# Patient Record
Sex: FEMALE | Race: BLACK OR AFRICAN AMERICAN | NOT HISPANIC OR LATINO | Employment: OTHER | URBAN - METROPOLITAN AREA
[De-identification: names, ages, dates, MRNs, and addresses within clinical notes are randomized per-mention and may not be internally consistent; named-entity substitution may affect disease eponyms.]

---

## 2018-11-17 DIAGNOSIS — J84.9 ILD (INTERSTITIAL LUNG DISEASE): ICD-10-CM

## 2018-11-17 DIAGNOSIS — R06.09 DOE (DYSPNEA ON EXERTION): Primary | ICD-10-CM

## 2018-11-20 ENCOUNTER — HOSPITAL ENCOUNTER (OUTPATIENT)
Dept: PULMONOLOGY | Facility: CLINIC | Age: 63
Discharge: HOME | End: 2018-11-20
Payer: MEDICARE

## 2018-11-20 ENCOUNTER — OFFICE VISIT (OUTPATIENT)
Dept: PULMONOLOGY | Facility: CLINIC | Age: 63
End: 2018-11-20
Payer: MEDICARE

## 2018-11-20 ENCOUNTER — TELEPHONE (OUTPATIENT)
Dept: TRANSPLANT | Facility: CLINIC | Age: 63
End: 2018-11-20

## 2018-11-20 ENCOUNTER — HOSPITAL ENCOUNTER (OUTPATIENT)
Dept: RADIOLOGY | Facility: HOSPITAL | Age: 63
Discharge: HOME | End: 2018-11-20
Attending: INTERNAL MEDICINE
Payer: MEDICARE

## 2018-11-20 VITALS
WEIGHT: 200 LBS | HEIGHT: 67 IN | HEART RATE: 94 BPM | SYSTOLIC BLOOD PRESSURE: 112 MMHG | BODY MASS INDEX: 31.39 KG/M2 | OXYGEN SATURATION: 97 % | DIASTOLIC BLOOD PRESSURE: 76 MMHG

## 2018-11-20 DIAGNOSIS — R06.09 DOE (DYSPNEA ON EXERTION): ICD-10-CM

## 2018-11-20 DIAGNOSIS — J84.9 ILD (INTERSTITIAL LUNG DISEASE): ICD-10-CM

## 2018-11-20 DIAGNOSIS — Z01.818 ENCOUNTER FOR PRE-TRANSPLANT EVALUATION FOR LUNG TRANSPLANT: ICD-10-CM

## 2018-11-20 DIAGNOSIS — Z76.82 LUNG TRANSPLANT CANDIDATE: ICD-10-CM

## 2018-11-20 DIAGNOSIS — J84.9 ILD (INTERSTITIAL LUNG DISEASE): Primary | ICD-10-CM

## 2018-11-20 LAB
PRE FEV1 FVC: 88
PRE FEV1: 1.2
PRE FVC: 1.36
PREDICTED FEV1 FVC: 78
PREDICTED FEV1: 2.63
PREDICTED FVC: 3.33

## 2018-11-20 PROCEDURE — 71046 X-RAY EXAM CHEST 2 VIEWS: CPT | Mod: TC,FY

## 2018-11-20 PROCEDURE — 94727 GAS DIL/WSHOT DETER LNG VOL: CPT | Mod: 26,S$PBB,, | Performed by: INTERNAL MEDICINE

## 2018-11-20 PROCEDURE — 94729 DIFFUSING CAPACITY: CPT | Mod: PBBFAC | Performed by: INTERNAL MEDICINE

## 2018-11-20 PROCEDURE — 99204 OFFICE O/P NEW MOD 45 MIN: CPT | Mod: 25,S$PBB,, | Performed by: INTERNAL MEDICINE

## 2018-11-20 PROCEDURE — 99213 OFFICE O/P EST LOW 20 MIN: CPT | Mod: PBBFAC,25 | Performed by: INTERNAL MEDICINE

## 2018-11-20 PROCEDURE — 71046 X-RAY EXAM CHEST 2 VIEWS: CPT | Mod: 26,,, | Performed by: RADIOLOGY

## 2018-11-20 PROCEDURE — 94010 BREATHING CAPACITY TEST: CPT | Mod: PBBFAC | Performed by: INTERNAL MEDICINE

## 2018-11-20 PROCEDURE — 94010 BREATHING CAPACITY TEST: CPT | Mod: 26,S$PBB,, | Performed by: INTERNAL MEDICINE

## 2018-11-20 PROCEDURE — 94729 DIFFUSING CAPACITY: CPT | Mod: 26,S$PBB,, | Performed by: INTERNAL MEDICINE

## 2018-11-20 PROCEDURE — 99999 PR PBB SHADOW E&M-EST. PATIENT-LVL III: CPT | Mod: PBBFAC,,, | Performed by: INTERNAL MEDICINE

## 2018-11-20 PROCEDURE — 94727 GAS DIL/WSHOT DETER LNG VOL: CPT | Mod: PBBFAC | Performed by: INTERNAL MEDICINE

## 2018-11-20 RX ORDER — DILTIAZEM HYDROCHLORIDE 120 MG/1
120 CAPSULE, EXTENDED RELEASE ORAL DAILY
COMMUNITY
Start: 2018-11-07 | End: 2020-01-01

## 2018-11-20 RX ORDER — ALBUTEROL SULFATE 90 UG/1
1 AEROSOL, METERED RESPIRATORY (INHALATION) EVERY 6 HOURS PRN
Status: ON HOLD | COMMUNITY
Start: 2018-10-31 | End: 2020-01-01 | Stop reason: HOSPADM

## 2018-11-20 RX ORDER — NINTEDANIB 150 MG/1
CAPSULE ORAL
Refills: 6 | Status: ON HOLD | COMMUNITY
Start: 2018-10-22 | End: 2020-01-01 | Stop reason: HOSPADM

## 2018-11-20 RX ORDER — GABAPENTIN 100 MG/1
100 CAPSULE ORAL 3 TIMES DAILY
Status: ON HOLD | COMMUNITY
Start: 2018-11-12 | End: 2020-01-01 | Stop reason: SDUPTHER

## 2018-11-20 RX ORDER — LOSARTAN POTASSIUM AND HYDROCHLOROTHIAZIDE 25; 100 MG/1; MG/1
1 TABLET ORAL DAILY
COMMUNITY
Start: 2018-11-12 | End: 2020-01-01

## 2018-11-20 RX ORDER — FLUTICASONE PROPIONATE AND SALMETEROL 50; 250 UG/1; UG/1
1 POWDER RESPIRATORY (INHALATION) 2 TIMES DAILY
Status: ON HOLD | COMMUNITY
Start: 2018-11-12 | End: 2020-01-01 | Stop reason: HOSPADM

## 2018-11-20 NOTE — TELEPHONE ENCOUNTER
Received call from Dr. Costa stating that patient was inadvertently referred to him instead of the transplant clinic.  He asked if one of our providers was available to see the patient today.  Explained that we do not have any one available today, but that we also need to get financial clearance to schedule testing and consult.  Explained that I would be in contact with the patient to schedule.  He will drop records off in the transplant clinic.      Attempted to contact patient, phone went straight to message stating that the subscriber is not available.  Contacted patient's daughter who states she is with her mom.  Offered to place them at the [facility] overnight and schedule her to be seen tomorrow afternoon.  She states that they have several people with them and would not be able to stay the night.  Explained that in that case, we could schedule her next week.  She states that would be better.

## 2018-11-20 NOTE — LETTER
11/23/2018    Nitin Ariza  [address]  [place] [address]  Phone: [phone]  Fax: [phone]         Dear Dr. Nitin Ariza    Patient: Chely Becerra     MR Number: [id number]     YOB: 1955       Thank you for the referral of Chely Becerra to our lung transplant program. An initial appointment with the transplant team has been scheduled for November 29, 2018. You will receive an after-visit summary following the completion of your patients appointment in our clinic.    Thank you again for your trust in our program. If there is anything we can do for you or your staff, please feel free to contact us at [phone].    Sincerely,         Niraj Garza MD   Director, Lung Transplantation   Pulmonary & Critical Care Medicine    [facility]  [address] [place] [address]  [phone]

## 2018-11-21 NOTE — PROGRESS NOTES
Subjective:      Patient ID: Chely Becerra is a 63 y.o. female.    Chief Complaint: No chief complaint on file.    HPI  Pleasant 62 yo AA female from [place], [place] is referred by Dr. Isidro for evaluation of possible lung transplant for management of interstitial lung disease She was diagnosed in 2013 with fibrotic lung disease and has been on OFEV for three years and de saturated with walking. In my office she dropped for 95% to 84% walking the length of the branch and back. She brings complete medical records with her.   Her PFT's today show severe Restriction  FVC: 47% and DLCO 43%  Her chest x- ray has bilateral volume loss and fibrotic changes.   She has had a cholecystectomy  and resection of a benign colon mass.No other surgeries and otherwise enjoys good  Health. No hx of cardiac issues or diabetes.  She wound up in my pulmonary clinic,her appointment should have been initially set up in the transplant clinic. They will review my note and her outside records and then contact the patient.      No flowsheet data found.  Review of Systems   Constitutional: Negative.    HENT: Negative.    Eyes: Negative.    Respiratory: Negative.  Positive for dyspnea on extertion. Negative for somnolence.         ILD  [id number]     Currently on OFEV and supplemental oxygen.   Cardiovascular: Negative.    Genitourinary: Negative.    Musculoskeletal: Negative.    Skin: Negative.    Gastrointestinal: Negative.         S.P Cholecystectomy    SP Resection of a benign colon mass   Neurological: Negative.    Psychiatric/Behavioral: Negative.      Objective:     Physical Exam   Constitutional: She is oriented to person, place, and time. She appears well-developed and well-nourished. No distress.   HENT:   Head: Normocephalic and atraumatic.   Right Ear: External ear normal.   Left Ear: External ear normal.   Nose: Nose normal.   Mouth/Throat: Oropharynx is clear and moist.   Eyes: Conjunctivae and EOM are normal. Pupils are equal,  round, and reactive to light.   Neck: Normal range of motion. Neck supple. No JVD present. No thyromegaly present.   Cardiovascular: Normal rate, regular rhythm, normal heart sounds and intact distal pulses. Exam reveals no gallop.   No murmur heard.  Pulmonary/Chest: Breath sounds normal. No stridor. No respiratory distress. She has no wheezes. She has no rales. She exhibits no tenderness.   Faint bi basilar crackles.    No digital clubbing   Abdominal: Soft. Bowel sounds are normal. She exhibits no distension and no mass. There is no tenderness. There is no rebound and no guarding.   Musculoskeletal: Normal range of motion. She exhibits no edema.   Lymphadenopathy:     She has no cervical adenopathy.   Neurological: She is alert and oriented to person, place, and time. She has normal reflexes. She displays normal reflexes. No cranial nerve deficit.   Skin: Skin is warm and dry. No rash noted.   Psychiatric: She has a normal mood and affect. Her behavior is normal. Judgment and thought content normal.   Nursing note and vitals reviewed.      Assessment:     1. ILD (interstitial lung disease)    2. Encounter for pre-transplant evaluation for lung transplant      Outpatient Encounter Medications as of 11/20/2018   Medication Sig Dispense Refill    ADVAIR DISKUS 250-50 mcg/dose diskus inhaler       diltiaZEM HCl (TIAZAC) 120 mg 24 hr capsule       gabapentin (NEURONTIN) 100 MG capsule       losartan-hydrochlorothiazide 100-25 mg (HYZAAR) 100-25 mg per tablet       OFEV 150 mg Cap TAKE 1 CAPSULE BY MOUTH TWICE A DAY  EVERY 12 HOURS  AS DIRECTED WITH FOOD  6    VENTOLIN HFA 90 mcg/actuation inhaler        No facility-administered encounter medications on file as of 11/20/2018.        Plan:   To refer to the lung transplant clinic for review and hopefully they will contact her and ask her to return.  Problem List Items Addressed This Visit     None      Visit Diagnoses     ILD (interstitial lung disease)    -   Primary    Relevant Orders    X-Ray Chest PA And Lateral (Completed)    Encounter for pre-transplant evaluation for lung transplant

## 2018-11-23 NOTE — TELEPHONE ENCOUNTER
Notified patient that she is scheduled for consultation on 11/29 starting at 9:15.  Explained that the consult appointment is where a patient's candidacy to move forward in the evaluation process is determined.  Explained that at this time, she is not a candidate to move forward regardless of what her test results show since she does not have adequate insurance coverage.  Explained that she needs medication coverage (Part D), and a supplement (Part F) in order to move forward in the process should she be a candidate to do so.  Advised that she call Medicare to determine options.  Pt verbalized understanding of all points discussed.

## 2018-11-26 ENCOUNTER — TELEPHONE (OUTPATIENT)
Dept: TRANSPLANT | Facility: CLINIC | Age: 63
End: 2018-11-26

## 2018-11-26 NOTE — TELEPHONE ENCOUNTER
Pt would like to come on 12/13 so her daughter can accompany her.  Scheduling card placed on 's desk.    ----- Message from Azucena Blair sent at 11/26/2018  3:20 PM CST -----  Contact: [phone]  /[phone]  Pt is calling to speak with the nurse to request if possible to reschedule her appt.    Please contact pt      Thank you!

## 2018-12-03 ENCOUNTER — TELEPHONE (OUTPATIENT)
Dept: TRANSPLANT | Facility: CLINIC | Age: 63
End: 2018-12-03

## 2018-12-13 ENCOUNTER — HOSPITAL ENCOUNTER (OUTPATIENT)
Dept: PULMONOLOGY | Facility: CLINIC | Age: 63
Discharge: HOME | End: 2018-12-13
Payer: MEDICARE

## 2018-12-13 ENCOUNTER — HOSPITAL ENCOUNTER (OUTPATIENT)
Dept: CARDIOLOGY | Facility: CLINIC | Age: 63
Discharge: HOME | End: 2018-12-13
Attending: INTERNAL MEDICINE
Payer: MEDICARE

## 2018-12-13 ENCOUNTER — INITIAL CONSULT (OUTPATIENT)
Dept: TRANSPLANT | Facility: CLINIC | Age: 63
End: 2018-12-13
Payer: MEDICARE

## 2018-12-13 ENCOUNTER — HOSPITAL ENCOUNTER (OUTPATIENT)
Dept: RADIOLOGY | Facility: HOSPITAL | Age: 63
Discharge: HOME | End: 2018-12-13
Attending: INTERNAL MEDICINE
Payer: MEDICARE

## 2018-12-13 VITALS
RESPIRATION RATE: 20 BRPM | WEIGHT: 201.06 LBS | OXYGEN SATURATION: 96 % | HEIGHT: 66 IN | BODY MASS INDEX: 32.31 KG/M2 | SYSTOLIC BLOOD PRESSURE: 123 MMHG | DIASTOLIC BLOOD PRESSURE: 62 MMHG | HEART RATE: 75 BPM | WEIGHT: 201.06 LBS | TEMPERATURE: 98 F | HEIGHT: 66 IN | BODY MASS INDEX: 32.31 KG/M2

## 2018-12-13 VITALS
DIASTOLIC BLOOD PRESSURE: 74 MMHG | HEIGHT: 67 IN | WEIGHT: 200 LBS | HEART RATE: 73 BPM | SYSTOLIC BLOOD PRESSURE: 110 MMHG | BODY MASS INDEX: 31.39 KG/M2

## 2018-12-13 DIAGNOSIS — Z76.82 LUNG TRANSPLANT CANDIDATE: ICD-10-CM

## 2018-12-13 DIAGNOSIS — J84.9 ILD (INTERSTITIAL LUNG DISEASE): ICD-10-CM

## 2018-12-13 DIAGNOSIS — J84.112 IPF (IDIOPATHIC PULMONARY FIBROSIS): Primary | ICD-10-CM

## 2018-12-13 DIAGNOSIS — E66.9 OBESITY (BMI 30.0-34.9): ICD-10-CM

## 2018-12-13 DIAGNOSIS — J96.11 CHRONIC RESPIRATORY FAILURE WITH HYPOXIA: ICD-10-CM

## 2018-12-13 LAB
ASCENDING AORTA: 3.26 CM
AV INDEX (PROSTH): 0.84
AV MEAN GRADIENT: 3.88 MMHG
AV PEAK GRADIENT: 7.84 MMHG
AV VALVE AREA: 2.96 CM2
BSA FOR ECHO PROCEDURE: 2.07 M2
CV ECHO LV RWT: 0.24 CM
DOP CALC AO PEAK VEL: 1.4 M/S
DOP CALC AO VTI: 26.56 CM
DOP CALC LVOT AREA: 3.53 CM2
DOP CALC LVOT DIAMETER: 2.12 CM
DOP CALC LVOT STROKE VOLUME: 78.71 CM3
DOP CALCLVOT PEAK VEL VTI: 22.31 CM
E WAVE DECELERATION TIME: 189.84 MSEC
E/A RATIO: 1.18
E/E' RATIO: 8.11
ECHO LV POSTERIOR WALL: 0.57 CM (ref 0.6–1.1)
FRACTIONAL SHORTENING: 34 % (ref 28–44)
INTERVENTRICULAR SEPTUM: 0.93 CM (ref 0.6–1.1)
IVRT: 0.13 MSEC
LA MAJOR: 5.32 CM
LA MINOR: 5.29 CM
LA WIDTH: 4.08 CM
LEFT ATRIUM SIZE: 3.97 CM
LEFT ATRIUM VOLUME INDEX: 36.1 ML/M2
LEFT ATRIUM VOLUME: 73.04 CM3
LEFT INTERNAL DIMENSION IN SYSTOLE: 3.11 CM (ref 2.1–4)
LEFT VENTRICLE DIASTOLIC VOLUME INDEX: 50.21 ML/M2
LEFT VENTRICLE DIASTOLIC VOLUME: 101.52 ML
LEFT VENTRICLE MASS INDEX: 55.2 G/M2
LEFT VENTRICLE SYSTOLIC VOLUME INDEX: 18.8 ML/M2
LEFT VENTRICLE SYSTOLIC VOLUME: 38.07 ML
LEFT VENTRICULAR INTERNAL DIMENSION IN DIASTOLE: 4.68 CM (ref 3.5–6)
LEFT VENTRICULAR MASS: 111.69 G
LV LATERAL E/E' RATIO: 5.92
LV SEPTAL E/E' RATIO: 12.83
MV PEAK A VEL: 0.65 M/S
MV PEAK E VEL: 0.77 M/S
PISA TR MAX VEL: 2.74 M/S
PULM VEIN S/D RATIO: 1.2
PV PEAK D VEL: 0.44 M/S
PV PEAK S VEL: 0.53 M/S
PV PEAK VELOCITY: 0.8 CM/S
RA MAJOR: 5.37 CM
RA WIDTH: 3.81 CM
RIGHT VENTRICULAR END-DIASTOLIC DIMENSION: 3 CM
RV TISSUE DOPPLER FREE WALL SYSTOLIC VELOCITY 1 (APICAL 4 CHAMBER VIEW): 13.34 M/S
SINUS: 3.29 CM
STJ: 2.87 CM
TDI LATERAL: 0.13
TDI SEPTAL: 0.06
TDI: 0.1
TR MAX PG: 30.03 MMHG
TRICUSPID ANNULAR PLANE SYSTOLIC EXCURSION: 1.01 CM

## 2018-12-13 PROCEDURE — 71250 CT THORAX DX C-: CPT | Mod: 26,TXP,, | Performed by: RADIOLOGY

## 2018-12-13 PROCEDURE — 94618 PULMONARY STRESS TESTING: CPT | Mod: 26,S$PBB,TXP, | Performed by: INTERNAL MEDICINE

## 2018-12-13 PROCEDURE — 93306 TTE W/DOPPLER COMPLETE: CPT | Mod: PBBFAC,TXP | Performed by: INTERNAL MEDICINE

## 2018-12-13 PROCEDURE — 94618 PULMONARY STRESS TESTING: CPT | Mod: PBBFAC,TXP | Performed by: INTERNAL MEDICINE

## 2018-12-13 PROCEDURE — 99213 OFFICE O/P EST LOW 20 MIN: CPT | Mod: PBBFAC,25,TXP | Performed by: INTERNAL MEDICINE

## 2018-12-13 PROCEDURE — 71250 CT THORAX DX C-: CPT | Mod: TC,TXP

## 2018-12-13 PROCEDURE — 99999 PR PBB SHADOW E&M-EST. PATIENT-LVL III: CPT | Mod: PBBFAC,TXP,, | Performed by: INTERNAL MEDICINE

## 2018-12-13 PROCEDURE — 99204 OFFICE O/P NEW MOD 45 MIN: CPT | Mod: 25,S$PBB,TXP, | Performed by: INTERNAL MEDICINE

## 2018-12-13 RX ORDER — LOPERAMIDE HCL 2 MG
2 TABLET ORAL 4 TIMES DAILY PRN
Status: ON HOLD | COMMUNITY
End: 2020-01-01 | Stop reason: HOSPADM

## 2018-12-13 RX ORDER — CALCIUM CARBONATE 200(500)MG
1 TABLET,CHEWABLE ORAL DAILY
COMMUNITY
End: 2020-01-01

## 2018-12-13 RX ORDER — CALC/MAG/B COMPLEX/D3/HERB 61
15 TABLET ORAL DAILY
Status: ON HOLD | COMMUNITY
End: 2020-01-01 | Stop reason: SDUPTHER

## 2018-12-13 NOTE — LETTER
December 14, 2018        Nitin Ariza  [address] [facility] [address]  [address]  [place] [address]  Phone: [phone]  Fax: [phone]             [facility]  [address]  [place] [address]  Phone: [phone]   Patient: Chely Becerra   MR Number: [id number]   YOB: 1955   Date of Visit: 12/13/2018       Dear Dr. Nitin Ariza    Thank you for referring Chely Becerra to me for evaluation. Attached you will find relevant portions of my assessment and plan of care.    If you have questions, please do not hesitate to call me. I look forward to following Chely Becerra along with you.    Sincerely,    Niraj Garza MD    Enclosure    If you would like to receive this communication electronically, please contact [email or web address] or [phone] to request [employer] access.    [employer] is a tool which provides read-only access to select patient information with whom you have a relationship. Its easy to use and provides real time access to review your patients record including encounter summaries, notes, results, and demographic information.    If you feel you have received this communication in error or would no longer like to receive these types of communications, please e-mail [email or web address]

## 2018-12-14 ENCOUNTER — TELEPHONE (OUTPATIENT)
Dept: TRANSPLANT | Facility: CLINIC | Age: 63
End: 2018-12-14

## 2018-12-14 DIAGNOSIS — J84.9 ILD (INTERSTITIAL LUNG DISEASE): Primary | ICD-10-CM

## 2018-12-14 DIAGNOSIS — Z76.82 LUNG TRANSPLANT CANDIDATE: ICD-10-CM

## 2018-12-14 NOTE — TELEPHONE ENCOUNTER
Notified patient and her daughter that clearance for eval has been obtained.  They requested to come the week of January 14th.  Explained that appt info would be mailed to the pt.  Mailed list of hotels to patient's daughter.

## 2018-12-14 NOTE — PROCEDURES
Chely Becerra is a 63 y.o.  female patient, who presents for a 6 minute walk test ordered by Prudencio Garza MD.  The diagnosis is Pre Lung Transplant Evaluation; Pulmonary Fibrosis.  The patient's BMI is 32.5 kg/m2.  Predicted distance (lower limit of normal) is 319.57 meters.      Test Results:    The test was completed without stopping.  The total time walked was 360 seconds.  During walking, the patient reported:  Dyspnea.  The patient used supplemental oxygen during testing.     12/13/2018---------Distance: 365.76 meters (1200 feet)     O2 Sat % Supplemental Oxygen Heart Rate Blood Pressure Jaclyn Scale   Pre-exercise  (Resting) 99 % 2 L/M 81 bpm 114/65 mmHg 0   During Exercise 88 % 2 L/M 101 bpm 133/69 mmHg 1   Post-exercise  (Recovery) 96 % 2 L/M  93 bpm       Recovery Time:  87 seconds    Performing nurse/tech:  Lola LOCO      PREVIOUS STUDY:   The patient has not had a previous study.      CLINICAL INTERPRETATION:  Six minute walk distance is 365.76 meters (1200 feet) with very light dyspnea.  During exercise, there was significant desaturation while breathing supplemental oxygen.  Blood pressure remained stable and Heart rate increased significantly with walking.  The patient did not report non-pulmonary symptoms during exercise.  No previous study performed.  Based upon age and body mass index, exercise capacity is normal.

## 2018-12-18 DIAGNOSIS — Z01.818 PRE-TRANSPLANT EVALUATION FOR LUNG TRANSPLANT: Primary | ICD-10-CM

## 2018-12-18 RX ORDER — DEXTROSE MONOHYDRATE AND SODIUM CHLORIDE 5; .45 G/100ML; G/100ML
INJECTION, SOLUTION INTRAVENOUS CONTINUOUS
Status: CANCELLED | OUTPATIENT
Start: 2018-12-18

## 2018-12-18 RX ORDER — DIPHENHYDRAMINE HCL 25 MG
50 CAPSULE ORAL ONCE
Status: CANCELLED | OUTPATIENT
Start: 2018-12-18 | End: 2018-12-18

## 2019-01-01 ENCOUNTER — HOSPITAL ENCOUNTER (OUTPATIENT)
Dept: RADIOLOGY | Facility: HOSPITAL | Age: 64
Discharge: HOME | End: 2019-12-19
Attending: INTERNAL MEDICINE
Payer: MEDICARE

## 2019-01-01 ENCOUNTER — TELEPHONE (OUTPATIENT)
Dept: TRANSPLANT | Facility: CLINIC | Age: 64
End: 2019-01-01

## 2019-01-01 ENCOUNTER — HOSPITAL ENCOUNTER (OUTPATIENT)
Dept: PULMONOLOGY | Facility: CLINIC | Age: 64
Discharge: HOME | End: 2019-12-19
Payer: MEDICARE

## 2019-01-01 ENCOUNTER — HOSPITAL ENCOUNTER (OUTPATIENT)
Dept: CARDIOLOGY | Facility: CLINIC | Age: 64
Discharge: HOME | End: 2019-12-19
Attending: INTERNAL MEDICINE
Payer: MEDICARE

## 2019-01-01 ENCOUNTER — OFFICE VISIT (OUTPATIENT)
Dept: TRANSPLANT | Facility: CLINIC | Age: 64
End: 2019-01-01
Payer: MEDICARE

## 2019-01-01 VITALS
BODY MASS INDEX: 30.45 KG/M2 | SYSTOLIC BLOOD PRESSURE: 132 MMHG | BODY MASS INDEX: 30.61 KG/M2 | TEMPERATURE: 98 F | WEIGHT: 195 LBS | WEIGHT: 194 LBS | DIASTOLIC BLOOD PRESSURE: 76 MMHG | OXYGEN SATURATION: 94 % | HEIGHT: 67 IN | HEART RATE: 69 BPM | HEIGHT: 67 IN | RESPIRATION RATE: 20 BRPM

## 2019-01-01 VITALS
HEIGHT: 67 IN | BODY MASS INDEX: 30.13 KG/M2 | DIASTOLIC BLOOD PRESSURE: 73 MMHG | WEIGHT: 192 LBS | SYSTOLIC BLOOD PRESSURE: 151 MMHG | HEART RATE: 68 BPM

## 2019-01-01 DIAGNOSIS — Z76.82 AWAITING TRANSPLANTATION OF LUNG: ICD-10-CM

## 2019-01-01 DIAGNOSIS — J84.112 IPF (IDIOPATHIC PULMONARY FIBROSIS): ICD-10-CM

## 2019-01-01 DIAGNOSIS — E04.1 THYROID CYST: ICD-10-CM

## 2019-01-01 DIAGNOSIS — J84.112 IPF (IDIOPATHIC PULMONARY FIBROSIS): Primary | ICD-10-CM

## 2019-01-01 DIAGNOSIS — J96.11 CHRONIC RESPIRATORY FAILURE WITH HYPOXIA: ICD-10-CM

## 2019-01-01 DIAGNOSIS — E66.9 OBESITY (BMI 30.0-34.9): ICD-10-CM

## 2019-01-01 LAB
ALLENS TEST: ABNORMAL
ASCENDING AORTA: 3.17 CM
AV INDEX (PROSTH): 0.82
AV MEAN GRADIENT: 5 MMHG
AV PEAK GRADIENT: 9 MMHG
AV VALVE AREA: 2.72 CM2
AV VELOCITY RATIO: 0.87
BSA FOR ECHO PROCEDURE: 2.03 M2
CV ECHO LV RWT: 0.39 CM
DELSYS: ABNORMAL
DLCO ADJ PRE: 8.63 ML/(MIN*MMHG) (ref 18.54–30)
DLCO SINGLE BREATH LLN: 18.54
DLCO SINGLE BREATH PRE REF: 33.6 %
DLCO SINGLE BREATH REF: 24.27
DLCOC SBVA LLN: 3.14
DLCOC SBVA PRE REF: 85.9 %
DLCOC SBVA REF: 4.47
DLCOC SINGLE BREATH LLN: 18.54
DLCOC SINGLE BREATH PRE REF: 35.6 %
DLCOC SINGLE BREATH REF: 24.27
DLCOCSBVAULN: 5.8
DLCOCSINGLEBREATHULN: 30
DLCOSINGLEBREATHULN: 30
DLCOVA LLN: 3.14
DLCOVA PRE REF: 81 %
DLCOVA PRE: 3.62 ML/(MIN*MMHG*L) (ref 3.14–5.8)
DLCOVA REF: 4.47
DLCOVAULN: 5.8
DLVAADJ PRE: 3.84 ML/(MIN*MMHG*L) (ref 3.14–5.8)
DOP CALC AO PEAK VEL: 1.47 M/S
DOP CALC AO VTI: 29.6 CM
DOP CALC LVOT AREA: 3.3 CM2
DOP CALC LVOT DIAMETER: 2.06 CM
DOP CALC LVOT PEAK VEL: 1.28 M/S
DOP CALC LVOT STROKE VOLUME: 80.62 CM3
DOP CALCLVOT PEAK VEL VTI: 24.2 CM
E WAVE DECELERATION TIME: 238.17 MSEC
E/A RATIO: 0.94
E/E' RATIO: 6.53 M/S
ECHO LV POSTERIOR WALL: 0.84 CM (ref 0.6–1.1)
FEF 25 75 LLN: 0.86
FEF 25 75 PRE REF: 128 %
FEF 25 75 REF: 2.04
FEV05 LLN: 1.1
FEV05 REF: 1.96
FEV1 FVC LLN: 67
FEV1 FVC PRE REF: 111.1 %
FEV1 FVC REF: 79
FEV1 LLN: 1.66
FEV1 PRE REF: 51.5 %
FEV1 REF: 2.3
FIO2: 0.27
FLOW: 2
FRACTIONAL SHORTENING: 37 % (ref 28–44)
FVC LLN: 2.14
FVC PRE REF: 46.1 %
FVC REF: 2.93
HCO3 UR-SCNC: 29.7 MMOL/L (ref 24–28)
INTERVENTRICULAR SEPTUM: 1.04 CM (ref 0.6–1.1)
IVC PRE: 1.44 L (ref 2.14–3.72)
IVC SINGLE BREATH LLN: 2.14
IVC SINGLE BREATH PRE REF: 49.1 %
IVC SINGLE BREATH REF: 2.93
IVCSINGLEBREATHULN: 3.72
IVRT: 0.08 MSEC
LA MAJOR: 5.08 CM
LA MINOR: 5.11 CM
LA WIDTH: 3.38 CM
LEFT ATRIUM SIZE: 3.78 CM
LEFT ATRIUM VOLUME INDEX: 27.8 ML/M2
LEFT ATRIUM VOLUME: 55.33 CM3
LEFT INTERNAL DIMENSION IN SYSTOLE: 2.71 CM (ref 2.1–4)
LEFT VENTRICLE DIASTOLIC VOLUME INDEX: 41.04 ML/M2
LEFT VENTRICLE DIASTOLIC VOLUME: 81.55 ML
LEFT VENTRICLE MASS INDEX: 65 G/M2
LEFT VENTRICLE SYSTOLIC VOLUME INDEX: 13.7 ML/M2
LEFT VENTRICLE SYSTOLIC VOLUME: 27.15 ML
LEFT VENTRICULAR INTERNAL DIMENSION IN DIASTOLE: 4.27 CM (ref 3.5–6)
LEFT VENTRICULAR MASS: 129.36 G
LV LATERAL E/E' RATIO: 5.17 M/S
LV SEPTAL E/E' RATIO: 8.86 M/S
MODE: ABNORMAL
MV PEAK A VEL: 0.66 M/S
MV PEAK E VEL: 0.62 M/S
PCO2 BLDA: 39.4 MMHG (ref 35–45)
PEF LLN: 3.76
PEF PRE REF: 59.5 %
PEF REF: 5.95
PH SMN: 7.48 [PH] (ref 7.35–7.45)
PHYSICIAN COMMENT: ABNORMAL
PISA TR MAX VEL: 2.91 M/S
PO2 BLDA: 116 MMHG (ref 80–100)
POC BE: 6 MMOL/L
POC SATURATED O2: 99 % (ref 95–100)
POC TCO2: 31 MMOL/L (ref 23–27)
PRE DLCO: 8.14 ML/(MIN*MMHG) (ref 18.54–30)
PRE FEF 25 75: 2.61 L/S (ref 0.86–3.22)
PRE FET 100: 5.9 SEC
PRE FEV05 REF: 55.6 %
PRE FEV1 FVC: 87.86 % (ref 67.22–91.01)
PRE FEV1: 1.19 L (ref 1.66–2.94)
PRE FEV5: 1.09 L (ref 1.1–2.81)
PRE FVC: 1.35 L (ref 2.14–3.72)
PRE PEF: 3.54 L/S (ref 3.76–8.15)
PULM VEIN S/D RATIO: 1.51
PV PEAK D VEL: 0.53 M/S
PV PEAK S VEL: 0.8 M/S
RA MAJOR: 3.86 CM
RA PRESSURE: 3 MMHG
RA WIDTH: 3.52 CM
RIGHT VENTRICULAR END-DIASTOLIC DIMENSION: 2.75 CM
RV TISSUE DOPPLER FREE WALL SYSTOLIC VELOCITY 1 (APICAL 4 CHAMBER VIEW): 10.51 CM/S
SAMPLE: ABNORMAL
SINUS: 2.87 CM
SITE: ABNORMAL
STJ: 3.23 CM
TDI LATERAL: 0.12 M/S
TDI SEPTAL: 0.07 M/S
TDI: 0.1 M/S
TR MAX PG: 34 MMHG
TRICUSPID ANNULAR PLANE SYSTOLIC EXCURSION: 1.94 CM
TV REST PULMONARY ARTERY PRESSURE: 37 MMHG
VA PRE: 2.24 L (ref 5.28–5.28)
VA SINGLE BREATH LLN: 5.28
VA SINGLE BREATH PRE REF: 42.4 %
VA SINGLE BREATH REF: 5.28
VASINGLEBREATHULN: 5.28

## 2019-01-01 PROCEDURE — 76536 US EXAM OF HEAD AND NECK: CPT | Mod: TC,TXP

## 2019-01-01 PROCEDURE — 71250 CT THORAX DX C-: CPT | Mod: 26,TXP,, | Performed by: RADIOLOGY

## 2019-01-01 PROCEDURE — 99213 OFFICE O/P EST LOW 20 MIN: CPT | Mod: PBBFAC,25,TXP | Performed by: INTERNAL MEDICINE

## 2019-01-01 PROCEDURE — 36600 PR WITHDRAWAL OF ARTERIAL BLOOD: ICD-10-PCS | Mod: S$PBB,TXP,, | Performed by: INTERNAL MEDICINE

## 2019-01-01 PROCEDURE — 99213 OFFICE O/P EST LOW 20 MIN: CPT | Mod: 25,S$PBB,TXP, | Performed by: INTERNAL MEDICINE

## 2019-01-01 PROCEDURE — 82803 BLOOD GASES ANY COMBINATION: CPT | Mod: PBBFAC,TXP | Performed by: INTERNAL MEDICINE

## 2019-01-01 PROCEDURE — 93306 TTE W/DOPPLER COMPLETE: CPT | Mod: PBBFAC,TXP | Performed by: INTERNAL MEDICINE

## 2019-01-01 PROCEDURE — 99999 PR PBB SHADOW E&M-EST. PATIENT-LVL III: ICD-10-PCS | Mod: PBBFAC,TXP,, | Performed by: INTERNAL MEDICINE

## 2019-01-01 PROCEDURE — 76536 US EXAM OF HEAD AND NECK: CPT | Mod: 26,TXP,, | Performed by: RADIOLOGY

## 2019-01-01 PROCEDURE — 36600 WITHDRAWAL OF ARTERIAL BLOOD: CPT | Mod: PBBFAC,TXP | Performed by: INTERNAL MEDICINE

## 2019-01-01 PROCEDURE — 94010 BREATHING CAPACITY TEST: CPT | Mod: PBBFAC,TXP | Performed by: INTERNAL MEDICINE

## 2019-01-01 PROCEDURE — 71250 CT THORAX DX C-: CPT | Mod: TC,TXP

## 2019-01-01 PROCEDURE — 94729 DIFFUSING CAPACITY: CPT | Mod: 26,S$PBB,TXP, | Performed by: INTERNAL MEDICINE

## 2019-01-01 PROCEDURE — 94010 BREATHING CAPACITY TEST: ICD-10-PCS | Mod: 26,S$PBB,TXP, | Performed by: INTERNAL MEDICINE

## 2019-01-01 PROCEDURE — 94729 DIFFUSING CAPACITY: CPT | Mod: PBBFAC,TXP | Performed by: INTERNAL MEDICINE

## 2019-01-01 PROCEDURE — 99999 PR PBB SHADOW E&M-EST. PATIENT-LVL III: CPT | Mod: PBBFAC,TXP,, | Performed by: INTERNAL MEDICINE

## 2019-01-01 PROCEDURE — 94618 PULMONARY STRESS TESTING: CPT | Mod: 26,S$PBB,TXP, | Performed by: INTERNAL MEDICINE

## 2019-01-01 PROCEDURE — 93306 ECHO (CUPID ONLY): ICD-10-PCS | Mod: 26,S$PBB,TXP, | Performed by: INTERNAL MEDICINE

## 2019-01-01 PROCEDURE — 94618 PULMONARY STRESS TESTING: CPT | Mod: PBBFAC,TXP | Performed by: INTERNAL MEDICINE

## 2019-01-01 PROCEDURE — 99213 PR OFFICE/OUTPT VISIT, EST, LEVL III, 20-29 MIN: ICD-10-PCS | Mod: 25,S$PBB,TXP, | Performed by: INTERNAL MEDICINE

## 2019-01-01 PROCEDURE — 36600 WITHDRAWAL OF ARTERIAL BLOOD: CPT | Mod: S$PBB,TXP,, | Performed by: INTERNAL MEDICINE

## 2019-01-01 PROCEDURE — 94010 BREATHING CAPACITY TEST: CPT | Mod: 26,S$PBB,TXP, | Performed by: INTERNAL MEDICINE

## 2019-01-01 PROCEDURE — 94618 PULMONARY STRESS TESTING: ICD-10-PCS | Mod: 26,S$PBB,TXP, | Performed by: INTERNAL MEDICINE

## 2019-01-01 PROCEDURE — 71250 CT CHEST WITHOUT CONTRAST: ICD-10-PCS | Mod: 26,TXP,, | Performed by: RADIOLOGY

## 2019-01-01 PROCEDURE — 94729 PR C02/MEMBANE DIFFUSE CAPACITY: ICD-10-PCS | Mod: 26,S$PBB,TXP, | Performed by: INTERNAL MEDICINE

## 2019-01-01 PROCEDURE — 76536 US SOFT TISSUE HEAD NECK THYROID: ICD-10-PCS | Mod: 26,TXP,, | Performed by: RADIOLOGY

## 2019-01-02 ENCOUNTER — TELEPHONE (OUTPATIENT)
Dept: TRANSPLANT | Facility: CLINIC | Age: 64
End: 2019-01-02

## 2019-01-02 NOTE — TELEPHONE ENCOUNTER
----- Message from Katty Tnioco sent at 1/2/2019  2:48 PM CST -----  Contact: Patient   Needs Advice    Reason for call: patient would like to speak with coordinator concerning paper work           Communication Preference:[phone]    Additional Information: n/a    Contacted Ms. Becerra.  She inquired if the dental clearance needs to be completed before her evaluation testing on 1/14/19.  Informed Ms. Becerra that she does not have to see the dentist before the evaluation testing.  Also informed her that if she needs dental work completed, then it will need to be done before she is listed for lung transplant provided she is a suitable candidate.  She verbalized her understanding and stated that she will try and schedule an appointment prior to 1/14/19.  She denied having any further questions.

## 2019-01-09 NOTE — PROGRESS NOTES
Dr. Garza would like for patient to undergo evaluation testing. Pre transplant binder given to patient. She was asked to review the information it contains.  Discussed evaluation/selection/listing process. Explained the PCP and Dental forms and that they need to be completed and faxed to us. Pap due 2019, mammogram completed 2018, cscope done 10/2018 by Dr. Champagne. Notified patient and her daughters that because they live > 1 hour away from [facility], they would have to relocate to within 1 hour of [facility] for at least 3 months post discharge following transplant. Notified them that the patient would need a primary caregiver who could commit to being with her 24/7 for at least 3 months post discharge. She would also need two backup caregivers in the event that the primary can not be with them for any reason. Patient was told that the primary caregiver will need to accompany her to evaluation testing appointments.  All present verbalized understanding of all points discussed. Discussed the use of Hep C positive donors and explained that with HepCAb +/JENNY - donors, the chance of transmission is 16% and that with HepCAb+/JENNY + donors the risk of transmission is 100%.  Explained that with treatment, HepC is now 98% curable and that typically in patients who can not be cured, effects are not seen (cirrhosis) for decades. Explained that once financial clearance is obtained, I will contact her to schedule work up.

## 2019-01-14 ENCOUNTER — EDUCATION (OUTPATIENT)
Dept: CARDIOLOGY | Facility: CLINIC | Age: 64
End: 2019-01-14

## 2019-01-14 ENCOUNTER — HOSPITAL ENCOUNTER (OUTPATIENT)
Dept: RADIOLOGY | Facility: HOSPITAL | Age: 64
Discharge: HOME | End: 2019-01-14
Attending: INTERNAL MEDICINE
Payer: MEDICARE

## 2019-01-14 ENCOUNTER — HOSPITAL ENCOUNTER (OUTPATIENT)
Dept: RADIOLOGY | Facility: CLINIC | Age: 64
Discharge: HOME | End: 2019-01-14
Attending: INTERNAL MEDICINE
Payer: MEDICARE

## 2019-01-14 ENCOUNTER — INITIAL CONSULT (OUTPATIENT)
Dept: CARDIOLOGY | Facility: CLINIC | Age: 64
End: 2019-01-14
Payer: MEDICARE

## 2019-01-14 ENCOUNTER — CLINICAL SUPPORT (OUTPATIENT)
Dept: TRANSPLANT | Facility: CLINIC | Age: 64
End: 2019-01-14
Payer: MEDICARE

## 2019-01-14 VITALS
SYSTOLIC BLOOD PRESSURE: 106 MMHG | WEIGHT: 197.56 LBS | HEIGHT: 67 IN | DIASTOLIC BLOOD PRESSURE: 68 MMHG | OXYGEN SATURATION: 95 % | HEART RATE: 82 BPM | BODY MASS INDEX: 31.01 KG/M2

## 2019-01-14 DIAGNOSIS — J84.9 ILD (INTERSTITIAL LUNG DISEASE): ICD-10-CM

## 2019-01-14 DIAGNOSIS — Z76.82 LUNG TRANSPLANT CANDIDATE: Primary | ICD-10-CM

## 2019-01-14 DIAGNOSIS — Z98.890 HISTORY OF CORONARY ANGIOGRAM: ICD-10-CM

## 2019-01-14 DIAGNOSIS — Z76.82 LUNG TRANSPLANT CANDIDATE: ICD-10-CM

## 2019-01-14 PROCEDURE — 78597 LUNG PERFUSION DIFFERENTIAL: CPT | Mod: TC,TXP

## 2019-01-14 PROCEDURE — 78597 NM LUNG QUANT DIFFERENTIAL PERFUSION W IMAGING: ICD-10-PCS | Mod: 26,TXP,, | Performed by: RADIOLOGY

## 2019-01-14 PROCEDURE — 99203 OFFICE O/P NEW LOW 30 MIN: CPT | Mod: S$PBB,TXP,, | Performed by: INTERNAL MEDICINE

## 2019-01-14 PROCEDURE — 77080 DXA BONE DENSITY AXIAL: CPT | Mod: 26,TXP,, | Performed by: INTERNAL MEDICINE

## 2019-01-14 PROCEDURE — 77080 DEXA BONE DENSITY SPINE HIP: ICD-10-PCS | Mod: 26,TXP,, | Performed by: INTERNAL MEDICINE

## 2019-01-14 PROCEDURE — 99999 PR PBB SHADOW E&M-EST. PATIENT-LVL III: CPT | Mod: PBBFAC,TXP,, | Performed by: INTERNAL MEDICINE

## 2019-01-14 PROCEDURE — 99211 OFF/OP EST MAY X REQ PHY/QHP: CPT | Mod: PBBFAC,27,TXP,25

## 2019-01-14 PROCEDURE — 78597 LUNG PERFUSION DIFFERENTIAL: CPT | Mod: 26,TXP,, | Performed by: RADIOLOGY

## 2019-01-14 PROCEDURE — 99213 OFFICE O/P EST LOW 20 MIN: CPT | Mod: PBBFAC,TXP,25 | Performed by: INTERNAL MEDICINE

## 2019-01-14 PROCEDURE — 99999 PR PBB SHADOW E&M-EST. PATIENT-LVL I: CPT | Mod: PBBFAC,TXP,,

## 2019-01-14 PROCEDURE — 99999 PR PBB SHADOW E&M-EST. PATIENT-LVL I: ICD-10-PCS | Mod: PBBFAC,TXP,,

## 2019-01-14 PROCEDURE — 99999 PR PBB SHADOW E&M-EST. PATIENT-LVL III: ICD-10-PCS | Mod: PBBFAC,TXP,, | Performed by: INTERNAL MEDICINE

## 2019-01-14 PROCEDURE — 99203 PR OFFICE/OUTPT VISIT, NEW, LEVL III, 30-44 MIN: ICD-10-PCS | Mod: S$PBB,TXP,, | Performed by: INTERNAL MEDICINE

## 2019-01-14 PROCEDURE — 77080 DXA BONE DENSITY AXIAL: CPT | Mod: TC,TXP

## 2019-01-14 RX ORDER — BENZONATATE 200 MG/1
200 CAPSULE ORAL 3 TIMES DAILY PRN
Status: ON HOLD | COMMUNITY
End: 2020-01-01 | Stop reason: HOSPADM

## 2019-01-14 NOTE — PROGRESS NOTES
PRE EDUCATION NOTE    Met with Chely Becerra and her two daughters for pre-transplant education and to consent for lung transplant evaluation.  Pre-transplant educational binder given to patient.  Instructed patient to read the binder.  Thorough pre-transplant education conducted.    Information presented included:  · Evaluation process and testing  · Members of the transplant team  · Selection committee members and role of the committee  · Contraindications to lung transplantation  · Policy for absolute smoking / nicotine cessation for at least 6 months prior to listing  · Relocation pre- and post-transplant  · Listing process for transplant: explained the listing designations, active versus inactive (status 7), lung allocation score (LAS), and allocation of lungs within 250 nautical miles to the recipient with the highest LAS  · National graft survival statistics, our current survival statistics since reopening of the program to present  · Wait time on the list  · The need to reach patient within 15 minutes with donor offer  ·  [facility] donors with increased risk of disease transmission  · Donor criteria and testing on donor, procurement of donor lungs and ischemic time, blood transfusions, process for matching donor with recipient  · Transplant surgery, single vs. double, estimated length of surgery, surgical incision, chest tubes and purpose, and ventilator  · Post-transplant immunosuppression for life with the need to be able to afford post transplant medications, immediate post transplant ICU stay - extubation, explained the importance of getting out of bed (once extubated) and the importance of coughing and taking deep breaths despite the pain to prevent pneumonia  · Need for a caregiver to be with her at all times beginning with discharge from ICU and transfer to TSU, through at least the first 3 months post-transplant possibly longer  · Post-transplant medications, their side effects, and self  medications  · Discharge, follow-up clinic visits, testing with each visit, and surveillance bronchoscopies  · Rejection and treatment, how to reach team members at any time  · Health maintenance post-transplant, avoiding large crowds and sick contacts, wearing a mask, good handwashing, pet policy, fishing, dermatology, opthamology, and dental visits post-transplant  · Multiple listing information provided    Chely Becerra and her two daughters asked pertinent questions which were answered to their satisfaction.     Informed Consent to Undergo Evaluation for Lung Transplantation reviewed and discussed with patient and her two daughters.  Consent initialed and signed by patient.  Copy of consent given to patient.  Original to be sent to Medical Records for scanning.

## 2019-01-14 NOTE — LETTER
January 14, 2019      Niraj Garza MD  [address]  [facility] [address]           [facility]  [address]  [facility] [address]  Phone: [phone]          Patient: Chely Becerra   MR Number: [id number]   YOB: 1955   Date of Visit: 1/14/2019       Dear Dr. Niraj Garza:    Thank you for referring Chely Becerra to me for evaluation. Attached you will find relevant portions of my assessment and plan of care.    If you have questions, please do not hesitate to call me. I look forward to following Chely Becerra along with you.    Sincerely,    Jose Alegre MD    Enclosure  CC:  No Recipients    If you would like to receive this communication electronically, please contact [email or web address] or [phone] to request more information on [employer] access.    For providers and/or their staff who would like to refer a patient to [facility], please contact us through our one-stop-shop provider referral line, [facility] , at [phone].    If you feel you have received this communication in error or would no longer like to receive these types of communications, please e-mail [email or web address]

## 2019-01-14 NOTE — PROGRESS NOTES
"    Interventional Cardiology Clinic Note  Reason for Visit: Coronary angiogram for lung transplant work up    REF: Niraj Garza    HPI:   62 y/o F, referred to interventional cardiology clinic for coronary angiogram as part of the lung transplant work up. She has interstitial pulmonary fibrosis, diagnosed in 2012, at that time she was having exertional dyspnea especially while walking up a flight of stairs. She was refferred to a cardiologist Dr. Marshall, she was unable to walk much on the treadmill for the stress test and therefore taken for an angiogram, we dont have the report of the films but per patient angiogram was negative, but incidentally found to have fibrosis on the lungs noted on fluoroscopy. Referred to Dr. Ariza and eventually to Dr. Garza for lung transplant evaluation.     She was started on home O2 for exertion and rest starting in 12/2018, able to walk upto 2 blocks without resting with the O2, able to shop in [employer] with her O2. She denies any claudication, chest pain, syncope, PND, orthopnea.    She has no hx of MI/CAD/CVA, she has 20 pack year hx of smoking, quit smoking 2 years ago, not a diabetic, she has HTN. She has a fmhx of CAD, mother with MI at age 65 and brother with CAD.   TTE 12/2018: EF 60%, mild RV failure, mild biatrial enlargement.    ROS:    Review of Systems   Constitution: Negative.   HENT: Negative.    Eyes: Negative.    Cardiovascular: Negative.    Respiratory: Positive for shortness of breath.    Musculoskeletal: Negative.    Gastrointestinal: Negative.    Genitourinary: Negative.    Neurological: Negative.      PMH:     Vitals:    01/14/19 0821 01/14/19 0825   BP: 112/68 106/68   BP Location: Left arm Right arm   Patient Position: Sitting Sitting   BP Method: Large (Automatic) Large (Automatic)   Pulse: 82    SpO2: 95%    Weight: 89.6 kg (197 lb 8.5 oz)    Height: 5' 7" (1.702 m)      Past Medical History:   Diagnosis Date    Hypertension     IPF (idiopathic " "pulmonary fibrosis)      Past Surgical History:   Procedure Laterality Date    CHOLECYSTECTOMY      COLONOSCOPY      ESOPHAGOGASTRODUODENOSCOPY      HERNIA REPAIR      SMALL INTESTINE SURGERY      mass removed (benign)     Allergies:   Review of patient's allergies indicates:  No Known Allergies  Medications:     Current Outpatient Medications on File Prior to Visit   Medication Sig Dispense Refill    ADVAIR DISKUS 250-50 mcg/dose diskus inhaler Inhale 1 puff into the lungs 2 (two) times daily.       benzonatate (TESSALON) 200 MG capsule Take 200 mg by mouth 3 (three) times daily as needed for Cough.      calcium carbonate (TUMS) 200 mg calcium (500 mg) chewable tablet Take 1 tablet by mouth once daily.      diltiaZEM HCl (TIAZAC) 120 mg 24 hr capsule Take 120 mg by mouth once daily.       gabapentin (NEURONTIN) 100 MG capsule Take 100 mg by mouth 3 (three) times daily.       lansoprazole (PREVACID) 15 MG capsule Take 15 mg by mouth once daily.      loperamide (IMODIUM A-D) 2 mg Tab Take 2 mg by mouth 4 (four) times daily as needed.      losartan-hydrochlorothiazide 100-25 mg (HYZAAR) 100-25 mg per tablet Take 1 tablet by mouth once daily.       OFEV 150 mg Cap TAKE 1 CAPSULE BY MOUTH TWICE A DAY  EVERY 12 HOURS  AS DIRECTED WITH FOOD  6    VENTOLIN HFA 90 mcg/actuation inhaler Inhale 1 puff into the lungs every 6 (six) hours as needed.        No current facility-administered medications on file prior to visit.      Social History:     Social History     Tobacco Use    Smoking status: Former Smoker     Types: Cigarettes     Last attempt to quit: 2016     Years since quittin.0    Smokeless tobacco: Never Used   Substance Use Topics    Alcohol use: No     Frequency: Never     Family History:   History reviewed. No pertinent family history.  Physical Exam:   /68 (BP Location: Right arm, Patient Position: Sitting, BP Method: Large (Automatic))   Pulse 82   Ht 5' 7" (1.702 m)   Wt " 89.6 kg (197 lb 8.5 oz)   SpO2 95%   BMI 30.94 kg/m²      Physical Exam   Constitutional: She is oriented to person, place, and time. She appears well-developed and well-nourished.   HENT:   Head: Normocephalic and atraumatic.   Eyes: No scleral icterus.   Cardiovascular: Normal rate, regular rhythm and normal heart sounds.   No murmur heard.  Pulses:       Radial pulses are 2+ on the right side, and 2+ on the left side.        Femoral pulses are 2+ on the right side, and 2+ on the left side.  Allens test normal b/l   Pulmonary/Chest: Effort normal. No respiratory distress. She has no wheezes. She has rales.   Musculoskeletal: She exhibits no edema.   Neurological: She is alert and oriented to person, place, and time.   Skin: Skin is warm.   Psychiatric: She has a normal mood and affect.       Labs:     No results found for: NA, K, CL, CO2, BUN, CREATININE, GLUCOSE, ANIONGAP  Lab Results   Component Value Date    HGBA1C 6.5 (H) 01/14/2019     Lab Results   Component Value Date    BNP <10 01/14/2019    No results found for: WBC, HGB, HCT, PLT, GRAN  No results found for: CHOL, HDL, LDLCALC, TRIG       Imaging:     Echo as above   Assessment:      1. Lung transplant candidate    2. History of coronary angiogram      Comment:  62 y/o F is a lung transplant candidate, has IPF, will proceed with coronary angiogram as part of the lung transplant evaluation. Per patient her angiogram in 2012 was negative for obstructive CAD.    Plan:     Coronary angiogram, diagnostic only because of inability to take ASA with pulmonary HTN meds.  Informed Consent obtained.  No ASA or plavix.    Signed:  Mikhail Navarro DO  Cardiology Fellow    Staff:  I have personally taken the history and examined this patient and agree with the fellow's note as stated above and amended it accordingly :-)

## 2019-01-14 NOTE — PROGRESS NOTES
OUTPATIENT CATHETERIZATION INSTRUCTIONS    You have been scheduled for a procedure in the catheterization lab on Thursday, January 17, 2019.     Please report to the Cardiology Waiting Area on the Third floor of the hospital and check in at 7:30AM.   You will then be taken to the SSCU (Short Stay Cardiac Unit) and prepared for your procedure. Please be aware that this is not the time of your procedure but the time you are to arrive. The procedures are scheduled on an hourly basis; however, emergency cases take precedence over all other cases.       You may not have anything to eat or drink after midnight the night before your test. You may take your regular morning medications with water. If there are any medications that you should not take you will be instructed to hold them that morning. If you are diabetic and on Metformin (Glucophage) do not take it the day before, the day of, and for 2 days after your procedure.      The procedure will take 1-2 hours to perform. After the procedure, you will return to SSCU on the third floor of the hospital. You will need to lie still (or keep your arm still) for the next 4 to 6 hours to minimize bleeding from the puncture site. Your family may remain in the room with you during this time.       You may be able to be discharged home that same afternoon if there is someone to drive you home and there were no complications. If you have one of the balloon, stent, or device procedures you may spend the night in the hospital. Your doctor will determine, based on your progress, the date and time of your discharge. The results of your procedure will be discussed with you before you are discharged. Any further testing or procedures will be scheduled for you either before you leave or you will be called with these appointments.       If you should have any questions, concerns, or need to change the date of your procedure, please call TREVON Culver @ [phone]    Special  Instructions:          THE ABOVE INSTRUCTIONS WERE GIVEN TO THE PATIENT VERBALLY AND THEY VERBALIZED UNDERSTANDING.  THEY DO NOT REQUIRE ANY SPECIAL NEEDS AND DO NOT HAVE ANY LEARNING BARRIERS.          Directions for Reporting to Cardiology Waiting Area in the Hospital  If you park in the Parking Garage:  Take elevators to the1st floor of the parking garage.  Continue past the gift shop, coffee shop, and piano.  Take a right and go to the gold elevators. (Elevator B)  Take the elevator to the 3rd floor.  Follow the arrow on the sign on the wall that says Cath Lab Registration/EP Lab Registration.  Follow the long hallway all the way around until you come to a big open area.  This is the registration area.  Check in at Reception Desk.    OR    If family is dropping you off:  Have them drop you off at the front of the Hospital under the green overhang.  Enter through the doors and take a right.  Take the E elevators to the 3rd floor Cardiology Waiting Area.  Check in at the Reception Desk in the waiting room.

## 2019-01-15 ENCOUNTER — HOSPITAL ENCOUNTER (OUTPATIENT)
Dept: RADIOLOGY | Facility: HOSPITAL | Age: 64
Discharge: HOME | End: 2019-01-15
Attending: INTERNAL MEDICINE
Payer: MEDICARE

## 2019-01-15 ENCOUNTER — TELEPHONE (OUTPATIENT)
Dept: TRANSPLANT | Facility: CLINIC | Age: 64
End: 2019-01-15

## 2019-01-15 ENCOUNTER — SOCIAL WORK (OUTPATIENT)
Dept: TRANSPLANT | Facility: CLINIC | Age: 64
End: 2019-01-15
Payer: MEDICARE

## 2019-01-15 DIAGNOSIS — Z76.82 AWAITING TRANSPLANTATION OF LUNG: Primary | ICD-10-CM

## 2019-01-15 DIAGNOSIS — J84.9 ILD (INTERSTITIAL LUNG DISEASE): ICD-10-CM

## 2019-01-15 DIAGNOSIS — Z76.82 LUNG TRANSPLANT CANDIDATE: ICD-10-CM

## 2019-01-15 DIAGNOSIS — R16.0 HEPATOMEGALY: ICD-10-CM

## 2019-01-15 DIAGNOSIS — R93.5 ABNORMAL ABDOMINAL ULTRASOUND: ICD-10-CM

## 2019-01-15 DIAGNOSIS — K76.0 HEPATIC STEATOSIS: ICD-10-CM

## 2019-01-15 PROBLEM — K83.8 COMMON BILE DUCT DILATATION: Status: ACTIVE | Noted: 2019-01-15

## 2019-01-15 PROBLEM — R76.8 HEPATITIS B CORE ANTIBODY POSITIVE: Status: ACTIVE | Noted: 2019-01-15

## 2019-01-15 PROCEDURE — 76700 US EXAM ABDOM COMPLETE: CPT | Mod: TC,TXP

## 2019-01-15 PROCEDURE — 74220 X-RAY XM ESOPHAGUS 1CNTRST: CPT | Mod: 26,TXP,, | Performed by: RADIOLOGY

## 2019-01-15 PROCEDURE — 76700 US EXAM ABDOM COMPLETE: CPT | Mod: 26,TXP,, | Performed by: RADIOLOGY

## 2019-01-15 PROCEDURE — 74220 FL ESOPHAGRAM COMPLETE: ICD-10-PCS | Mod: 26,TXP,, | Performed by: RADIOLOGY

## 2019-01-15 PROCEDURE — 93880 EXTRACRANIAL BILAT STUDY: CPT | Mod: TC,TXP

## 2019-01-15 PROCEDURE — 93880 EXTRACRANIAL BILAT STUDY: CPT | Mod: 26,TXP,, | Performed by: RADIOLOGY

## 2019-01-15 PROCEDURE — 93880 US CAROTID BILATERAL: ICD-10-PCS | Mod: 26,TXP,, | Performed by: RADIOLOGY

## 2019-01-15 PROCEDURE — 76700 US ABDOMEN COMPLETE: ICD-10-PCS | Mod: 26,TXP,, | Performed by: RADIOLOGY

## 2019-01-15 PROCEDURE — 74220 X-RAY XM ESOPHAGUS 1CNTRST: CPT | Mod: TC,TXP

## 2019-01-15 NOTE — TELEPHONE ENCOUNTER
Received below results of abnormal abdominal ultrasound:   Findings compatible with hepatomegaly and hepatic steatosis.  2.  Left simple renal cyst.  3. Mildly dilated common bile duct, measuring 1.0 cm. Consider further evaluation with ERCP versus MRCP.     Message sent to Dr. Garza.   Hepatology consult ordered.    Notified Ms. Becerra of the results of the abdominal ultrasound and orders for a hepatology consult.  She verbalized her understanding.    Contacted Ms. Becerra and notified her of the hepatology appointment tomorrow.  Instructed her not to eat 4 hours prior to the appointment.  She verbalized her understanding of all discussed.

## 2019-01-15 NOTE — PROGRESS NOTES
[facility] VISIT NEW PT NOTE    REFERRING PROVIDER:  Dr. Niraj Garza    CHIEF COMPLAINT: abnormal abd imaging (CBD dilation, fatty liver on US)    HPI: This is a 63 y.o. Black or  female with PMH noted below, presenting for evaluation of    fatty liver disease without elevated liver enzymes.    History of hepatitis ~1979, Jaundice x 2 months, resolved on its, never needed hospitalization or treatment. Unsure of risk for transmission (as no tattoos, IVDU, intranasal drug use, blood transfusions, sexual partners with hepatitis pt is aware of)    Presents today with daughters x2    Referred by Dr. Garza. Currently in evaluation for lung transplant for idiopathic pulmonary fibrosis.     Labs done 1/14/19 show normal transaminase levels, alk phos. No thrombocytopenia  No previous labs to review  Synthetic liver functioning WNL, except mildly decreased albumin     Lab Results   Component Value Date    ALT 14 01/14/2019    AST 15 01/14/2019    ALKPHOS 77 01/14/2019    BILITOT 0.5 01/14/2019    ALBUMIN 3.4 (L) 01/14/2019    INR 0.9 01/14/2019     (H) 01/14/2019     Limited previous serologic w/u negative for Hep B and C. Normal JUAN, IgG, IgM.   However, labs note previous Hep B exposure with clearance (+ Hep B core total ab, negative surface antigen).     Abd U/S done 1/15/19 showed hepatomegaly (18.7cm), diffuse increased parenchymal echogenicity and an elevated HRI, measuring 1.67, suggestive of hepatic steatosis.  Homogeneous parenchymal echotexture. No focal lesions.    FIbroscan done today showed no fibrosis (5.1 kPA), S2 steatosis    Risk factors for NAFLD include obesity, HTN, newly diagnosed T2DM. Diet : higher in carbs, drinking sugary beverages.  Exercise : limited due to pulmonary fibrosis.     Denies family history of liver disease. Denies significant alcohol consumption now or in the past    Immunity to Hep A and B - received Hep A vaccine with ID today, + Hep B  immunity     Denies jaundice, dark urine, abdominal distention, hematemesis, melena, slowed mentation. No abnormal skin rashes. No generalized joint or muscle pain.      Review of patient's allergies indicates:  No Known Allergies    Current Outpatient Medications on File Prior to Visit   Medication Sig Dispense Refill    ADVAIR DISKUS 250-50 mcg/dose diskus inhaler Inhale 1 puff into the lungs 2 (two) times daily.       benzonatate (TESSALON) 200 MG capsule Take 200 mg by mouth 3 (three) times daily as needed for Cough.      calcium carbonate (TUMS) 200 mg calcium (500 mg) chewable tablet Take 1 tablet by mouth once daily.      diltiaZEM HCl (TIAZAC) 120 mg 24 hr capsule Take 120 mg by mouth once daily.       gabapentin (NEURONTIN) 100 MG capsule Take 100 mg by mouth 3 (three) times daily.       lansoprazole (PREVACID) 15 MG capsule Take 15 mg by mouth once daily.      loperamide (IMODIUM A-D) 2 mg Tab Take 2 mg by mouth 4 (four) times daily as needed.      losartan-hydrochlorothiazide 100-25 mg (HYZAAR) 100-25 mg per tablet Take 1 tablet by mouth once daily.       OFEV 150 mg Cap TAKE 1 CAPSULE BY MOUTH TWICE A DAY  EVERY 12 HOURS  AS DIRECTED WITH FOOD  6    VENTOLIN HFA 90 mcg/actuation inhaler Inhale 1 puff into the lungs every 6 (six) hours as needed.        Current Facility-Administered Medications on File Prior to Visit   Medication Dose Route Frequency Provider Last Rate Last Dose    [COMPLETED] ez tanisha ba sulfate 300 mL  300 mL Oral ONCE PRN Niraj Garza MD   300 mL at 01/15/19 0915       PMHX:  has a past medical history of Common bile duct dilatation (1/15/2019), Controlled type 2 diabetes mellitus without complication, without long-term current use of insulin (1/17/2019), Essential hypertension (1/16/2019), GERD (gastroesophageal reflux disease), Hepatitis B core antibody positive (1/15/2019), IPF (idiopathic pulmonary fibrosis), Obesity (BMI 30-39.9) (1/16/2019), and RLS (restless  "legs syndrome).    PSHX:  has a past surgical history that includes Cholecystectomy; Small intestine surgery; Hernia repair; Esophagogastroduodenoscopy; and Colonoscopy.    FAMILY HISTORY: Negative for liver disease, reviewed in EPIC    SOCIAL HISTORY:   Social History     Tobacco Use   Smoking Status Former Smoker    Types: Cigarettes    Last attempt to quit: 2016    Years since quittin.0   Smokeless Tobacco Never Used       Social History     Substance and Sexual Activity   Alcohol Use No    Frequency: Never       Social History     Substance and Sexual Activity   Drug Use No       ROS:   GENERAL: Denies fever, chills, weight loss/gain, fatigue  HEENT: Denies headaches, dizziness, vision/hearing changes  CARDIOVASCULAR: Denies chest pain, palpitations, or edema  RESPIRATORY: + chronic dyspnea  GI: Denies abdominal pain, rectal bleeding, nausea, vomiting. No change in bowel pattern or color  : Denies dysuria, hematuria   SKIN: Denies rash, itching   NEURO: Denies confusion, memory loss, or mood changes  PSYCH: Denies depression or anxiety  HEME/LYMPH: Denies easy bruising or bleeding    PHYSICAL EXAM:   Friendly Black or  female, in no acute distress; alert and oriented to person, place and time  VITALS: /68 (BP Location: Right arm, Patient Position: Sitting, BP Method: Medium (Automatic))   Pulse 72   Resp 18   Ht 5' 7" (1.702 m)   Wt 88.5 kg (195 lb 1.7 oz)   SpO2 (!) 91% Comment: 2 liter O2  BMI 30.56 kg/m²   HENT: Normocephalic, without obvious abnormality. Oral mucosa pink and moist. Dentition good.  EYES: Sclerae anicteric. No conjunctival pallor.   NECK: Supple. No masses or cervical adenopathy.  CARDIOVASCULAR: Regular rate and rhythm. No murmurs.  RESPIRATORY: . No wheezes or crackles. Home oxygen in place  GI: Soft, non-tender, non-distended. No hepatosplenomegaly. No masses palpable. No ascites.  EXTREMITIES:  No clubbing, cyanosis or edema.  SKIN: Warm and " dry. No jaundice. No rashes noted to exposed skin. No telangectasias noted. No palmar erythema.  NEURO:  Normal gait. No asterixis.  PSYCH:  Memory intact. Thought and speech pattern appropriate. Behavior normal. No depression or anxiety noted.    RECENT LABS:    Hepatitis A and B immunity markers:    Hepatitis A Antibody IgG   Date Value Ref Range Status   01/14/2019 Negative  Final       Hepatitis B Surface Ag   Date Value Ref Range Status   01/14/2019 Negative  Final     Hep B Core Total Ab   Date Value Ref Range Status   01/14/2019 Positive (A)  Final     Hep B S Ab   Date Value Ref Range Status   01/14/2019 Positive (A)  Final       There is no immunization history on file for this patient.    Labs:  Lab Results   Component Value Date    WBC 10.53 01/14/2019    HGB 11.3 (L) 01/14/2019    HCT 36.1 (L) 01/14/2019     (H) 01/14/2019    CHOL 180 01/14/2019    TRIG 102 01/14/2019    HDL 57 01/14/2019     01/14/2019    K 3.4 (L) 01/14/2019    CREATININE 0.7 01/14/2019    ALT 14 01/14/2019    AST 15 01/14/2019    ALKPHOS 77 01/14/2019    BILITOT 0.5 01/14/2019    ALBUMIN 3.4 (L) 01/14/2019    INR 0.9 01/14/2019       DIAGNOSTIC STUDIES:  ABD. U/S-   Done 1/15/19  FINDINGS:  Pancreas: The visualized portions of pancreas appear normal.    Aorta: No aneurysm.    Liver: Enlarged, measuring 18.7 cm with diffuse increased parenchymal echogenicity and an elevated HRI, measuring 1.67, suggestive of hepatic steatosis.  Homogeneous parenchymal echotexture. No focal lesions.    Gallbladder: Status post cholecystectomy.    Biliary system: Common bile duct is prominent, measuring 1.0 cm.  No intrahepatic ductal dilatation.    Inferior vena cava: Normal in appearance.    Right kidney: 10.4 cm. No hydronephrosis.    Left kidney: 13.4 cm. No hydronephrosis.  Anechoic lesion within the midpole, compatible with a simple renal cyst, measuring 3.0 x 2.3 x 2.7 cm.    Spleen: 8.8 cm.  Normal in size with homogeneous  echotexture.    Miscellaneous: No ascites.      Impression       1. Findings are compatible with hepatomegaly and hepatic steatosis.    2.  Left simple renal cyst.    3.  Common bile duct is mildly dilated, measuring 1.0 cm.  Consider further evaluation with ERCP versus MRCP.       FIBROSCAN - done 19  Fibroscan readin.1 KPa  Fibrosis:F 0-1   CAP readin dB/m  Steatosis: :S2     ASSESSMENT:  63 y.o. Black or  female with:  1.  NAFLD  - Abd U/S done 1/15/19 showed hepatomegaly (18.7cm), diffuse increased parenchymal echogenicity and an elevated HRI, measuring 1.67, suggestive of hepatic steatosis.  Homogeneous parenchymal echotexture. No focal lesions.  - Labs done 19 show normal transaminase levels, alk phos. No thrombocytopenia  No previous labs to review  Synthetic liver functioning WNL, except mildly decreased albumin   -- Limited previous serologic w/u negative for Hep B and C. Normal JUAN, IgG, IgM. However, labs note previous Hep B exposure with clearance (+ Hep B core total ab, negative surface antigen).   -- Risk factors for NAFLD include obesity, HTN, newly diagnosed T2DM. Diet : higher in carbs, drinking sugary beverages.  Exercise : limited due to pulmonary fibrosis.   -- Immunity to Hep A and B - received Hep A vaccine with ID today, + Hep B immunity   -- FIbroscan done today showed no fibrosis (5.1 kPA), S2 steatosis    2. Hep B core positive antibody  -- negative Hep B surface antigen   -- indicates previous exposure with clearance, no chronic Hep B  -- will need Hep B prophylaxis s/p transplant due to immunosuppresion     3. CBD dilation on US  -- Common bile duct is prominent, measuring 1.0 cm.  No intrahepatic ductal dilatation.  -- likely due to post cholecystectomy, will r/o other etiologies with MRCP  -- alk phos and Tbili WNL    4. Obesity, HTN, newly diagnosed T2DM  -- Body mass index is 30.56 kg/m².   -- could improve carb intake in diet          EDUCATION:      Discussed with patient presence of Hep B core positive antibody, negative antigen so likely suggests previous exposure, clearance, no active chronic Hep B. Discussed importance of notifying any healthcare provider, especially if any immunosuppressive therapy started in the future, such as post transplant immunosuppression, high dose steroids, immunologic medications for rheumatologic disease, chemotherapy, etc.     We discussed the manifestations of non-alcoholic fatty liver disease. At this time, there are no FDA approved therapy for non-alcoholic fatty liver disease.  The patient and I discussed the importance of diet, exercise, and weight loss for management of non-alcoholic fatty liver disease.    We discussed a low carb/sugar, high fiber and protein diet and a goal of 30 minutes of exercise 5 times per week    Recommend to avoid alcohol consumption. It is know that heavy alcohol use is associated with disease progression among patients with fatty liver disease. Information is unknown at this time of the effects on the liver with alcohol use in moderation.    Patient is aware that fatty liver can progress to steatohepatitis and possibly to cirrhosis, putting one at increased risk for liver cancer, liver failure, and death      PLAN:  1. Fibroscan today  2. Continue Hep A vaccine per ID  3. MRCP to assess bile duct   Orders Placed This Encounter   Procedures    MRI MRCP Without Contrast   4. Counseled on options for decreasing carb intake given newly diagnosed DM per labs. Pt instructed to f/u with PCP for diabetes monitoring/management  5. Low carb/sugar, high fiber and protein diet  6. Recommend good cholesterol, blood pressure, blood sugar levels   7.  No Follow-up on file.  - Given normal lab values, no fibrosis on fibroscan - no need for hepatology f/u. Recommend f/u yearly with PCP with liver labs and to schedule f/u appt if liver enzymes increase above normal in the future    Thank you for allowing me  to participate in the care of Chely Becerra    Danielle Garcia NP-C    ADDENDUM 1/17/19: MRCP today:  The intra pancreatic portion of the bile duct has a caliber of 6 mm and tapers to the level of the papilla.  No evidence peribiliary extrinsic masses of retained stones within the bile ducts.  No intrahepatic bile duct dilation. Pancreatic duct has maximum caliber of 2 mm which is within normal limits. No ductal strictures are identified.  Discussed results with Dr. Mendez. No concerns or need for further w/u. No contraindications from hepatology standpoint to proceed with lung transplantation.     CC'ed note to:   Dr. Garza

## 2019-01-15 NOTE — PROGRESS NOTES
Transplant Recipient Adult Psychosocial Assessment    Chely Becerra   [address] [facility] [place] [address]  Telephone Information:   Mobile [phone]   Home  [phone] (home)  Work  There is no work phone number on file.  E-mail  [email or web address]    Sex: female  YOB: 1955  Age: 63 y.o.    Encounter Date: 1/15/2019  [place] Citizen: yes  Primary Language: English   Needed: no    Emergency Contact:  Name: Sushma Kaplan (39)  Relationship: daughter  Address: Same as pt  Phone Numbers:  [phone] (mobile)    Family/Social Support:   Number of dependents/: Pt resides with her 3 young grandchildren. Pt daughter will be primary caregiver.  Zen Eusebio Murray.,children's father, will provide care for them post transplant.   Marital history:  since   Other family dynamics: Pt presents in clinic for Lung transplant work up with SW. Pt presents with daughters Nabil and Ivy who report will be primary caregivers. Pt reports good support group made up of 3 daughters and 1 son. Pt reports parents are .     Household Composition:  Name: Chely Becerra  Age: 63  Relationship: patient  Does person drive? yes    Name: Sushma Kaplan   Age: 39  Relationship: daughter  Does person drive? yes    Name: Brandy Kaplan  Age: 17 (turns 20 2019)  Relationship: granddaughter  Does person drive? no    Name: Zen Kaplan  Age: 15  Relationship: grandson  Does person drive? no    Name: Laney Eusebio  Age: 11  Relationship: granddaughter    Do you and your caregivers have access to reliable transportation? yes  PRIMARY CAREGIVER: Ivy Gusmanes (43), resides in [place] will be primary caregiver, phone number [phone].      provided in-depth information to patient and caregiver regarding pre- and post-transplant caregiver role.   strongly encourages patient and caregiver to have concrete plan regarding post-transplant care giving, including  "back-up caregiver(s) to ensure care giving needs are met as needed.    Patient and Caregiver states understanding all aspects of caregiver role/commitment and is able/willing/committed to being caregiver to the fullest extent necessary.    Patient and Caregiver verbalizes understanding of the education provided today and caregiver responsibilities.         remains available. Patient and Caregiver agree to contact  in a timely manner if concerns arise.      Able to take time off work without financial concerns: yes. Family to rotate    Additional Significant Others who will Assist with Transplant:    Name: Aneesh Leonard (Charles)  Age: 36  City: [place] State: [place]  Relationship: son  Does person drive? yes    Name: Ny Leonard  Age: 28  City: [place] State: [place]  Relationship: daughter  Does person drive? yes     Living Will: no  Healthcare Power of : no  Advance Directives on file: <<no information> per medical record.  Verbally reviewed LW/HCPA information.   provided patient with copy of LW/HCPA documents and provided education on completion of forms.      Highest Education Level: Attended College/Technical School  Reading Ability: college  Denies difficulty with: reading, writing, seeing, hearing, comprehension, learning and memory  Learns Best By:  Written material/Verbal explanation/Demonstration/ Hands on practice     Status: no  VA Benefits: no     Working for Income: No  If no, reason not working: Disability  Patient is disabled.  Prior to disability, patient  was employed as a  at [employer].    Disabled: yes: date disability began: 2013, due to: IPF.    Monthly Income:   Disability: $803  Food [place]: $115  Able to afford all costs now and if transplanted, including medications: yes  Patient and Caregiver verbalizes understanding of personal responsibilities related to transplant costs and the importance of " having a financial plan to ensure that patients transplant costs are fully covered.       provided fundraising information/education. Patient and Caregiververbalizes understanding.   remains available.    Insurance:   Payor/Plan Subscr  Sex Relation Sub. Ins. ID Effective Group Num   1. [place] - [place]* TORRES ADAMS 1955 Female  [id number] 12/1/15                                    [address]   2. [place] TOR[place] ADAMS 1955 Female  [id number]* 12/1/15                                    [address] [address]     Primary Insurance (for UNOS reporting): Public Insurance - Medicare FFS (Fee For Service)  Secondary Insurance (for UNOS reporting): Public Insurance - Medicaid  Patient and Caregiver verbalizes clear understanding that patient may experience difficulty obtaining and/or be denied insurance coverage post-surgery. This includes and is not limited to disability insurance, life insurance, health insurance, burial insurance, long term care insurance, and other insurances.      Patient and Caregiver also reports understanding that future health concerns related to or unrelated to transplantation may not be covered by patient's insurance.  Resources and information provided and reviewed.     Patient and Caregiver provides verbal permission to release any necessary information to outside resources for patient care and discharge planning.  Resources and information provided are reviewed.      Dialysis: Pt reports no history  Infusion Service: patient utilizing? no  Home Health: patient utilizing? no  DME: yes O2 through [employer]. Pt has a portable and home concentrator  Pulmonary/Cardiac Rehab: Pt reports no history   ADLS:  Pt reports no issues with walking, cooking, eating, bathing, housekeeping, and shopping. Pt reports to not driving.     Adherence:   Patient reports high level of adherence to current health care regimen.  Adherence education and counseling provided.  Pt verbalized understanding of importance of taking medications as instructed, following all team and MD recommendations, and coming to all follow up appointments      Per History Section:  Past Medical History:   Diagnosis Date    Hypertension     IPF (idiopathic pulmonary fibrosis)      Social History     Tobacco Use    Smoking status: Former Smoker     Types: Cigarettes     Last attempt to quit: 2016     Years since quittin.0    Smokeless tobacco: Never Used   Substance Use Topics    Alcohol use: No     Frequency: Never     Social History     Substance and Sexual Activity   Drug Use No     Social History     Substance and Sexual Activity   Sexual Activity Not on file       Per Today's Psychosocial:  Tobacco: none, patient denies any use since quitting in 2016. Pt states smoked for several years beginning at the age of 24 and quitting 2 years ago at 1/2pp. Pt reports is committed to abstain.  Alcohol: Pt states drinks rarely. Last drink was a sip of alcohol during new years  Illicit Drugs/Non-prescribed Medications: none, patient denies any use.    Patient and Caregiver states clear understanding of the potential impact of substance use as it relates to transplant candidacy and is aware of possible random substance screening.  Substance abstinence/cessation counseling, education and resources provided and reviewed.     Arrests/DWI/Treatment/Rehab: patient denies    Psychiatric History:    Mental Health: Pt denies any history of depression, anxiety, or any additional current or past mental health issues. Pt verbalized willingness to communicate to team any mental health concerns that may arise and to access appropriate resources in a timely manner as needed and/or as recommended.  Psychiatrist/Counselor: Pt reports no history  Medications:  Pt reports no history  Suicide/Homicide Issues: Pt denies any recent, past, or current feelings of suicide or homicidal issues.   Safety at home: Pt reports no  issues. no physical/emotional abuse reported.      Knowledge: Patient and Caregiver states having clear understanding and realistic expectations regarding the potential risks and potential benefits of organ transplantation and organ donation and agrees to discuss with health care team members and support system members, as well as to utilize available resources and express questions and/or concerns in order to further facilitate the pt informed decision-making.  Resources and information provided and reviewed.    Patient and Caregiver is aware of [facility]'s affiliation and/or partnership with agencies in home health care, LTAC, SNF, [facility], and other hospitals and clinics.    Understanding: Patient and Caregiver reports having a clear understanding of the many lifetime commitments involved with being a transplant recipient, including costs, compliance, medications, lab work, procedures, appointments, concrete and financial planning, preparedness, timely and appropriate communication of concerns, abstinence (ETOH, tobacco, illicit non-prescribed drugs), adherence to all health care team recommendations, support system and caregiver involvement, appropriate and timely resource utilization and follow-through, mental health counseling as needed/recommended, and patient and caregiver responsibilities.  Social Service Handbook, resources and detailed educational information provided and reviewed.  Educational information provided.    Patient and Caregiver also reports current and expected compliance with health care regime and states having a clear understanding of the importance of compliance.      Patient and Caregiver reports a clear understanding that risks and benefits may be involved with organ transplantation and with organ donation.       Patient and Caregiver also reports clear understanding that psychosocial risk factors may affect patient, and include but are not limited to feelings of depression, generalized  anxiety, anxiety regarding dependence on others, post traumatic stress disorder, feelings of guilt and other emotional and/or mental concerns, and/or exacerbation of existing mental health concerns.  Detailed resources provided and discussed.      Patient and Caregiver agrees to access appropriate resources in a timely manner as needed and/or as recommended, and to communicate concerns appropriately.  Patient and Caregiver also reports a clear understanding of treatment options available.     Patient and Caregiver received education in a group setting.   reviewed education, provided additional information, and answered questions.    Feelings or Concerns:  Pt verbalized no issues or concerns at this time.     Coping: Pt coping adequately with transplant process at this time.     Goals: To improve quality of life by discontinuing dependence on O2 through Lung transplant and to spend more time with her grandchildren.    Interview Behavior: Patient and Caregiver presents as alert and oriented x 4, pleasant, good eye contact, well groomed, recall good, concentration/judgement good and asking and answering questions appropriately. Pt provided permission for family to remain in room during entire interview.          Transplant Social Work - Candidacy  Assessment/Plan:     Psychosocial Suitability: Patient presents as a suitable candidate for lung transplant at this time. Based on psychosocial risk factors, patient presents as low risk, due to pt: reports adequate caregiver/transportation plan, reports financial ability to afford transplant, reports compliance with current medical regimen, reports adequate functional status - currently independent with ADLs, no reported cognitive/developmental/behavioral impairments, no significant mental health concerns reported, no reported substance use/abuse, and reports general understanding and motivation for transplant with adequate coping skills.      Recommendations/Additional Comments: Pt would likely benefit from fundraising and [facility] post transplant: both discussed in detail with pt/caregiver. Pt was provided with a handout outlining housing options post transplant and explaining [facility] amenities.     Pt lives further than [place]-hour away from [facility] and will be asked to reside local (within1-hour of [facility]) for an estimated 3 months post transplant. Pt and caregiver were provided with education regarding local housing and given the options of finding their own housing accommodations (hotels/motels/apartments/etc) or residing at [facility]. [facility] are an option available to [facility] patients on a low-cost/sliding-scale rate. Pt was provided with a handout which includes information about [facility]. The patient can find relevant information regarding [facility] policies, sliding scale fee, apartment furnishings, a list of items the patient is expected to provide, as well as other pertinent instructions on this handout.   Pt and caregiver were informed they are not required to make a decision regarding post-transplant housing until the time of transplant, at which point they will be asked to make a post transplant housing plan and share this information with the Transplant  and Lung Transplant Team. Pt and caregiver understand [facility] are under [facility].         Brigid Verma LMSW

## 2019-01-16 ENCOUNTER — CLINICAL SUPPORT (OUTPATIENT)
Dept: INFECTIOUS DISEASES | Facility: CLINIC | Age: 64
End: 2019-01-16
Payer: MEDICARE

## 2019-01-16 ENCOUNTER — HOSPITAL ENCOUNTER (OUTPATIENT)
Dept: PULMONOLOGY | Facility: CLINIC | Age: 64
Discharge: HOME | End: 2019-01-16
Payer: MEDICARE

## 2019-01-16 ENCOUNTER — OFFICE VISIT (OUTPATIENT)
Dept: HEPATOLOGY | Facility: CLINIC | Age: 64
End: 2019-01-16
Payer: MEDICARE

## 2019-01-16 ENCOUNTER — NUTRITION (OUTPATIENT)
Dept: TRANSPLANT | Facility: CLINIC | Age: 64
End: 2019-01-16
Payer: MEDICARE

## 2019-01-16 ENCOUNTER — OFFICE VISIT (OUTPATIENT)
Dept: CARDIOTHORACIC SURGERY | Facility: CLINIC | Age: 64
End: 2019-01-16
Payer: MEDICARE

## 2019-01-16 ENCOUNTER — HOSPITAL ENCOUNTER (OUTPATIENT)
Dept: CARDIOLOGY | Facility: CLINIC | Age: 64
Discharge: HOME | End: 2019-01-16
Payer: MEDICARE

## 2019-01-16 ENCOUNTER — OFFICE VISIT (OUTPATIENT)
Dept: INFECTIOUS DISEASES | Facility: CLINIC | Age: 64
End: 2019-01-16
Payer: MEDICARE

## 2019-01-16 ENCOUNTER — PROCEDURE VISIT (OUTPATIENT)
Dept: HEPATOLOGY | Facility: CLINIC | Age: 64
End: 2019-01-16
Attending: NURSE PRACTITIONER
Payer: MEDICARE

## 2019-01-16 ENCOUNTER — HOSPITAL ENCOUNTER (OUTPATIENT)
Dept: RADIOLOGY | Facility: HOSPITAL | Age: 64
Discharge: HOME | End: 2019-01-16
Attending: NURSE PRACTITIONER
Payer: MEDICARE

## 2019-01-16 VITALS
SYSTOLIC BLOOD PRESSURE: 122 MMHG | BODY MASS INDEX: 30.64 KG/M2 | OXYGEN SATURATION: 91 % | HEART RATE: 96 BPM | TEMPERATURE: 98 F | WEIGHT: 195.19 LBS | HEIGHT: 67 IN | DIASTOLIC BLOOD PRESSURE: 68 MMHG

## 2019-01-16 VITALS
RESPIRATION RATE: 18 BRPM | OXYGEN SATURATION: 91 % | WEIGHT: 195.13 LBS | BODY MASS INDEX: 30.63 KG/M2 | DIASTOLIC BLOOD PRESSURE: 68 MMHG | HEART RATE: 72 BPM | HEIGHT: 67 IN | SYSTOLIC BLOOD PRESSURE: 124 MMHG

## 2019-01-16 VITALS
TEMPERATURE: 98 F | SYSTOLIC BLOOD PRESSURE: 120 MMHG | DIASTOLIC BLOOD PRESSURE: 77 MMHG | HEIGHT: 67 IN | HEART RATE: 79 BPM | WEIGHT: 196 LBS | BODY MASS INDEX: 30.63 KG/M2 | WEIGHT: 195.13 LBS | BODY MASS INDEX: 30.76 KG/M2 | HEIGHT: 67 IN

## 2019-01-16 VITALS — BODY MASS INDEX: 29.51 KG/M2 | HEIGHT: 68 IN | WEIGHT: 194.69 LBS

## 2019-01-16 DIAGNOSIS — Z76.82 LUNG TRANSPLANT CANDIDATE: ICD-10-CM

## 2019-01-16 DIAGNOSIS — J84.9 ILD (INTERSTITIAL LUNG DISEASE): ICD-10-CM

## 2019-01-16 DIAGNOSIS — R93.2 ABNORMAL FINDINGS ON DIAGNOSTIC IMAGING OF LIVER AND BILIARY TRACT: ICD-10-CM

## 2019-01-16 DIAGNOSIS — R06.09 DOE (DYSPNEA ON EXERTION): ICD-10-CM

## 2019-01-16 DIAGNOSIS — I10 ESSENTIAL HYPERTENSION: ICD-10-CM

## 2019-01-16 DIAGNOSIS — K83.8 COMMON BILE DUCT DILATATION: ICD-10-CM

## 2019-01-16 DIAGNOSIS — K76.0 HEPATIC STEATOSIS: Primary | ICD-10-CM

## 2019-01-16 DIAGNOSIS — E66.9 OBESITY (BMI 30.0-34.9): ICD-10-CM

## 2019-01-16 DIAGNOSIS — Z01.818 PRE-TRANSPLANT EVALUATION FOR LUNG TRANSPLANT: ICD-10-CM

## 2019-01-16 DIAGNOSIS — K76.0 HEPATIC STEATOSIS: ICD-10-CM

## 2019-01-16 DIAGNOSIS — J84.9 ILD (INTERSTITIAL LUNG DISEASE): Primary | ICD-10-CM

## 2019-01-16 DIAGNOSIS — R76.8 HEPATITIS B CORE ANTIBODY POSITIVE: ICD-10-CM

## 2019-01-16 DIAGNOSIS — Z76.82 LUNG TRANSPLANT CANDIDATE: Primary | ICD-10-CM

## 2019-01-16 DIAGNOSIS — E66.9 OBESITY (BMI 30-39.9): ICD-10-CM

## 2019-01-16 DIAGNOSIS — J84.112 IPF (IDIOPATHIC PULMONARY FIBROSIS): ICD-10-CM

## 2019-01-16 DIAGNOSIS — E11.9 CONTROLLED TYPE 2 DIABETES MELLITUS WITHOUT COMPLICATION, WITHOUT LONG-TERM CURRENT USE OF INSULIN: ICD-10-CM

## 2019-01-16 LAB
PRE FEV1 FVC: 83
PRE FEV1: 1.09
PRE FVC: 1.31
PREDICTED FEV1 FVC: 78
PREDICTED FEV1: 2.63
PREDICTED FVC: 3.33

## 2019-01-16 PROCEDURE — 99999 PR PBB SHADOW E&M-EST. PATIENT-LVL III: CPT | Mod: PBBFAC,TXP,, | Performed by: THORACIC SURGERY (CARDIOTHORACIC VASCULAR SURGERY)

## 2019-01-16 PROCEDURE — 74181 MRI ABDOMEN WITHOUT CONTRAST MRCP: ICD-10-PCS | Mod: 26,TXP,, | Performed by: RADIOLOGY

## 2019-01-16 PROCEDURE — 94010 BREATHING CAPACITY TEST: CPT | Mod: 26,S$PBB,TXP, | Performed by: INTERNAL MEDICINE

## 2019-01-16 PROCEDURE — G0009 ADMIN PNEUMOCOCCAL VACCINE: HCPCS | Mod: PBBFAC,TXP

## 2019-01-16 PROCEDURE — 76376 3D RENDER W/INTRP POSTPROCES: CPT | Mod: TC,TXP

## 2019-01-16 PROCEDURE — 91200 LIVER ELASTOGRAPHY: CPT | Mod: 26,S$PBB,TXP, | Performed by: NURSE PRACTITIONER

## 2019-01-16 PROCEDURE — 76376 MRI ABDOMEN WITHOUT CONTRAST MRCP: ICD-10-PCS | Mod: 26,TXP,, | Performed by: RADIOLOGY

## 2019-01-16 PROCEDURE — 99213 OFFICE O/P EST LOW 20 MIN: CPT | Mod: PBBFAC,27,TXP,25 | Performed by: THORACIC SURGERY (CARDIOTHORACIC VASCULAR SURGERY)

## 2019-01-16 PROCEDURE — 99999 PR PBB SHADOW E&M-EST. PATIENT-LVL I: ICD-10-PCS | Mod: PBBFAC,TXP,, | Performed by: DIETITIAN, REGISTERED

## 2019-01-16 PROCEDURE — 99204 PR OFFICE/OUTPT VISIT, NEW, LEVL IV, 45-59 MIN: ICD-10-PCS | Mod: S$PBB,TXP,, | Performed by: NURSE PRACTITIONER

## 2019-01-16 PROCEDURE — 90715 TDAP VACCINE 7 YRS/> IM: CPT | Mod: PBBFAC,TXP

## 2019-01-16 PROCEDURE — 94010 BREATHING CAPACITY TEST: CPT | Mod: PBBFAC,TXP | Performed by: INTERNAL MEDICINE

## 2019-01-16 PROCEDURE — 94010 BREATHING CAPACITY TEST: ICD-10-PCS | Mod: 26,S$PBB,TXP, | Performed by: INTERNAL MEDICINE

## 2019-01-16 PROCEDURE — 94618 PULMONARY STRESS TESTING: CPT | Mod: 26,S$PBB,TXP, | Performed by: INTERNAL MEDICINE

## 2019-01-16 PROCEDURE — 99999 PR PBB SHADOW E&M-EST. PATIENT-LVL V: CPT | Mod: PBBFAC,TXP,, | Performed by: NURSE PRACTITIONER

## 2019-01-16 PROCEDURE — 99204 PR OFFICE/OUTPT VISIT, NEW, LEVL IV, 45-59 MIN: ICD-10-PCS | Mod: S$PBB,TXP,, | Performed by: PHYSICIAN ASSISTANT

## 2019-01-16 PROCEDURE — 99205 PR OFFICE/OUTPT VISIT, NEW, LEVL V, 60-74 MIN: ICD-10-PCS | Mod: S$PBB,TXP,, | Performed by: THORACIC SURGERY (CARDIOTHORACIC VASCULAR SURGERY)

## 2019-01-16 PROCEDURE — 99999 PR PBB SHADOW E&M-EST. PATIENT-LVL IV: ICD-10-PCS | Mod: PBBFAC,TXP,, | Performed by: PHYSICIAN ASSISTANT

## 2019-01-16 PROCEDURE — 90471 IMMUNIZATION ADMIN: CPT | Mod: PBBFAC,TXP

## 2019-01-16 PROCEDURE — 74181 MRI ABDOMEN W/O CONTRAST: CPT | Mod: 26,TXP,, | Performed by: RADIOLOGY

## 2019-01-16 PROCEDURE — 99999 PR PBB SHADOW E&M-EST. PATIENT-LVL V: ICD-10-PCS | Mod: PBBFAC,TXP,, | Performed by: NURSE PRACTITIONER

## 2019-01-16 PROCEDURE — 74181 MRI ABDOMEN W/O CONTRAST: CPT | Mod: TC,TXP

## 2019-01-16 PROCEDURE — 94618 PULMONARY STRESS TESTING: CPT | Mod: PBBFAC,TXP | Performed by: INTERNAL MEDICINE

## 2019-01-16 PROCEDURE — 93010 EKG 12-LEAD: ICD-10-PCS | Mod: S$PBB,TXP,, | Performed by: INTERNAL MEDICINE

## 2019-01-16 PROCEDURE — 99999 PR PBB SHADOW E&M-EST. PATIENT-LVL IV: CPT | Mod: PBBFAC,TXP,, | Performed by: PHYSICIAN ASSISTANT

## 2019-01-16 PROCEDURE — 99215 OFFICE O/P EST HI 40 MIN: CPT | Mod: PBBFAC,25,27,TXP | Performed by: NURSE PRACTITIONER

## 2019-01-16 PROCEDURE — 93005 ELECTROCARDIOGRAM TRACING: CPT | Mod: PBBFAC,TXP | Performed by: INTERNAL MEDICINE

## 2019-01-16 PROCEDURE — 93010 ELECTROCARDIOGRAM REPORT: CPT | Mod: S$PBB,TXP,, | Performed by: INTERNAL MEDICINE

## 2019-01-16 PROCEDURE — 90472 IMMUNIZATION ADMIN EACH ADD: CPT | Mod: PBBFAC,TXP

## 2019-01-16 PROCEDURE — 99999 PR PBB SHADOW E&M-EST. PATIENT-LVL III: ICD-10-PCS | Mod: PBBFAC,TXP,, | Performed by: THORACIC SURGERY (CARDIOTHORACIC VASCULAR SURGERY)

## 2019-01-16 PROCEDURE — 97802 MEDICAL NUTRITION INDIV IN: CPT | Mod: PBBFAC,TXP | Performed by: DIETITIAN, REGISTERED

## 2019-01-16 PROCEDURE — 91200 PR LIVER ELASTOGRAPHY W/OUT IMAG W/INTERP & REPORT: ICD-10-PCS | Mod: 26,S$PBB,TXP, | Performed by: NURSE PRACTITIONER

## 2019-01-16 PROCEDURE — 76376 3D RENDER W/INTRP POSTPROCES: CPT | Mod: 26,TXP,, | Performed by: RADIOLOGY

## 2019-01-16 PROCEDURE — 91200 LIVER ELASTOGRAPHY: CPT | Mod: PBBFAC,TXP | Performed by: NURSE PRACTITIONER

## 2019-01-16 PROCEDURE — 94618 PULMONARY STRESS TESTING: ICD-10-PCS | Mod: 26,S$PBB,TXP, | Performed by: INTERNAL MEDICINE

## 2019-01-16 PROCEDURE — 99214 OFFICE O/P EST MOD 30 MIN: CPT | Mod: PBBFAC,25,27,TXP | Performed by: PHYSICIAN ASSISTANT

## 2019-01-16 PROCEDURE — 99204 OFFICE O/P NEW MOD 45 MIN: CPT | Mod: S$PBB,TXP,, | Performed by: PHYSICIAN ASSISTANT

## 2019-01-16 PROCEDURE — 99211 OFF/OP EST MAY X REQ PHY/QHP: CPT | Mod: PBBFAC,TXP,25 | Performed by: DIETITIAN, REGISTERED

## 2019-01-16 PROCEDURE — 99999 PR PBB SHADOW E&M-EST. PATIENT-LVL I: CPT | Mod: PBBFAC,TXP,, | Performed by: DIETITIAN, REGISTERED

## 2019-01-16 PROCEDURE — 99204 OFFICE O/P NEW MOD 45 MIN: CPT | Mod: S$PBB,TXP,, | Performed by: NURSE PRACTITIONER

## 2019-01-16 PROCEDURE — 99205 OFFICE O/P NEW HI 60 MIN: CPT | Mod: S$PBB,TXP,, | Performed by: THORACIC SURGERY (CARDIOTHORACIC VASCULAR SURGERY)

## 2019-01-16 NOTE — Clinical Note
Please find out when she is coming back so she can have the fungal blood and urine tests I ordered and arrange.  If not anytime soon, let me know and we can have her goto [facility].thx

## 2019-01-16 NOTE — PATIENT INSTRUCTIONS
Discussed with patient presence of Hep B core positive antibody, negative antigen so likely suggests previous exposure, clearance, no active chronic Hep B. Discussed importance of notifying any healthcare provider, especially if any immunosuppressive therapy started in the future, such as high dose steroids, transplant immunosuppression, chemotherapy, etc.     1. Fibroscan today  2. MRI to look at bile ducts  3. Discuss your diabetes marker (A1c 6.5%) if your primary doctor. NO sugary beverages, limit starchy food, carbohydrates, fruits  4. If your liver enzymes are ever elevated with your primary doctor, schedule a follow up with us     There is no FDA approved therapy for non-alcoholic fatty liver disease. Therefore, these things are important:  1. Weight loss goal of 10 lbs  2. Low carb/sugar, high fiber and protein diet  3. Recommend good cholesterol, blood pressure, blood sugar levels     Fatty liver can progress to steatohepatitis (inflamatory fatty liver) and possibly to cirrhosis, putting one at increased risk for liver cancer, liver failure, and death

## 2019-01-16 NOTE — LETTER
January 20, 2019      Niraj Garza MD  [address]  [facility] [address]           [facility]  [address]  [facility] [address]  Phone: [phone]  Fax: [phone]          Patient: Chely Becerra   MR Number: [id number]   YOB: 1955   Date of Visit: 1/16/2019       Dear Dr. Niraj Garza:    Thank you for referring Chely Becerra to me for evaluation. Attached you will find relevant portions of my assessment and plan of care.    If you have questions, please do not hesitate to call me. I look forward to following Chely Becerra along with you.    Sincerely,    Noel Villagomez Jr., PA    Enclosure  CC:  No Recipients    If you would like to receive this communication electronically, please contact [email or web address] or [phone] to request more information on [employer] access.    For providers and/or their staff who would like to refer a patient to [facility], please contact us through our one-stop-shop provider referral line, [facility], at [phone].    If you feel you have received this communication in error or would no longer like to receive these types of communications, please e-mail [email or web address]

## 2019-01-16 NOTE — PROGRESS NOTES
I have personally performed a face to face diagnostic evaluation on this patient. I have reviewed and agree with today's findings and the care plan outlined by Danielle Garcia NP with following comments:    Ms. Becerra is a very pleasant 63-year-old lady with history of idiopathic pulmonary fibrosis, who is undergoing lung transplant evaluation. She was found to have hepatic steatosis on ultrasound and dilated CBD without intrahepatic biliary dilatation.   VCTE: F0-1 and grade 2 steatosis.     The patient's risk factors for NAFLD include:    · Obesity/overweight                     yes Body mass index is 30.56 kg/m².  · Dyslipidemia                                no  · Insulin resistance/Diabetes         Yes (last A1C 6.5)  · Family history of diabetes           n/a    The patient has NAFLD - no advanced liver fibrosis. No contraindications for lung transplant from Hepatology standpoint. CBD dilation often seen in post-cholecystectomy, however will obtain MRCP to rule out biliary obstruction for the sake of thoroughness in work up.    The patient will return to Danielle Garcia NP  for follow-up.     Gasper Mendez MD   Hepatology  [facility]

## 2019-01-16 NOTE — PROGRESS NOTES
Pre Transplant Infectious Diseases Consult  Lung Transplant Recipient Evaluation    Requesting Physician: Dr. Aneesh Garza MD    Reason for Visit:    Chief Complaint   Patient presents with    Lung Transplant     History of Present Illness  Chely Becerra is a 63 y.o. year old Black or  female with advanced Lung disease (IPF) currently being evaluated for Lung transplant.  She was diagnosed with IPF in 2013. She is not currently on any immunosuppression.  Patient denies any recent fever, chills, or infective illnesses.      1) Do you have a history of:   YES NO   Diabetes      [] [x]     WOund/Foot Infection/Bone Infection   []        [x]     Surgical Removal of Spleen   []  [x]                  2) Have you had recurrent infections involving:             YES NO  Sinus infections  []         [x]   Sore Throat   []         [x]                 Prostate Infections  []         []              Bladder Infections  []         [x]                     Kidney Infections  []         [x]                               Intestinal Infections  []         [x]      Skin Infections   []         [x]       Reproductive Infections          []  [x]   Periodontal Disease  []         [x]        3)Have you ever had: YES     NO UNKNOWN      Chicken Pox   [x]         []  []   Shingles   []         [x]  []   Orolabial Herpes             []  [x]  []   Genital Herpes  []         [x]  []   Cytomegalovirus  []         [x]  []   Makenzie-Barr Virus  []         [x]  []   Genital Warts   []         [x]  []   Hepatitis A   []         [x]  []   Hepatitis B   []         [x]  []   Hepatitis C   []         [x]  []   Syphilis   [x]         []  []  1983 was diagnosed and treated  Gonorrhea   []         [x]  []   Pelvic Inflammatory   Disease   []         [x]  []   Chlamydia Infection  []         [x]  []   Intestinal parasites   or worms   []         [x]  []   Fungal Infections  []         [x]  []   Blood Infections  []         [x]  []        Comment:       4) Have you ever been exposed   YES NO  To someone with tuberculosis?  []   [x]   If yes, what treatment did you receive:     5) What states have you lived in? [place], [place]    6) What countries have you visited for more than 2 weeks?   none                      YES NO  7) Did you have any associated infections?  []  [x]       8) Are you planning to travel outside the    []  [x]   [place] after your transplant?    9) Household                   YES NO  Do you have pets living in your house?    []         [x]   If yes, describe:     Do you spend time or live on a farm or     []         [x]   have livestock or other farm animals?  If yes, which ones:    Do you have a fish tank?          []  [x]       Do you have a litter box?      []         [x]     Do you fish or hunt?       []         [x]     Do you clean or skin fish or animals?    []         [x]     Do you consume raw or undercooked    []         [x]   meat, fish, or shellfish?      10) What occupations have you had?  in past    11) Patient reports hobby to be sew, fishing, cook.          12)Do you garden or otherwise  YES NO   work in the soil?    []         [x]   13)Do you hike, camp, or spend     time in wooded areas?   []         [x]        14) The patient's immunization history was reviewed.    Have you ever received:  YES NO UNKNOWN DATES   Routine Childhood vaccines  [x]         []  []      Influenza vaccine   [x]  []  [] 2018   Pneumovax    []  []  []     Tetanus-diptheria   []         [x]  []    Hepatitis A vaccine series       [x]  []  []    Hepatitis B vaccine series         [x]  []  []    Meningitis vaccine   []         []  []    Varicella vaccine   [x]         []  []  2018     Influenza 11/04/2011   01/23/2018   10/10/2018               Pneumo (PCV) 01/23/2018                   Novel Influenza [id number] 12/09/2009                   Based on the patients immunization history and serologies, immunizations were  ordered:         Ordered  Not Ordered  Influenza Vaccine     []    [x]   Hepatitis A series at 0,  6 months   [x]    []   Hepatitis B seriesat 0, 1, and 6 months  []    [x]   Hepatitis B High Dose 0,1, and 6 months  []    [x]   Prevnar      []    [x]   Pneumovax      [x]    []    TDap       [x]    []    Shingrix      [x]    []   Menactra      []    [x]            The patient was encouraged to contact us about any problems that may develop after immunization and possible side effects were reviewed.      Previous Transplant: no    Etiology of Lung Disease: H    Allergies: Patient has no known allergies.    There is no immunization history on file for this patient.  Past Medical History:   Diagnosis Date    Common bile duct dilatation 1/15/2019    Essential hypertension 2019    Hepatitis B core antibody positive 1/15/2019    Hypertension     IPF (idiopathic pulmonary fibrosis)     Obesity (BMI 30-39.9) 2019     Past Surgical History:   Procedure Laterality Date    CHOLECYSTECTOMY      COLONOSCOPY      ESOPHAGOGASTRODUODENOSCOPY      HERNIA REPAIR      SMALL INTESTINE SURGERY      mass removed (benign)      Social History     Socioeconomic History    Marital status: Single     Spouse name: Not on file    Number of children: Not on file    Years of education: Not on file    Highest education level: Not on file   Social Needs    Financial resource strain: Not on file    Food insecurity - worry: Not on file    Food insecurity - inability: Not on file    Transportation needs - medical: Not on file    Transportation needs - non-medical: Not on file   Occupational History    Not on file   Tobacco Use    Smoking status: Former Smoker     Types: Cigarettes     Last attempt to quit: 2016     Years since quittin.0    Smokeless tobacco: Never Used   Substance and Sexual Activity    Alcohol use: No     Frequency: Never    Drug use: No    Sexual activity: Not on file   Other Topics  Concern    Not on file   Social History Narrative    Not on file       Review of Systems   Constitution: Positive for decreased appetite, malaise/fatigue, night sweats and weight loss. Negative for chills, fever, weakness and weight gain.   HENT: Negative for congestion, ear pain, hearing loss, hoarse voice, sore throat and tinnitus.    Eyes: Positive for blurred vision and visual disturbance. Negative for redness.   Cardiovascular: Negative for chest pain, leg swelling and palpitations.   Respiratory: Positive for cough, hemoptysis and shortness of breath. Negative for sputum production and wheezing.    Endocrine: Negative for cold intolerance and heat intolerance.   Hematologic/Lymphatic: Negative for adenopathy. Does not bruise/bleed easily.   Skin: Negative for dry skin, itching, rash and suspicious lesions.   Musculoskeletal: Positive for back pain. Negative for joint pain, myalgias and neck pain.   Gastrointestinal: Positive for diarrhea, heartburn and vomiting. Negative for abdominal pain, constipation and nausea.   Genitourinary: Negative for dysuria, flank pain, frequency, hematuria, hesitancy and urgency.   Neurological: Positive for dizziness and paresthesias. Negative for headaches and numbness.   Psychiatric/Behavioral: Negative for depression and memory loss. The patient does not have insomnia and is not nervous/anxious.    Allergic/Immunologic: Negative for environmental allergies, HIV exposure, hives and persistent infections.     Physical Exam   Constitutional: She is oriented to person, place, and time. She appears well-developed and well-nourished. No distress.   HENT:   Head: Normocephalic and atraumatic.   Mouth/Throat: She does not have dentures. No oral lesions. Abnormal dentition. Dental caries present. No dental abscesses or lacerations.   Eyes: EOM are normal. Pupils are equal, round, and reactive to light.   Neck: Normal range of motion. Neck supple.   Cardiovascular: Normal rate,  regular rhythm and normal heart sounds. Exam reveals no gallop and no friction rub.   No murmur heard.  Pulmonary/Chest: Effort normal and breath sounds normal. No respiratory distress. She has no wheezes. She has no rales.   Abdominal: Soft. Bowel sounds are normal. She exhibits no distension and no mass. There is no tenderness. There is no guarding.   Musculoskeletal: She exhibits no edema.   Lymphadenopathy:        Head (right side): No submental, no submandibular, no tonsillar, no preauricular, no posterior auricular and no occipital adenopathy present.        Head (left side): No submental, no submandibular, no tonsillar, no preauricular, no posterior auricular and no occipital adenopathy present.     She has no cervical adenopathy.        Right cervical: No superficial cervical, no deep cervical and no posterior cervical adenopathy present.       Left cervical: No superficial cervical, no deep cervical and no posterior cervical adenopathy present.     She has no axillary adenopathy.        Right axillary: No pectoral and no lateral adenopathy present.        Left axillary: No pectoral and no lateral adenopathy present.       Right: No inguinal, no supraclavicular and no epitrochlear adenopathy present.        Left: No inguinal, no supraclavicular and no epitrochlear adenopathy present.   Neurological: She is alert and oriented to person, place, and time.   Skin: Skin is warm and dry. She is not diaphoretic.   Psychiatric: She has a normal mood and affect. Her behavior is normal.     Diagnostics:   RPR   Date Value Ref Range Status   01/14/2019 Non-reactive Non-reactive Final     No results found for: CMVANTIBODIE  No results found for: HIV1X2  No results found for: HTLVIIIANTIB  Hepatitis B Surface Ag   Date Value Ref Range Status   01/14/2019 Negative  Final     Hep B Core Total Ab   Date Value Ref Range Status   01/14/2019 Positive (A)  Final     Hepatitis C Ab   Date Value Ref Range Status   01/14/2019  Negative  Final     Toxoplasma gondii IGG   Date Value Ref Range Status   01/14/2019 35.1 (H) 0.0 - 6.4 IU/mL Final     No components found for: TOXOIGGINTER  HSV 1 IgG   Date Value Ref Range Status   01/14/2019 Positive (A) Negative Final     HSV 2 IgG   Date Value Ref Range Status   01/14/2019 Positive (A) Negative Final     No results found for: VARICELLAZOS  No results found for: VARICELLAINT  No results found for: STRONGANTIGG  Makenzie-Barr Virus IgG (VCA)   Date Value Ref Range Status   01/14/2019 Positive (A) Negative Final     Hep B S Ab   Date Value Ref Range Status   01/14/2019 Positive (A)  Final        Ref. Range 1/14/2019 08:11   Hep B Core Total Ab Unknown Positive (A)   Hep B S Ab Unknown Positive (A)   Hepatitis B Surface Ag Unknown Negative   Hepatitis C Ab Unknown Negative   Mitogen - Nil Latest Ref Range: See text IU/mL 4.930   NIL Latest Ref Range: See text IU/mL 0.350   TB Gold Plus Unknown Negative   TB1 - Nil Latest Ref Range: See text IU/mL 0.010   TB2 - Nil Latest Ref Range: See text IU/mL -0.090   HIV 1/2 Ag/Ab Latest Ref Range: Negative  Negative   RPR Latest Ref Range: Non-reactive  Non-reactive   CMV IgG Interpretation Unknown Reactive (A)   Makenzie-Barr Virus IgG (VCA) Latest Ref Range: Negative  Positive (A)   HSV 1 IgG Latest Ref Range: Negative  Positive (A)   HSV 2 IgG Latest Ref Range: Negative  Positive (A)   Strongyloides Ab IgG Latest Ref Range: Negative  Negative   Toxoplasma gondii IGG Latest Ref Range: 0.0 - 6.4 IU/mL 35.1 (H)   Toxoplasma IGG Interpretation Unknown Reactive (A)   Toxoplasma IgM Latest Ref Range: <8.0 AU/mL 4.2   Varicella IgG Latest Ref Range: 0.00 - 0.90 ISR 3.04 (H)   Varicella Interpretation Latest Ref Range: Negative  Positive (A)        Ref. Range 1/14/2019 08:11   Hepatitis A Antibody IgG Unknown Negative     Imaging:  CT CHEST WITHOUT CONTRAST   Status: Final result   MyChart Results Release     MyChart Status: Active  Results Release   PACS Images  for [facility] Viewer      Show images for CT CHEST WITHOUT CONTRAST   PACS Images for [facility] Viewer (Includes Ashraf Images)      Show images for CT CHEST WITHOUT CONTRAST   Reviewed By Alena Garza MD on 12/14/2018 16:08   Jessie Santamaria RN on 12/13/2018 10:24   CT CHEST WITHOUT CONTRAST   Order: [id number]   Status:  Final result   Visible to patient:  Yes (Patient Portal) Next appt:  01/17/2019 at 07:30 AM in [facility] ([facility], [facility]) Dx:  ILD (interstitial lung disease); Lung...   Details     Reading Physician Reading Date Result Priority   Aby Acevedo MD 12/13/2018    ORVILLE Brito 12/13/2018       Narrative     EXAMINATION:  CT CHEST WITHOUT CONTRAST    CLINICAL HISTORY:  lung transplant consult; Interstitial pulmonary disease, unspecified    TECHNIQUE:  Low dose axial images, sagittal and coronal reformations were obtained from the thoracic inlet to the lung bases. Contrast was not administered.    COMPARISON:  Chest radiograph 11/20/2018.    FINDINGS:  Examination of the soft tissue and vascular structures at the base of the neck is normal.    There is a left-sided aortic arch with 3 branch vessels.  The thoracic aorta maintains normal caliber, contour, and course without significant atherosclerotic calcification.  There is no evidence of aneurysmal dilation or dissection.    The pulmonary arteries distribute normally.  There are four pulmonary veins.  Systemic and pulmonary venoatrial connections are concordant.    The heart is not enlarged and there is no evidence of pericardial effusion.  There is mild coronary artery atherosclerosis.    There is no axillary, mediastinal, or hilar lymph node enlargement.  There are multiple calcified mediastinal lymph nodes.    The esophagus maintains a normal course and caliber.    Limited images of the upper abdomen obtained during the course of this dedicated thoracic CT demonstrates multiple calcified granulomas in the  "spleen, fluid attenuating left renal lesions suggestive of renal cyst, and cholecystectomy.  There is a small hiatal hernia.    The osseous structures demonstrate thoracic kyphosis and degenerative changes of the thoracic spine.    The trachea and proximal airways are patent.    The lungs are under expanded.  There is widespread reticulation, ground-glass attenuation, architectural distortion, tubular bronchiectasis, and honeycombing which is most compatible with interstitial lung.  No pleural effusion, pneumothorax, or large focal consolidation.      Impression       Findings suggestive of an underlying interstitial lung disease such as UIP or NSIP.  Other considerations include chronic hypersensitivity pneumonitis and sarcoidosis which are felt less likely.    Small hiatal hernia.    Mild coronary artery atherosclerosis.    Calcified mediastinal lymph nodes and multiple calcified splenic granulomas suggestive of old granulomatous disease.    Fluid attenuating left renal lesions suggestive of renal cyst.  This could further be evaluated with ultrasound as warranted.    Additional findings as detailed above.    Electronically signed by resident: Alyce Diaz  Date: 12/13/2018  Time: 09:50    Electronically signed by: Aby Acevedo MD  Date: 12/13/2018  Time: 10:18           TRANSTHORACIC ECHO (TTE) COMPLETE   Conclusion     · Normal left ventricular systolic function. The estimated ejection fraction is 65%  · Indeterminate left ventricular diastolic function.  · Mild-moderate left atrial enlargement.  · Mild tricuspid regurgitation.  · Mild atrial enlargement.  · Mildly reduced right ventricular systolic function.      Performing Sonographer     Mau Hurtado    Reason For Exam   Priority: Routine   Dx: ILD (interstitial lung disease) [J84.9 (ICD-10-CM)]; Lung transplant candidate [Z76.82 (ICD-10-CM)]         Vitals     Height Weight BMI (Calculated) BSA (Calculated - sq m) BP   5' 7" (1.702 m) 88.5 kg (195 " lb 1.7 oz) 30.6 2.05 sq meters 124/68   PACS Images for [facility] Viewer      Show images for Transthoracic echo (TTE) complete (Cupid Only)   Result Image Hyperlink      Show images for Transthoracic echo (TTE) complete (Cupid Only)    Reviewed By     Niraj Garza MD on 12/14/2018 08:58   Jessie Santamaria RN on 12/13/2018 12:12         Study Details     A complete echo was performed using complete 2D, color flow Doppler and spectral Doppler. During the study the apical, parasternal, subcostal and suprasternal views were captured. Overall the study quality was adequate.   Echocardiography Findings     Left Ventricle Normal ejection fraction at 65%. Cavity is normal. Normal wall thickness observed. Indeterminate left ventricular diastolic function.   Right Ventricle Normal cavity size. The ejection fraction is mildly reduced.   Left Atrium Mild-moderate atrial enlarged.   Right Atrium Mild atrial enlargement.   Aortic Valve The valve is trileaflet. Mobility is normal   Mitral Valve Normal valve structure. Normal leaflet mobility.   Tricuspid Valve The tricuspid valve is normal. Mild regurgitation. Mobility is normal   Pulmonic Valve Normal valve structure. Mobility is normal.   IVC/SVC Inferior vena cava is not well visualized.            Transplant Infectious Diseases - Candidacy    Assessment/Plan:     Transplant Candidacy: Based on available information, there are no identified significant barriers to transplantation from an infectious disease standpoint pending a negative immunodiffusion assay, serum and urine histoplasma antigen and Blastomyces urine antigen given splenic calcified granuloma seen on imaging.  Refer to ID if positive. She has natural immunity to Hep B and positive Toxoplasma IgG which need management per protocol postoperatively. It is recommended the patient have their teeth treated prior to transplant which she planning on in the near future. Patient is HSV-2 positive, though she has  never had an outbreak, and was counseled on risks, transmissability, and that she could have outbreaks post transplant and to notify transplant team if occurs  Final determination of transplant candidacy will be made once evaluation is complete and reviewed by the Transplant Selection Committee.    BREN Denny  Vaccine Needs:  1. Hep A today and 6 months rx given for fu vaccine  2. Pneumovax today  3. Tdap today  4. Shingrix - rx given       Counseling:I discussed with Chely the risk for increased susceptibility to infections following transplantation including increased risk for infection right after transplant and if rejection should occur.  The patients has been counseled on the importance of vaccinations including but not limited to a yearly flu vaccine.  Specific guidance has been provided to the patient regarding the patients occupation, hobbies and activities to avoid future infectious complications including but not limited to avoiding undercooked meats and seafood, proper hygiene, and contact with animals.

## 2019-01-16 NOTE — Clinical Note
Fatty liver but no fibrosis. CBD dilation but MRCP with no concerns. No contraindications from hepatology standpoint to proceed with lung transplantation. Diana

## 2019-01-16 NOTE — PROGRESS NOTES
TRANSPLANT NUTRITIONAL ASSESSMENT    Referring Provider: Niraj Garza MD    Reason for Visit: Pre-lung transplant work-up    Age: 63 y.o.  Sex: female    Patient Active Problem List   Diagnosis    CONDON (dyspnea on exertion)    ILD (interstitial lung disease)    Lung transplant candidate    History of coronary angiogram    Hepatic steatosis    Hepatitis B core antibody positive    Common bile duct dilatation     Past Medical History:   Diagnosis Date    Common bile duct dilatation 1/15/2019    Hepatitis B core antibody positive 1/15/2019    Hypertension     IPF (idiopathic pulmonary fibrosis)      Lab Results   Component Value Date     (H) 01/14/2019    K 3.4 (L) 01/14/2019    MG 1.7 01/14/2019    CHOL 180 01/14/2019    HDL 57 01/14/2019    TRIG 102 01/14/2019    ALBUMIN 3.4 (L) 01/14/2019    HGBA1C 6.5 (H) 01/14/2019    CALCIUM 9.9 01/14/2019     Other Pertinent Labs: none    Current Outpatient Medications   Medication Sig    ADVAIR DISKUS 250-50 mcg/dose diskus inhaler Inhale 1 puff into the lungs 2 (two) times daily.     benzonatate (TESSALON) 200 MG capsule Take 200 mg by mouth 3 (three) times daily as needed for Cough.    calcium carbonate (TUMS) 200 mg calcium (500 mg) chewable tablet Take 1 tablet by mouth once daily.    diltiaZEM HCl (TIAZAC) 120 mg 24 hr capsule Take 120 mg by mouth once daily.     gabapentin (NEURONTIN) 100 MG capsule Take 100 mg by mouth 3 (three) times daily.     lansoprazole (PREVACID) 15 MG capsule Take 15 mg by mouth once daily.    loperamide (IMODIUM A-D) 2 mg Tab Take 2 mg by mouth 4 (four) times daily as needed.    losartan-hydrochlorothiazide 100-25 mg (HYZAAR) 100-25 mg per tablet Take 1 tablet by mouth once daily.     OFEV 150 mg Cap TAKE 1 CAPSULE BY MOUTH TWICE A DAY  EVERY 12 HOURS  AS DIRECTED WITH FOOD    VENTOLIN HFA 90 mcg/actuation inhaler Inhale 1 puff into the lungs every 6 (six) hours as needed.      No current facility-administered  "medications for this visit.      Allergies: Patient has no known allergies.    Ht Readings from Last 1 Encounters:   01/16/19 5' 7" (1.702 m)     Wt Readings from Last 1 Encounters:   01/16/19 88.6 kg (195 lb 3.5 oz)      BMI: Body mass index is 30.04 kg/m².    Usual Weight: 190-204 lb  Weight Change/Time: generally stable  Current Diet: regular  Appetite/Current Intake: good   Exercise/Physical Activity: functional in ADL's, walking up/down stairs several times/day  Nutritional/Herbal Supplements: none  Potential Food/Medication Interactions: none  Chewing/Swallowing Problems: none  Symptoms: none  Assessment of Lab Values: Glu 119, K 3.4, Alb 3.4, Ha1c 6.5  Support System: 2 daughters present with pt today, another daughter also involved in pt support, both voice support in pt's diet/nutrition compliance    Estimated Kcal Need: 2200 kcal (25 kcal/kg)  Estimated Protein Need: 88 gm ( 1 gm/kg)    Nutritional History:   Pt present daughters today. Pt has good appetite, no n/v/d/abd pain. Pt voices interest in loosing some weight, about 10 lbs. Pt has been staying with one daughter in [place], she cooks and pt cooks sometimes. Pt reported the following diet recall:   B: oatmeal w/ sugar, carnation milk or boiled/scrambled eggs (1-2), maybe 1 toast, cereal or sandwich (2 thin slices turkey or ham, cheese, epstein) or honey bun, orange juice or milk, 1c  Coffee w/ sweetened flavored creamer  Snack: fruit or none  L: left overs, canned soup, sandwich, once in a while Omid's or Simona's (<1 x/week), water, sweet tea, occasional strawberry lemonade or soda  D: seasoning with Dorian's or Season-all, Okra/tomatoes/corn/shrimp/sausage, fried chicken & biscuits, potatoes (mashed or salad), pork chop, smothered steak, chicken/shrimp keri pasta, sauteed vegetables w/ sausage or shrimp, Chinese food; using fresh greens and frozen vegetables mainly, salad w/ tomato/chopped ham/cheese/eggs/dressing, Ranch or caesar dressing  Snack: " small bag of chips, 1 bread w/ 1 piece of meat or cheese, fresh fruit & cheese, soup, Ramen noddles, yogurt    Nutritional Diagnoses  Problem: food- and nutrition-related knowledge deficit  Etiology: r/t no prior edu on low sodium/heart healthy diet recommendations  Symptoms: aeb diet recall, questions from pt and family    Educational Need? yes  Barriers: none identified  Discussed with: patient and daughter  Interventions: Patient taught nutrition information regarding Pre-lung transplant work-up.    Reviewed Low Sodium packet (low Na diet, foods recommended/not recommended, food label strategies, flavoring tips, & sample menu).   Weight loss tips, 1700/1800 raudel sample meal plan, weight management cooking tips  Continue to be physical active daily.   Limit sugary beverages/sugar added to beverages  Goals/Recommendations: diet adherence and choose healthy options when dining out  Actions Taken: instruct/provide written information  Strategies Used: problem solving, goal setting, motivational interviewing  Patient and/or family comprehend instructions: yes , adherence expected  Outcome: Verbalizes understanding  Monitoring:     Contact information provided, will f/u in clinic and communicate with the care team as needed.     Counseling Time: 30 minutes

## 2019-01-16 NOTE — PROGRESS NOTES
Subjective:      Patient ID: Chely Becerra is a 63 y.o. female.    Chief Complaint: Consult      HPI:  Chely Becerra is a 63 y.o. female who presents on no oxygen at rest but 2L on exertion.  She is on no assisted ventilation.  Her New York Heart Association Class is III and a Karnofsky score of 60% - Requires occasional assistance but is able to care for needs.  She presents today for her initial surgical evaluation for lung transplant. She carries a diagnosis of IPF.     Ms. Becerra says hat she was diagnosed with her condition in 2012. She used to work as a  at [employer] in [place] and noticed that she was developing shortness of breath. She was smoking at the time and has a smoking history of 18 pack years. She quit smoking in 2016. When she saw her PCP she was still symptomatic so she was referred to Cardiology (Dr. Marshall) who performed a LHC on her. Her LHC was unremarkable for coronary disease but he did notice fibrotic changes on the lungs with the fluoroscope and referred her to pulmonary to see Dr. Ariza.      Dr. Ariza performed several radiographic and serum investigations and confirmed the diagnosis of IPF. He did not perform a biopsy. At the moment she was started on a LABA/ICS and KOJO as well as prednisone and benzonatate. She says that the KOJO and the benzonatate helped with her cough but that felt no benefit from the other medications. In 2014 she was started on nintedanib which she has tolerated. She continues to have shortness of breath on exertion and a dry cough. She was started on oxygen this year which she uses for exertion and sleeping. Denies chest pain or orthopnea. Denies leg swelling. Denies fevers, night sweats, or weight loss. Does have heartburn which is controlled with lansoprazole.      Family and social history reviewed    Review of patient's allergies indicates:  No Known Allergies  Past Medical History:   Diagnosis Date     Common bile duct dilatation 1/15/2019    Hepatitis B core antibody positive 1/15/2019    Hypertension     IPF (idiopathic pulmonary fibrosis)      Past Surgical History:   Procedure Laterality Date    CHOLECYSTECTOMY      COLONOSCOPY      ESOPHAGOGASTRODUODENOSCOPY      HERNIA REPAIR      SMALL INTESTINE SURGERY      mass removed (benign)     Family History     None        Social History     Socioeconomic History    Marital status: Single     Spouse name: Not on file    Number of children: Not on file    Years of education: Not on file    Highest education level: Not on file   Social Needs    Financial resource strain: Not on file    Food insecurity - worry: Not on file    Food insecurity - inability: Not on file    Transportation needs - medical: Not on file    Transportation needs - non-medical: Not on file   Occupational History    Not on file   Tobacco Use    Smoking status: Former Smoker     Types: Cigarettes     Last attempt to quit: 2016     Years since quittin.0    Smokeless tobacco: Never Used   Substance and Sexual Activity    Alcohol use: No     Frequency: Never    Drug use: No    Sexual activity: Not on file   Other Topics Concern    Not on file   Social History Narrative    Not on file       Current medications Reviewed    Review of Systems   Constitutional: Negative for chills, fatigue, fever and unexpected weight change.   HENT: Negative for hearing loss and nosebleeds.    Eyes: Negative for pain, redness and visual disturbance.   Respiratory: Positive for shortness of breath. Negative for cough and chest tightness.    Cardiovascular: Negative for chest pain and leg swelling.   Gastrointestinal: Negative for abdominal distention.   Genitourinary: Negative for difficulty urinating and hematuria.   Musculoskeletal: Negative for back pain, gait problem and neck pain.   Neurological: Negative for dizziness, seizures, syncope and headaches.   Hematological: Negative for  adenopathy. Does not bruise/bleed easily.   Psychiatric/Behavioral: Negative for behavioral problems.     Objective:   Physical Exam   Constitutional: She is oriented to person, place, and time. She appears well-developed and well-nourished.   HENT:   Head: Normocephalic and atraumatic.   Eyes: Conjunctivae and EOM are normal. Pupils are equal, round, and reactive to light.   Neck: Normal range of motion. Neck supple. No JVD present. No tracheal deviation present. No thyromegaly present.   Cardiovascular: Normal rate, regular rhythm, normal heart sounds and intact distal pulses.   Pulmonary/Chest: Effort normal. She has decreased breath sounds.   Abdominal: Soft. Bowel sounds are normal.   Musculoskeletal: Normal range of motion.   Neurological: She is alert and oriented to person, place, and time. She has normal reflexes.   Skin: Skin is warm and dry.   Psychiatric: She has a normal mood and affect. Her behavior is normal. Judgment and thought content normal.   Nursing note and vitals reviewed.    Diagnostic Results:    CT chest reviewed   Assessment:   1. IPF  Plan:   I explained lung transplant in detail and think she is a good candidate for bilateral

## 2019-01-16 NOTE — PROGRESS NOTES
Ms. Becerra has received first dose of Hep A, Tdap and Pneumovax 23  Tolerated well  Left unit in NAD

## 2019-01-16 NOTE — PROCEDURES
Fibroscan Procedure     Name: Chely Becerra  Date of Procedure : 2019   :: Danielle Garcia NP  Diagnosis: NAFLD    Probe: XL    Fibroscan readin.1 KPa    Fibrosis:F 0-1     CAP readin dB/m    Steatosis: :S2

## 2019-01-16 NOTE — LETTER
January 16, 2019      Niraj Garza MD  [address]  [facility] [address]           [facility]  [address]  [facility] [address]  Phone: [phone]          Patient: Chely Becerra   MR Number: [id number]   YOB: 1955   Date of Visit: 1/16/2019       Dear Dr. Niraj Garza:    Thank you for referring Chely Becerra to me for evaluation. Attached you will find relevant portions of my assessment and plan of care.    If you have questions, please do not hesitate to call me. I look forward to following Chely Becerra along with you.    Sincerely,    Benjamín Isidro MD    Enclosure  CC:  No Recipients    If you would like to receive this communication electronically, please contact [email or web address] or [phone] to request more information on [employer] access.    For providers and/or their staff who would like to refer a patient to [facility], please contact us through our one-stop-shop provider referral line, [facility] , at [phone].    If you feel you have received this communication in error or would no longer like to receive these types of communications, please e-mail [email or web address]

## 2019-01-16 NOTE — LETTER
January 17, 2019      Niraj Garza MD  [address]  [place] [address]           [facility]  [address]  [place] [address]  Phone: [phone]  Fax: [phone]          Patient: Chely Becerra   MR Number: [id number]   YOB: 1955   Date of Visit: 1/16/2019       Dear Dr. Niraj Garza:    Thank you for referring Chely Becerra to me for evaluation. Attached you will find relevant portions of my assessment and plan of care.    If you have questions, please do not hesitate to call me. I look forward to following Chely Becerra along with you.    Sincerely,    Danielle Garcia, NP    Enclosure  CC:  No Recipients    If you would like to receive this communication electronically, please contact [email or web address] or [phone] to request more information on [employer] Link access.    For providers and/or their staff who would like to refer a patient to [facility], please contact us through our one-stop-shop provider referral line, [facility], at [phone].    If you feel you have received this communication in error or would no longer like to receive these types of communications, please e-mail [email or web address]

## 2019-01-17 ENCOUNTER — PATIENT MESSAGE (OUTPATIENT)
Dept: HEPATOLOGY | Facility: CLINIC | Age: 64
End: 2019-01-17

## 2019-01-17 PROBLEM — Z01.818 PRE-TRANSPLANT EVALUATION FOR LUNG TRANSPLANT: Status: ACTIVE | Noted: 2019-01-17

## 2019-01-17 PROBLEM — E11.9 CONTROLLED TYPE 2 DIABETES MELLITUS WITHOUT COMPLICATION, WITHOUT LONG-TERM CURRENT USE OF INSULIN: Status: ACTIVE | Noted: 2019-01-17

## 2019-01-18 NOTE — PROCEDURES
Chely Becerra is a 63 y.o.  female patient, who presents for a 6 minute walk test ordered by Prudencio Garza MD.  The diagnosis is Pre Lung Transplant Evaluation; Pulmonary Fibrosis.  The patient's BMI is 30.6 kg/m2.  Predicted distance (lower limit of normal) is 331.43 meters.      Test Results:    The test was completed without stopping.  The total time walked was 360 seconds.  During walking, the patient reported:  Chest pain, Dyspnea.  The patient used supplemental oxygen during testing.     01/16/2019---------Distance: 335.28 meters (1100 feet)     O2 Sat % Supplemental Oxygen Heart Rate Blood Pressure Jaclyn Scale   Pre-exercise  (Resting) 98 % 2 L/M 88 bpm 129/69 mmHg 1   During Exercise 83 % 2 L/M 119 bpm 151/72 mmHg 4   Post-exercise  (Recovery) 98 % 2 L/M  98 bpm       Recovery Time:  161 seconds    Performing nurse/tech:  Ganesh LOCO      PREVIOUS STUDY:   12/13/2018---------Distance: 365.76 meters (1200 feet)       O2 Sat % Supplemental Oxygen Heart Rate Blood Pressure Jaclyn Scale   Pre-exercise  (Resting) 99 % 2 L/M 81 bpm 114/65 mmHg 0   During Exercise 88 % 2 L/M 101 bpm 133/69 mmHg 1   Post-exercise  (Recovery) 96 % 2 L/M  93 bpm             CLINICAL INTERPRETATION:  Six minute walk distance is 335.28 meters (1100 feet) with somewhat heavy dyspnea.  During exercise, there was significant desaturation while breathing supplemental oxygen.  Both blood pressure and heart rate increased significantly with walking.  The patient reported non-pulmonary symptoms during exercise.  Since the previous study in December 2018, exercise capacity is unchanged.  Based upon age and body mass index, exercise capacity is normal.

## 2019-01-22 ENCOUNTER — TELEPHONE (OUTPATIENT)
Dept: TRANSPLANT | Facility: CLINIC | Age: 64
End: 2019-01-22

## 2019-01-22 NOTE — TELEPHONE ENCOUNTER
Contacted Ms. Becerra to inquire about pap smear and mammogram results.  Informed her that Dr. Cho's office faxed results from a mammogram from 11/22/17.  Informed her that we need a mammogram within the last year.  Also informed her that a message was sent that there were no paps or gyn exams on file.  Informed her that we need a recent pap or note from a recent gynecological exam.  She verbalized her understanding and stated that she would call her doctor's office tomorrow.  Ms. Becerra also stated that she received a call after she left about additional labs that were needed.  She inquired if she could do the testing locally.  Informed her that the urine and labs were ordered by ID.  Informed her that I would send a message to BREN Salmon, to see if the testing could be done locally.  She verbalized her understanding and stated that she would call back tomorrow regarding the mammogram and pap smear results.

## 2019-01-28 ENCOUNTER — DOCUMENTATION ONLY (OUTPATIENT)
Dept: TRANSPLANT | Facility: CLINIC | Age: 64
End: 2019-01-28

## 2019-01-28 NOTE — PROGRESS NOTES
Attempted to contact Ms. Becerra to see if pap smear and mammogram were scheduled.  No answer.  Left a message requesting a return call.    Received a return call from Ms. Becerra.  She stated that she has an appointment scheduled on 2/6/19 for the pap smear.  She is hoping to have the mammogram on the same day.  She also stated that she needs to call the dentist's office back to schedule an appointment.  Instructed her to keep me updated on the dates of all the appointments.  She verbalized her understanding.

## 2019-01-30 ENCOUNTER — TELEPHONE (OUTPATIENT)
Dept: TRANSPLANT | Facility: CLINIC | Age: 64
End: 2019-01-30

## 2019-01-30 NOTE — TELEPHONE ENCOUNTER
----- Message from Akosua Tapia sent at 1/30/2019 12:14 PM CST -----  Contact: Self  Needs Advice    Reason for call:updating on procedure         Communication Preference:[phone]    Additional Information:    Contacted Ms. Becerra.  She stated that the mammogram and pap smear have been scheduled.  One is on 2/6/19 and the other is on 2/13/19.  She is still working on scheduling a dental appointment.  Requested that she have the results of the mammogram and pap smear faxed to me, and to let me know when the appointment with the dentist is scheduled.  She verbalized her understanding.

## 2019-02-18 ENCOUNTER — DOCUMENTATION ONLY (OUTPATIENT)
Dept: TRANSPLANT | Facility: CLINIC | Age: 64
End: 2019-02-18

## 2019-03-06 ENCOUNTER — TELEPHONE (OUTPATIENT)
Dept: TRANSPLANT | Facility: CLINIC | Age: 64
End: 2019-03-06

## 2019-03-06 NOTE — TELEPHONE ENCOUNTER
LM for patient.    Pt returned my call and states that she had her dental visit and mammogram on Monday and that we should have received records.  We have not received mammogram report, but do have the dental clearance.      Dental recommendations:  Extractions x 2, routine restoratives x 4, prophylaxis with hygienist. Per Ilene with Dr. Tyson's office, he states extractions are necessary as there is active infection.  Restorations are something that can wait.  Per Ilene, patient requested to schedule extractions at the end of April due to financial reasons.  Lm for patient to return my call so that I can explain that she will have to wait to have her case presented to selection until the extractions are done.  Also wanted to make patient aware that the mammogram report has not been received.    ----- Message from Katty Tinoco sent at 3/6/2019  9:41 AM CST -----  Contact: patient   Needs Advice    Reason for call:  To Discuss appointments         Communication Preference: [phone]    Additional Information: n/a

## 2019-03-18 ENCOUNTER — TELEPHONE (OUTPATIENT)
Dept: TRANSPLANT | Facility: CLINIC | Age: 64
End: 2019-03-18

## 2019-03-18 NOTE — TELEPHONE ENCOUNTER
Contacted Ms. Becerra to inform her that the mammogram still has not been received.  Inquired as to where the procedure was performed.  She stated that she had it at [facility].  Also inquired if she received Jessie's message that all the dental work must be completed before we present her to the selection committee.  She stated that she received Jessie's message.  She stated that the required dental work is expensive and she needs to obtain the funds before she has the work done.  She is scheduled to have the required teeth pulled on 4/1/19.  Instructed her to contact me should the appointment date change.  She verbalized her understanding.    Ms. Becerra inquired if she will need to repeat any of the evaluation testing and if so at what time frame.  Informed Ms. Becerra that our listed patients have labs and ABGs every 6 months (with labs possibly every 2 months depending on HLA).  Echocardiogram every year and a CT of the chest every year with an extensive smoking history.  Informed her that PFTs and 6 minute walk test are performed each visit.  Informed her that we will need to schedule a follow-up visit because she was last seen in December.  Informed her that we can not present her to the selection committee unless she has been seen in clinic within the last 3 months.  Informed her that the last appointment was on 12/13/18.  She verbalized her understanding of all discussed.

## 2019-03-21 DIAGNOSIS — J84.9 ILD (INTERSTITIAL LUNG DISEASE): ICD-10-CM

## 2019-03-21 DIAGNOSIS — Z76.82 AWAITING TRANSPLANTATION OF LUNG: Primary | ICD-10-CM

## 2019-04-11 ENCOUNTER — HOSPITAL ENCOUNTER (OUTPATIENT)
Dept: PULMONOLOGY | Facility: CLINIC | Age: 64
Discharge: HOME | End: 2019-04-11
Payer: MEDICARE

## 2019-04-11 ENCOUNTER — OFFICE VISIT (OUTPATIENT)
Dept: TRANSPLANT | Facility: CLINIC | Age: 64
End: 2019-04-11
Payer: MEDICARE

## 2019-04-11 VITALS
RESPIRATION RATE: 20 BRPM | OXYGEN SATURATION: 97 % | BODY MASS INDEX: 29.51 KG/M2 | BODY MASS INDEX: 29.51 KG/M2 | TEMPERATURE: 98 F | HEART RATE: 67 BPM | HEIGHT: 67 IN | HEIGHT: 67 IN | DIASTOLIC BLOOD PRESSURE: 75 MMHG | WEIGHT: 188 LBS | WEIGHT: 188 LBS | SYSTOLIC BLOOD PRESSURE: 122 MMHG

## 2019-04-11 DIAGNOSIS — J84.9 ILD (INTERSTITIAL LUNG DISEASE): ICD-10-CM

## 2019-04-11 DIAGNOSIS — J96.11 CHRONIC RESPIRATORY FAILURE WITH HYPOXIA: ICD-10-CM

## 2019-04-11 DIAGNOSIS — Z76.82 AWAITING TRANSPLANTATION OF LUNG: ICD-10-CM

## 2019-04-11 DIAGNOSIS — J84.112 IPF (IDIOPATHIC PULMONARY FIBROSIS): ICD-10-CM

## 2019-04-11 DIAGNOSIS — Z76.82 LUNG TRANSPLANT CANDIDATE: Primary | ICD-10-CM

## 2019-04-11 PROBLEM — J96.10 CHRONIC RESPIRATORY FAILURE: Status: ACTIVE | Noted: 2019-04-11

## 2019-04-11 LAB
PRE FEV1 FVC: 89
PRE FEV1: 1.1
PRE FVC: 1.23
PREDICTED FEV1 FVC: 78
PREDICTED FEV1: 2.63
PREDICTED FVC: 3.33

## 2019-04-11 PROCEDURE — 94010 BREATHING CAPACITY TEST: ICD-10-PCS | Mod: 26,S$PBB,TXP, | Performed by: INTERNAL MEDICINE

## 2019-04-11 PROCEDURE — 99213 OFFICE O/P EST LOW 20 MIN: CPT | Mod: 25,S$PBB,TXP, | Performed by: INTERNAL MEDICINE

## 2019-04-11 PROCEDURE — 99999 PR PBB SHADOW E&M-EST. PATIENT-LVL III: CPT | Mod: PBBFAC,TXP,, | Performed by: INTERNAL MEDICINE

## 2019-04-11 PROCEDURE — 99213 PR OFFICE/OUTPT VISIT, EST, LEVL III, 20-29 MIN: ICD-10-PCS | Mod: 25,S$PBB,TXP, | Performed by: INTERNAL MEDICINE

## 2019-04-11 PROCEDURE — 94618 PULMONARY STRESS TESTING: ICD-10-PCS | Mod: 26,S$PBB,TXP, | Performed by: INTERNAL MEDICINE

## 2019-04-11 PROCEDURE — 94010 BREATHING CAPACITY TEST: CPT | Mod: PBBFAC,TXP | Performed by: INTERNAL MEDICINE

## 2019-04-11 PROCEDURE — 94618 PULMONARY STRESS TESTING: CPT | Mod: 26,S$PBB,TXP, | Performed by: INTERNAL MEDICINE

## 2019-04-11 PROCEDURE — 94010 BREATHING CAPACITY TEST: CPT | Mod: 26,S$PBB,TXP, | Performed by: INTERNAL MEDICINE

## 2019-04-11 PROCEDURE — 99213 OFFICE O/P EST LOW 20 MIN: CPT | Mod: PBBFAC,TXP | Performed by: INTERNAL MEDICINE

## 2019-04-11 PROCEDURE — 94618 PULMONARY STRESS TESTING: CPT | Mod: PBBFAC,TXP | Performed by: INTERNAL MEDICINE

## 2019-04-11 PROCEDURE — 99999 PR PBB SHADOW E&M-EST. PATIENT-LVL III: ICD-10-PCS | Mod: PBBFAC,TXP,, | Performed by: INTERNAL MEDICINE

## 2019-04-11 NOTE — PROCEDURES
Chely Becerra is a 63 y.o.  female patient, who presents for a 6 minute walk test ordered by Prudencio Garza MD.  The diagnosis is Pre Lung Transplant Evaluation; Pulmonary Fibrosis.  The patient's BMI is 29.5 kg/m2.  Predicted distance (lower limit of normal) is 338.29 meters.      Test Results:    The test was completed without stopping.  The total time walked was 360 seconds.  During walking, the patient reported:  Dyspnea.  The patient used supplemental oxygen during testing.     04/11/2019---------Distance: 365.76 meters (1200 feet)     O2 Sat % Supplemental Oxygen Heart Rate Blood Pressure Jaclyn Scale   Pre-exercise  (Resting) 98 % 2 L/M 75 bpm 137/73 mmHg 0.5   During Exercise 82 % 2 L/M 113 bpm 144/76 mmHg 3   Post-exercise  (Recovery) 98 % 2 L/M  88 bpm       Recovery Time:  90 seconds    Performing nurse/tech:  Estopinal RRT      PREVIOUS STUDY:   01/16/2019---------Distance: 335.28 meters (1100 feet)       O2 Sat % Supplemental Oxygen Heart Rate Blood Pressure Jaclyn Scale   Pre-exercise  (Resting) 98 % 2 L/M 88 bpm 129/69 mmHg 1   During Exercise 83 % 2 L/M 119 bpm 151/72 mmHg 4   Post-exercise  (Recovery) 98 % 2 L/M  98 bpm             CLINICAL INTERPRETATION:  Six minute walk distance is 365.76 meters (1200 feet) with moderate dyspnea.  During exercise, there was significant desaturation while breathing supplemental oxygen.  Blood pressure remained stable and Heart rate increased significantly with walking.  The patient did not report non-pulmonary symptoms during exercise.  Since the previous study in January 2019, exercise capacity is unchanged.  Based upon age and body mass index, exercise capacity is normal.

## 2019-04-11 NOTE — PROGRESS NOTES
"LUNG TRANSPLANT PRE FOLLOW-UP    Referring Physician: Nitin Ariza    Reason for Visit:  Pre-lung transplant follow-up.         Date of Initial Evaluation: 1/14/2019                                                                                             History of Present Illness: Chely Becerra is a 63 y.o. female who is on 2L of oxygen. She is on no assisted ventilation.  Her New York Heart Association Class is III and a Karnofsky score of 60% - Requires occasional assistance but is able to care for needs. She is not diabetic. She feels well. Sob remains the same, has dyspnea on exertion, she is able to walk as much as a mile but able to do 6 steps. Intermittent cough, dry, no chest pain, fever, night sweats, reports some weight loss. No ER visit  Or hospitalization for difficulty breathing. She is complaint with OFEV and advair. She underwent cardiac cath as a part of her lung transplant workup whch essentially normal. Her other transplant workup has so far been unrevealing. Patient has been actively trying to lose weight. Currently BMI is 29  Review of Systems   Constitutional: Negative.    HENT: Negative.    Eyes: Negative.    Respiratory: Positive for cough and shortness of breath. Negative for hemoptysis, sputum production and wheezing.    Cardiovascular: Negative.    Gastrointestinal: Negative.    Genitourinary: Negative.    Musculoskeletal: Negative.    Skin: Negative.    Neurological: Negative.    Endo/Heme/Allergies: Negative.    Psychiatric/Behavioral: Negative.      Objective:   /75   Pulse 67   Temp 97.7 °F (36.5 °C) (Oral)   Resp 20   Ht 5' 7" (1.702 m)   Wt 85.3 kg (188 lb)   SpO2 97% Comment: 2 liters  BMI 29.44 kg/m²   Physical Exam   Constitutional: She is oriented to person, place, and time. No distress.   HENT:   Head: Normocephalic and atraumatic.   Eyes: Pupils are equal, round, and reactive to light. EOM are normal.   Neck: Normal range of motion. Neck supple. "   Cardiovascular: Normal rate, regular rhythm, normal heart sounds and intact distal pulses.   Pulmonary/Chest: Effort normal. No respiratory distress. She has no wheezes. She has rales in the right middle field, the right lower field, the left middle field and the left lower field. She exhibits no tenderness.   Abdominal: Soft. Bowel sounds are normal.   Musculoskeletal: Normal range of motion. She exhibits no edema.   Neurological: She is alert and oriented to person, place, and time.   Skin: Skin is warm and dry. She is not diaphoretic.   Psychiatric: Affect normal.     Labs:  No visits with results within 7 Day(s) from this visit.   Latest known visit with results is:   Admission on 01/17/2019, Discharged on 01/17/2019   Component Date Value    Sodium 01/17/2019 136     Potassium 01/17/2019 3.2*    Chloride 01/17/2019 97     CO2 01/17/2019 31*    Glucose 01/17/2019 112*    BUN, Bld 01/17/2019 14     Creatinine 01/17/2019 0.8     Calcium 01/17/2019 9.7     Anion Gap 01/17/2019 8     eGFR if African American 01/17/2019 >60.0     eGFR if non  Amer* 01/17/2019 >60.0     WBC 01/17/2019 9.10     RBC 01/17/2019 3.84*    Hemoglobin 01/17/2019 11.1*    Hematocrit 01/17/2019 35.5*    MCV 01/17/2019 92     MCH 01/17/2019 28.9     MCHC 01/17/2019 31.3*    RDW 01/17/2019 13.0     Platelets 01/17/2019 341     MPV 01/17/2019 9.4     Immature Granulocytes 01/17/2019 0.3     Gran # (ANC) 01/17/2019 4.8     Immature Grans (Abs) 01/17/2019 0.03     Lymph # 01/17/2019 3.2     Mono # 01/17/2019 0.8     Eos # 01/17/2019 0.3     Baso # 01/17/2019 0.03     nRBC 01/17/2019 0     Gran% 01/17/2019 53.0     Lymph% 01/17/2019 34.6     Mono% 01/17/2019 8.6     Eosinophil% 01/17/2019 3.2     Basophil% 01/17/2019 0.3     Differential Method 01/17/2019 Automated     Group & Rh 01/17/2019 O POS     Indirect Bert 01/17/2019 NEG        Pulmonary Function Tests 4/11/2019 11/20/2018 2/26/2018   FVC 1.23  1.36 1.31   FEV1 1.1 1.2 0.98   TLC (liters) - 1.89 2.55   DLCO (ml/mmHg sec) - 7.3 -   FVC% 37 41 44   FEV1% 42 46 41   FEF 25-75 1.25 2.33 0.76   FEF 25-75% 48 90 33   TLC% - 35 52   RV - 0.58 1.36   RV% - 28 1.16   DLCO% - 37 -     6MW 4/11/2019 1/16/2019 12/13/2018   6MWT Status completed without stopping completed without stopping completed without stopping   Patient Reported Dyspnea Chest pain;Dyspnea Dyspnea   Was O2 used? Yes Yes Yes   Delivery Method Cannula;Pull Tank;Continuous Flow Cannula;Pull Tank;Continuous Flow Cannula;Pull Tank;Continuous Flow   6MW Distance walked (feet) 1200 1100 1200   Distance walked (meters) 365.76 335.28 365.76   Did patient stop? No No No   Oxygen Saturation 98 98 99   Supplemental Oxygen 2 L/M 2 L/M 2 L/M   Heart Rate 75 88 81   Blood Pressure 137/73 129/69 114/65   Jaclyn Dyspnea Rating  very, very light (just noticeable) very light nothing at all   Oxygen Saturation 82 83 88   Supplemental Oxygen 2 L/M 2 L/M 2 L/M   Heart Rate 113 119 101   Blood Pressure 144/76 151/72 133/69   Jaclyn Dyspnea Rating  moderate somewhat heavy very light   Recovery Time (seconds) 90 161 87   Oxygen Saturation 98 98 96   Supplemental Oxygen 2 L/M 2 L/M 2 L/M   Heart Rate 88 98 93     Cardiac cath   · Non-obstructive CAD.  · Filling pressures normal.  · Normal PA pressures  · Estimated blood loss: <50 mL  Assessment:-  1. Lung transplant candidate    2. Chronic respiratory failure with hypoxia    3. IPF (idiopathic pulmonary fibrosis)      Plan:     1. Undergone workup for lung transplant pending presentation at selection committee     2.  Overall patient is stable clinically with no exacerbations , hospitalizations since last visit.     3. FEV1 and FVC slightly decreased from prior. We will continue to monitor; 6MW improving     4. Continue with Nintedanib  For IPF, She also uses imodium to control GI side effects.     5. Continue with Home 02    6. rtc in 2-3 months    Danitza  Naldo  Pulmonary and Critical Care  [facility]     Attending Note:    I have seen and evaluated the patient with the fellow. Their note reflects the content of our discussion and my plan of care.      Niraj Garza MD  Pulmonary/Critical Care Medicine

## 2019-04-11 NOTE — LETTER
April 14, 2019        Nitin Ariza  [address]  [address]  [place] [address]  Phone: [phone]  Fax: [phone]             [facility]  [address]  [place] [address]  Phone: [phone]   Patient: Chely Becerra   MR Number: [id number]   YOB: 1955   Date of Visit: 4/11/2019       Dear Dr. Nitin Ariza    Thank you for referring Chely Becerra to me for evaluation. Attached you will find relevant portions of my assessment and plan of care.    If you have questions, please do not hesitate to call me. I look forward to following Chely Becerra along with you.    Sincerely,    Niraj Garza MD    Enclosure    If you would like to receive this communication electronically, please contact [email or web address] or [phone] to request [employer] Link access.    [employer] Link is a tool which provides read-only access to select patient information with whom you have a relationship. Its easy to use and provides real time access to review your patients record including encounter summaries, notes, results, and demographic information.    If you feel you have received this communication in error or would no longer like to receive these types of communications, please e-mail [email or web address]

## 2019-04-15 ENCOUNTER — TELEPHONE (OUTPATIENT)
Dept: TRANSPLANT | Facility: CLINIC | Age: 64
End: 2019-04-15

## 2019-04-15 NOTE — TELEPHONE ENCOUNTER
Contacted Dr. Blair's office.  Informed that patient did have the 2 extractions on 4/4/19 and is now cleared for transplant.

## 2019-04-16 ENCOUNTER — DOCUMENTATION ONLY (OUTPATIENT)
Dept: PHARMACY | Facility: HOSPITAL | Age: 64
End: 2019-04-16

## 2019-04-16 ENCOUNTER — DOCUMENTATION ONLY (OUTPATIENT)
Dept: TRANSPLANT | Facility: CLINIC | Age: 64
End: 2019-04-16

## 2019-04-16 ENCOUNTER — COMMITTEE REVIEW (OUTPATIENT)
Dept: TRANSPLANT | Facility: CLINIC | Age: 64
End: 2019-04-16

## 2019-04-16 NOTE — PROGRESS NOTES
PHARM.D. PRE-TRANSPLANT NOTE:    Ms. Becerra is a 63 YOF being evaluated for lung transplant due to IPF.  Her New York Heart Association Class is III and a Karnofsky score of 60% - Requires occasional assistance but is able to care for needs. She is not diabetic.     This patient's medication therapy was evaluated as part of her pre-transplant evaluation.      This patient's medication profile was reviewed for contraindications for DAA Hepatitis C therapy:     No current inducers of CYP 3A4 or PGP   No amiodarone on this patient's EMR profile in the last 24 months   No past or current atrial fibrillation on this patient's EMR profile       Current Outpatient Medications   Medication Sig Dispense Refill    ADVAIR DISKUS 250-50 mcg/dose diskus inhaler Inhale 1 puff into the lungs 2 (two) times daily.       benzonatate (TESSALON) 200 MG capsule Take 200 mg by mouth 3 (three) times daily as needed for Cough.      calcium carbonate (TUMS) 200 mg calcium (500 mg) chewable tablet Take 1 tablet by mouth once daily.      diltiaZEM HCl (TIAZAC) 120 mg 24 hr capsule Take 120 mg by mouth once daily.       gabapentin (NEURONTIN) 100 MG capsule Take 100 mg by mouth 3 (three) times daily.       lansoprazole (PREVACID) 15 MG capsule Take 15 mg by mouth once daily.      loperamide (IMODIUM A-D) 2 mg Tab Take 2 mg by mouth 4 (four) times daily as needed.      losartan-hydrochlorothiazide 100-25 mg (HYZAAR) 100-25 mg per tablet Take 1 tablet by mouth once daily.       OFEV 150 mg Cap TAKE 1 CAPSULE BY MOUTH TWICE A DAY  EVERY 12 HOURS  AS DIRECTED WITH FOOD  6    VENTOLIN HFA 90 mcg/actuation inhaler Inhale 1 puff into the lungs every 6 (six) hours as needed.        No current facility-administered medications for this visit.        Currently, patient is responsible for preparing / administering this patient's medications on a daily basis.  I am available for consultation and can be contacted, as needed by the other members of  the Lung Transplant team.

## 2019-04-16 NOTE — COMMITTEE REVIEW
Native Organ Dx: IPF    Pending:  Ms. Chely Becerra was presented to selection committee for a diagnosis of IPF.  All members present agreed that further discussion with Dr. Isidro needs to occur before a final determination regarding lung transplantation can be made.  Dr. Yipolla to discuss with Dr. Isidro upon his return.        I was present during the entire meeting and agree with the statement above.

## 2019-04-18 ENCOUNTER — TELEPHONE (OUTPATIENT)
Dept: TRANSPLANT | Facility: CLINIC | Age: 64
End: 2019-04-18

## 2019-04-18 NOTE — TELEPHONE ENCOUNTER
Discussed patient with Dr. Garza.  Inquired if he spoke with Dr. Isidro regarding determination regarding lung transplantation.  Plan is to discuss patient in the selection committee meeting on Tuesday, 4/23/19.        Contacted Ms. Becerra and notified her of above.  She verbalized her understanding.

## 2019-04-23 ENCOUNTER — TELEPHONE (OUTPATIENT)
Dept: TRANSPLANT | Facility: CLINIC | Age: 64
End: 2019-04-23

## 2019-04-23 ENCOUNTER — COMMITTEE REVIEW (OUTPATIENT)
Dept: TRANSPLANT | Facility: CLINIC | Age: 64
End: 2019-04-23

## 2019-04-23 NOTE — TELEPHONE ENCOUNTER
Contacted Ms. Becerra and notified her of the outcome of selection committee meeting.  Informed her that she will be listed for a bilateral lung transplant once we receive financial clearance from her insurance company.  Patient verbalized complete understanding and stated that she is ready to be listed.

## 2019-04-23 NOTE — COMMITTEE REVIEW
Native Organ Dx: IIP: Idiopathic Pulmonary Fibrosis (IPF)    Approved:  Chely Becerra was presented to selection committee for diagnosis of IPF.  All members present agreed that patient is a suitable candidate for bilateral sequential lung  transplantation.  Plan is to proceed with listing once financial clearance is received.        I was present during the entire meeting and agree with the statement above.

## 2019-04-25 ENCOUNTER — TELEPHONE (OUTPATIENT)
Dept: TRANSPLANT | Facility: CLINIC | Age: 64
End: 2019-04-25

## 2019-04-25 DIAGNOSIS — Z76.82 AWAITING TRANSPLANTATION OF LUNG: Primary | ICD-10-CM

## 2019-04-25 DIAGNOSIS — J84.112 IPF (IDIOPATHIC PULMONARY FIBROSIS): ICD-10-CM

## 2019-04-25 NOTE — TELEPHONE ENCOUNTER
"LISTING NOTE    Received financial clearance.  Contacted Chely Hernan.  Informed her that we will proceed with listing today.  Patient agreeable to being listed.  Verified and updated name, date of birth, social security number, medications, allergies, and telephone numbers.  Notified her of our protocol for our listed patients to start taking Aspirin 81mg daily.  Instructed her to buy the enteric coated Aspirin.  Reminded patient that she should not travel further away than her home, and if she does, someone must first call to let us (on-call MD / on-call transplant coordinator) know.  Also informed patient that she must notify someone (on-call MD) if she is hospitalized somewhere other than here or if she becomes ill, she must notify us of any medication changes or insurance changes.  Patient also instructed that she must be able to be easily reached by telephone when a donor offer becomes available.  Also informed her that she should leave her cell phone on audible and keep it charged.  Asked patient to tell her back-up caregivers to do the same.  Instructed her to call during regular office hours if she has any non-urgent/non-symptom based questions regarding any part of being listed.  Reminded her that at any point prior to the actual surgery, the transplant could be called off resulting in her return home to continue to wait for another organ offer.  Patient verbalized understanding of all points discussed.       Patient listed today per order of Dr. Garza.  Listing paperwork given to , [place].    Diagnosis: IPF  ABO: O POS  LAS: 35.684  CPRA: 0%   Class I Specificity: Negative  Class II Specificity: Negative    No prospective crossmatch needed.  Retrospective crossmatch only.    Ht Readings from Last 1 Encounters:   04/11/19 5' 7" (1.702 m)     Wt Readings from Last 1 Encounters:   04/11/19 85.3 kg (188 lb)     BMI: Estimated body mass index is 29.44 kg/m² as calculated from " "the following:    Height as of 4/11/19: 5' 7" (1.702 m).    Weight as of 4/11/19: 85.3 kg (188 lb).     Contacted Ms. Becerra.  Informed her that she is officially listed and can be contacted at any time with an organ offer.  She verbalized her understanding and denied having any questions.  "

## 2019-04-26 RX ORDER — ASPIRIN 81 MG/1
81 TABLET ORAL DAILY
Status: ON HOLD | COMMUNITY
End: 2020-01-01 | Stop reason: SDUPTHER

## 2019-04-29 ENCOUNTER — TELEPHONE (OUTPATIENT)
Dept: TRANSPLANT | Facility: HOSPITAL | Age: 64
End: 2019-04-29

## 2019-04-29 ENCOUNTER — TELEPHONE (OUTPATIENT)
Dept: TRANSPLANT | Facility: CLINIC | Age: 64
End: 2019-04-29

## 2019-04-29 DIAGNOSIS — Z76.82 AWAITING TRANSPLANTATION OF LUNG: ICD-10-CM

## 2019-04-29 DIAGNOSIS — R73.09 ELEVATED GLYCOSYLATED HEMOGLOBIN: Primary | ICD-10-CM

## 2019-04-29 NOTE — TELEPHONE ENCOUNTER
HgA1C to be drawn with next lab draw.    ----- Message from Niraj Garza MD sent at 4/29/2019  1:06 PM CDT -----  yes  ----- Message -----  From: Mindi Bills RN  Sent: 4/26/2019   2:29 PM  To: Niraj Garza MD, Mindi Bills, RN    Ms. Becerra is not diabetic and is not on any medications, but her HgA1C in January was 6.5.  Do you want to repeat it?    sl

## 2019-04-29 NOTE — TELEPHONE ENCOUNTER
----- Message from Mindi Bills RN sent at 4/25/2019 12:06 PM CDT -----  Ms. Becerra was listed today.  She is asking for information to [facility].    SW returned pt's call. Pt had general questions re: housing after transplant. Pt [facility] away from [facility] and will be asked to reside locally ([facility] of [facility]) for an estimated 3 months post transplant. Pt was provided with general education regarding local housing and given the options of finding their own housing accommodations (hotels/motels/apartments/etc) or residing at [facility]. SW provided list of items that would be needed for the [facility] apartments as well as provided education on the cost (sliding scale fee). All questions answered to pt's satisfaction. No additional needs voiced at this time. SW remains available.

## 2019-04-30 ENCOUNTER — TELEPHONE (OUTPATIENT)
Dept: TRANSPLANT | Facility: CLINIC | Age: 64
End: 2019-04-30

## 2019-04-30 NOTE — TELEPHONE ENCOUNTER
----- Message from Mónica Jordan sent at 4/30/2019 11:59 AM CDT -----  Contact: patient  Needs Advice    Reason for call: Patient called to speak to coordinator regarding her insurance being renewed.        Communication Preference: [phone]    Additional Information: n/a    Contacted Ms. Becerra. She had questions regarding her insurance.  Informed her that she would need to speak with Aliya regarding any insurance questions.  Informed Ms. Becerra that I would send a message to Aliya requesting that she call her.  Ms. Becerra verbalized her understanding and will await Aliya's call.    Message sent to Aliya regarding contacting patient.

## 2019-05-13 ENCOUNTER — TELEPHONE (OUTPATIENT)
Dept: TRANSPLANT | Facility: CLINIC | Age: 64
End: 2019-05-13

## 2019-05-13 NOTE — TELEPHONE ENCOUNTER
"----- Message from Micki Flor sent at 5/13/2019  3:35 PM CDT -----  Calling to discuss lab work results   Lab work was done by dr. Wallace    Pt contact [phone]    Attempted to contact Ms. Becerra.  No answer.  Left a message requesting a return call.     Received a return call from Ms. Becerra.  She stated that she had labs locally and she was found to be anemic.  Additional lab tests were ordered.  Ms. Becerra stated, "It doesn't look like a vitamin deficiency, so I need to see a blood doctor."  The appointment is not scheduled yet.  Instructed Ms. Becerra to have Dr. Stack's office fax the lab results.  Also instructed her to have the hematologist's office send the office note once the appointment is complete.  She verbalized her understanding of all discussed.  "

## 2019-05-14 ENCOUNTER — CONFERENCE (OUTPATIENT)
Dept: TRANSPLANT | Facility: CLINIC | Age: 64
End: 2019-05-14

## 2019-05-14 NOTE — TELEPHONE ENCOUNTER
LISTED PATIENT REVIEW     Ms. Becerra was discussed in the meeting today after M&M discussion.  Members present were:  Dr. Garza, Dr. Dominguez, Dr. Weill, Dr. Isidro, Dr. Clancy, Hussein Guallpa (NP), Mayuri Nath (PA), Marissa Leonard (RN), Amisha Quiroz (RN), Subha Bruce (RN), Mindi Irving (RN), and Jessie Santamaria (RN).  Medical information, studies, and imaging reviewed.  Notified the members present of my conversation with Ms. Becerra yesterday regarding anemia on recent labs drawn locally without vitamin deficiency and PCP's recommendations for Hematology / Oncology consult.  Informed everyone that I am still waiting on the lab results.  Instructed to change Ms. Becerra's status to inactive on the wait list until lab results and hematology / oncology note received and reviewed.  If Ms. Becerra is reactivated on the wait list, she will be listed for a left single lung transplant only due to size mismatch of lungs.  She will need to lose weight to reach a BMI of < 28 (goal weight: 178#).        Contacted Ms. Becerra and notified her of the discussion today.  Informed her that her status will be changed to inactive on the wait list until the lab results and hematologist's note is received and reviewed.  Informed her that she will still be on the lung transplant wait list, but she will not be contacted with any organ offers while she is inactive.  Informed her that she will receive a letter in the mail regarding this decision.  Informed her that we need to ensure that is is safe to proceed with the lung transplantation.  Informed her that if she is reactivated on the wait list, she will be listed for a left single lung transplant only, not a bilateral lung.  Informed her that the physicians are concerned with the size mismatch of her lungs.  Informed her that it is was determined that a single lung would be the best option for her.  Informed her that a single lung transplant is a shorter surgery.  Informed her that the  survival statistics for single and double lung transplants are the same at one year.  Informed her that she will need to reach a BMI of < 28 or a goal weight of 178# to be listed for a single lung transplant.  Informed her that patients with increased BMIs do not do as well and may have more complications.  She verbalized her understanding of all discussed and denied having any questions.  Instructed her to have her daughters contact me should they have any questions.  She again verbalized her understanding.  Informed her that I have not received the lab results from Dr. Stack's office yet.  She stated that she is going to Dr. Stack's office tomorrow.  She will have someone fax the results.  Inquired if the appointment with the hematologist has been scheduled.  She stated that it hasn't.  She will contact me with the date of the appointment.    Status changed to inactive in [facility] per Dr. Garza's instructions.  Also changed listing to left single lung only.

## 2019-05-20 ENCOUNTER — DOCUMENTATION ONLY (OUTPATIENT)
Dept: TRANSPLANT | Facility: CLINIC | Age: 64
End: 2019-05-20

## 2019-05-20 LAB
EXT BASOPHIL%: 0.7
EXT EOSINOPHIL%: 4.6
EXT FERRITIN: 77
EXT HEMATOCRIT: 33.8
EXT HEMOGLOBIN: 11.1
EXT LYMPH%: 46.4
EXT MCH: 28.8
EXT MCHC: 32.8
EXT MCV: 87.6
EXT MONOCYTES%: 5.9
EXT NEUT%: 42.4
EXT PLATELETS: 338
EXT RBC UA: 3.86
EXT RDW: 12.9
EXT WBC: 8.4
FOLIC ACID LEVEL: 10.7
IRON, SERUM: 59
Lab: 20
Lab: 233
Lab: 292
RETICULOCYTE COUNT: 1.9
VIT B12 SERPL-MCNC: 386 PG/ML

## 2019-05-22 ENCOUNTER — TELEPHONE (OUTPATIENT)
Dept: TRANSPLANT | Facility: CLINIC | Age: 64
End: 2019-05-22

## 2019-05-22 NOTE — TELEPHONE ENCOUNTER
----- Message from Niraj Garza MD sent at 5/20/2019 11:34 AM CDT -----  Have no clue why there is so much concern about her anemia    Discussed outside lab results with Dr. Garza.  Instructed to follow PCP's instructions for a hematology appointment.

## 2019-05-28 ENCOUNTER — TELEPHONE (OUTPATIENT)
Dept: TRANSPLANT | Facility: CLINIC | Age: 64
End: 2019-05-28

## 2019-05-31 NOTE — TELEPHONE ENCOUNTER
5/28/19 - Contacted Ms. Becerra.  Inquired if appointment with local hematologist had been scheduled.  She stated that the appointment has been scheduled for 6/5/19.  Informed her that we received the lab results, but we will need to await the hematologist's recommendations.  Reminded her to have the note sent to us after completion of the visit.  She verbalized her understanding.

## 2019-06-11 ENCOUNTER — TELEPHONE (OUTPATIENT)
Dept: TRANSPLANT | Facility: CLINIC | Age: 64
End: 2019-06-11

## 2019-06-11 NOTE — TELEPHONE ENCOUNTER
----- Message from Rasheedmahaddanay Julian sent at 6/11/2019  2:01 PM CDT -----  Contact: Patient   Needs Advice    Reason for call: Speak with coordinator regarding test results     Pt stated she won't get results until 6/26        Communication Preference: [phone]     Additional Information: N/A    Contacted Ms. Becerra.  She stated that she saw the hematologist / oncologist, but the results of testing will not be back until 6/26/19.  Instructed her to have the hematologist / oncologist send the clinic note and test results once resulted.  She verbalized her understanding and stated that she wanted to keep us updated.

## 2019-06-13 ENCOUNTER — OFFICE VISIT (OUTPATIENT)
Dept: TRANSPLANT | Facility: CLINIC | Age: 64
End: 2019-06-13
Payer: MEDICARE

## 2019-06-13 ENCOUNTER — HOSPITAL ENCOUNTER (OUTPATIENT)
Dept: PULMONOLOGY | Facility: CLINIC | Age: 64
Discharge: HOME | End: 2019-06-13
Payer: MEDICARE

## 2019-06-13 VITALS — WEIGHT: 194.69 LBS | HEIGHT: 67 IN | BODY MASS INDEX: 30.56 KG/M2

## 2019-06-13 VITALS
HEIGHT: 67 IN | SYSTOLIC BLOOD PRESSURE: 139 MMHG | BODY MASS INDEX: 30.45 KG/M2 | HEART RATE: 71 BPM | RESPIRATION RATE: 20 BRPM | TEMPERATURE: 98 F | OXYGEN SATURATION: 98 % | WEIGHT: 194 LBS | DIASTOLIC BLOOD PRESSURE: 67 MMHG

## 2019-06-13 DIAGNOSIS — J84.112 IPF (IDIOPATHIC PULMONARY FIBROSIS): ICD-10-CM

## 2019-06-13 DIAGNOSIS — Z76.82 AWAITING TRANSPLANTATION OF LUNG: ICD-10-CM

## 2019-06-13 DIAGNOSIS — E66.9 OBESITY (BMI 30.0-34.9): ICD-10-CM

## 2019-06-13 DIAGNOSIS — J96.11 CHRONIC RESPIRATORY FAILURE WITH HYPOXIA: ICD-10-CM

## 2019-06-13 DIAGNOSIS — D64.9 ANEMIA, UNSPECIFIED TYPE: ICD-10-CM

## 2019-06-13 DIAGNOSIS — Z76.82 LUNG TRANSPLANT CANDIDATE: Primary | ICD-10-CM

## 2019-06-13 LAB
ALLENS TEST: ABNORMAL
DELSYS: ABNORMAL
HCO3 UR-SCNC: 28.7 MMOL/L (ref 24–28)
PCO2 BLDA: 44.3 MMHG (ref 35–45)
PH SMN: 7.42 [PH] (ref 7.35–7.45)
PO2 BLDA: 71 MMHG (ref 80–100)
POC BE: 4 MMOL/L
POC SATURATED O2: 94 % (ref 95–100)
POC TCO2: 30 MMOL/L (ref 23–27)
PRE FEV1 FVC: 88
PRE FEV1: 1.12
PRE FVC: 1.28
PREDICTED FEV1 FVC: 78
PREDICTED FEV1: 2.63
PREDICTED FVC: 3.33
SAMPLE: ABNORMAL
SITE: ABNORMAL

## 2019-06-13 PROCEDURE — 94729 DIFFUSING CAPACITY: CPT | Mod: 26,S$PBB,TXP, | Performed by: INTERNAL MEDICINE

## 2019-06-13 PROCEDURE — 36600 PR WITHDRAWAL OF ARTERIAL BLOOD: ICD-10-PCS | Mod: S$PBB,TXP,, | Performed by: INTERNAL MEDICINE

## 2019-06-13 PROCEDURE — 94618 PULMONARY STRESS TESTING: ICD-10-PCS | Mod: 26,S$PBB,TXP, | Performed by: INTERNAL MEDICINE

## 2019-06-13 PROCEDURE — 94729 PR C02/MEMBANE DIFFUSE CAPACITY: ICD-10-PCS | Mod: 26,S$PBB,TXP, | Performed by: INTERNAL MEDICINE

## 2019-06-13 PROCEDURE — 99214 PR OFFICE/OUTPT VISIT, EST, LEVL IV, 30-39 MIN: ICD-10-PCS | Mod: 25,S$PBB,TXP, | Performed by: PHYSICIAN ASSISTANT

## 2019-06-13 PROCEDURE — 94010 BREATHING CAPACITY TEST: CPT | Mod: 26,S$PBB,TXP, | Performed by: INTERNAL MEDICINE

## 2019-06-13 PROCEDURE — 94010 BREATHING CAPACITY TEST: CPT | Mod: PBBFAC,TXP | Performed by: INTERNAL MEDICINE

## 2019-06-13 PROCEDURE — 94618 PULMONARY STRESS TESTING: CPT | Mod: 26,S$PBB,TXP, | Performed by: INTERNAL MEDICINE

## 2019-06-13 PROCEDURE — 94010 BREATHING CAPACITY TEST: ICD-10-PCS | Mod: 26,S$PBB,TXP, | Performed by: INTERNAL MEDICINE

## 2019-06-13 PROCEDURE — 99213 OFFICE O/P EST LOW 20 MIN: CPT | Mod: PBBFAC,TXP,25 | Performed by: PHYSICIAN ASSISTANT

## 2019-06-13 PROCEDURE — 94618 PULMONARY STRESS TESTING: CPT | Mod: PBBFAC,TXP | Performed by: INTERNAL MEDICINE

## 2019-06-13 PROCEDURE — 99214 OFFICE O/P EST MOD 30 MIN: CPT | Mod: 25,S$PBB,TXP, | Performed by: PHYSICIAN ASSISTANT

## 2019-06-13 PROCEDURE — 36600 WITHDRAWAL OF ARTERIAL BLOOD: CPT | Mod: S$PBB,TXP,, | Performed by: INTERNAL MEDICINE

## 2019-06-13 PROCEDURE — 94729 DIFFUSING CAPACITY: CPT | Mod: PBBFAC,TXP | Performed by: INTERNAL MEDICINE

## 2019-06-13 PROCEDURE — 99999 PR PBB SHADOW E&M-EST. PATIENT-LVL III: CPT | Mod: PBBFAC,TXP,, | Performed by: PHYSICIAN ASSISTANT

## 2019-06-13 PROCEDURE — 82803 BLOOD GASES ANY COMBINATION: CPT | Mod: PBBFAC,TXP | Performed by: INTERNAL MEDICINE

## 2019-06-13 PROCEDURE — 36600 WITHDRAWAL OF ARTERIAL BLOOD: CPT | Mod: PBBFAC,TXP | Performed by: INTERNAL MEDICINE

## 2019-06-13 PROCEDURE — 99999 PR PBB SHADOW E&M-EST. PATIENT-LVL III: ICD-10-PCS | Mod: PBBFAC,TXP,, | Performed by: PHYSICIAN ASSISTANT

## 2019-06-13 RX ORDER — ERGOCALCIFEROL 1.25 MG/1
50000 CAPSULE ORAL
Status: ON HOLD | COMMUNITY
End: 2020-01-01 | Stop reason: HOSPADM

## 2019-06-13 NOTE — LETTER
June 13, 2019        Nitin Ariza  [address]  [address]  [place] [address]  Phone: [phone]  Fax: [phone]             [facility]  [address]  [place] [address]  Phone: [phone]   Patient: Chely Becerra   MR Number: [id number]   YOB: 1955   Date of Visit: 6/13/2019       Dear Dr. Nitin Ariza    Thank you for referring Chely Becerra to me for evaluation. Attached you will find relevant portions of my assessment and plan of care.    If you have questions, please do not hesitate to call me. I look forward to following Chely Becerra along with you.    Sincerely,    Becki Justin PA-C    Enclosure    If you would like to receive this communication electronically, please contact [email or web address] or [phone] to request [employer] access.    [employer] is a tool which provides read-only access to select patient information with whom you have a relationship. Its easy to use and provides real time access to review your patients record including encounter summaries, notes, results, and demographic information.    If you feel you have received this communication in error or would no longer like to receive these types of communications, please e-mail [email or web address]

## 2019-06-13 NOTE — PROGRESS NOTES
Reminded Ms. Becerra to have the hematologist fax the office note and lab results to us for review.  Instructed Ms. Becerra to monitor her weight and continue to work on weight loss.  Encouraged her to remain active and to contact us with any new or worsening complaints.  Instructed her to contact us for any admissions locally.  Ms. Becerra's daughter asked about the frequency of appointments.  Informed her that the listed patients are followed in clinic every 2 months.  Ms. Becerra asked about her immunization schedule.  Notified her of the immunization schedule based on the Infectious Disease note.  Both Ms. Becerra and her daughter verbalized their understanding of all discussed.    Short Physical Performance Battery performed during clinic visit.  Total score = 10.

## 2019-06-13 NOTE — PROGRESS NOTES
LUNG TRANSPLANT PRE FOLLOW-UP    Referring Physician: Nitin Ariza    Reason for Visit:  Pre-lung transplant follow-up.         Date of Initial Evaluation: 1/14/2019                                                                                             History of Present Illness: Chely Becerra is a 63 y.o. female who is on 2L of oxygen. She is on no assisted ventilation.  Her New York Heart Association Class is III and a Karnofsky score of 60% - Requires occasional assistance but is able to care for needs. She is not diabetic.  She presents today for routine follow up for consideration of lung transplant secondary to diagnosis of IPF. Patient reports doing well over the past few months. She denies any recent exacerbations requiring antibiotic or steroid use, ER visits, or hospitalizations. She reports stable respiratory complaints of dyspnea with exertion. She reports her cough is stable and notes mostly a dry cough at night and a productive cough with clear sputum production in the morning. She reports non-compliance with wearing her oxygen with exertion intermittently and reports that the portable concentrator is too heavy for her during her walks. She reports staying active by doing her house work as well as walking in the afternoons around her neighborhood. She reports having nasal congestion and is currently doing saline rinses and Mucinex for relief. She denies any recent fevers, chills, or myalgias. Reports intermittent episodes of diarrhea due to her Ofev for which she takes Imodium with relief. She does report slight weight gain over the past few months, which she attributes to having more sweets in the house while having her grandchildren at home.       Review of Systems   Constitutional: Negative for chills, diaphoresis, fever, malaise/fatigue and weight loss.   HENT: Positive for congestion. Negative for ear discharge, ear pain, hearing loss, nosebleeds, sinus pain, sore throat and tinnitus.   "  Eyes: Negative for blurred vision, double vision, photophobia, pain, discharge and redness.   Respiratory: Positive for cough, sputum production and shortness of breath. Negative for hemoptysis, wheezing and stridor.    Cardiovascular: Negative for chest pain, palpitations, orthopnea, claudication, leg swelling and PND.   Gastrointestinal: Positive for diarrhea. Negative for abdominal pain, blood in stool, constipation, heartburn, melena, nausea and vomiting.   Genitourinary: Negative for dysuria, flank pain, frequency, hematuria and urgency.   Musculoskeletal: Negative for back pain, falls, joint pain, myalgias and neck pain.   Skin: Negative for itching and rash.   Neurological: Negative for dizziness, tingling, tremors, sensory change, speech change, focal weakness, seizures, loss of consciousness, weakness and headaches.   Endo/Heme/Allergies: Negative for environmental allergies and polydipsia. Does not bruise/bleed easily.   Psychiatric/Behavioral: Negative for depression, hallucinations, memory loss, substance abuse and suicidal ideas. The patient is not nervous/anxious and does not have insomnia.      Objective:   /67   Pulse 71   Temp 98.3 °F (36.8 °C) (Oral)   Resp 20   Ht 5' 7" (1.702 m)   Wt 88 kg (194 lb)   SpO2 98% Comment: 2 liters  BMI 30.38 kg/m²   Physical Exam   Constitutional: She is oriented to person, place, and time and well-developed, well-nourished, and in no distress. No distress.   HENT:   Head: Normocephalic and atraumatic.   Right Ear: External ear normal.   Left Ear: External ear normal.   Nose: Nose normal.   Mouth/Throat: Oropharynx is clear and moist.   Eyes: Pupils are equal, round, and reactive to light. EOM are normal.   Neck: Normal range of motion. Neck supple. No JVD present.   Cardiovascular: Normal rate, regular rhythm, normal heart sounds and intact distal pulses. Exam reveals no gallop and no friction rub.   No murmur heard.  Pulmonary/Chest: Effort normal. " No respiratory distress. She has decreased breath sounds. She has no wheezes. She has no rhonchi. She has rales in the right middle field, the right lower field, the left middle field and the left lower field. She exhibits no tenderness.   Nasal cannula in place   Abdominal: Soft. Bowel sounds are normal. She exhibits no distension. There is no tenderness.   Musculoskeletal: Normal range of motion. She exhibits no edema.   Neurological: She is alert and oriented to person, place, and time. Gait normal. GCS score is 15.   Skin: Skin is warm and dry. No rash noted. She is not diaphoretic.   Psychiatric: Affect normal.   Nursing note and vitals reviewed.    Labs:  Hospital Outpatient Visit on 06/13/2019   Component Date Value    POC PH 06/13/2019 7.419     POC PCO2 06/13/2019 44.3     POC PO2 06/13/2019 71*    POC HCO3 06/13/2019 28.7*    POC BE 06/13/2019 4     POC SATURATED O2 06/13/2019 94*    POC TCO2 06/13/2019 30*    Sample 06/13/2019 ARTERIAL     Site 06/13/2019 RR     Allens Test 06/13/2019 Pass     DelSys 06/13/2019 Room Air    Lab Visit on 06/13/2019   Component Date Value    WBC 06/13/2019 9.18     RBC 06/13/2019 3.63*    Hemoglobin 06/13/2019 10.6*    Hematocrit 06/13/2019 33.8*    Mean Corpuscular Volume 06/13/2019 93     Mean Corpuscular Hemoglo* 06/13/2019 29.2     Mean Corpuscular Hemoglo* 06/13/2019 31.4*    RDW 06/13/2019 13.2     Platelets 06/13/2019 337     MPV 06/13/2019 9.4     Immature Granulocytes 06/13/2019 0.2     Gran # (ANC) 06/13/2019 3.9     Immature Grans (Abs) 06/13/2019 0.02     Lymph # 06/13/2019 4.2     Mono # 06/13/2019 0.7     Eos # 06/13/2019 0.4     Baso # 06/13/2019 0.05     nRBC 06/13/2019 0     Gran% 06/13/2019 42.1     Lymph% 06/13/2019 45.5     Mono% 06/13/2019 7.8     Eosinophil% 06/13/2019 3.9     Basophil% 06/13/2019 0.5     Differential Method 06/13/2019 Automated     Sodium 06/13/2019 138     Potassium 06/13/2019 3.8     Chloride  06/13/2019 99     CO2 06/13/2019 31*    Glucose 06/13/2019 101     BUN, Bld 06/13/2019 10     Creatinine 06/13/2019 0.7     Calcium 06/13/2019 9.8     Total Protein 06/13/2019 7.8     Albumin 06/13/2019 3.5     Total Bilirubin 06/13/2019 0.3     Alkaline Phosphatase 06/13/2019 90     AST 06/13/2019 13     ALT 06/13/2019 11     Anion Gap 06/13/2019 8     eGFR if African American 06/13/2019 >60.0     eGFR if non African Amer* 06/13/2019 >60.0     Hemoglobin A1C 06/13/2019 6.3*    Estimated Avg Glucose 06/13/2019 134*       Pulmonary Function Tests 6/13/2019 4/11/2019 11/20/2018 2/26/2018   FVC 1.28 1.23 1.36 1.31   FEV1 1.12 1.1 1.2 0.98   TLC (liters) - - 1.89 2.55   DLCO (ml/mmHg sec) 6.4 - 7.3 -   FVC% 39 37 41 44   FEV1% 43 42 46 41   FEF 25-75 - 1.25 2.33 0.76   FEF 25-75% - 48 90 33   TLC% - - 35 52   RV - - 0.58 1.36   RV% - - 28 1.16   DLCO% 33 - 37 -     6MW 6/13/2019 4/11/2019 1/16/2019 12/13/2018   6MWT Status completed without stopping completed without stopping completed without stopping completed without stopping   Patient Reported Dyspnea Dyspnea Chest pain;Dyspnea Dyspnea   Was O2 used? Yes Yes Yes Yes   Delivery Method Cannula;Pull Tank Cannula;Pull Tank;Continuous Flow Cannula;Pull Tank;Continuous Flow Cannula;Pull Tank;Continuous Flow   6MW Distance walked (feet) 1200 1200 1100 1200   Distance walked (meters) 365.76 365.76 335.28 365.76   Did patient stop? No No No No   Oxygen Saturation 98 98 98 99   Supplemental Oxygen 2 L/M 2 L/M 2 L/M 2 L/M   Heart Rate 74 75 88 81   Blood Pressure 123/59 137/73 129/69 114/65   Jaclyn Dyspnea Rating  very light very, very light (just noticeable) very light nothing at all   Oxygen Saturation 79 82 83 88   Supplemental Oxygen 2 L/M 2 L/M 2 L/M 2 L/M   Heart Rate 102 113 119 101   Blood Pressure 144/68 144/76 151/72 133/69   Jaclyn Dyspnea Rating  somewhat heavy moderate somewhat heavy very light   Recovery Time (seconds) 251 90 161 87   Oxygen  Saturation 98 98 98 96   Supplemental Oxygen 2 L/M 2 L/M 2 L/M 2 L/M   Heart Rate 72 88 98 93       Assessment:-  1. Lung transplant candidate    2. IPF (idiopathic pulmonary fibrosis)    3. Chronic respiratory failure with hypoxia    4. Obesity (BMI 30.0-34.9)    5. Anemia, unspecified type      Plan:   1. Patient to remain inactive pending further investigation of anemia per hematology at PCP's request. Will follow up on patient following hematology appointment on 6/26 and request records. Discussed with patient that she will need to maintain BMI <31. Labs reviewed. ABG with no evidence of hypercapnia. 6MWT stable with 1200 feet completed. SPPB frailty score calculated at 10 today.     2. Continue OFev and Advair. Continue Ventolin PRN. Spirometry reviewed and FVC stable.     3. Continue with continuous oxygen supplementation. Discussed at length with patient that she is to wear her oxygen at rest and with activity as she was noted to desaturate to 79% on the 6MWT today despite wearing oxygen during the walk. Advised patient to maintain O2 sats >88%. Discussed risks of cardiac strain if she does not wear her oxygen. Patient verbalizes understanding.    4. Encouraged patient to maintain her current weight and strive for weight loss through diet and exercise. Current BMI 30.3. Informed patient that prior to listing for lung transplant her BMI will have to be less than 31. Patient verbalizes understanding.     5. Hematology appointment scheduled for 6/26/2019. Will follow up with patient and request records. CBC reviewed and H/H stable at 10.6/33.     6.  Follow up in 2 months or earlier if needed.    Becki Justin PA-C  Lung Transplant

## 2019-06-15 NOTE — PROCEDURES
Chely Becerra is a 63 y.o.  female patient, who presents for a 6 minute walk test ordered by Purdencio Garza MD.  The diagnosis is Pre Lung Transplant Evaluation; Pulmonary Fibrosis.  The patient's BMI is 30.6 kg/m2.  Predicted distance (lower limit of normal) is 331.43 meters.      Test Results:    The test was completed without stopping.  The total time walked was 360 seconds.  During walking, the patient reported:  Dyspnea.  The patient used supplemental oxygen during testing.     06/13/2019---------Distance: 365.76 meters (1200 feet)     O2 Sat % Supplemental Oxygen Heart Rate Blood Pressure Jaclyn Scale   Pre-exercise  (Resting) 98 % 2 L/M 74 bpm 123/59 mmHg 1   During Exercise 79 % 2 L/M 102 bpm 144/68 mmHg 4   Post-exercise  (Recovery) 98 % 2 L/M  72 bpm 132/66 mmHg      Recovery Time:  251 seconds    Performing nurse/tech:  Ganesh LOCO      PREVIOUS STUDY:   04/11/2019---------Distance: 365.76 meters (1200 feet)       O2 Sat % Supplemental Oxygen Heart Rate Blood Pressure Jaclyn Scale   Pre-exercise  (Resting) 98 % 2 L/M 75 bpm 137/73 mmHg 0.5   During Exercise 82 % 2 L/M 113 bpm 144/76 mmHg 3   Post-exercise  (Recovery) 98 % 2 L/M  88 bpm           CLINICAL INTERPRETATION:  Six minute walk distance is 365.76 meters (1200 feet) with somewhat heavy dyspnea.  During exercise, there was significant desaturation while breathing supplemental oxygen.  Blood pressure remained stable and Heart rate increased significantly with walking.  The patient did not report non-pulmonary symptoms during exercise.  Since the previous study in April 2019, exercise capacity is unchanged.  Based upon age and body mass index, exercise capacity is normal.

## 2019-06-21 DIAGNOSIS — Z76.82 AWAITING TRANSPLANTATION OF LUNG: Primary | ICD-10-CM

## 2019-06-21 DIAGNOSIS — J84.112 IPF (IDIOPATHIC PULMONARY FIBROSIS): ICD-10-CM

## 2019-06-26 ENCOUNTER — TELEPHONE (OUTPATIENT)
Dept: TRANSPLANT | Facility: CLINIC | Age: 64
End: 2019-06-26

## 2019-06-26 NOTE — TELEPHONE ENCOUNTER
----- Message from Subha Lake MA sent at 6/26/2019  2:16 PM CDT -----  Contact: [phone]  Needs Advice    Reason for call: pt was wanting to let Mindi know that she had test results from  [facility] faxed to your office.  please call to discuss        Communication Preference: [phone]     Contacted Ms. Becerra.  She stated that she saw the hematologist oncologist today.  She stated that she was told that it isn't anything to worry about and the doctor's office was faxing the office note.  Informed her that I have not received a fax as of yet.  Informed her that I would fax a release of information.  Phone number obtained [phone].  Informed Ms. Becerra that I would review the note with Dr. Garza once received and we will call back with the plan.  She verbalized her understanding.    Release of information faxed to [facility] for labs and office notes.

## 2019-06-26 NOTE — TELEPHONE ENCOUNTER
----- Message from Milton Julian sent at 6/26/2019  3:25 PM CDT -----  Contact: Patient   Needs Advice    Reason for call: Notify coordinator that Dr. Rodriguez from the [facility]    was the doctor that did blood work on her        Communication Preference: [phone]     Additional Information: N/A    Contacted Ms. Becerra.  Informed her that the release of information for records was sent.  Also reminded her that she does need to lose weight to reach a BMI of <28 or a weight of 178#.  Informed her that her weight was 194#, BMI of 30.38 on 6/14/19.  Reminded her that she will be only listed for a left single lung due to the size mismatch of her lungs as previously discussed.  Informed her that we require our patients listed for a single lung only to have a lower   BMI.  Informed her that we have experienced worse outcomes with single lung recipients with higher BMIs.  She verbalized her understanding and requested that information regarding weight loss be sent to her daughter's fax number.  Informed her that I would have the dietitian send the information.  Informed her that I would review the records from Heme / Once with Dr. Garza once received and I would call her back with the plan.  Informed her that she would more than likely need to meet the recommended goal weight prior to reactivating her on the wait list.  She verbalized her understanding and inquired if she would need to wait until her next appointment in August for an assessment of her weight.  Informed her that if she meets the weight requirement prior to her next visit, then she can schedule a weight check at her PCP's office and the result could be faxed to us.  She verbalized her understanding again.

## 2019-07-01 ENCOUNTER — TELEPHONE (OUTPATIENT)
Dept: TRANSPLANT | Facility: CLINIC | Age: 64
End: 2019-07-01

## 2019-07-01 NOTE — TELEPHONE ENCOUNTER
Office notes and labs from Dr. Alejo Tripp (Heme/Onc) reviewed with Dr. Garza.  Notified him of her BMI of 30.38 during her last office visit.  Instructed to reactivate Ms. Becerra on the wait list.  Ms. Becerra must not gain any weight and should continue trying to lose weight.    Contacted Ms. Becerra and notified her of Dr. Garza's instructions.  Informed her that she will be reactivated on the wait list and can be contacted at any time with an organ offer.  Reminded her that she must be reachable 24 hours a day, 7 days a week.  Also instructed her not to gain any weight and to continue trying to lose weight.  She verbalized her understanding and stated that she has been working on weight loss.  She is monitoring her dietary intake and she is also walking.  Informed her that she will receive a letter in the mail regarding her reactivation on the wait list.  Instructed her to contact me for any questions or concerns.  She again verbalized her understanding.    Status changed to active on the wait list.  Also data due to .  UNET updated from recent clinic visit.  LAS [phone].

## 2019-07-01 NOTE — TELEPHONE ENCOUNTER
----- Message from Tania Sidhu RD sent at 7/1/2019  3:46 PM CDT -----  Sending her info now. Put my phone number on the cover sheet as well.    ----- Message -----  From: Mindi Bills RN  Sent: 6/26/2019   3:58 PM  To: Tania Sidhu RD    Ms. Becerra needs to lose weight and reach a BMI of < 28 (178#).  We saw her in clinic recently and she was 194#, BMI 30.38.  She asked that you fax information regarding weight loss, meal plans, etc to her daughter, Ivy, at [phone].    Thanks,  sl

## 2019-07-11 ENCOUNTER — TELEPHONE (OUTPATIENT)
Dept: TRANSPLANT | Facility: CLINIC | Age: 64
End: 2019-07-11

## 2019-07-11 NOTE — TELEPHONE ENCOUNTER
----- Message from Manuel Nieto sent at 7/11/2019 11:36 AM CDT -----  Contact: patient  Please call pt at [phone]    Patient now has [facility] Medicaid (new medicaid)    Thank you    Contacted Ms. Becerra.  She stated that she has a new Medicaid card and she wanted to ensure that transplant would still be covered.  Member ID# obtained.  Informed Ms. Becerra that I would send a message to the  since she would need to verify coverage.  She verbalized her understanding and denied having any further questions or concerns.  She stated that she is working on weight loss.  She did receive the information that Tania sent.    Message sent to Yuly and Aliya regarding insurance.    Received a voice message from Yuly that the behavioral health portion is only through [facility].  There are no issues with her insurance in regard to the transplant.      Contacted Ms. Becerra.  Informed her that there are no issues with her insurance in regards to transplant.  She verbalized her understanding.  She also stated that her daughter is unable to find the information that Tania sent via email.  She asked that Tania send it directly to her.  Informed Ms. Becerra that I would send a message to Tania.  She again verbalized her understanding.

## 2019-07-11 NOTE — TELEPHONE ENCOUNTER
----- Message from Yuly Rogel sent at 7/11/2019 12:50 PM CDT -----  She has [facility] Medicaid for her behavioral health. Thanks.  ----- Message -----  From: Mindi Bills RN  Sent: 7/11/2019  12:31 PM  To: Aliya Castro    Please call pt at [phone]    Patient now has [facility] Medicaid (new medicaid)      I received the above message from Ms. Becerra today.  I called her and she gave me the member ID: [id number].      Mindi

## 2019-07-12 ENCOUNTER — TELEPHONE (OUTPATIENT)
Dept: TRANSPLANT | Facility: CLINIC | Age: 64
End: 2019-07-12

## 2019-07-12 NOTE — TELEPHONE ENCOUNTER
Sent email to contact address provided by nurse coordinator. Emailed pt/family the following nutrition education:  1700 calorie sample meal plan  Weight loss tips  Healthy eating with My Plate method  Encouraged increase physical activity as tolerated, safely.   Reminded pt/family of weight goal of under 180 lb to decrease BMI.     RD contact info included in email for any follow up questions.

## 2019-08-06 ENCOUNTER — TELEPHONE (OUTPATIENT)
Dept: TRANSPLANT | Facility: CLINIC | Age: 64
End: 2019-08-06

## 2019-08-06 NOTE — TELEPHONE ENCOUNTER
----- Message from Subha Lake MA sent at 8/6/2019  3:44 PM CDT -----  Contact: [phone]  Needs Advice    Reason for call: pt has a series of appointments on Thursday 8/8/19 that she would like to have rescheduled to next week if possible         Communication Preference: [phone]    Additional Information: please call

## 2019-08-15 ENCOUNTER — HOSPITAL ENCOUNTER (OUTPATIENT)
Dept: RADIOLOGY | Facility: HOSPITAL | Age: 64
Discharge: HOME | End: 2019-08-15
Attending: INTERNAL MEDICINE
Payer: MEDICARE

## 2019-08-15 ENCOUNTER — OFFICE VISIT (OUTPATIENT)
Dept: TRANSPLANT | Facility: CLINIC | Age: 64
End: 2019-08-15
Payer: MEDICARE

## 2019-08-15 ENCOUNTER — HOSPITAL ENCOUNTER (OUTPATIENT)
Dept: PULMONOLOGY | Facility: CLINIC | Age: 64
Discharge: HOME | End: 2019-08-15
Payer: MEDICARE

## 2019-08-15 VITALS
OXYGEN SATURATION: 97 % | WEIGHT: 189 LBS | TEMPERATURE: 98 F | WEIGHT: 189 LBS | SYSTOLIC BLOOD PRESSURE: 101 MMHG | BODY MASS INDEX: 29.66 KG/M2 | HEIGHT: 67 IN | HEIGHT: 67 IN | DIASTOLIC BLOOD PRESSURE: 62 MMHG | HEART RATE: 97 BPM | BODY MASS INDEX: 29.66 KG/M2 | RESPIRATION RATE: 20 BRPM

## 2019-08-15 DIAGNOSIS — J84.112 IPF (IDIOPATHIC PULMONARY FIBROSIS): ICD-10-CM

## 2019-08-15 DIAGNOSIS — E66.9 OBESITY (BMI 30.0-34.9): ICD-10-CM

## 2019-08-15 DIAGNOSIS — Z76.82 AWAITING TRANSPLANTATION OF LUNG: ICD-10-CM

## 2019-08-15 DIAGNOSIS — J84.112 IPF (IDIOPATHIC PULMONARY FIBROSIS): Primary | ICD-10-CM

## 2019-08-15 DIAGNOSIS — J96.11 CHRONIC RESPIRATORY FAILURE WITH HYPOXIA: ICD-10-CM

## 2019-08-15 DIAGNOSIS — Z76.82 AWAITING TRANSPLANTATION OF LUNG: Primary | ICD-10-CM

## 2019-08-15 DIAGNOSIS — J84.9 ILD (INTERSTITIAL LUNG DISEASE): ICD-10-CM

## 2019-08-15 LAB
PRE FEV1 FVC: 86
PRE FEV1: 1.15
PRE FVC: 1.33
PREDICTED FEV1 FVC: 78
PREDICTED FEV1: 2.61
PREDICTED FVC: 3.31

## 2019-08-15 PROCEDURE — 94010 BREATHING CAPACITY TEST: CPT | Mod: 26,S$PBB,TXP, | Performed by: INTERNAL MEDICINE

## 2019-08-15 PROCEDURE — 99213 PR OFFICE/OUTPT VISIT, EST, LEVL III, 20-29 MIN: ICD-10-PCS | Mod: 25,S$PBB,TXP, | Performed by: INTERNAL MEDICINE

## 2019-08-15 PROCEDURE — 99999 PR PBB SHADOW E&M-EST. PATIENT-LVL III: CPT | Mod: PBBFAC,TXP,, | Performed by: INTERNAL MEDICINE

## 2019-08-15 PROCEDURE — 94618 PULMONARY STRESS TESTING: ICD-10-PCS | Mod: 26,S$PBB,TXP, | Performed by: INTERNAL MEDICINE

## 2019-08-15 PROCEDURE — 94618 PULMONARY STRESS TESTING: CPT | Mod: PBBFAC,TXP | Performed by: INTERNAL MEDICINE

## 2019-08-15 PROCEDURE — 94010 BREATHING CAPACITY TEST: CPT | Mod: PBBFAC,TXP | Performed by: INTERNAL MEDICINE

## 2019-08-15 PROCEDURE — 99999 PR PBB SHADOW E&M-EST. PATIENT-LVL III: ICD-10-PCS | Mod: PBBFAC,TXP,, | Performed by: INTERNAL MEDICINE

## 2019-08-15 PROCEDURE — 99213 OFFICE O/P EST LOW 20 MIN: CPT | Mod: PBBFAC,25,TXP | Performed by: INTERNAL MEDICINE

## 2019-08-15 PROCEDURE — 71046 X-RAY EXAM CHEST 2 VIEWS: CPT | Mod: 26,TXP,, | Performed by: RADIOLOGY

## 2019-08-15 PROCEDURE — 99213 OFFICE O/P EST LOW 20 MIN: CPT | Mod: 25,S$PBB,TXP, | Performed by: INTERNAL MEDICINE

## 2019-08-15 PROCEDURE — 94618 PULMONARY STRESS TESTING: CPT | Mod: 26,S$PBB,TXP, | Performed by: INTERNAL MEDICINE

## 2019-08-15 PROCEDURE — 71046 XR CHEST PA AND LATERAL: ICD-10-PCS | Mod: 26,TXP,, | Performed by: RADIOLOGY

## 2019-08-15 PROCEDURE — 71046 X-RAY EXAM CHEST 2 VIEWS: CPT | Mod: TC,TXP

## 2019-08-15 PROCEDURE — 94010 BREATHING CAPACITY TEST: ICD-10-PCS | Mod: 26,S$PBB,TXP, | Performed by: INTERNAL MEDICINE

## 2019-08-15 RX ORDER — FERROUS SULFATE 325(65) MG
325 TABLET ORAL
Status: ON HOLD | COMMUNITY
End: 2020-01-01 | Stop reason: HOSPADM

## 2019-08-15 NOTE — PROGRESS NOTES
LUNG TRANSPLANT PRE FOLLOW-UP    Referring Physician: Nitin Ariza    Reason for Visit:  Pre-lung transplant follow-up.         Date of Initial Evaluation: 1/14/2019                                                                                             History of Present Illness: Chely Becerra is a 64 y.o. female who is on 2L of oxygen. She is on no assisted ventilation.  Her New York Heart Association Class is III and a Karnofsky score of 70% - Cares for self: Unable to carry on normal activity or active work. She is not diabetic. She presents today for follow up while on the lung transplant wait list. She is listed for a left single lung transplant for her IPF.     Since her last clinic visit she has been doing well. She had some mild anemia which was evaluated by a local hematologist and was cleared from their perspective. She has not had any ED visits or hospitalizations because of her IPF. She continues to use O2 at rest and exertion. Continues to be compliant with nintedanib.     Review of Systems   Constitutional: Negative for chills, diaphoresis, fever, malaise/fatigue and weight loss.   HENT: Negative for congestion, ear discharge, ear pain, hearing loss, nosebleeds and sore throat.    Eyes: Negative for blurred vision, double vision and photophobia.   Respiratory: Positive for cough and sputum production. Negative for hemoptysis, shortness of breath and wheezing.    Cardiovascular: Negative for chest pain, palpitations, orthopnea, claudication, leg swelling and PND.   Gastrointestinal: Negative for abdominal pain, blood in stool, constipation, diarrhea, heartburn, melena, nausea and vomiting.   Genitourinary: Negative for dysuria, flank pain, frequency, hematuria and urgency.   Musculoskeletal: Negative for back pain, falls, joint pain, myalgias and neck pain.   Skin: Negative for itching and rash.   Neurological: Negative for dizziness, tremors, sensory change, loss of consciousness, weakness and  "headaches.   Endo/Heme/Allergies: Does not bruise/bleed easily.   Psychiatric/Behavioral: Negative for depression, hallucinations and memory loss. The patient is not nervous/anxious and does not have insomnia.      Objective:   /62   Pulse 97   Temp 97.8 °F (36.6 °C) (Oral)   Resp 20   Ht 5' 7" (1.702 m)   Wt 85.7 kg (189 lb)   SpO2 97% Comment: 2 liters  BMI 29.60 kg/m²      Physical Exam   Constitutional: She is oriented to person, place, and time and well-developed, well-nourished, and in no distress. No distress.   HENT:   Head: Normocephalic and atraumatic.   Nose: Nose normal.   Mouth/Throat: Oropharynx is clear and moist. No oropharyngeal exudate.   Eyes: Pupils are equal, round, and reactive to light. Conjunctivae and EOM are normal. No scleral icterus.   Neck: Normal range of motion. Neck supple. No JVD present. No tracheal deviation present. No thyromegaly present.   Cardiovascular: Normal rate, regular rhythm, normal heart sounds and intact distal pulses. Exam reveals no gallop and no friction rub.   No murmur heard.  Pulmonary/Chest: Effort normal. No stridor. No respiratory distress. She has no wheezes. She has rales in the right middle field, the right lower field, the left middle field and the left lower field. She exhibits no tenderness.   Abdominal: Soft. Bowel sounds are normal. She exhibits no distension and no mass. There is no tenderness. There is no rebound and no guarding.   Musculoskeletal: Normal range of motion. She exhibits no edema.   Lymphadenopathy:     She has no cervical adenopathy.   Neurological: She is alert and oriented to person, place, and time. No cranial nerve deficit. Gait normal. Coordination normal.   Skin: Skin is warm and dry. No rash noted. She is not diaphoretic. No erythema. No pallor.   Psychiatric: Mood and affect normal.     SPPB score:     Labs:  No visits with results within 7 Day(s) from this visit.   Latest known visit with results is:   Hospital " Outpatient Visit on 06/13/2019   Component Date Value    POC PH 06/13/2019 7.419     POC PCO2 06/13/2019 44.3     POC PO2 06/13/2019 71*    POC HCO3 06/13/2019 28.7*    POC BE 06/13/2019 4     POC SATURATED O2 06/13/2019 94*    POC TCO2 06/13/2019 30*    Sample 06/13/2019 ARTERIAL     Site 06/13/2019 RR     Allens Test 06/13/2019 Pass     DelSys 06/13/2019 Room Air        Pulmonary Function Tests 8/15/2019 6/13/2019 4/11/2019 11/20/2018 2/26/2018   FVC 1.33 1.28 1.23 1.36 1.31   FEV1 1.15 1.12 1.1 1.2 0.98   TLC (liters) - - - 1.89 2.55   DLCO (ml/mmHg sec) - 6.4 - 7.3 -   FVC% 40 39 37 41 44   FEV1% 44 43 42 46 41   FEF 25-75 - - 1.25 2.33 0.76   FEF 25-75% - - 48 90 33   TLC% - - - 35 52   RV - - - 0.58 1.36   RV% - - - 28 1.16   DLCO% - 33 - 37 -     6MW 8/15/2019 6/13/2019 4/11/2019 1/16/2019 12/13/2018   6MWT Status completed without stopping completed without stopping completed without stopping completed without stopping completed without stopping   Patient Reported Dyspnea Dyspnea Dyspnea Chest pain;Dyspnea Dyspnea   Was O2 used? Yes Yes Yes Yes Yes   Delivery Method Cannula Cannula;Pull Tank Cannula;Pull Tank;Continuous Flow Cannula;Pull Tank;Continuous Flow Cannula;Pull Tank;Continuous Flow   6MW Distance walked (feet) 1162 1200 1200 1100 1200   Distance walked (meters) 354.18 365.76 365.76 335.28 365.76   Did patient stop? No No No No No   Oxygen Saturation 97 98 98 98 99   Supplemental Oxygen 2 L/M 2 L/M 2 L/M 2 L/M 2 L/M   Heart Rate 75 74 75 88 81   Blood Pressure 120/62 123/59 137/73 129/69 114/65   Jaclyn Dyspnea Rating  moderate very light very, very light (just noticeable) very light nothing at all   Oxygen Saturation 86 79 82 83 88   Supplemental Oxygen 2 L/M 2 L/M 2 L/M 2 L/M 2 L/M   Heart Rate 108 102 113 119 101   Blood Pressure 126/73 144/68 144/76 151/72 133/69   Jaclyn Dyspnea Rating  somewhat heavy somewhat heavy moderate somewhat heavy very light   Recovery Time (seconds) 100 251 90  161 87   Oxygen Saturation - 98 98 98 96   Supplemental Oxygen 2 L/M 2 L/M 2 L/M 2 L/M 2 L/M   Heart Rate 88 72 88 98 93       Assessment:-  1. IPF (idiopathic pulmonary fibrosis)    2. Chronic respiratory failure with hypoxia    3. Obesity (BMI 30.0-34.9)    4. Awaiting transplantation of lung      Plan:   1. She will continue nintedanib for her IPF. Her FVC has remained stable.     2. Continue oxygen supplementation.     3. Encouraged to maintain her current weight and continue to lose as much as she can.     4. She will remain active on the lung transplant wait list    5. RTC in 2 months or earlier if necessary.

## 2019-08-15 NOTE — LETTER
August 15, 2019        Nitin Ariza  [address] [facility]  [address]  [place] [address]  Phone: [phone]  Fax: [phone]             [facility] Trans[facility]  [address]  [place] [address]  Phone: [phone]   Patient: Chely Becerra   MR Number: [id number]   YOB: 1955   Date of Visit: 8/15/2019       Dear Dr. Nitin Ariza    Thank you for referring Chely Becerra to me for evaluation. Attached you will find relevant portions of my assessment and plan of care.    If you have questions, please do not hesitate to call me. I look forward to following Chely Becerra along with you.    Sincerely,    Niraj Garza MD    Enclosure    If you would like to receive this communication electronically, please contact [email or web address] or [phone] to request [employer] access.    [employer] is a tool which provides read-only access to select patient information with whom you have a relationship. Its easy to use and provides real time access to review your patients record including encounter summaries, notes, results, and demographic information.    If you feel you have received this communication in error or would no longer like to receive these types of communications, please e-mail [email or web address]

## 2019-08-17 NOTE — PROCEDURES
Chely Becerra is a 64 y.o.  female patient, who presents for a 6 minute walk test ordered by Prudencio Garza MD.  The diagnosis is Pre Lung Transplant Evaluation; Pulmonary Fibrosis.  The patient's BMI is 29.7 kg/m2.  Predicted distance (lower limit of normal) is 331.21 meters.      Test Results:    The test was completed without stopping.  The total time walked was 360 seconds.  During walking, the patient reported:  Dyspnea.  The patient used supplemental oxygen during testing.     08/15/2019---------Distance: 354.18 meters (1162 feet)     O2 Sat % Supplemental Oxygen Heart Rate Blood Pressure Jaclyn Scale   Pre-exercise  (Resting) 97 % 2 L/M 75 bpm 120/62 mmHg 3   During Exercise 86 % 2 L/M 108 bpm 126/73 mmHg 4   Post-exercise  (Recovery) 97 % 2 L/M  88 bpm       Recovery Time:  100 seconds    Performing nurse/tech:  [employer] RRT      PREVIOUS STUDY:   06/13/2019---------Distance: 365.76 meters (1200 feet)       O2 Sat % Supplemental Oxygen Heart Rate Blood Pressure Jaclyn Scale   Pre-exercise  (Resting) 98 % 2 L/M 74 bpm 123/59 mmHg 1   During Exercise 79 % 2 L/M 102 bpm 144/68 mmHg 4   Post-exercise  (Recovery) 98 % 2 L/M  72 bpm 132/66 mmHg         CLINICAL INTERPRETATION:  Six minute walk distance is 354.18 meters (1162 feet) with somewhat heavy dyspnea.  During exercise, there was significant desaturation while breathing supplemental oxygen.  Blood pressure remained stable and Heart rate increased significantly with walking.  The patient did not report non-pulmonary symptoms during exercise.  Since the previous study in June 2019, exercise capacity is unchanged.  Based upon age and body mass index, exercise capacity is normal.

## 2019-08-27 ENCOUNTER — TELEPHONE (OUTPATIENT)
Dept: TRANSPLANT | Facility: CLINIC | Age: 64
End: 2019-08-27

## 2019-08-27 NOTE — TELEPHONE ENCOUNTER
----- Message from Esther Spence sent at 8/27/2019  4:17 PM CDT -----  Contact: Patient  Need Advice:      Reason For Call: Patient would like a call back to discuss lab work and medication       Communication Preference: [phone] or [phone]      Contacted Ms. Becerra.  She stated that she received a call from Dr. Ariza' office today instructing her to hold the OFEV x 1 month.  She stated that she had her monthly labs to monitor the OFEV and the results were abnormal.  She is uncertain on which values were abnormal.  Labs will be repeated in 1 month.  Informed Ms. Becerra that I would contact Dr. Ariza' office for the lab results.  Informed her that once labs are received, the results will be reviewed with Dr. Garza.  She verbalized her understanding.    Attempted to contact Dr. Ariza' office.  No answer and unable to leave a message.  Will attempt to call back tomorrow.    8/28/19 - Contacted Kameron at Dr. Ariza' office regarding labs.  She stated that Ms. Becerra's LFTs were elevated, therefore, OFEV is being held for 1 month.  She will fax the results.

## 2019-08-28 ENCOUNTER — DOCUMENTATION ONLY (OUTPATIENT)
Dept: TRANSPLANT | Facility: CLINIC | Age: 64
End: 2019-08-28

## 2019-08-28 LAB
EXT ALBUMIN: 4.1
EXT ALKALINE PHOSPHATASE: 108
EXT ALT: 65
EXT AST: 22
EXT BILIRUBIN TOTAL: 0.3
TOTAL PROTEIN: 8 G/DL

## 2019-09-04 ENCOUNTER — DOCUMENTATION ONLY (OUTPATIENT)
Dept: TRANSPLANT | Facility: CLINIC | Age: 64
End: 2019-09-04

## 2019-09-04 LAB
EXT ALKALINE PHOSPHATASE: 106
EXT ALT: 65
EXT AST: 22
EXT BASOPHIL%: 0.7
EXT BILIRUBIN TOTAL: 0.3
EXT BUN: 9
EXT CALCIUM: 9.6
EXT CHLORIDE: 96
EXT CO2: 28
EXT CREATININE: 0.63 MG/DL
EXT EOSINOPHIL%: 3.7
EXT GFR MDRD AF AMER: 111
EXT GFR MDRD NON AF AMER: 96
EXT HEMATOCRIT: 34.9
EXT HEMOGLOBIN: 11.5
EXT LDL CHOLESTEROL: 200
EXT LYMPH%: 38.2
EXT MCH: 29
EXT MCHC: 33
EXT MCV: 88.1
EXT MONOCYTES: 7.8
EXT NEUTROPHILS (ABSOLUTE): 49.6
EXT PLATELETS: 326
EXT POTASSIUM: 3.4
EXT PROTEIN TOTAL: 7.3
EXT RBC UA: 3.96
EXT RDW: 13
EXT SODIUM: 140 MMOL/L
EXT WBC: 8.4

## 2019-09-05 ENCOUNTER — DOCUMENTATION ONLY (OUTPATIENT)
Dept: TRANSPLANT | Facility: CLINIC | Age: 64
End: 2019-09-05

## 2019-09-05 NOTE — PROGRESS NOTES
Outside labs from PCP's office received and sent to Dr. Garza for review.  No additional orders necessary at this time.

## 2019-10-10 ENCOUNTER — HOSPITAL ENCOUNTER (OUTPATIENT)
Dept: PULMONOLOGY | Facility: CLINIC | Age: 64
Discharge: HOME | End: 2019-10-10
Payer: MEDICARE

## 2019-10-10 ENCOUNTER — HOSPITAL ENCOUNTER (OUTPATIENT)
Dept: RADIOLOGY | Facility: HOSPITAL | Age: 64
Discharge: HOME | End: 2019-10-10
Attending: INTERNAL MEDICINE
Payer: MEDICARE

## 2019-10-10 ENCOUNTER — OFFICE VISIT (OUTPATIENT)
Dept: TRANSPLANT | Facility: CLINIC | Age: 64
End: 2019-10-10
Payer: MEDICARE

## 2019-10-10 VITALS — BODY MASS INDEX: 30.27 KG/M2 | WEIGHT: 192.88 LBS | HEIGHT: 67 IN

## 2019-10-10 VITALS
TEMPERATURE: 98 F | RESPIRATION RATE: 18 BRPM | HEART RATE: 81 BPM | OXYGEN SATURATION: 96 % | BODY MASS INDEX: 30.29 KG/M2 | WEIGHT: 193 LBS | HEIGHT: 67 IN | DIASTOLIC BLOOD PRESSURE: 73 MMHG | SYSTOLIC BLOOD PRESSURE: 151 MMHG

## 2019-10-10 DIAGNOSIS — J84.9 ILD (INTERSTITIAL LUNG DISEASE): ICD-10-CM

## 2019-10-10 DIAGNOSIS — J96.11 CHRONIC RESPIRATORY FAILURE WITH HYPOXIA: ICD-10-CM

## 2019-10-10 DIAGNOSIS — Z76.82 AWAITING TRANSPLANTATION OF LUNG: ICD-10-CM

## 2019-10-10 DIAGNOSIS — R06.09 DOE (DYSPNEA ON EXERTION): ICD-10-CM

## 2019-10-10 DIAGNOSIS — J84.112 IPF (IDIOPATHIC PULMONARY FIBROSIS): Primary | ICD-10-CM

## 2019-10-10 DIAGNOSIS — E66.9 OBESITY (BMI 30.0-34.9): ICD-10-CM

## 2019-10-10 PROCEDURE — 99213 PR OFFICE/OUTPT VISIT, EST, LEVL III, 20-29 MIN: ICD-10-PCS | Mod: 25,S$PBB,TXP, | Performed by: INTERNAL MEDICINE

## 2019-10-10 PROCEDURE — 94618 PULMONARY STRESS TESTING: CPT | Mod: PBBFAC,TXP | Performed by: INTERNAL MEDICINE

## 2019-10-10 PROCEDURE — 94010 BREATHING CAPACITY TEST: ICD-10-PCS | Mod: 26,S$PBB,TXP, | Performed by: INTERNAL MEDICINE

## 2019-10-10 PROCEDURE — 99999 PR PBB SHADOW E&M-EST. PATIENT-LVL III: CPT | Mod: PBBFAC,TXP,, | Performed by: INTERNAL MEDICINE

## 2019-10-10 PROCEDURE — 94010 BREATHING CAPACITY TEST: CPT | Mod: PBBFAC,TXP | Performed by: INTERNAL MEDICINE

## 2019-10-10 PROCEDURE — 94618 PULMONARY STRESS TESTING: ICD-10-PCS | Mod: 26,S$PBB,TXP, | Performed by: INTERNAL MEDICINE

## 2019-10-10 PROCEDURE — 94618 PULMONARY STRESS TESTING: CPT | Mod: 26,S$PBB,TXP, | Performed by: INTERNAL MEDICINE

## 2019-10-10 PROCEDURE — 99999 PR PBB SHADOW E&M-EST. PATIENT-LVL III: ICD-10-PCS | Mod: PBBFAC,TXP,, | Performed by: INTERNAL MEDICINE

## 2019-10-10 PROCEDURE — 99213 OFFICE O/P EST LOW 20 MIN: CPT | Mod: PBBFAC,TXP,25 | Performed by: INTERNAL MEDICINE

## 2019-10-10 PROCEDURE — 94010 BREATHING CAPACITY TEST: CPT | Mod: 26,S$PBB,TXP, | Performed by: INTERNAL MEDICINE

## 2019-10-10 PROCEDURE — 99213 OFFICE O/P EST LOW 20 MIN: CPT | Mod: 25,S$PBB,TXP, | Performed by: INTERNAL MEDICINE

## 2019-10-10 NOTE — PROGRESS NOTES
LUNG TRANSPLANT PRE FOLLOW-UP    Referring Physician: Nitin Ariza    Reason for Visit:  Pre-lung transplant follow-up.         Date of Initial Evaluation: 1/14/2019                                                                                             History of Present Illness: Chely Becerra is a 64 y.o. female who is on 2L of oxygen. She is on no assisted ventilation.  Her New York Heart Association Class is III and a Karnofsky score of 70% - Cares for self: Unable to carry on normal activity or active work. She is not diabetic. She presents today for follow up while on the lung transplant wait list. She is listed for a left single lung transplant for her IPF.     Since her last clinic visit she has been doing well. She had some mild anemia which was evaluated by a local hematologist and was cleared from their perspective. She has not had any ED visits or hospitalizations because of her IPF. She continues to use O2 at rest and exertion. Continues to be compliant with nintedanib.     Review of Systems   Constitutional: Negative for chills, diaphoresis, fever, malaise/fatigue and weight loss.   HENT: Negative for congestion, ear discharge, ear pain, hearing loss, nosebleeds and sore throat.    Eyes: Negative for blurred vision, double vision and photophobia.   Respiratory: Positive for cough and sputum production. Negative for hemoptysis, shortness of breath and wheezing.    Cardiovascular: Negative for chest pain, palpitations, orthopnea, claudication, leg swelling and PND.   Gastrointestinal: Negative for abdominal pain, blood in stool, constipation, diarrhea, heartburn, melena, nausea and vomiting.   Genitourinary: Negative for dysuria, flank pain, frequency, hematuria and urgency.   Musculoskeletal: Negative for back pain, falls, joint pain, myalgias and neck pain.   Skin: Negative for itching and rash.   Neurological: Negative for dizziness, tremors, sensory change, loss of consciousness, weakness and  "headaches.   Endo/Heme/Allergies: Does not bruise/bleed easily.   Psychiatric/Behavioral: Negative for depression, hallucinations and memory loss. The patient is not nervous/anxious and does not have insomnia.      Objective:   BP (!) 151/73   Pulse 81   Temp 97.9 °F (36.6 °C)   Resp 18   Ht 5' 7" (1.702 m)   Wt 87.5 kg (193 lb)   SpO2 96% Comment: 2 liters  BMI 30.23 kg/m²      Physical Exam   Constitutional: She is oriented to person, place, and time and well-developed, well-nourished, and in no distress. No distress.   HENT:   Head: Normocephalic and atraumatic.   Nose: Nose normal.   Mouth/Throat: Oropharynx is clear and moist. No oropharyngeal exudate.   Eyes: Pupils are equal, round, and reactive to light. Conjunctivae and EOM are normal. No scleral icterus.   Neck: Normal range of motion. Neck supple. No JVD present. No tracheal deviation present. No thyromegaly present.   Cardiovascular: Normal rate, regular rhythm, normal heart sounds and intact distal pulses. Exam reveals no gallop and no friction rub.   No murmur heard.  Pulmonary/Chest: Effort normal. No stridor. No respiratory distress. She has no wheezes. She has rales in the right middle field, the right lower field, the left middle field and the left lower field. She exhibits no tenderness.   Abdominal: Soft. Bowel sounds are normal. She exhibits no distension and no mass. There is no tenderness. There is no rebound and no guarding.   Musculoskeletal: Normal range of motion. She exhibits no edema.   Lymphadenopathy:     She has no cervical adenopathy.   Neurological: She is alert and oriented to person, place, and time. No cranial nerve deficit. Gait normal. Coordination normal.   Skin: Skin is warm and dry. No rash noted. She is not diaphoretic. No erythema. No pallor.   Psychiatric: Mood and affect normal.     SPPB score:     Labs:  No visits with results within 7 Day(s) from this visit.   Latest known visit with results is:   Documentation " Only on 09/04/2019   Component Date Value    EXT WBC 08/28/2019 8.4     EXT RBC UA 08/28/2019 3.96     EXT Hemoglobin 08/28/2019 11.5     EXT Hematocrit 08/28/2019 34.9     EXT Platelets 08/28/2019 326     EXT Monocytes 08/28/2019 7.8     EXT Lymph% 08/28/2019 38.2     EXT Sodium 08/28/2019 140     EXT Potassium 08/28/2019 3.4     EXT Chloride 08/28/2019 96     EXT CO2 08/28/2019 28     EXT BUN 08/28/2019 9     EXT Creatinine 08/28/2019 0.63     EXT GFR MDRD NON AF AMER 08/28/2019 96     EXT GFR MDRD AF AMER 08/28/2019 111     EXT Calcium 08/28/2019 9.6     EXT Alkaline Phosphatase 08/28/2019 106     EXT Protein total 08/28/2019 7.3     EXT BilirubiN Total 08/28/2019 0.3     EXT AST 08/28/2019 22     EXT ALT 08/28/2019 65     EXT LDL Cholesterol 08/28/2019 200     EXT Eosinophil% 08/28/2019 3.7     EXT Basophil % 08/28/2019 0.7     EXT NEUTROPHILS (ABSOLUT* 08/28/2019 49.6     EXT MCV 08/28/2019 88.1     EXT MCH 08/28/2019 29.0     EXT MCHC 08/28/2019 33.0     EXT RDW 08/28/2019 13.0        Pulmonary Function Tests 10/10/2019 8/15/2019 6/13/2019 4/11/2019 11/20/2018 2/26/2018   FVC 1.29 1.33 1.28 1.23 1.36 1.31   FEV1 1.15 1.15 1.12 1.1 1.2 0.98   TLC (liters) - - - - 1.89 2.55   DLCO (ml/mmHg sec) - - 6.4 - 7.3 -   FVC% 44 40 39 37 41 44   FEV1% 50 44 43 42 46 41   FEF 25-75 - - - 1.25 2.33 0.76   FEF 25-75% - - - 48 90 33   TLC% - - - - 35 52   RV - - - - 0.58 1.36   RV% - - - - 28 1.16   DLCO% - - 33 - 37 -     6MW 10/10/2019 8/15/2019 6/13/2019 4/11/2019 1/16/2019 12/13/2018   6MWT Status completed without stopping completed without stopping completed without stopping completed without stopping completed without stopping completed without stopping   Patient Reported Dyspnea;Chest pain Dyspnea Dyspnea Dyspnea Chest pain;Dyspnea Dyspnea   Was O2 used? Yes Yes Yes Yes Yes Yes   Delivery Method Cannula;Pull Tank;Continuous Flow Cannula Cannula;Pull Tank Cannula;Pull Tank;Continuous Flow  Cannula;Pull Tank;Continuous Flow Cannula;Pull Tank;Continuous Flow   6MW Distance walked (feet) 1150 1162 1200 1200 1100 1200   Distance walked (meters) 350.52 354.18 365.76 365.76 335.28 365.76   Did patient stop? No No No No No No   Oxygen Saturation 99 97 98 98 98 99   Supplemental Oxygen 3 L/M 2 L/M 2 L/M 2 L/M 2 L/M 2 L/M   Heart Rate 77 75 74 75 88 81   Blood Pressure 146/73 120/62 123/59 137/73 129/69 114/65   Jaclyn Dyspnea Rating  moderate moderate very light very, very light (just noticeable) very light nothing at all   Oxygen Saturation 89 86 79 82 83 88   Supplemental Oxygen 3 L/M 2 L/M 2 L/M 2 L/M 2 L/M 2 L/M   Heart Rate 108 108 102 113 119 101   Blood Pressure 148/81 126/73 144/68 144/76 151/72 133/69   Jaclyn Dyspnea Rating  somewhat heavy somewhat heavy somewhat heavy moderate somewhat heavy very light   Recovery Time (seconds) 121 100 251 90 161 87   Oxygen Saturation 99 - 98 98 98 96   Supplemental Oxygen 3 L/M 2 L/M 2 L/M 2 L/M 2 L/M 2 L/M   Heart Rate 85 88 72 88 98 93       Assessment:-  1. IPF (idiopathic pulmonary fibrosis)    2. Chronic respiratory failure with hypoxia    3. Obesity (BMI 30.0-34.9)    4. Awaiting transplantation of lung      Plan:   1. She will continue nintedanib for her IPF. Her FVC has remained stable.     2. Continue oxygen supplementation.     3. Encouraged to maintain her current weight and continue to lose as much as she can.     4. She will remain active on the lung transplant wait list    5. RTC in 2 months or earlier if necessary.

## 2019-10-10 NOTE — LETTER
October 10, 2019        Nitin Ariza  [address] [facility]  [address]  [place] [address]  Phone: [phone]  Fax: [phone]             [facility] - [facility] Trans[facility]  [address]  [place] [address]  Phone: [phone]   Patient: Chely Becerra   MR Number: [id number]   YOB: 1955   Date of Visit: 10/10/2019       Dear Dr. Nitin Ariza    Thank you for referring Chely Becerra to me for evaluation. Attached you will find relevant portions of my assessment and plan of care.    If you have questions, please do not hesitate to call me. I look forward to following Chely Becerra along with you.    Sincerely,    Niraj Garza MD    Enclosure    If you would like to receive this communication electronically, please contact [email or web address] or [phone] to request [employer] [facility] access.    [employer] [facility] is a tool which provides read-only access to select patient information with whom you have a relationship. Its easy to use and provides real time access to review your patients record including encounter summaries, notes, results, and demographic information.    If you feel you have received this communication in error or would no longer like to receive these types of communications, please e-mail [email or web address]

## 2019-10-10 NOTE — PROCEDURES
Chely Becerra is a 64 y.o.  female patient, who presents for a 6 minute walk test ordered by Prudencio Garza MD.  The diagnosis is Pre Lung Transplant Evaluation; Pulmonary Fibrosis.  The patient's BMI is 30.3 kg/m2.  Predicted distance (lower limit of normal) is 327.47 meters.      Test Results:    The test was completed without stopping.  The total time walked was 360 seconds.  During walking, the patient reported:  Dyspnea, Chest pain.  The patient used supplemental oxygen during testing.     10/10/2019---------Distance: 350.52 meters (1150 feet)     O2 Sat % Supplemental Oxygen Heart Rate Blood Pressure Jaclyn Scale   Pre-exercise  (Resting) 99 % 3 L/M 77 bpm 146/73 mmHg 3   During Exercise 89 % 3 L/M 108 bpm 148/81 mmHg 4   Post-exercise  (Recovery) 99 % 3 L/M  85 bpm 135/67 mmHg      Recovery Time:  121 seconds    Performing nurse/tech:  Orly LOCO      PREVIOUS STUDY:   08/15/2019---------Distance: 354.18 meters (1162 feet)       O2 Sat % Supplemental Oxygen Heart Rate Blood Pressure Jaclyn Scale   Pre-exercise  (Resting) 97 % 2 L/M 75 bpm 120/62 mmHg 3   During Exercise   86 % 2 L/M 108 bpm 126/73 mmHg 4   Post-exercise  (Recovery) 97 % 2 L/M  88 bpm           CLINICAL INTERPRETATION:  Six minute walk distance is 350.52 meters (1150 feet) with somewhat heavy dyspnea.  During exercise, there was significant desaturation while breathing supplemental oxygen.  Blood pressure remained stable and Heart rate increased significantly with walking.  The patient reported non-pulmonary symptoms during exercise.  Since the previous study in August 2019, exercise capacity is unchanged.  Based upon age and body mass index, exercise capacity is normal.

## 2019-11-07 DIAGNOSIS — J84.112 IPF (IDIOPATHIC PULMONARY FIBROSIS): ICD-10-CM

## 2019-11-07 DIAGNOSIS — Z76.82 AWAITING TRANSPLANTATION OF LUNG: Primary | ICD-10-CM

## 2019-11-07 DIAGNOSIS — R73.09 ELEVATED GLYCOSYLATED HEMOGLOBIN: ICD-10-CM

## 2019-12-19 NOTE — LETTER
December 19, 2019        Nitin Ariza  [address] [facility]  [address]  [place] [address]  Phone: [phone]  Fax: [phone]             [facility] - [facility] Trans[facility]  [address]  [place] [address]  Phone: [phone]   Patient: Chely Becerra   MR Number: [id number]   YOB: 1955   Date of Visit: 12/19/2019       Dear Dr. Nitin Ariza    Thank you for referring Chely Becerra to me for evaluation. Attached you will find relevant portions of my assessment and plan of care.    If you have questions, please do not hesitate to call me. I look forward to following Chely Becerra along with you.    Sincerely,    Niraj Garza MD    Enclosure    If you would like to receive this communication electronically, please contact [email or web address] or [phone] to request [employer] access.    [employer] is a tool which provides read-only access to select patient information with whom you have a relationship. Its easy to use and provides real time access to review your patients record including encounter summaries, notes, results, and demographic information.    If you feel you have received this communication in error or would no longer like to receive these types of communications, please e-mail [email or web address]

## 2019-12-19 NOTE — PROGRESS NOTES
LUNG TRANSPLANT PRE FOLLOW-UP    Referring Physician: Nitin Ariza    Reason for Visit:  Pre-lung transplant follow-up.         Date of Initial Evaluation: 1/14/2019                                                                                             History of Present Illness: Chely Becerra is a 64 y.o. female who is on 2L of oxygen. She is on no assisted ventilation.  Her New York Heart Association Class is III and a Karnofsky score of 70% - Cares for self: Unable to carry on normal activity or active work. She is not diabetic. She presents today for follow up while on the lung transplant wait list. She is listed for a left single lung transplant for her IPF.     Since her last clinic visit she has been doing well. Tires easily. She has not had any ED visits or hospitalizations because of her IPF. She continues to use O2 at rest and exertion. Continues to be compliant with nintedanib.     Review of Systems   Constitutional: Negative for chills, diaphoresis, fever, malaise/fatigue and weight loss.   HENT: Negative for congestion, ear discharge, ear pain, hearing loss, nosebleeds and sore throat.    Eyes: Negative for blurred vision, double vision and photophobia.   Respiratory: Positive for cough and sputum production. Negative for hemoptysis, shortness of breath and wheezing.    Cardiovascular: Negative for chest pain, palpitations, orthopnea, claudication, leg swelling and PND.   Gastrointestinal: Negative for abdominal pain, blood in stool, constipation, diarrhea, heartburn, melena, nausea and vomiting.   Genitourinary: Negative for dysuria, flank pain, frequency, hematuria and urgency.   Musculoskeletal: Negative for back pain, falls, joint pain, myalgias and neck pain.   Skin: Negative for itching and rash.   Neurological: Negative for dizziness, tremors, sensory change, loss of consciousness, weakness and headaches.   Endo/Heme/Allergies: Does not bruise/bleed easily.   Psychiatric/Behavioral:  "Negative for depression, hallucinations and memory loss. The patient is not nervous/anxious and does not have insomnia.      Objective:   /76   Pulse 69   Temp 97.7 °F (36.5 °C) (Oral)   Resp 20   Ht 5' 7" (1.702 m)   Wt 88 kg (194 lb)   SpO2 (!) 94% Comment: 2 liters  BMI 30.38 kg/m²      Physical Exam   Constitutional: She is oriented to person, place, and time and well-developed, well-nourished, and in no distress. No distress.   HENT:   Head: Normocephalic and atraumatic.   Nose: Nose normal.   Mouth/Throat: Oropharynx is clear and moist. No oropharyngeal exudate.   Eyes: Pupils are equal, round, and reactive to light. Conjunctivae and EOM are normal. No scleral icterus.   Neck: Normal range of motion. Neck supple. No JVD present. No tracheal deviation present. No thyromegaly present.   Cardiovascular: Normal rate, regular rhythm, normal heart sounds and intact distal pulses. Exam reveals no gallop and no friction rub.   No murmur heard.  Pulmonary/Chest: Effort normal. No stridor. No respiratory distress. She has no wheezes. She has rales in the right middle field, the right lower field, the left middle field and the left lower field. She exhibits no tenderness.   Abdominal: Soft. Bowel sounds are normal. She exhibits no distension and no mass. There is no tenderness. There is no rebound and no guarding.   Musculoskeletal: Normal range of motion. She exhibits no edema.   Lymphadenopathy:     She has no cervical adenopathy.   Neurological: She is alert and oriented to person, place, and time. No cranial nerve deficit. Gait normal. Coordination normal.   Skin: Skin is warm and dry. No rash noted. She is not diaphoretic. No erythema. No pallor.   Psychiatric: Mood and affect normal.     SPPB score:     Labs:  Hospital Outpatient Visit on 12/19/2019   Component Date Value    POC PH 12/19/2019 7.484*    POC PCO2 12/19/2019 39.4     POC PO2 12/19/2019 116*    POC HCO3 12/19/2019 29.7*    POC BE " 12/19/2019 6     POC SATURATED O2 12/19/2019 99     POC TCO2 12/19/2019 31*    Sample 12/19/2019 ARTERIAL     Site 12/19/2019 RR     Allens Test 12/19/2019 Pass     DelSys 12/19/2019 Nasal Can     Mode 12/19/2019 SPONT     Flow 12/19/2019 2     FiO2 12/19/2019 0.27    Hospital Outpatient Visit on 12/19/2019   Component Date Value    Physician Comment 12/19/2019                      Value:Spirometry shows severe restriction based on the reduction in FVC. DLCO is severely decreased.     Notes:    No recent hemoglobin value available.  DLCO interpretation assumes a normal hemoglobin value. Consider lung volume determination to confirm the degree of restriction.     Arterial blood gas shows mixed metabolic and respiratory alkalosis with normal oxygenation while breathing supplemental oxygen.      Pre FVC 12/19/2019 1.35*    PRE FEV5 12/19/2019 1.09*    Pre FEV1 12/19/2019 1.19*    Pre FEV1 FVC 12/19/2019 87.86     Pre FEF 25 75 12/19/2019 2.61     Pre PEF 12/19/2019 3.54*    Pre  12/19/2019 5.90     Pre DLCO 12/19/2019 8.14*    DLCO ADJ PRE 12/19/2019 8.63*    DLCOVA PRE 12/19/2019 3.62     DLVAAdj PRE 12/19/2019 3.84     VA PRE 12/19/2019 2.24*    IVC PRE 12/19/2019 1.44*    FVC Ref 12/19/2019 2.93     FVC LLN 12/19/2019 2.14     FVC Pre Ref 12/19/2019 46.1     FEV05 REF 12/19/2019 1.96     FEV05 LLN 12/19/2019 1.10     PRE FEV05 REF 12/19/2019 55.6     FEV1 Ref 12/19/2019 2.30     FEV1 LLN 12/19/2019 1.66     FEV1 Pre Ref 12/19/2019 51.5     FEV1 FVC Ref 12/19/2019 79     FEV1 FVC LLN 12/19/2019 67     FEV1 FVC Pre Ref 12/19/2019 111.1     FEF 25 75 Ref 12/19/2019 2.04     FEF 25 75 LLN 12/19/2019 0.86     FEF 25 75 Pre Ref 12/19/2019 128.0     PEF Ref 12/19/2019 5.95     PEF LLN 12/19/2019 3.76     PEF Pre Ref 12/19/2019 59.5     DLCO Single Breath Ref 12/19/2019 24.27     DLCO Single Breath LLN 12/19/2019 18.54     DLCO SINGLEBREATH ULN 12/19/2019 30.00     DLCO  Single Breath Pre R* 12/19/2019 33.6     DLCOc Single Breath Ref 12/19/2019 24.27     DLCOc Single Breath LLN 12/19/2019 18.54     DLCOC SINGLEBREATH ULN 12/19/2019 30.00     DLCOc Single Breath Pre * 12/19/2019 35.6     DLCOVA Ref 12/19/2019 4.47     DLCOVA LLN 12/19/2019 3.14     DLCOVA ULN 12/19/2019 5.80     DLCOVA Pre Ref 12/19/2019 81.0     DLCOc SBVA Ref 12/19/2019 4.47     DLCOc SBVA LLN 12/19/2019 3.14     DLCOCSBVA ULN 12/19/2019 5.80     DLCOc SBVA Pre Ref 12/19/2019 85.9     VA Single Breath Ref 12/19/2019 5.28     VA Single Breath LLN 12/19/2019 5.28     VA SINGLEBREATH ULN 12/19/2019 5.28     VA Single Breath Pre Ref 12/19/2019 42.4     IVC Single Breath Ref 12/19/2019 2.93     IVC Single Breath LLN 12/19/2019 2.14     IVC SINGLEBREATH ULN 12/19/2019 3.72     IVC Single Breath Pre Ref 12/19/2019 49.1    Lab Visit on 12/19/2019   Component Date Value    WBC 12/19/2019 7.60     RBC 12/19/2019 3.96*    Hemoglobin 12/19/2019 11.7*    Hematocrit 12/19/2019 38.9     Mean Corpuscular Volume 12/19/2019 98     Mean Corpuscular Hemoglo* 12/19/2019 29.5     Mean Corpuscular Hemoglo* 12/19/2019 30.1*    RDW 12/19/2019 12.6     Platelets 12/19/2019 318     MPV 12/19/2019 9.1*    Immature Granulocytes 12/19/2019 0.1     Gran # (ANC) 12/19/2019 3.3     Immature Grans (Abs) 12/19/2019 0.01     Lymph # 12/19/2019 3.4     Mono # 12/19/2019 0.6     Eos # 12/19/2019 0.3     Baso # 12/19/2019 0.05     nRBC 12/19/2019 0     Gran% 12/19/2019 43.5     Lymph% 12/19/2019 44.5     Mono% 12/19/2019 7.4     Eosinophil% 12/19/2019 3.8     Basophil% 12/19/2019 0.7     Differential Method 12/19/2019 Automated     Sodium 12/19/2019 139     Potassium 12/19/2019 3.6     Chloride 12/19/2019 100     CO2 12/19/2019 32*    Glucose 12/19/2019 109     BUN, Bld 12/19/2019 12     Creatinine 12/19/2019 0.8     Calcium 12/19/2019 9.7     Total Protein 12/19/2019 8.1     Albumin 12/19/2019 3.8      Total Bilirubin 12/19/2019 0.3     Alkaline Phosphatase 12/19/2019 86     AST 12/19/2019 14     ALT 12/19/2019 13     Anion Gap 12/19/2019 7*    eGFR if African American 12/19/2019 >60.0     eGFR if non  Amer* 12/19/2019 >60.0     Hemoglobin A1C 12/19/2019 6.1*    Estimated Avg Glucose 12/19/2019 128    Hospital Outpatient Visit on 12/19/2019   Component Date Value    Ascending aorta 12/19/2019 3.17     STJ 12/19/2019 3.23     AV mean gradient 12/19/2019 5     Ao peak ganesh 12/19/2019 1.47     Ao VTI 12/19/2019 29.60     IVRT 12/19/2019 0.08     IVS 12/19/2019 1.04     LA size 12/19/2019 3.78     Left Atrium Major Axis 12/19/2019 5.08     Left Atrium Minor Axis 12/19/2019 5.11     LVIDD 12/19/2019 4.27     LVIDS 12/19/2019 2.71     LVOT diameter 12/19/2019 2.06     LVOT peak VTI 12/19/2019 24.20     PW 12/19/2019 0.84     MV Peak A Ganesh 12/19/2019 0.66     E wave decelartion time 12/19/2019 238.17     MV Peak E Ganesh 12/19/2019 0.62     PV Peak D Ganesh 12/19/2019 0.53     PV Peak S Ganesh 12/19/2019 0.80     RA Major Axis 12/19/2019 3.86     RA Width 12/19/2019 3.52     RVDD 12/19/2019 2.75     Sinus 12/19/2019 2.87     TAPSE 12/19/2019 1.94     TR Max Ganesh 12/19/2019 2.91     TDI LATERAL 12/19/2019 0.12     TDI SEPTAL 12/19/2019 0.07     LA WIDTH 12/19/2019 3.38     LV Diastolic Volume 12/19/2019 81.55     LV Systolic Volume 12/19/2019 27.15     RV S' 12/19/2019 10.51     LVOT peak ganesh 12/19/2019 1.28     LV LATERAL E/E' RATIO 12/19/2019 5.17     LV SEPTAL E/E' RATIO 12/19/2019 8.86     FS 12/19/2019 37     LA volume 12/19/2019 55.33     LV mass 12/19/2019 129.36     Left Ventricle Relative * 12/19/2019 0.39     AV valve area 12/19/2019 2.72     AV Velocity Ratio 12/19/2019 0.87     AV index (prosthetic) 12/19/2019 0.82     E/A ratio 12/19/2019 0.94     Mean e' 12/19/2019 0.10     Pulm vein S/D ratio 12/19/2019 1.51     LVOT area 12/19/2019 3.3     LVOT stroke  volume 12/19/2019 80.62     AV peak gradient 12/19/2019 9     E/E' ratio 12/19/2019 6.53     Triscuspid Valve Regurgi* 12/19/2019 34     BSA 12/19/2019 2.03     LV Systolic Volume Index 12/19/2019 13.7     LV Diastolic Volume Index 12/19/2019 41.04     LA Volume Index 12/19/2019 27.8     LV Mass Index 12/19/2019 65     Right Atrial Pressure (f* 12/19/2019 3     TV rest pulmonary artery* 12/19/2019 37        Pulmonary Function Tests 12/19/2019 10/10/2019 8/15/2019 6/13/2019 4/11/2019 11/20/2018 2/26/2018   FVC 1.35 1.29 1.33 1.28 1.23 1.36 1.31   FEV1 1.19 1.15 1.15 1.12 1.1 1.2 0.98   TLC (liters) - - - - - 1.89 2.55   DLCO (ml/mmHg sec) 8.1 - - 6.4 - 7.3 -   FVC% 46 44 40 39 37 41 44   FEV1% 52 50 44 43 42 46 41   FEF 25-75 - - - - 1.25 2.33 0.76   FEF 25-75% - - - - 48 90 33   TLC% - - - - - 35 52   RV - - - - - 0.58 1.36   RV% - - - - - 28 1.16   DLCO% 34 - - 33 - 37 -     6MW 12/19/2019 10/10/2019 8/15/2019 6/13/2019 4/11/2019 1/16/2019 12/13/2018   6MWT Status completed without stopping completed without stopping completed without stopping completed without stopping completed without stopping completed without stopping completed without stopping   Patient Reported Dyspnea Dyspnea;Chest pain Dyspnea Dyspnea Dyspnea Chest pain;Dyspnea Dyspnea   Was O2 used? Yes Yes Yes Yes Yes Yes Yes   Delivery Method Cannula;Pull Tank;Continuous Flow Cannula;Pull Tank;Continuous Flow Cannula Cannula;Pull Tank Cannula;Pull Tank;Continuous Flow Cannula;Pull Tank;Continuous Flow Cannula;Pull Tank;Continuous Flow   6MW Distance walked (feet) 1100 1150 1162 1200 1200 1100 1200   Distance walked (meters) 335.28 350.52 354.18 365.76 365.76 335.28 365.76   Did patient stop? No No No No No No No   Oxygen Saturation 100 99 97 98 98 98 99   Supplemental Oxygen 2 L/M 3 L/M 2 L/M 2 L/M 2 L/M 2 L/M 2 L/M   Heart Rate 68 77 75 74 75 88 81   Blood Pressure 123/68 146/73 120/62 123/59 137/73 129/69 114/65   Jaclyn Dyspnea Rating  very,  very light (just noticeable) moderate moderate very light very, very light (just noticeable) very light nothing at all   Oxygen Saturation 86 89 86 79 82 83 88   Supplemental Oxygen 2 L/M 3 L/M 2 L/M 2 L/M 2 L/M 2 L/M 2 L/M   Heart Rate 103 108 108 102 113 119 101   Blood Pressure 145/70 148/81 126/73 144/68 144/76 151/72 133/69   Jaclyn Dyspnea Rating  somewhat heavy somewhat heavy somewhat heavy somewhat heavy moderate somewhat heavy very light   Recovery Time (seconds) 241 121 100 251 90 161 87   Oxygen Saturation 98 99 - 98 98 98 96   Supplemental Oxygen 2 L/M 3 L/M 2 L/M 2 L/M 2 L/M 2 L/M 2 L/M   Heart Rate 76 85 88 72 88 98 93     Results for orders placed during the hospital encounter of 12/19/19   CT Chest Without Contrast    Narrative EXAMINATION:  CT CHEST WITHOUT CONTRAST    CLINICAL HISTORY:  IPF; listed for lung transplant; with smoking history; Awaiting organ transplant status    TECHNIQUE:  Low dose axial images, sagittal and coronal reformations were obtained from the thoracic inlet to the lung bases. Contrast was not administered.    COMPARISON:  Chest radiograph 08/15/2019.  CT chest 12/13/2018.    FINDINGS:  Base of Neck: Left thyroid lobe hypodense lesion measuring 1.5 cm unchanged.    Thoracic soft tissues: Normal.    Aorta: Left-sided aortic arch.  No aneurysm.    Heart: Normal size.  No effusion.  Mild coronary atherosclerosis.    Pulmonary vasculature: Pulmonary arteries distribute normally.  Upper limits of normal size of the main pulmonary artery.    Aurea/Mediastinum: Several calcified lymph nodes in the mediastinum and right aurea, likely old granulomatous disease, including possible sarcoidosis.    Airways: Trachea and proximal airways are patent.    Lungs: Widespread abnormal pulmonary parenchyma with peripheral predominant reticulation, architectural distortion, traction bronchiectasis, and pleural based honeycombing.  Findings are consistent with patient's history of interstitial lung  disease; imaging pattern is consistent with usual interstitial pneumonia (UIP).  No significant detrimental change since prior exam.  No large abnormal masses or new consolidation.    Elevation right hemidiaphragm, unchanged.    Pleura: No pleural thickening or effusion.    Esophagus: Normal.    Upper Abdomen: Small hiatal hernia.  Stable 2.3 cm left renal cyst.  Accessory splenule.  Status post cholecystectomy.  Several punctate splenic calcifications.  Postoperative changes partially visualized in a loop of bowel in the right upper quadrant.    Bones: No acute fracture. No suspicious lytic or sclerotic lesions.      Impression Widespread abnormal pulmonary parenchyma, consistent with chronic interstitial lung disease such as UIP, stable from last year..  Sarcoidosis or other granulomatous process also considered in the differential, noting hilar/mediastinal calcified lymph nodes.    Small hiatal hernia.    Left thyroid hypodense 1.5 cm lesion, unchanged.  Consideration of thyroid ultrasound is recommended if not already performed.    Left renal cyst, unchanged.    Additional stable incidental findings, as above.    Electronically signed by resident: Scott Ellis  Date:    12/19/2019  Time:    10:49    Electronically signed by: Cruz Bernal MD  Date:    12/19/2019  Time:    11:29         Assessment:-  1. IPF (idiopathic pulmonary fibrosis)    2. Chronic respiratory failure with hypoxia    3. Obesity (BMI 30.0-34.9)    4. Thyroid cyst    5. Awaiting transplantation of lung      Plan:   1. She will continue nintedanib for her IPF. Her FVC has remained stable.     2. Continue oxygen supplementation.     3. Encouraged to maintain her current weight and continue to lose as much as she can.     4. Thyroid US ordered today to evaluate cyst found on chest CT.    5. She will remain active on the lung transplant wait list    6. RTC in 2 months or earlier if necessary.

## 2019-12-19 NOTE — TELEPHONE ENCOUNTER
----- Message from Niraj Garza MD sent at 12/19/2019  4:19 PM CST -----  Benign nodules. Nothing else to do.      Attempted to contact Ms. Becerra regarding the results of the thyroid ultrasound.  No answer.  Left a message informing her that the thyroid nodules are benign, therefore, no additional orders are necessary.  Instructed her to return my call for any questions or to discuss further.

## 2019-12-20 NOTE — PROCEDURES
Chely Becerra is a 64 y.o.  female patient, who presents for a 6 minute walk test ordered by Prudencio Garza MD.  The diagnosis is Pre Lung Transplant Evaluation; Pulmonary Fibrosis.  The patient's BMI is 30.5 kg/m2.  Predicted distance (lower limit of normal) is 326.22 meters.      Test Results:    The test was completed without stopping.  The total time walked was 360 seconds.  During walking, the patient reported:  Dyspnea.  The patient used supplemental oxygen during testing.     12/19/2019---------Distance: 335.28 meters (1100 feet)     O2 Sat % Supplemental Oxygen Heart Rate Blood Pressure Jaclyn Scale   Pre-exercise  (Resting) 100 % 2 L/M 68 bpm 123/68 mmHg 0.5   During Exercise 86 % 2 L/M 103 bpm 145/70 mmHg 4   Post-exercise  (Recovery) 98 % 2 L/M  76 bpm 131/66 mmHg      Recovery Time:  241 seconds    Performing nurse/tech:  Ganesh LOCO      PREVIOUS STUDY:   10/10/2019---------Distance: 350.52 meters (1150 feet)       O2 Sat % Supplemental Oxygen Heart Rate Blood Pressure Jaclyn Scale   Pre-exercise  (Resting) 99 % 3 L/M 77 bpm 146/73 mmHg 3   During Exercise   89 % 3 L/M 108 bpm 148/81 mmHg 4   Post-exercise  (Recovery) 99 % 3 L/M  85 bpm 135/67 mmHg         CLINICAL INTERPRETATION:  Six minute walk distance is 335.28 meters (1100 feet) with somewhat heavy dyspnea.  During exercise, there was significant desaturation while breathing supplemental oxygen.  Blood pressure remained stable and Heart rate increased significantly with walking.  The patient did not report non-pulmonary symptoms during exercise.  Since the previous study in October 2019, exercise capacity is unchanged.  Based upon age and body mass index, exercise capacity is normal.

## 2020-01-01 ENCOUNTER — OFFICE VISIT (OUTPATIENT)
Dept: TRANSPLANT | Facility: CLINIC | Age: 65
End: 2020-01-01
Payer: MEDICARE

## 2020-01-01 ENCOUNTER — PATIENT MESSAGE (OUTPATIENT)
Dept: TRANSPLANT | Facility: CLINIC | Age: 65
End: 2020-01-01

## 2020-01-01 ENCOUNTER — HOSPITAL ENCOUNTER (INPATIENT)
Facility: HOSPITAL | Age: 65
LOS: 33 days | DRG: 207 | End: 2020-12-07
Attending: EMERGENCY MEDICINE | Admitting: INTERNAL MEDICINE
Payer: MEDICARE

## 2020-01-01 ENCOUNTER — DOCUMENTATION ONLY (OUTPATIENT)
Dept: TRANSPLANT | Facility: CLINIC | Age: 65
End: 2020-01-01

## 2020-01-01 ENCOUNTER — OFFICE VISIT (OUTPATIENT)
Dept: CARDIOTHORACIC SURGERY | Facility: CLINIC | Age: 65
End: 2020-01-01
Payer: MEDICARE

## 2020-01-01 ENCOUNTER — TELEPHONE (OUTPATIENT)
Dept: TRANSPLANT | Facility: CLINIC | Age: 65
End: 2020-01-01

## 2020-01-01 ENCOUNTER — ANESTHESIA (OUTPATIENT)
Dept: SURGERY | Facility: HOSPITAL | Age: 65
DRG: 007 | End: 2020-01-01
Payer: MEDICARE

## 2020-01-01 ENCOUNTER — TELEPHONE (OUTPATIENT)
Dept: ENDOCRINOLOGY | Facility: HOSPITAL | Age: 65
End: 2020-01-01

## 2020-01-01 ENCOUNTER — HOSPITAL ENCOUNTER (OUTPATIENT)
Dept: PULMONOLOGY | Facility: HOSPITAL | Age: 65
Discharge: HOME | End: 2020-10-21
Attending: INTERNAL MEDICINE
Payer: MEDICARE

## 2020-01-01 ENCOUNTER — HOSPITAL ENCOUNTER (OUTPATIENT)
Dept: PULMONOLOGY | Facility: OTHER | Age: 65
Discharge: HOME | End: 2020-09-22
Attending: INTERNAL MEDICINE
Payer: MEDICARE

## 2020-01-01 ENCOUNTER — HOSPITAL ENCOUNTER (OUTPATIENT)
Dept: PULMONOLOGY | Facility: CLINIC | Age: 65
Discharge: HOME | End: 2020-02-17
Payer: MEDICARE

## 2020-01-01 ENCOUNTER — HOSPITAL ENCOUNTER (OUTPATIENT)
Dept: PULMONOLOGY | Facility: OTHER | Age: 65
Discharge: HOME | End: 2020-09-17
Attending: INTERNAL MEDICINE
Payer: MEDICARE

## 2020-01-01 ENCOUNTER — SOCIAL WORK (OUTPATIENT)
Dept: TRANSPLANT | Facility: CLINIC | Age: 65
End: 2020-01-01

## 2020-01-01 ENCOUNTER — ANESTHESIA EVENT (OUTPATIENT)
Dept: SURGERY | Facility: HOSPITAL | Age: 65
DRG: 007 | End: 2020-01-01
Payer: MEDICARE

## 2020-01-01 ENCOUNTER — LAB VISIT (OUTPATIENT)
Dept: SURGERY | Facility: CLINIC | Age: 65
End: 2020-01-01
Payer: MEDICARE

## 2020-01-01 ENCOUNTER — HOSPITAL ENCOUNTER (OUTPATIENT)
Dept: PULMONOLOGY | Facility: HOSPITAL | Age: 65
Discharge: HOME | End: 2020-08-24
Attending: INTERNAL MEDICINE
Payer: MEDICARE

## 2020-01-01 ENCOUNTER — CONFERENCE (OUTPATIENT)
Dept: TRANSPLANT | Facility: CLINIC | Age: 65
End: 2020-01-01

## 2020-01-01 ENCOUNTER — TELEPHONE (OUTPATIENT)
Dept: CARDIOTHORACIC SURGERY | Facility: CLINIC | Age: 65
End: 2020-01-01

## 2020-01-01 ENCOUNTER — PATIENT MESSAGE (OUTPATIENT)
Dept: CARDIOLOGY | Facility: CLINIC | Age: 65
End: 2020-01-01

## 2020-01-01 ENCOUNTER — HOSPITAL ENCOUNTER (OUTPATIENT)
Dept: RADIOLOGY | Facility: HOSPITAL | Age: 65
Discharge: HOME | End: 2020-09-28
Attending: INTERNAL MEDICINE
Payer: MEDICARE

## 2020-01-01 ENCOUNTER — HOSPITAL ENCOUNTER (OUTPATIENT)
Dept: RADIOLOGY | Facility: HOSPITAL | Age: 65
Discharge: HOME | End: 2020-10-21
Attending: INTERNAL MEDICINE
Payer: MEDICARE

## 2020-01-01 ENCOUNTER — HOSPITAL ENCOUNTER (OUTPATIENT)
Dept: PULMONOLOGY | Facility: HOSPITAL | Age: 65
Discharge: HOME | End: 2020-09-14
Attending: INTERNAL MEDICINE
Payer: MEDICARE

## 2020-01-01 ENCOUNTER — HOSPITAL ENCOUNTER (OUTPATIENT)
Dept: PULMONOLOGY | Facility: CLINIC | Age: 65
Discharge: HOME | End: 2020-06-18
Payer: MEDICARE

## 2020-01-01 ENCOUNTER — TELEPHONE (OUTPATIENT)
Dept: TRANSPLANT | Facility: HOSPITAL | Age: 65
End: 2020-01-01

## 2020-01-01 ENCOUNTER — HOSPITAL ENCOUNTER (OUTPATIENT)
Dept: PULMONOLOGY | Facility: OTHER | Age: 65
Discharge: HOME | End: 2020-09-24
Attending: INTERNAL MEDICINE
Payer: MEDICARE

## 2020-01-01 ENCOUNTER — EXTERNAL HOME HEALTH (OUTPATIENT)
Dept: HOME HEALTH SERVICES | Facility: HOSPITAL | Age: 65
End: 2020-01-01
Payer: MEDICARE

## 2020-01-01 ENCOUNTER — HOSPITAL ENCOUNTER (OUTPATIENT)
Dept: RADIOLOGY | Facility: HOSPITAL | Age: 65
Discharge: HOME | End: 2020-09-08
Attending: INTERNAL MEDICINE
Payer: MEDICARE

## 2020-01-01 ENCOUNTER — LAB VISIT (OUTPATIENT)
Dept: URGENT CARE | Facility: CLINIC | Age: 65
End: 2020-01-01
Payer: MEDICARE

## 2020-01-01 ENCOUNTER — HOSPITAL ENCOUNTER (OUTPATIENT)
Dept: PULMONOLOGY | Facility: OTHER | Age: 65
Discharge: HOME | End: 2020-10-01
Attending: INTERNAL MEDICINE
Payer: MEDICARE

## 2020-01-01 ENCOUNTER — LAB VISIT (OUTPATIENT)
Dept: LAB | Facility: HOSPITAL | Age: 65
End: 2020-01-01
Payer: MEDICARE

## 2020-01-01 ENCOUNTER — HOSPITAL ENCOUNTER (OUTPATIENT)
Dept: PULMONOLOGY | Facility: OTHER | Age: 65
Discharge: HOME | End: 2020-09-29
Attending: INTERNAL MEDICINE
Payer: MEDICARE

## 2020-01-01 ENCOUNTER — HOSPITAL ENCOUNTER (OUTPATIENT)
Dept: PULMONOLOGY | Facility: CLINIC | Age: 65
Discharge: HOME | End: 2020-09-08
Payer: MEDICARE

## 2020-01-01 ENCOUNTER — HOSPITAL ENCOUNTER (OUTPATIENT)
Dept: PULMONOLOGY | Facility: CLINIC | Age: 65
Discharge: HOME | DRG: 207 | End: 2020-11-04
Payer: MEDICARE

## 2020-01-01 ENCOUNTER — HOSPITAL ENCOUNTER (OUTPATIENT)
Dept: RADIOLOGY | Facility: HOSPITAL | Age: 65
Discharge: HOME | End: 2020-08-31
Attending: INTERNAL MEDICINE
Payer: MEDICARE

## 2020-01-01 ENCOUNTER — EDUCATION (OUTPATIENT)
Dept: CARDIOLOGY | Facility: CLINIC | Age: 65
End: 2020-01-01

## 2020-01-01 ENCOUNTER — HOSPITAL ENCOUNTER (OUTPATIENT)
Dept: RADIOLOGY | Facility: HOSPITAL | Age: 65
Discharge: HOME | DRG: 207 | End: 2020-11-04
Attending: INTERNAL MEDICINE
Payer: MEDICARE

## 2020-01-01 ENCOUNTER — HOSPITAL ENCOUNTER (OUTPATIENT)
Dept: PULMONOLOGY | Facility: OTHER | Age: 65
Discharge: HOME | End: 2020-09-11
Attending: INTERNAL MEDICINE
Payer: MEDICARE

## 2020-01-01 ENCOUNTER — HOSPITAL ENCOUNTER (OUTPATIENT)
Dept: PULMONOLOGY | Facility: CLINIC | Age: 65
Discharge: HOME | End: 2020-10-07
Payer: MEDICARE

## 2020-01-01 ENCOUNTER — HOSPITAL ENCOUNTER (OUTPATIENT)
Dept: PULMONOLOGY | Facility: HOSPITAL | Age: 65
Discharge: HOME | End: 2020-10-07
Attending: INTERNAL MEDICINE
Payer: MEDICARE

## 2020-01-01 ENCOUNTER — TELEPHONE (OUTPATIENT)
Dept: CARDIOLOGY | Facility: CLINIC | Age: 65
End: 2020-01-01

## 2020-01-01 ENCOUNTER — SOCIAL WORK (OUTPATIENT)
Dept: TRANSPLANT | Facility: CLINIC | Age: 65
End: 2020-01-01
Payer: MEDICARE

## 2020-01-01 ENCOUNTER — HOSPITAL ENCOUNTER (OUTPATIENT)
Dept: RADIOLOGY | Facility: HOSPITAL | Age: 65
Discharge: HOME | End: 2020-08-24
Attending: INTERNAL MEDICINE
Payer: MEDICARE

## 2020-01-01 ENCOUNTER — HOSPITAL ENCOUNTER (INPATIENT)
Facility: HOSPITAL | Age: 65
LOS: 11 days | Discharge: HOME HEALTH | DRG: 007 | End: 2020-08-20
Attending: INTERNAL MEDICINE | Admitting: INTERNAL MEDICINE
Payer: MEDICARE

## 2020-01-01 ENCOUNTER — HOSPITAL ENCOUNTER (OUTPATIENT)
Facility: HOSPITAL | Age: 65
Discharge: HOME | End: 2020-02-18
Attending: INTERNAL MEDICINE | Admitting: INTERNAL MEDICINE
Payer: MEDICARE

## 2020-01-01 ENCOUNTER — HOSPITAL ENCOUNTER (OUTPATIENT)
Dept: PULMONOLOGY | Facility: HOSPITAL | Age: 65
Discharge: HOME | End: 2020-08-31
Attending: INTERNAL MEDICINE
Payer: MEDICARE

## 2020-01-01 ENCOUNTER — HOSPITAL ENCOUNTER (OUTPATIENT)
Dept: PULMONOLOGY | Facility: HOSPITAL | Age: 65
Discharge: HOME | End: 2020-09-28
Attending: INTERNAL MEDICINE
Payer: MEDICARE

## 2020-01-01 ENCOUNTER — HOSPITAL ENCOUNTER (OUTPATIENT)
Dept: PULMONOLOGY | Facility: OTHER | Age: 65
Discharge: HOME | End: 2020-10-06
Attending: INTERNAL MEDICINE
Payer: MEDICARE

## 2020-01-01 ENCOUNTER — HOSPITAL ENCOUNTER (OUTPATIENT)
Facility: HOSPITAL | Age: 65
Discharge: HOME | End: 2020-09-23
Attending: INTERNAL MEDICINE | Admitting: INTERNAL MEDICINE
Payer: MEDICARE

## 2020-01-01 ENCOUNTER — HOSPITAL ENCOUNTER (OUTPATIENT)
Dept: PULMONOLOGY | Facility: CLINIC | Age: 65
Discharge: HOME | End: 2020-10-21
Payer: MEDICARE

## 2020-01-01 ENCOUNTER — HOSPITAL ENCOUNTER (OUTPATIENT)
Dept: PULMONOLOGY | Facility: HOSPITAL | Age: 65
Discharge: HOME | DRG: 207 | End: 2020-11-04
Attending: INTERNAL MEDICINE
Payer: MEDICARE

## 2020-01-01 ENCOUNTER — HOSPITAL ENCOUNTER (OUTPATIENT)
Dept: RADIOLOGY | Facility: HOSPITAL | Age: 65
Discharge: HOME | End: 2020-10-07
Attending: INTERNAL MEDICINE
Payer: MEDICARE

## 2020-01-01 ENCOUNTER — HOSPITAL ENCOUNTER (OUTPATIENT)
Dept: PULMONOLOGY | Facility: HOSPITAL | Age: 65
Discharge: HOME | End: 2020-09-08
Attending: INTERNAL MEDICINE
Payer: MEDICARE

## 2020-01-01 ENCOUNTER — HOSPITAL ENCOUNTER (OUTPATIENT)
Dept: RADIOLOGY | Facility: HOSPITAL | Age: 65
Discharge: HOME | End: 2020-09-14
Attending: INTERNAL MEDICINE
Payer: MEDICARE

## 2020-01-01 VITALS
OXYGEN SATURATION: 100 % | SYSTOLIC BLOOD PRESSURE: 122 MMHG | BODY MASS INDEX: 26.64 KG/M2 | RESPIRATION RATE: 20 BRPM | WEIGHT: 169.75 LBS | TEMPERATURE: 98 F | DIASTOLIC BLOOD PRESSURE: 80 MMHG | HEART RATE: 96 BPM | HEIGHT: 67 IN

## 2020-01-01 VITALS
HEIGHT: 67 IN | WEIGHT: 192.25 LBS | RESPIRATION RATE: 20 BRPM | HEART RATE: 68 BPM | OXYGEN SATURATION: 96 % | DIASTOLIC BLOOD PRESSURE: 67 MMHG | WEIGHT: 192 LBS | BODY MASS INDEX: 30.13 KG/M2 | TEMPERATURE: 98 F | BODY MASS INDEX: 30.17 KG/M2 | SYSTOLIC BLOOD PRESSURE: 120 MMHG | HEIGHT: 67 IN

## 2020-01-01 VITALS
HEIGHT: 67 IN | WEIGHT: 169 LBS | HEIGHT: 67 IN | TEMPERATURE: 98 F | OXYGEN SATURATION: 96 % | OXYGEN SATURATION: 97 % | DIASTOLIC BLOOD PRESSURE: 80 MMHG | TEMPERATURE: 98 F | TEMPERATURE: 97 F | TEMPERATURE: 97 F | WEIGHT: 172 LBS | OXYGEN SATURATION: 99 % | BODY MASS INDEX: 27 KG/M2 | HEART RATE: 109 BPM | RESPIRATION RATE: 20 BRPM | SYSTOLIC BLOOD PRESSURE: 120 MMHG | HEART RATE: 89 BPM | DIASTOLIC BLOOD PRESSURE: 83 MMHG | BODY MASS INDEX: 26.53 KG/M2 | RESPIRATION RATE: 20 BRPM | SYSTOLIC BLOOD PRESSURE: 128 MMHG | HEART RATE: 101 BPM

## 2020-01-01 VITALS
HEIGHT: 67 IN | DIASTOLIC BLOOD PRESSURE: 84 MMHG | HEART RATE: 105 BPM | OXYGEN SATURATION: 96 % | BODY MASS INDEX: 27.41 KG/M2 | SYSTOLIC BLOOD PRESSURE: 111 MMHG

## 2020-01-01 VITALS
HEART RATE: 95 BPM | BODY MASS INDEX: 27.47 KG/M2 | OXYGEN SATURATION: 96 % | HEIGHT: 67 IN | OXYGEN SATURATION: 97 % | WEIGHT: 175 LBS | HEIGHT: 67 IN | WEIGHT: 172 LBS | RESPIRATION RATE: 20 BRPM | HEART RATE: 104 BPM | DIASTOLIC BLOOD PRESSURE: 86 MMHG | SYSTOLIC BLOOD PRESSURE: 130 MMHG | TEMPERATURE: 98 F | RESPIRATION RATE: 17 BRPM | DIASTOLIC BLOOD PRESSURE: 86 MMHG | BODY MASS INDEX: 27 KG/M2 | TEMPERATURE: 97 F | SYSTOLIC BLOOD PRESSURE: 122 MMHG

## 2020-01-01 VITALS
SYSTOLIC BLOOD PRESSURE: 146 MMHG | HEART RATE: 85 BPM | RESPIRATION RATE: 17 BRPM | DIASTOLIC BLOOD PRESSURE: 77 MMHG | HEIGHT: 67 IN | BODY MASS INDEX: 31.56 KG/M2 | WEIGHT: 201.06 LBS | TEMPERATURE: 99 F | OXYGEN SATURATION: 96 %

## 2020-01-01 VITALS
DIASTOLIC BLOOD PRESSURE: 66 MMHG | BODY MASS INDEX: 26.53 KG/M2 | RESPIRATION RATE: 5 BRPM | WEIGHT: 169 LBS | OXYGEN SATURATION: 83 % | TEMPERATURE: 98 F | HEIGHT: 67 IN | SYSTOLIC BLOOD PRESSURE: 128 MMHG

## 2020-01-01 VITALS
WEIGHT: 192 LBS | BODY MASS INDEX: 30.13 KG/M2 | TEMPERATURE: 97 F | RESPIRATION RATE: 20 BRPM | OXYGEN SATURATION: 98 % | DIASTOLIC BLOOD PRESSURE: 64 MMHG | HEIGHT: 67 IN | HEART RATE: 63 BPM | SYSTOLIC BLOOD PRESSURE: 112 MMHG

## 2020-01-01 VITALS
TEMPERATURE: 97 F | DIASTOLIC BLOOD PRESSURE: 80 MMHG | BODY MASS INDEX: 29.03 KG/M2 | SYSTOLIC BLOOD PRESSURE: 123 MMHG | BODY MASS INDEX: 30.45 KG/M2 | OXYGEN SATURATION: 95 % | HEART RATE: 101 BPM | HEIGHT: 67 IN | HEART RATE: 100 BPM | SYSTOLIC BLOOD PRESSURE: 136 MMHG | RESPIRATION RATE: 20 BRPM | WEIGHT: 185 LBS | RESPIRATION RATE: 20 BRPM | WEIGHT: 194 LBS | OXYGEN SATURATION: 97 % | HEIGHT: 67 IN | TEMPERATURE: 99 F | DIASTOLIC BLOOD PRESSURE: 76 MMHG

## 2020-01-01 VITALS — BODY MASS INDEX: 30.13 KG/M2 | HEIGHT: 67 IN | WEIGHT: 192 LBS

## 2020-01-01 VITALS
DIASTOLIC BLOOD PRESSURE: 63 MMHG | TEMPERATURE: 97 F | WEIGHT: 191 LBS | OXYGEN SATURATION: 100 % | HEIGHT: 67 IN | SYSTOLIC BLOOD PRESSURE: 131 MMHG | BODY MASS INDEX: 29.98 KG/M2 | RESPIRATION RATE: 18 BRPM | HEART RATE: 65 BPM

## 2020-01-01 VITALS
HEART RATE: 100 BPM | TEMPERATURE: 98 F | OXYGEN SATURATION: 98 % | RESPIRATION RATE: 20 BRPM | HEIGHT: 67 IN | BODY MASS INDEX: 26.64 KG/M2 | DIASTOLIC BLOOD PRESSURE: 88 MMHG | SYSTOLIC BLOOD PRESSURE: 142 MMHG | WEIGHT: 169.75 LBS

## 2020-01-01 VITALS — BODY MASS INDEX: 30.13 KG/M2 | WEIGHT: 192 LBS | OXYGEN SATURATION: 92 % | HEIGHT: 67 IN

## 2020-01-01 DIAGNOSIS — R00.0 TACHYCARDIA: ICD-10-CM

## 2020-01-01 DIAGNOSIS — Z76.82 AWAITING TRANSPLANTATION OF LUNG: ICD-10-CM

## 2020-01-01 DIAGNOSIS — J80 ACUTE RESPIRATORY DISTRESS SYNDROME (ARDS) DUE TO COVID-19 VIRUS: ICD-10-CM

## 2020-01-01 DIAGNOSIS — Z94.2 LUNG REPLACED BY TRANSPLANT: Primary | ICD-10-CM

## 2020-01-01 DIAGNOSIS — J84.112 IPF (IDIOPATHIC PULMONARY FIBROSIS): ICD-10-CM

## 2020-01-01 DIAGNOSIS — U07.1 ACUTE HYPOXEMIC RESPIRATORY FAILURE DUE TO COVID-19: ICD-10-CM

## 2020-01-01 DIAGNOSIS — E83.42 HYPOMAGNESEMIA: ICD-10-CM

## 2020-01-01 DIAGNOSIS — R49.0 HOARSENESS: ICD-10-CM

## 2020-01-01 DIAGNOSIS — R65.21 SEPTIC SHOCK: ICD-10-CM

## 2020-01-01 DIAGNOSIS — Z48.24 ENCOUNTER FOR AFTERCARE FOLLOWING LUNG TRANSPLANT: Primary | ICD-10-CM

## 2020-01-01 DIAGNOSIS — E11.9 TYPE 2 DIABETES MELLITUS WITHOUT COMPLICATION, WITH LONG-TERM CURRENT USE OF INSULIN: ICD-10-CM

## 2020-01-01 DIAGNOSIS — D84.9 IMMUNOSUPPRESSION: ICD-10-CM

## 2020-01-01 DIAGNOSIS — D62 ACUTE BLOOD LOSS ANEMIA: ICD-10-CM

## 2020-01-01 DIAGNOSIS — Z79.2 PROPHYLACTIC ANTIBIOTIC: ICD-10-CM

## 2020-01-01 DIAGNOSIS — T38.0X5A ADRENAL CORTICAL STEROIDS CAUSING ADVERSE EFFECT IN THERAPEUTIC USE: ICD-10-CM

## 2020-01-01 DIAGNOSIS — Z94.2 S/P LUNG TRANSPLANT: ICD-10-CM

## 2020-01-01 DIAGNOSIS — R73.03 PREDIABETES: ICD-10-CM

## 2020-01-01 DIAGNOSIS — J84.112 IPF (IDIOPATHIC PULMONARY FIBROSIS): Primary | ICD-10-CM

## 2020-01-01 DIAGNOSIS — Z94.2 LUNG REPLACED BY TRANSPLANT: ICD-10-CM

## 2020-01-01 DIAGNOSIS — Z94.2 S/P LUNG TRANSPLANT: Primary | ICD-10-CM

## 2020-01-01 DIAGNOSIS — J96.12 CHRONIC RESPIRATORY FAILURE WITH HYPOXIA AND HYPERCAPNIA: ICD-10-CM

## 2020-01-01 DIAGNOSIS — Z79.4 TYPE 2 DIABETES MELLITUS WITHOUT COMPLICATION, WITH LONG-TERM CURRENT USE OF INSULIN: ICD-10-CM

## 2020-01-01 DIAGNOSIS — Z94.2 H/O LUNG TRANSPLANT: ICD-10-CM

## 2020-01-01 DIAGNOSIS — U07.1 ACUTE RESPIRATORY DISTRESS SYNDROME (ARDS) DUE TO COVID-19 VIRUS: ICD-10-CM

## 2020-01-01 DIAGNOSIS — Z76.82 LUNG TRANSPLANT CANDIDATE: ICD-10-CM

## 2020-01-01 DIAGNOSIS — I25.10 CORONARY ARTERY DISEASE, ANGINA PRESENCE UNSPECIFIED, UNSPECIFIED VESSEL OR LESION TYPE, UNSPECIFIED WHETHER NATIVE OR TRANSPLANTED HEART: Primary | ICD-10-CM

## 2020-01-01 DIAGNOSIS — R53.81 PHYSICAL DECONDITIONING: ICD-10-CM

## 2020-01-01 DIAGNOSIS — Z01.812 PRE-PROCEDURE LAB EXAM: ICD-10-CM

## 2020-01-01 DIAGNOSIS — U07.1 COVID-19 VIRUS INFECTION: ICD-10-CM

## 2020-01-01 DIAGNOSIS — I25.10 CORONARY ARTERY DISEASE INVOLVING NATIVE CORONARY ARTERY OF NATIVE HEART WITHOUT ANGINA PECTORIS: Primary | ICD-10-CM

## 2020-01-01 DIAGNOSIS — R74.8 ELEVATED ALKALINE PHOSPHATASE LEVEL: ICD-10-CM

## 2020-01-01 DIAGNOSIS — Z98.890 HISTORY OF CORONARY ANGIOGRAM: ICD-10-CM

## 2020-01-01 DIAGNOSIS — J84.9 ILD (INTERSTITIAL LUNG DISEASE): ICD-10-CM

## 2020-01-01 DIAGNOSIS — Z76.82 AWAITING TRANSPLANTATION OF LUNG: Primary | ICD-10-CM

## 2020-01-01 DIAGNOSIS — J96.11 CHRONIC RESPIRATORY FAILURE WITH HYPOXIA: ICD-10-CM

## 2020-01-01 DIAGNOSIS — E66.9 OBESITY (BMI 30.0-34.9): ICD-10-CM

## 2020-01-01 DIAGNOSIS — Z76.82 LUNG TRANSPLANT CANDIDATE: Primary | ICD-10-CM

## 2020-01-01 DIAGNOSIS — J84.9 ILD (INTERSTITIAL LUNG DISEASE): Primary | ICD-10-CM

## 2020-01-01 DIAGNOSIS — Z94.2 STATUS POST LUNG TRANSPLANTATION: ICD-10-CM

## 2020-01-01 DIAGNOSIS — J96.01 ACUTE HYPOXEMIC RESPIRATORY FAILURE DUE TO COVID-19: ICD-10-CM

## 2020-01-01 DIAGNOSIS — E83.42 HYPOMAGNESEMIA: Primary | ICD-10-CM

## 2020-01-01 DIAGNOSIS — Z13.9 SCREENING PROCEDURE: Primary | ICD-10-CM

## 2020-01-01 DIAGNOSIS — R94.31 QT PROLONGATION: ICD-10-CM

## 2020-01-01 DIAGNOSIS — Z94.2 HISTORY OF LUNG TRANSPLANT: ICD-10-CM

## 2020-01-01 DIAGNOSIS — N39.0 URINARY TRACT INFECTION WITHOUT HEMATURIA, SITE UNSPECIFIED: Primary | ICD-10-CM

## 2020-01-01 DIAGNOSIS — T81.89XD NON-HEALING SURGICAL WOUND, SUBSEQUENT ENCOUNTER: ICD-10-CM

## 2020-01-01 DIAGNOSIS — R25.2 MUSCLE CRAMPING: ICD-10-CM

## 2020-01-01 DIAGNOSIS — I49.9 ARRHYTHMIA: ICD-10-CM

## 2020-01-01 DIAGNOSIS — A41.9 SEPTIC SHOCK: ICD-10-CM

## 2020-01-01 DIAGNOSIS — Z79.2 NEED FOR PROPHYLACTIC ANTIBIOTIC: Primary | ICD-10-CM

## 2020-01-01 DIAGNOSIS — J12.82 PNEUMONIA DUE TO COVID-19 VIRUS: ICD-10-CM

## 2020-01-01 DIAGNOSIS — R29.898 MUSCULAR DECONDITIONING: ICD-10-CM

## 2020-01-01 DIAGNOSIS — J96.11 CHRONIC HYPOXEMIC RESPIRATORY FAILURE: ICD-10-CM

## 2020-01-01 DIAGNOSIS — J96.11 CHRONIC RESPIRATORY FAILURE WITH HYPOXIA AND HYPERCAPNIA: ICD-10-CM

## 2020-01-01 DIAGNOSIS — Z94.2 H/O LUNG TRANSPLANT: Primary | ICD-10-CM

## 2020-01-01 DIAGNOSIS — D72.829 LEUKOCYTOSIS, UNSPECIFIED TYPE: ICD-10-CM

## 2020-01-01 DIAGNOSIS — R74.8 ELEVATED ALKALINE PHOSPHATASE LEVEL: Primary | ICD-10-CM

## 2020-01-01 DIAGNOSIS — N39.0 URINARY TRACT INFECTION WITHOUT HEMATURIA, SITE UNSPECIFIED: ICD-10-CM

## 2020-01-01 DIAGNOSIS — L25.9 CONTACT DERMATITIS, UNSPECIFIED CONTACT DERMATITIS TYPE, UNSPECIFIED TRIGGER: ICD-10-CM

## 2020-01-01 DIAGNOSIS — U07.1 PNEUMONIA DUE TO COVID-19 VIRUS: ICD-10-CM

## 2020-01-01 DIAGNOSIS — R73.09 ELEVATED GLYCOSYLATED HEMOGLOBIN: ICD-10-CM

## 2020-01-01 DIAGNOSIS — R52 ACUTE PAIN: ICD-10-CM

## 2020-01-01 DIAGNOSIS — E11.9 CONTROLLED TYPE 2 DIABETES MELLITUS WITHOUT COMPLICATION, WITHOUT LONG-TERM CURRENT USE OF INSULIN: ICD-10-CM

## 2020-01-01 DIAGNOSIS — I25.10 CORONARY ARTERY DISEASE, ANGINA PRESENCE UNSPECIFIED, UNSPECIFIED VESSEL OR LESION TYPE, UNSPECIFIED WHETHER NATIVE OR TRANSPLANTED HEART: ICD-10-CM

## 2020-01-01 DIAGNOSIS — R00.1 BRADYCARDIA: ICD-10-CM

## 2020-01-01 DIAGNOSIS — E87.5 HYPERKALEMIA: ICD-10-CM

## 2020-01-01 LAB
ABO + RH BLD: NORMAL
ACID FAST MOD KINY STN SPEC: NORMAL
ALBUMIN SERPL BCP-MCNC: 1.1 G/DL (ref 3.5–5.2)
ALBUMIN SERPL BCP-MCNC: 1.3 G/DL (ref 3.5–5.2)
ALBUMIN SERPL BCP-MCNC: 1.4 G/DL (ref 3.5–5.2)
ALBUMIN SERPL BCP-MCNC: 1.4 G/DL (ref 3.5–5.2)
ALBUMIN SERPL BCP-MCNC: 1.5 G/DL (ref 3.5–5.2)
ALBUMIN SERPL BCP-MCNC: 1.6 G/DL (ref 3.5–5.2)
ALBUMIN SERPL BCP-MCNC: 1.7 G/DL (ref 3.5–5.2)
ALBUMIN SERPL BCP-MCNC: 1.8 G/DL (ref 3.5–5.2)
ALBUMIN SERPL BCP-MCNC: 2.1 G/DL (ref 3.5–5.2)
ALBUMIN SERPL BCP-MCNC: 2.4 G/DL (ref 3.5–5.2)
ALBUMIN SERPL BCP-MCNC: 2.6 G/DL (ref 3.5–5.2)
ALBUMIN SERPL BCP-MCNC: 2.7 G/DL (ref 3.5–5.2)
ALBUMIN SERPL BCP-MCNC: 2.8 G/DL (ref 3.5–5.2)
ALBUMIN SERPL BCP-MCNC: 2.9 G/DL (ref 3.5–5.2)
ALBUMIN SERPL BCP-MCNC: 3 G/DL (ref 3.5–5.2)
ALBUMIN SERPL BCP-MCNC: 3 G/DL (ref 3.5–5.2)
ALBUMIN SERPL BCP-MCNC: 3.1 G/DL (ref 3.5–5.2)
ALBUMIN SERPL BCP-MCNC: 3.2 G/DL (ref 3.5–5.2)
ALBUMIN SERPL BCP-MCNC: 3.2 G/DL (ref 3.5–5.2)
ALBUMIN SERPL BCP-MCNC: 3.3 G/DL (ref 3.5–5.2)
ALBUMIN SERPL BCP-MCNC: 3.4 G/DL (ref 3.5–5.2)
ALBUMIN SERPL BCP-MCNC: 3.5 G/DL (ref 3.5–5.2)
ALBUMIN SERPL BCP-MCNC: 3.8 G/DL (ref 3.5–5.2)
ALLENS TEST: ABNORMAL
ALLENS TEST: NORMAL
ALP SERPL-CCNC: 105 U/L (ref 55–135)
ALP SERPL-CCNC: 105 U/L (ref 55–135)
ALP SERPL-CCNC: 106 U/L (ref 55–135)
ALP SERPL-CCNC: 107 U/L (ref 55–135)
ALP SERPL-CCNC: 108 U/L (ref 55–135)
ALP SERPL-CCNC: 113 U/L (ref 55–135)
ALP SERPL-CCNC: 114 U/L (ref 55–135)
ALP SERPL-CCNC: 117 U/L (ref 55–135)
ALP SERPL-CCNC: 122 U/L (ref 55–135)
ALP SERPL-CCNC: 136 U/L (ref 55–135)
ALP SERPL-CCNC: 138 U/L (ref 55–135)
ALP SERPL-CCNC: 141 U/L (ref 55–135)
ALP SERPL-CCNC: 142 U/L (ref 55–135)
ALP SERPL-CCNC: 148 U/L (ref 55–135)
ALP SERPL-CCNC: 148 U/L (ref 55–135)
ALP SERPL-CCNC: 150 U/L (ref 55–135)
ALP SERPL-CCNC: 153 U/L (ref 55–135)
ALP SERPL-CCNC: 155 U/L (ref 55–135)
ALP SERPL-CCNC: 157 U/L (ref 55–135)
ALP SERPL-CCNC: 162 U/L (ref 55–135)
ALP SERPL-CCNC: 164 U/L (ref 55–135)
ALP SERPL-CCNC: 172 U/L (ref 55–135)
ALP SERPL-CCNC: 173 U/L (ref 55–135)
ALP SERPL-CCNC: 184 U/L (ref 55–135)
ALP SERPL-CCNC: 197 U/L (ref 55–135)
ALP SERPL-CCNC: 199 U/L (ref 55–135)
ALP SERPL-CCNC: 199 U/L (ref 55–135)
ALP SERPL-CCNC: 200 U/L (ref 55–135)
ALP SERPL-CCNC: 205 U/L (ref 55–135)
ALP SERPL-CCNC: 227 U/L (ref 55–135)
ALP SERPL-CCNC: 239 U/L (ref 55–135)
ALP SERPL-CCNC: 276 U/L (ref 55–135)
ALP SERPL-CCNC: 287 U/L (ref 55–135)
ALP SERPL-CCNC: 317 U/L (ref 55–135)
ALP SERPL-CCNC: 335 U/L (ref 55–135)
ALP SERPL-CCNC: 336 U/L (ref 55–135)
ALP SERPL-CCNC: 353 U/L (ref 55–135)
ALP SERPL-CCNC: 54 U/L (ref 55–135)
ALP SERPL-CCNC: 54 U/L (ref 55–135)
ALP SERPL-CCNC: 68 U/L (ref 55–135)
ALP SERPL-CCNC: 69 U/L (ref 55–135)
ALP SERPL-CCNC: 70 U/L (ref 55–135)
ALP SERPL-CCNC: 78 U/L (ref 55–135)
ALP SERPL-CCNC: 80 U/L (ref 55–135)
ALP SERPL-CCNC: 81 U/L (ref 55–135)
ALT SERPL W/O P-5'-P-CCNC: 12 U/L (ref 10–44)
ALT SERPL W/O P-5'-P-CCNC: 13 U/L (ref 10–44)
ALT SERPL W/O P-5'-P-CCNC: 13 U/L (ref 10–44)
ALT SERPL W/O P-5'-P-CCNC: 139 U/L (ref 10–44)
ALT SERPL W/O P-5'-P-CCNC: 14 U/L (ref 10–44)
ALT SERPL W/O P-5'-P-CCNC: 16 U/L (ref 10–44)
ALT SERPL W/O P-5'-P-CCNC: 17 U/L (ref 10–44)
ALT SERPL W/O P-5'-P-CCNC: 17 U/L (ref 10–44)
ALT SERPL W/O P-5'-P-CCNC: 18 U/L (ref 10–44)
ALT SERPL W/O P-5'-P-CCNC: 20 U/L (ref 10–44)
ALT SERPL W/O P-5'-P-CCNC: 20 U/L (ref 10–44)
ALT SERPL W/O P-5'-P-CCNC: 21 U/L (ref 10–44)
ALT SERPL W/O P-5'-P-CCNC: 21 U/L (ref 10–44)
ALT SERPL W/O P-5'-P-CCNC: 22 U/L (ref 10–44)
ALT SERPL W/O P-5'-P-CCNC: 23 U/L (ref 10–44)
ALT SERPL W/O P-5'-P-CCNC: 24 U/L (ref 10–44)
ALT SERPL W/O P-5'-P-CCNC: 26 U/L (ref 10–44)
ALT SERPL W/O P-5'-P-CCNC: 28 U/L (ref 10–44)
ALT SERPL W/O P-5'-P-CCNC: 28 U/L (ref 10–44)
ALT SERPL W/O P-5'-P-CCNC: 29 U/L (ref 10–44)
ALT SERPL W/O P-5'-P-CCNC: 30 U/L (ref 10–44)
ALT SERPL W/O P-5'-P-CCNC: 31 U/L (ref 10–44)
ALT SERPL W/O P-5'-P-CCNC: 31 U/L (ref 10–44)
ALT SERPL W/O P-5'-P-CCNC: 32 U/L (ref 10–44)
ALT SERPL W/O P-5'-P-CCNC: 32 U/L (ref 10–44)
ALT SERPL W/O P-5'-P-CCNC: 33 U/L (ref 10–44)
ALT SERPL W/O P-5'-P-CCNC: 35 U/L (ref 10–44)
ALT SERPL W/O P-5'-P-CCNC: 38 U/L (ref 10–44)
ALT SERPL W/O P-5'-P-CCNC: 39 U/L (ref 10–44)
ALT SERPL W/O P-5'-P-CCNC: 43 U/L (ref 10–44)
ALT SERPL W/O P-5'-P-CCNC: 46 U/L (ref 10–44)
ALT SERPL W/O P-5'-P-CCNC: 48 U/L (ref 10–44)
ALT SERPL W/O P-5'-P-CCNC: 48 U/L (ref 10–44)
ALT SERPL W/O P-5'-P-CCNC: 49 U/L (ref 10–44)
ALT SERPL W/O P-5'-P-CCNC: 49 U/L (ref 10–44)
ALT SERPL W/O P-5'-P-CCNC: 53 U/L (ref 10–44)
ALT SERPL W/O P-5'-P-CCNC: 59 U/L (ref 10–44)
ALT SERPL W/O P-5'-P-CCNC: 80 U/L (ref 10–44)
ALT SERPL W/O P-5'-P-CCNC: 83 U/L (ref 10–44)
ALT SERPL W/O P-5'-P-CCNC: 86 U/L (ref 10–44)
AMORPH CRY UR QL COMP ASSIST: ABNORMAL
ANION GAP SERPL CALC-SCNC: 10 MMOL/L (ref 8–16)
ANION GAP SERPL CALC-SCNC: 11 MMOL/L (ref 8–16)
ANION GAP SERPL CALC-SCNC: 12 MMOL/L (ref 8–16)
ANION GAP SERPL CALC-SCNC: 13 MMOL/L (ref 8–16)
ANION GAP SERPL CALC-SCNC: 14 MMOL/L (ref 8–16)
ANION GAP SERPL CALC-SCNC: 14 MMOL/L (ref 8–16)
ANION GAP SERPL CALC-SCNC: 15 MMOL/L (ref 8–16)
ANION GAP SERPL CALC-SCNC: 16 MMOL/L (ref 8–16)
ANION GAP SERPL CALC-SCNC: 16 MMOL/L (ref 8–16)
ANION GAP SERPL CALC-SCNC: 18 MMOL/L (ref 8–16)
ANION GAP SERPL CALC-SCNC: 20 MMOL/L (ref 8–16)
ANION GAP SERPL CALC-SCNC: 4 MMOL/L (ref 8–16)
ANION GAP SERPL CALC-SCNC: 5 MMOL/L (ref 8–16)
ANION GAP SERPL CALC-SCNC: 6 MMOL/L (ref 8–16)
ANION GAP SERPL CALC-SCNC: 7 MMOL/L (ref 8–16)
ANION GAP SERPL CALC-SCNC: 8 MMOL/L (ref 8–16)
ANION GAP SERPL CALC-SCNC: 9 MMOL/L (ref 8–16)
ANISOCYTOSIS BLD QL SMEAR: SLIGHT
APPEARANCE FLD: NORMAL
APTT BLDCRRT: 25.5 SEC (ref 21–32)
APTT BLDCRRT: 26.1 SEC (ref 21–32)
APTT BLDCRRT: 26.5 SEC (ref 21–32)
APTT BLDCRRT: 27 SEC (ref 21–32)
APTT BLDCRRT: 27.7 SEC (ref 21–32)
APTT BLDCRRT: 31.6 SEC (ref 21–32)
APTT BLDCRRT: 36.2 SEC (ref 21–32)
APTT BLDCRRT: 53.3 SEC (ref 21–32)
ASCENDING AORTA: 3.02 CM
ASCENDING AORTA: 3.36 CM
AST SERPL-CCNC: 11 U/L (ref 10–40)
AST SERPL-CCNC: 111 U/L (ref 10–40)
AST SERPL-CCNC: 12 U/L (ref 10–40)
AST SERPL-CCNC: 13 U/L (ref 10–40)
AST SERPL-CCNC: 14 U/L (ref 10–40)
AST SERPL-CCNC: 14 U/L (ref 10–40)
AST SERPL-CCNC: 17 U/L (ref 10–40)
AST SERPL-CCNC: 18 U/L (ref 10–40)
AST SERPL-CCNC: 19 U/L (ref 10–40)
AST SERPL-CCNC: 19 U/L (ref 10–40)
AST SERPL-CCNC: 21 U/L (ref 10–40)
AST SERPL-CCNC: 22 U/L (ref 10–40)
AST SERPL-CCNC: 23 U/L (ref 10–40)
AST SERPL-CCNC: 24 U/L (ref 10–40)
AST SERPL-CCNC: 24 U/L (ref 10–40)
AST SERPL-CCNC: 25 U/L (ref 10–40)
AST SERPL-CCNC: 25 U/L (ref 10–40)
AST SERPL-CCNC: 26 U/L (ref 10–40)
AST SERPL-CCNC: 27 U/L (ref 10–40)
AST SERPL-CCNC: 27 U/L (ref 10–40)
AST SERPL-CCNC: 28 U/L (ref 10–40)
AST SERPL-CCNC: 31 U/L (ref 10–40)
AST SERPL-CCNC: 31 U/L (ref 10–40)
AST SERPL-CCNC: 34 U/L (ref 10–40)
AST SERPL-CCNC: 35 U/L (ref 10–40)
AST SERPL-CCNC: 36 U/L (ref 10–40)
AST SERPL-CCNC: 38 U/L (ref 10–40)
AST SERPL-CCNC: 40 U/L (ref 10–40)
AST SERPL-CCNC: 40 U/L (ref 10–40)
AST SERPL-CCNC: 45 U/L (ref 10–40)
AST SERPL-CCNC: 47 U/L (ref 10–40)
AST SERPL-CCNC: 53 U/L (ref 10–40)
AST SERPL-CCNC: 54 U/L (ref 10–40)
AST SERPL-CCNC: 59 U/L (ref 10–40)
AST SERPL-CCNC: 62 U/L (ref 10–40)
AST SERPL-CCNC: 66 U/L (ref 10–40)
AST SERPL-CCNC: 73 U/L (ref 10–40)
AST SERPL-CCNC: 76 U/L (ref 10–40)
AST SERPL-CCNC: 88 U/L (ref 10–40)
AST SERPL-CCNC: 98 U/L (ref 10–40)
AV INDEX (PROSTH): 0.74
AV INDEX (PROSTH): 1
AV MEAN GRADIENT: 4 MMHG
AV MEAN GRADIENT: 5 MMHG
AV PEAK GRADIENT: 6 MMHG
AV PEAK GRADIENT: 8 MMHG
AV VALVE AREA: 2.29 CM2
AV VALVE AREA: 3.75 CM2
AV VELOCITY RATIO: 0.78
AV VELOCITY RATIO: 0.96
B CELL RESULTS - XM ALLO: NEGATIVE
B CELL RESULTS - XM ALLO: NEGATIVE
B CELL RESULTS - XM AUTO: NEGATIVE
B MCS AVERAGE - XM ALLO: 22
B MCS AVERAGE - XM ALLO: 3.5
B MCS AVERAGE - XM AUTO: -14.6
B-OH-BUTYR BLD STRIP-SCNC: 0.1 MMOL/L (ref 0–0.5)
BACTERIA #/AREA URNS AUTO: ABNORMAL /HPF
BACTERIA BLD CULT: ABNORMAL
BACTERIA BLD CULT: NORMAL
BACTERIA SPEC AEROBE CULT: ABNORMAL
BACTERIA SPEC AEROBE CULT: ABNORMAL
BACTERIA SPEC AEROBE CULT: NO GROWTH
BACTERIA SPEC AEROBE CULT: NORMAL
BACTERIA SPEC ANAEROBE CULT: NORMAL
BASO STIPL BLD QL SMEAR: ABNORMAL
BASOPHILS # BLD AUTO: 0.01 K/UL (ref 0–0.2)
BASOPHILS # BLD AUTO: 0.02 K/UL (ref 0–0.2)
BASOPHILS # BLD AUTO: 0.03 K/UL (ref 0–0.2)
BASOPHILS # BLD AUTO: 0.04 K/UL (ref 0–0.2)
BASOPHILS # BLD AUTO: 0.05 K/UL (ref 0–0.2)
BASOPHILS # BLD AUTO: 0.07 K/UL (ref 0–0.2)
BASOPHILS # BLD AUTO: 0.13 K/UL (ref 0–0.2)
BASOPHILS # BLD AUTO: ABNORMAL K/UL (ref 0–0.2)
BASOPHILS NFR BLD: 0 % (ref 0–1.9)
BASOPHILS NFR BLD: 0.1 % (ref 0–1.9)
BASOPHILS NFR BLD: 0.2 % (ref 0–1.9)
BASOPHILS NFR BLD: 0.3 % (ref 0–1.9)
BASOPHILS NFR BLD: 0.5 % (ref 0–1.9)
BASOPHILS NFR BLD: 0.7 % (ref 0–1.9)
BASOPHILS NFR BLD: 0.7 % (ref 0–1.9)
BASOPHILS NFR BLD: 0.8 % (ref 0–1.9)
BILIRUB SERPL-MCNC: 0.3 MG/DL (ref 0.1–1)
BILIRUB SERPL-MCNC: 0.4 MG/DL (ref 0.1–1)
BILIRUB SERPL-MCNC: 0.5 MG/DL (ref 0.1–1)
BILIRUB SERPL-MCNC: 0.6 MG/DL (ref 0.1–1)
BILIRUB SERPL-MCNC: 0.6 MG/DL (ref 0.1–1)
BILIRUB SERPL-MCNC: 0.7 MG/DL (ref 0.1–1)
BILIRUB SERPL-MCNC: 0.7 MG/DL (ref 0.1–1)
BILIRUB SERPL-MCNC: 0.9 MG/DL (ref 0.1–1)
BILIRUB SERPL-MCNC: 0.9 MG/DL (ref 0.1–1)
BILIRUB SERPL-MCNC: 1 MG/DL (ref 0.1–1)
BILIRUB SERPL-MCNC: 1 MG/DL (ref 0.1–1)
BILIRUB SERPL-MCNC: 1.1 MG/DL (ref 0.1–1)
BILIRUB SERPL-MCNC: 1.2 MG/DL (ref 0.1–1)
BILIRUB SERPL-MCNC: 1.4 MG/DL (ref 0.1–1)
BILIRUB SERPL-MCNC: 1.7 MG/DL (ref 0.1–1)
BILIRUB SERPL-MCNC: 1.9 MG/DL (ref 0.1–1)
BILIRUB SERPL-MCNC: 2.1 MG/DL (ref 0.1–1)
BILIRUB UR QL STRIP: NEGATIVE
BLD GP AB SCN CELLS X3 SERPL QL: NORMAL
BLD PROD TYP BPU: NORMAL
BLOOD UNIT EXPIRATION DATE: NORMAL
BLOOD UNIT TYPE CODE: 5100
BLOOD UNIT TYPE CODE: 9500
BLOOD UNIT TYPE CODE: 9500
BLOOD UNIT TYPE: NORMAL
BNP SERPL-MCNC: 122 PG/ML (ref 0–99)
BNP SERPL-MCNC: 356 PG/ML (ref 0–99)
BODY FLD TYPE: NORMAL
BSA FOR ECHO PROCEDURE: 1.9 M2
BSA FOR ECHO PROCEDURE: 1.9 M2
BUN SERPL-MCNC: 10 MG/DL (ref 8–23)
BUN SERPL-MCNC: 10 MG/DL (ref 8–23)
BUN SERPL-MCNC: 11 MG/DL (ref 8–23)
BUN SERPL-MCNC: 12 MG/DL (ref 8–23)
BUN SERPL-MCNC: 12 MG/DL (ref 8–23)
BUN SERPL-MCNC: 20 MG/DL (ref 8–23)
BUN SERPL-MCNC: 21 MG/DL (ref 8–23)
BUN SERPL-MCNC: 22 MG/DL (ref 8–23)
BUN SERPL-MCNC: 23 MG/DL (ref 8–23)
BUN SERPL-MCNC: 24 MG/DL (ref 8–23)
BUN SERPL-MCNC: 25 MG/DL (ref 8–23)
BUN SERPL-MCNC: 26 MG/DL (ref 8–23)
BUN SERPL-MCNC: 27 MG/DL (ref 6–30)
BUN SERPL-MCNC: 27 MG/DL (ref 8–23)
BUN SERPL-MCNC: 28 MG/DL (ref 8–23)
BUN SERPL-MCNC: 29 MG/DL (ref 8–23)
BUN SERPL-MCNC: 30 MG/DL (ref 8–23)
BUN SERPL-MCNC: 33 MG/DL (ref 8–23)
BUN SERPL-MCNC: 35 MG/DL (ref 8–23)
BUN SERPL-MCNC: 37 MG/DL (ref 8–23)
BUN SERPL-MCNC: 41 MG/DL (ref 8–23)
BUN SERPL-MCNC: 43 MG/DL (ref 8–23)
BUN SERPL-MCNC: 44 MG/DL (ref 8–23)
BUN SERPL-MCNC: 44 MG/DL (ref 8–23)
BUN SERPL-MCNC: 45 MG/DL (ref 8–23)
BUN SERPL-MCNC: 45 MG/DL (ref 8–23)
BUN SERPL-MCNC: 47 MG/DL (ref 8–23)
BUN SERPL-MCNC: 48 MG/DL (ref 8–23)
BUN SERPL-MCNC: 48 MG/DL (ref 8–23)
BUN SERPL-MCNC: 7 MG/DL (ref 8–23)
BUN SERPL-MCNC: 8 MG/DL (ref 8–23)
BUN SERPL-MCNC: 9 MG/DL (ref 8–23)
BUN SERPL-MCNC: 9 MG/DL (ref 8–23)
BURR CELLS BLD QL SMEAR: ABNORMAL
CA-I BLDV-SCNC: 1.03 MMOL/L (ref 1.06–1.42)
CALCIUM SERPL-MCNC: 6.9 MG/DL (ref 8.7–10.5)
CALCIUM SERPL-MCNC: 7 MG/DL (ref 8.7–10.5)
CALCIUM SERPL-MCNC: 7.1 MG/DL (ref 8.7–10.5)
CALCIUM SERPL-MCNC: 7.2 MG/DL (ref 8.7–10.5)
CALCIUM SERPL-MCNC: 7.2 MG/DL (ref 8.7–10.5)
CALCIUM SERPL-MCNC: 7.4 MG/DL (ref 8.7–10.5)
CALCIUM SERPL-MCNC: 7.5 MG/DL (ref 8.7–10.5)
CALCIUM SERPL-MCNC: 7.5 MG/DL (ref 8.7–10.5)
CALCIUM SERPL-MCNC: 7.6 MG/DL (ref 8.7–10.5)
CALCIUM SERPL-MCNC: 7.9 MG/DL (ref 8.7–10.5)
CALCIUM SERPL-MCNC: 7.9 MG/DL (ref 8.7–10.5)
CALCIUM SERPL-MCNC: 8 MG/DL (ref 8.7–10.5)
CALCIUM SERPL-MCNC: 8 MG/DL (ref 8.7–10.5)
CALCIUM SERPL-MCNC: 8.1 MG/DL (ref 8.7–10.5)
CALCIUM SERPL-MCNC: 8.2 MG/DL (ref 8.7–10.5)
CALCIUM SERPL-MCNC: 8.3 MG/DL (ref 8.7–10.5)
CALCIUM SERPL-MCNC: 8.4 MG/DL (ref 8.7–10.5)
CALCIUM SERPL-MCNC: 8.5 MG/DL (ref 8.7–10.5)
CALCIUM SERPL-MCNC: 8.6 MG/DL (ref 8.7–10.5)
CALCIUM SERPL-MCNC: 8.7 MG/DL (ref 8.7–10.5)
CALCIUM SERPL-MCNC: 8.8 MG/DL (ref 8.7–10.5)
CALCIUM SERPL-MCNC: 8.9 MG/DL (ref 8.7–10.5)
CALCIUM SERPL-MCNC: 9 MG/DL (ref 8.7–10.5)
CALCIUM SERPL-MCNC: 9.1 MG/DL (ref 8.7–10.5)
CALCIUM SERPL-MCNC: 9.2 MG/DL (ref 8.7–10.5)
CALCIUM SERPL-MCNC: 9.3 MG/DL (ref 8.7–10.5)
CALCIUM SERPL-MCNC: 9.5 MG/DL (ref 8.7–10.5)
CHLORIDE SERPL-SCNC: 100 MMOL/L (ref 95–110)
CHLORIDE SERPL-SCNC: 101 MMOL/L (ref 95–110)
CHLORIDE SERPL-SCNC: 102 MMOL/L (ref 95–110)
CHLORIDE SERPL-SCNC: 102 MMOL/L (ref 95–110)
CHLORIDE SERPL-SCNC: 103 MMOL/L (ref 95–110)
CHLORIDE SERPL-SCNC: 104 MMOL/L (ref 95–110)
CHLORIDE SERPL-SCNC: 105 MMOL/L (ref 95–110)
CHLORIDE SERPL-SCNC: 106 MMOL/L (ref 95–110)
CHLORIDE SERPL-SCNC: 107 MMOL/L (ref 95–110)
CHLORIDE SERPL-SCNC: 108 MMOL/L (ref 95–110)
CHLORIDE SERPL-SCNC: 109 MMOL/L (ref 95–110)
CHLORIDE SERPL-SCNC: 89 MMOL/L (ref 95–110)
CHLORIDE SERPL-SCNC: 93 MMOL/L (ref 95–110)
CHLORIDE SERPL-SCNC: 94 MMOL/L (ref 95–110)
CHLORIDE SERPL-SCNC: 95 MMOL/L (ref 95–110)
CHLORIDE SERPL-SCNC: 95 MMOL/L (ref 95–110)
CHLORIDE SERPL-SCNC: 97 MMOL/L (ref 95–110)
CHLORIDE SERPL-SCNC: 98 MMOL/L (ref 95–110)
CHLORIDE SERPL-SCNC: 99 MMOL/L (ref 95–110)
CLARITY UR REFRACT.AUTO: ABNORMAL
CLARITY UR REFRACT.AUTO: ABNORMAL
CLARITY UR REFRACT.AUTO: CLEAR
CLARITY UR REFRACT.AUTO: CLEAR
CLASS I ANTIBODIES - LUMINEX: NEGATIVE
CLASS II ANTIBODIES - LUMINEX: NEGATIVE
CO2 SERPL-SCNC: 12 MMOL/L (ref 23–29)
CO2 SERPL-SCNC: 16 MMOL/L (ref 23–29)
CO2 SERPL-SCNC: 17 MMOL/L (ref 23–29)
CO2 SERPL-SCNC: 18 MMOL/L (ref 23–29)
CO2 SERPL-SCNC: 20 MMOL/L (ref 23–29)
CO2 SERPL-SCNC: 21 MMOL/L (ref 23–29)
CO2 SERPL-SCNC: 21 MMOL/L (ref 23–29)
CO2 SERPL-SCNC: 22 MMOL/L (ref 23–29)
CO2 SERPL-SCNC: 23 MMOL/L (ref 23–29)
CO2 SERPL-SCNC: 24 MMOL/L (ref 23–29)
CO2 SERPL-SCNC: 25 MMOL/L (ref 23–29)
CO2 SERPL-SCNC: 26 MMOL/L (ref 23–29)
CO2 SERPL-SCNC: 27 MMOL/L (ref 23–29)
CO2 SERPL-SCNC: 28 MMOL/L (ref 23–29)
CO2 SERPL-SCNC: 29 MMOL/L (ref 23–29)
CO2 SERPL-SCNC: 30 MMOL/L (ref 23–29)
CO2 SERPL-SCNC: 31 MMOL/L (ref 23–29)
CO2 SERPL-SCNC: 32 MMOL/L (ref 23–29)
CO2 SERPL-SCNC: 33 MMOL/L (ref 23–29)
CO2 SERPL-SCNC: 34 MMOL/L (ref 23–29)
CODING SYSTEM: NORMAL
COLOR FLD: COLORLESS
COLOR UR AUTO: NORMAL
COLOR UR AUTO: YELLOW
COMMENT: NORMAL
CPRA %: 0
CREAT SERPL-MCNC: 0.6 MG/DL (ref 0.5–1.4)
CREAT SERPL-MCNC: 0.7 MG/DL (ref 0.5–1.4)
CREAT SERPL-MCNC: 0.8 MG/DL (ref 0.5–1.4)
CREAT SERPL-MCNC: 0.9 MG/DL (ref 0.5–1.4)
CREAT SERPL-MCNC: 1 MG/DL (ref 0.5–1.4)
CREAT SERPL-MCNC: 1 MG/DL (ref 0.5–1.4)
CREAT SERPL-MCNC: 1.1 MG/DL (ref 0.5–1.4)
CREAT SERPL-MCNC: 1.2 MG/DL (ref 0.5–1.4)
CTP QC/QA: YES
CV ECHO LV RWT: 0.44 CM
CV ECHO LV RWT: 0.55 CM
D DIMER PPP IA.FEU-MCNC: 0.94 MG/L FEU
D DIMER PPP IA.FEU-MCNC: 1.42 MG/L FEU
DACRYOCYTES BLD QL SMEAR: ABNORMAL
DELSYS: ABNORMAL
DIFFERENTIAL METHOD: ABNORMAL
DISPENSE STATUS: NORMAL
DOHLE BOD BLD QL SMEAR: PRESENT
DONOR MRN: NORMAL
DONOR MRN: NORMAL
DOP CALC AO PEAK VEL: 1.18 M/S
DOP CALC AO PEAK VEL: 1.42 M/S
DOP CALC AO VTI: 20.67 CM
DOP CALC AO VTI: 23.25 CM
DOP CALC LVOT AREA: 3.1 CM2
DOP CALC LVOT AREA: 3.7 CM2
DOP CALC LVOT DIAMETER: 1.98 CM
DOP CALC LVOT DIAMETER: 2.18 CM
DOP CALC LVOT PEAK VEL: 0.92 M/S
DOP CALC LVOT PEAK VEL: 1.37 M/S
DOP CALC LVOT STROKE VOLUME: 47.27 CM3
DOP CALC LVOT STROKE VOLUME: 87.15 CM3
DOP CALCLVOT PEAK VEL VTI: 15.36 CM
DOP CALCLVOT PEAK VEL VTI: 23.36 CM
E WAVE DECELERATION TIME: 221.72 MSEC
E/A RATIO: 0.96
E/E' RATIO: 6.1 M/S
ECHO LV POSTERIOR WALL: 0.9 CM (ref 0.6–1.1)
ECHO LV POSTERIOR WALL: 0.97 CM (ref 0.6–1.1)
ENTEROVIRUS/RHINOVIRUS: NOT DETECTED
ENTEROVIRUS/RHINOVIRUS: NOT DETECTED
EOSINOPHIL # BLD AUTO: 0 K/UL (ref 0–0.5)
EOSINOPHIL # BLD AUTO: 0.1 K/UL (ref 0–0.5)
EOSINOPHIL # BLD AUTO: 0.2 K/UL (ref 0–0.5)
EOSINOPHIL # BLD AUTO: 0.3 K/UL (ref 0–0.5)
EOSINOPHIL # BLD AUTO: 0.4 K/UL (ref 0–0.5)
EOSINOPHIL # BLD AUTO: 0.5 K/UL (ref 0–0.5)
EOSINOPHIL # BLD AUTO: 0.6 K/UL (ref 0–0.5)
EOSINOPHIL # BLD AUTO: 0.6 K/UL (ref 0–0.5)
EOSINOPHIL # BLD AUTO: ABNORMAL K/UL (ref 0–0.5)
EOSINOPHIL NFR BLD: 0 % (ref 0–8)
EOSINOPHIL NFR BLD: 0.1 % (ref 0–8)
EOSINOPHIL NFR BLD: 0.2 % (ref 0–8)
EOSINOPHIL NFR BLD: 0.3 % (ref 0–8)
EOSINOPHIL NFR BLD: 0.7 % (ref 0–8)
EOSINOPHIL NFR BLD: 0.7 % (ref 0–8)
EOSINOPHIL NFR BLD: 1 % (ref 0–8)
EOSINOPHIL NFR BLD: 1.1 % (ref 0–8)
EOSINOPHIL NFR BLD: 1.5 % (ref 0–8)
EOSINOPHIL NFR BLD: 1.9 % (ref 0–8)
EOSINOPHIL NFR BLD: 2.5 % (ref 0–8)
EOSINOPHIL NFR BLD: 2.6 % (ref 0–8)
EOSINOPHIL NFR BLD: 2.7 % (ref 0–8)
EOSINOPHIL NFR BLD: 2.7 % (ref 0–8)
EOSINOPHIL NFR BLD: 2.8 % (ref 0–8)
EOSINOPHIL NFR BLD: 2.8 % (ref 0–8)
EOSINOPHIL NFR BLD: 3 % (ref 0–8)
EOSINOPHIL NFR BLD: 3.3 % (ref 0–8)
EOSINOPHIL NFR BLD: 3.8 % (ref 0–8)
EOSINOPHIL NFR BLD: 3.8 % (ref 0–8)
EOSINOPHIL NFR BLD: 4.1 % (ref 0–8)
EOSINOPHIL NFR BLD: 4.7 % (ref 0–8)
EOSINOPHIL NFR BLD: 7 % (ref 0–8)
ERYTHROCYTE [DISTWIDTH] IN BLOOD BY AUTOMATED COUNT: 12.4 % (ref 11.5–14.5)
ERYTHROCYTE [DISTWIDTH] IN BLOOD BY AUTOMATED COUNT: 12.6 % (ref 11.5–14.5)
ERYTHROCYTE [DISTWIDTH] IN BLOOD BY AUTOMATED COUNT: 12.9 % (ref 11.5–14.5)
ERYTHROCYTE [DISTWIDTH] IN BLOOD BY AUTOMATED COUNT: 13.4 % (ref 11.5–14.5)
ERYTHROCYTE [DISTWIDTH] IN BLOOD BY AUTOMATED COUNT: 13.4 % (ref 11.5–14.5)
ERYTHROCYTE [DISTWIDTH] IN BLOOD BY AUTOMATED COUNT: 13.5 % (ref 11.5–14.5)
ERYTHROCYTE [DISTWIDTH] IN BLOOD BY AUTOMATED COUNT: 13.7 % (ref 11.5–14.5)
ERYTHROCYTE [DISTWIDTH] IN BLOOD BY AUTOMATED COUNT: 13.7 % (ref 11.5–14.5)
ERYTHROCYTE [DISTWIDTH] IN BLOOD BY AUTOMATED COUNT: 13.8 % (ref 11.5–14.5)
ERYTHROCYTE [DISTWIDTH] IN BLOOD BY AUTOMATED COUNT: 13.9 % (ref 11.5–14.5)
ERYTHROCYTE [DISTWIDTH] IN BLOOD BY AUTOMATED COUNT: 13.9 % (ref 11.5–14.5)
ERYTHROCYTE [DISTWIDTH] IN BLOOD BY AUTOMATED COUNT: 14 % (ref 11.5–14.5)
ERYTHROCYTE [DISTWIDTH] IN BLOOD BY AUTOMATED COUNT: 14.1 % (ref 11.5–14.5)
ERYTHROCYTE [DISTWIDTH] IN BLOOD BY AUTOMATED COUNT: 14.1 % (ref 11.5–14.5)
ERYTHROCYTE [DISTWIDTH] IN BLOOD BY AUTOMATED COUNT: 14.3 % (ref 11.5–14.5)
ERYTHROCYTE [DISTWIDTH] IN BLOOD BY AUTOMATED COUNT: 14.5 % (ref 11.5–14.5)
ERYTHROCYTE [DISTWIDTH] IN BLOOD BY AUTOMATED COUNT: 14.6 % (ref 11.5–14.5)
ERYTHROCYTE [DISTWIDTH] IN BLOOD BY AUTOMATED COUNT: 15.2 % (ref 11.5–14.5)
ERYTHROCYTE [DISTWIDTH] IN BLOOD BY AUTOMATED COUNT: 15.3 % (ref 11.5–14.5)
ERYTHROCYTE [DISTWIDTH] IN BLOOD BY AUTOMATED COUNT: 15.4 % (ref 11.5–14.5)
ERYTHROCYTE [DISTWIDTH] IN BLOOD BY AUTOMATED COUNT: 15.4 % (ref 11.5–14.5)
ERYTHROCYTE [DISTWIDTH] IN BLOOD BY AUTOMATED COUNT: 15.5 % (ref 11.5–14.5)
ERYTHROCYTE [DISTWIDTH] IN BLOOD BY AUTOMATED COUNT: 15.5 % (ref 11.5–14.5)
ERYTHROCYTE [DISTWIDTH] IN BLOOD BY AUTOMATED COUNT: 15.6 % (ref 11.5–14.5)
ERYTHROCYTE [DISTWIDTH] IN BLOOD BY AUTOMATED COUNT: 15.6 % (ref 11.5–14.5)
ERYTHROCYTE [DISTWIDTH] IN BLOOD BY AUTOMATED COUNT: 15.7 % (ref 11.5–14.5)
ERYTHROCYTE [DISTWIDTH] IN BLOOD BY AUTOMATED COUNT: 15.9 % (ref 11.5–14.5)
ERYTHROCYTE [DISTWIDTH] IN BLOOD BY AUTOMATED COUNT: 16 % (ref 11.5–14.5)
ERYTHROCYTE [DISTWIDTH] IN BLOOD BY AUTOMATED COUNT: 16.2 % (ref 11.5–14.5)
ERYTHROCYTE [DISTWIDTH] IN BLOOD BY AUTOMATED COUNT: 16.4 % (ref 11.5–14.5)
ERYTHROCYTE [DISTWIDTH] IN BLOOD BY AUTOMATED COUNT: 16.4 % (ref 11.5–14.5)
ERYTHROCYTE [DISTWIDTH] IN BLOOD BY AUTOMATED COUNT: 16.9 % (ref 11.5–14.5)
ERYTHROCYTE [DISTWIDTH] IN BLOOD BY AUTOMATED COUNT: 17.5 % (ref 11.5–14.5)
ERYTHROCYTE [DISTWIDTH] IN BLOOD BY AUTOMATED COUNT: 17.8 % (ref 11.5–14.5)
ERYTHROCYTE [DISTWIDTH] IN BLOOD BY AUTOMATED COUNT: 18.2 % (ref 11.5–14.5)
ERYTHROCYTE [DISTWIDTH] IN BLOOD BY AUTOMATED COUNT: 18.5 % (ref 11.5–14.5)
ERYTHROCYTE [DISTWIDTH] IN BLOOD BY AUTOMATED COUNT: 18.6 % (ref 11.5–14.5)
ERYTHROCYTE [DISTWIDTH] IN BLOOD BY AUTOMATED COUNT: 18.6 % (ref 11.5–14.5)
ERYTHROCYTE [DISTWIDTH] IN BLOOD BY AUTOMATED COUNT: 18.7 % (ref 11.5–14.5)
ERYTHROCYTE [DISTWIDTH] IN BLOOD BY AUTOMATED COUNT: 18.7 % (ref 11.5–14.5)
ERYTHROCYTE [DISTWIDTH] IN BLOOD BY AUTOMATED COUNT: 18.8 % (ref 11.5–14.5)
ERYTHROCYTE [DISTWIDTH] IN BLOOD BY AUTOMATED COUNT: 19 % (ref 11.5–14.5)
ERYTHROCYTE [DISTWIDTH] IN BLOOD BY AUTOMATED COUNT: 19.1 % (ref 11.5–14.5)
ERYTHROCYTE [DISTWIDTH] IN BLOOD BY AUTOMATED COUNT: 19.2 % (ref 11.5–14.5)
ERYTHROCYTE [DISTWIDTH] IN BLOOD BY AUTOMATED COUNT: 19.4 % (ref 11.5–14.5)
ERYTHROCYTE [DISTWIDTH] IN BLOOD BY AUTOMATED COUNT: 19.8 % (ref 11.5–14.5)
ERYTHROCYTE [DISTWIDTH] IN BLOOD BY AUTOMATED COUNT: 19.9 % (ref 11.5–14.5)
ERYTHROCYTE [DISTWIDTH] IN BLOOD BY AUTOMATED COUNT: 20 % (ref 11.5–14.5)
ERYTHROCYTE [DISTWIDTH] IN BLOOD BY AUTOMATED COUNT: 20.3 % (ref 11.5–14.5)
ERYTHROCYTE [SEDIMENTATION RATE] IN BLOOD BY WESTERGREN METHOD: 12 MM/H
ERYTHROCYTE [SEDIMENTATION RATE] IN BLOOD BY WESTERGREN METHOD: 16 MM/H
ERYTHROCYTE [SEDIMENTATION RATE] IN BLOOD BY WESTERGREN METHOD: 18 MM/H
ERYTHROCYTE [SEDIMENTATION RATE] IN BLOOD BY WESTERGREN METHOD: 22 MM/H
ERYTHROCYTE [SEDIMENTATION RATE] IN BLOOD BY WESTERGREN METHOD: 25 MM/H
ERYTHROCYTE [SEDIMENTATION RATE] IN BLOOD BY WESTERGREN METHOD: 26 MM/H
ERYTHROCYTE [SEDIMENTATION RATE] IN BLOOD BY WESTERGREN METHOD: 30 MM/H
ERYTHROCYTE [SEDIMENTATION RATE] IN BLOOD BY WESTERGREN METHOD: 32 MM/H
ERYTHROCYTE [SEDIMENTATION RATE] IN BLOOD BY WESTERGREN METHOD: 35 MM/H
ERYTHROCYTE [SEDIMENTATION RATE] IN BLOOD BY WESTERGREN METHOD: >120 MM/HR (ref 0–36)
EST. GFR  (AFRICAN AMERICAN): 55.6 ML/MIN/1.73 M^2
EST. GFR  (AFRICAN AMERICAN): >60 ML/MIN/1.73 M^2
EST. GFR  (NON AFRICAN AMERICAN): 48.2 ML/MIN/1.73 M^2
EST. GFR  (NON AFRICAN AMERICAN): 53.6 ML/MIN/1.73 M^2
EST. GFR  (NON AFRICAN AMERICAN): >60 ML/MIN/1.73 M^2
ESTIMATED AVG GLUCOSE: 183 MG/DL (ref 68–131)
FEF 25 75 LLN: 0.83
FEF 25 75 LLN: 0.84
FEF 25 75 LLN: 0.85
FEF 25 75 PRE REF: 113.2 %
FEF 25 75 PRE REF: 127.3 %
FEF 25 75 PRE REF: 129.3 %
FEF 25 75 PRE REF: 150.9 %
FEF 25 75 PRE REF: 79.8 %
FEF 25 75 PRE REF: 87.7 %
FEF 25 75 PRE REF: 92.3 %
FEF 25 75 PRE REF: 97.9 %
FEF 25 75 REF: 2.01
FEF 25 75 REF: 2.02
FEF 25 75 REF: 2.02
FEF 25 75 REF: 2.03
FERRITIN SERPL-MCNC: 4375 NG/ML (ref 20–300)
FEV05 LLN: 1.09
FEV05 LLN: 1.1
FEV05 REF: 1.94
FEV05 REF: 1.95
FEV05 REF: 1.96
FEV1 FVC LLN: 67
FEV1 FVC PRE REF: 106.6 %
FEV1 FVC PRE REF: 109.8 %
FEV1 FVC PRE REF: 109.8 %
FEV1 FVC PRE REF: 112.2 %
FEV1 FVC PRE REF: 112.6 %
FEV1 FVC PRE REF: 112.6 %
FEV1 FVC PRE REF: 112.9 %
FEV1 FVC PRE REF: 115.3 %
FEV1 FVC REF: 79
FEV1 LLN: 1.64
FEV1 LLN: 1.64
FEV1 LLN: 1.65
FEV1 LLN: 1.66
FEV1 PRE REF: 50.7 %
FEV1 PRE REF: 57.9 %
FEV1 PRE REF: 59 %
FEV1 PRE REF: 63 %
FEV1 PRE REF: 63.6 %
FEV1 PRE REF: 64.6 %
FEV1 PRE REF: 65.3 %
FEV1 PRE REF: 65.5 %
FEV1 REF: 2.28
FEV1 REF: 2.29
FEV1 REF: 2.3
FIBRINOGEN PPP-MCNC: 286 MG/DL (ref 182–366)
FIBRINOGEN PPP-MCNC: 401 MG/DL (ref 182–366)
FIBRINOGEN PPP-MCNC: 421 MG/DL (ref 182–366)
FIBRINOGEN PPP-MCNC: 422 MG/DL (ref 182–366)
FIBRINOGEN PPP-MCNC: 442 MG/DL (ref 182–366)
FIBRINOGEN PPP-MCNC: 456 MG/DL (ref 182–366)
FIBRINOGEN PPP-MCNC: 469 MG/DL (ref 182–366)
FINAL PATHOLOGIC DIAGNOSIS: NORMAL
FINAL PATHOLOGIC DIAGNOSIS: NORMAL
FIO2: 100
FIO2: 100
FIO2: 30
FIO2: 40
FIO2: 50
FIO2: 55
FIO2: 60
FIO2: 70
FIO2: 75
FIO2: 90
FIO2: 90
FRACTIONAL SHORTENING: 27 % (ref 28–44)
FRACTIONAL SHORTENING: 30 % (ref 28–44)
FUNGUS SPEC CULT: NORMAL
FVC LLN: 2.12
FVC LLN: 2.13
FVC LLN: 2.13
FVC LLN: 2.14
FVC PRE REF: 44.7 %
FVC PRE REF: 51.3 %
FVC PRE REF: 52.1 %
FVC PRE REF: 54.3 %
FVC PRE REF: 56.1 %
FVC PRE REF: 58.4 %
FVC PRE REF: 59.1 %
FVC PRE REF: 61 %
FVC REF: 2.91
FVC REF: 2.92
FXMAL TESTING DATE: NORMAL
FXMAL TESTING DATE: NORMAL
FXMAU TESTING DATE: NORMAL
GALACTOMANNAN AG SERPL IA-ACNC: <0.5 INDEX
GALACTOMANNAN AG SPEC-ACNC: <0.5 INDEX
GALACTOMANNAN AG SPEC-ACNC: <0.5 INDEX
GIANT PLATELETS BLD QL SMEAR: PRESENT
GLUCOSE SERPL-MCNC: 101 MG/DL (ref 70–110)
GLUCOSE SERPL-MCNC: 105 MG/DL (ref 70–110)
GLUCOSE SERPL-MCNC: 112 MG/DL (ref 70–110)
GLUCOSE SERPL-MCNC: 118 MG/DL (ref 70–110)
GLUCOSE SERPL-MCNC: 119 MG/DL (ref 70–110)
GLUCOSE SERPL-MCNC: 123 MG/DL (ref 70–110)
GLUCOSE SERPL-MCNC: 123 MG/DL (ref 70–110)
GLUCOSE SERPL-MCNC: 124 MG/DL (ref 70–110)
GLUCOSE SERPL-MCNC: 124 MG/DL (ref 70–110)
GLUCOSE SERPL-MCNC: 134 MG/DL (ref 70–110)
GLUCOSE SERPL-MCNC: 134 MG/DL (ref 70–110)
GLUCOSE SERPL-MCNC: 136 MG/DL (ref 70–110)
GLUCOSE SERPL-MCNC: 136 MG/DL (ref 70–110)
GLUCOSE SERPL-MCNC: 138 MG/DL (ref 70–110)
GLUCOSE SERPL-MCNC: 139 MG/DL (ref 70–110)
GLUCOSE SERPL-MCNC: 140 MG/DL (ref 70–110)
GLUCOSE SERPL-MCNC: 146 MG/DL (ref 70–110)
GLUCOSE SERPL-MCNC: 146 MG/DL (ref 70–110)
GLUCOSE SERPL-MCNC: 149 MG/DL (ref 70–110)
GLUCOSE SERPL-MCNC: 151 MG/DL (ref 70–110)
GLUCOSE SERPL-MCNC: 151 MG/DL (ref 70–110)
GLUCOSE SERPL-MCNC: 152 MG/DL (ref 70–110)
GLUCOSE SERPL-MCNC: 152 MG/DL (ref 70–110)
GLUCOSE SERPL-MCNC: 153 MG/DL (ref 70–110)
GLUCOSE SERPL-MCNC: 159 MG/DL (ref 70–110)
GLUCOSE SERPL-MCNC: 161 MG/DL (ref 70–110)
GLUCOSE SERPL-MCNC: 165 MG/DL (ref 70–110)
GLUCOSE SERPL-MCNC: 165 MG/DL (ref 70–110)
GLUCOSE SERPL-MCNC: 169 MG/DL (ref 70–110)
GLUCOSE SERPL-MCNC: 170 MG/DL (ref 70–110)
GLUCOSE SERPL-MCNC: 170 MG/DL (ref 70–110)
GLUCOSE SERPL-MCNC: 171 MG/DL (ref 70–110)
GLUCOSE SERPL-MCNC: 172 MG/DL (ref 70–110)
GLUCOSE SERPL-MCNC: 178 MG/DL (ref 70–110)
GLUCOSE SERPL-MCNC: 185 MG/DL (ref 70–110)
GLUCOSE SERPL-MCNC: 186 MG/DL (ref 70–110)
GLUCOSE SERPL-MCNC: 187 MG/DL (ref 70–110)
GLUCOSE SERPL-MCNC: 189 MG/DL (ref 70–110)
GLUCOSE SERPL-MCNC: 192 MG/DL (ref 70–110)
GLUCOSE SERPL-MCNC: 192 MG/DL (ref 70–110)
GLUCOSE SERPL-MCNC: 193 MG/DL (ref 70–110)
GLUCOSE SERPL-MCNC: 196 MG/DL (ref 70–110)
GLUCOSE SERPL-MCNC: 203 MG/DL (ref 70–110)
GLUCOSE SERPL-MCNC: 204 MG/DL (ref 70–110)
GLUCOSE SERPL-MCNC: 205 MG/DL (ref 70–110)
GLUCOSE SERPL-MCNC: 206 MG/DL (ref 70–110)
GLUCOSE SERPL-MCNC: 207 MG/DL (ref 70–110)
GLUCOSE SERPL-MCNC: 214 MG/DL (ref 70–110)
GLUCOSE SERPL-MCNC: 219 MG/DL (ref 70–110)
GLUCOSE SERPL-MCNC: 222 MG/DL (ref 70–110)
GLUCOSE SERPL-MCNC: 237 MG/DL (ref 70–110)
GLUCOSE SERPL-MCNC: 242 MG/DL (ref 70–110)
GLUCOSE SERPL-MCNC: 243 MG/DL (ref 70–110)
GLUCOSE SERPL-MCNC: 243 MG/DL (ref 70–110)
GLUCOSE SERPL-MCNC: 247 MG/DL (ref 70–110)
GLUCOSE SERPL-MCNC: 266 MG/DL (ref 70–110)
GLUCOSE SERPL-MCNC: 293 MG/DL (ref 70–110)
GLUCOSE SERPL-MCNC: 332 MG/DL (ref 70–110)
GLUCOSE SERPL-MCNC: 407 MG/DL (ref 70–110)
GLUCOSE SERPL-MCNC: 410 MG/DL (ref 70–110)
GLUCOSE SERPL-MCNC: 72 MG/DL (ref 70–110)
GLUCOSE SERPL-MCNC: 85 MG/DL (ref 70–110)
GLUCOSE SERPL-MCNC: 91 MG/DL (ref 70–110)
GLUCOSE SERPL-MCNC: 92 MG/DL (ref 70–110)
GLUCOSE SERPL-MCNC: 93 MG/DL (ref 70–110)
GLUCOSE SERPL-MCNC: 99 MG/DL (ref 70–110)
GLUCOSE UR QL STRIP: ABNORMAL
GLUCOSE UR QL STRIP: NEGATIVE
GRAM STN SPEC: ABNORMAL
GRAM STN SPEC: NORMAL
GROSS: NORMAL
GROSS: NORMAL
HBA1C MFR BLD HPLC: 8 % (ref 4–5.6)
HBV DNA SERPL NAA+PROBE-ACNC: <10 IU/ML
HBV DNA SERPL NAA+PROBE-LOG IU: <1 LOG (10) IU/ML
HBV DNA SERPL QL NAA+PROBE: NOT DETECTED
HBV SURFACE AG SERPL QL IA: NEGATIVE
HCO3 UR-SCNC: 15.4 MMOL/L (ref 24–28)
HCO3 UR-SCNC: 18 MMOL/L (ref 24–28)
HCO3 UR-SCNC: 18.7 MMOL/L (ref 24–28)
HCO3 UR-SCNC: 20.4 MMOL/L (ref 24–28)
HCO3 UR-SCNC: 20.5 MMOL/L (ref 24–28)
HCO3 UR-SCNC: 22.2 MMOL/L (ref 24–28)
HCO3 UR-SCNC: 23.2 MMOL/L (ref 24–28)
HCO3 UR-SCNC: 23.4 MMOL/L (ref 24–28)
HCO3 UR-SCNC: 23.5 MMOL/L (ref 24–28)
HCO3 UR-SCNC: 23.7 MMOL/L (ref 24–28)
HCO3 UR-SCNC: 23.9 MMOL/L (ref 24–28)
HCO3 UR-SCNC: 24 MMOL/L (ref 24–28)
HCO3 UR-SCNC: 24.1 MMOL/L (ref 24–28)
HCO3 UR-SCNC: 24.1 MMOL/L (ref 24–28)
HCO3 UR-SCNC: 24.8 MMOL/L (ref 24–28)
HCO3 UR-SCNC: 25.1 MMOL/L (ref 24–28)
HCO3 UR-SCNC: 25.5 MMOL/L (ref 24–28)
HCO3 UR-SCNC: 25.6 MMOL/L (ref 24–28)
HCO3 UR-SCNC: 26.7 MMOL/L (ref 24–28)
HCO3 UR-SCNC: 27.3 MMOL/L (ref 24–28)
HCO3 UR-SCNC: 27.6 MMOL/L (ref 24–28)
HCO3 UR-SCNC: 27.6 MMOL/L (ref 24–28)
HCO3 UR-SCNC: 27.9 MMOL/L (ref 24–28)
HCO3 UR-SCNC: 28 MMOL/L (ref 24–28)
HCO3 UR-SCNC: 28.1 MMOL/L (ref 24–28)
HCO3 UR-SCNC: 28.2 MMOL/L (ref 24–28)
HCO3 UR-SCNC: 28.3 MMOL/L (ref 24–28)
HCO3 UR-SCNC: 28.3 MMOL/L (ref 24–28)
HCO3 UR-SCNC: 28.9 MMOL/L (ref 24–28)
HCO3 UR-SCNC: 29 MMOL/L (ref 24–28)
HCO3 UR-SCNC: 29 MMOL/L (ref 24–28)
HCO3 UR-SCNC: 29.2 MMOL/L (ref 24–28)
HCO3 UR-SCNC: 29.5 MMOL/L (ref 24–28)
HCO3 UR-SCNC: 29.6 MMOL/L (ref 24–28)
HCO3 UR-SCNC: 31 MMOL/L (ref 24–28)
HCO3 UR-SCNC: 31.7 MMOL/L (ref 24–28)
HCO3 UR-SCNC: 31.8 MMOL/L (ref 24–28)
HCO3 UR-SCNC: 31.8 MMOL/L (ref 24–28)
HCO3 UR-SCNC: 32.3 MMOL/L (ref 24–28)
HCO3 UR-SCNC: 32.5 MMOL/L (ref 24–28)
HCO3 UR-SCNC: 32.6 MMOL/L (ref 24–28)
HCO3 UR-SCNC: 32.7 MMOL/L (ref 24–28)
HCO3 UR-SCNC: 33 MMOL/L (ref 24–28)
HCO3 UR-SCNC: 33 MMOL/L (ref 24–28)
HCO3 UR-SCNC: 33.1 MMOL/L (ref 24–28)
HCO3 UR-SCNC: 33.3 MMOL/L (ref 24–28)
HCO3 UR-SCNC: 33.4 MMOL/L (ref 24–28)
HCO3 UR-SCNC: 33.5 MMOL/L (ref 24–28)
HCO3 UR-SCNC: 33.9 MMOL/L (ref 24–28)
HCO3 UR-SCNC: 34 MMOL/L (ref 24–28)
HCO3 UR-SCNC: 34.2 MMOL/L (ref 24–28)
HCO3 UR-SCNC: 34.2 MMOL/L (ref 24–28)
HCO3 UR-SCNC: 34.4 MMOL/L (ref 24–28)
HCO3 UR-SCNC: 34.5 MMOL/L (ref 24–28)
HCO3 UR-SCNC: 34.7 MMOL/L (ref 24–28)
HCO3 UR-SCNC: 35.1 MMOL/L (ref 24–28)
HCO3 UR-SCNC: 35.5 MMOL/L (ref 24–28)
HCO3 UR-SCNC: 35.8 MMOL/L (ref 24–28)
HCO3 UR-SCNC: 36.6 MMOL/L (ref 24–28)
HCT VFR BLD AUTO: 23.1 % (ref 37–48.5)
HCT VFR BLD AUTO: 23.6 % (ref 37–48.5)
HCT VFR BLD AUTO: 23.9 % (ref 37–48.5)
HCT VFR BLD AUTO: 24.4 % (ref 37–48.5)
HCT VFR BLD AUTO: 24.8 % (ref 37–48.5)
HCT VFR BLD AUTO: 24.9 % (ref 37–48.5)
HCT VFR BLD AUTO: 25.1 % (ref 37–48.5)
HCT VFR BLD AUTO: 25.3 % (ref 37–48.5)
HCT VFR BLD AUTO: 25.5 % (ref 37–48.5)
HCT VFR BLD AUTO: 25.8 % (ref 37–48.5)
HCT VFR BLD AUTO: 25.9 % (ref 37–48.5)
HCT VFR BLD AUTO: 26.2 % (ref 37–48.5)
HCT VFR BLD AUTO: 26.4 % (ref 37–48.5)
HCT VFR BLD AUTO: 26.4 % (ref 37–48.5)
HCT VFR BLD AUTO: 26.5 % (ref 37–48.5)
HCT VFR BLD AUTO: 26.8 % (ref 37–48.5)
HCT VFR BLD AUTO: 26.9 % (ref 37–48.5)
HCT VFR BLD AUTO: 27 % (ref 37–48.5)
HCT VFR BLD AUTO: 27.2 % (ref 37–48.5)
HCT VFR BLD AUTO: 27.5 % (ref 37–48.5)
HCT VFR BLD AUTO: 27.6 % (ref 37–48.5)
HCT VFR BLD AUTO: 28.1 % (ref 37–48.5)
HCT VFR BLD AUTO: 28.1 % (ref 37–48.5)
HCT VFR BLD AUTO: 28.2 % (ref 37–48.5)
HCT VFR BLD AUTO: 28.2 % (ref 37–48.5)
HCT VFR BLD AUTO: 28.3 % (ref 37–48.5)
HCT VFR BLD AUTO: 28.3 % (ref 37–48.5)
HCT VFR BLD AUTO: 28.5 % (ref 37–48.5)
HCT VFR BLD AUTO: 28.6 % (ref 37–48.5)
HCT VFR BLD AUTO: 28.6 % (ref 37–48.5)
HCT VFR BLD AUTO: 28.9 % (ref 37–48.5)
HCT VFR BLD AUTO: 29.3 % (ref 37–48.5)
HCT VFR BLD AUTO: 30.1 % (ref 37–48.5)
HCT VFR BLD AUTO: 30.1 % (ref 37–48.5)
HCT VFR BLD AUTO: 31 % (ref 37–48.5)
HCT VFR BLD AUTO: 31 % (ref 37–48.5)
HCT VFR BLD AUTO: 31.5 % (ref 37–48.5)
HCT VFR BLD AUTO: 31.9 % (ref 37–48.5)
HCT VFR BLD AUTO: 32.7 % (ref 37–48.5)
HCT VFR BLD AUTO: 33 % (ref 37–48.5)
HCT VFR BLD AUTO: 33.3 % (ref 37–48.5)
HCT VFR BLD AUTO: 33.7 % (ref 37–48.5)
HCT VFR BLD AUTO: 34.9 % (ref 37–48.5)
HCT VFR BLD AUTO: 35.2 % (ref 37–48.5)
HCT VFR BLD AUTO: 35.8 % (ref 37–48.5)
HCT VFR BLD AUTO: 36 % (ref 37–48.5)
HCT VFR BLD AUTO: 37.1 % (ref 37–48.5)
HCT VFR BLD AUTO: 37.1 % (ref 37–48.5)
HCT VFR BLD AUTO: 37.4 % (ref 37–48.5)
HCT VFR BLD AUTO: 37.9 % (ref 37–48.5)
HCT VFR BLD AUTO: 38.7 % (ref 37–48.5)
HCT VFR BLD CALC: 19 %PCV (ref 36–54)
HCT VFR BLD CALC: 21 %PCV (ref 36–54)
HCT VFR BLD CALC: 22 %PCV (ref 36–54)
HCT VFR BLD CALC: 25 %PCV (ref 36–54)
HCT VFR BLD CALC: 26 %PCV (ref 36–54)
HCT VFR BLD CALC: 26 %PCV (ref 36–54)
HCT VFR BLD CALC: 28 %PCV (ref 36–54)
HCT VFR BLD CALC: 38 %PCV (ref 36–54)
HCV RNA SERPL NAA+PROBE-LOG IU: <1.08 LOG (10) IU/ML
HCV RNA SERPL QL NAA+PROBE: NOT DETECTED IU/ML
HCV RNA SPEC NAA+PROBE-ACNC: <12 IU/ML
HGB BLD-MCNC: 10.1 G/DL (ref 12–16)
HGB BLD-MCNC: 10.4 G/DL (ref 12–16)
HGB BLD-MCNC: 10.5 G/DL (ref 12–16)
HGB BLD-MCNC: 10.6 G/DL (ref 12–16)
HGB BLD-MCNC: 10.6 G/DL (ref 12–16)
HGB BLD-MCNC: 10.9 G/DL (ref 12–16)
HGB BLD-MCNC: 10.9 G/DL (ref 12–16)
HGB BLD-MCNC: 11.3 G/DL (ref 12–16)
HGB BLD-MCNC: 11.6 G/DL (ref 12–16)
HGB BLD-MCNC: 11.8 G/DL (ref 12–16)
HGB BLD-MCNC: 11.8 G/DL (ref 12–16)
HGB BLD-MCNC: 12 G/DL (ref 12–16)
HGB BLD-MCNC: 12.3 G/DL (ref 12–16)
HGB BLD-MCNC: 12.4 G/DL (ref 12–16)
HGB BLD-MCNC: 7 G/DL (ref 12–16)
HGB BLD-MCNC: 7.1 G/DL (ref 12–16)
HGB BLD-MCNC: 7.1 G/DL (ref 12–16)
HGB BLD-MCNC: 7.2 G/DL (ref 12–16)
HGB BLD-MCNC: 7.6 G/DL (ref 12–16)
HGB BLD-MCNC: 7.6 G/DL (ref 12–16)
HGB BLD-MCNC: 7.9 G/DL (ref 12–16)
HGB BLD-MCNC: 7.9 G/DL (ref 12–16)
HGB BLD-MCNC: 8 G/DL (ref 12–16)
HGB BLD-MCNC: 8 G/DL (ref 12–16)
HGB BLD-MCNC: 8.1 G/DL (ref 12–16)
HGB BLD-MCNC: 8.2 G/DL (ref 12–16)
HGB BLD-MCNC: 8.3 G/DL (ref 12–16)
HGB BLD-MCNC: 8.3 G/DL (ref 12–16)
HGB BLD-MCNC: 8.4 G/DL (ref 12–16)
HGB BLD-MCNC: 8.4 G/DL (ref 12–16)
HGB BLD-MCNC: 8.5 G/DL (ref 12–16)
HGB BLD-MCNC: 8.5 G/DL (ref 12–16)
HGB BLD-MCNC: 8.6 G/DL (ref 12–16)
HGB BLD-MCNC: 8.7 G/DL (ref 12–16)
HGB BLD-MCNC: 8.8 G/DL (ref 12–16)
HGB BLD-MCNC: 8.9 G/DL (ref 12–16)
HGB BLD-MCNC: 9 G/DL (ref 12–16)
HGB BLD-MCNC: 9.1 G/DL (ref 12–16)
HGB BLD-MCNC: 9.2 G/DL (ref 12–16)
HGB BLD-MCNC: 9.2 G/DL (ref 12–16)
HGB BLD-MCNC: 9.3 G/DL (ref 12–16)
HGB BLD-MCNC: 9.3 G/DL (ref 12–16)
HGB BLD-MCNC: 9.4 G/DL (ref 12–16)
HGB BLD-MCNC: 9.5 G/DL (ref 12–16)
HGB BLD-MCNC: 9.6 G/DL (ref 12–16)
HGB BLD-MCNC: 9.8 G/DL (ref 12–16)
HGB UR QL STRIP: ABNORMAL
HGB UR QL STRIP: NEGATIVE
HIV 1+2 AB+HIV1 P24 AG SERPL QL IA: NEGATIVE
HLA FLOW CROSSMATCH (ALLO) INTERPRETATION: NORMAL
HLA FLOW CROSSMATCH (AUTO) INTERPRETATION: NORMAL
HPRA INTERPRETATION: NORMAL
HUMAN BOCAVIRUS: NOT DETECTED
HUMAN BOCAVIRUS: NOT DETECTED
HUMAN CORONAVIRUS, COMMON COLD VIRUS: NOT DETECTED
HUMAN CORONAVIRUS, COMMON COLD VIRUS: NOT DETECTED
HYALINE CASTS UR QL AUTO: 0 /LPF
HYPOCHROMIA BLD QL SMEAR: ABNORMAL
IMM GRANULOCYTES # BLD AUTO: 0.02 K/UL (ref 0–0.04)
IMM GRANULOCYTES # BLD AUTO: 0.03 K/UL (ref 0–0.04)
IMM GRANULOCYTES # BLD AUTO: 0.05 K/UL (ref 0–0.04)
IMM GRANULOCYTES # BLD AUTO: 0.05 K/UL (ref 0–0.04)
IMM GRANULOCYTES # BLD AUTO: 0.06 K/UL (ref 0–0.04)
IMM GRANULOCYTES # BLD AUTO: 0.07 K/UL (ref 0–0.04)
IMM GRANULOCYTES # BLD AUTO: 0.08 K/UL (ref 0–0.04)
IMM GRANULOCYTES # BLD AUTO: 0.08 K/UL (ref 0–0.04)
IMM GRANULOCYTES # BLD AUTO: 0.09 K/UL (ref 0–0.04)
IMM GRANULOCYTES # BLD AUTO: 0.1 K/UL (ref 0–0.04)
IMM GRANULOCYTES # BLD AUTO: 0.1 K/UL (ref 0–0.04)
IMM GRANULOCYTES # BLD AUTO: 0.11 K/UL (ref 0–0.04)
IMM GRANULOCYTES # BLD AUTO: 0.12 K/UL (ref 0–0.04)
IMM GRANULOCYTES # BLD AUTO: 0.12 K/UL (ref 0–0.04)
IMM GRANULOCYTES # BLD AUTO: 0.16 K/UL (ref 0–0.04)
IMM GRANULOCYTES # BLD AUTO: 0.16 K/UL (ref 0–0.04)
IMM GRANULOCYTES # BLD AUTO: 0.18 K/UL (ref 0–0.04)
IMM GRANULOCYTES # BLD AUTO: 0.19 K/UL (ref 0–0.04)
IMM GRANULOCYTES # BLD AUTO: 0.19 K/UL (ref 0–0.04)
IMM GRANULOCYTES # BLD AUTO: 0.21 K/UL (ref 0–0.04)
IMM GRANULOCYTES # BLD AUTO: 0.3 K/UL (ref 0–0.04)
IMM GRANULOCYTES # BLD AUTO: 0.35 K/UL (ref 0–0.04)
IMM GRANULOCYTES # BLD AUTO: 0.36 K/UL (ref 0–0.04)
IMM GRANULOCYTES # BLD AUTO: 0.36 K/UL (ref 0–0.04)
IMM GRANULOCYTES # BLD AUTO: 0.59 K/UL (ref 0–0.04)
IMM GRANULOCYTES # BLD AUTO: ABNORMAL K/UL (ref 0–0.04)
IMM GRANULOCYTES NFR BLD AUTO: 0.2 % (ref 0–0.5)
IMM GRANULOCYTES NFR BLD AUTO: 0.2 % (ref 0–0.5)
IMM GRANULOCYTES NFR BLD AUTO: 0.5 % (ref 0–0.5)
IMM GRANULOCYTES NFR BLD AUTO: 0.6 % (ref 0–0.5)
IMM GRANULOCYTES NFR BLD AUTO: 0.6 % (ref 0–0.5)
IMM GRANULOCYTES NFR BLD AUTO: 0.8 % (ref 0–0.5)
IMM GRANULOCYTES NFR BLD AUTO: 0.8 % (ref 0–0.5)
IMM GRANULOCYTES NFR BLD AUTO: 0.9 % (ref 0–0.5)
IMM GRANULOCYTES NFR BLD AUTO: 1.3 % (ref 0–0.5)
IMM GRANULOCYTES NFR BLD AUTO: 1.5 % (ref 0–0.5)
IMM GRANULOCYTES NFR BLD AUTO: 1.6 % (ref 0–0.5)
IMM GRANULOCYTES NFR BLD AUTO: 1.6 % (ref 0–0.5)
IMM GRANULOCYTES NFR BLD AUTO: 1.7 % (ref 0–0.5)
IMM GRANULOCYTES NFR BLD AUTO: 1.7 % (ref 0–0.5)
IMM GRANULOCYTES NFR BLD AUTO: 1.8 % (ref 0–0.5)
IMM GRANULOCYTES NFR BLD AUTO: 1.9 % (ref 0–0.5)
IMM GRANULOCYTES NFR BLD AUTO: 2 % (ref 0–0.5)
IMM GRANULOCYTES NFR BLD AUTO: 2 % (ref 0–0.5)
IMM GRANULOCYTES NFR BLD AUTO: 2.1 % (ref 0–0.5)
IMM GRANULOCYTES NFR BLD AUTO: 2.1 % (ref 0–0.5)
IMM GRANULOCYTES NFR BLD AUTO: 2.4 % (ref 0–0.5)
IMM GRANULOCYTES NFR BLD AUTO: 2.6 % (ref 0–0.5)
IMM GRANULOCYTES NFR BLD AUTO: 2.9 % (ref 0–0.5)
IMM GRANULOCYTES NFR BLD AUTO: 3.1 % (ref 0–0.5)
IMM GRANULOCYTES NFR BLD AUTO: 4.4 % (ref 0–0.5)
IMM GRANULOCYTES NFR BLD AUTO: ABNORMAL % (ref 0–0.5)
INFLUENZA A - H1N1-09: NOT DETECTED
INFLUENZA A - H1N1-09: NOT DETECTED
INR PPP: 0.9 (ref 0.8–1.2)
INR PPP: 1 (ref 0.8–1.2)
INR PPP: 1.1 (ref 0.8–1.2)
INTERVENTRICULAR SEPTUM: 1 CM (ref 0.6–1.1)
INTERVENTRICULAR SEPTUM: 1.16 CM (ref 0.6–1.1)
IP: 20
IT: 1
KETONES UR QL STRIP: NEGATIVE
LA MAJOR: 5.22 CM
LA MAJOR: 5.44 CM
LA MINOR: 4.51 CM
LA MINOR: 5.44 CM
LA WIDTH: 3.02 CM
LA WIDTH: 3.41 CM
LACTATE SERPL-SCNC: 2.2 MMOL/L (ref 0.5–2.2)
LACTATE SERPL-SCNC: 2.5 MMOL/L (ref 0.5–2.2)
LACTATE SERPL-SCNC: 3.2 MMOL/L (ref 0.5–2.2)
LACTATE SERPL-SCNC: 3.2 MMOL/L (ref 0.5–2.2)
LACTATE SERPL-SCNC: 3.3 MMOL/L (ref 0.5–2.2)
LACTATE SERPL-SCNC: 5.2 MMOL/L (ref 0.5–2.2)
LACTATE SERPL-SCNC: 5.6 MMOL/L (ref 0.5–2.2)
LACTATE SERPL-SCNC: 7.7 MMOL/L (ref 0.5–2.2)
LACTATE SERPL-SCNC: 8.3 MMOL/L (ref 0.5–2.2)
LDH SERPL L TO P-CCNC: 1.23 MMOL/L (ref 0.36–1.25)
LDH SERPL L TO P-CCNC: 1.94 MMOL/L (ref 0.36–1.25)
LDH SERPL L TO P-CCNC: 3.74 MMOL/L (ref 0.36–1.25)
LDH SERPL L TO P-CCNC: 3.76 MMOL/L (ref 0.36–1.25)
LDH SERPL L TO P-CCNC: 842 U/L (ref 110–260)
LEFT ATRIUM SIZE: 2.06 CM
LEFT ATRIUM SIZE: 2.31 CM
LEFT ATRIUM VOLUME INDEX: 13.6 ML/M2
LEFT ATRIUM VOLUME INDEX: 19.3 ML/M2
LEFT ATRIUM VOLUME: 25.59 CM3
LEFT ATRIUM VOLUME: 36.42 CM3
LEFT INTERNAL DIMENSION IN SYSTOLE: 2.48 CM (ref 2.1–4)
LEFT INTERNAL DIMENSION IN SYSTOLE: 3 CM (ref 2.1–4)
LEFT VENTRICLE DIASTOLIC VOLUME INDEX: 27.58 ML/M2
LEFT VENTRICLE DIASTOLIC VOLUME INDEX: 39.24 ML/M2
LEFT VENTRICLE DIASTOLIC VOLUME: 51.91 ML
LEFT VENTRICLE DIASTOLIC VOLUME: 73.86 ML
LEFT VENTRICLE MASS INDEX: 61 G/M2
LEFT VENTRICLE MASS INDEX: 65 G/M2
LEFT VENTRICLE SYSTOLIC VOLUME INDEX: 10.9 ML/M2
LEFT VENTRICLE SYSTOLIC VOLUME INDEX: 11.7 ML/M2
LEFT VENTRICLE SYSTOLIC VOLUME: 20.51 ML
LEFT VENTRICLE SYSTOLIC VOLUME: 21.97 ML
LEFT VENTRICULAR INTERNAL DIMENSION IN DIASTOLE: 3.53 CM (ref 3.5–6)
LEFT VENTRICULAR INTERNAL DIMENSION IN DIASTOLE: 4.09 CM (ref 3.5–6)
LEFT VENTRICULAR MASS: 114.86 G
LEFT VENTRICULAR MASS: 122.49 G
LEGIONELLA PNEUMOPHILA: NOT DETECTED
LEGIONELLA PNEUMOPHILA: NOT DETECTED
LEUKOCYTE ESTERASE UR QL STRIP: NEGATIVE
LV LATERAL E/E' RATIO: 5.33 M/S
LV SEPTAL E/E' RATIO: 7.11 M/S
LYMPHOCYTES # BLD AUTO: 0.4 K/UL (ref 1–4.8)
LYMPHOCYTES # BLD AUTO: 0.6 K/UL (ref 1–4.8)
LYMPHOCYTES # BLD AUTO: 0.6 K/UL (ref 1–4.8)
LYMPHOCYTES # BLD AUTO: 0.8 K/UL (ref 1–4.8)
LYMPHOCYTES # BLD AUTO: 0.9 K/UL (ref 1–4.8)
LYMPHOCYTES # BLD AUTO: 1 K/UL (ref 1–4.8)
LYMPHOCYTES # BLD AUTO: 1.1 K/UL (ref 1–4.8)
LYMPHOCYTES # BLD AUTO: 1.2 K/UL (ref 1–4.8)
LYMPHOCYTES # BLD AUTO: 1.2 K/UL (ref 1–4.8)
LYMPHOCYTES # BLD AUTO: 1.3 K/UL (ref 1–4.8)
LYMPHOCYTES # BLD AUTO: 1.4 K/UL (ref 1–4.8)
LYMPHOCYTES # BLD AUTO: 1.5 K/UL (ref 1–4.8)
LYMPHOCYTES # BLD AUTO: 1.6 K/UL (ref 1–4.8)
LYMPHOCYTES # BLD AUTO: 1.7 K/UL (ref 1–4.8)
LYMPHOCYTES # BLD AUTO: 2.2 K/UL (ref 1–4.8)
LYMPHOCYTES # BLD AUTO: 2.9 K/UL (ref 1–4.8)
LYMPHOCYTES # BLD AUTO: 3.4 K/UL (ref 1–4.8)
LYMPHOCYTES # BLD AUTO: 3.5 K/UL (ref 1–4.8)
LYMPHOCYTES # BLD AUTO: 3.8 K/UL (ref 1–4.8)
LYMPHOCYTES # BLD AUTO: 4.3 K/UL (ref 1–4.8)
LYMPHOCYTES # BLD AUTO: ABNORMAL K/UL (ref 1–4.8)
LYMPHOCYTES NFR BLD: 1 % (ref 18–48)
LYMPHOCYTES NFR BLD: 10.1 % (ref 18–48)
LYMPHOCYTES NFR BLD: 10.7 % (ref 18–48)
LYMPHOCYTES NFR BLD: 11 % (ref 18–48)
LYMPHOCYTES NFR BLD: 13 % (ref 18–48)
LYMPHOCYTES NFR BLD: 13.1 % (ref 18–48)
LYMPHOCYTES NFR BLD: 14 % (ref 18–48)
LYMPHOCYTES NFR BLD: 17.8 % (ref 18–48)
LYMPHOCYTES NFR BLD: 18.9 % (ref 18–48)
LYMPHOCYTES NFR BLD: 19 % (ref 18–48)
LYMPHOCYTES NFR BLD: 2.5 % (ref 18–48)
LYMPHOCYTES NFR BLD: 20 % (ref 18–48)
LYMPHOCYTES NFR BLD: 20 % (ref 18–48)
LYMPHOCYTES NFR BLD: 20.1 % (ref 18–48)
LYMPHOCYTES NFR BLD: 22 % (ref 18–48)
LYMPHOCYTES NFR BLD: 23 % (ref 18–48)
LYMPHOCYTES NFR BLD: 24 % (ref 18–48)
LYMPHOCYTES NFR BLD: 24.5 % (ref 18–48)
LYMPHOCYTES NFR BLD: 24.7 % (ref 18–48)
LYMPHOCYTES NFR BLD: 24.9 % (ref 18–48)
LYMPHOCYTES NFR BLD: 26 % (ref 18–48)
LYMPHOCYTES NFR BLD: 26 % (ref 18–48)
LYMPHOCYTES NFR BLD: 26.9 % (ref 18–48)
LYMPHOCYTES NFR BLD: 27.1 % (ref 18–48)
LYMPHOCYTES NFR BLD: 27.9 % (ref 18–48)
LYMPHOCYTES NFR BLD: 28.4 % (ref 18–48)
LYMPHOCYTES NFR BLD: 29 % (ref 18–48)
LYMPHOCYTES NFR BLD: 3 % (ref 18–48)
LYMPHOCYTES NFR BLD: 3.2 % (ref 18–48)
LYMPHOCYTES NFR BLD: 30.4 % (ref 18–48)
LYMPHOCYTES NFR BLD: 39.6 % (ref 18–48)
LYMPHOCYTES NFR BLD: 4 % (ref 18–48)
LYMPHOCYTES NFR BLD: 4.5 % (ref 18–48)
LYMPHOCYTES NFR BLD: 4.8 % (ref 18–48)
LYMPHOCYTES NFR BLD: 4.9 % (ref 18–48)
LYMPHOCYTES NFR BLD: 5.5 % (ref 18–48)
LYMPHOCYTES NFR BLD: 5.6 % (ref 18–48)
LYMPHOCYTES NFR BLD: 6 % (ref 18–48)
LYMPHOCYTES NFR BLD: 6 % (ref 18–48)
LYMPHOCYTES NFR BLD: 6.2 % (ref 18–48)
LYMPHOCYTES NFR BLD: 6.8 % (ref 18–48)
LYMPHOCYTES NFR BLD: 6.8 % (ref 18–48)
LYMPHOCYTES NFR BLD: 7 % (ref 18–48)
LYMPHOCYTES NFR BLD: 7.1 % (ref 18–48)
LYMPHOCYTES NFR BLD: 7.5 % (ref 18–48)
LYMPHOCYTES NFR BLD: 7.9 % (ref 18–48)
LYMPHOCYTES NFR BLD: 8 % (ref 18–48)
LYMPHOCYTES NFR BLD: 8 % (ref 18–48)
LYMPHOCYTES NFR BLD: 8.2 % (ref 18–48)
LYMPHOCYTES NFR BLD: 8.8 % (ref 18–48)
LYMPHOCYTES NFR BLD: 9 % (ref 18–48)
LYMPHOCYTES NFR BLD: 9.9 % (ref 18–48)
LYMPHOCYTES NFR FLD MANUAL: 4 %
MAGNESIUM SERPL-MCNC: 1.3 MG/DL (ref 1.6–2.6)
MAGNESIUM SERPL-MCNC: 1.3 MG/DL (ref 1.6–2.6)
MAGNESIUM SERPL-MCNC: 1.4 MG/DL (ref 1.6–2.6)
MAGNESIUM SERPL-MCNC: 1.4 MG/DL (ref 1.6–2.6)
MAGNESIUM SERPL-MCNC: 1.5 MG/DL (ref 1.6–2.6)
MAGNESIUM SERPL-MCNC: 1.6 MG/DL (ref 1.6–2.6)
MAGNESIUM SERPL-MCNC: 1.7 MG/DL (ref 1.6–2.6)
MAGNESIUM SERPL-MCNC: 1.8 MG/DL (ref 1.6–2.6)
MAGNESIUM SERPL-MCNC: 1.9 MG/DL (ref 1.6–2.6)
MAGNESIUM SERPL-MCNC: 2 MG/DL (ref 1.6–2.6)
MAGNESIUM SERPL-MCNC: 2.1 MG/DL (ref 1.6–2.6)
MAGNESIUM SERPL-MCNC: 2.2 MG/DL (ref 1.6–2.6)
MAGNESIUM SERPL-MCNC: 2.2 MG/DL (ref 1.6–2.6)
MAGNESIUM SERPL-MCNC: 2.3 MG/DL (ref 1.6–2.6)
MAGNESIUM SERPL-MCNC: 2.3 MG/DL (ref 1.6–2.6)
MAGNESIUM SERPL-MCNC: 2.4 MG/DL (ref 1.6–2.6)
MAGNESIUM SERPL-MCNC: 2.6 MG/DL (ref 1.6–2.6)
MCH RBC QN AUTO: 29.9 PG (ref 27–31)
MCH RBC QN AUTO: 30 PG (ref 27–31)
MCH RBC QN AUTO: 30.2 PG (ref 27–31)
MCH RBC QN AUTO: 30.4 PG (ref 27–31)
MCH RBC QN AUTO: 30.4 PG (ref 27–31)
MCH RBC QN AUTO: 30.5 PG (ref 27–31)
MCH RBC QN AUTO: 30.6 PG (ref 27–31)
MCH RBC QN AUTO: 30.7 PG (ref 27–31)
MCH RBC QN AUTO: 30.7 PG (ref 27–31)
MCH RBC QN AUTO: 30.8 PG (ref 27–31)
MCH RBC QN AUTO: 30.9 PG (ref 27–31)
MCH RBC QN AUTO: 30.9 PG (ref 27–31)
MCH RBC QN AUTO: 31.9 PG (ref 27–31)
MCH RBC QN AUTO: 32 PG (ref 27–31)
MCH RBC QN AUTO: 32.1 PG (ref 27–31)
MCH RBC QN AUTO: 32.2 PG (ref 27–31)
MCH RBC QN AUTO: 32.3 PG (ref 27–31)
MCH RBC QN AUTO: 32.4 PG (ref 27–31)
MCH RBC QN AUTO: 32.5 PG (ref 27–31)
MCH RBC QN AUTO: 32.6 PG (ref 27–31)
MCH RBC QN AUTO: 32.7 PG (ref 27–31)
MCH RBC QN AUTO: 32.8 PG (ref 27–31)
MCH RBC QN AUTO: 32.9 PG (ref 27–31)
MCH RBC QN AUTO: 32.9 PG (ref 27–31)
MCH RBC QN AUTO: 33 PG (ref 27–31)
MCH RBC QN AUTO: 33.1 PG (ref 27–31)
MCH RBC QN AUTO: 33.1 PG (ref 27–31)
MCH RBC QN AUTO: 33.2 PG (ref 27–31)
MCH RBC QN AUTO: 33.2 PG (ref 27–31)
MCH RBC QN AUTO: 33.4 PG (ref 27–31)
MCH RBC QN AUTO: 34.1 PG (ref 27–31)
MCH RBC QN AUTO: 34.4 PG (ref 27–31)
MCH RBC QN AUTO: 34.5 PG (ref 27–31)
MCHC RBC AUTO-ENTMCNC: 28.9 G/DL (ref 32–36)
MCHC RBC AUTO-ENTMCNC: 29 G/DL (ref 32–36)
MCHC RBC AUTO-ENTMCNC: 29.1 G/DL (ref 32–36)
MCHC RBC AUTO-ENTMCNC: 29.3 G/DL (ref 32–36)
MCHC RBC AUTO-ENTMCNC: 29.4 G/DL (ref 32–36)
MCHC RBC AUTO-ENTMCNC: 29.5 G/DL (ref 32–36)
MCHC RBC AUTO-ENTMCNC: 29.5 G/DL (ref 32–36)
MCHC RBC AUTO-ENTMCNC: 29.7 G/DL (ref 32–36)
MCHC RBC AUTO-ENTMCNC: 29.8 G/DL (ref 32–36)
MCHC RBC AUTO-ENTMCNC: 30 G/DL (ref 32–36)
MCHC RBC AUTO-ENTMCNC: 30 G/DL (ref 32–36)
MCHC RBC AUTO-ENTMCNC: 30.1 G/DL (ref 32–36)
MCHC RBC AUTO-ENTMCNC: 30.2 G/DL (ref 32–36)
MCHC RBC AUTO-ENTMCNC: 30.4 G/DL (ref 32–36)
MCHC RBC AUTO-ENTMCNC: 30.4 G/DL (ref 32–36)
MCHC RBC AUTO-ENTMCNC: 30.6 G/DL (ref 32–36)
MCHC RBC AUTO-ENTMCNC: 30.7 G/DL (ref 32–36)
MCHC RBC AUTO-ENTMCNC: 30.9 G/DL (ref 32–36)
MCHC RBC AUTO-ENTMCNC: 30.9 G/DL (ref 32–36)
MCHC RBC AUTO-ENTMCNC: 31 G/DL (ref 32–36)
MCHC RBC AUTO-ENTMCNC: 31 G/DL (ref 32–36)
MCHC RBC AUTO-ENTMCNC: 31.1 G/DL (ref 32–36)
MCHC RBC AUTO-ENTMCNC: 31.2 G/DL (ref 32–36)
MCHC RBC AUTO-ENTMCNC: 31.5 G/DL (ref 32–36)
MCHC RBC AUTO-ENTMCNC: 31.6 G/DL (ref 32–36)
MCHC RBC AUTO-ENTMCNC: 31.7 G/DL (ref 32–36)
MCHC RBC AUTO-ENTMCNC: 31.7 G/DL (ref 32–36)
MCHC RBC AUTO-ENTMCNC: 31.8 G/DL (ref 32–36)
MCHC RBC AUTO-ENTMCNC: 31.9 G/DL (ref 32–36)
MCHC RBC AUTO-ENTMCNC: 32 G/DL (ref 32–36)
MCHC RBC AUTO-ENTMCNC: 32.1 G/DL (ref 32–36)
MCHC RBC AUTO-ENTMCNC: 32.2 G/DL (ref 32–36)
MCHC RBC AUTO-ENTMCNC: 32.3 G/DL (ref 32–36)
MCHC RBC AUTO-ENTMCNC: 32.4 G/DL (ref 32–36)
MCHC RBC AUTO-ENTMCNC: 32.4 G/DL (ref 32–36)
MCHC RBC AUTO-ENTMCNC: 32.5 G/DL (ref 32–36)
MCHC RBC AUTO-ENTMCNC: 32.6 G/DL (ref 32–36)
MCHC RBC AUTO-ENTMCNC: 32.6 G/DL (ref 32–36)
MCHC RBC AUTO-ENTMCNC: 32.9 G/DL (ref 32–36)
MCHC RBC AUTO-ENTMCNC: 32.9 G/DL (ref 32–36)
MCHC RBC AUTO-ENTMCNC: 33 G/DL (ref 32–36)
MCHC RBC AUTO-ENTMCNC: 33.1 G/DL (ref 32–36)
MCHC RBC AUTO-ENTMCNC: 33.2 G/DL (ref 32–36)
MCHC RBC AUTO-ENTMCNC: 33.3 G/DL (ref 32–36)
MCHC RBC AUTO-ENTMCNC: 33.8 G/DL (ref 32–36)
MCV RBC AUTO: 100 FL (ref 82–98)
MCV RBC AUTO: 101 FL (ref 82–98)
MCV RBC AUTO: 102 FL (ref 82–98)
MCV RBC AUTO: 102 FL (ref 82–98)
MCV RBC AUTO: 103 FL (ref 82–98)
MCV RBC AUTO: 105 FL (ref 82–98)
MCV RBC AUTO: 105 FL (ref 82–98)
MCV RBC AUTO: 106 FL (ref 82–98)
MCV RBC AUTO: 107 FL (ref 82–98)
MCV RBC AUTO: 108 FL (ref 82–98)
MCV RBC AUTO: 108 FL (ref 82–98)
MCV RBC AUTO: 109 FL (ref 82–98)
MCV RBC AUTO: 109 FL (ref 82–98)
MCV RBC AUTO: 110 FL (ref 82–98)
MCV RBC AUTO: 111 FL (ref 82–98)
MCV RBC AUTO: 111 FL (ref 82–98)
MCV RBC AUTO: 112 FL (ref 82–98)
MCV RBC AUTO: 113 FL (ref 82–98)
MCV RBC AUTO: 114 FL (ref 82–98)
MCV RBC AUTO: 114 FL (ref 82–98)
MCV RBC AUTO: 115 FL (ref 82–98)
MCV RBC AUTO: 93 FL (ref 82–98)
MCV RBC AUTO: 94 FL (ref 82–98)
MCV RBC AUTO: 95 FL (ref 82–98)
MCV RBC AUTO: 96 FL (ref 82–98)
MCV RBC AUTO: 97 FL (ref 82–98)
MCV RBC AUTO: 98 FL (ref 82–98)
MCV RBC AUTO: 99 FL (ref 82–98)
METAMYELOCYTES NFR BLD MANUAL: 0.7 %
METAMYELOCYTES NFR BLD MANUAL: 0.7 %
METAMYELOCYTES NFR BLD MANUAL: 1 %
METAMYELOCYTES NFR BLD MANUAL: 1 %
METAMYELOCYTES NFR BLD MANUAL: 14 %
METAMYELOCYTES NFR BLD MANUAL: 2 %
METAMYELOCYTES NFR BLD MANUAL: 2.5 %
METAMYELOCYTES NFR BLD MANUAL: 4 %
MICROSCOPIC COMMENT: ABNORMAL
MICROSCOPIC COMMENT: ABNORMAL
MIN VOL: 6.3
MIN VOL: 6.5
MIN VOL: 6.8
MIN VOL: 6.81
MIN VOL: 7.6
MIN VOL: 7.72
MIN VOL: 7.75
MIN VOL: 7.75
MIN VOL: 7.79
MIN VOL: 7.8
MIN VOL: 7.82
MIN VOL: 8.08
MIN VOL: 8.71
MIN VOL: 9.16
MODE: ABNORMAL
MONOCYTES # BLD AUTO: 0.1 K/UL (ref 0.3–1)
MONOCYTES # BLD AUTO: 0.2 K/UL (ref 0.3–1)
MONOCYTES # BLD AUTO: 0.3 K/UL (ref 0.3–1)
MONOCYTES # BLD AUTO: 0.4 K/UL (ref 0.3–1)
MONOCYTES # BLD AUTO: 0.6 K/UL (ref 0.3–1)
MONOCYTES # BLD AUTO: 0.7 K/UL (ref 0.3–1)
MONOCYTES # BLD AUTO: 0.8 K/UL (ref 0.3–1)
MONOCYTES # BLD AUTO: 0.8 K/UL (ref 0.3–1)
MONOCYTES # BLD AUTO: 0.9 K/UL (ref 0.3–1)
MONOCYTES # BLD AUTO: 1 K/UL (ref 0.3–1)
MONOCYTES # BLD AUTO: 1.2 K/UL (ref 0.3–1)
MONOCYTES # BLD AUTO: 1.3 K/UL (ref 0.3–1)
MONOCYTES # BLD AUTO: ABNORMAL K/UL (ref 0.3–1)
MONOCYTES NFR BLD: 0 % (ref 4–15)
MONOCYTES NFR BLD: 0 % (ref 4–15)
MONOCYTES NFR BLD: 1 % (ref 4–15)
MONOCYTES NFR BLD: 1 % (ref 4–15)
MONOCYTES NFR BLD: 1.5 % (ref 4–15)
MONOCYTES NFR BLD: 1.6 % (ref 4–15)
MONOCYTES NFR BLD: 1.9 % (ref 4–15)
MONOCYTES NFR BLD: 1.9 % (ref 4–15)
MONOCYTES NFR BLD: 11 % (ref 4–15)
MONOCYTES NFR BLD: 2 % (ref 4–15)
MONOCYTES NFR BLD: 2.2 % (ref 4–15)
MONOCYTES NFR BLD: 2.6 % (ref 4–15)
MONOCYTES NFR BLD: 2.7 % (ref 4–15)
MONOCYTES NFR BLD: 3 % (ref 4–15)
MONOCYTES NFR BLD: 3.1 % (ref 4–15)
MONOCYTES NFR BLD: 3.3 % (ref 4–15)
MONOCYTES NFR BLD: 3.3 % (ref 4–15)
MONOCYTES NFR BLD: 3.5 % (ref 4–15)
MONOCYTES NFR BLD: 3.7 % (ref 4–15)
MONOCYTES NFR BLD: 3.8 % (ref 4–15)
MONOCYTES NFR BLD: 4 % (ref 4–15)
MONOCYTES NFR BLD: 4 % (ref 4–15)
MONOCYTES NFR BLD: 4.3 % (ref 4–15)
MONOCYTES NFR BLD: 4.4 % (ref 4–15)
MONOCYTES NFR BLD: 5 % (ref 4–15)
MONOCYTES NFR BLD: 5.2 % (ref 4–15)
MONOCYTES NFR BLD: 5.5 % (ref 4–15)
MONOCYTES NFR BLD: 5.6 % (ref 4–15)
MONOCYTES NFR BLD: 5.6 % (ref 4–15)
MONOCYTES NFR BLD: 5.7 % (ref 4–15)
MONOCYTES NFR BLD: 5.8 % (ref 4–15)
MONOCYTES NFR BLD: 6 % (ref 4–15)
MONOCYTES NFR BLD: 6 % (ref 4–15)
MONOCYTES NFR BLD: 6.2 % (ref 4–15)
MONOCYTES NFR BLD: 6.5 % (ref 4–15)
MONOCYTES NFR BLD: 6.8 % (ref 4–15)
MONOCYTES NFR BLD: 7 % (ref 4–15)
MONOCYTES NFR BLD: 7.1 % (ref 4–15)
MONOCYTES NFR BLD: 7.3 % (ref 4–15)
MONOCYTES NFR BLD: 7.5 % (ref 4–15)
MONOCYTES NFR BLD: 8.1 % (ref 4–15)
MONOCYTES NFR BLD: 9 % (ref 4–15)
MONOS+MACROS NFR FLD MANUAL: 92 %
MORAXELLA CATARRHALIS: NOT DETECTED
MORAXELLA CATARRHALIS: NOT DETECTED
MV PEAK A VEL: 0.67 M/S
MV PEAK E VEL: 0.64 M/S
MV STENOSIS PRESSURE HALF TIME: 64.3 MS
MV VALVE AREA P 1/2 METHOD: 3.42 CM2
MYCOBACTERIUM SPEC QL CULT: NORMAL
MYELOCYTES NFR BLD MANUAL: 0.5 %
MYELOCYTES NFR BLD MANUAL: 1 %
MYELOCYTES NFR BLD MANUAL: 1.3 %
MYELOCYTES NFR BLD MANUAL: 2 %
MYELOCYTES NFR BLD MANUAL: 2 %
MYELOCYTES NFR BLD MANUAL: 2.5 %
MYELOCYTES NFR BLD MANUAL: 3 %
MYELOCYTES NFR BLD MANUAL: 4 %
NEUTROPHILS # BLD AUTO: 10.5 K/UL (ref 1.8–7.7)
NEUTROPHILS # BLD AUTO: 10.7 K/UL (ref 1.8–7.7)
NEUTROPHILS # BLD AUTO: 11 K/UL (ref 1.8–7.7)
NEUTROPHILS # BLD AUTO: 11 K/UL (ref 1.8–7.7)
NEUTROPHILS # BLD AUTO: 11.1 K/UL (ref 1.8–7.7)
NEUTROPHILS # BLD AUTO: 11.4 K/UL (ref 1.8–7.7)
NEUTROPHILS # BLD AUTO: 11.9 K/UL (ref 1.8–7.7)
NEUTROPHILS # BLD AUTO: 14.4 K/UL (ref 1.8–7.7)
NEUTROPHILS # BLD AUTO: 14.6 K/UL (ref 1.8–7.7)
NEUTROPHILS # BLD AUTO: 14.8 K/UL (ref 1.8–7.7)
NEUTROPHILS # BLD AUTO: 17.1 K/UL (ref 1.8–7.7)
NEUTROPHILS # BLD AUTO: 17.1 K/UL (ref 1.8–7.7)
NEUTROPHILS # BLD AUTO: 18 K/UL (ref 1.8–7.7)
NEUTROPHILS # BLD AUTO: 18.2 K/UL (ref 1.8–7.7)
NEUTROPHILS # BLD AUTO: 2.7 K/UL (ref 1.8–7.7)
NEUTROPHILS # BLD AUTO: 2.8 K/UL (ref 1.8–7.7)
NEUTROPHILS # BLD AUTO: 3.2 K/UL (ref 1.8–7.7)
NEUTROPHILS # BLD AUTO: 3.4 K/UL (ref 1.8–7.7)
NEUTROPHILS # BLD AUTO: 3.8 K/UL (ref 1.8–7.7)
NEUTROPHILS # BLD AUTO: 4 K/UL (ref 1.8–7.7)
NEUTROPHILS # BLD AUTO: 4.3 K/UL (ref 1.8–7.7)
NEUTROPHILS # BLD AUTO: 5.1 K/UL (ref 1.8–7.7)
NEUTROPHILS # BLD AUTO: 6.1 K/UL (ref 1.8–7.7)
NEUTROPHILS # BLD AUTO: 6.4 K/UL (ref 1.8–7.7)
NEUTROPHILS # BLD AUTO: 8.3 K/UL (ref 1.8–7.7)
NEUTROPHILS # BLD AUTO: 8.7 K/UL (ref 1.8–7.7)
NEUTROPHILS # BLD AUTO: 8.8 K/UL (ref 1.8–7.7)
NEUTROPHILS # BLD AUTO: 8.9 K/UL (ref 1.8–7.7)
NEUTROPHILS # BLD AUTO: 9.6 K/UL (ref 1.8–7.7)
NEUTROPHILS # BLD AUTO: ABNORMAL K/UL (ref 1.8–7.7)
NEUTROPHILS NFR BLD: 48 % (ref 38–73)
NEUTROPHILS NFR BLD: 48.1 % (ref 38–73)
NEUTROPHILS NFR BLD: 54.8 % (ref 38–73)
NEUTROPHILS NFR BLD: 58 % (ref 38–73)
NEUTROPHILS NFR BLD: 59 % (ref 38–73)
NEUTROPHILS NFR BLD: 59.3 % (ref 38–73)
NEUTROPHILS NFR BLD: 59.6 % (ref 38–73)
NEUTROPHILS NFR BLD: 62 % (ref 38–73)
NEUTROPHILS NFR BLD: 62.7 % (ref 38–73)
NEUTROPHILS NFR BLD: 63.7 % (ref 38–73)
NEUTROPHILS NFR BLD: 64.3 % (ref 38–73)
NEUTROPHILS NFR BLD: 64.3 % (ref 38–73)
NEUTROPHILS NFR BLD: 65.5 % (ref 38–73)
NEUTROPHILS NFR BLD: 66.7 % (ref 38–73)
NEUTROPHILS NFR BLD: 67.4 % (ref 38–73)
NEUTROPHILS NFR BLD: 68 % (ref 38–73)
NEUTROPHILS NFR BLD: 69 % (ref 38–73)
NEUTROPHILS NFR BLD: 69.5 % (ref 38–73)
NEUTROPHILS NFR BLD: 70 % (ref 38–73)
NEUTROPHILS NFR BLD: 71.1 % (ref 38–73)
NEUTROPHILS NFR BLD: 74 % (ref 38–73)
NEUTROPHILS NFR BLD: 74 % (ref 38–73)
NEUTROPHILS NFR BLD: 74.3 % (ref 38–73)
NEUTROPHILS NFR BLD: 76 % (ref 38–73)
NEUTROPHILS NFR BLD: 77 % (ref 38–73)
NEUTROPHILS NFR BLD: 78 % (ref 38–73)
NEUTROPHILS NFR BLD: 78 % (ref 38–73)
NEUTROPHILS NFR BLD: 78.6 % (ref 38–73)
NEUTROPHILS NFR BLD: 80.5 % (ref 38–73)
NEUTROPHILS NFR BLD: 82.1 % (ref 38–73)
NEUTROPHILS NFR BLD: 82.6 % (ref 38–73)
NEUTROPHILS NFR BLD: 84 % (ref 38–73)
NEUTROPHILS NFR BLD: 84.7 % (ref 38–73)
NEUTROPHILS NFR BLD: 85 % (ref 38–73)
NEUTROPHILS NFR BLD: 85 % (ref 38–73)
NEUTROPHILS NFR BLD: 85.5 % (ref 38–73)
NEUTROPHILS NFR BLD: 86 % (ref 38–73)
NEUTROPHILS NFR BLD: 86 % (ref 38–73)
NEUTROPHILS NFR BLD: 86.8 % (ref 38–73)
NEUTROPHILS NFR BLD: 87 % (ref 38–73)
NEUTROPHILS NFR BLD: 87.4 % (ref 38–73)
NEUTROPHILS NFR BLD: 87.5 % (ref 38–73)
NEUTROPHILS NFR BLD: 87.7 % (ref 38–73)
NEUTROPHILS NFR BLD: 87.8 % (ref 38–73)
NEUTROPHILS NFR BLD: 88.3 % (ref 38–73)
NEUTROPHILS NFR BLD: 88.3 % (ref 38–73)
NEUTROPHILS NFR BLD: 88.5 % (ref 38–73)
NEUTROPHILS NFR BLD: 88.5 % (ref 38–73)
NEUTROPHILS NFR BLD: 89.4 % (ref 38–73)
NEUTROPHILS NFR BLD: 89.7 % (ref 38–73)
NEUTROPHILS NFR BLD: 90.1 % (ref 38–73)
NEUTROPHILS NFR BLD: 92 % (ref 38–73)
NEUTROPHILS NFR BLD: 93 % (ref 38–73)
NEUTROPHILS NFR BLD: 93.7 % (ref 38–73)
NEUTROPHILS NFR FLD MANUAL: 4 %
NEUTS BAND NFR BLD MANUAL: 1 %
NEUTS BAND NFR BLD MANUAL: 12.5 %
NEUTS BAND NFR BLD MANUAL: 14 %
NEUTS BAND NFR BLD MANUAL: 2 %
NEUTS BAND NFR BLD MANUAL: 21 %
NEUTS BAND NFR BLD MANUAL: 23 %
NEUTS BAND NFR BLD MANUAL: 3 %
NEUTS BAND NFR BLD MANUAL: 4.6 %
NEUTS BAND NFR BLD MANUAL: 5.5 %
NEUTS BAND NFR BLD MANUAL: 6 %
NEUTS BAND NFR BLD MANUAL: 7 %
NITRITE UR QL STRIP: NEGATIVE
NRBC BLD-RTO: 0 /100 WBC
NRBC BLD-RTO: 1 /100 WBC
NRBC BLD-RTO: 10 /100 WBC
NRBC BLD-RTO: 10 /100 WBC
NRBC BLD-RTO: 11 /100 WBC
NRBC BLD-RTO: 12 /100 WBC
NRBC BLD-RTO: 14 /100 WBC
NRBC BLD-RTO: 17 /100 WBC
NRBC BLD-RTO: 18 /100 WBC
NRBC BLD-RTO: 2 /100 WBC
NRBC BLD-RTO: 22 /100 WBC
NRBC BLD-RTO: 22 /100 WBC
NRBC BLD-RTO: 3 /100 WBC
NRBC BLD-RTO: 4 /100 WBC
NRBC BLD-RTO: 4 /100 WBC
NRBC BLD-RTO: 6 /100 WBC
NRBC BLD-RTO: 9 /100 WBC
NUM UNITS TRANS FFP: NORMAL
NUM UNITS TRANS PACKED RBC: NORMAL
OVALOCYTES BLD QL SMEAR: ABNORMAL
PARAINFLUENZA: NOT DETECTED
PARAINFLUENZA: NOT DETECTED
PATH REV BLD -IMP: NORMAL
PCO2 BLDA: 31.3 MMHG (ref 35–45)
PCO2 BLDA: 33.3 MMHG (ref 35–45)
PCO2 BLDA: 34 MMHG (ref 35–45)
PCO2 BLDA: 34.5 MMHG (ref 35–45)
PCO2 BLDA: 35.5 MMHG (ref 35–45)
PCO2 BLDA: 35.7 MMHG (ref 35–45)
PCO2 BLDA: 35.9 MMHG (ref 35–45)
PCO2 BLDA: 36 MMHG (ref 35–45)
PCO2 BLDA: 36.2 MMHG (ref 35–45)
PCO2 BLDA: 36.8 MMHG (ref 35–45)
PCO2 BLDA: 37.2 MMHG (ref 35–45)
PCO2 BLDA: 37.2 MMHG (ref 35–45)
PCO2 BLDA: 37.5 MMHG (ref 35–45)
PCO2 BLDA: 37.6 MMHG (ref 35–45)
PCO2 BLDA: 37.7 MMHG (ref 35–45)
PCO2 BLDA: 37.8 MMHG (ref 35–45)
PCO2 BLDA: 38.2 MMHG (ref 35–45)
PCO2 BLDA: 40.4 MMHG (ref 35–45)
PCO2 BLDA: 40.9 MMHG (ref 35–45)
PCO2 BLDA: 41.3 MMHG (ref 35–45)
PCO2 BLDA: 41.4 MMHG (ref 35–45)
PCO2 BLDA: 42.2 MMHG (ref 35–45)
PCO2 BLDA: 42.2 MMHG (ref 35–45)
PCO2 BLDA: 42.5 MMHG (ref 35–45)
PCO2 BLDA: 42.8 MMHG (ref 35–45)
PCO2 BLDA: 43 MMHG (ref 35–45)
PCO2 BLDA: 43 MMHG (ref 35–45)
PCO2 BLDA: 43.1 MMHG (ref 35–45)
PCO2 BLDA: 43.3 MMHG (ref 35–45)
PCO2 BLDA: 43.4 MMHG (ref 35–45)
PCO2 BLDA: 43.7 MMHG (ref 35–45)
PCO2 BLDA: 44.2 MMHG (ref 35–45)
PCO2 BLDA: 44.6 MMHG (ref 35–45)
PCO2 BLDA: 45.3 MMHG (ref 35–45)
PCO2 BLDA: 46.8 MMHG (ref 35–45)
PCO2 BLDA: 47.1 MMHG (ref 35–45)
PCO2 BLDA: 47.1 MMHG (ref 35–45)
PCO2 BLDA: 47.2 MMHG (ref 35–45)
PCO2 BLDA: 47.7 MMHG (ref 35–45)
PCO2 BLDA: 48 MMHG (ref 35–45)
PCO2 BLDA: 48.5 MMHG (ref 35–45)
PCO2 BLDA: 48.5 MMHG (ref 35–45)
PCO2 BLDA: 48.9 MMHG (ref 35–45)
PCO2 BLDA: 49.6 MMHG (ref 35–45)
PCO2 BLDA: 50.2 MMHG (ref 35–45)
PCO2 BLDA: 52.7 MMHG (ref 35–45)
PCO2 BLDA: 54.3 MMHG (ref 35–45)
PCO2 BLDA: 55 MMHG (ref 35–45)
PCO2 BLDA: 55.3 MMHG (ref 35–45)
PCO2 BLDA: 55.5 MMHG (ref 35–45)
PCO2 BLDA: 55.5 MMHG (ref 35–45)
PCO2 BLDA: 56.5 MMHG (ref 35–45)
PCO2 BLDA: 58 MMHG (ref 35–45)
PCO2 BLDA: 61.6 MMHG (ref 35–45)
PCO2 BLDA: 61.7 MMHG (ref 35–45)
PCO2 BLDA: 61.7 MMHG (ref 35–45)
PCO2 BLDA: 72.3 MMHG (ref 35–45)
PCO2 BLDA: 74.8 MMHG (ref 35–45)
PCO2 BLDA: 80 MMHG (ref 35–45)
PEEP: 10
PEEP: 5
PEEP: 7
PEEP: 8
PEF LLN: 3.69
PEF LLN: 3.7
PEF LLN: 3.76
PEF PRE REF: 51.2 %
PEF PRE REF: 61.7 %
PEF PRE REF: 67.7 %
PEF PRE REF: 68.2 %
PEF PRE REF: 72.6 %
PEF PRE REF: 76.8 %
PEF PRE REF: 81.8 %
PEF PRE REF: 83.8 %
PEF REF: 5.88
PEF REF: 5.9
PEF REF: 5.95
PH SMN: 7.12 [PH] (ref 7.35–7.45)
PH SMN: 7.13 [PH] (ref 7.35–7.45)
PH SMN: 7.13 [PH] (ref 7.35–7.45)
PH SMN: 7.14 [PH] (ref 7.35–7.45)
PH SMN: 7.23 [PH] (ref 7.35–7.45)
PH SMN: 7.26 [PH] (ref 7.35–7.45)
PH SMN: 7.26 [PH] (ref 7.35–7.45)
PH SMN: 7.31 [PH] (ref 7.35–7.45)
PH SMN: 7.32 [PH] (ref 7.35–7.45)
PH SMN: 7.33 [PH] (ref 7.35–7.45)
PH SMN: 7.34 [PH] (ref 7.35–7.45)
PH SMN: 7.35 [PH] (ref 7.35–7.45)
PH SMN: 7.37 [PH] (ref 7.35–7.45)
PH SMN: 7.37 [PH] (ref 7.35–7.45)
PH SMN: 7.38 [PH] (ref 7.35–7.45)
PH SMN: 7.38 [PH] (ref 7.35–7.45)
PH SMN: 7.39 [PH] (ref 7.35–7.45)
PH SMN: 7.4 [PH] (ref 7.35–7.45)
PH SMN: 7.41 [PH] (ref 7.35–7.45)
PH SMN: 7.42 [PH] (ref 7.35–7.45)
PH SMN: 7.43 [PH] (ref 7.35–7.45)
PH SMN: 7.43 [PH] (ref 7.35–7.45)
PH SMN: 7.44 [PH] (ref 7.35–7.45)
PH SMN: 7.44 [PH] (ref 7.35–7.45)
PH SMN: 7.45 [PH] (ref 7.35–7.45)
PH SMN: 7.46 [PH] (ref 7.35–7.45)
PH SMN: 7.47 [PH] (ref 7.35–7.45)
PH SMN: 7.48 [PH] (ref 7.35–7.45)
PH SMN: 7.49 [PH] (ref 7.35–7.45)
PH SMN: 7.5 [PH] (ref 7.35–7.45)
PH SMN: 7.52 [PH] (ref 7.35–7.45)
PH SMN: 7.53 [PH] (ref 7.35–7.45)
PH SMN: 7.55 [PH] (ref 7.35–7.45)
PH SMN: 7.55 [PH] (ref 7.35–7.45)
PH UR STRIP: 5 [PH] (ref 5–8)
PHOSPHATE SERPL-MCNC: 2.1 MG/DL (ref 2.7–4.5)
PHOSPHATE SERPL-MCNC: 2.2 MG/DL (ref 2.7–4.5)
PHOSPHATE SERPL-MCNC: 2.3 MG/DL (ref 2.7–4.5)
PHOSPHATE SERPL-MCNC: 2.4 MG/DL (ref 2.7–4.5)
PHOSPHATE SERPL-MCNC: 2.5 MG/DL (ref 2.7–4.5)
PHOSPHATE SERPL-MCNC: 2.6 MG/DL (ref 2.7–4.5)
PHOSPHATE SERPL-MCNC: 2.7 MG/DL (ref 2.7–4.5)
PHOSPHATE SERPL-MCNC: 2.8 MG/DL (ref 2.7–4.5)
PHOSPHATE SERPL-MCNC: 2.8 MG/DL (ref 2.7–4.5)
PHOSPHATE SERPL-MCNC: 2.9 MG/DL (ref 2.7–4.5)
PHOSPHATE SERPL-MCNC: 3 MG/DL (ref 2.7–4.5)
PHOSPHATE SERPL-MCNC: 3 MG/DL (ref 2.7–4.5)
PHOSPHATE SERPL-MCNC: 3.1 MG/DL (ref 2.7–4.5)
PHOSPHATE SERPL-MCNC: 3.2 MG/DL (ref 2.7–4.5)
PHOSPHATE SERPL-MCNC: 3.2 MG/DL (ref 2.7–4.5)
PHOSPHATE SERPL-MCNC: 3.3 MG/DL (ref 2.7–4.5)
PHOSPHATE SERPL-MCNC: 3.4 MG/DL (ref 2.7–4.5)
PHOSPHATE SERPL-MCNC: 3.5 MG/DL (ref 2.7–4.5)
PHOSPHATE SERPL-MCNC: 3.6 MG/DL (ref 2.7–4.5)
PHOSPHATE SERPL-MCNC: 3.7 MG/DL (ref 2.7–4.5)
PHOSPHATE SERPL-MCNC: 3.7 MG/DL (ref 2.7–4.5)
PHOSPHATE SERPL-MCNC: 3.8 MG/DL (ref 2.7–4.5)
PHOSPHATE SERPL-MCNC: 3.9 MG/DL (ref 2.7–4.5)
PHOSPHATE SERPL-MCNC: 3.9 MG/DL (ref 2.7–4.5)
PHOSPHATE SERPL-MCNC: 4 MG/DL (ref 2.7–4.5)
PHOSPHATE SERPL-MCNC: 4.1 MG/DL (ref 2.7–4.5)
PHOSPHATE SERPL-MCNC: 4.3 MG/DL (ref 2.7–4.5)
PHOSPHATE SERPL-MCNC: 4.5 MG/DL (ref 2.7–4.5)
PHYSICIAN COMMENT: ABNORMAL
PHYSICIAN COMMENT: ABNORMAL
PIP: 27
PIP: 32
PIP: 33
PIP: 34
PIP: 36
PIP: 37
PIP: 39
PIP: 40
PIP: 42
PIP: 43
PIP: 48
PISA TR MAX VEL: 3.3 M/S
PLATELET # BLD AUTO: 108 K/UL (ref 150–350)
PLATELET # BLD AUTO: 112 K/UL (ref 150–350)
PLATELET # BLD AUTO: 122 K/UL (ref 150–350)
PLATELET # BLD AUTO: 133 K/UL (ref 150–350)
PLATELET # BLD AUTO: 144 K/UL (ref 150–350)
PLATELET # BLD AUTO: 145 K/UL (ref 150–350)
PLATELET # BLD AUTO: 145 K/UL (ref 150–350)
PLATELET # BLD AUTO: 146 K/UL (ref 150–350)
PLATELET # BLD AUTO: 148 K/UL (ref 150–350)
PLATELET # BLD AUTO: 150 K/UL (ref 150–350)
PLATELET # BLD AUTO: 151 K/UL (ref 150–350)
PLATELET # BLD AUTO: 151 K/UL (ref 150–350)
PLATELET # BLD AUTO: 152 K/UL (ref 150–350)
PLATELET # BLD AUTO: 153 K/UL (ref 150–350)
PLATELET # BLD AUTO: 153 K/UL (ref 150–350)
PLATELET # BLD AUTO: 154 K/UL (ref 150–350)
PLATELET # BLD AUTO: 155 K/UL (ref 150–350)
PLATELET # BLD AUTO: 157 K/UL (ref 150–350)
PLATELET # BLD AUTO: 157 K/UL (ref 150–350)
PLATELET # BLD AUTO: 160 K/UL (ref 150–350)
PLATELET # BLD AUTO: 160 K/UL (ref 150–350)
PLATELET # BLD AUTO: 163 K/UL (ref 150–350)
PLATELET # BLD AUTO: 164 K/UL (ref 150–350)
PLATELET # BLD AUTO: 166 K/UL (ref 150–350)
PLATELET # BLD AUTO: 166 K/UL (ref 150–350)
PLATELET # BLD AUTO: 168 K/UL (ref 150–350)
PLATELET # BLD AUTO: 177 K/UL (ref 150–350)
PLATELET # BLD AUTO: 177 K/UL (ref 150–350)
PLATELET # BLD AUTO: 178 K/UL (ref 150–350)
PLATELET # BLD AUTO: 179 K/UL (ref 150–350)
PLATELET # BLD AUTO: 181 K/UL (ref 150–350)
PLATELET # BLD AUTO: 190 K/UL (ref 150–350)
PLATELET # BLD AUTO: 191 K/UL (ref 150–350)
PLATELET # BLD AUTO: 194 K/UL (ref 150–350)
PLATELET # BLD AUTO: 200 K/UL (ref 150–350)
PLATELET # BLD AUTO: 208 K/UL (ref 150–350)
PLATELET # BLD AUTO: 214 K/UL (ref 150–350)
PLATELET # BLD AUTO: 219 K/UL (ref 150–350)
PLATELET # BLD AUTO: 230 K/UL (ref 150–350)
PLATELET # BLD AUTO: 231 K/UL (ref 150–350)
PLATELET # BLD AUTO: 247 K/UL (ref 150–350)
PLATELET # BLD AUTO: 252 K/UL (ref 150–350)
PLATELET # BLD AUTO: 253 K/UL (ref 150–350)
PLATELET # BLD AUTO: 270 K/UL (ref 150–350)
PLATELET # BLD AUTO: 278 K/UL (ref 150–350)
PLATELET # BLD AUTO: 284 K/UL (ref 150–350)
PLATELET # BLD AUTO: 285 K/UL (ref 150–350)
PLATELET # BLD AUTO: 290 K/UL (ref 150–350)
PLATELET # BLD AUTO: 296 K/UL (ref 150–350)
PLATELET # BLD AUTO: 309 K/UL (ref 150–350)
PLATELET # BLD AUTO: 342 K/UL (ref 150–350)
PLATELET # BLD AUTO: 453 K/UL (ref 150–350)
PLATELET # BLD AUTO: 87 K/UL (ref 150–350)
PLATELET # BLD AUTO: 94 K/UL (ref 150–350)
PLATELET # BLD AUTO: ABNORMAL K/UL (ref 150–350)
PLATELET BLD QL SMEAR: ABNORMAL
PMV BLD AUTO: 10 FL (ref 9.2–12.9)
PMV BLD AUTO: 10 FL (ref 9.2–12.9)
PMV BLD AUTO: 10.2 FL (ref 9.2–12.9)
PMV BLD AUTO: 10.3 FL (ref 9.2–12.9)
PMV BLD AUTO: 10.4 FL (ref 9.2–12.9)
PMV BLD AUTO: 10.6 FL (ref 9.2–12.9)
PMV BLD AUTO: 10.7 FL (ref 9.2–12.9)
PMV BLD AUTO: 10.8 FL (ref 9.2–12.9)
PMV BLD AUTO: 10.9 FL (ref 9.2–12.9)
PMV BLD AUTO: 11 FL (ref 9.2–12.9)
PMV BLD AUTO: 11.3 FL (ref 9.2–12.9)
PMV BLD AUTO: 11.4 FL (ref 9.2–12.9)
PMV BLD AUTO: 11.4 FL (ref 9.2–12.9)
PMV BLD AUTO: 11.5 FL (ref 9.2–12.9)
PMV BLD AUTO: 11.6 FL (ref 9.2–12.9)
PMV BLD AUTO: 11.6 FL (ref 9.2–12.9)
PMV BLD AUTO: 11.7 FL (ref 9.2–12.9)
PMV BLD AUTO: 11.8 FL (ref 9.2–12.9)
PMV BLD AUTO: 11.9 FL (ref 9.2–12.9)
PMV BLD AUTO: 12 FL (ref 9.2–12.9)
PMV BLD AUTO: 12 FL (ref 9.2–12.9)
PMV BLD AUTO: 12.3 FL (ref 9.2–12.9)
PMV BLD AUTO: 12.4 FL (ref 9.2–12.9)
PMV BLD AUTO: 12.5 FL (ref 9.2–12.9)
PMV BLD AUTO: 12.8 FL (ref 9.2–12.9)
PMV BLD AUTO: 8.4 FL (ref 9.2–12.9)
PMV BLD AUTO: 9.3 FL (ref 9.2–12.9)
PMV BLD AUTO: 9.5 FL (ref 9.2–12.9)
PMV BLD AUTO: 9.6 FL (ref 9.2–12.9)
PMV BLD AUTO: 9.6 FL (ref 9.2–12.9)
PMV BLD AUTO: 9.7 FL (ref 9.2–12.9)
PMV BLD AUTO: 9.8 FL (ref 9.2–12.9)
PMV BLD AUTO: 9.9 FL (ref 9.2–12.9)
PMV BLD AUTO: ABNORMAL FL (ref 9.2–12.9)
PO2 BLDA: 122 MMHG (ref 80–100)
PO2 BLDA: 127 MMHG (ref 80–100)
PO2 BLDA: 176 MMHG (ref 80–100)
PO2 BLDA: 187 MMHG (ref 80–100)
PO2 BLDA: 20 MMHG (ref 40–60)
PO2 BLDA: 203 MMHG (ref 80–100)
PO2 BLDA: 210 MMHG (ref 80–100)
PO2 BLDA: 256 MMHG (ref 80–100)
PO2 BLDA: 285 MMHG (ref 80–100)
PO2 BLDA: 35 MMHG (ref 40–60)
PO2 BLDA: 37 MMHG (ref 40–60)
PO2 BLDA: 41 MMHG (ref 40–60)
PO2 BLDA: 49 MMHG (ref 80–100)
PO2 BLDA: 53 MMHG (ref 80–100)
PO2 BLDA: 54 MMHG (ref 80–100)
PO2 BLDA: 54 MMHG (ref 80–100)
PO2 BLDA: 56 MMHG (ref 40–60)
PO2 BLDA: 56 MMHG (ref 80–100)
PO2 BLDA: 57 MMHG (ref 80–100)
PO2 BLDA: 58 MMHG (ref 80–100)
PO2 BLDA: 58 MMHG (ref 80–100)
PO2 BLDA: 59 MMHG (ref 80–100)
PO2 BLDA: 60 MMHG (ref 80–100)
PO2 BLDA: 61 MMHG (ref 80–100)
PO2 BLDA: 62 MMHG (ref 80–100)
PO2 BLDA: 64 MMHG (ref 80–100)
PO2 BLDA: 66 MMHG (ref 80–100)
PO2 BLDA: 67 MMHG (ref 80–100)
PO2 BLDA: 67 MMHG (ref 80–100)
PO2 BLDA: 69 MMHG (ref 80–100)
PO2 BLDA: 70 MMHG (ref 80–100)
PO2 BLDA: 70 MMHG (ref 80–100)
PO2 BLDA: 71 MMHG (ref 80–100)
PO2 BLDA: 72 MMHG (ref 80–100)
PO2 BLDA: 75 MMHG (ref 80–100)
PO2 BLDA: 76 MMHG (ref 80–100)
PO2 BLDA: 76 MMHG (ref 80–100)
PO2 BLDA: 77 MMHG (ref 80–100)
PO2 BLDA: 77 MMHG (ref 80–100)
PO2 BLDA: 79 MMHG (ref 80–100)
PO2 BLDA: 80 MMHG (ref 80–100)
PO2 BLDA: 80 MMHG (ref 80–100)
PO2 BLDA: 82 MMHG (ref 80–100)
PO2 BLDA: 83 MMHG (ref 80–100)
PO2 BLDA: 86 MMHG (ref 80–100)
PO2 BLDA: 90 MMHG (ref 80–100)
PO2 BLDA: 94 MMHG (ref 80–100)
PO2 BLDA: 95 MMHG (ref 80–100)
POC BE: -1 MMOL/L
POC BE: -10 MMOL/L
POC BE: -10 MMOL/L
POC BE: -14 MMOL/L
POC BE: -2 MMOL/L
POC BE: -2 MMOL/L
POC BE: -4 MMOL/L
POC BE: -5 MMOL/L
POC BE: -9 MMOL/L
POC BE: 0 MMOL/L
POC BE: 0 MMOL/L
POC BE: 1 MMOL/L
POC BE: 1 MMOL/L
POC BE: 10 MMOL/L
POC BE: 11 MMOL/L
POC BE: 12 MMOL/L
POC BE: 12 MMOL/L
POC BE: 2 MMOL/L
POC BE: 3 MMOL/L
POC BE: 3 MMOL/L
POC BE: 4 MMOL/L
POC BE: 5 MMOL/L
POC BE: 6 MMOL/L
POC BE: 7 MMOL/L
POC BE: 8 MMOL/L
POC BE: 9 MMOL/L
POC IONIZED CALCIUM: 1.02 MMOL/L (ref 1.06–1.42)
POC IONIZED CALCIUM: 1.04 MMOL/L (ref 1.06–1.42)
POC IONIZED CALCIUM: 1.06 MMOL/L (ref 1.06–1.42)
POC IONIZED CALCIUM: 1.09 MMOL/L (ref 1.06–1.42)
POC IONIZED CALCIUM: 1.1 MMOL/L (ref 1.06–1.42)
POC IONIZED CALCIUM: 1.1 MMOL/L (ref 1.06–1.42)
POC IONIZED CALCIUM: 1.16 MMOL/L (ref 1.06–1.42)
POC IONIZED CALCIUM: 1.17 MMOL/L (ref 1.06–1.42)
POC IONIZED CALCIUM: 1.17 MMOL/L (ref 1.06–1.42)
POC IONIZED CALCIUM: 1.2 MMOL/L (ref 1.06–1.42)
POC MOLECULAR INFLUENZA A AGN: NEGATIVE
POC MOLECULAR INFLUENZA B AGN: NEGATIVE
POC SATURATED O2: 100 % (ref 95–100)
POC SATURATED O2: 32 % (ref 95–100)
POC SATURATED O2: 68 % (ref 95–100)
POC SATURATED O2: 72 % (ref 95–100)
POC SATURATED O2: 74 % (ref 95–100)
POC SATURATED O2: 74 % (ref 95–100)
POC SATURATED O2: 77 % (ref 95–100)
POC SATURATED O2: 78 % (ref 95–100)
POC SATURATED O2: 87 % (ref 95–100)
POC SATURATED O2: 88 % (ref 95–100)
POC SATURATED O2: 89 % (ref 95–100)
POC SATURATED O2: 90 % (ref 95–100)
POC SATURATED O2: 91 % (ref 95–100)
POC SATURATED O2: 92 % (ref 95–100)
POC SATURATED O2: 93 % (ref 95–100)
POC SATURATED O2: 94 % (ref 95–100)
POC SATURATED O2: 95 % (ref 95–100)
POC SATURATED O2: 96 % (ref 95–100)
POC SATURATED O2: 97 % (ref 95–100)
POC SATURATED O2: 98 % (ref 95–100)
POC SATURATED O2: 99 % (ref 95–100)
POC SATURATED O2: 99 % (ref 95–100)
POC TCO2 (MEASURED): 21 MMOL/L (ref 23–29)
POC TCO2: 17 MMOL/L (ref 23–27)
POC TCO2: 19 MMOL/L (ref 23–27)
POC TCO2: 20 MMOL/L (ref 23–27)
POC TCO2: 21 MMOL/L (ref 24–29)
POC TCO2: 22 MMOL/L (ref 24–29)
POC TCO2: 23 MMOL/L (ref 23–27)
POC TCO2: 24 MMOL/L (ref 23–27)
POC TCO2: 24 MMOL/L (ref 24–29)
POC TCO2: 25 MMOL/L (ref 23–27)
POC TCO2: 25 MMOL/L (ref 24–29)
POC TCO2: 26 MMOL/L (ref 23–27)
POC TCO2: 27 MMOL/L (ref 23–27)
POC TCO2: 28 MMOL/L (ref 23–27)
POC TCO2: 28 MMOL/L (ref 23–27)
POC TCO2: 29 MMOL/L (ref 23–27)
POC TCO2: 29 MMOL/L (ref 24–29)
POC TCO2: 30 MMOL/L (ref 23–27)
POC TCO2: 31 MMOL/L (ref 23–27)
POC TCO2: 32 MMOL/L (ref 23–27)
POC TCO2: 33 MMOL/L (ref 23–27)
POC TCO2: 34 MMOL/L (ref 23–27)
POC TCO2: 35 MMOL/L (ref 23–27)
POC TCO2: 36 MMOL/L (ref 23–27)
POC TCO2: 38 MMOL/L (ref 23–27)
POC TCO2: 38 MMOL/L (ref 23–27)
POC TCO2: 39 MMOL/L (ref 23–27)
POCT GLUCOSE: 103 MG/DL (ref 70–110)
POCT GLUCOSE: 105 MG/DL (ref 70–110)
POCT GLUCOSE: 106 MG/DL (ref 70–110)
POCT GLUCOSE: 107 MG/DL (ref 70–110)
POCT GLUCOSE: 107 MG/DL (ref 70–110)
POCT GLUCOSE: 108 MG/DL (ref 70–110)
POCT GLUCOSE: 109 MG/DL (ref 70–110)
POCT GLUCOSE: 109 MG/DL (ref 70–110)
POCT GLUCOSE: 111 MG/DL (ref 70–110)
POCT GLUCOSE: 112 MG/DL (ref 70–110)
POCT GLUCOSE: 112 MG/DL (ref 70–110)
POCT GLUCOSE: 114 MG/DL (ref 70–110)
POCT GLUCOSE: 114 MG/DL (ref 70–110)
POCT GLUCOSE: 115 MG/DL (ref 70–110)
POCT GLUCOSE: 115 MG/DL (ref 70–110)
POCT GLUCOSE: 116 MG/DL (ref 70–110)
POCT GLUCOSE: 121 MG/DL (ref 70–110)
POCT GLUCOSE: 122 MG/DL (ref 70–110)
POCT GLUCOSE: 122 MG/DL (ref 70–110)
POCT GLUCOSE: 123 MG/DL (ref 70–110)
POCT GLUCOSE: 123 MG/DL (ref 70–110)
POCT GLUCOSE: 124 MG/DL (ref 70–110)
POCT GLUCOSE: 125 MG/DL (ref 70–110)
POCT GLUCOSE: 125 MG/DL (ref 70–110)
POCT GLUCOSE: 126 MG/DL (ref 70–110)
POCT GLUCOSE: 128 MG/DL (ref 70–110)
POCT GLUCOSE: 128 MG/DL (ref 70–110)
POCT GLUCOSE: 129 MG/DL (ref 70–110)
POCT GLUCOSE: 130 MG/DL (ref 70–110)
POCT GLUCOSE: 131 MG/DL (ref 70–110)
POCT GLUCOSE: 133 MG/DL (ref 70–110)
POCT GLUCOSE: 134 MG/DL (ref 70–110)
POCT GLUCOSE: 135 MG/DL (ref 70–110)
POCT GLUCOSE: 136 MG/DL (ref 70–110)
POCT GLUCOSE: 138 MG/DL (ref 70–110)
POCT GLUCOSE: 140 MG/DL (ref 70–110)
POCT GLUCOSE: 141 MG/DL (ref 70–110)
POCT GLUCOSE: 142 MG/DL (ref 70–110)
POCT GLUCOSE: 143 MG/DL (ref 70–110)
POCT GLUCOSE: 145 MG/DL (ref 70–110)
POCT GLUCOSE: 146 MG/DL (ref 70–110)
POCT GLUCOSE: 147 MG/DL (ref 70–110)
POCT GLUCOSE: 149 MG/DL (ref 70–110)
POCT GLUCOSE: 149 MG/DL (ref 70–110)
POCT GLUCOSE: 150 MG/DL (ref 70–110)
POCT GLUCOSE: 151 MG/DL (ref 70–110)
POCT GLUCOSE: 152 MG/DL (ref 70–110)
POCT GLUCOSE: 152 MG/DL (ref 70–110)
POCT GLUCOSE: 153 MG/DL (ref 70–110)
POCT GLUCOSE: 153 MG/DL (ref 70–110)
POCT GLUCOSE: 154 MG/DL (ref 70–110)
POCT GLUCOSE: 155 MG/DL (ref 70–110)
POCT GLUCOSE: 155 MG/DL (ref 70–110)
POCT GLUCOSE: 156 MG/DL (ref 70–110)
POCT GLUCOSE: 156 MG/DL (ref 70–110)
POCT GLUCOSE: 157 MG/DL (ref 70–110)
POCT GLUCOSE: 158 MG/DL (ref 70–110)
POCT GLUCOSE: 158 MG/DL (ref 70–110)
POCT GLUCOSE: 159 MG/DL (ref 70–110)
POCT GLUCOSE: 160 MG/DL (ref 70–110)
POCT GLUCOSE: 161 MG/DL (ref 70–110)
POCT GLUCOSE: 162 MG/DL (ref 70–110)
POCT GLUCOSE: 162 MG/DL (ref 70–110)
POCT GLUCOSE: 163 MG/DL (ref 70–110)
POCT GLUCOSE: 164 MG/DL (ref 70–110)
POCT GLUCOSE: 164 MG/DL (ref 70–110)
POCT GLUCOSE: 165 MG/DL (ref 70–110)
POCT GLUCOSE: 165 MG/DL (ref 70–110)
POCT GLUCOSE: 166 MG/DL (ref 70–110)
POCT GLUCOSE: 167 MG/DL (ref 70–110)
POCT GLUCOSE: 167 MG/DL (ref 70–110)
POCT GLUCOSE: 168 MG/DL (ref 70–110)
POCT GLUCOSE: 168 MG/DL (ref 70–110)
POCT GLUCOSE: 169 MG/DL (ref 70–110)
POCT GLUCOSE: 169 MG/DL (ref 70–110)
POCT GLUCOSE: 171 MG/DL (ref 70–110)
POCT GLUCOSE: 171 MG/DL (ref 70–110)
POCT GLUCOSE: 172 MG/DL (ref 70–110)
POCT GLUCOSE: 173 MG/DL (ref 70–110)
POCT GLUCOSE: 173 MG/DL (ref 70–110)
POCT GLUCOSE: 175 MG/DL (ref 70–110)
POCT GLUCOSE: 175 MG/DL (ref 70–110)
POCT GLUCOSE: 176 MG/DL (ref 70–110)
POCT GLUCOSE: 176 MG/DL (ref 70–110)
POCT GLUCOSE: 177 MG/DL (ref 70–110)
POCT GLUCOSE: 178 MG/DL (ref 70–110)
POCT GLUCOSE: 179 MG/DL (ref 70–110)
POCT GLUCOSE: 180 MG/DL (ref 70–110)
POCT GLUCOSE: 182 MG/DL (ref 70–110)
POCT GLUCOSE: 183 MG/DL (ref 70–110)
POCT GLUCOSE: 184 MG/DL (ref 70–110)
POCT GLUCOSE: 185 MG/DL (ref 70–110)
POCT GLUCOSE: 186 MG/DL (ref 70–110)
POCT GLUCOSE: 186 MG/DL (ref 70–110)
POCT GLUCOSE: 187 MG/DL (ref 70–110)
POCT GLUCOSE: 187 MG/DL (ref 70–110)
POCT GLUCOSE: 188 MG/DL (ref 70–110)
POCT GLUCOSE: 190 MG/DL (ref 70–110)
POCT GLUCOSE: 191 MG/DL (ref 70–110)
POCT GLUCOSE: 192 MG/DL (ref 70–110)
POCT GLUCOSE: 193 MG/DL (ref 70–110)
POCT GLUCOSE: 194 MG/DL (ref 70–110)
POCT GLUCOSE: 195 MG/DL (ref 70–110)
POCT GLUCOSE: 196 MG/DL (ref 70–110)
POCT GLUCOSE: 197 MG/DL (ref 70–110)
POCT GLUCOSE: 198 MG/DL (ref 70–110)
POCT GLUCOSE: 198 MG/DL (ref 70–110)
POCT GLUCOSE: 199 MG/DL (ref 70–110)
POCT GLUCOSE: 199 MG/DL (ref 70–110)
POCT GLUCOSE: 200 MG/DL (ref 70–110)
POCT GLUCOSE: 201 MG/DL (ref 70–110)
POCT GLUCOSE: 204 MG/DL (ref 70–110)
POCT GLUCOSE: 205 MG/DL (ref 70–110)
POCT GLUCOSE: 206 MG/DL (ref 70–110)
POCT GLUCOSE: 206 MG/DL (ref 70–110)
POCT GLUCOSE: 207 MG/DL (ref 70–110)
POCT GLUCOSE: 207 MG/DL (ref 70–110)
POCT GLUCOSE: 208 MG/DL (ref 70–110)
POCT GLUCOSE: 209 MG/DL (ref 70–110)
POCT GLUCOSE: 211 MG/DL (ref 70–110)
POCT GLUCOSE: 212 MG/DL (ref 70–110)
POCT GLUCOSE: 212 MG/DL (ref 70–110)
POCT GLUCOSE: 214 MG/DL (ref 70–110)
POCT GLUCOSE: 214 MG/DL (ref 70–110)
POCT GLUCOSE: 215 MG/DL (ref 70–110)
POCT GLUCOSE: 215 MG/DL (ref 70–110)
POCT GLUCOSE: 216 MG/DL (ref 70–110)
POCT GLUCOSE: 216 MG/DL (ref 70–110)
POCT GLUCOSE: 217 MG/DL (ref 70–110)
POCT GLUCOSE: 218 MG/DL (ref 70–110)
POCT GLUCOSE: 218 MG/DL (ref 70–110)
POCT GLUCOSE: 220 MG/DL (ref 70–110)
POCT GLUCOSE: 221 MG/DL (ref 70–110)
POCT GLUCOSE: 221 MG/DL (ref 70–110)
POCT GLUCOSE: 222 MG/DL (ref 70–110)
POCT GLUCOSE: 223 MG/DL (ref 70–110)
POCT GLUCOSE: 224 MG/DL (ref 70–110)
POCT GLUCOSE: 225 MG/DL (ref 70–110)
POCT GLUCOSE: 227 MG/DL (ref 70–110)
POCT GLUCOSE: 228 MG/DL (ref 70–110)
POCT GLUCOSE: 228 MG/DL (ref 70–110)
POCT GLUCOSE: 229 MG/DL (ref 70–110)
POCT GLUCOSE: 231 MG/DL (ref 70–110)
POCT GLUCOSE: 233 MG/DL (ref 70–110)
POCT GLUCOSE: 235 MG/DL (ref 70–110)
POCT GLUCOSE: 238 MG/DL (ref 70–110)
POCT GLUCOSE: 239 MG/DL (ref 70–110)
POCT GLUCOSE: 241 MG/DL (ref 70–110)
POCT GLUCOSE: 241 MG/DL (ref 70–110)
POCT GLUCOSE: 242 MG/DL (ref 70–110)
POCT GLUCOSE: 243 MG/DL (ref 70–110)
POCT GLUCOSE: 244 MG/DL (ref 70–110)
POCT GLUCOSE: 244 MG/DL (ref 70–110)
POCT GLUCOSE: 247 MG/DL (ref 70–110)
POCT GLUCOSE: 247 MG/DL (ref 70–110)
POCT GLUCOSE: 248 MG/DL (ref 70–110)
POCT GLUCOSE: 248 MG/DL (ref 70–110)
POCT GLUCOSE: 250 MG/DL (ref 70–110)
POCT GLUCOSE: 253 MG/DL (ref 70–110)
POCT GLUCOSE: 255 MG/DL (ref 70–110)
POCT GLUCOSE: 259 MG/DL (ref 70–110)
POCT GLUCOSE: 262 MG/DL (ref 70–110)
POCT GLUCOSE: 263 MG/DL (ref 70–110)
POCT GLUCOSE: 264 MG/DL (ref 70–110)
POCT GLUCOSE: 266 MG/DL (ref 70–110)
POCT GLUCOSE: 269 MG/DL (ref 70–110)
POCT GLUCOSE: 280 MG/DL (ref 70–110)
POCT GLUCOSE: 281 MG/DL (ref 70–110)
POCT GLUCOSE: 284 MG/DL (ref 70–110)
POCT GLUCOSE: 288 MG/DL (ref 70–110)
POCT GLUCOSE: 303 MG/DL (ref 70–110)
POCT GLUCOSE: 304 MG/DL (ref 70–110)
POCT GLUCOSE: 306 MG/DL (ref 70–110)
POCT GLUCOSE: 309 MG/DL (ref 70–110)
POCT GLUCOSE: 317 MG/DL (ref 70–110)
POCT GLUCOSE: 333 MG/DL (ref 70–110)
POCT GLUCOSE: 358 MG/DL (ref 70–110)
POCT GLUCOSE: 359 MG/DL (ref 70–110)
POCT GLUCOSE: 371 MG/DL (ref 70–110)
POCT GLUCOSE: 377 MG/DL (ref 70–110)
POCT GLUCOSE: 408 MG/DL (ref 70–110)
POCT GLUCOSE: 421 MG/DL (ref 70–110)
POCT GLUCOSE: 442 MG/DL (ref 70–110)
POCT GLUCOSE: 447 MG/DL (ref 70–110)
POCT GLUCOSE: 85 MG/DL (ref 70–110)
POCT GLUCOSE: 90 MG/DL (ref 70–110)
POCT GLUCOSE: 90 MG/DL (ref 70–110)
POCT GLUCOSE: 93 MG/DL (ref 70–110)
POCT GLUCOSE: 95 MG/DL (ref 70–110)
POCT GLUCOSE: 95 MG/DL (ref 70–110)
POCT GLUCOSE: 99 MG/DL (ref 70–110)
POCT GLUCOSE: 99 MG/DL (ref 70–110)
POIKILOCYTOSIS BLD QL SMEAR: SLIGHT
POLYCHROMASIA BLD QL SMEAR: ABNORMAL
POTASSIUM BLD-SCNC: 2.7 MMOL/L (ref 3.5–5.1)
POTASSIUM BLD-SCNC: 3.1 MMOL/L (ref 3.5–5.1)
POTASSIUM BLD-SCNC: 3.1 MMOL/L (ref 3.5–5.1)
POTASSIUM BLD-SCNC: 3.3 MMOL/L (ref 3.5–5.1)
POTASSIUM BLD-SCNC: 3.3 MMOL/L (ref 3.5–5.1)
POTASSIUM BLD-SCNC: 3.4 MMOL/L (ref 3.5–5.1)
POTASSIUM BLD-SCNC: 3.9 MMOL/L (ref 3.5–5.1)
POTASSIUM BLD-SCNC: 3.9 MMOL/L (ref 3.5–5.1)
POTASSIUM BLD-SCNC: 4 MMOL/L (ref 3.5–5.1)
POTASSIUM BLD-SCNC: 5.6 MMOL/L (ref 3.5–5.1)
POTASSIUM SERPL-SCNC: 3 MMOL/L (ref 3.5–5.1)
POTASSIUM SERPL-SCNC: 3.1 MMOL/L (ref 3.5–5.1)
POTASSIUM SERPL-SCNC: 3.2 MMOL/L (ref 3.5–5.1)
POTASSIUM SERPL-SCNC: 3.2 MMOL/L (ref 3.5–5.1)
POTASSIUM SERPL-SCNC: 3.3 MMOL/L (ref 3.5–5.1)
POTASSIUM SERPL-SCNC: 3.3 MMOL/L (ref 3.5–5.1)
POTASSIUM SERPL-SCNC: 3.4 MMOL/L (ref 3.5–5.1)
POTASSIUM SERPL-SCNC: 3.4 MMOL/L (ref 3.5–5.1)
POTASSIUM SERPL-SCNC: 3.5 MMOL/L (ref 3.5–5.1)
POTASSIUM SERPL-SCNC: 3.6 MMOL/L (ref 3.5–5.1)
POTASSIUM SERPL-SCNC: 3.7 MMOL/L (ref 3.5–5.1)
POTASSIUM SERPL-SCNC: 3.8 MMOL/L (ref 3.5–5.1)
POTASSIUM SERPL-SCNC: 3.9 MMOL/L (ref 3.5–5.1)
POTASSIUM SERPL-SCNC: 4 MMOL/L (ref 3.5–5.1)
POTASSIUM SERPL-SCNC: 4.1 MMOL/L (ref 3.5–5.1)
POTASSIUM SERPL-SCNC: 4.2 MMOL/L (ref 3.5–5.1)
POTASSIUM SERPL-SCNC: 4.3 MMOL/L (ref 3.5–5.1)
POTASSIUM SERPL-SCNC: 4.4 MMOL/L (ref 3.5–5.1)
POTASSIUM SERPL-SCNC: 4.4 MMOL/L (ref 3.5–5.1)
POTASSIUM SERPL-SCNC: 4.5 MMOL/L (ref 3.5–5.1)
POTASSIUM SERPL-SCNC: 4.5 MMOL/L (ref 3.5–5.1)
POTASSIUM SERPL-SCNC: 4.6 MMOL/L (ref 3.5–5.1)
POTASSIUM SERPL-SCNC: 4.7 MMOL/L (ref 3.5–5.1)
POTASSIUM SERPL-SCNC: 4.8 MMOL/L (ref 3.5–5.1)
POTASSIUM SERPL-SCNC: 4.9 MMOL/L (ref 3.5–5.1)
POTASSIUM SERPL-SCNC: 5.1 MMOL/L (ref 3.5–5.1)
POTASSIUM SERPL-SCNC: 5.2 MMOL/L (ref 3.5–5.1)
POTASSIUM SERPL-SCNC: 5.7 MMOL/L (ref 3.5–5.1)
POTASSIUM SERPL-SCNC: 5.7 MMOL/L (ref 3.5–5.1)
PRE FEF 25 75: 1.61 L/S (ref 0.84–3.73)
PRE FEF 25 75: 1.76 L/S (ref 0.84–3.19)
PRE FEF 25 75: 1.86 L/S (ref 0.84–3.73)
PRE FEF 25 75: 1.97 L/S (ref 0.84–3.73)
PRE FEF 25 75: 2.28 L/S (ref 0.84–3.73)
PRE FEF 25 75: 2.56 L/S (ref 0.83–3.73)
PRE FEF 25 75: 2.6 L/S (ref 0.84–3.73)
PRE FEF 25 75: 3.07 L/S (ref 0.85–3.22)
PRE FET 100: 4.68 SEC
PRE FET 100: 5.29 SEC
PRE FET 100: 5.53 SEC
PRE FET 100: 5.53 SEC
PRE FET 100: 5.73 SEC
PRE FET 100: 5.89 SEC
PRE FET 100: 6.02 SEC
PRE FET 100: 6.32 SEC
PRE FEV05 REF: 48.2 %
PRE FEV05 REF: 54.8 %
PRE FEV05 REF: 56.7 %
PRE FEV05 REF: 58.6 %
PRE FEV05 REF: 59.3 %
PRE FEV05 REF: 60.1 %
PRE FEV05 REF: 65 %
PRE FEV05 REF: 66.4 %
PRE FEV1 FVC: 84.28 % (ref 67.05–91.01)
PRE FEV1 FVC: 86.75 % (ref 67.03–89.31)
PRE FEV1 FVC: 86.75 % (ref 67.04–89.31)
PRE FEV1 FVC: 88.63 % (ref 67.05–89.31)
PRE FEV1 FVC: 88.98 % (ref 67.05–89.31)
PRE FEV1 FVC: 89 % (ref 67.01–89.31)
PRE FEV1 FVC: 89.28 % (ref 67.18–91.01)
PRE FEV1 FVC: 91.07 % (ref 67.02–89.31)
PRE FEV1: 1.17 L (ref 1.66–2.94)
PRE FEV1: 1.33 L (ref 1.65–2.9)
PRE FEV1: 1.35 L (ref 1.65–2.9)
PRE FEV1: 1.44 L (ref 1.65–2.9)
PRE FEV1: 1.45 L (ref 1.64–2.9)
PRE FEV1: 1.48 L (ref 1.65–2.9)
PRE FEV1: 1.49 L (ref 1.65–2.9)
PRE FEV1: 1.5 L (ref 1.64–2.92)
PRE FEV5: 0.94 L (ref 1.09–2.8)
PRE FEV5: 1.07 L (ref 1.1–2.81)
PRE FEV5: 1.1 L (ref 1.09–2.8)
PRE FEV5: 1.14 L (ref 1.09–2.8)
PRE FEV5: 1.15 L (ref 1.09–2.8)
PRE FEV5: 1.17 L (ref 1.09–2.8)
PRE FEV5: 1.26 L (ref 1.09–2.8)
PRE FEV5: 1.29 L (ref 1.09–2.8)
PRE FVC: 1.31 L (ref 2.14–3.71)
PRE FVC: 1.5 L (ref 2.13–3.75)
PRE FVC: 1.52 L (ref 2.13–3.75)
PRE FVC: 1.58 L (ref 2.12–3.74)
PRE FVC: 1.63 L (ref 2.12–3.74)
PRE FVC: 1.7 L (ref 2.12–3.74)
PRE FVC: 1.72 L (ref 2.12–3.74)
PRE FVC: 1.77 L (ref 2.12–3.7)
PRE PEF: 3.01 L/S (ref 3.69–8.08)
PRE PEF: 3.64 L/S (ref 3.7–8.09)
PRE PEF: 3.99 L/S (ref 3.7–8.09)
PRE PEF: 4.02 L/S (ref 3.7–8.09)
PRE PEF: 4.28 L/S (ref 3.7–8.09)
PRE PEF: 4.58 L/S (ref 3.76–8.15)
PRE PEF: 4.82 L/S (ref 3.7–8.09)
PRE PEF: 4.94 L/S (ref 3.7–8.09)
PROCALCITONIN SERPL IA-MCNC: 2.11 NG/ML
PROCALCITONIN SERPL IA-MCNC: 3.17 NG/ML
PROMYELOCYTES NFR BLD MANUAL: 0.5 %
PROMYELOCYTES NFR BLD MANUAL: 2 %
PROT SERPL-MCNC: 4.5 G/DL (ref 6–8.4)
PROT SERPL-MCNC: 4.7 G/DL (ref 6–8.4)
PROT SERPL-MCNC: 4.9 G/DL (ref 6–8.4)
PROT SERPL-MCNC: 5 G/DL (ref 6–8.4)
PROT SERPL-MCNC: 5.1 G/DL (ref 6–8.4)
PROT SERPL-MCNC: 5.2 G/DL (ref 6–8.4)
PROT SERPL-MCNC: 5.3 G/DL (ref 6–8.4)
PROT SERPL-MCNC: 5.4 G/DL (ref 6–8.4)
PROT SERPL-MCNC: 5.6 G/DL (ref 6–8.4)
PROT SERPL-MCNC: 5.7 G/DL (ref 6–8.4)
PROT SERPL-MCNC: 5.8 G/DL (ref 6–8.4)
PROT SERPL-MCNC: 5.9 G/DL (ref 6–8.4)
PROT SERPL-MCNC: 6 G/DL (ref 6–8.4)
PROT SERPL-MCNC: 6.1 G/DL (ref 6–8.4)
PROT SERPL-MCNC: 6.2 G/DL (ref 6–8.4)
PROT SERPL-MCNC: 6.3 G/DL (ref 6–8.4)
PROT SERPL-MCNC: 6.4 G/DL (ref 6–8.4)
PROT SERPL-MCNC: 6.6 G/DL (ref 6–8.4)
PROT SERPL-MCNC: 6.7 G/DL (ref 6–8.4)
PROT SERPL-MCNC: 6.8 G/DL (ref 6–8.4)
PROT SERPL-MCNC: 7.4 G/DL (ref 6–8.4)
PROT SERPL-MCNC: 7.6 G/DL (ref 6–8.4)
PROT UR QL STRIP: ABNORMAL
PROT UR QL STRIP: NEGATIVE
PROTHROMBIN TIME: 10.3 SEC (ref 9–12.5)
PROTHROMBIN TIME: 10.4 SEC (ref 9–12.5)
PROTHROMBIN TIME: 10.9 SEC (ref 9–12.5)
PROTHROMBIN TIME: 12 SEC (ref 9–12.5)
PROTHROMBIN TIME: 9.7 SEC (ref 9–12.5)
RA MAJOR: 4.14 CM
RA MAJOR: 4.44 CM
RA WIDTH: 2.65 CM
RA WIDTH: 2.84 CM
RBC # BLD AUTO: 2.06 M/UL (ref 4–5.4)
RBC # BLD AUTO: 2.09 M/UL (ref 4–5.4)
RBC # BLD AUTO: 2.13 M/UL (ref 4–5.4)
RBC # BLD AUTO: 2.14 M/UL (ref 4–5.4)
RBC # BLD AUTO: 2.29 M/UL (ref 4–5.4)
RBC # BLD AUTO: 2.33 M/UL (ref 4–5.4)
RBC # BLD AUTO: 2.42 M/UL (ref 4–5.4)
RBC # BLD AUTO: 2.43 M/UL (ref 4–5.4)
RBC # BLD AUTO: 2.44 M/UL (ref 4–5.4)
RBC # BLD AUTO: 2.49 M/UL (ref 4–5.4)
RBC # BLD AUTO: 2.5 M/UL (ref 4–5.4)
RBC # BLD AUTO: 2.51 M/UL (ref 4–5.4)
RBC # BLD AUTO: 2.51 M/UL (ref 4–5.4)
RBC # BLD AUTO: 2.57 M/UL (ref 4–5.4)
RBC # BLD AUTO: 2.58 M/UL (ref 4–5.4)
RBC # BLD AUTO: 2.6 M/UL (ref 4–5.4)
RBC # BLD AUTO: 2.63 M/UL (ref 4–5.4)
RBC # BLD AUTO: 2.63 M/UL (ref 4–5.4)
RBC # BLD AUTO: 2.64 M/UL (ref 4–5.4)
RBC # BLD AUTO: 2.64 M/UL (ref 4–5.4)
RBC # BLD AUTO: 2.67 M/UL (ref 4–5.4)
RBC # BLD AUTO: 2.7 M/UL (ref 4–5.4)
RBC # BLD AUTO: 2.78 M/UL (ref 4–5.4)
RBC # BLD AUTO: 2.78 M/UL (ref 4–5.4)
RBC # BLD AUTO: 2.8 M/UL (ref 4–5.4)
RBC # BLD AUTO: 2.83 M/UL (ref 4–5.4)
RBC # BLD AUTO: 2.87 M/UL (ref 4–5.4)
RBC # BLD AUTO: 2.92 M/UL (ref 4–5.4)
RBC # BLD AUTO: 2.93 M/UL (ref 4–5.4)
RBC # BLD AUTO: 2.99 M/UL (ref 4–5.4)
RBC # BLD AUTO: 3.01 M/UL (ref 4–5.4)
RBC # BLD AUTO: 3.01 M/UL (ref 4–5.4)
RBC # BLD AUTO: 3.02 M/UL (ref 4–5.4)
RBC # BLD AUTO: 3.03 M/UL (ref 4–5.4)
RBC # BLD AUTO: 3.04 M/UL (ref 4–5.4)
RBC # BLD AUTO: 3.07 M/UL (ref 4–5.4)
RBC # BLD AUTO: 3.08 M/UL (ref 4–5.4)
RBC # BLD AUTO: 3.1 M/UL (ref 4–5.4)
RBC # BLD AUTO: 3.15 M/UL (ref 4–5.4)
RBC # BLD AUTO: 3.17 M/UL (ref 4–5.4)
RBC # BLD AUTO: 3.2 M/UL (ref 4–5.4)
RBC # BLD AUTO: 3.24 M/UL (ref 4–5.4)
RBC # BLD AUTO: 3.29 M/UL (ref 4–5.4)
RBC # BLD AUTO: 3.31 M/UL (ref 4–5.4)
RBC # BLD AUTO: 3.35 M/UL (ref 4–5.4)
RBC # BLD AUTO: 3.48 M/UL (ref 4–5.4)
RBC # BLD AUTO: 3.57 M/UL (ref 4–5.4)
RBC # BLD AUTO: 3.62 M/UL (ref 4–5.4)
RBC # BLD AUTO: 3.65 M/UL (ref 4–5.4)
RBC # BLD AUTO: 3.67 M/UL (ref 4–5.4)
RBC # BLD AUTO: 3.72 M/UL (ref 4–5.4)
RBC # BLD AUTO: 3.87 M/UL (ref 4–5.4)
RBC # BLD AUTO: 3.89 M/UL (ref 4–5.4)
RBC #/AREA URNS AUTO: 2 /HPF (ref 0–4)
RBC #/AREA URNS AUTO: 9 /HPF (ref 0–4)
RIGHT VENTRICULAR END-DIASTOLIC DIMENSION: 3.87 CM
RVP - ADENOVIRUS: NOT DETECTED
RVP - ADENOVIRUS: NOT DETECTED
RVP - HUMAN METAPNEUMOVIRUS (HMPV): NOT DETECTED
RVP - HUMAN METAPNEUMOVIRUS (HMPV): NOT DETECTED
RVP - INFLUENZA A: NOT DETECTED
RVP - INFLUENZA A: NOT DETECTED
RVP - INFLUENZA B: NOT DETECTED
RVP - INFLUENZA B: NOT DETECTED
RVP - RESPIRATORY SYNCTIAL VIRUS (RSV) A: NOT DETECTED
RVP - RESPIRATORY SYNCTIAL VIRUS (RSV) A: NOT DETECTED
RVP - RESPIRATORY VIRAL PANEL, SOURCE: NORMAL
RVP - RESPIRATORY VIRAL PANEL, SOURCE: NORMAL
RVP - RHINOVIRUS: NOT DETECTED
RVP - RHINOVIRUS: NOT DETECTED
SAMPLE: ABNORMAL
SAMPLE: NORMAL
SARS-COV-2 RDRP RESP QL NAA+PROBE: NEGATIVE
SARS-COV-2 RDRP RESP QL NAA+PROBE: POSITIVE
SARS-COV-2 RNA RESP QL NAA+PROBE: NOT DETECTED
SERUM COLLECTION DT - LUMINEX CLASS I: NORMAL
SERUM COLLECTION DT - LUMINEX CLASS II: NORMAL
SERUM COLLECTION DT - XM ALLO: NORMAL
SERUM COLLECTION DT - XM ALLO: NORMAL
SERUM COLLECTION DT - XM AUTO: NORMAL
SINUS: 3.21 CM
SINUS: 3.66 CM
SITE: ABNORMAL
SITE: NORMAL
SMUDGE CELLS BLD QL SMEAR: ABNORMAL
SMUDGE CELLS BLD QL SMEAR: PRESENT
SODIUM BLD-SCNC: 131 MMOL/L (ref 136–145)
SODIUM BLD-SCNC: 137 MMOL/L (ref 136–145)
SODIUM BLD-SCNC: 138 MMOL/L (ref 136–145)
SODIUM BLD-SCNC: 138 MMOL/L (ref 136–145)
SODIUM BLD-SCNC: 139 MMOL/L (ref 136–145)
SODIUM BLD-SCNC: 139 MMOL/L (ref 136–145)
SODIUM BLD-SCNC: 140 MMOL/L (ref 136–145)
SODIUM SERPL-SCNC: 125 MMOL/L (ref 136–145)
SODIUM SERPL-SCNC: 125 MMOL/L (ref 136–145)
SODIUM SERPL-SCNC: 126 MMOL/L (ref 136–145)
SODIUM SERPL-SCNC: 127 MMOL/L (ref 136–145)
SODIUM SERPL-SCNC: 128 MMOL/L (ref 136–145)
SODIUM SERPL-SCNC: 129 MMOL/L (ref 136–145)
SODIUM SERPL-SCNC: 130 MMOL/L (ref 136–145)
SODIUM SERPL-SCNC: 133 MMOL/L (ref 136–145)
SODIUM SERPL-SCNC: 134 MMOL/L (ref 136–145)
SODIUM SERPL-SCNC: 135 MMOL/L (ref 136–145)
SODIUM SERPL-SCNC: 136 MMOL/L (ref 136–145)
SODIUM SERPL-SCNC: 137 MMOL/L (ref 136–145)
SODIUM SERPL-SCNC: 138 MMOL/L (ref 136–145)
SODIUM SERPL-SCNC: 139 MMOL/L (ref 136–145)
SODIUM SERPL-SCNC: 140 MMOL/L (ref 136–145)
SODIUM SERPL-SCNC: 140 MMOL/L (ref 136–145)
SODIUM SERPL-SCNC: 141 MMOL/L (ref 136–145)
SODIUM SERPL-SCNC: 142 MMOL/L (ref 136–145)
SODIUM SERPL-SCNC: 143 MMOL/L (ref 136–145)
SODIUM SERPL-SCNC: 144 MMOL/L (ref 136–145)
SODIUM SERPL-SCNC: 145 MMOL/L (ref 136–145)
SODIUM SERPL-SCNC: 146 MMOL/L (ref 136–145)
SODIUM SERPL-SCNC: 147 MMOL/L (ref 136–145)
SODIUM SERPL-SCNC: 147 MMOL/L (ref 136–145)
SODIUM SERPL-SCNC: 148 MMOL/L (ref 136–145)
SP GR UR STRIP: 1.01 (ref 1–1.03)
SP GR UR STRIP: 1.03 (ref 1–1.03)
SP02: 100
SP02: 90
SP02: 91
SP02: 92
SP02: 92
SP02: 93
SP02: 94
SP02: 94
SP02: 96
SP02: 98
SP02: 99
SP02: 99
SPCL1 TESTING DATE: NORMAL
SPCL2 TESTING DATE: NORMAL
SPLUA TESTING DATE: NORMAL
STJ: 3 CM
STJ: 3.15 CM
T CELL RESULTS - XM ALLO: NEGATIVE
T CELL RESULTS - XM ALLO: NEGATIVE
T CELL RESULTS - XM AUTO: NEGATIVE
T MCS AVERAGE - XM ALLO: 24.6
T MCS AVERAGE - XM ALLO: 8
T MCS AVERAGE - XM AUTO: -5.9
TACROLIMUS BLD-MCNC: 1.6 NG/ML (ref 5–15)
TACROLIMUS BLD-MCNC: 10.2 NG/ML (ref 5–15)
TACROLIMUS BLD-MCNC: 10.4 NG/ML (ref 5–15)
TACROLIMUS BLD-MCNC: 10.7 NG/ML (ref 5–15)
TACROLIMUS BLD-MCNC: 11.1 NG/ML (ref 5–15)
TACROLIMUS BLD-MCNC: 11.5 NG/ML (ref 5–15)
TACROLIMUS BLD-MCNC: 14.3 NG/ML (ref 5–15)
TACROLIMUS BLD-MCNC: 15.6 NG/ML (ref 5–15)
TACROLIMUS BLD-MCNC: 16.7 NG/ML (ref 5–15)
TACROLIMUS BLD-MCNC: 2.3 NG/ML (ref 5–15)
TACROLIMUS BLD-MCNC: 2.4 NG/ML (ref 5–15)
TACROLIMUS BLD-MCNC: 2.9 NG/ML (ref 5–15)
TACROLIMUS BLD-MCNC: 3.3 NG/ML (ref 5–15)
TACROLIMUS BLD-MCNC: 3.9 NG/ML (ref 5–15)
TACROLIMUS BLD-MCNC: 4.8 NG/ML (ref 5–15)
TACROLIMUS BLD-MCNC: 5.3 NG/ML (ref 5–15)
TACROLIMUS BLD-MCNC: 5.7 NG/ML (ref 5–15)
TACROLIMUS BLD-MCNC: 6.1 NG/ML (ref 5–15)
TACROLIMUS BLD-MCNC: 6.2 NG/ML (ref 5–15)
TACROLIMUS BLD-MCNC: 6.3 NG/ML (ref 5–15)
TACROLIMUS BLD-MCNC: 6.7 NG/ML (ref 5–15)
TACROLIMUS BLD-MCNC: 6.8 NG/ML (ref 5–15)
TACROLIMUS BLD-MCNC: 7.2 NG/ML (ref 5–15)
TACROLIMUS BLD-MCNC: 7.2 NG/ML (ref 5–15)
TACROLIMUS BLD-MCNC: 7.3 NG/ML (ref 5–15)
TACROLIMUS BLD-MCNC: 7.5 NG/ML (ref 5–15)
TACROLIMUS BLD-MCNC: 7.8 NG/ML (ref 5–15)
TACROLIMUS BLD-MCNC: 7.8 NG/ML (ref 5–15)
TACROLIMUS BLD-MCNC: 8 NG/ML (ref 5–15)
TACROLIMUS BLD-MCNC: 8.1 NG/ML (ref 5–15)
TACROLIMUS BLD-MCNC: 8.1 NG/ML (ref 5–15)
TACROLIMUS BLD-MCNC: 8.2 NG/ML (ref 5–15)
TACROLIMUS BLD-MCNC: 8.2 NG/ML (ref 5–15)
TACROLIMUS BLD-MCNC: 8.6 NG/ML (ref 5–15)
TACROLIMUS BLD-MCNC: 8.7 NG/ML (ref 5–15)
TACROLIMUS BLD-MCNC: 8.9 NG/ML (ref 5–15)
TACROLIMUS BLD-MCNC: 9 NG/ML (ref 5–15)
TACROLIMUS BLD-MCNC: 9.9 NG/ML (ref 5–15)
TACROLIMUS BLD-MCNC: <1.5 NG/ML (ref 5–15)
TARGETS BLD QL SMEAR: ABNORMAL
TARGETS BLD QL SMEAR: ABNORMAL
TDI LATERAL: 0.12 M/S
TDI SEPTAL: 0.09 M/S
TDI: 0.11 M/S
TEM - ACINETOBACTER BAUMANNII: NOT DETECTED
TEM - ACINETOBACTER BAUMANNII: NOT DETECTED
TEM - BORDETELLA PERTUSSIS: NOT DETECTED
TEM - BORDETELLA PERTUSSIS: NOT DETECTED
TEM - CHLAMYDOPHILA PNEUMONIAE: NOT DETECTED
TEM - CHLAMYDOPHILA PNEUMONIAE: NOT DETECTED
TEM - KLEBSIELLA PNEUMONIAE: NOT DETECTED
TEM - KLEBSIELLA PNEUMONIAE: NOT DETECTED
TEM - MRSA: NOT DETECTED
TEM - MRSA: NOT DETECTED
TEM - MYCOPLASMA PNEUMONIAE: NOT DETECTED
TEM - MYCOPLASMA PNEUMONIAE: NOT DETECTED
TEM - NEISSERIA MENINGITIDIS: NOT DETECTED
TEM - NEISSERIA MENINGITIDIS: NOT DETECTED
TEM - PANTON-VALENTINE: NOT DETECTED
TEM - PANTON-VALENTINE: NOT DETECTED
TEM - PSEUDOMONAS AERUGINOSA: NOT DETECTED
TEM - PSEUDOMONAS AERUGINOSA: NOT DETECTED
TEM - STAPHYLOCOCCUS AUREUS: NOT DETECTED
TEM - STAPHYLOCOCCUS AUREUS: NOT DETECTED
TEM - STREPTOCOCCUS PNEUMONIAE: NOT DETECTED
TEM - STREPTOCOCCUS PNEUMONIAE: NOT DETECTED
TEM - STREPTOCOCCUS PYOGENES A: NOT DETECTED
TEM - STREPTOCOCCUS PYOGENES A: NOT DETECTED
TEM- HAEMOPHILUS INFLUENZAE B: NOT DETECTED
TEM- HAEMOPHILUS INFLUENZAE B: NOT DETECTED
TEM- HAEMOPHILUS INFLUENZAE: NOT DETECTED
TEM- HAEMOPHILUS INFLUENZAE: NOT DETECTED
TOXIC GRANULES BLD QL SMEAR: PRESENT
TR MAX PG: 44 MMHG
TRIGL SERPL-MCNC: 220 MG/DL (ref 30–150)
TROPONIN I SERPL DL<=0.01 NG/ML-MCNC: 0.03 NG/ML (ref 0–0.03)
TROPONIN I SERPL DL<=0.01 NG/ML-MCNC: 0.06 NG/ML (ref 0–0.03)
UNIT NUMBER: NORMAL
URN SPEC COLLECT METH UR: ABNORMAL
URN SPEC COLLECT METH UR: NORMAL
VANCOMYCIN TROUGH SERPL-MCNC: 14.2 UG/ML (ref 10–22)
VANCOMYCIN TROUGH SERPL-MCNC: 15.7 UG/ML (ref 10–22)
VANCOMYCIN TROUGH SERPL-MCNC: 18.3 UG/ML (ref 10–22)
VANCOMYCIN TROUGH SERPL-MCNC: 27.9 UG/ML (ref 10–22)
VANCOMYCIN TROUGH SERPL-MCNC: 9.6 UG/ML (ref 10–22)
VANCOMYCIN TROUGH SERPL-MCNC: <1.1 UG/ML (ref 10–22)
VORICONAZOLE SERPL-MCNC: 6.7 MCG/ML (ref 1–5.5)
VT: 280
VT: 340
VT: 340
VT: 350
VT: 360
VT: 370
VT: 391
VT: 400
VT: 400
VT: 450
WBC # BLD AUTO: 10.29 K/UL (ref 3.9–12.7)
WBC # BLD AUTO: 12.06 K/UL (ref 3.9–12.7)
WBC # BLD AUTO: 12.38 K/UL (ref 3.9–12.7)
WBC # BLD AUTO: 12.78 K/UL (ref 3.9–12.7)
WBC # BLD AUTO: 12.84 K/UL (ref 3.9–12.7)
WBC # BLD AUTO: 12.99 K/UL (ref 3.9–12.7)
WBC # BLD AUTO: 13.61 K/UL (ref 3.9–12.7)
WBC # BLD AUTO: 13.79 K/UL (ref 3.9–12.7)
WBC # BLD AUTO: 13.9 K/UL (ref 3.9–12.7)
WBC # BLD AUTO: 14.3 K/UL (ref 3.9–12.7)
WBC # BLD AUTO: 15.9 K/UL (ref 3.9–12.7)
WBC # BLD AUTO: 15.98 K/UL (ref 3.9–12.7)
WBC # BLD AUTO: 16.07 K/UL (ref 3.9–12.7)
WBC # BLD AUTO: 16.4 K/UL (ref 3.9–12.7)
WBC # BLD AUTO: 16.58 K/UL (ref 3.9–12.7)
WBC # BLD AUTO: 17.32 K/UL (ref 3.9–12.7)
WBC # BLD AUTO: 17.46 K/UL (ref 3.9–12.7)
WBC # BLD AUTO: 17.47 K/UL (ref 3.9–12.7)
WBC # BLD AUTO: 18.11 K/UL (ref 3.9–12.7)
WBC # BLD AUTO: 18.26 K/UL (ref 3.9–12.7)
WBC # BLD AUTO: 19.64 K/UL (ref 3.9–12.7)
WBC # BLD AUTO: 19.68 K/UL (ref 3.9–12.7)
WBC # BLD AUTO: 19.82 K/UL (ref 3.9–12.7)
WBC # BLD AUTO: 19.85 K/UL (ref 3.9–12.7)
WBC # BLD AUTO: 2.65 K/UL (ref 3.9–12.7)
WBC # BLD AUTO: 20.33 K/UL (ref 3.9–12.7)
WBC # BLD AUTO: 20.68 K/UL (ref 3.9–12.7)
WBC # BLD AUTO: 21.81 K/UL (ref 3.9–12.7)
WBC # BLD AUTO: 22.72 K/UL (ref 3.9–12.7)
WBC # BLD AUTO: 26.25 K/UL (ref 3.9–12.7)
WBC # BLD AUTO: 27.54 K/UL (ref 3.9–12.7)
WBC # BLD AUTO: 3.74 K/UL (ref 3.9–12.7)
WBC # BLD AUTO: 3.82 K/UL (ref 3.9–12.7)
WBC # BLD AUTO: 4.07 K/UL (ref 3.9–12.7)
WBC # BLD AUTO: 4.21 K/UL (ref 3.9–12.7)
WBC # BLD AUTO: 4.29 K/UL (ref 3.9–12.7)
WBC # BLD AUTO: 4.55 K/UL (ref 3.9–12.7)
WBC # BLD AUTO: 4.57 K/UL (ref 3.9–12.7)
WBC # BLD AUTO: 4.76 K/UL (ref 3.9–12.7)
WBC # BLD AUTO: 4.83 K/UL (ref 3.9–12.7)
WBC # BLD AUTO: 4.93 K/UL (ref 3.9–12.7)
WBC # BLD AUTO: 4.98 K/UL (ref 3.9–12.7)
WBC # BLD AUTO: 5.1 K/UL (ref 3.9–12.7)
WBC # BLD AUTO: 5.12 K/UL (ref 3.9–12.7)
WBC # BLD AUTO: 5.13 K/UL (ref 3.9–12.7)
WBC # BLD AUTO: 5.65 K/UL (ref 3.9–12.7)
WBC # BLD AUTO: 5.95 K/UL (ref 3.9–12.7)
WBC # BLD AUTO: 5.98 K/UL (ref 3.9–12.7)
WBC # BLD AUTO: 6.69 K/UL (ref 3.9–12.7)
WBC # BLD AUTO: 6.84 K/UL (ref 3.9–12.7)
WBC # BLD AUTO: 6.99 K/UL (ref 3.9–12.7)
WBC # BLD AUTO: 7.26 K/UL (ref 3.9–12.7)
WBC # BLD AUTO: 8.1 K/UL (ref 3.9–12.7)
WBC # BLD AUTO: 8.91 K/UL (ref 3.9–12.7)
WBC # BLD AUTO: 9.79 K/UL (ref 3.9–12.7)
WBC # FLD: 389 /CU MM
WBC #/AREA URNS AUTO: 1 /HPF (ref 0–5)
WBC #/AREA URNS AUTO: 11 /HPF (ref 0–5)
WBC TOXIC VACUOLES BLD QL SMEAR: PRESENT
YEAST UR QL AUTO: ABNORMAL

## 2020-01-01 PROCEDURE — 94003 VENT MGMT INPAT SUBQ DAY: CPT

## 2020-01-01 PROCEDURE — 99214 OFFICE O/P EST MOD 30 MIN: CPT | Mod: 25,S$PBB,, | Performed by: INTERNAL MEDICINE

## 2020-01-01 PROCEDURE — 94660 CPAP INITIATION&MGMT: CPT

## 2020-01-01 PROCEDURE — 87305 ASPERGILLUS AG IA: CPT

## 2020-01-01 PROCEDURE — 63600175 PHARM REV CODE 636 W HCPCS: Performed by: NURSE PRACTITIONER

## 2020-01-01 PROCEDURE — 80197 ASSAY OF TACROLIMUS: CPT

## 2020-01-01 PROCEDURE — 92610 EVALUATE SWALLOWING FUNCTION: CPT

## 2020-01-01 PROCEDURE — P9016 RBC LEUKOCYTES REDUCED: HCPCS

## 2020-01-01 PROCEDURE — 20000000 HC ICU ROOM

## 2020-01-01 PROCEDURE — 71046 X-RAY EXAM CHEST 2 VIEWS: CPT | Mod: 26,,, | Performed by: RADIOLOGY

## 2020-01-01 PROCEDURE — 94640 AIRWAY INHALATION TREATMENT: CPT

## 2020-01-01 PROCEDURE — 99292 PR CRITICAL CARE, ADDL 30 MIN: ICD-10-PCS | Mod: ,,, | Performed by: INTERNAL MEDICINE

## 2020-01-01 PROCEDURE — 63700000 PHARM REV CODE 250 ALT 637 W/O HCPCS: Performed by: NURSE PRACTITIONER

## 2020-01-01 PROCEDURE — 63600175 PHARM REV CODE 636 W HCPCS: Performed by: STUDENT IN AN ORGANIZED HEALTH CARE EDUCATION/TRAINING PROGRAM

## 2020-01-01 PROCEDURE — 25000003 PHARM REV CODE 250: Performed by: INTERNAL MEDICINE

## 2020-01-01 PROCEDURE — 25000003 PHARM REV CODE 250: Performed by: STUDENT IN AN ORGANIZED HEALTH CARE EDUCATION/TRAINING PROGRAM

## 2020-01-01 PROCEDURE — 99291 CRITICAL CARE FIRST HOUR: CPT | Mod: GC,,, | Performed by: INTERNAL MEDICINE

## 2020-01-01 PROCEDURE — 94761 N-INVAS EAR/PLS OXIMETRY MLT: CPT

## 2020-01-01 PROCEDURE — 85025 COMPLETE CBC W/AUTO DIFF WBC: CPT

## 2020-01-01 PROCEDURE — 63600175 PHARM REV CODE 636 W HCPCS: Performed by: INTERNAL MEDICINE

## 2020-01-01 PROCEDURE — 99232 PR SUBSEQUENT HOSPITAL CARE,LEVL II: ICD-10-PCS | Mod: ,,, | Performed by: NURSE PRACTITIONER

## 2020-01-01 PROCEDURE — 94668 MNPJ CHEST WALL SBSQ: CPT

## 2020-01-01 PROCEDURE — 80048 BASIC METABOLIC PNL TOTAL CA: CPT

## 2020-01-01 PROCEDURE — 25000003 PHARM REV CODE 250: Performed by: NURSE PRACTITIONER

## 2020-01-01 PROCEDURE — 25000003 PHARM REV CODE 250: Performed by: THORACIC SURGERY (CARDIOTHORACIC VASCULAR SURGERY)

## 2020-01-01 PROCEDURE — 82800 BLOOD PH: CPT

## 2020-01-01 PROCEDURE — 97110 THERAPEUTIC EXERCISES: CPT | Mod: CQ

## 2020-01-01 PROCEDURE — 84100 ASSAY OF PHOSPHORUS: CPT

## 2020-01-01 PROCEDURE — 36000931 HC OR TIME LEV VII EA ADD 15 MIN: Performed by: THORACIC SURGERY (CARDIOTHORACIC VASCULAR SURGERY)

## 2020-01-01 PROCEDURE — 80053 COMPREHEN METABOLIC PANEL: CPT

## 2020-01-01 PROCEDURE — 97803 MED NUTRITION INDIV SUBSEQ: CPT

## 2020-01-01 PROCEDURE — 82803 BLOOD GASES ANY COMBINATION: CPT

## 2020-01-01 PROCEDURE — 82330 ASSAY OF CALCIUM: CPT

## 2020-01-01 PROCEDURE — C9113 INJ PANTOPRAZOLE SODIUM, VIA: HCPCS | Performed by: INTERNAL MEDICINE

## 2020-01-01 PROCEDURE — 99214 PR OFFICE/OUTPT VISIT, EST, LEVL IV, 30-39 MIN: ICD-10-PCS | Mod: 25,S$PBB,, | Performed by: INTERNAL MEDICINE

## 2020-01-01 PROCEDURE — 99233 SBSQ HOSP IP/OBS HIGH 50: CPT | Mod: ,,, | Performed by: INTERNAL MEDICINE

## 2020-01-01 PROCEDURE — 99233 PR SUBSEQUENT HOSPITAL CARE,LEVL III: ICD-10-PCS | Mod: ,,, | Performed by: INTERNAL MEDICINE

## 2020-01-01 PROCEDURE — 99232 SBSQ HOSP IP/OBS MODERATE 35: CPT | Mod: ,,, | Performed by: NURSE PRACTITIONER

## 2020-01-01 PROCEDURE — 93010 EKG 12-LEAD: ICD-10-PCS | Mod: TXP,,, | Performed by: INTERNAL MEDICINE

## 2020-01-01 PROCEDURE — 27200966 HC CLOSED SUCTION SYSTEM

## 2020-01-01 PROCEDURE — 71046 XR CHEST PA AND LATERAL: ICD-10-PCS | Mod: 26,,, | Performed by: RADIOLOGY

## 2020-01-01 PROCEDURE — G0237 THERAPEUTIC PROCD STRG ENDUR: HCPCS

## 2020-01-01 PROCEDURE — 99900026 HC AIRWAY MAINTENANCE (STAT)

## 2020-01-01 PROCEDURE — 85027 COMPLETE CBC AUTOMATED: CPT

## 2020-01-01 PROCEDURE — 99900035 HC TECH TIME PER 15 MIN (STAT)

## 2020-01-01 PROCEDURE — 71046 X-RAY EXAM CHEST 2 VIEWS: CPT | Mod: TC

## 2020-01-01 PROCEDURE — 99233 PR SUBSEQUENT HOSPITAL CARE,LEVL III: ICD-10-PCS | Mod: GC,,, | Performed by: INTERNAL MEDICINE

## 2020-01-01 PROCEDURE — C1751 CATH, INF, PER/CENT/MIDLINE: HCPCS

## 2020-01-01 PROCEDURE — 27000221 HC OXYGEN, UP TO 24 HOURS

## 2020-01-01 PROCEDURE — 99999 PR PBB SHADOW E&M-EST. PATIENT-LVL III: CPT | Mod: PBBFAC,,, | Performed by: INTERNAL MEDICINE

## 2020-01-01 PROCEDURE — 83735 ASSAY OF MAGNESIUM: CPT

## 2020-01-01 PROCEDURE — 37799 UNLISTED PX VASCULAR SURGERY: CPT

## 2020-01-01 PROCEDURE — 96374 THER/PROPH/DIAG INJ IV PUSH: CPT

## 2020-01-01 PROCEDURE — 27000221 HC OXYGEN, UP TO 24 HOURS: Mod: NTX

## 2020-01-01 PROCEDURE — 85007 BL SMEAR W/DIFF WBC COUNT: CPT

## 2020-01-01 PROCEDURE — 99999 PR PBB SHADOW E&M-EST. PATIENT-LVL V: ICD-10-PCS | Mod: PBBFAC,,, | Performed by: INTERNAL MEDICINE

## 2020-01-01 PROCEDURE — 80202 ASSAY OF VANCOMYCIN: CPT

## 2020-01-01 PROCEDURE — 63600175 PHARM REV CODE 636 W HCPCS: Performed by: PHYSICIAN ASSISTANT

## 2020-01-01 PROCEDURE — 25000242 PHARM REV CODE 250 ALT 637 W/ HCPCS: Performed by: PHYSICIAN ASSISTANT

## 2020-01-01 PROCEDURE — 36415 COLL VENOUS BLD VENIPUNCTURE: CPT

## 2020-01-01 PROCEDURE — 88313 PR  SPECIAL STAINS,GROUP II: ICD-10-PCS | Mod: 26,,, | Performed by: PATHOLOGY

## 2020-01-01 PROCEDURE — G0180 MD CERTIFICATION HHA PATIENT: HCPCS | Mod: ,,, | Performed by: INTERNAL MEDICINE

## 2020-01-01 PROCEDURE — 86832 HLA CLASS I HIGH DEFIN QUAL: CPT

## 2020-01-01 PROCEDURE — 36620 INSERTION CATHETER ARTERY: CPT

## 2020-01-01 PROCEDURE — 99291 PR CRITICAL CARE, E/M 30-74 MINUTES: ICD-10-PCS | Mod: ,,, | Performed by: INTERNAL MEDICINE

## 2020-01-01 PROCEDURE — 83735 ASSAY OF MAGNESIUM: CPT | Mod: 91

## 2020-01-01 PROCEDURE — C1729 CATH, DRAINAGE: HCPCS | Performed by: THORACIC SURGERY (CARDIOTHORACIC VASCULAR SURGERY)

## 2020-01-01 PROCEDURE — 63600175 PHARM REV CODE 636 W HCPCS: Performed by: ANESTHESIOLOGY

## 2020-01-01 PROCEDURE — 94010 BREATHING CAPACITY TEST: ICD-10-PCS | Mod: 26,,, | Performed by: INTERNAL MEDICINE

## 2020-01-01 PROCEDURE — 94761 N-INVAS EAR/PLS OXIMETRY MLT: CPT | Mod: NTX

## 2020-01-01 PROCEDURE — 86920 COMPATIBILITY TEST SPIN: CPT

## 2020-01-01 PROCEDURE — 97161 PT EVAL LOW COMPLEX 20 MIN: CPT

## 2020-01-01 PROCEDURE — 99291 PR CRITICAL CARE, E/M 30-74 MINUTES: ICD-10-PCS | Mod: ,,, | Performed by: NURSE PRACTITIONER

## 2020-01-01 PROCEDURE — 99999 PR PBB SHADOW E&M-EST. PATIENT-LVL III: ICD-10-PCS | Mod: PBBFAC,,, | Performed by: NURSE PRACTITIONER

## 2020-01-01 PROCEDURE — 25000242 PHARM REV CODE 250 ALT 637 W/ HCPCS: Performed by: EMERGENCY MEDICINE

## 2020-01-01 PROCEDURE — 82803 BLOOD GASES ANY COMBINATION: CPT | Mod: NTX

## 2020-01-01 PROCEDURE — 99291 CRITICAL CARE FIRST HOUR: CPT | Mod: ,,, | Performed by: INTERNAL MEDICINE

## 2020-01-01 PROCEDURE — 99232 SBSQ HOSP IP/OBS MODERATE 35: CPT | Mod: ,,, | Performed by: INTERNAL MEDICINE

## 2020-01-01 PROCEDURE — 84132 ASSAY OF SERUM POTASSIUM: CPT

## 2020-01-01 PROCEDURE — 25000003 PHARM REV CODE 250: Performed by: PHYSICIAN ASSISTANT

## 2020-01-01 PROCEDURE — 85652 RBC SED RATE AUTOMATED: CPT

## 2020-01-01 PROCEDURE — 99999 PR PBB SHADOW E&M-EST. PATIENT-LVL IV: CPT | Mod: PBBFAC,TXP,, | Performed by: INTERNAL MEDICINE

## 2020-01-01 PROCEDURE — 84478 ASSAY OF TRIGLYCERIDES: CPT

## 2020-01-01 PROCEDURE — 36620 ARTERIAL: ICD-10-PCS | Mod: 59,GC,, | Performed by: ANESTHESIOLOGY

## 2020-01-01 PROCEDURE — 20600001 HC STEP DOWN PRIVATE ROOM

## 2020-01-01 PROCEDURE — 99291 CRITICAL CARE FIRST HOUR: CPT | Mod: GC,,, | Performed by: ANESTHESIOLOGY

## 2020-01-01 PROCEDURE — 93454 CORONARY ARTERY ANGIO S&I: CPT | Mod: TXP | Performed by: INTERNAL MEDICINE

## 2020-01-01 PROCEDURE — 25500020 PHARM REV CODE 255: Mod: TXP | Performed by: INTERNAL MEDICINE

## 2020-01-01 PROCEDURE — 87486 CHLMYD PNEUM DNA AMP PROBE: CPT

## 2020-01-01 PROCEDURE — 97116 GAIT TRAINING THERAPY: CPT | Mod: CQ

## 2020-01-01 PROCEDURE — 99223 1ST HOSP IP/OBS HIGH 75: CPT | Mod: ,,, | Performed by: INTERNAL MEDICINE

## 2020-01-01 PROCEDURE — 97802 MEDICAL NUTRITION INDIV IN: CPT

## 2020-01-01 PROCEDURE — 97165 OT EVAL LOW COMPLEX 30 MIN: CPT

## 2020-01-01 PROCEDURE — 63600175 PHARM REV CODE 636 W HCPCS: Performed by: THORACIC SURGERY (CARDIOTHORACIC VASCULAR SURGERY)

## 2020-01-01 PROCEDURE — 83605 ASSAY OF LACTIC ACID: CPT

## 2020-01-01 PROCEDURE — G0238 OTH RESP PROC, INDIV: HCPCS

## 2020-01-01 PROCEDURE — 27201037 HC PRESSURE MONITORING SET UP: Mod: NTX

## 2020-01-01 PROCEDURE — 99215 OFFICE O/P EST HI 40 MIN: CPT | Mod: PBBFAC,25 | Performed by: INTERNAL MEDICINE

## 2020-01-01 PROCEDURE — 25000242 PHARM REV CODE 250 ALT 637 W/ HCPCS: Performed by: NURSE PRACTITIONER

## 2020-01-01 PROCEDURE — 93010 EKG 12-LEAD: ICD-10-PCS | Mod: ,,, | Performed by: INTERNAL MEDICINE

## 2020-01-01 PROCEDURE — 85007 BL SMEAR W/DIFF WBC COUNT: CPT | Mod: 91

## 2020-01-01 PROCEDURE — U0002 COVID-19 LAB TEST NON-CDC: HCPCS

## 2020-01-01 PROCEDURE — 93503 SWAN GANZ LINE: ICD-10-PCS | Mod: 59,GC,, | Performed by: ANESTHESIOLOGY

## 2020-01-01 PROCEDURE — 99223 1ST HOSP IP/OBS HIGH 75: CPT | Mod: ,,, | Performed by: EMERGENCY MEDICINE

## 2020-01-01 PROCEDURE — 86901 BLOOD TYPING SEROLOGIC RH(D): CPT

## 2020-01-01 PROCEDURE — 27000190 HC CPAP FULL FACE MASK W/VALVE

## 2020-01-01 PROCEDURE — 99999 PR PBB SHADOW E&M-EST. PATIENT-LVL III: ICD-10-PCS | Mod: PBBFAC,,, | Performed by: INTERNAL MEDICINE

## 2020-01-01 PROCEDURE — 99215 OFFICE O/P EST HI 40 MIN: CPT | Mod: S$PBB,,, | Performed by: INTERNAL MEDICINE

## 2020-01-01 PROCEDURE — 87102 FUNGUS ISOLATION CULTURE: CPT | Mod: 59

## 2020-01-01 PROCEDURE — 86833 HLA CLASS II HIGH DEFIN QUAL: CPT

## 2020-01-01 PROCEDURE — 99232 PR SUBSEQUENT HOSPITAL CARE,LEVL II: ICD-10-PCS | Mod: ,,, | Performed by: INTERNAL MEDICINE

## 2020-01-01 PROCEDURE — 82962 GLUCOSE BLOOD TEST: CPT

## 2020-01-01 PROCEDURE — 87070 CULTURE OTHR SPECIMN AEROBIC: CPT

## 2020-01-01 PROCEDURE — 63600175 PHARM REV CODE 636 W HCPCS: Performed by: SURGERY

## 2020-01-01 PROCEDURE — 99291 PR CRITICAL CARE, E/M 30-74 MINUTES: ICD-10-PCS | Mod: GC,,, | Performed by: INTERNAL MEDICINE

## 2020-01-01 PROCEDURE — 94010 BREATHING CAPACITY TEST: CPT | Mod: 26,,, | Performed by: INTERNAL MEDICINE

## 2020-01-01 PROCEDURE — 85060 PATHOLOGIST REVIEW: ICD-10-PCS | Mod: ,,, | Performed by: PATHOLOGY

## 2020-01-01 PROCEDURE — 85610 PROTHROMBIN TIME: CPT | Mod: TXP

## 2020-01-01 PROCEDURE — 31500 INSERT EMERGENCY AIRWAY: CPT

## 2020-01-01 PROCEDURE — 84100 ASSAY OF PHOSPHORUS: CPT | Mod: 91

## 2020-01-01 PROCEDURE — 87075 CULTR BACTERIA EXCEPT BLOOD: CPT

## 2020-01-01 PROCEDURE — 76937 US GUIDE VASCULAR ACCESS: CPT

## 2020-01-01 PROCEDURE — 99233 SBSQ HOSP IP/OBS HIGH 50: CPT | Mod: GC,,, | Performed by: INTERNAL MEDICINE

## 2020-01-01 PROCEDURE — 37000008 HC ANESTHESIA 1ST 15 MINUTES: Performed by: THORACIC SURGERY (CARDIOTHORACIC VASCULAR SURGERY)

## 2020-01-01 PROCEDURE — 99999 PR PBB SHADOW E&M-EST. PATIENT-LVL I: CPT | Mod: PBBFAC,TXP,,

## 2020-01-01 PROCEDURE — 63700000 PHARM REV CODE 250 ALT 637 W/O HCPCS: Performed by: INTERNAL MEDICINE

## 2020-01-01 PROCEDURE — 99152 PR MOD CONSCIOUS SEDATION, SAME PHYS, 5+ YRS, FIRST 15 MIN: ICD-10-PCS | Mod: TXP,,, | Performed by: INTERNAL MEDICINE

## 2020-01-01 PROCEDURE — 25000003 PHARM REV CODE 250: Mod: TXP | Performed by: INTERNAL MEDICINE

## 2020-01-01 PROCEDURE — 99223 PR INITIAL HOSPITAL CARE,LEVL III: ICD-10-PCS | Mod: ,,, | Performed by: NURSE PRACTITIONER

## 2020-01-01 PROCEDURE — 97116 GAIT TRAINING THERAPY: CPT

## 2020-01-01 PROCEDURE — 36620 INSERTION CATHETER ARTERY: CPT | Mod: 59,GC,, | Performed by: ANESTHESIOLOGY

## 2020-01-01 PROCEDURE — 83880 ASSAY OF NATRIURETIC PEPTIDE: CPT

## 2020-01-01 PROCEDURE — 99238 PR HOSPITAL DISCHARGE DAY,<30 MIN: ICD-10-PCS | Mod: ,,, | Performed by: INTERNAL MEDICINE

## 2020-01-01 PROCEDURE — 88309 PR  SURG PATH,LEVEL VI: ICD-10-PCS | Mod: 26,,, | Performed by: PATHOLOGY

## 2020-01-01 PROCEDURE — 99291 CRITICAL CARE FIRST HOUR: CPT | Mod: ,,, | Performed by: NURSE PRACTITIONER

## 2020-01-01 PROCEDURE — 97530 THERAPEUTIC ACTIVITIES: CPT | Mod: CQ

## 2020-01-01 PROCEDURE — 80048 BASIC METABOLIC PNL TOTAL CA: CPT | Mod: 91

## 2020-01-01 PROCEDURE — D9220A PRA ANESTHESIA: Mod: ,,, | Performed by: ANESTHESIOLOGY

## 2020-01-01 PROCEDURE — 99900035 HC TECH TIME PER 15 MIN (STAT): Mod: NTX

## 2020-01-01 PROCEDURE — 94010 BREATHING CAPACITY TEST: ICD-10-PCS | Mod: 26,S$PBB,TXP, | Performed by: INTERNAL MEDICINE

## 2020-01-01 PROCEDURE — 27100171 HC OXYGEN HIGH FLOW UP TO 24 HOURS

## 2020-01-01 PROCEDURE — 86826 HLA X-MATCH NONCYTOTOXC ADDL: CPT

## 2020-01-01 PROCEDURE — 99214 OFFICE O/P EST MOD 30 MIN: CPT | Mod: 95,TXP,, | Performed by: INTERNAL MEDICINE

## 2020-01-01 PROCEDURE — 36000930 HC OR TIME LEV VII 1ST 15 MIN: Performed by: THORACIC SURGERY (CARDIOTHORACIC VASCULAR SURGERY)

## 2020-01-01 PROCEDURE — 94010 BREATHING CAPACITY TEST: CPT | Mod: PBBFAC,TXP | Performed by: INTERNAL MEDICINE

## 2020-01-01 PROCEDURE — 99292 CRITICAL CARE ADDL 30 MIN: CPT | Mod: 25,,, | Performed by: INTERNAL MEDICINE

## 2020-01-01 PROCEDURE — 87502 INFLUENZA DNA AMP PROBE: CPT

## 2020-01-01 PROCEDURE — 93312 TEE: ICD-10-PCS | Mod: 26,59,, | Performed by: ANESTHESIOLOGY

## 2020-01-01 PROCEDURE — 82010 KETONE BODYS QUAN: CPT

## 2020-01-01 PROCEDURE — 80285 DRUG ASSAY VORICONAZOLE: CPT

## 2020-01-01 PROCEDURE — 87205 SMEAR GRAM STAIN: CPT

## 2020-01-01 PROCEDURE — 63600175 PHARM REV CODE 636 W HCPCS: Mod: NTX | Performed by: STUDENT IN AN ORGANIZED HEALTH CARE EDUCATION/TRAINING PROGRAM

## 2020-01-01 PROCEDURE — 86850 RBC ANTIBODY SCREEN: CPT

## 2020-01-01 PROCEDURE — 93010 ELECTROCARDIOGRAM REPORT: CPT | Mod: 76,,, | Performed by: INTERNAL MEDICINE

## 2020-01-01 PROCEDURE — 94667 MNPJ CHEST WALL 1ST: CPT

## 2020-01-01 PROCEDURE — D9220A PRA ANESTHESIA: ICD-10-PCS | Mod: ,,, | Performed by: ANESTHESIOLOGY

## 2020-01-01 PROCEDURE — P9012 CRYOPRECIPITATE EACH UNIT: HCPCS

## 2020-01-01 PROCEDURE — 87116 MYCOBACTERIA CULTURE: CPT

## 2020-01-01 PROCEDURE — 99292 CRITICAL CARE ADDL 30 MIN: CPT | Mod: ,,, | Performed by: INTERNAL MEDICINE

## 2020-01-01 PROCEDURE — 99999 PR PBB SHADOW E&M-EST. PATIENT-LVL III: CPT | Mod: PBBFAC,,, | Performed by: NURSE PRACTITIONER

## 2020-01-01 PROCEDURE — 88313 SPECIAL STAINS GROUP 2: CPT | Performed by: PATHOLOGY

## 2020-01-01 PROCEDURE — 31623 DX BRONCHOSCOPE/BRUSH: CPT | Mod: 59,LT,, | Performed by: INTERNAL MEDICINE

## 2020-01-01 PROCEDURE — 93005 ELECTROCARDIOGRAM TRACING: CPT

## 2020-01-01 PROCEDURE — 27000191 HC C-V MONITORING: Mod: NTX

## 2020-01-01 PROCEDURE — 97530 THERAPEUTIC ACTIVITIES: CPT

## 2020-01-01 PROCEDURE — 99292 PR CRITICAL CARE, ADDL 30 MIN: ICD-10-PCS | Mod: 25,,, | Performed by: INTERNAL MEDICINE

## 2020-01-01 PROCEDURE — 85610 PROTHROMBIN TIME: CPT

## 2020-01-01 PROCEDURE — 99291 CRITICAL CARE FIRST HOUR: CPT | Mod: 25,,, | Performed by: INTERNAL MEDICINE

## 2020-01-01 PROCEDURE — 84295 ASSAY OF SERUM SODIUM: CPT | Mod: NTX

## 2020-01-01 PROCEDURE — 88309 TISSUE EXAM BY PATHOLOGIST: CPT | Performed by: PATHOLOGY

## 2020-01-01 PROCEDURE — 20600001 HC STEP DOWN PRIVATE ROOM: Mod: NTX

## 2020-01-01 PROCEDURE — 27100088 HC CELL SAVER: Mod: NTX

## 2020-01-01 PROCEDURE — 36410 VNPNXR 3YR/> PHY/QHP DX/THER: CPT

## 2020-01-01 PROCEDURE — 94618 PULMONARY STRESS TESTING: CPT | Mod: PBBFAC,TXP | Performed by: INTERNAL MEDICINE

## 2020-01-01 PROCEDURE — 99214 OFFICE O/P EST MOD 30 MIN: CPT | Mod: 25,S$PBB,TXP, | Performed by: INTERNAL MEDICINE

## 2020-01-01 PROCEDURE — C1769 GUIDE WIRE: HCPCS | Mod: TXP | Performed by: INTERNAL MEDICINE

## 2020-01-01 PROCEDURE — 99213 OFFICE O/P EST LOW 20 MIN: CPT | Mod: PBBFAC,25 | Performed by: INTERNAL MEDICINE

## 2020-01-01 PROCEDURE — 88312 SPECIAL STAINS GROUP 1: CPT | Mod: 26,,, | Performed by: PATHOLOGY

## 2020-01-01 PROCEDURE — 93010 ELECTROCARDIOGRAM REPORT: CPT | Mod: ,,, | Performed by: INTERNAL MEDICINE

## 2020-01-01 PROCEDURE — 99223 1ST HOSP IP/OBS HIGH 75: CPT | Mod: ,,, | Performed by: NURSE PRACTITIONER

## 2020-01-01 PROCEDURE — 99291 CRITICAL CARE FIRST HOUR: CPT | Mod: ,,, | Performed by: EMERGENCY MEDICINE

## 2020-01-01 PROCEDURE — 25000003 PHARM REV CODE 250

## 2020-01-01 PROCEDURE — 31500 INSERT EMERGENCY AIRWAY: CPT | Mod: ,,, | Performed by: INTERNAL MEDICINE

## 2020-01-01 PROCEDURE — 63600175 PHARM REV CODE 636 W HCPCS: Mod: JG | Performed by: STUDENT IN AN ORGANIZED HEALTH CARE EDUCATION/TRAINING PROGRAM

## 2020-01-01 PROCEDURE — 99223 PR INITIAL HOSPITAL CARE,LEVL III: ICD-10-PCS | Mod: ,,, | Performed by: INTERNAL MEDICINE

## 2020-01-01 PROCEDURE — 32852 PR LUNG TRANSPLANT,SINGL W CP BYPASS: ICD-10-PCS | Mod: 22,LT,GC, | Performed by: THORACIC SURGERY (CARDIOTHORACIC VASCULAR SURGERY)

## 2020-01-01 PROCEDURE — 99153 MOD SED SAME PHYS/QHP EA: CPT | Mod: TXP | Performed by: INTERNAL MEDICINE

## 2020-01-01 PROCEDURE — G0180 PR HOME HEALTH MD CERTIFICATION: ICD-10-PCS | Mod: ,,, | Performed by: INTERNAL MEDICINE

## 2020-01-01 PROCEDURE — 99024 PR POST-OP FOLLOW-UP VISIT: ICD-10-PCS | Mod: POP,,, | Performed by: NURSE PRACTITIONER

## 2020-01-01 PROCEDURE — 85730 THROMBOPLASTIN TIME PARTIAL: CPT | Mod: 91

## 2020-01-01 PROCEDURE — 89051 BODY FLUID CELL COUNT: CPT

## 2020-01-01 PROCEDURE — 86825 HLA X-MATH NON-CYTOTOXIC: CPT | Mod: 91

## 2020-01-01 PROCEDURE — 84145 PROCALCITONIN (PCT): CPT

## 2020-01-01 PROCEDURE — 93454 PR CATH PLACE/CORONARY ANGIO, IMG SUPER/INTERP: ICD-10-PCS | Mod: 26,TXP,, | Performed by: INTERNAL MEDICINE

## 2020-01-01 PROCEDURE — 63600175 PHARM REV CODE 636 W HCPCS: Mod: TXP | Performed by: INTERNAL MEDICINE

## 2020-01-01 PROCEDURE — 87541 LEGION PNEUMO DNA AMP PROB: CPT

## 2020-01-01 PROCEDURE — 99999 PR PBB SHADOW E&M-EST. PATIENT-LVL I: ICD-10-PCS | Mod: PBBFAC,TXP,,

## 2020-01-01 PROCEDURE — 94618 PULMONARY STRESS TESTING: ICD-10-PCS | Mod: 26,S$PBB,TXP, | Performed by: INTERNAL MEDICINE

## 2020-01-01 PROCEDURE — 87517 HEPATITIS B DNA QUANT: CPT

## 2020-01-01 PROCEDURE — 87077 CULTURE AEROBIC IDENTIFY: CPT

## 2020-01-01 PROCEDURE — S5010 5% DEXTROSE AND 0.45% SALINE: HCPCS | Mod: TXP | Performed by: INTERNAL MEDICINE

## 2020-01-01 PROCEDURE — 99223 PR INITIAL HOSPITAL CARE,LEVL III: ICD-10-PCS | Mod: AI,NTX,, | Performed by: INTERNAL MEDICINE

## 2020-01-01 PROCEDURE — 87340 HEPATITIS B SURFACE AG IA: CPT

## 2020-01-01 PROCEDURE — 99213 OFFICE O/P EST LOW 20 MIN: CPT | Mod: PBBFAC,25,27 | Performed by: NURSE PRACTITIONER

## 2020-01-01 PROCEDURE — 99223 1ST HOSP IP/OBS HIGH 75: CPT | Mod: AI,NTX,, | Performed by: INTERNAL MEDICINE

## 2020-01-01 PROCEDURE — 85027 COMPLETE CBC AUTOMATED: CPT | Mod: TXP

## 2020-01-01 PROCEDURE — 99231 PR SUBSEQUENT HOSPITAL CARE,LEVL I: ICD-10-PCS | Mod: ,,, | Performed by: NURSE PRACTITIONER

## 2020-01-01 PROCEDURE — U0003 INFECTIOUS AGENT DETECTION BY NUCLEIC ACID (DNA OR RNA); SEVERE ACUTE RESPIRATORY SYNDROME CORONAVIRUS 2 (SARS-COV-2) (CORONAVIRUS DISEASE [COVID-19]), AMPLIFIED PROBE TECHNIQUE, MAKING USE OF HIGH THROUGHPUT TECHNOLOGIES AS DESCRIBED BY CMS-2020-01-R: HCPCS

## 2020-01-01 PROCEDURE — 87040 BLOOD CULTURE FOR BACTERIA: CPT

## 2020-01-01 PROCEDURE — S0028 INJECTION, FAMOTIDINE, 20 MG: HCPCS | Performed by: STUDENT IN AN ORGANIZED HEALTH CARE EDUCATION/TRAINING PROGRAM

## 2020-01-01 PROCEDURE — 93005 ELECTROCARDIOGRAM TRACING: CPT | Mod: TXP

## 2020-01-01 PROCEDURE — 99211 OFF/OP EST MAY X REQ PHY/QHP: CPT | Mod: PBBFAC,25,27,TXP

## 2020-01-01 PROCEDURE — 85610 PROTHROMBIN TIME: CPT | Mod: 91

## 2020-01-01 PROCEDURE — 99223 PR INITIAL HOSPITAL CARE,LEVL III: ICD-10-PCS | Mod: ,,, | Performed by: EMERGENCY MEDICINE

## 2020-01-01 PROCEDURE — 63600175 PHARM REV CODE 636 W HCPCS: Mod: JG

## 2020-01-01 PROCEDURE — 81300001 HC LUNG ACQUISITION CHARGE

## 2020-01-01 PROCEDURE — 80053 COMPREHEN METABOLIC PANEL: CPT | Mod: 91

## 2020-01-01 PROCEDURE — 85730 THROMBOPLASTIN TIME PARTIAL: CPT

## 2020-01-01 PROCEDURE — 32852 LUNG TRANSPLANT WITH BYPASS: CPT | Mod: 22,LT,GC, | Performed by: THORACIC SURGERY (CARDIOTHORACIC VASCULAR SURGERY)

## 2020-01-01 PROCEDURE — 84484 ASSAY OF TROPONIN QUANT: CPT

## 2020-01-01 PROCEDURE — P9045 ALBUMIN (HUMAN), 5%, 250 ML: HCPCS | Mod: JG

## 2020-01-01 PROCEDURE — 87102 FUNGUS ISOLATION CULTURE: CPT

## 2020-01-01 PROCEDURE — 84132 ASSAY OF SERUM POTASSIUM: CPT | Mod: 91

## 2020-01-01 PROCEDURE — 27201423 OPTIME MED/SURG SUP & DEVICES STERILE SUPPLY: Performed by: THORACIC SURGERY (CARDIOTHORACIC VASCULAR SURGERY)

## 2020-01-01 PROCEDURE — 99214 PR OFFICE/OUTPT VISIT, EST, LEVL IV, 30-39 MIN: ICD-10-PCS | Mod: 95,TXP,, | Performed by: INTERNAL MEDICINE

## 2020-01-01 PROCEDURE — 83615 LACTATE (LD) (LDH) ENZYME: CPT

## 2020-01-01 PROCEDURE — 37000009 HC ANESTHESIA EA ADD 15 MINS: Performed by: THORACIC SURGERY (CARDIOTHORACIC VASCULAR SURGERY)

## 2020-01-01 PROCEDURE — 37799 UNLISTED PX VASCULAR SURGERY: CPT | Mod: NTX

## 2020-01-01 PROCEDURE — 94618 PULMONARY STRESS TESTING: CPT

## 2020-01-01 PROCEDURE — 97110 THERAPEUTIC EXERCISES: CPT

## 2020-01-01 PROCEDURE — 31624 DX BRONCHOSCOPE/LAVAGE: CPT | Performed by: INTERNAL MEDICINE

## 2020-01-01 PROCEDURE — 99900025 HC BRONCHOSCOPY-ASST (STAT)

## 2020-01-01 PROCEDURE — 88305 TISSUE EXAM BY PATHOLOGIST: CPT | Performed by: PATHOLOGY

## 2020-01-01 PROCEDURE — 84295 ASSAY OF SERUM SODIUM: CPT

## 2020-01-01 PROCEDURE — 85014 HEMATOCRIT: CPT

## 2020-01-01 PROCEDURE — 85379 FIBRIN DEGRADATION QUANT: CPT

## 2020-01-01 PROCEDURE — 87186 SC STD MICRODIL/AGAR DIL: CPT | Mod: 59

## 2020-01-01 PROCEDURE — 27200188 HC TRANSDUCER, ART ADULT/PEDS

## 2020-01-01 PROCEDURE — 99999 PR PBB SHADOW E&M-EST. PATIENT-LVL IV: ICD-10-PCS | Mod: PBBFAC,TXP,, | Performed by: INTERNAL MEDICINE

## 2020-01-01 PROCEDURE — 80047 BASIC METABLC PNL IONIZED CA: CPT

## 2020-01-01 PROCEDURE — 87206 SMEAR FLUORESCENT/ACID STAI: CPT

## 2020-01-01 PROCEDURE — 84484 ASSAY OF TROPONIN QUANT: CPT | Mod: 91

## 2020-01-01 PROCEDURE — 85025 COMPLETE CBC W/AUTO DIFF WBC: CPT | Mod: 91

## 2020-01-01 PROCEDURE — 87040 BLOOD CULTURE FOR BACTERIA: CPT | Mod: 59

## 2020-01-01 PROCEDURE — 99153 MOD SED SAME PHYS/QHP EA: CPT | Performed by: INTERNAL MEDICINE

## 2020-01-01 PROCEDURE — 99214 PR OFFICE/OUTPT VISIT, EST, LEVL IV, 30-39 MIN: ICD-10-PCS | Mod: 25,S$PBB,TXP, | Performed by: INTERNAL MEDICINE

## 2020-01-01 PROCEDURE — 88313 SPECIAL STAINS GROUP 2: CPT | Mod: 26,,, | Performed by: PATHOLOGY

## 2020-01-01 PROCEDURE — 99214 OFFICE O/P EST MOD 30 MIN: CPT | Mod: PBBFAC,TXP | Performed by: INTERNAL MEDICINE

## 2020-01-01 PROCEDURE — 94010 BREATHING CAPACITY TEST: CPT | Mod: 26,S$PBB,, | Performed by: INTERNAL MEDICINE

## 2020-01-01 PROCEDURE — 86825 HLA X-MATH NON-CYTOTOXIC: CPT

## 2020-01-01 PROCEDURE — 99215 PR OFFICE/OUTPT VISIT, EST, LEVL V, 40-54 MIN: ICD-10-PCS | Mod: S$PBB,,, | Performed by: INTERNAL MEDICINE

## 2020-01-01 PROCEDURE — 94799 UNLISTED PULMONARY SVC/PX: CPT

## 2020-01-01 PROCEDURE — 88312 PR  SPECIAL STAINS,GROUP I: ICD-10-PCS | Mod: 26,,, | Performed by: PATHOLOGY

## 2020-01-01 PROCEDURE — 99291 PR CRITICAL CARE, E/M 30-74 MINUTES: ICD-10-PCS | Mod: GC,,, | Performed by: ANESTHESIOLOGY

## 2020-01-01 PROCEDURE — 87015 SPECIMEN INFECT AGNT CONCNTJ: CPT

## 2020-01-01 PROCEDURE — 99152 MOD SED SAME PHYS/QHP 5/>YRS: CPT | Mod: TXP,,, | Performed by: INTERNAL MEDICINE

## 2020-01-01 PROCEDURE — 94618 PULMONARY STRESS TESTING: CPT | Mod: 26,S$PBB,TXP, | Performed by: INTERNAL MEDICINE

## 2020-01-01 PROCEDURE — 99999 PR PBB SHADOW E&M-EST. PATIENT-LVL V: CPT | Mod: PBBFAC,,, | Performed by: INTERNAL MEDICINE

## 2020-01-01 PROCEDURE — 94010 BREATHING CAPACITY TEST: CPT | Mod: PBBFAC | Performed by: INTERNAL MEDICINE

## 2020-01-01 PROCEDURE — 83036 HEMOGLOBIN GLYCOSYLATED A1C: CPT

## 2020-01-01 PROCEDURE — 84132 ASSAY OF SERUM POTASSIUM: CPT | Mod: NTX

## 2020-01-01 PROCEDURE — 88305 TISSUE EXAM BY PATHOLOGIST: ICD-10-PCS | Mod: 26,,, | Performed by: PATHOLOGY

## 2020-01-01 PROCEDURE — 99211 PR OFFICE/OUTPT VISIT, EST, LEVL I: ICD-10-PCS | Mod: 25,S$GLB,, | Performed by: INTERNAL MEDICINE

## 2020-01-01 PROCEDURE — 31624 DX BRONCHOSCOPE/LAVAGE: CPT | Mod: LT,,, | Performed by: INTERNAL MEDICINE

## 2020-01-01 PROCEDURE — C9399 UNCLASSIFIED DRUGS OR BIOLOG: HCPCS | Performed by: STUDENT IN AN ORGANIZED HEALTH CARE EDUCATION/TRAINING PROGRAM

## 2020-01-01 PROCEDURE — 85520 HEPARIN ASSAY: CPT | Mod: NTX

## 2020-01-01 PROCEDURE — 85384 FIBRINOGEN ACTIVITY: CPT | Mod: 91

## 2020-01-01 PROCEDURE — 76937 SWAN GANZ LINE: ICD-10-PCS | Mod: 26,GC,, | Performed by: ANESTHESIOLOGY

## 2020-01-01 PROCEDURE — 87086 URINE CULTURE/COLONY COUNT: CPT

## 2020-01-01 PROCEDURE — 99291 PR CRITICAL CARE, E/M 30-74 MINUTES: ICD-10-PCS | Mod: ,,, | Performed by: EMERGENCY MEDICINE

## 2020-01-01 PROCEDURE — 63600175 PHARM REV CODE 636 W HCPCS: Mod: NTX | Performed by: INTERNAL MEDICINE

## 2020-01-01 PROCEDURE — 63600367 HC NITRIC OXIDE PER HOUR

## 2020-01-01 PROCEDURE — 31624 PR BRONCHOSCOPY,DIAG2STIC W LAVAGE: ICD-10-PCS | Mod: LT,,, | Performed by: INTERNAL MEDICINE

## 2020-01-01 PROCEDURE — 88309 TISSUE EXAM BY PATHOLOGIST: CPT | Mod: 26,,, | Performed by: PATHOLOGY

## 2020-01-01 PROCEDURE — 88305 TISSUE EXAM BY PATHOLOGIST: CPT | Mod: 26,,, | Performed by: PATHOLOGY

## 2020-01-01 PROCEDURE — 31623 PR BRONCHOSCOPY,DIAGNOSTIC W BRUSH: ICD-10-PCS | Mod: 59,LT,, | Performed by: INTERNAL MEDICINE

## 2020-01-01 PROCEDURE — 36620 INSERTION CATHETER ARTERY: CPT | Mod: 59,,, | Performed by: INTERNAL MEDICINE

## 2020-01-01 PROCEDURE — 76937 US GUIDE VASCULAR ACCESS: CPT | Mod: 26,GC,, | Performed by: ANESTHESIOLOGY

## 2020-01-01 PROCEDURE — 92526 ORAL FUNCTION THERAPY: CPT

## 2020-01-01 PROCEDURE — 83605 ASSAY OF LACTIC ACID: CPT | Mod: 91

## 2020-01-01 PROCEDURE — 94010 BREATHING CAPACITY TEST: ICD-10-PCS | Mod: 26,S$PBB,, | Performed by: INTERNAL MEDICINE

## 2020-01-01 PROCEDURE — 99152 MOD SED SAME PHYS/QHP 5/>YRS: CPT | Mod: TXP | Performed by: INTERNAL MEDICINE

## 2020-01-01 PROCEDURE — 85060 BLOOD SMEAR INTERPRETATION: CPT | Mod: ,,, | Performed by: PATHOLOGY

## 2020-01-01 PROCEDURE — 94002 VENT MGMT INPAT INIT DAY: CPT | Mod: NTX

## 2020-01-01 PROCEDURE — 63600175 PHARM REV CODE 636 W HCPCS: Performed by: EMERGENCY MEDICINE

## 2020-01-01 PROCEDURE — C1894 INTRO/SHEATH, NON-LASER: HCPCS | Mod: TXP | Performed by: INTERNAL MEDICINE

## 2020-01-01 PROCEDURE — 99900026 HC AIRWAY MAINTENANCE (STAT): Mod: NTX

## 2020-01-01 PROCEDURE — 99238 HOSP IP/OBS DSCHRG MGMT 30/<: CPT | Mod: ,,, | Performed by: INTERNAL MEDICINE

## 2020-01-01 PROCEDURE — 81001 URINALYSIS AUTO W/SCOPE: CPT

## 2020-01-01 PROCEDURE — 93312 ECHO TRANSESOPHAGEAL: CPT | Mod: 26,59,, | Performed by: ANESTHESIOLOGY

## 2020-01-01 PROCEDURE — 25000003 PHARM REV CODE 250: Performed by: SURGERY

## 2020-01-01 PROCEDURE — 32855 PR TRANSPLANT,PREP DONOR LUNG, SINGLE: ICD-10-PCS | Mod: 51,LT,GC, | Performed by: THORACIC SURGERY (CARDIOTHORACIC VASCULAR SURGERY)

## 2020-01-01 PROCEDURE — 36600 WITHDRAWAL OF ARTERIAL BLOOD: CPT | Mod: NTX

## 2020-01-01 PROCEDURE — P9017 PLASMA 1 DONOR FRZ W/IN 8 HR: HCPCS

## 2020-01-01 PROCEDURE — 99214 OFFICE O/P EST MOD 30 MIN: CPT | Mod: PBBFAC,TXP,25 | Performed by: INTERNAL MEDICINE

## 2020-01-01 PROCEDURE — 93010 RHYTHM STRIP: ICD-10-PCS | Mod: ,,, | Performed by: INTERNAL MEDICINE

## 2020-01-01 PROCEDURE — 93010 ELECTROCARDIOGRAM REPORT: CPT | Mod: TXP,,, | Performed by: INTERNAL MEDICINE

## 2020-01-01 PROCEDURE — 87206 SMEAR FLUORESCENT/ACID STAI: CPT | Mod: 91

## 2020-01-01 PROCEDURE — 25000003 PHARM REV CODE 250: Performed by: ANESTHESIOLOGY

## 2020-01-01 PROCEDURE — 93571 PR HEART FLOW RESERV MEASURE,INIT VESSL: ICD-10-PCS | Mod: 26,52,TXP, | Performed by: INTERNAL MEDICINE

## 2020-01-01 PROCEDURE — 36430 TRANSFUSION BLD/BLD COMPNT: CPT

## 2020-01-01 PROCEDURE — 80048 BASIC METABOLIC PNL TOTAL CA: CPT | Mod: TXP

## 2020-01-01 PROCEDURE — 81003 URINALYSIS AUTO W/O SCOPE: CPT

## 2020-01-01 PROCEDURE — 31623 DX BRONCHOSCOPE/BRUSH: CPT | Performed by: INTERNAL MEDICINE

## 2020-01-01 PROCEDURE — 36592 COLLECT BLOOD FROM PICC: CPT | Mod: NTX

## 2020-01-01 PROCEDURE — 87070 CULTURE OTHR SPECIMN AEROBIC: CPT | Mod: 59

## 2020-01-01 PROCEDURE — 99152 MOD SED SAME PHYS/QHP 5/>YRS: CPT | Performed by: INTERNAL MEDICINE

## 2020-01-01 PROCEDURE — 31622 DX BRONCHOSCOPE/WASH: CPT

## 2020-01-01 PROCEDURE — 94002 VENT MGMT INPAT INIT DAY: CPT

## 2020-01-01 PROCEDURE — 88312 SPECIAL STAINS GROUP 1: CPT | Performed by: PATHOLOGY

## 2020-01-01 PROCEDURE — 27000175 HC ADULT BYPASS PUMP: Mod: NTX

## 2020-01-01 PROCEDURE — S0030 INJECTION, METRONIDAZOLE: HCPCS | Performed by: INTERNAL MEDICINE

## 2020-01-01 PROCEDURE — 99291 PR CRITICAL CARE, E/M 30-74 MINUTES: ICD-10-PCS | Mod: 25,,, | Performed by: INTERNAL MEDICINE

## 2020-01-01 PROCEDURE — 99231 SBSQ HOSP IP/OBS SF/LOW 25: CPT | Mod: ,,, | Performed by: NURSE PRACTITIONER

## 2020-01-01 PROCEDURE — 36600 WITHDRAWAL OF ARTERIAL BLOOD: CPT

## 2020-01-01 PROCEDURE — 93571 IV DOP VEL&/PRESS C FLO 1ST: CPT | Mod: 26,52,TXP, | Performed by: INTERNAL MEDICINE

## 2020-01-01 PROCEDURE — 86965 POOLING BLOOD PLATELETS: CPT

## 2020-01-01 PROCEDURE — 99291 CRITICAL CARE FIRST HOUR: CPT | Mod: 25

## 2020-01-01 PROCEDURE — 94010 BREATHING CAPACITY TEST: CPT | Mod: 26,S$PBB,TXP, | Performed by: INTERNAL MEDICINE

## 2020-01-01 PROCEDURE — 99024 POSTOP FOLLOW-UP VISIT: CPT | Mod: POP,,, | Performed by: NURSE PRACTITIONER

## 2020-01-01 PROCEDURE — 93454 CORONARY ARTERY ANGIO S&I: CPT | Mod: 26,TXP,, | Performed by: INTERNAL MEDICINE

## 2020-01-01 PROCEDURE — 86850 RBC ANTIBODY SCREEN: CPT | Mod: TXP

## 2020-01-01 PROCEDURE — C1887 CATHETER, GUIDING: HCPCS | Mod: TXP | Performed by: INTERNAL MEDICINE

## 2020-01-01 PROCEDURE — 93571 IV DOP VEL&/PRESS C FLO 1ST: CPT | Mod: 74,TXP | Performed by: INTERNAL MEDICINE

## 2020-01-01 PROCEDURE — 87205 SMEAR GRAM STAIN: CPT | Mod: 59

## 2020-01-01 PROCEDURE — 93503 INSERT/PLACE HEART CATHETER: CPT | Mod: 59,GC,, | Performed by: ANESTHESIOLOGY

## 2020-01-01 PROCEDURE — 31628 BRONCHOSCOPY/LUNG BX EACH: CPT | Performed by: INTERNAL MEDICINE

## 2020-01-01 PROCEDURE — 31500 INTUBATION: ICD-10-PCS | Mod: ,,, | Performed by: INTERNAL MEDICINE

## 2020-01-01 PROCEDURE — 86826 HLA X-MATCH NONCYTOTOXC ADDL: CPT | Mod: 91

## 2020-01-01 PROCEDURE — 87522 HEPATITIS C REVRS TRNSCRPJ: CPT

## 2020-01-01 PROCEDURE — 82728 ASSAY OF FERRITIN: CPT

## 2020-01-01 PROCEDURE — 86703 HIV-1/HIV-2 1 RESULT ANTBDY: CPT

## 2020-01-01 PROCEDURE — 94003 VENT MGMT INPAT SUBQ DAY: CPT | Mod: NTX

## 2020-01-01 PROCEDURE — 81003 URINALYSIS AUTO W/O SCOPE: CPT | Mod: TXP

## 2020-01-01 PROCEDURE — 27202608 HC CANNULA, MISC: Mod: NTX

## 2020-01-01 PROCEDURE — 86977 RBC SERUM PRETX INCUBJ/INHIB: CPT | Mod: 59

## 2020-01-01 PROCEDURE — 99211 OFF/OP EST MAY X REQ PHY/QHP: CPT | Mod: 25,S$GLB,, | Performed by: INTERNAL MEDICINE

## 2020-01-01 PROCEDURE — 36620 ARTERIAL LINE: ICD-10-PCS | Mod: 59,,, | Performed by: INTERNAL MEDICINE

## 2020-01-01 RX ORDER — FUROSEMIDE 10 MG/ML
60 INJECTION INTRAMUSCULAR; INTRAVENOUS ONCE
Status: DISCONTINUED | OUTPATIENT
Start: 2020-01-01 | End: 2020-01-01

## 2020-01-01 RX ORDER — SULFAMETHOXAZOLE AND TRIMETHOPRIM 800; 160 MG/1; MG/1
1 TABLET ORAL
Status: DISCONTINUED | OUTPATIENT
Start: 2020-01-01 | End: 2020-01-01 | Stop reason: HOSPADM

## 2020-01-01 RX ORDER — QUETIAPINE FUMARATE 25 MG/1
25 TABLET, FILM COATED ORAL NIGHTLY
Status: DISCONTINUED | OUTPATIENT
Start: 2020-01-01 | End: 2020-01-01

## 2020-01-01 RX ORDER — INSULIN ASPART 100 [IU]/ML
3 INJECTION, SOLUTION INTRAVENOUS; SUBCUTANEOUS
Status: DISCONTINUED | OUTPATIENT
Start: 2020-01-01 | End: 2020-01-01

## 2020-01-01 RX ORDER — NIFEDIPINE 30 MG/1
30 TABLET, EXTENDED RELEASE ORAL DAILY
Status: DISCONTINUED | OUTPATIENT
Start: 2020-01-01 | End: 2020-01-01

## 2020-01-01 RX ORDER — HEPARIN SODIUM 200 [USP'U]/100ML
INJECTION, SOLUTION INTRAVENOUS
Status: DISCONTINUED | OUTPATIENT
Start: 2020-01-01 | End: 2020-01-01 | Stop reason: HOSPADM

## 2020-01-01 RX ORDER — CIPROFLOXACIN 500 MG/1
500 TABLET ORAL EVERY 12 HOURS
Qty: 14 TABLET | Refills: 0 | Status: ON HOLD | OUTPATIENT
Start: 2020-01-01 | End: 2020-01-01 | Stop reason: HOSPADM

## 2020-01-01 RX ORDER — MAGNESIUM SULFATE HEPTAHYDRATE 40 MG/ML
2 INJECTION, SOLUTION INTRAVENOUS
Status: DISCONTINUED | OUTPATIENT
Start: 2020-01-01 | End: 2020-12-07 | Stop reason: HOSPADM

## 2020-01-01 RX ORDER — VANCOMYCIN HCL IN 5 % DEXTROSE 1G/250ML
1000 PLASTIC BAG, INJECTION (ML) INTRAVENOUS
Status: DISCONTINUED | OUTPATIENT
Start: 2020-01-01 | End: 2020-12-07 | Stop reason: HOSPADM

## 2020-01-01 RX ORDER — IBUPROFEN 200 MG
24 TABLET ORAL
Status: DISCONTINUED | OUTPATIENT
Start: 2020-01-01 | End: 2020-01-01

## 2020-01-01 RX ORDER — TACROLIMUS 0.5 MG/1
0.5 CAPSULE ORAL NIGHTLY
Qty: 30 CAPSULE | Refills: 11 | Status: SHIPPED | OUTPATIENT
Start: 2020-01-01 | End: 2020-01-01

## 2020-01-01 RX ORDER — FUROSEMIDE 10 MG/ML
80 INJECTION INTRAMUSCULAR; INTRAVENOUS ONCE
Status: COMPLETED | OUTPATIENT
Start: 2020-01-01 | End: 2020-01-01

## 2020-01-01 RX ORDER — PEN NEEDLE, DIABETIC 30 GX3/16"
NEEDLE, DISPOSABLE MISCELLANEOUS
Qty: 100 EACH | Refills: 11 | Status: SHIPPED | OUTPATIENT
Start: 2020-01-01 | End: 2020-01-01 | Stop reason: SDUPTHER

## 2020-01-01 RX ORDER — MEROPENEM 1 G/1
1 INJECTION, POWDER, FOR SOLUTION INTRAVENOUS
Status: DISCONTINUED | OUTPATIENT
Start: 2020-01-01 | End: 2020-01-01

## 2020-01-01 RX ORDER — FENTANYL CITRATE-0.9 % NACL/PF 10 MCG/ML
10 PLASTIC BAG, INJECTION (ML) INTRAVENOUS CONTINUOUS
Status: DISCONTINUED | OUTPATIENT
Start: 2020-01-01 | End: 2020-01-01

## 2020-01-01 RX ORDER — INSULIN PUMP SYRINGE, 3 ML
EACH MISCELLANEOUS
Qty: 1 EACH | Refills: 0 | Status: ON HOLD | OUTPATIENT
Start: 2020-01-01 | End: 2020-01-01 | Stop reason: HOSPADM

## 2020-01-01 RX ORDER — LAMIVUDINE 150 MG/1
150 TABLET, FILM COATED ORAL DAILY
Status: DISCONTINUED | OUTPATIENT
Start: 2020-01-01 | End: 2020-01-01

## 2020-01-01 RX ORDER — CALC/MAG/B COMPLEX/D3/HERB 61
15 TABLET ORAL DAILY
Qty: 30 CAPSULE | Refills: 11 | Status: ON HOLD | OUTPATIENT
Start: 2020-01-01 | End: 2020-01-01 | Stop reason: HOSPADM

## 2020-01-01 RX ORDER — HEPARIN SODIUM 1000 [USP'U]/ML
INJECTION, SOLUTION INTRAVENOUS; SUBCUTANEOUS
Status: DISCONTINUED | OUTPATIENT
Start: 2020-01-01 | End: 2020-01-01 | Stop reason: HOSPADM

## 2020-01-01 RX ORDER — FUROSEMIDE 10 MG/ML
10 INJECTION INTRAMUSCULAR; INTRAVENOUS ONCE
Status: COMPLETED | OUTPATIENT
Start: 2020-01-01 | End: 2020-01-01

## 2020-01-01 RX ORDER — MAGNESIUM SULFATE HEPTAHYDRATE 40 MG/ML
2 INJECTION, SOLUTION INTRAVENOUS ONCE
Status: COMPLETED | OUTPATIENT
Start: 2020-01-01 | End: 2020-01-01

## 2020-01-01 RX ORDER — TACROLIMUS 0.5 MG/1
0.5 CAPSULE ORAL ONCE
Status: DISCONTINUED | OUTPATIENT
Start: 2020-01-01 | End: 2020-01-01 | Stop reason: HOSPADM

## 2020-01-01 RX ORDER — PROMETHAZINE HYDROCHLORIDE AND CODEINE PHOSPHATE 6.25; 1 MG/5ML; MG/5ML
5 SOLUTION ORAL EVERY 4 HOURS PRN
Status: DISCONTINUED | OUTPATIENT
Start: 2020-01-01 | End: 2020-12-07 | Stop reason: HOSPADM

## 2020-01-01 RX ORDER — FAMOTIDINE 20 MG/1
20 TABLET, FILM COATED ORAL 2 TIMES DAILY
Status: DISCONTINUED | OUTPATIENT
Start: 2020-01-01 | End: 2020-01-01

## 2020-01-01 RX ORDER — POTASSIUM CHLORIDE 14.9 MG/ML
20 INJECTION INTRAVENOUS
Status: DISCONTINUED | OUTPATIENT
Start: 2020-01-01 | End: 2020-01-01

## 2020-01-01 RX ORDER — PANTOPRAZOLE SODIUM 40 MG/1
40 TABLET, DELAYED RELEASE ORAL DAILY
Status: DISCONTINUED | OUTPATIENT
Start: 2020-01-01 | End: 2020-01-01

## 2020-01-01 RX ORDER — PREDNISONE 5 MG/1
5 TABLET ORAL DAILY
Qty: 30 TABLET | Refills: 1 | Status: SHIPPED | OUTPATIENT
Start: 2020-12-09 | End: 2020-01-01

## 2020-01-01 RX ORDER — INSULIN ASPART 100 [IU]/ML
3 INJECTION, SOLUTION INTRAVENOUS; SUBCUTANEOUS
Status: DISCONTINUED | OUTPATIENT
Start: 2020-01-01 | End: 2020-12-07 | Stop reason: HOSPADM

## 2020-01-01 RX ORDER — TACROLIMUS 1 MG/1
1 CAPSULE ORAL EVERY MORNING
Status: DISCONTINUED | OUTPATIENT
Start: 2020-01-01 | End: 2020-01-01 | Stop reason: HOSPADM

## 2020-01-01 RX ORDER — IBUPROFEN 200 MG
16 TABLET ORAL
Status: DISCONTINUED | OUTPATIENT
Start: 2020-01-01 | End: 2020-01-01 | Stop reason: HOSPADM

## 2020-01-01 RX ORDER — LAMIVUDINE 10 MG/ML
150 SOLUTION ORAL DAILY
Status: DISCONTINUED | OUTPATIENT
Start: 2020-01-01 | End: 2020-12-07 | Stop reason: HOSPADM

## 2020-01-01 RX ORDER — PROPOFOL 10 MG/ML
INJECTION, EMULSION INTRAVENOUS
Status: COMPLETED
Start: 2020-01-01 | End: 2020-01-01

## 2020-01-01 RX ORDER — TRANEXAMIC ACID 100 MG/ML
INJECTION, SOLUTION INTRAVENOUS
Status: DISCONTINUED | OUTPATIENT
Start: 2020-01-01 | End: 2020-01-01

## 2020-01-01 RX ORDER — MUPIROCIN 20 MG/G
OINTMENT TOPICAL 2 TIMES DAILY
Status: COMPLETED | OUTPATIENT
Start: 2020-01-01 | End: 2020-01-01

## 2020-01-01 RX ORDER — DEXMEDETOMIDINE HYDROCHLORIDE 4 UG/ML
0.2 INJECTION, SOLUTION INTRAVENOUS CONTINUOUS
Status: DISCONTINUED | OUTPATIENT
Start: 2020-01-01 | End: 2020-01-01

## 2020-01-01 RX ORDER — INSULIN ASPART 100 [IU]/ML
0-5 INJECTION, SOLUTION INTRAVENOUS; SUBCUTANEOUS EVERY 6 HOURS PRN
Status: DISCONTINUED | OUTPATIENT
Start: 2020-01-01 | End: 2020-01-01

## 2020-01-01 RX ORDER — ONDANSETRON 8 MG/1
8 TABLET, ORALLY DISINTEGRATING ORAL EVERY 8 HOURS PRN
Qty: 15 TABLET | Refills: 11 | Status: ON HOLD | OUTPATIENT
Start: 2020-01-01 | End: 2020-01-01 | Stop reason: HOSPADM

## 2020-01-01 RX ORDER — FENTANYL CITRATE 50 UG/ML
INJECTION, SOLUTION INTRAMUSCULAR; INTRAVENOUS CODE/TRAUMA/SEDATION MEDICATION
Status: COMPLETED | OUTPATIENT
Start: 2020-01-01 | End: 2020-01-01

## 2020-01-01 RX ORDER — SODIUM CHLORIDE 0.9 % (FLUSH) 0.9 %
10 SYRINGE (ML) INJECTION
Status: DISCONTINUED | OUTPATIENT
Start: 2020-01-01 | End: 2020-12-07 | Stop reason: HOSPADM

## 2020-01-01 RX ORDER — VANCOMYCIN HCL IN 5 % DEXTROSE 1G/250ML
1000 PLASTIC BAG, INJECTION (ML) INTRAVENOUS
Status: DISCONTINUED | OUTPATIENT
Start: 2020-01-01 | End: 2020-01-01

## 2020-01-01 RX ORDER — DIPHENHYDRAMINE HYDROCHLORIDE 50 MG/ML
INJECTION INTRAMUSCULAR; INTRAVENOUS
Status: DISCONTINUED | OUTPATIENT
Start: 2020-01-01 | End: 2020-01-01

## 2020-01-01 RX ORDER — OLMESARTAN MEDOXOMIL AND HYDROCHLOROTHIAZIDE 40/12.5 40; 12.5 MG/1; MG/1
1 TABLET ORAL DAILY
Status: ON HOLD | COMMUNITY
Start: 2020-01-01 | End: 2020-01-01 | Stop reason: HOSPADM

## 2020-01-01 RX ORDER — NOREPINEPHRINE BITARTRATE 1 MG/ML
INJECTION, SOLUTION INTRAVENOUS
Status: DISPENSED
Start: 2020-01-01 | End: 2020-01-01

## 2020-01-01 RX ORDER — ALBUMIN HUMAN 50 G/1000ML
SOLUTION INTRAVENOUS
Status: COMPLETED
Start: 2020-01-01 | End: 2020-01-01

## 2020-01-01 RX ORDER — LANCETS
EACH MISCELLANEOUS
Qty: 100 EACH | Refills: 11 | Status: SHIPPED | OUTPATIENT
Start: 2020-01-01 | End: 2020-01-01 | Stop reason: SDUPTHER

## 2020-01-01 RX ORDER — NOREPINEPHRINE BITARTRATE/D5W 8 MG/250ML
0.02 PLASTIC BAG, INJECTION (ML) INTRAVENOUS CONTINUOUS
Status: DISCONTINUED | OUTPATIENT
Start: 2020-01-01 | End: 2020-01-01

## 2020-01-01 RX ORDER — VASOPRESSIN 20 [USP'U]/ML
INJECTION, SOLUTION INTRAMUSCULAR; SUBCUTANEOUS
Status: DISCONTINUED | OUTPATIENT
Start: 2020-01-01 | End: 2020-01-01

## 2020-01-01 RX ORDER — VASOPRESSIN 20 [USP'U]/ML
INJECTION, SOLUTION INTRAMUSCULAR; SUBCUTANEOUS
Status: DISPENSED
Start: 2020-01-01 | End: 2020-01-01

## 2020-01-01 RX ORDER — INSULIN ASPART 100 [IU]/ML
1-10 INJECTION, SOLUTION INTRAVENOUS; SUBCUTANEOUS EVERY 4 HOURS PRN
Status: DISCONTINUED | OUTPATIENT
Start: 2020-01-01 | End: 2020-12-07 | Stop reason: HOSPADM

## 2020-01-01 RX ORDER — METOPROLOL TARTRATE 100 MG/1
100 TABLET ORAL 2 TIMES DAILY
Qty: 60 TABLET | Refills: 11 | Status: SHIPPED | OUTPATIENT
Start: 2020-01-01 | End: 2020-01-01 | Stop reason: SDUPTHER

## 2020-01-01 RX ORDER — INSULIN ASPART 100 [IU]/ML
1-10 INJECTION, SOLUTION INTRAVENOUS; SUBCUTANEOUS
Status: DISCONTINUED | OUTPATIENT
Start: 2020-01-01 | End: 2020-01-01

## 2020-01-01 RX ORDER — ATORVASTATIN CALCIUM 20 MG/1
20 TABLET, FILM COATED ORAL DAILY
Qty: 30 TABLET | Refills: 11 | Status: ON HOLD | OUTPATIENT
Start: 2020-01-01 | End: 2020-01-01 | Stop reason: HOSPADM

## 2020-01-01 RX ORDER — FLUDROCORTISONE ACETATE 0.1 MG/1
100 TABLET ORAL DAILY
Status: DISCONTINUED | OUTPATIENT
Start: 2020-01-01 | End: 2020-01-01

## 2020-01-01 RX ORDER — VANCOMYCIN HCL IN 5 % DEXTROSE 1.25 G/25
1250 PLASTIC BAG, INJECTION (ML) INTRAVENOUS
Status: DISCONTINUED | OUTPATIENT
Start: 2020-01-01 | End: 2020-01-01

## 2020-01-01 RX ORDER — FENTANYL CITRATE 50 UG/ML
50 INJECTION, SOLUTION INTRAMUSCULAR; INTRAVENOUS
Status: DISCONTINUED | OUTPATIENT
Start: 2020-01-01 | End: 2020-01-01

## 2020-01-01 RX ORDER — LIDOCAINE HCL/PF 100 MG/5ML
SYRINGE (ML) INTRAVENOUS
Status: DISCONTINUED | OUTPATIENT
Start: 2020-01-01 | End: 2020-01-01

## 2020-01-01 RX ORDER — POTASSIUM CHLORIDE 1.5 G/1.58G
60 POWDER, FOR SOLUTION ORAL
Status: DISCONTINUED | OUTPATIENT
Start: 2020-01-01 | End: 2020-01-01 | Stop reason: HOSPADM

## 2020-01-01 RX ORDER — NICARDIPINE HYDROCHLORIDE 0.2 MG/ML
1 INJECTION INTRAVENOUS CONTINUOUS
Status: DISCONTINUED | OUTPATIENT
Start: 2020-01-01 | End: 2020-01-01

## 2020-01-01 RX ORDER — NOREPINEPHRINE BITARTRATE/D5W 4MG/250ML
0-3 PLASTIC BAG, INJECTION (ML) INTRAVENOUS CONTINUOUS
Status: DISCONTINUED | OUTPATIENT
Start: 2020-01-01 | End: 2020-01-01

## 2020-01-01 RX ORDER — IPRATROPIUM BROMIDE AND ALBUTEROL SULFATE 2.5; .5 MG/3ML; MG/3ML
3 SOLUTION RESPIRATORY (INHALATION) EVERY 6 HOURS
Status: DISCONTINUED | OUTPATIENT
Start: 2020-01-01 | End: 2020-01-01 | Stop reason: SINTOL

## 2020-01-01 RX ORDER — GLYCOPYRROLATE 0.2 MG/ML
INJECTION INTRAMUSCULAR; INTRAVENOUS
Status: DISCONTINUED | OUTPATIENT
Start: 2020-01-01 | End: 2020-01-01

## 2020-01-01 RX ORDER — ENOXAPARIN SODIUM 100 MG/ML
40 INJECTION SUBCUTANEOUS EVERY 24 HOURS
Status: DISCONTINUED | OUTPATIENT
Start: 2020-01-01 | End: 2020-01-01 | Stop reason: HOSPADM

## 2020-01-01 RX ORDER — CYCLOBENZAPRINE HCL 10 MG
10 TABLET ORAL 3 TIMES DAILY PRN
Qty: 30 TABLET | Refills: 0 | Status: SHIPPED | OUTPATIENT
Start: 2020-01-01 | End: 2020-01-01

## 2020-01-01 RX ORDER — PEN NEEDLE, DIABETIC 30 GX3/16"
NEEDLE, DISPOSABLE MISCELLANEOUS
Qty: 100 EACH | Refills: 11 | Status: ON HOLD | OUTPATIENT
Start: 2020-01-01 | End: 2020-01-01 | Stop reason: HOSPADM

## 2020-01-01 RX ORDER — INSULIN PUMP SYRINGE, 3 ML
EACH MISCELLANEOUS
Qty: 1 EACH | Refills: 0 | Status: SHIPPED | OUTPATIENT
Start: 2020-01-01 | End: 2020-01-01 | Stop reason: SDUPTHER

## 2020-01-01 RX ORDER — LIDOCAINE HYDROCHLORIDE 10 MG/ML
INJECTION INFILTRATION; PERINEURAL CODE/TRAUMA/SEDATION MEDICATION
Status: COMPLETED | OUTPATIENT
Start: 2020-01-01 | End: 2020-01-01

## 2020-01-01 RX ORDER — TACROLIMUS 0.5 MG/1
0.5 CAPSULE ORAL DAILY
Qty: 30 CAPSULE | Refills: 11 | Status: ON HOLD | OUTPATIENT
Start: 2020-01-01 | End: 2020-01-01 | Stop reason: HOSPADM

## 2020-01-01 RX ORDER — POTASSIUM CHLORIDE 29.8 MG/ML
40 INJECTION INTRAVENOUS ONCE
Status: DISCONTINUED | OUTPATIENT
Start: 2020-01-01 | End: 2020-01-01

## 2020-01-01 RX ORDER — CHLORHEXIDINE GLUCONATE ORAL RINSE 1.2 MG/ML
10 SOLUTION DENTAL 2 TIMES DAILY
Status: DISPENSED | OUTPATIENT
Start: 2020-01-01 | End: 2020-01-01

## 2020-01-01 RX ORDER — INSULIN ASPART 100 [IU]/ML
0-10 INJECTION, SOLUTION INTRAVENOUS; SUBCUTANEOUS
Status: DISCONTINUED | OUTPATIENT
Start: 2020-01-01 | End: 2020-01-01

## 2020-01-01 RX ORDER — POTASSIUM CHLORIDE 29.8 MG/ML
40 INJECTION INTRAVENOUS
Status: DISCONTINUED | OUTPATIENT
Start: 2020-01-01 | End: 2020-01-01

## 2020-01-01 RX ORDER — SENNOSIDES 8.6 MG/1
17.2 TABLET ORAL DAILY
Status: DISCONTINUED | OUTPATIENT
Start: 2020-01-01 | End: 2020-01-01 | Stop reason: HOSPADM

## 2020-01-01 RX ORDER — LIDOCAINE 50 MG/G
1 PATCH TOPICAL DAILY
Qty: 15 PATCH | Refills: 1 | Status: SHIPPED | OUTPATIENT
Start: 2020-01-01 | End: 2020-01-01

## 2020-01-01 RX ORDER — SULFAMETHOXAZOLE AND TRIMETHOPRIM 800; 160 MG/1; MG/1
1 TABLET ORAL
Qty: 15 TABLET | Refills: 11 | Status: ON HOLD | OUTPATIENT
Start: 2020-01-01 | End: 2020-01-01 | Stop reason: HOSPADM

## 2020-01-01 RX ORDER — DOCUSATE SODIUM 100 MG/1
100 CAPSULE, LIQUID FILLED ORAL 2 TIMES DAILY
Status: DISCONTINUED | OUTPATIENT
Start: 2020-01-01 | End: 2020-01-01 | Stop reason: HOSPADM

## 2020-01-01 RX ORDER — IBUPROFEN 200 MG
16 TABLET ORAL
Status: DISCONTINUED | OUTPATIENT
Start: 2020-01-01 | End: 2020-01-01

## 2020-01-01 RX ORDER — FUROSEMIDE 10 MG/ML
60 INJECTION INTRAMUSCULAR; INTRAVENOUS EVERY 6 HOURS
Status: DISCONTINUED | OUTPATIENT
Start: 2020-01-01 | End: 2020-01-01

## 2020-01-01 RX ORDER — FAMOTIDINE 10 MG/ML
20 INJECTION INTRAVENOUS 2 TIMES DAILY
Status: DISCONTINUED | OUTPATIENT
Start: 2020-01-01 | End: 2020-01-01

## 2020-01-01 RX ORDER — TACROLIMUS 0.5 MG/1
0.5 CAPSULE ORAL EVERY MORNING
Status: DISCONTINUED | OUTPATIENT
Start: 2020-01-01 | End: 2020-01-01

## 2020-01-01 RX ORDER — POTASSIUM CHLORIDE 1.5 G/1.58G
40 POWDER, FOR SOLUTION ORAL
Status: DISCONTINUED | OUTPATIENT
Start: 2020-01-01 | End: 2020-01-01 | Stop reason: HOSPADM

## 2020-01-01 RX ORDER — IBUPROFEN 200 MG
24 TABLET ORAL
Status: DISCONTINUED | OUTPATIENT
Start: 2020-01-01 | End: 2020-01-01 | Stop reason: HOSPADM

## 2020-01-01 RX ORDER — PREDNISONE 5 MG/1
5 TABLET ORAL DAILY
Qty: 30 TABLET | Refills: 1 | Status: SHIPPED | OUTPATIENT
Start: 2020-01-01 | End: 2020-01-01

## 2020-01-01 RX ORDER — NIFEDIPINE 30 MG/1
30 TABLET, EXTENDED RELEASE ORAL DAILY
Qty: 30 TABLET | Refills: 11 | Status: ON HOLD | OUTPATIENT
Start: 2020-01-01 | End: 2020-01-01 | Stop reason: HOSPADM

## 2020-01-01 RX ORDER — AMOXICILLIN AND CLAVULANATE POTASSIUM 875; 125 MG/1; MG/1
1 TABLET, FILM COATED ORAL EVERY 12 HOURS
Qty: 60 TABLET | Refills: 2 | Status: ON HOLD | OUTPATIENT
Start: 2020-01-01 | End: 2020-01-01 | Stop reason: HOSPADM

## 2020-01-01 RX ORDER — VANCOMYCIN HCL IN 5 % DEXTROSE 1G/250ML
1000 PLASTIC BAG, INJECTION (ML) INTRAVENOUS
Status: COMPLETED | OUTPATIENT
Start: 2020-01-01 | End: 2020-01-01

## 2020-01-01 RX ORDER — FUROSEMIDE 10 MG/ML
40 INJECTION INTRAMUSCULAR; INTRAVENOUS ONCE
Status: COMPLETED | OUTPATIENT
Start: 2020-01-01 | End: 2020-01-01

## 2020-01-01 RX ORDER — TACROLIMUS 0.5 MG/1
0.5 CAPSULE ORAL 2 TIMES DAILY
Status: DISCONTINUED | OUTPATIENT
Start: 2020-01-01 | End: 2020-01-01

## 2020-01-01 RX ORDER — GLUCAGON 1 MG
1 KIT INJECTION
Status: DISCONTINUED | OUTPATIENT
Start: 2020-01-01 | End: 2020-01-01

## 2020-01-01 RX ORDER — INSULIN ASPART 100 [IU]/ML
0-5 INJECTION, SOLUTION INTRAVENOUS; SUBCUTANEOUS
Status: DISCONTINUED | OUTPATIENT
Start: 2020-01-01 | End: 2020-01-01

## 2020-01-01 RX ORDER — NOREPINEPHRINE BITARTRATE/D5W 4MG/250ML
PLASTIC BAG, INJECTION (ML) INTRAVENOUS
Status: COMPLETED
Start: 2020-01-01 | End: 2020-01-01

## 2020-01-01 RX ORDER — PROPOFOL 10 MG/ML
5 INJECTION, EMULSION INTRAVENOUS CONTINUOUS
Status: DISCONTINUED | OUTPATIENT
Start: 2020-01-01 | End: 2020-01-01

## 2020-01-01 RX ORDER — GLUCAGON 1 MG
1 KIT INJECTION
Status: DISCONTINUED | OUTPATIENT
Start: 2020-01-01 | End: 2020-01-01 | Stop reason: HOSPADM

## 2020-01-01 RX ORDER — PROPOFOL 10 MG/ML
0-50 INJECTION, EMULSION INTRAVENOUS CONTINUOUS
Status: DISCONTINUED | OUTPATIENT
Start: 2020-01-01 | End: 2020-01-01

## 2020-01-01 RX ORDER — TALC
9 POWDER (GRAM) TOPICAL NIGHTLY PRN
Status: DISCONTINUED | OUTPATIENT
Start: 2020-01-01 | End: 2020-01-01 | Stop reason: HOSPADM

## 2020-01-01 RX ORDER — PROPOFOL 10 MG/ML
VIAL (ML) INTRAVENOUS CONTINUOUS PRN
Status: DISCONTINUED | OUTPATIENT
Start: 2020-01-01 | End: 2020-01-01

## 2020-01-01 RX ORDER — VALGANCICLOVIR 450 MG/1
450 TABLET, FILM COATED ORAL 2 TIMES DAILY
Qty: 60 TABLET | Refills: 11 | Status: ON HOLD | OUTPATIENT
Start: 2020-01-01 | End: 2020-01-01 | Stop reason: HOSPADM

## 2020-01-01 RX ORDER — LABETALOL HCL 20 MG/4 ML
10 SYRINGE (ML) INTRAVENOUS EVERY 4 HOURS PRN
Status: DISCONTINUED | OUTPATIENT
Start: 2020-01-01 | End: 2020-01-01

## 2020-01-01 RX ORDER — DEXAMETHASONE SODIUM PHOSPHATE 4 MG/ML
6 INJECTION, SOLUTION INTRA-ARTICULAR; INTRALESIONAL; INTRAMUSCULAR; INTRAVENOUS; SOFT TISSUE
Status: COMPLETED | OUTPATIENT
Start: 2020-01-01 | End: 2020-01-01

## 2020-01-01 RX ORDER — AMOXICILLIN AND CLAVULANATE POTASSIUM 500; 125 MG/1; MG/1
1 TABLET, FILM COATED ORAL 2 TIMES DAILY
Status: DISCONTINUED | OUTPATIENT
Start: 2020-01-01 | End: 2020-01-01 | Stop reason: HOSPADM

## 2020-01-01 RX ORDER — OXYCODONE HYDROCHLORIDE 10 MG/1
10 TABLET ORAL EVERY 6 HOURS PRN
Qty: 20 TABLET | Refills: 0 | Status: CANCELLED | OUTPATIENT
Start: 2020-01-01

## 2020-01-01 RX ORDER — MYCOPHENOLATE MOFETIL 200 MG/ML
500 POWDER, FOR SUSPENSION ORAL 2 TIMES DAILY
Status: DISCONTINUED | OUTPATIENT
Start: 2020-01-01 | End: 2020-01-01

## 2020-01-01 RX ORDER — AMOXICILLIN AND CLAVULANATE POTASSIUM 875; 125 MG/1; MG/1
1 TABLET, FILM COATED ORAL EVERY 12 HOURS
Status: DISCONTINUED | OUTPATIENT
Start: 2020-01-01 | End: 2020-01-01

## 2020-01-01 RX ORDER — INSULIN ASPART 100 [IU]/ML
2 INJECTION, SOLUTION INTRAVENOUS; SUBCUTANEOUS
Status: DISCONTINUED | OUTPATIENT
Start: 2020-01-01 | End: 2020-01-01 | Stop reason: HOSPADM

## 2020-01-01 RX ORDER — DEXMEDETOMIDINE HYDROCHLORIDE 4 UG/ML
INJECTION, SOLUTION INTRAVENOUS
Status: COMPLETED
Start: 2020-01-01 | End: 2020-01-01

## 2020-01-01 RX ORDER — CLOPIDOGREL 300 MG/1
600 TABLET, FILM COATED ORAL ONCE
Status: COMPLETED | OUTPATIENT
Start: 2020-01-01 | End: 2020-01-01

## 2020-01-01 RX ORDER — OXYCODONE HYDROCHLORIDE 10 MG/1
10 TABLET ORAL EVERY 4 HOURS PRN
Status: DISCONTINUED | OUTPATIENT
Start: 2020-01-01 | End: 2020-01-01 | Stop reason: HOSPADM

## 2020-01-01 RX ORDER — SULFAMETHOXAZOLE AND TRIMETHOPRIM 400; 80 MG/1; MG/1
1 TABLET ORAL
Status: DISCONTINUED | OUTPATIENT
Start: 2020-01-01 | End: 2020-01-01

## 2020-01-01 RX ORDER — PHENYLEPHRINE HCL IN 0.9% NACL 1 MG/10 ML
100 SYRINGE (ML) INTRAVENOUS ONCE
Status: COMPLETED | OUTPATIENT
Start: 2020-01-01 | End: 2020-01-01

## 2020-01-01 RX ORDER — TACROLIMUS 0.5 MG/1
0.5 CAPSULE ORAL EVERY EVENING
Status: DISCONTINUED | OUTPATIENT
Start: 2020-01-01 | End: 2020-01-01 | Stop reason: HOSPADM

## 2020-01-01 RX ORDER — LIDOCAINE HYDROCHLORIDE 20 MG/ML
SOLUTION OROPHARYNGEAL CODE/TRAUMA/SEDATION MEDICATION
Status: COMPLETED | OUTPATIENT
Start: 2020-01-01 | End: 2020-01-01

## 2020-01-01 RX ORDER — SULFAMETHOXAZOLE AND TRIMETHOPRIM 200; 40 MG/5ML; MG/5ML
20 SUSPENSION ORAL
Status: DISCONTINUED | OUTPATIENT
Start: 2020-01-01 | End: 2020-01-01

## 2020-01-01 RX ORDER — POLYETHYLENE GLYCOL 3350 17 G/17G
17 POWDER, FOR SOLUTION ORAL DAILY
Status: DISCONTINUED | OUTPATIENT
Start: 2020-01-01 | End: 2020-12-07 | Stop reason: HOSPADM

## 2020-01-01 RX ORDER — POTASSIUM CHLORIDE 14.9 MG/ML
60 INJECTION INTRAVENOUS
Status: DISCONTINUED | OUTPATIENT
Start: 2020-01-01 | End: 2020-12-07 | Stop reason: HOSPADM

## 2020-01-01 RX ORDER — NOREPINEPHRINE BITARTRATE/D5W 4MG/250ML
0.02 PLASTIC BAG, INJECTION (ML) INTRAVENOUS CONTINUOUS
Status: DISCONTINUED | OUTPATIENT
Start: 2020-01-01 | End: 2020-01-01

## 2020-01-01 RX ORDER — PREDNISONE 5 MG/1
5 TABLET ORAL DAILY
Qty: 29 TABLET | Refills: 0 | Status: SHIPPED | OUTPATIENT
Start: 2020-01-01 | End: 2020-01-01

## 2020-01-01 RX ORDER — POTASSIUM CHLORIDE 29.8 MG/ML
40 INJECTION INTRAVENOUS ONCE
Status: COMPLETED | OUTPATIENT
Start: 2020-01-01 | End: 2020-01-01

## 2020-01-01 RX ORDER — PHENYLEPHRINE HCL IN 0.9% NACL 1 MG/10 ML
SYRINGE (ML) INTRAVENOUS
Status: COMPLETED
Start: 2020-01-01 | End: 2020-01-01

## 2020-01-01 RX ORDER — POLYETHYLENE GLYCOL 3350 17 G/17G
17 POWDER, FOR SOLUTION ORAL DAILY
Status: DISCONTINUED | OUTPATIENT
Start: 2020-01-01 | End: 2020-01-01 | Stop reason: HOSPADM

## 2020-01-01 RX ORDER — NEOSTIGMINE METHYLSULFATE 0.5 MG/ML
INJECTION, SOLUTION INTRAVENOUS
Status: DISCONTINUED | OUTPATIENT
Start: 2020-01-01 | End: 2020-01-01

## 2020-01-01 RX ORDER — MIDAZOLAM HYDROCHLORIDE 5 MG/ML
INJECTION INTRAMUSCULAR; INTRAVENOUS CODE/TRAUMA/SEDATION MEDICATION
Status: COMPLETED | OUTPATIENT
Start: 2020-01-01 | End: 2020-01-01

## 2020-01-01 RX ORDER — ENOXAPARIN SODIUM 100 MG/ML
1 INJECTION SUBCUTANEOUS EVERY 12 HOURS
Status: DISCONTINUED | OUTPATIENT
Start: 2020-01-01 | End: 2020-12-07 | Stop reason: HOSPADM

## 2020-01-01 RX ORDER — PREDNISONE 5 MG/1
TABLET ORAL
Qty: 140 TABLET | Refills: 11 | Status: CANCELLED | OUTPATIENT
Start: 2020-01-01

## 2020-01-01 RX ORDER — SULFAMETHOXAZOLE AND TRIMETHOPRIM 800; 160 MG/1; MG/1
1 TABLET ORAL
Status: DISCONTINUED | OUTPATIENT
Start: 2020-01-01 | End: 2020-01-01

## 2020-01-01 RX ORDER — DIPHENHYDRAMINE HCL 25 MG
50 CAPSULE ORAL ONCE
Status: CANCELLED | OUTPATIENT
Start: 2020-01-01 | End: 2020-01-01

## 2020-01-01 RX ORDER — POTASSIUM CHLORIDE 14.9 MG/ML
20 INJECTION INTRAVENOUS ONCE
Status: COMPLETED | OUTPATIENT
Start: 2020-01-01 | End: 2020-01-01

## 2020-01-01 RX ORDER — ALBUMIN HUMAN 50 G/1000ML
25 SOLUTION INTRAVENOUS ONCE
Status: COMPLETED | OUTPATIENT
Start: 2020-01-01 | End: 2020-01-01

## 2020-01-01 RX ORDER — DEXAMETHASONE SODIUM PHOSPHATE 4 MG/ML
6 INJECTION, SOLUTION INTRA-ARTICULAR; INTRALESIONAL; INTRAMUSCULAR; INTRAVENOUS; SOFT TISSUE DAILY
Status: COMPLETED | OUTPATIENT
Start: 2020-01-01 | End: 2020-01-01

## 2020-01-01 RX ORDER — GABAPENTIN 100 MG/1
100 CAPSULE ORAL 3 TIMES DAILY
Status: DISCONTINUED | OUTPATIENT
Start: 2020-01-01 | End: 2020-01-01 | Stop reason: HOSPADM

## 2020-01-01 RX ORDER — LIDOCAINE 50 MG/G
1 PATCH TOPICAL
Status: DISCONTINUED | OUTPATIENT
Start: 2020-01-01 | End: 2020-01-01 | Stop reason: HOSPADM

## 2020-01-01 RX ORDER — CLOTRIMAZOLE AND BETAMETHASONE DIPROPIONATE 10; .64 MG/G; MG/G
CREAM TOPICAL 2 TIMES DAILY
Qty: 1 TUBE | Refills: 2 | Status: SHIPPED | OUTPATIENT
Start: 2020-01-01 | End: 2020-01-01

## 2020-01-01 RX ORDER — LACTULOSE 10 G/15ML
20 SOLUTION ORAL EVERY 6 HOURS PRN
Status: DISCONTINUED | OUTPATIENT
Start: 2020-01-01 | End: 2020-01-01 | Stop reason: HOSPADM

## 2020-01-01 RX ORDER — MUPIROCIN 20 MG/G
OINTMENT TOPICAL
Status: DISCONTINUED | OUTPATIENT
Start: 2020-01-01 | End: 2020-01-01

## 2020-01-01 RX ORDER — POTASSIUM CHLORIDE 14.9 MG/ML
40 INJECTION INTRAVENOUS
Status: DISCONTINUED | OUTPATIENT
Start: 2020-01-01 | End: 2020-01-01

## 2020-01-01 RX ORDER — PREDNISONE 5 MG/1
5 TABLET ORAL DAILY
Qty: 30 TABLET | Refills: 11 | Status: SHIPPED | OUTPATIENT
Start: 2021-02-08 | End: 2020-01-01

## 2020-01-01 RX ORDER — CEFEPIME HYDROCHLORIDE 2 G/1
2 INJECTION, POWDER, FOR SOLUTION INTRAVENOUS
Status: DISCONTINUED | OUTPATIENT
Start: 2020-01-01 | End: 2020-01-01

## 2020-01-01 RX ORDER — PROTAMINE SULFATE 10 MG/ML
INJECTION, SOLUTION INTRAVENOUS
Status: DISCONTINUED | OUTPATIENT
Start: 2020-01-01 | End: 2020-01-01

## 2020-01-01 RX ORDER — TACROLIMUS 1 MG/1
1 CAPSULE ORAL DAILY
Qty: 30 CAPSULE | Refills: 11 | Status: SHIPPED | OUTPATIENT
Start: 2020-01-01 | End: 2020-01-01 | Stop reason: DRUGHIGH

## 2020-01-01 RX ORDER — MYCOPHENOLATE MOFETIL 250 MG/1
1000 CAPSULE ORAL 2 TIMES DAILY
Qty: 240 CAPSULE | Refills: 11 | Status: ON HOLD | OUTPATIENT
Start: 2020-01-01 | End: 2020-01-01 | Stop reason: HOSPADM

## 2020-01-01 RX ORDER — GLUCAGON 1 MG
1 KIT INJECTION
Status: DISCONTINUED | OUTPATIENT
Start: 2020-01-01 | End: 2020-12-07 | Stop reason: HOSPADM

## 2020-01-01 RX ORDER — AZITHROMYCIN 250 MG/1
250 TABLET, FILM COATED ORAL
Status: DISCONTINUED | OUTPATIENT
Start: 2020-01-01 | End: 2020-01-01

## 2020-01-01 RX ORDER — INSULIN ASPART 100 [IU]/ML
4 INJECTION, SOLUTION INTRAVENOUS; SUBCUTANEOUS
Qty: 1 BOX | Refills: 3 | Status: ON HOLD | OUTPATIENT
Start: 2020-01-01 | End: 2020-01-01 | Stop reason: HOSPADM

## 2020-01-01 RX ORDER — ACETAZOLAMIDE 500 MG/5ML
500 INJECTION, POWDER, LYOPHILIZED, FOR SOLUTION INTRAVENOUS ONCE
Status: COMPLETED | OUTPATIENT
Start: 2020-01-01 | End: 2020-01-01

## 2020-01-01 RX ORDER — INSULIN ASPART 100 [IU]/ML
0-5 INJECTION, SOLUTION INTRAVENOUS; SUBCUTANEOUS EVERY 4 HOURS PRN
Status: DISCONTINUED | OUTPATIENT
Start: 2020-01-01 | End: 2020-01-01

## 2020-01-01 RX ORDER — METOPROLOL SUCCINATE 25 MG/1
25 TABLET, EXTENDED RELEASE ORAL DAILY
Status: ON HOLD | COMMUNITY
Start: 2020-01-01 | End: 2020-01-01 | Stop reason: HOSPADM

## 2020-01-01 RX ORDER — FENTANYL CITRATE 50 UG/ML
INJECTION, SOLUTION INTRAMUSCULAR; INTRAVENOUS
Status: DISCONTINUED | OUTPATIENT
Start: 2020-01-01 | End: 2020-01-01

## 2020-01-01 RX ORDER — MAGNESIUM SULFATE HEPTAHYDRATE 40 MG/ML
2 INJECTION, SOLUTION INTRAVENOUS
Status: DISCONTINUED | OUTPATIENT
Start: 2020-01-01 | End: 2020-01-01 | Stop reason: HOSPADM

## 2020-01-01 RX ORDER — METOCLOPRAMIDE HYDROCHLORIDE 5 MG/ML
5 INJECTION INTRAMUSCULAR; INTRAVENOUS EVERY 6 HOURS PRN
Status: DISCONTINUED | OUTPATIENT
Start: 2020-01-01 | End: 2020-01-01 | Stop reason: HOSPADM

## 2020-01-01 RX ORDER — LANCETS
EACH MISCELLANEOUS
Qty: 100 EACH | Refills: 11 | Status: ON HOLD | OUTPATIENT
Start: 2020-01-01 | End: 2020-01-01 | Stop reason: HOSPADM

## 2020-01-01 RX ORDER — SODIUM CHLORIDE 9 MG/ML
INJECTION, SOLUTION INTRAVENOUS
Status: DISCONTINUED | OUTPATIENT
Start: 2020-01-01 | End: 2020-01-01 | Stop reason: HOSPADM

## 2020-01-01 RX ORDER — GABAPENTIN 100 MG/1
100 CAPSULE ORAL 3 TIMES DAILY
Qty: 90 CAPSULE | Refills: 11 | Status: ON HOLD | OUTPATIENT
Start: 2020-01-01 | End: 2020-01-01 | Stop reason: HOSPADM

## 2020-01-01 RX ORDER — POTASSIUM CHLORIDE 29.8 MG/ML
80 INJECTION INTRAVENOUS
Status: DISCONTINUED | OUTPATIENT
Start: 2020-01-01 | End: 2020-12-07 | Stop reason: HOSPADM

## 2020-01-01 RX ORDER — INSULIN ASPART 100 [IU]/ML
0-5 INJECTION, SOLUTION INTRAVENOUS; SUBCUTANEOUS
Status: DISCONTINUED | OUTPATIENT
Start: 2020-01-01 | End: 2020-01-01 | Stop reason: HOSPADM

## 2020-01-01 RX ORDER — METHYLPREDNISOLONE SODIUM SUCCINATE 1 G/16ML
INJECTION INTRAMUSCULAR; INTRAVENOUS
Status: DISCONTINUED | OUTPATIENT
Start: 2020-01-01 | End: 2020-01-01

## 2020-01-01 RX ORDER — LABETALOL HCL 20 MG/4 ML
10 SYRINGE (ML) INTRAVENOUS ONCE
Status: COMPLETED | OUTPATIENT
Start: 2020-01-01 | End: 2020-01-01

## 2020-01-01 RX ORDER — AZITHROMYCIN 250 MG/1
250 TABLET, FILM COATED ORAL DAILY
COMMUNITY
Start: 2020-01-01 | End: 2020-01-01

## 2020-01-01 RX ORDER — FUROSEMIDE 10 MG/ML
80 INJECTION INTRAMUSCULAR; INTRAVENOUS 2 TIMES DAILY
Status: DISCONTINUED | OUTPATIENT
Start: 2020-01-01 | End: 2020-01-01

## 2020-01-01 RX ORDER — FUROSEMIDE 10 MG/ML
60 INJECTION INTRAMUSCULAR; INTRAVENOUS EVERY 12 HOURS
Status: DISCONTINUED | OUTPATIENT
Start: 2020-01-01 | End: 2020-01-01

## 2020-01-01 RX ORDER — LEVALBUTEROL 1.25 MG/.5ML
1.25 SOLUTION, CONCENTRATE RESPIRATORY (INHALATION) EVERY 6 HOURS PRN
Status: DISCONTINUED | OUTPATIENT
Start: 2020-01-01 | End: 2020-01-01 | Stop reason: HOSPADM

## 2020-01-01 RX ORDER — ASPIRIN 81 MG/1
81 TABLET ORAL DAILY
Status: DISCONTINUED | OUTPATIENT
Start: 2020-01-01 | End: 2020-01-01 | Stop reason: HOSPADM

## 2020-01-01 RX ORDER — PROPOFOL 10 MG/ML
5 INJECTION, EMULSION INTRAVENOUS CONTINUOUS
Status: DISCONTINUED | OUTPATIENT
Start: 2020-01-01 | End: 2020-12-07 | Stop reason: HOSPADM

## 2020-01-01 RX ORDER — BETAMETHASONE DIPROPIONATE 0.5 MG/G
CREAM TOPICAL 2 TIMES DAILY
Qty: 30 G | Refills: 1 | Status: SHIPPED | OUTPATIENT
Start: 2020-01-01 | End: 2020-01-01 | Stop reason: SDUPTHER

## 2020-01-01 RX ORDER — HYDROCODONE BITARTRATE AND ACETAMINOPHEN 500; 5 MG/1; MG/1
TABLET ORAL
Status: DISCONTINUED | OUTPATIENT
Start: 2020-01-01 | End: 2020-01-01

## 2020-01-01 RX ORDER — POLYETHYLENE GLYCOL 3350 17 G/17G
17 POWDER, FOR SOLUTION ORAL 2 TIMES DAILY PRN
Qty: 510 G | Refills: 5 | Status: ON HOLD | OUTPATIENT
Start: 2020-01-01 | End: 2020-01-01 | Stop reason: HOSPADM

## 2020-01-01 RX ORDER — POTASSIUM CHLORIDE 29.8 MG/ML
40 INJECTION INTRAVENOUS
Status: DISCONTINUED | OUTPATIENT
Start: 2020-01-01 | End: 2020-12-07 | Stop reason: HOSPADM

## 2020-01-01 RX ORDER — ASPIRIN 81 MG/1
81 TABLET ORAL DAILY
Qty: 30 TABLET | Refills: 11 | Status: ON HOLD | OUTPATIENT
Start: 2020-01-01 | End: 2020-01-01 | Stop reason: HOSPADM

## 2020-01-01 RX ORDER — CISATRACURIUM BESYLATE 2 MG/ML
0.15 INJECTION, SOLUTION INTRAVENOUS ONCE
Status: COMPLETED | OUTPATIENT
Start: 2020-01-01 | End: 2020-01-01

## 2020-01-01 RX ORDER — FENTANYL CITRATE-0.9 % NACL/PF 10 MCG/ML
0-250 PLASTIC BAG, INJECTION (ML) INTRAVENOUS CONTINUOUS
Status: DISCONTINUED | OUTPATIENT
Start: 2020-01-01 | End: 2020-12-07 | Stop reason: HOSPADM

## 2020-01-01 RX ORDER — DIPHENHYDRAMINE HCL 50 MG
50 CAPSULE ORAL ONCE
Status: COMPLETED | OUTPATIENT
Start: 2020-01-01 | End: 2020-01-01

## 2020-01-01 RX ORDER — LIDOCAINE HYDROCHLORIDE 20 MG/ML
INJECTION, SOLUTION INFILTRATION; PERINEURAL CODE/TRAUMA/SEDATION MEDICATION
Status: COMPLETED | OUTPATIENT
Start: 2020-01-01 | End: 2020-01-01

## 2020-01-01 RX ORDER — NIFEDIPINE 30 MG/1
30 TABLET, EXTENDED RELEASE ORAL DAILY
Status: DISCONTINUED | OUTPATIENT
Start: 2020-01-01 | End: 2020-01-01 | Stop reason: HOSPADM

## 2020-01-01 RX ORDER — PANTOPRAZOLE SODIUM 40 MG/1
40 TABLET, DELAYED RELEASE ORAL DAILY
Status: DISCONTINUED | OUTPATIENT
Start: 2020-01-01 | End: 2020-01-01 | Stop reason: HOSPADM

## 2020-01-01 RX ORDER — ATENOLOL 25 MG/1
25 TABLET ORAL DAILY
COMMUNITY
Start: 2020-01-01 | End: 2020-01-01

## 2020-01-01 RX ORDER — NOREPINEPHRINE BITARTRATE/D5W 4MG/250ML
0-.2 PLASTIC BAG, INJECTION (ML) INTRAVENOUS CONTINUOUS
Status: DISCONTINUED | OUTPATIENT
Start: 2020-01-01 | End: 2020-01-01

## 2020-01-01 RX ORDER — ONDANSETRON 2 MG/ML
INJECTION INTRAMUSCULAR; INTRAVENOUS
Status: DISCONTINUED | OUTPATIENT
Start: 2020-01-01 | End: 2020-01-01

## 2020-01-01 RX ORDER — FUROSEMIDE 10 MG/ML
20 INJECTION INTRAMUSCULAR; INTRAVENOUS DAILY
Status: COMPLETED | OUTPATIENT
Start: 2020-01-01 | End: 2020-01-01

## 2020-01-01 RX ORDER — PHENYLEPHRINE HCL IN 0.9% NACL 20MG/250ML
0.5 PLASTIC BAG, INJECTION (ML) INTRAVENOUS CONTINUOUS
Status: DISCONTINUED | OUTPATIENT
Start: 2020-01-01 | End: 2020-01-01

## 2020-01-01 RX ORDER — PREDNISONE 5 MG/1
20 TABLET ORAL DAILY
Status: DISCONTINUED | OUTPATIENT
Start: 2020-01-01 | End: 2020-01-01

## 2020-01-01 RX ORDER — LANOLIN ALCOHOL/MO/W.PET/CERES
400 CREAM (GRAM) TOPICAL 3 TIMES DAILY
Qty: 90 TABLET | Refills: 11 | Status: ON HOLD | OUTPATIENT
Start: 2020-01-01 | End: 2020-01-01 | Stop reason: HOSPADM

## 2020-01-01 RX ORDER — ASPIRIN 325 MG
325 TABLET ORAL DAILY
Status: DISCONTINUED | OUTPATIENT
Start: 2020-01-01 | End: 2020-01-01

## 2020-01-01 RX ORDER — FENTANYL CITRATE 50 UG/ML
INJECTION, SOLUTION INTRAMUSCULAR; INTRAVENOUS
Status: DISCONTINUED | OUTPATIENT
Start: 2020-01-01 | End: 2020-01-01 | Stop reason: HOSPADM

## 2020-01-01 RX ORDER — PREDNISONE 5 MG/1
TABLET ORAL
Qty: 140 TABLET | Refills: 11 | Status: ON HOLD | OUTPATIENT
Start: 2020-01-01 | End: 2020-01-01 | Stop reason: HOSPADM

## 2020-01-01 RX ORDER — MUPIROCIN 20 MG/G
1 OINTMENT TOPICAL 2 TIMES DAILY
Status: COMPLETED | OUTPATIENT
Start: 2020-01-01 | End: 2020-01-01

## 2020-01-01 RX ORDER — INSULIN ASPART 100 [IU]/ML
1-10 INJECTION, SOLUTION INTRAVENOUS; SUBCUTANEOUS EVERY 6 HOURS PRN
Status: DISCONTINUED | OUTPATIENT
Start: 2020-01-01 | End: 2020-01-01

## 2020-01-01 RX ORDER — MORPHINE SULFATE 2 MG/ML
1 INJECTION, SOLUTION INTRAMUSCULAR; INTRAVENOUS ONCE
Status: COMPLETED | OUTPATIENT
Start: 2020-01-01 | End: 2020-01-01

## 2020-01-01 RX ORDER — ACETAMINOPHEN 10 MG/ML
INJECTION, SOLUTION INTRAVENOUS
Status: DISCONTINUED | OUTPATIENT
Start: 2020-01-01 | End: 2020-01-01

## 2020-01-01 RX ORDER — HEPARIN SODIUM 1000 [USP'U]/ML
INJECTION, SOLUTION INTRAVENOUS; SUBCUTANEOUS
Status: DISCONTINUED | OUTPATIENT
Start: 2020-01-01 | End: 2020-01-01

## 2020-01-01 RX ORDER — INSULIN ASPART 100 [IU]/ML
4 INJECTION, SOLUTION INTRAVENOUS; SUBCUTANEOUS
Status: DISCONTINUED | OUTPATIENT
Start: 2020-01-01 | End: 2020-01-01

## 2020-01-01 RX ORDER — CEFAZOLIN SODIUM 1 G/3ML
INJECTION, POWDER, FOR SOLUTION INTRAMUSCULAR; INTRAVENOUS
Status: DISCONTINUED | OUTPATIENT
Start: 2020-01-01 | End: 2020-01-01

## 2020-01-01 RX ORDER — FLUDROCORTISONE ACETATE 0.1 MG/1
100 TABLET ORAL DAILY
Status: DISCONTINUED | OUTPATIENT
Start: 2020-01-01 | End: 2020-12-07 | Stop reason: HOSPADM

## 2020-01-01 RX ORDER — FENTANYL CITRATE-0.9 % NACL/PF 10 MCG/ML
PLASTIC BAG, INJECTION (ML) INTRAVENOUS CONTINUOUS
Status: DISCONTINUED | OUTPATIENT
Start: 2020-01-01 | End: 2020-01-01

## 2020-01-01 RX ORDER — SENNOSIDES 8.6 MG/1
2 TABLET ORAL DAILY
Qty: 60 TABLET | Refills: 11 | Status: ON HOLD | OUTPATIENT
Start: 2020-01-01 | End: 2020-01-01 | Stop reason: HOSPADM

## 2020-01-01 RX ORDER — MAGNESIUM SULFATE HEPTAHYDRATE 40 MG/ML
2 INJECTION, SOLUTION INTRAVENOUS ONCE
Status: DISCONTINUED | OUTPATIENT
Start: 2020-01-01 | End: 2020-01-01

## 2020-01-01 RX ORDER — DEXTROSE MONOHYDRATE AND SODIUM CHLORIDE 5; .45 G/100ML; G/100ML
INJECTION, SOLUTION INTRAVENOUS CONTINUOUS
Status: DISCONTINUED | OUTPATIENT
Start: 2020-01-01 | End: 2020-01-01 | Stop reason: HOSPADM

## 2020-01-01 RX ORDER — MYCOPHENOLATE MOFETIL 250 MG/1
1000 CAPSULE ORAL 2 TIMES DAILY
Status: DISCONTINUED | OUTPATIENT
Start: 2020-01-01 | End: 2020-01-01 | Stop reason: HOSPADM

## 2020-01-01 RX ORDER — ETOMIDATE 2 MG/ML
INJECTION INTRAVENOUS
Status: COMPLETED
Start: 2020-01-01 | End: 2020-01-01

## 2020-01-01 RX ORDER — AZITHROMYCIN 250 MG/1
250 TABLET, FILM COATED ORAL
Qty: 13 TABLET | Refills: 11 | Status: CANCELLED | OUTPATIENT
Start: 2020-01-01

## 2020-01-01 RX ORDER — METRONIDAZOLE 500 MG/100ML
500 INJECTION, SOLUTION INTRAVENOUS
Status: DISCONTINUED | OUTPATIENT
Start: 2020-01-01 | End: 2020-01-01

## 2020-01-01 RX ORDER — AZITHROMYCIN 250 MG/1
250 TABLET, FILM COATED ORAL
Qty: 13 TABLET | Refills: 11 | Status: ON HOLD | OUTPATIENT
Start: 2020-01-01 | End: 2020-01-01 | Stop reason: HOSPADM

## 2020-01-01 RX ORDER — PROPOFOL 10 MG/ML
VIAL (ML) INTRAVENOUS
Status: DISCONTINUED | OUTPATIENT
Start: 2020-01-01 | End: 2020-01-01

## 2020-01-01 RX ORDER — CYCLOBENZAPRINE HCL 5 MG
10 TABLET ORAL 3 TIMES DAILY PRN
Status: DISCONTINUED | OUTPATIENT
Start: 2020-01-01 | End: 2020-01-01 | Stop reason: HOSPADM

## 2020-01-01 RX ORDER — MIDAZOLAM HYDROCHLORIDE 1 MG/ML
INJECTION, SOLUTION INTRAMUSCULAR; INTRAVENOUS
Status: DISCONTINUED | OUTPATIENT
Start: 2020-01-01 | End: 2020-01-01 | Stop reason: HOSPADM

## 2020-01-01 RX ORDER — LAMIVUDINE 150 MG/1
150 TABLET, FILM COATED ORAL DAILY
Qty: 30 TABLET | Refills: 11 | Status: ON HOLD | OUTPATIENT
Start: 2020-01-01 | End: 2020-01-01 | Stop reason: HOSPADM

## 2020-01-01 RX ORDER — ATORVASTATIN CALCIUM 20 MG/1
20 TABLET, FILM COATED ORAL DAILY
Status: ON HOLD | COMMUNITY
Start: 2020-01-01 | End: 2020-01-01 | Stop reason: SDUPTHER

## 2020-01-01 RX ORDER — POTASSIUM CHLORIDE 1.5 G/1.58G
20 POWDER, FOR SOLUTION ORAL ONCE
Status: COMPLETED | OUTPATIENT
Start: 2020-01-01 | End: 2020-01-01

## 2020-01-01 RX ORDER — METOPROLOL TARTRATE 25 MG/1
25 TABLET, FILM COATED ORAL 2 TIMES DAILY
Status: DISCONTINUED | OUTPATIENT
Start: 2020-01-01 | End: 2020-01-01 | Stop reason: HOSPADM

## 2020-01-01 RX ORDER — OXYCODONE HYDROCHLORIDE 5 MG/1
5 TABLET ORAL EVERY 6 HOURS PRN
Qty: 28 TABLET | Refills: 0 | Status: SHIPPED | OUTPATIENT
Start: 2020-01-01 | End: 2020-01-01 | Stop reason: ALTCHOICE

## 2020-01-01 RX ORDER — IPRATROPIUM BROMIDE AND ALBUTEROL SULFATE 2.5; .5 MG/3ML; MG/3ML
3 SOLUTION RESPIRATORY (INHALATION)
Status: COMPLETED | OUTPATIENT
Start: 2020-01-01 | End: 2020-01-01

## 2020-01-01 RX ORDER — LEVALBUTEROL 1.25 MG/.5ML
1.25 SOLUTION, CONCENTRATE RESPIRATORY (INHALATION)
Status: DISCONTINUED | OUTPATIENT
Start: 2020-01-01 | End: 2020-01-01 | Stop reason: HOSPADM

## 2020-01-01 RX ORDER — DOCUSATE SODIUM 100 MG/1
100 CAPSULE, LIQUID FILLED ORAL 2 TIMES DAILY PRN
Qty: 60 CAPSULE | Refills: 11 | Status: ON HOLD | OUTPATIENT
Start: 2020-01-01 | End: 2020-01-01 | Stop reason: HOSPADM

## 2020-01-01 RX ORDER — DEXTROSE MONOHYDRATE 100 MG/ML
INJECTION, SOLUTION INTRAVENOUS CONTINUOUS PRN
Status: DISCONTINUED | OUTPATIENT
Start: 2020-01-01 | End: 2020-12-07 | Stop reason: HOSPADM

## 2020-01-01 RX ORDER — MYCOPHENOLATE MOFETIL 250 MG/1
1000 CAPSULE ORAL 2 TIMES DAILY
Qty: 16 CAPSULE | Refills: 0 | Status: SHIPPED | OUTPATIENT
Start: 2020-01-01 | End: 2020-01-01

## 2020-01-01 RX ORDER — ACETAMINOPHEN 325 MG/1
650 TABLET ORAL EVERY 6 HOURS PRN
Status: DISCONTINUED | OUTPATIENT
Start: 2020-01-01 | End: 2020-12-07 | Stop reason: HOSPADM

## 2020-01-01 RX ORDER — VORICONAZOLE 200 MG/1
TABLET, FILM COATED ORAL
Qty: 90 TABLET | Refills: 5 | Status: ON HOLD | OUTPATIENT
Start: 2020-01-01 | End: 2020-01-01 | Stop reason: HOSPADM

## 2020-01-01 RX ORDER — TRANEXAMIC ACID 100 MG/ML
INJECTION, SOLUTION INTRAVENOUS CONTINUOUS PRN
Status: DISCONTINUED | OUTPATIENT
Start: 2020-01-01 | End: 2020-01-01

## 2020-01-01 RX ORDER — MAGNESIUM SULFATE HEPTAHYDRATE 40 MG/ML
4 INJECTION, SOLUTION INTRAVENOUS
Status: DISCONTINUED | OUTPATIENT
Start: 2020-01-01 | End: 2020-12-07 | Stop reason: HOSPADM

## 2020-01-01 RX ORDER — LABETALOL HCL 20 MG/4 ML
5 SYRINGE (ML) INTRAVENOUS ONCE
Status: COMPLETED | OUTPATIENT
Start: 2020-01-01 | End: 2020-01-01

## 2020-01-01 RX ORDER — CARVEDILOL 6.25 MG/1
6.25 TABLET ORAL 2 TIMES DAILY
Status: DISCONTINUED | OUTPATIENT
Start: 2020-01-01 | End: 2020-01-01

## 2020-01-01 RX ORDER — FUROSEMIDE 10 MG/ML
40 INJECTION INTRAMUSCULAR; INTRAVENOUS EVERY 12 HOURS
Status: DISCONTINUED | OUTPATIENT
Start: 2020-01-01 | End: 2020-01-01

## 2020-01-01 RX ORDER — NITROGLYCERIN 5 MG/ML
INJECTION, SOLUTION INTRAVENOUS
Status: DISCONTINUED | OUTPATIENT
Start: 2020-01-01 | End: 2020-01-01 | Stop reason: HOSPADM

## 2020-01-01 RX ORDER — MIDAZOLAM HYDROCHLORIDE 1 MG/ML
INJECTION, SOLUTION INTRAMUSCULAR; INTRAVENOUS
Status: DISCONTINUED | OUTPATIENT
Start: 2020-01-01 | End: 2020-01-01

## 2020-01-01 RX ORDER — POTASSIUM CHLORIDE 14.9 MG/ML
INJECTION INTRAVENOUS CONTINUOUS PRN
Status: DISCONTINUED | OUTPATIENT
Start: 2020-01-01 | End: 2020-01-01

## 2020-01-01 RX ORDER — METOPROLOL TARTRATE 25 MG/1
25 TABLET, FILM COATED ORAL 2 TIMES DAILY
Qty: 60 TABLET | Refills: 11 | Status: ON HOLD | OUTPATIENT
Start: 2020-01-01 | End: 2020-01-01 | Stop reason: HOSPADM

## 2020-01-01 RX ORDER — OXYCODONE HYDROCHLORIDE 5 MG/1
5 TABLET ORAL EVERY 4 HOURS PRN
Status: DISCONTINUED | OUTPATIENT
Start: 2020-01-01 | End: 2020-01-01 | Stop reason: HOSPADM

## 2020-01-01 RX ORDER — ONDANSETRON 8 MG/1
8 TABLET, ORALLY DISINTEGRATING ORAL EVERY 8 HOURS PRN
Status: DISCONTINUED | OUTPATIENT
Start: 2020-01-01 | End: 2020-01-01 | Stop reason: HOSPADM

## 2020-01-01 RX ORDER — FLUCONAZOLE 10 MG/ML
400 POWDER, FOR SUSPENSION ORAL DAILY
Status: DISCONTINUED | OUTPATIENT
Start: 2020-01-01 | End: 2020-01-01

## 2020-01-01 RX ORDER — ROCURONIUM BROMIDE 10 MG/ML
0.6 INJECTION, SOLUTION INTRAVENOUS ONCE
Status: COMPLETED | OUTPATIENT
Start: 2020-01-01 | End: 2020-01-01

## 2020-01-01 RX ORDER — CYCLOBENZAPRINE HCL 10 MG
10 TABLET ORAL 3 TIMES DAILY PRN
Qty: 30 TABLET | Refills: 1 | Status: SHIPPED | OUTPATIENT
Start: 2020-01-01 | End: 2020-01-01

## 2020-01-01 RX ORDER — PREDNISONE 5 MG/1
20 TABLET ORAL DAILY
Qty: 92 TABLET | Refills: 0 | Status: SHIPPED | OUTPATIENT
Start: 2020-01-01 | End: 2020-01-01

## 2020-01-01 RX ORDER — SULFAMETHOXAZOLE AND TRIMETHOPRIM 400; 80 MG/1; MG/1
1 TABLET ORAL
Status: DISCONTINUED | OUTPATIENT
Start: 2020-01-01 | End: 2020-12-07 | Stop reason: HOSPADM

## 2020-01-01 RX ORDER — TACROLIMUS 1 MG/1
1 CAPSULE ORAL EVERY 12 HOURS
Qty: 60 CAPSULE | Refills: 11 | Status: SHIPPED | OUTPATIENT
Start: 2020-01-01 | End: 2020-01-01 | Stop reason: SDUPTHER

## 2020-01-01 RX ORDER — POLYETHYLENE GLYCOL 3350 17 G/17G
17 POWDER, FOR SOLUTION ORAL DAILY
Status: DISCONTINUED | OUTPATIENT
Start: 2020-01-01 | End: 2020-01-01

## 2020-01-01 RX ORDER — CEFTRIAXONE 2 G/50ML
2 INJECTION, SOLUTION INTRAVENOUS
Status: DISCONTINUED | OUTPATIENT
Start: 2020-01-01 | End: 2020-01-01

## 2020-01-01 RX ORDER — MYCOPHENOLATE MOFETIL 250 MG/1
500 CAPSULE ORAL 2 TIMES DAILY
Status: DISCONTINUED | OUTPATIENT
Start: 2020-01-01 | End: 2020-01-01

## 2020-01-01 RX ORDER — LIDOCAINE AND PRILOCAINE 25; 25 MG/G; MG/G
CREAM TOPICAL ONCE
Status: COMPLETED | OUTPATIENT
Start: 2020-01-01 | End: 2020-01-01

## 2020-01-01 RX ORDER — ACETAMINOPHEN 325 MG/1
650 TABLET ORAL EVERY 6 HOURS PRN
Status: DISCONTINUED | OUTPATIENT
Start: 2020-01-01 | End: 2020-01-01 | Stop reason: HOSPADM

## 2020-01-01 RX ORDER — HYDROXYZINE HYDROCHLORIDE 25 MG/1
50 TABLET, FILM COATED ORAL 3 TIMES DAILY PRN
Status: DISCONTINUED | OUTPATIENT
Start: 2020-01-01 | End: 2020-12-07 | Stop reason: HOSPADM

## 2020-01-01 RX ORDER — POTASSIUM CHLORIDE 1.5 G/1.58G
40 POWDER, FOR SOLUTION ORAL ONCE
Status: COMPLETED | OUTPATIENT
Start: 2020-01-01 | End: 2020-01-01

## 2020-01-01 RX ORDER — MEROPENEM 1 G/1
2 INJECTION, POWDER, FOR SOLUTION INTRAVENOUS
Status: DISCONTINUED | OUTPATIENT
Start: 2020-01-01 | End: 2020-01-01

## 2020-01-01 RX ORDER — INSULIN ASPART 100 [IU]/ML
2 INJECTION, SOLUTION INTRAVENOUS; SUBCUTANEOUS
Status: DISCONTINUED | OUTPATIENT
Start: 2020-01-01 | End: 2020-01-01

## 2020-01-01 RX ORDER — LANOLIN ALCOHOL/MO/W.PET/CERES
400 CREAM (GRAM) TOPICAL 2 TIMES DAILY
Qty: 60 TABLET | Refills: 11 | Status: SHIPPED | OUTPATIENT
Start: 2020-01-01 | End: 2020-01-01 | Stop reason: SDUPTHER

## 2020-01-01 RX ORDER — TACROLIMUS 0.5 MG/1
0.5 CAPSULE ORAL EVERY 12 HOURS
Qty: 60 CAPSULE | Refills: 11 | Status: SHIPPED | OUTPATIENT
Start: 2020-01-01 | End: 2020-01-01

## 2020-01-01 RX ORDER — ETOMIDATE 2 MG/ML
20 INJECTION INTRAVENOUS ONCE
Status: COMPLETED | OUTPATIENT
Start: 2020-01-01 | End: 2020-01-01

## 2020-01-01 RX ORDER — ASPIRIN 325 MG
325 TABLET ORAL ONCE
Status: COMPLETED | OUTPATIENT
Start: 2020-01-01 | End: 2020-01-01

## 2020-01-01 RX ORDER — FENTANYL CITRATE 50 UG/ML
INJECTION, SOLUTION INTRAMUSCULAR; INTRAVENOUS
Status: COMPLETED
Start: 2020-01-01 | End: 2020-01-01

## 2020-01-01 RX ORDER — ROCURONIUM BROMIDE 10 MG/ML
INJECTION, SOLUTION INTRAVENOUS
Status: COMPLETED
Start: 2020-01-01 | End: 2020-01-01

## 2020-01-01 RX ORDER — DEXTROSE MONOHYDRATE AND SODIUM CHLORIDE 5; .45 G/100ML; G/100ML
INJECTION, SOLUTION INTRAVENOUS CONTINUOUS
Status: CANCELLED | OUTPATIENT
Start: 2020-01-01

## 2020-01-01 RX ORDER — NITROGLYCERIN 5 MG/ML
INJECTION, SOLUTION INTRAVENOUS
Status: DISCONTINUED | OUTPATIENT
Start: 2020-01-01 | End: 2020-01-01

## 2020-01-01 RX ORDER — KETAMINE HCL IN 0.9 % NACL 50 MG/5 ML
SYRINGE (ML) INTRAVENOUS
Status: DISCONTINUED | OUTPATIENT
Start: 2020-01-01 | End: 2020-01-01

## 2020-01-01 RX ORDER — VALGANCICLOVIR 450 MG/1
450 TABLET, FILM COATED ORAL 2 TIMES DAILY
Status: DISCONTINUED | OUTPATIENT
Start: 2020-01-01 | End: 2020-01-01 | Stop reason: HOSPADM

## 2020-01-01 RX ORDER — CALCIUM CARBONATE 200(500)MG
1000 TABLET,CHEWABLE ORAL EVERY 6 HOURS PRN
Status: DISCONTINUED | OUTPATIENT
Start: 2020-01-01 | End: 2020-01-01 | Stop reason: HOSPADM

## 2020-01-01 RX ORDER — ADENOSINE 3 MG/ML
INJECTION, SOLUTION INTRAVENOUS
Status: DISCONTINUED | OUTPATIENT
Start: 2020-01-01 | End: 2020-01-01 | Stop reason: HOSPADM

## 2020-01-01 RX ORDER — ROSUVASTATIN CALCIUM 10 MG/1
10 TABLET, COATED ORAL DAILY
Status: ON HOLD | COMMUNITY
End: 2020-01-01 | Stop reason: HOSPADM

## 2020-01-01 RX ORDER — PANTOPRAZOLE SODIUM 40 MG/10ML
40 INJECTION, POWDER, LYOPHILIZED, FOR SOLUTION INTRAVENOUS DAILY
Status: DISCONTINUED | OUTPATIENT
Start: 2020-01-01 | End: 2020-01-01

## 2020-01-01 RX ORDER — METHYLPREDNISOLONE SOD SUCC 125 MG
2 VIAL (EA) INJECTION DAILY
Status: COMPLETED | OUTPATIENT
Start: 2020-01-01 | End: 2020-01-01

## 2020-01-01 RX ORDER — ROCURONIUM BROMIDE 10 MG/ML
75 INJECTION, SOLUTION INTRAVENOUS ONCE
Status: COMPLETED | OUTPATIENT
Start: 2020-01-01 | End: 2020-01-01

## 2020-01-01 RX ORDER — LIDOCAINE HYDROCHLORIDE 20 MG/ML
INJECTION, SOLUTION EPIDURAL; INFILTRATION; INTRACAUDAL; PERINEURAL
Status: DISCONTINUED | OUTPATIENT
Start: 2020-01-01 | End: 2020-01-01 | Stop reason: HOSPADM

## 2020-01-01 RX ORDER — PANTOPRAZOLE SODIUM 40 MG/1
40 FOR SUSPENSION ORAL DAILY
Status: DISCONTINUED | OUTPATIENT
Start: 2020-01-01 | End: 2020-12-07 | Stop reason: HOSPADM

## 2020-01-01 RX ORDER — NAPROXEN SODIUM 220 MG/1
81 TABLET, FILM COATED ORAL ONCE
Status: COMPLETED | OUTPATIENT
Start: 2020-01-01 | End: 2020-01-01

## 2020-01-01 RX ORDER — LIDOCAINE 50 MG/G
1 PATCH TOPICAL
Status: DISCONTINUED | OUTPATIENT
Start: 2020-01-01 | End: 2020-01-01

## 2020-01-01 RX ORDER — LANOLIN ALCOHOL/MO/W.PET/CERES
400 CREAM (GRAM) TOPICAL 2 TIMES DAILY
Status: DISCONTINUED | OUTPATIENT
Start: 2020-01-01 | End: 2020-01-01 | Stop reason: HOSPADM

## 2020-01-01 RX ORDER — TACROLIMUS 0.5 MG/1
0.5 CAPSULE ORAL EVERY 12 HOURS
Qty: 60 CAPSULE | Refills: 11 | Status: SHIPPED | OUTPATIENT
Start: 2020-01-01 | End: 2020-01-01 | Stop reason: SDUPTHER

## 2020-01-01 RX ORDER — ROCURONIUM BROMIDE 10 MG/ML
INJECTION, SOLUTION INTRAVENOUS
Status: DISCONTINUED | OUTPATIENT
Start: 2020-01-01 | End: 2020-01-01

## 2020-01-01 RX ADMIN — LAMIVUDINE 150 MG: 10 SOLUTION ORAL at 09:12

## 2020-01-01 RX ADMIN — METHYLPREDNISOLONE SODIUM SUCCINATE 500 MG: 1 INJECTION, POWDER, LYOPHILIZED, FOR SOLUTION INTRAMUSCULAR; INTRAVENOUS at 05:08

## 2020-01-01 RX ADMIN — DEXTROSE 2 MCG/KG/MIN: 50 INJECTION, SOLUTION INTRAVENOUS at 06:11

## 2020-01-01 RX ADMIN — POTASSIUM CHLORIDE 40 MEQ: 29.8 INJECTION, SOLUTION INTRAVENOUS at 01:12

## 2020-01-01 RX ADMIN — SODIUM CHLORIDE 0.5 UNITS/HR: 9 INJECTION, SOLUTION INTRAVENOUS at 08:08

## 2020-01-01 RX ADMIN — LEVALBUTEROL 1.25 MG: 1.25 SOLUTION, CONCENTRATE RESPIRATORY (INHALATION) at 08:08

## 2020-01-01 RX ADMIN — HYDROCORTISONE SODIUM SUCCINATE 100 MG: 100 INJECTION, POWDER, FOR SOLUTION INTRAMUSCULAR; INTRAVENOUS at 01:11

## 2020-01-01 RX ADMIN — GANCICLOVIR SODIUM 385 MG: 500 INJECTION, POWDER, LYOPHILIZED, FOR SOLUTION INTRAVENOUS at 08:11

## 2020-01-01 RX ADMIN — PANTOPRAZOLE SODIUM 40 MG: 40 GRANULE, DELAYED RELEASE ORAL at 09:12

## 2020-01-01 RX ADMIN — TACROLIMUS 0.5 MG: 0.5 CAPSULE ORAL at 05:08

## 2020-01-01 RX ADMIN — DEXMEDETOMIDINE HYDROCHLORIDE 1.4 MCG/KG/HR: 4 INJECTION, SOLUTION INTRAVENOUS at 07:11

## 2020-01-01 RX ADMIN — FAMOTIDINE 20 MG: 20 TABLET, FILM COATED ORAL at 08:11

## 2020-01-01 RX ADMIN — HYDROXYZINE HYDROCHLORIDE 50 MG: 25 TABLET, FILM COATED ORAL at 06:11

## 2020-01-01 RX ADMIN — FUROSEMIDE 40 MG: 10 INJECTION, SOLUTION INTRAMUSCULAR; INTRAVENOUS at 11:11

## 2020-01-01 RX ADMIN — NICARDIPINE HYDROCHLORIDE 5 MG/HR: 0.2 INJECTION, SOLUTION INTRAVENOUS at 07:11

## 2020-01-01 RX ADMIN — Medication 200 MCG: at 12:11

## 2020-01-01 RX ADMIN — IPRATROPIUM BROMIDE AND ALBUTEROL SULFATE 3 ML: .5; 2.5 SOLUTION RESPIRATORY (INHALATION) at 08:11

## 2020-01-01 RX ADMIN — SODIUM CHLORIDE 2 UNITS/HR: 9 INJECTION, SOLUTION INTRAVENOUS at 08:11

## 2020-01-01 RX ADMIN — DEXMEDETOMIDINE HYDROCHLORIDE 1.4 MCG/KG/HR: 4 INJECTION, SOLUTION INTRAVENOUS at 09:11

## 2020-01-01 RX ADMIN — ENOXAPARIN SODIUM 80 MG: 40 INJECTION SUBCUTANEOUS at 08:11

## 2020-01-01 RX ADMIN — DEXTROSE 20 MG: 5 SOLUTION INTRAVENOUS at 01:08

## 2020-01-01 RX ADMIN — GABAPENTIN 100 MG: 100 CAPSULE ORAL at 08:08

## 2020-01-01 RX ADMIN — SODIUM CHLORIDE 2.7 UNITS/HR: 9 INJECTION, SOLUTION INTRAVENOUS at 06:08

## 2020-01-01 RX ADMIN — LEVALBUTEROL 1.25 MG: 1.25 SOLUTION, CONCENTRATE RESPIRATORY (INHALATION) at 02:08

## 2020-01-01 RX ADMIN — ASPIRIN 81 MG: 81 TABLET, COATED ORAL at 08:08

## 2020-01-01 RX ADMIN — INSULIN ASPART 1 UNITS: 100 INJECTION, SOLUTION INTRAVENOUS; SUBCUTANEOUS at 11:08

## 2020-01-01 RX ADMIN — CALCIUM CARBONATE (ANTACID) CHEW TAB 500 MG 1000 MG: 500 CHEW TAB at 05:08

## 2020-01-01 RX ADMIN — SODIUM CHLORIDE 2.4 UNITS/HR: 9 INJECTION, SOLUTION INTRAVENOUS at 12:11

## 2020-01-01 RX ADMIN — TACROLIMUS 0.5 MG: 0.5 CAPSULE ORAL at 10:08

## 2020-01-01 RX ADMIN — MINERAL OIL AND WHITE PETROLATUM: 150; 830 OINTMENT OPHTHALMIC at 10:11

## 2020-01-01 RX ADMIN — DEXTROSE 50 ML/HR: 10 SOLUTION INTRAVENOUS at 08:12

## 2020-01-01 RX ADMIN — FENTANYL CITRATE 50 MCG: 50 INJECTION INTRAMUSCULAR; INTRAVENOUS at 12:08

## 2020-01-01 RX ADMIN — SODIUM CHLORIDE, SODIUM LACTATE, POTASSIUM CHLORIDE, AND CALCIUM CHLORIDE 500 ML: .6; .31; .03; .02 INJECTION, SOLUTION INTRAVENOUS at 05:12

## 2020-01-01 RX ADMIN — SODIUM CHLORIDE 2 UNITS/HR: 9 INJECTION, SOLUTION INTRAVENOUS at 11:11

## 2020-01-01 RX ADMIN — INSULIN ASPART 4 UNITS: 100 INJECTION, SOLUTION INTRAVENOUS; SUBCUTANEOUS at 11:11

## 2020-01-01 RX ADMIN — PROPOFOL 50 MCG/KG/MIN: 10 INJECTION, EMULSION INTRAVENOUS at 07:12

## 2020-01-01 RX ADMIN — PROPOFOL 40 MCG/KG/MIN: 10 INJECTION, EMULSION INTRAVENOUS at 06:12

## 2020-01-01 RX ADMIN — MINERAL OIL AND WHITE PETROLATUM: 150; 830 OINTMENT OPHTHALMIC at 01:11

## 2020-01-01 RX ADMIN — INSULIN ASPART 6 UNITS: 100 INJECTION, SOLUTION INTRAVENOUS; SUBCUTANEOUS at 05:08

## 2020-01-01 RX ADMIN — SULFAMETHOXAZOLE AND TRIMETHOPRIM 1 TABLET: 800; 160 TABLET ORAL at 08:11

## 2020-01-01 RX ADMIN — PROPOFOL 15 MCG/KG/MIN: 10 INJECTION, EMULSION INTRAVENOUS at 05:11

## 2020-01-01 RX ADMIN — PANTOPRAZOLE SODIUM 40 MG: 40 INJECTION, POWDER, LYOPHILIZED, FOR SOLUTION INTRAVENOUS at 09:11

## 2020-01-01 RX ADMIN — PROPOFOL 20 MCG/KG/MIN: 10 INJECTION, EMULSION INTRAVENOUS at 11:11

## 2020-01-01 RX ADMIN — POTASSIUM CHLORIDE 60 MEQ: 14.9 INJECTION, SOLUTION INTRAVENOUS at 10:12

## 2020-01-01 RX ADMIN — NIFEDIPINE 30 MG: 30 TABLET, FILM COATED, EXTENDED RELEASE ORAL at 08:11

## 2020-01-01 RX ADMIN — Medication 250 MCG: at 01:12

## 2020-01-01 RX ADMIN — MEROPENEM 2 G: 1 INJECTION, POWDER, FOR SOLUTION INTRAVENOUS at 08:11

## 2020-01-01 RX ADMIN — DEXTROSE MONOHYDRATE 350 MG: 50 INJECTION, SOLUTION INTRAVENOUS at 12:08

## 2020-01-01 RX ADMIN — INSULIN ASPART 2 UNITS: 100 INJECTION, SOLUTION INTRAVENOUS; SUBCUTANEOUS at 07:11

## 2020-01-01 RX ADMIN — GANCICLOVIR SODIUM 433 MG: 500 INJECTION, POWDER, LYOPHILIZED, FOR SOLUTION INTRAVENOUS at 11:08

## 2020-01-01 RX ADMIN — SODIUM CHLORIDE, SODIUM GLUCONATE, SODIUM ACETATE, POTASSIUM CHLORIDE, MAGNESIUM CHLORIDE, SODIUM PHOSPHATE, DIBASIC, AND POTASSIUM PHOSPHATE: .53; .5; .37; .037; .03; .012; .00082 INJECTION, SOLUTION INTRAVENOUS at 05:08

## 2020-01-01 RX ADMIN — INSULIN ASPART 8 UNITS: 100 INJECTION, SOLUTION INTRAVENOUS; SUBCUTANEOUS at 12:11

## 2020-01-01 RX ADMIN — MORPHINE SULFATE 1 MG: 2 INJECTION, SOLUTION INTRAMUSCULAR; INTRAVENOUS at 05:11

## 2020-01-01 RX ADMIN — Medication 0.08 MCG/KG/MIN: at 01:11

## 2020-01-01 RX ADMIN — INSULIN ASPART 4 UNITS: 100 INJECTION, SOLUTION INTRAVENOUS; SUBCUTANEOUS at 08:11

## 2020-01-01 RX ADMIN — CHLORHEXIDINE GLUCONATE 0.12% ORAL RINSE 10 ML: 1.2 LIQUID ORAL at 08:08

## 2020-01-01 RX ADMIN — LAMIVUDINE 150 MG: 10 SOLUTION ORAL at 09:11

## 2020-01-01 RX ADMIN — POLYETHYLENE GLYCOL 3350 17 G: 17 POWDER, FOR SOLUTION ORAL at 08:12

## 2020-01-01 RX ADMIN — CEFEPIME 2 G: 2 INJECTION, POWDER, FOR SOLUTION INTRAVENOUS at 02:12

## 2020-01-01 RX ADMIN — CALCIUM CHLORIDE 500 MG: 100 INJECTION, SOLUTION INTRAVENOUS at 06:08

## 2020-01-01 RX ADMIN — MAGNESIUM SULFATE IN WATER 2 G: 40 INJECTION, SOLUTION INTRAVENOUS at 06:12

## 2020-01-01 RX ADMIN — DEXTROSE 300 MG: 5 SOLUTION INTRAVENOUS at 08:11

## 2020-01-01 RX ADMIN — TACROLIMUS 0.5 MG: 1 CAPSULE, GELATIN COATED ORAL at 06:12

## 2020-01-01 RX ADMIN — PROPOFOL 30 MCG/KG/MIN: 10 INJECTION, EMULSION INTRAVENOUS at 11:11

## 2020-01-01 RX ADMIN — TACROLIMUS 0.5 MG: 0.5 CAPSULE ORAL at 11:11

## 2020-01-01 RX ADMIN — AZITHROMYCIN MONOHYDRATE 500 MG: 500 INJECTION, POWDER, LYOPHILIZED, FOR SOLUTION INTRAVENOUS at 05:11

## 2020-01-01 RX ADMIN — PROPOFOL 29.99 MCG/KG/MIN: 10 INJECTION, EMULSION INTRAVENOUS at 11:11

## 2020-01-01 RX ADMIN — MEROPENEM 2 G: 1 INJECTION, POWDER, FOR SOLUTION INTRAVENOUS at 12:11

## 2020-01-01 RX ADMIN — PROPOFOL 45 MCG/KG/MIN: 10 INJECTION, EMULSION INTRAVENOUS at 05:11

## 2020-01-01 RX ADMIN — Medication 225 MCG/HR: at 01:12

## 2020-01-01 RX ADMIN — PROPOFOL 10 MCG/KG/MIN: 10 INJECTION, EMULSION INTRAVENOUS at 06:11

## 2020-01-01 RX ADMIN — PROPOFOL 45 MCG/KG/MIN: 10 INJECTION, EMULSION INTRAVENOUS at 09:11

## 2020-01-01 RX ADMIN — MAGNESIUM SULFATE IN WATER 2 G: 40 INJECTION, SOLUTION INTRAVENOUS at 03:08

## 2020-01-01 RX ADMIN — INSULIN ASPART 2 UNITS: 100 INJECTION, SOLUTION INTRAVENOUS; SUBCUTANEOUS at 12:11

## 2020-01-01 RX ADMIN — MINERAL OIL AND WHITE PETROLATUM: 150; 830 OINTMENT OPHTHALMIC at 08:11

## 2020-01-01 RX ADMIN — VORICONAZOLE 350 MG: 50 TABLET ORAL at 09:08

## 2020-01-01 RX ADMIN — Medication 0.8 MCG/KG/MIN: at 04:12

## 2020-01-01 RX ADMIN — PROPOFOL 45 MCG/KG/MIN: 10 INJECTION, EMULSION INTRAVENOUS at 07:11

## 2020-01-01 RX ADMIN — TACROLIMUS 0.5 MG: 1 CAPSULE, GELATIN COATED ORAL at 05:11

## 2020-01-01 RX ADMIN — MINERAL OIL AND WHITE PETROLATUM: 150; 830 OINTMENT OPHTHALMIC at 05:11

## 2020-01-01 RX ADMIN — GABAPENTIN 100 MG: 100 CAPSULE ORAL at 09:08

## 2020-01-01 RX ADMIN — GANCICLOVIR SODIUM 385 MG: 500 INJECTION, POWDER, LYOPHILIZED, FOR SOLUTION INTRAVENOUS at 11:11

## 2020-01-01 RX ADMIN — MUPIROCIN 1 G: 20 OINTMENT TOPICAL at 09:08

## 2020-01-01 RX ADMIN — MEROPENEM 2 G: 1 INJECTION, POWDER, FOR SOLUTION INTRAVENOUS at 10:11

## 2020-01-01 RX ADMIN — VALGANCICLOVIR 450 MG: 450 TABLET, FILM COATED ORAL at 09:08

## 2020-01-01 RX ADMIN — Medication 1250 MG: at 06:08

## 2020-01-01 RX ADMIN — PROPOFOL 30 MCG/KG/MIN: 10 INJECTION, EMULSION INTRAVENOUS at 07:11

## 2020-01-01 RX ADMIN — DEXMEDETOMIDINE HYDROCHLORIDE 1.4 MCG/KG/HR: 4 INJECTION, SOLUTION INTRAVENOUS at 05:11

## 2020-01-01 RX ADMIN — CYCLOBENZAPRINE HYDROCHLORIDE 10 MG: 5 TABLET, FILM COATED ORAL at 11:08

## 2020-01-01 RX ADMIN — POTASSIUM CHLORIDE 40 MEQ: 1.5 POWDER, FOR SOLUTION ORAL at 09:11

## 2020-01-01 RX ADMIN — FUROSEMIDE 10 MG: 10 INJECTION, SOLUTION INTRAMUSCULAR; INTRAVENOUS at 02:08

## 2020-01-01 RX ADMIN — PROPOFOL 30 MCG/KG/MIN: 10 INJECTION, EMULSION INTRAVENOUS at 04:11

## 2020-01-01 RX ADMIN — NIFEDIPINE 30 MG: 30 TABLET, FILM COATED, EXTENDED RELEASE ORAL at 08:08

## 2020-01-01 RX ADMIN — MINERAL OIL AND WHITE PETROLATUM: 150; 830 OINTMENT OPHTHALMIC at 10:12

## 2020-01-01 RX ADMIN — PROPOFOL 40 MCG/KG/MIN: 10 INJECTION, EMULSION INTRAVENOUS at 01:12

## 2020-01-01 RX ADMIN — INSULIN ASPART 2 UNITS: 100 INJECTION, SOLUTION INTRAVENOUS; SUBCUTANEOUS at 08:11

## 2020-01-01 RX ADMIN — AMOXICILLIN AND CLAVULANATE POTASSIUM 500 MG: 500; 125 TABLET, FILM COATED ORAL at 09:08

## 2020-01-01 RX ADMIN — MEROPENEM 2 G: 1 INJECTION, POWDER, FOR SOLUTION INTRAVENOUS at 07:11

## 2020-01-01 RX ADMIN — FUROSEMIDE 60 MG: 10 INJECTION, SOLUTION INTRAMUSCULAR; INTRAVENOUS at 01:12

## 2020-01-01 RX ADMIN — REMDESIVIR 100 MG: 100 INJECTION, POWDER, LYOPHILIZED, FOR SOLUTION INTRAVENOUS at 03:11

## 2020-01-01 RX ADMIN — ENOXAPARIN SODIUM 80 MG: 40 INJECTION SUBCUTANEOUS at 08:12

## 2020-01-01 RX ADMIN — TACROLIMUS 0.5 MG: 1 CAPSULE, GELATIN COATED ORAL at 05:12

## 2020-01-01 RX ADMIN — PROPOFOL 45 MCG/KG/MIN: 10 INJECTION, EMULSION INTRAVENOUS at 02:11

## 2020-01-01 RX ADMIN — Medication 2500 MCG: at 12:11

## 2020-01-01 RX ADMIN — LEVALBUTEROL 1.25 MG: 1.25 SOLUTION, CONCENTRATE RESPIRATORY (INHALATION) at 07:08

## 2020-01-01 RX ADMIN — ENOXAPARIN SODIUM 80 MG: 40 INJECTION SUBCUTANEOUS at 10:11

## 2020-01-01 RX ADMIN — INSULIN ASPART 2 UNITS: 100 INJECTION, SOLUTION INTRAVENOUS; SUBCUTANEOUS at 06:08

## 2020-01-01 RX ADMIN — CEFTRIAXONE 2 G: 2 INJECTION, SOLUTION INTRAVENOUS at 03:11

## 2020-01-01 RX ADMIN — FENTANYL CITRATE 50 MCG: 50 INJECTION INTRAMUSCULAR; INTRAVENOUS at 07:08

## 2020-01-01 RX ADMIN — MYCOPHENOLATE MOFETIL 500 MG: 250 CAPSULE ORAL at 09:08

## 2020-01-01 RX ADMIN — LIDOCAINE 1 PATCH: 50 PATCH TOPICAL at 09:08

## 2020-01-01 RX ADMIN — ENOXAPARIN SODIUM 80 MG: 40 INJECTION SUBCUTANEOUS at 09:11

## 2020-01-01 RX ADMIN — Medication 1250 MG: at 05:08

## 2020-01-01 RX ADMIN — FUROSEMIDE 40 MG: 10 INJECTION, SOLUTION INTRAMUSCULAR; INTRAVENOUS at 02:11

## 2020-01-01 RX ADMIN — ALBUMIN (HUMAN) 25 G: 12.5 SOLUTION INTRAVENOUS at 12:08

## 2020-01-01 RX ADMIN — DEXAMETHASONE SODIUM PHOSPHATE 6 MG: 4 INJECTION, SOLUTION INTRAMUSCULAR; INTRAVENOUS at 08:11

## 2020-01-01 RX ADMIN — MINERAL OIL AND WHITE PETROLATUM: 150; 830 OINTMENT OPHTHALMIC at 09:12

## 2020-01-01 RX ADMIN — ENOXAPARIN SODIUM 40 MG: 40 INJECTION SUBCUTANEOUS at 05:08

## 2020-01-01 RX ADMIN — Medication 0.2 MCG/KG/MIN: at 09:11

## 2020-01-01 RX ADMIN — CISATRACURIUM BESYLATE 2 MCG/KG/MIN: 10 INJECTION INTRAVENOUS at 03:12

## 2020-01-01 RX ADMIN — GANCICLOVIR SODIUM 385 MG: 500 INJECTION, POWDER, LYOPHILIZED, FOR SOLUTION INTRAVENOUS at 09:11

## 2020-01-01 RX ADMIN — NIFEDIPINE 30 MG: 30 TABLET, FILM COATED, EXTENDED RELEASE ORAL at 04:11

## 2020-01-01 RX ADMIN — PROPOFOL 45 MCG/KG/MIN: 10 INJECTION, EMULSION INTRAVENOUS at 10:11

## 2020-01-01 RX ADMIN — OXYCODONE HYDROCHLORIDE 10 MG: 10 TABLET ORAL at 11:08

## 2020-01-01 RX ADMIN — FLUDROCORTISONE ACETATE 100 MCG: 0.1 TABLET ORAL at 08:11

## 2020-01-01 RX ADMIN — MAGNESIUM SULFATE IN WATER 4 G: 40 INJECTION, SOLUTION INTRAVENOUS at 03:12

## 2020-01-01 RX ADMIN — FUROSEMIDE 80 MG: 10 INJECTION, SOLUTION INTRAMUSCULAR; INTRAVENOUS at 08:11

## 2020-01-01 RX ADMIN — OXYCODONE HYDROCHLORIDE 5 MG: 5 TABLET ORAL at 06:08

## 2020-01-01 RX ADMIN — TOPICAL ANESTHETIC 0.5 ML: 200 SPRAY DENTAL; PERIODONTAL at 08:09

## 2020-01-01 RX ADMIN — OXYCODONE HYDROCHLORIDE 5 MG: 5 TABLET ORAL at 01:08

## 2020-01-01 RX ADMIN — VALGANCICLOVIR 450 MG: 450 TABLET, FILM COATED ORAL at 08:08

## 2020-01-01 RX ADMIN — DOCUSATE SODIUM 100 MG: 100 CAPSULE, LIQUID FILLED ORAL at 09:08

## 2020-01-01 RX ADMIN — Medication 9 MG: at 08:08

## 2020-01-01 RX ADMIN — LAMIVUDINE 150 MG: 10 SOLUTION ORAL at 01:11

## 2020-01-01 RX ADMIN — SODIUM PHOSPHATE, MONOBASIC, MONOHYDRATE 15 MMOL: 276; 142 INJECTION, SOLUTION INTRAVENOUS at 01:11

## 2020-01-01 RX ADMIN — SULFAMETHOXAZOLE AND TRIMETHOPRIM 20 ML: 200; 40 SUSPENSION ORAL at 12:08

## 2020-01-01 RX ADMIN — MEROPENEM 2 G: 1 INJECTION, POWDER, FOR SOLUTION INTRAVENOUS at 11:11

## 2020-01-01 RX ADMIN — LEVALBUTEROL 1.25 MG: 1.25 SOLUTION, CONCENTRATE RESPIRATORY (INHALATION) at 03:08

## 2020-01-01 RX ADMIN — PREDNISONE 20 MG: 5 TABLET ORAL at 08:12

## 2020-01-01 RX ADMIN — LIDOCAINE 1 PATCH: 50 PATCH TOPICAL at 08:08

## 2020-01-01 RX ADMIN — PROPOFOL 30 MCG/KG/MIN: 10 INJECTION, EMULSION INTRAVENOUS at 09:11

## 2020-01-01 RX ADMIN — LAMIVUDINE 150 MG: 150 TABLET, FILM COATED ORAL at 08:11

## 2020-01-01 RX ADMIN — PROPOFOL 40 MCG/KG/MIN: 10 INJECTION, EMULSION INTRAVENOUS at 12:11

## 2020-01-01 RX ADMIN — CYCLOBENZAPRINE HYDROCHLORIDE 10 MG: 5 TABLET, FILM COATED ORAL at 02:08

## 2020-01-01 RX ADMIN — SODIUM BICARBONATE: 84 INJECTION, SOLUTION INTRAVENOUS at 07:11

## 2020-01-01 RX ADMIN — PROPOFOL 45 MCG/KG/MIN: 10 INJECTION, EMULSION INTRAVENOUS at 10:12

## 2020-01-01 RX ADMIN — SULFAMETHOXAZOLE AND TRIMETHOPRIM 1 TABLET: 400; 80 TABLET ORAL at 09:11

## 2020-01-01 RX ADMIN — METOPROLOL TARTRATE 25 MG: 25 TABLET, FILM COATED ORAL at 09:08

## 2020-01-01 RX ADMIN — TACROLIMUS 0.5 MG: 1 CAPSULE, GELATIN COATED ORAL at 07:11

## 2020-01-01 RX ADMIN — FUROSEMIDE 60 MG: 10 INJECTION, SOLUTION INTRAMUSCULAR; INTRAVENOUS at 05:12

## 2020-01-01 RX ADMIN — PROPOFOL 40 MCG/KG/MIN: 10 INJECTION, EMULSION INTRAVENOUS at 11:11

## 2020-01-01 RX ADMIN — LAMIVUDINE 150 MG: 10 SOLUTION ORAL at 08:12

## 2020-01-01 RX ADMIN — HYDROCORTISONE SODIUM SUCCINATE 50 MG: 100 INJECTION, POWDER, FOR SOLUTION INTRAMUSCULAR; INTRAVENOUS at 08:11

## 2020-01-01 RX ADMIN — INSULIN ASPART 2 UNITS: 100 INJECTION, SOLUTION INTRAVENOUS; SUBCUTANEOUS at 04:11

## 2020-01-01 RX ADMIN — PREDNISONE 35 MG: 20 TABLET ORAL at 09:08

## 2020-01-01 RX ADMIN — Medication 2500 MCG: at 02:11

## 2020-01-01 RX ADMIN — HYDROCORTISONE SODIUM SUCCINATE 100 MG: 100 INJECTION, POWDER, FOR SOLUTION INTRAMUSCULAR; INTRAVENOUS at 05:11

## 2020-01-01 RX ADMIN — DOCUSATE SODIUM 100 MG: 100 CAPSULE, LIQUID FILLED ORAL at 08:08

## 2020-01-01 RX ADMIN — INSULIN ASPART 4 UNITS: 100 INJECTION, SOLUTION INTRAVENOUS; SUBCUTANEOUS at 05:11

## 2020-01-01 RX ADMIN — MAGNESIUM SULFATE IN WATER 2 G: 40 INJECTION, SOLUTION INTRAVENOUS at 04:11

## 2020-01-01 RX ADMIN — Medication 30 MG: at 05:08

## 2020-01-01 RX ADMIN — MAGNESIUM OXIDE TAB 400 MG (241.3 MG ELEMENTAL MG) 400 MG: 400 (241.3 MG) TAB at 09:08

## 2020-01-01 RX ADMIN — NITROGLYCERIN 25 MCG: 5 INJECTION, SOLUTION INTRAVENOUS at 06:08

## 2020-01-01 RX ADMIN — CEFEPIME 2 G: 2 INJECTION, POWDER, FOR SOLUTION INTRAVENOUS at 11:08

## 2020-01-01 RX ADMIN — MUPIROCIN 1 G: 20 OINTMENT TOPICAL at 12:08

## 2020-01-01 RX ADMIN — HYDROCORTISONE SODIUM SUCCINATE 100 MG: 100 INJECTION, POWDER, FOR SOLUTION INTRAMUSCULAR; INTRAVENOUS at 10:11

## 2020-01-01 RX ADMIN — POTASSIUM CHLORIDE 20 MEQ: 14.9 INJECTION, SOLUTION INTRAVENOUS at 12:12

## 2020-01-01 RX ADMIN — FUROSEMIDE 80 MG: 10 INJECTION, SOLUTION INTRAMUSCULAR; INTRAVENOUS at 06:11

## 2020-01-01 RX ADMIN — PROPOFOL 50 MCG/KG/MIN: 10 INJECTION, EMULSION INTRAVENOUS at 10:08

## 2020-01-01 RX ADMIN — MAGNESIUM SULFATE IN WATER 4 G: 40 INJECTION, SOLUTION INTRAVENOUS at 10:12

## 2020-01-01 RX ADMIN — POTASSIUM CHLORIDE 40 MEQ: 1.5 FOR SOLUTION ORAL at 01:11

## 2020-01-01 RX ADMIN — IPRATROPIUM BROMIDE AND ALBUTEROL SULFATE 3 ML: .5; 2.5 SOLUTION RESPIRATORY (INHALATION) at 12:11

## 2020-01-01 RX ADMIN — CEFTRIAXONE 2 G: 2 INJECTION, SOLUTION INTRAVENOUS at 12:11

## 2020-01-01 RX ADMIN — FUROSEMIDE 60 MG: 10 INJECTION, SOLUTION INTRAMUSCULAR; INTRAVENOUS at 12:12

## 2020-01-01 RX ADMIN — CALCIUM CARBONATE (ANTACID) CHEW TAB 500 MG 1000 MG: 500 CHEW TAB at 08:08

## 2020-01-01 RX ADMIN — INSULIN ASPART 3 UNITS: 100 INJECTION, SOLUTION INTRAVENOUS; SUBCUTANEOUS at 11:11

## 2020-01-01 RX ADMIN — INSULIN ASPART 1 UNITS: 100 INJECTION, SOLUTION INTRAVENOUS; SUBCUTANEOUS at 04:08

## 2020-01-01 RX ADMIN — PANTOPRAZOLE SODIUM 40 MG: 40 TABLET, DELAYED RELEASE ORAL at 09:08

## 2020-01-01 RX ADMIN — FUROSEMIDE 20 MG: 10 INJECTION, SOLUTION INTRAMUSCULAR; INTRAVENOUS at 09:08

## 2020-01-01 RX ADMIN — VORICONAZOLE 350 MG: 50 TABLET ORAL at 08:08

## 2020-01-01 RX ADMIN — MYCOPHENOLATE MOFETIL 500 MG: 250 CAPSULE ORAL at 08:08

## 2020-01-01 RX ADMIN — INSULIN ASPART 3 UNITS: 100 INJECTION, SOLUTION INTRAVENOUS; SUBCUTANEOUS at 11:12

## 2020-01-01 RX ADMIN — IPRATROPIUM BROMIDE AND ALBUTEROL SULFATE 3 ML: .5; 2.5 SOLUTION RESPIRATORY (INHALATION) at 01:11

## 2020-01-01 RX ADMIN — PROPOFOL 30 MCG/KG/MIN: 10 INJECTION, EMULSION INTRAVENOUS at 01:11

## 2020-01-01 RX ADMIN — NIFEDIPINE 30 MG: 30 TABLET, FILM COATED, EXTENDED RELEASE ORAL at 09:08

## 2020-01-01 RX ADMIN — GANCICLOVIR SODIUM 385 MG: 500 INJECTION, POWDER, LYOPHILIZED, FOR SOLUTION INTRAVENOUS at 09:12

## 2020-01-01 RX ADMIN — INSULIN ASPART 3 UNITS: 100 INJECTION, SOLUTION INTRAVENOUS; SUBCUTANEOUS at 05:08

## 2020-01-01 RX ADMIN — INSULIN ASPART 6 UNITS: 100 INJECTION, SOLUTION INTRAVENOUS; SUBCUTANEOUS at 11:11

## 2020-01-01 RX ADMIN — VANCOMYCIN HYDROCHLORIDE 1000 MG: 1 INJECTION, POWDER, LYOPHILIZED, FOR SOLUTION INTRAVENOUS at 02:11

## 2020-01-01 RX ADMIN — ACETAMINOPHEN 650 MG: 325 TABLET ORAL at 06:11

## 2020-01-01 RX ADMIN — Medication 0.02 MCG/KG/MIN: at 10:11

## 2020-01-01 RX ADMIN — VORICONAZOLE 300 MG: 50 TABLET ORAL at 08:11

## 2020-01-01 RX ADMIN — CISATRACURIUM BESYLATE 6.9 MCG/KG/MIN: 10 INJECTION INTRAVENOUS at 01:12

## 2020-01-01 RX ADMIN — IPRATROPIUM BROMIDE AND ALBUTEROL SULFATE 3 ML: .5; 2.5 SOLUTION RESPIRATORY (INHALATION) at 11:11

## 2020-01-01 RX ADMIN — NOREPINEPHRINE BITARTRATE 0.02 MCG/KG/MIN: 1 INJECTION, SOLUTION, CONCENTRATE INTRAVENOUS at 05:08

## 2020-01-01 RX ADMIN — CEFEPIME 2 G: 2 INJECTION, POWDER, FOR SOLUTION INTRAVENOUS at 10:08

## 2020-01-01 RX ADMIN — INSULIN ASPART 2 UNITS: 100 INJECTION, SOLUTION INTRAVENOUS; SUBCUTANEOUS at 05:08

## 2020-01-01 RX ADMIN — INSULIN ASPART 1 UNITS: 100 INJECTION, SOLUTION INTRAVENOUS; SUBCUTANEOUS at 12:12

## 2020-01-01 RX ADMIN — Medication 2500 MCG: at 06:11

## 2020-01-01 RX ADMIN — OXYCODONE HYDROCHLORIDE 10 MG: 10 TABLET ORAL at 08:08

## 2020-01-01 RX ADMIN — HYDROCORTISONE SODIUM SUCCINATE: 100 INJECTION, POWDER, FOR SOLUTION INTRAMUSCULAR; INTRAVENOUS at 08:11

## 2020-01-01 RX ADMIN — PANTOPRAZOLE SODIUM 40 MG: 40 GRANULE, DELAYED RELEASE ORAL at 08:11

## 2020-01-01 RX ADMIN — HYDROCORTISONE SODIUM SUCCINATE 50 MG: 100 INJECTION, POWDER, FOR SOLUTION INTRAMUSCULAR; INTRAVENOUS at 09:11

## 2020-01-01 RX ADMIN — TACROLIMUS 0.5 MG: 1 CAPSULE, GELATIN COATED ORAL at 06:11

## 2020-01-01 RX ADMIN — LEVALBUTEROL 1.25 MG: 1.25 SOLUTION, CONCENTRATE RESPIRATORY (INHALATION) at 09:08

## 2020-01-01 RX ADMIN — VANCOMYCIN HYDROCHLORIDE 1000 MG: 1 INJECTION, POWDER, LYOPHILIZED, FOR SOLUTION INTRAVENOUS at 03:11

## 2020-01-01 RX ADMIN — METOPROLOL TARTRATE 25 MG: 25 TABLET, FILM COATED ORAL at 08:08

## 2020-01-01 RX ADMIN — INSULIN ASPART 3 UNITS: 100 INJECTION, SOLUTION INTRAVENOUS; SUBCUTANEOUS at 03:11

## 2020-01-01 RX ADMIN — DEXTROSE 300 MG: 5 SOLUTION INTRAVENOUS at 07:12

## 2020-01-01 RX ADMIN — INSULIN ASPART 3 UNITS: 100 INJECTION, SOLUTION INTRAVENOUS; SUBCUTANEOUS at 08:12

## 2020-01-01 RX ADMIN — Medication 250 MCG/HR: at 04:12

## 2020-01-01 RX ADMIN — FUROSEMIDE 60 MG: 10 INJECTION, SOLUTION INTRAMUSCULAR; INTRAVENOUS at 11:12

## 2020-01-01 RX ADMIN — FUROSEMIDE 80 MG: 10 INJECTION, SOLUTION INTRAMUSCULAR; INTRAVENOUS at 03:11

## 2020-01-01 RX ADMIN — TACROLIMUS 0.5 MG: 0.5 CAPSULE ORAL at 09:11

## 2020-01-01 RX ADMIN — TACROLIMUS 0.5 MG: 1 CAPSULE, GELATIN COATED ORAL at 10:11

## 2020-01-01 RX ADMIN — TOBRAMYCIN SULFATE 430 MG: 40 INJECTION, SOLUTION INTRAMUSCULAR; INTRAVENOUS at 05:11

## 2020-01-01 RX ADMIN — FUROSEMIDE 20 MG: 10 INJECTION, SOLUTION INTRAMUSCULAR; INTRAVENOUS at 08:08

## 2020-01-01 RX ADMIN — POLYETHYLENE GLYCOL 3350 17 G: 17 POWDER, FOR SOLUTION ORAL at 01:11

## 2020-01-01 RX ADMIN — Medication 0.02 MCG/KG/MIN: at 05:11

## 2020-01-01 RX ADMIN — INSULIN ASPART 2 UNITS: 100 INJECTION, SOLUTION INTRAVENOUS; SUBCUTANEOUS at 05:11

## 2020-01-01 RX ADMIN — PREDNISONE 60 MG: 10 TABLET ORAL at 08:08

## 2020-01-01 RX ADMIN — HYDROXYZINE HYDROCHLORIDE 50 MG: 25 TABLET, FILM COATED ORAL at 01:11

## 2020-01-01 RX ADMIN — INSULIN ASPART 3 UNITS: 100 INJECTION, SOLUTION INTRAVENOUS; SUBCUTANEOUS at 04:12

## 2020-01-01 RX ADMIN — INSULIN ASPART 3 UNITS: 100 INJECTION, SOLUTION INTRAVENOUS; SUBCUTANEOUS at 08:11

## 2020-01-01 RX ADMIN — VASOPRESSIN 0.04 UNITS/MIN: 20 INJECTION INTRAVENOUS at 09:12

## 2020-01-01 RX ADMIN — OXYCODONE HYDROCHLORIDE 5 MG: 5 TABLET ORAL at 04:08

## 2020-01-01 RX ADMIN — MINERAL OIL AND WHITE PETROLATUM: 150; 830 OINTMENT OPHTHALMIC at 05:12

## 2020-01-01 RX ADMIN — CEFEPIME 2 G: 2 INJECTION, POWDER, FOR SOLUTION INTRAVENOUS at 09:11

## 2020-01-01 RX ADMIN — LAMIVUDINE 150 MG: 10 SOLUTION ORAL at 10:11

## 2020-01-01 RX ADMIN — PROPOFOL 45 MCG/KG/MIN: 10 INJECTION, EMULSION INTRAVENOUS at 06:12

## 2020-01-01 RX ADMIN — INSULIN ASPART 3 UNITS: 100 INJECTION, SOLUTION INTRAVENOUS; SUBCUTANEOUS at 05:11

## 2020-01-01 RX ADMIN — CISATRACURIUM BESYLATE 3 MCG/KG/MIN: 10 INJECTION INTRAVENOUS at 08:11

## 2020-01-01 RX ADMIN — MUPIROCIN 1 G: 20 OINTMENT TOPICAL at 08:08

## 2020-01-01 RX ADMIN — CLOPIDOGREL BISULFATE 600 MG: 300 TABLET, FILM COATED ORAL at 09:02

## 2020-01-01 RX ADMIN — INSULIN ASPART 3 UNITS: 100 INJECTION, SOLUTION INTRAVENOUS; SUBCUTANEOUS at 12:11

## 2020-01-01 RX ADMIN — INSULIN ASPART 3 UNITS: 100 INJECTION, SOLUTION INTRAVENOUS; SUBCUTANEOUS at 01:12

## 2020-01-01 RX ADMIN — MEROPENEM 2 G: 1 INJECTION, POWDER, FOR SOLUTION INTRAVENOUS at 03:11

## 2020-01-01 RX ADMIN — CISATRACURIUM BESYLATE 3 MCG/KG/MIN: 10 INJECTION INTRAVENOUS at 04:11

## 2020-01-01 RX ADMIN — INSULIN ASPART 3 UNITS: 100 INJECTION, SOLUTION INTRAVENOUS; SUBCUTANEOUS at 04:11

## 2020-01-01 RX ADMIN — FUROSEMIDE 60 MG: 10 INJECTION, SOLUTION INTRAMUSCULAR; INTRAVENOUS at 06:12

## 2020-01-01 RX ADMIN — Medication 250 MCG: at 11:11

## 2020-01-01 RX ADMIN — PREDNISONE 20 MG: 5 TABLET ORAL at 09:12

## 2020-01-01 RX ADMIN — Medication 0.2 MCG/KG/MIN: at 03:12

## 2020-01-01 RX ADMIN — INSULIN ASPART 1 UNITS: 100 INJECTION, SOLUTION INTRAVENOUS; SUBCUTANEOUS at 09:08

## 2020-01-01 RX ADMIN — DEXTROSE MONOHYDRATE 520 MG: 50 INJECTION, SOLUTION INTRAVENOUS at 12:08

## 2020-01-01 RX ADMIN — NICARDIPINE HYDROCHLORIDE 12.5 MG/HR: 0.2 INJECTION, SOLUTION INTRAVENOUS at 06:08

## 2020-01-01 RX ADMIN — GANCICLOVIR SODIUM 433 MG: 500 INJECTION, POWDER, LYOPHILIZED, FOR SOLUTION INTRAVENOUS at 10:08

## 2020-01-01 RX ADMIN — TACROLIMUS 0.5 MG: 0.5 CAPSULE ORAL at 09:08

## 2020-01-01 RX ADMIN — SODIUM CHLORIDE 1000 ML: 0.9 INJECTION, SOLUTION INTRAVENOUS at 01:12

## 2020-01-01 RX ADMIN — INSULIN ASPART 3 UNITS: 100 INJECTION, SOLUTION INTRAVENOUS; SUBCUTANEOUS at 06:12

## 2020-01-01 RX ADMIN — MINERAL OIL AND WHITE PETROLATUM: 150; 830 OINTMENT OPHTHALMIC at 02:11

## 2020-01-01 RX ADMIN — MEROPENEM 2 G: 1 INJECTION, POWDER, FOR SOLUTION INTRAVENOUS at 02:11

## 2020-01-01 RX ADMIN — CEFTRIAXONE 2 G: 2 INJECTION, SOLUTION INTRAVENOUS at 01:11

## 2020-01-01 RX ADMIN — PROPOFOL 40 MCG/KG/MIN: 10 INJECTION, EMULSION INTRAVENOUS at 01:11

## 2020-01-01 RX ADMIN — PROPOFOL 15 MCG/KG/MIN: 10 INJECTION, EMULSION INTRAVENOUS at 07:11

## 2020-01-01 RX ADMIN — Medication 150 MCG/HR: at 12:11

## 2020-01-01 RX ADMIN — SODIUM CHLORIDE 0.5 UNITS/HR: 9 INJECTION, SOLUTION INTRAVENOUS at 09:08

## 2020-01-01 RX ADMIN — HYDROCORTISONE SODIUM SUCCINATE 100 MG: 100 INJECTION, POWDER, FOR SOLUTION INTRAMUSCULAR; INTRAVENOUS at 09:11

## 2020-01-01 RX ADMIN — FAMOTIDINE 20 MG: 20 TABLET, FILM COATED ORAL at 09:11

## 2020-01-01 RX ADMIN — DEXTROSE: 5 SOLUTION INTRAVENOUS at 08:08

## 2020-01-01 RX ADMIN — GABAPENTIN 100 MG: 100 CAPSULE ORAL at 02:08

## 2020-01-01 RX ADMIN — MUPIROCIN: 20 OINTMENT TOPICAL at 09:11

## 2020-01-01 RX ADMIN — MYCOPHENOLATE MOFETIL 1000 MG: 250 CAPSULE ORAL at 09:08

## 2020-01-01 RX ADMIN — PROPOFOL 45 MCG/KG/MIN: 10 INJECTION, EMULSION INTRAVENOUS at 12:11

## 2020-01-01 RX ADMIN — ENOXAPARIN SODIUM 40 MG: 40 INJECTION SUBCUTANEOUS at 08:11

## 2020-01-01 RX ADMIN — DEXMEDETOMIDINE HYDROCHLORIDE 1.4 MCG/KG/HR: 4 INJECTION, SOLUTION INTRAVENOUS at 01:11

## 2020-01-01 RX ADMIN — FUROSEMIDE 10 MG: 10 INJECTION, SOLUTION INTRAMUSCULAR; INTRAVENOUS at 09:08

## 2020-01-01 RX ADMIN — INSULIN ASPART 2 UNITS: 100 INJECTION, SOLUTION INTRAVENOUS; SUBCUTANEOUS at 01:11

## 2020-01-01 RX ADMIN — MEROPENEM 2 G: 1 INJECTION, POWDER, FOR SOLUTION INTRAVENOUS at 06:11

## 2020-01-01 RX ADMIN — PANTOPRAZOLE SODIUM 40 MG: 40 INJECTION, POWDER, LYOPHILIZED, FOR SOLUTION INTRAVENOUS at 08:11

## 2020-01-01 RX ADMIN — TACROLIMUS 0.5 MG: 0.5 CAPSULE ORAL at 07:08

## 2020-01-01 RX ADMIN — Medication 250 MCG/HR: at 11:12

## 2020-01-01 RX ADMIN — INSULIN ASPART 6 UNITS: 100 INJECTION, SOLUTION INTRAVENOUS; SUBCUTANEOUS at 06:11

## 2020-01-01 RX ADMIN — SODIUM BICARBONATE: 84 INJECTION, SOLUTION INTRAVENOUS at 02:11

## 2020-01-01 RX ADMIN — Medication 250 MCG: at 03:11

## 2020-01-01 RX ADMIN — SULFAMETHOXAZOLE AND TRIMETHOPRIM 1 TABLET: 400; 80 TABLET ORAL at 08:11

## 2020-01-01 RX ADMIN — INSULIN ASPART 4 UNITS: 100 INJECTION, SOLUTION INTRAVENOUS; SUBCUTANEOUS at 06:11

## 2020-01-01 RX ADMIN — PROPOFOL 30 MCG/KG/MIN: 10 INJECTION, EMULSION INTRAVENOUS at 03:11

## 2020-01-01 RX ADMIN — Medication 100 MCG: at 02:11

## 2020-01-01 RX ADMIN — Medication 250 MCG: at 12:11

## 2020-01-01 RX ADMIN — INSULIN ASPART 2 UNITS: 100 INJECTION, SOLUTION INTRAVENOUS; SUBCUTANEOUS at 05:12

## 2020-01-01 RX ADMIN — INSULIN ASPART 2 UNITS: 100 INJECTION, SOLUTION INTRAVENOUS; SUBCUTANEOUS at 09:08

## 2020-01-01 RX ADMIN — CYCLOBENZAPRINE HYDROCHLORIDE 10 MG: 5 TABLET, FILM COATED ORAL at 10:08

## 2020-01-01 RX ADMIN — VORICONAZOLE 300 MG: 50 TABLET ORAL at 09:11

## 2020-01-01 RX ADMIN — FLUDROCORTISONE ACETATE 100 MCG: 0.1 TABLET ORAL at 10:11

## 2020-01-01 RX ADMIN — DEXTROSE 300 MG: 5 SOLUTION INTRAVENOUS at 09:11

## 2020-01-01 RX ADMIN — VANCOMYCIN HYDROCHLORIDE 1000 MG: 1 INJECTION, POWDER, LYOPHILIZED, FOR SOLUTION INTRAVENOUS at 04:11

## 2020-01-01 RX ADMIN — PROPOFOL 45 MCG/KG/MIN: 10 INJECTION, EMULSION INTRAVENOUS at 04:11

## 2020-01-01 RX ADMIN — PANTOPRAZOLE SODIUM 40 MG: 40 TABLET, DELAYED RELEASE ORAL at 08:08

## 2020-01-01 RX ADMIN — MINERAL OIL AND WHITE PETROLATUM: 150; 830 OINTMENT OPHTHALMIC at 01:12

## 2020-01-01 RX ADMIN — GANCICLOVIR SODIUM 433 MG: 500 INJECTION, POWDER, LYOPHILIZED, FOR SOLUTION INTRAVENOUS at 12:08

## 2020-01-01 RX ADMIN — POTASSIUM CHLORIDE 40 MEQ: 29.8 INJECTION, SOLUTION INTRAVENOUS at 05:12

## 2020-01-01 RX ADMIN — DIPHENHYDRAMINE HYDROCHLORIDE 50 MG: 50 CAPSULE ORAL at 08:02

## 2020-01-01 RX ADMIN — PROPOFOL 45 MCG/KG/MIN: 10 INJECTION, EMULSION INTRAVENOUS at 05:12

## 2020-01-01 RX ADMIN — PROPOFOL 45 MCG/KG/MIN: 10 INJECTION, EMULSION INTRAVENOUS at 08:12

## 2020-01-01 RX ADMIN — INSULIN ASPART 3 UNITS: 100 INJECTION, SOLUTION INTRAVENOUS; SUBCUTANEOUS at 09:12

## 2020-01-01 RX ADMIN — ASPIRIN 325 MG ORAL TABLET 325 MG: 325 PILL ORAL at 09:08

## 2020-01-01 RX ADMIN — CYCLOBENZAPRINE HYDROCHLORIDE 10 MG: 5 TABLET, FILM COATED ORAL at 08:08

## 2020-01-01 RX ADMIN — AMOXICILLIN AND CLAVULANATE POTASSIUM 1 TABLET: 875; 125 TABLET, FILM COATED ORAL at 08:11

## 2020-01-01 RX ADMIN — VASOPRESSIN 2 UNITS: 20 INJECTION INTRAVENOUS at 09:08

## 2020-01-01 RX ADMIN — TACROLIMUS 0.5 MG: 0.5 CAPSULE ORAL at 10:11

## 2020-01-01 RX ADMIN — CHLORHEXIDINE GLUCONATE 0.12% ORAL RINSE 10 ML: 1.2 LIQUID ORAL at 09:08

## 2020-01-01 RX ADMIN — MINERAL OIL AND WHITE PETROLATUM: 150; 830 OINTMENT OPHTHALMIC at 03:11

## 2020-01-01 RX ADMIN — INSULIN ASPART 4 UNITS: 100 INJECTION, SOLUTION INTRAVENOUS; SUBCUTANEOUS at 12:11

## 2020-01-01 RX ADMIN — ASPIRIN 81 MG: 81 TABLET, COATED ORAL at 09:08

## 2020-01-01 RX ADMIN — MUPIROCIN: 20 OINTMENT TOPICAL at 08:11

## 2020-01-01 RX ADMIN — MAGNESIUM SULFATE IN WATER 2 G: 40 INJECTION, SOLUTION INTRAVENOUS at 10:08

## 2020-01-01 RX ADMIN — CARVEDILOL 6.25 MG: 6.25 TABLET, FILM COATED ORAL at 08:11

## 2020-01-01 RX ADMIN — OXYCODONE HYDROCHLORIDE 10 MG: 10 TABLET ORAL at 09:08

## 2020-01-01 RX ADMIN — INSULIN ASPART 1 UNITS: 100 INJECTION, SOLUTION INTRAVENOUS; SUBCUTANEOUS at 08:12

## 2020-01-01 RX ADMIN — Medication 250 MCG: at 06:11

## 2020-01-01 RX ADMIN — AZITHROMYCIN MONOHYDRATE 250 MG: 250 TABLET ORAL at 08:11

## 2020-01-01 RX ADMIN — QUETIAPINE FUMARATE 25 MG: 25 TABLET ORAL at 08:11

## 2020-01-01 RX ADMIN — Medication 5 MG: at 04:11

## 2020-01-01 RX ADMIN — TRANEXAMIC ACID 800 MG: 100 INJECTION, SOLUTION INTRAVENOUS at 06:08

## 2020-01-01 RX ADMIN — Medication 5 MG: at 06:11

## 2020-01-01 RX ADMIN — VORICONAZOLE 350 MG: 50 TABLET ORAL at 11:08

## 2020-01-01 RX ADMIN — DEXTROSE 300 MG: 5 SOLUTION INTRAVENOUS at 08:12

## 2020-01-01 RX ADMIN — FAMOTIDINE 20 MG: 10 INJECTION INTRAVENOUS at 08:08

## 2020-01-01 RX ADMIN — PROPOFOL 45 MCG/KG/MIN: 10 INJECTION, EMULSION INTRAVENOUS at 12:12

## 2020-01-01 RX ADMIN — MAGNESIUM SULFATE IN WATER 2 G: 40 INJECTION, SOLUTION INTRAVENOUS at 05:11

## 2020-01-01 RX ADMIN — METRONIDAZOLE 500 MG: 500 SOLUTION INTRAVENOUS at 02:11

## 2020-01-01 RX ADMIN — SODIUM BICARBONATE: 84 INJECTION, SOLUTION INTRAVENOUS at 01:11

## 2020-01-01 RX ADMIN — Medication 20 MG: at 08:08

## 2020-01-01 RX ADMIN — FUROSEMIDE 60 MG: 10 INJECTION, SOLUTION INTRAMUSCULAR; INTRAVENOUS at 02:11

## 2020-01-01 RX ADMIN — INSULIN ASPART 2 UNITS: 100 INJECTION, SOLUTION INTRAVENOUS; SUBCUTANEOUS at 11:11

## 2020-01-01 RX ADMIN — PREDNISONE 90 MG: 20 TABLET ORAL at 09:08

## 2020-01-01 RX ADMIN — POTASSIUM CHLORIDE 40 MEQ: 200 INJECTION, SOLUTION INTRAVENOUS at 12:08

## 2020-01-01 RX ADMIN — MEROPENEM 2 G: 1 INJECTION, POWDER, FOR SOLUTION INTRAVENOUS at 08:12

## 2020-01-01 RX ADMIN — LIDOCAINE HYDROCHLORIDE 4 ML: 20 INJECTION, SOLUTION INFILTRATION; PERINEURAL at 08:09

## 2020-01-01 RX ADMIN — AMOXICILLIN AND CLAVULANATE POTASSIUM 500 MG: 500; 125 TABLET, FILM COATED ORAL at 08:08

## 2020-01-01 RX ADMIN — POTASSIUM CHLORIDE 40 MEQ: 29.8 INJECTION, SOLUTION INTRAVENOUS at 07:12

## 2020-01-01 RX ADMIN — CISATRACURIUM BESYLATE 3 MCG/KG/MIN: 10 INJECTION INTRAVENOUS at 06:11

## 2020-01-01 RX ADMIN — IPRATROPIUM BROMIDE AND ALBUTEROL SULFATE 3 ML: .5; 2.5 SOLUTION RESPIRATORY (INHALATION) at 07:11

## 2020-01-01 RX ADMIN — MAGNESIUM SULFATE IN WATER 2 G: 40 INJECTION, SOLUTION INTRAVENOUS at 12:08

## 2020-01-01 RX ADMIN — PROPOFOL 40 MCG/KG/MIN: 10 INJECTION, EMULSION INTRAVENOUS at 08:12

## 2020-01-01 RX ADMIN — INSULIN ASPART 2 UNITS: 100 INJECTION, SOLUTION INTRAVENOUS; SUBCUTANEOUS at 02:08

## 2020-01-01 RX ADMIN — INSULIN ASPART 3 UNITS: 100 INJECTION, SOLUTION INTRAVENOUS; SUBCUTANEOUS at 12:08

## 2020-01-01 RX ADMIN — NOREPINEPHRINE BITARTRATE 1.4 MCG/KG/MIN: 1 INJECTION, SOLUTION, CONCENTRATE INTRAVENOUS at 05:12

## 2020-01-01 RX ADMIN — CEFTRIAXONE 2 G: 2 INJECTION, SOLUTION INTRAVENOUS at 02:11

## 2020-01-01 RX ADMIN — CISATRACURIUM BESYLATE 3 MCG/KG/MIN: 10 INJECTION INTRAVENOUS at 11:12

## 2020-01-01 RX ADMIN — VASOPRESSIN 0.04 UNITS/MIN: 20 INJECTION INTRAVENOUS at 07:11

## 2020-01-01 RX ADMIN — PANTOPRAZOLE SODIUM 40 MG: 40 TABLET, DELAYED RELEASE ORAL at 10:08

## 2020-01-01 RX ADMIN — IPRATROPIUM BROMIDE AND ALBUTEROL SULFATE 3 ML: .5; 2.5 SOLUTION RESPIRATORY (INHALATION) at 02:11

## 2020-01-01 RX ADMIN — ACETAMINOPHEN 650 MG: 325 TABLET ORAL at 01:11

## 2020-01-01 RX ADMIN — MEROPENEM 1 G: 1 INJECTION, POWDER, FOR SOLUTION INTRAVENOUS at 04:11

## 2020-01-01 RX ADMIN — CEFEPIME 2 G: 2 INJECTION, POWDER, FOR SOLUTION INTRAVENOUS at 08:12

## 2020-01-01 RX ADMIN — MEROPENEM 2 G: 1 INJECTION, POWDER, FOR SOLUTION INTRAVENOUS at 01:11

## 2020-01-01 RX ADMIN — PROPOFOL 45 MCG/KG/MIN: 10 INJECTION, EMULSION INTRAVENOUS at 03:11

## 2020-01-01 RX ADMIN — ACETAMINOPHEN 650 MG: 325 TABLET ORAL at 04:11

## 2020-01-01 RX ADMIN — PREDNISONE 25 MG: 20 TABLET ORAL at 09:08

## 2020-01-01 RX ADMIN — PROPOFOL 40 MCG/KG/MIN: 10 INJECTION, EMULSION INTRAVENOUS at 05:11

## 2020-01-01 RX ADMIN — AZITHROMYCIN 250 MG: 250 TABLET, FILM COATED ORAL at 09:11

## 2020-01-01 RX ADMIN — POLYETHYLENE GLYCOL 3350 17 G: 17 POWDER, FOR SOLUTION ORAL at 08:11

## 2020-01-01 RX ADMIN — MAGNESIUM SULFATE IN WATER 2 G: 40 INJECTION, SOLUTION INTRAVENOUS at 09:11

## 2020-01-01 RX ADMIN — INSULIN ASPART 4 UNITS: 100 INJECTION, SOLUTION INTRAVENOUS; SUBCUTANEOUS at 04:11

## 2020-01-01 RX ADMIN — TACROLIMUS 0.5 MG: 1 CAPSULE, GELATIN COATED ORAL at 08:11

## 2020-01-01 RX ADMIN — SENNOSIDES 17.2 MG: 8.6 TABLET, FILM COATED ORAL at 09:08

## 2020-01-01 RX ADMIN — POTASSIUM CHLORIDE 40 MEQ: 29.8 INJECTION, SOLUTION INTRAVENOUS at 07:11

## 2020-01-01 RX ADMIN — MAGNESIUM SULFATE IN WATER 2 G: 40 INJECTION, SOLUTION INTRAVENOUS at 08:11

## 2020-01-01 RX ADMIN — INSULIN ASPART 3 UNITS: 100 INJECTION, SOLUTION INTRAVENOUS; SUBCUTANEOUS at 01:11

## 2020-01-01 RX ADMIN — Medication 1250 MG: at 08:08

## 2020-01-01 RX ADMIN — POLYETHYLENE GLYCOL 3350 17 G: 17 POWDER, FOR SOLUTION ORAL at 09:11

## 2020-01-01 RX ADMIN — SULFAMETHOXAZOLE AND TRIMETHOPRIM 1 TABLET: 800; 160 TABLET ORAL at 09:08

## 2020-01-01 RX ADMIN — REMDESIVIR 100 MG: 100 INJECTION, POWDER, LYOPHILIZED, FOR SOLUTION INTRAVENOUS at 04:11

## 2020-01-01 RX ADMIN — POTASSIUM CHLORIDE 20 MEQ: 14.9 INJECTION, SOLUTION INTRAVENOUS at 02:12

## 2020-01-01 RX ADMIN — ASPIRIN 325 MG ORAL TABLET 325 MG: 325 PILL ORAL at 10:08

## 2020-01-01 RX ADMIN — INSULIN ASPART 3 UNITS: 100 INJECTION, SOLUTION INTRAVENOUS; SUBCUTANEOUS at 03:12

## 2020-01-01 RX ADMIN — HYDROCORTISONE SODIUM SUCCINATE: 100 INJECTION, POWDER, FOR SOLUTION INTRAMUSCULAR; INTRAVENOUS at 10:11

## 2020-01-01 RX ADMIN — MEROPENEM 2 G: 1 INJECTION, POWDER, FOR SOLUTION INTRAVENOUS at 05:11

## 2020-01-01 RX ADMIN — DEXMEDETOMIDINE HYDROCHLORIDE 1.4 MCG/KG/HR: 4 INJECTION, SOLUTION INTRAVENOUS at 11:11

## 2020-01-01 RX ADMIN — MINERAL OIL AND WHITE PETROLATUM: 150; 830 OINTMENT OPHTHALMIC at 06:12

## 2020-01-01 RX ADMIN — MINERAL OIL AND WHITE PETROLATUM: 150; 830 OINTMENT OPHTHALMIC at 04:11

## 2020-01-01 RX ADMIN — MAGNESIUM SULFATE IN WATER 2 G: 40 INJECTION, SOLUTION INTRAVENOUS at 09:08

## 2020-01-01 RX ADMIN — Medication 0.56 MCG/KG/MIN: at 09:11

## 2020-01-01 RX ADMIN — OXYCODONE HYDROCHLORIDE 5 MG: 5 TABLET ORAL at 11:08

## 2020-01-01 RX ADMIN — FLUDROCORTISONE ACETATE 100 MCG: 0.1 TABLET ORAL at 09:11

## 2020-01-01 RX ADMIN — AZITHROMYCIN 250 MG: 250 TABLET, FILM COATED ORAL at 08:11

## 2020-01-01 RX ADMIN — SODIUM CHLORIDE 2.4 UNITS/HR: 9 INJECTION, SOLUTION INTRAVENOUS at 08:11

## 2020-01-01 RX ADMIN — FAMOTIDINE 20 MG: 20 TABLET ORAL at 10:08

## 2020-01-01 RX ADMIN — CISATRACURIUM BESYLATE 1 MCG/KG/MIN: 10 INJECTION INTRAVENOUS at 10:11

## 2020-01-01 RX ADMIN — CISATRACURIUM BESYLATE 3 MCG/KG/MIN: 10 INJECTION INTRAVENOUS at 08:12

## 2020-01-01 RX ADMIN — INSULIN ASPART 4 UNITS: 100 INJECTION, SOLUTION INTRAVENOUS; SUBCUTANEOUS at 09:11

## 2020-01-01 RX ADMIN — INSULIN DETEMIR 7 UNITS: 100 INJECTION, SOLUTION SUBCUTANEOUS at 09:11

## 2020-01-01 RX ADMIN — Medication 250 MCG/HR: at 07:11

## 2020-01-01 RX ADMIN — DEXTROSE 300 MG: 5 SOLUTION INTRAVENOUS at 11:11

## 2020-01-01 RX ADMIN — LIDOCAINE 1 PATCH: 50 PATCH TOPICAL at 03:08

## 2020-01-01 RX ADMIN — HYDROCORTISONE SODIUM SUCCINATE 100 MG: 100 INJECTION, POWDER, FOR SOLUTION INTRAMUSCULAR; INTRAVENOUS at 09:12

## 2020-01-01 RX ADMIN — CYCLOBENZAPRINE HYDROCHLORIDE 10 MG: 5 TABLET, FILM COATED ORAL at 09:08

## 2020-01-01 RX ADMIN — Medication 0.84 MCG/KG/MIN: at 03:11

## 2020-01-01 RX ADMIN — Medication 225 MCG/HR: at 02:12

## 2020-01-01 RX ADMIN — Medication 2500 MCG: at 04:11

## 2020-01-01 RX ADMIN — ROCURONIUM BROMIDE 46 MG: 10 INJECTION, SOLUTION INTRAVENOUS at 07:11

## 2020-01-01 RX ADMIN — SENNOSIDES 17.2 MG: 8.6 TABLET, FILM COATED ORAL at 08:08

## 2020-01-01 RX ADMIN — DEXTROSE: 5 SOLUTION INTRAVENOUS at 09:08

## 2020-01-01 RX ADMIN — INSULIN ASPART 3 UNITS: 100 INJECTION, SOLUTION INTRAVENOUS; SUBCUTANEOUS at 11:08

## 2020-01-01 RX ADMIN — DEXTROSE MONOHYDRATE 520 MG: 50 INJECTION, SOLUTION INTRAVENOUS at 01:08

## 2020-01-01 RX ADMIN — METHYLPREDNISOLONE SODIUM SUCCINATE 80 MG: 40 INJECTION, POWDER, FOR SOLUTION INTRAMUSCULAR; INTRAVENOUS at 04:12

## 2020-01-01 RX ADMIN — ACETAZOLAMIDE 500 MG: 500 INJECTION, POWDER, LYOPHILIZED, FOR SOLUTION INTRAVENOUS at 05:11

## 2020-01-01 RX ADMIN — Medication 250 MCG/HR: at 09:11

## 2020-01-01 RX ADMIN — PROPOFOL 50 MCG/KG/MIN: 10 INJECTION, EMULSION INTRAVENOUS at 11:12

## 2020-01-01 RX ADMIN — Medication 0.35 MCG/KG/MIN: at 08:12

## 2020-01-01 RX ADMIN — FENTANYL CITRATE 100 MCG: 50 INJECTION, SOLUTION INTRAMUSCULAR; INTRAVENOUS at 05:08

## 2020-01-01 RX ADMIN — Medication 200 MCG/HR: at 05:11

## 2020-01-01 RX ADMIN — INSULIN ASPART 3 UNITS: 100 INJECTION, SOLUTION INTRAVENOUS; SUBCUTANEOUS at 12:12

## 2020-01-01 RX ADMIN — INSULIN ASPART 2 UNITS: 100 INJECTION, SOLUTION INTRAVENOUS; SUBCUTANEOUS at 11:08

## 2020-01-01 RX ADMIN — FAMOTIDINE 20 MG: 20 TABLET ORAL at 09:08

## 2020-01-01 RX ADMIN — CYCLOBENZAPRINE HYDROCHLORIDE 10 MG: 5 TABLET, FILM COATED ORAL at 05:08

## 2020-01-01 RX ADMIN — ENOXAPARIN SODIUM 80 MG: 40 INJECTION SUBCUTANEOUS at 09:12

## 2020-01-01 RX ADMIN — POLYETHYLENE GLYCOL 3350 17 G: 17 POWDER, FOR SOLUTION ORAL at 09:12

## 2020-01-01 RX ADMIN — FUROSEMIDE 40 MG: 10 INJECTION, SOLUTION INTRAMUSCULAR; INTRAVENOUS at 04:11

## 2020-01-01 RX ADMIN — PANTOPRAZOLE SODIUM 40 MG: 40 TABLET, DELAYED RELEASE ORAL at 02:11

## 2020-01-01 RX ADMIN — ASPIRIN 325 MG ORAL TABLET 325 MG: 325 PILL ORAL at 08:08

## 2020-01-01 RX ADMIN — PROPOFOL 30 MCG/KG/MIN: 10 INJECTION, EMULSION INTRAVENOUS at 06:11

## 2020-01-01 RX ADMIN — POTASSIUM CHLORIDE 40 MEQ: 29.8 INJECTION, SOLUTION INTRAVENOUS at 10:11

## 2020-01-01 RX ADMIN — VANCOMYCIN HYDROCHLORIDE 1500 MG: 1.5 INJECTION, POWDER, LYOPHILIZED, FOR SOLUTION INTRAVENOUS at 06:11

## 2020-01-01 RX ADMIN — CISATRACURIUM BESYLATE 11.6 MG: 2 INJECTION INTRAVENOUS at 03:12

## 2020-01-01 RX ADMIN — ASPIRIN 325 MG ORAL TABLET 325 MG: 325 PILL ORAL at 12:08

## 2020-01-01 RX ADMIN — Medication 0.4 MCG/KG/MIN: at 04:12

## 2020-01-01 RX ADMIN — MIDAZOLAM 1 MG: 5 INJECTION INTRAMUSCULAR; INTRAVENOUS at 08:09

## 2020-01-01 RX ADMIN — PREDNISONE 20 MG: 5 TABLET ORAL at 08:11

## 2020-01-01 RX ADMIN — DEXTROSE 300 MG: 5 SOLUTION INTRAVENOUS at 07:11

## 2020-01-01 RX ADMIN — CEFEPIME 2 G: 2 INJECTION, POWDER, FOR SOLUTION INTRAVENOUS at 02:11

## 2020-01-01 RX ADMIN — TACROLIMUS 0.5 MG: 0.5 CAPSULE ORAL at 08:08

## 2020-01-01 RX ADMIN — TACROLIMUS 0.5 MG: 1 CAPSULE, GELATIN COATED ORAL at 08:12

## 2020-01-01 RX ADMIN — Medication 250 MCG/HR: at 02:11

## 2020-01-01 RX ADMIN — ACETAMINOPHEN 650 MG: 325 TABLET ORAL at 10:11

## 2020-01-01 RX ADMIN — LAMIVUDINE 150 MG: 150 TABLET, FILM COATED ORAL at 11:11

## 2020-01-01 RX ADMIN — PROPOFOL 45 MCG/KG/MIN: 10 INJECTION, EMULSION INTRAVENOUS at 02:12

## 2020-01-01 RX ADMIN — MAGNESIUM SULFATE IN WATER 2 G: 40 INJECTION, SOLUTION INTRAVENOUS at 11:08

## 2020-01-01 RX ADMIN — SODIUM CHLORIDE, SODIUM LACTATE, POTASSIUM CHLORIDE, AND CALCIUM CHLORIDE 500 ML: .6; .31; .03; .02 INJECTION, SOLUTION INTRAVENOUS at 07:11

## 2020-01-01 RX ADMIN — Medication 200 MCG/HR: at 01:12

## 2020-01-01 RX ADMIN — INSULIN ASPART 1 UNITS: 100 INJECTION, SOLUTION INTRAVENOUS; SUBCUTANEOUS at 10:08

## 2020-01-01 RX ADMIN — Medication 0.34 MCG/KG/MIN: at 02:11

## 2020-01-01 RX ADMIN — PROPOFOL 5 MCG/KG/MIN: 10 INJECTION, EMULSION INTRAVENOUS at 09:12

## 2020-01-01 RX ADMIN — LAMIVUDINE 150 MG: 150 TABLET, FILM COATED ORAL at 09:11

## 2020-01-01 RX ADMIN — ENOXAPARIN SODIUM 100 MG: 40 INJECTION SUBCUTANEOUS at 09:12

## 2020-01-01 RX ADMIN — MINERAL OIL AND WHITE PETROLATUM: 150; 830 OINTMENT OPHTHALMIC at 02:12

## 2020-01-01 RX ADMIN — LIDOCAINE HYDROCHLORIDE 75 MG: 20 INJECTION, SOLUTION INTRAVENOUS at 05:08

## 2020-01-01 RX ADMIN — PROPOFOL 40 MCG/KG/MIN: 10 INJECTION, EMULSION INTRAVENOUS at 03:12

## 2020-01-01 RX ADMIN — PANTOPRAZOLE SODIUM 40 MG: 40 GRANULE, DELAYED RELEASE ORAL at 08:12

## 2020-01-01 RX ADMIN — Medication: at 11:11

## 2020-01-01 RX ADMIN — POTASSIUM CHLORIDE 20 MEQ: 14.9 INJECTION, SOLUTION INTRAVENOUS at 01:11

## 2020-01-01 RX ADMIN — NOREPINEPHRINE BITARTRATE 0.4 MCG/KG/MIN: 1 INJECTION, SOLUTION, CONCENTRATE INTRAVENOUS at 11:11

## 2020-01-01 RX ADMIN — SULFAMETHOXAZOLE AND TRIMETHOPRIM 1 TABLET: 800; 160 TABLET ORAL at 08:08

## 2020-01-01 RX ADMIN — TACROLIMUS 0.5 MG: 0.5 CAPSULE ORAL at 08:11

## 2020-01-01 RX ADMIN — CALCIUM GLUCONATE 1000 MG: 98 INJECTION, SOLUTION INTRAVENOUS at 03:08

## 2020-01-01 RX ADMIN — ENOXAPARIN SODIUM 40 MG: 40 INJECTION SUBCUTANEOUS at 03:08

## 2020-01-01 RX ADMIN — Medication 250 MCG: at 05:12

## 2020-01-01 RX ADMIN — DEXTROSE 2 MCG/KG/MIN: 50 INJECTION, SOLUTION INTRAVENOUS at 09:11

## 2020-01-01 RX ADMIN — DEXMEDETOMIDINE HYDROCHLORIDE 1.4 MCG/KG/HR: 4 INJECTION, SOLUTION INTRAVENOUS at 03:11

## 2020-01-01 RX ADMIN — OXYCODONE HYDROCHLORIDE 10 MG: 10 TABLET ORAL at 02:08

## 2020-01-01 RX ADMIN — FUROSEMIDE 40 MG: 10 INJECTION, SOLUTION INTRAMUSCULAR; INTRAVENOUS at 08:11

## 2020-01-01 RX ADMIN — CALCIUM CARBONATE (ANTACID) CHEW TAB 500 MG 1000 MG: 500 CHEW TAB at 11:08

## 2020-01-01 RX ADMIN — AMOXICILLIN AND CLAVULANATE POTASSIUM 1 TABLET: 875; 125 TABLET, FILM COATED ORAL at 09:11

## 2020-01-01 RX ADMIN — TACROLIMUS 0.5 MG: 0.5 CAPSULE ORAL at 06:08

## 2020-01-01 RX ADMIN — VASOPRESSIN 0.04 UNITS/MIN: 20 INJECTION INTRAVENOUS at 12:11

## 2020-01-01 RX ADMIN — INSULIN ASPART 2 UNITS: 100 INJECTION, SOLUTION INTRAVENOUS; SUBCUTANEOUS at 08:08

## 2020-01-01 RX ADMIN — PROPOFOL 40 MCG/KG/MIN: 10 INJECTION, EMULSION INTRAVENOUS at 05:12

## 2020-01-01 RX ADMIN — INSULIN ASPART 3 UNITS: 100 INJECTION, SOLUTION INTRAVENOUS; SUBCUTANEOUS at 05:12

## 2020-01-01 RX ADMIN — LAMIVUDINE 150 MG: 150 TABLET, FILM COATED ORAL at 10:11

## 2020-01-01 RX ADMIN — HYDROCORTISONE SODIUM SUCCINATE 50 MG: 100 INJECTION, POWDER, FOR SOLUTION INTRAMUSCULAR; INTRAVENOUS at 05:11

## 2020-01-01 RX ADMIN — ACETAMINOPHEN 650 MG: 325 TABLET ORAL at 02:11

## 2020-01-01 RX ADMIN — MAGNESIUM SULFATE IN WATER 2 G: 40 INJECTION, SOLUTION INTRAVENOUS at 10:11

## 2020-01-01 RX ADMIN — PROPOFOL 45 MCG/KG/MIN: 10 INJECTION, EMULSION INTRAVENOUS at 08:11

## 2020-01-01 RX ADMIN — INSULIN ASPART 2 UNITS: 100 INJECTION, SOLUTION INTRAVENOUS; SUBCUTANEOUS at 07:08

## 2020-01-01 RX ADMIN — PROMETHAZINE HYDROCHLORIDE AND CODEINE PHOSPHATE 5 ML: 10; 6.25 SOLUTION ORAL at 08:11

## 2020-01-01 RX ADMIN — VANCOMYCIN HYDROCHLORIDE 1000 MG: 1 INJECTION, POWDER, LYOPHILIZED, FOR SOLUTION INTRAVENOUS at 06:08

## 2020-01-01 RX ADMIN — VASOPRESSIN 1 UNITS: 20 INJECTION INTRAVENOUS at 08:08

## 2020-01-01 RX ADMIN — INSULIN ASPART 2 UNITS: 100 INJECTION, SOLUTION INTRAVENOUS; SUBCUTANEOUS at 09:11

## 2020-01-01 RX ADMIN — FENTANYL CITRATE 100 MCG: 50 INJECTION, SOLUTION INTRAMUSCULAR; INTRAVENOUS at 06:08

## 2020-01-01 RX ADMIN — PROPOFOL 45 MCG/KG/MIN: 10 INJECTION, EMULSION INTRAVENOUS at 03:12

## 2020-01-01 RX ADMIN — PROPOFOL 25 MCG/KG/MIN: 10 INJECTION, EMULSION INTRAVENOUS at 06:11

## 2020-01-01 RX ADMIN — CISATRACURIUM BESYLATE 2 MCG/KG/MIN: 10 INJECTION INTRAVENOUS at 01:12

## 2020-01-01 RX ADMIN — INSULIN ASPART 1 UNITS: 100 INJECTION, SOLUTION INTRAVENOUS; SUBCUTANEOUS at 03:08

## 2020-01-01 RX ADMIN — ASPIRIN 81 MG CHEWABLE TABLET 81 MG: 81 TABLET CHEWABLE at 09:02

## 2020-01-01 RX ADMIN — Medication 0.01 MCG/KG/MIN: at 07:11

## 2020-01-01 RX ADMIN — CISATRACURIUM BESYLATE 3 MCG/KG/MIN: 10 INJECTION INTRAVENOUS at 12:11

## 2020-01-01 RX ADMIN — MINERAL OIL AND WHITE PETROLATUM: 150; 830 OINTMENT OPHTHALMIC at 09:11

## 2020-01-01 RX ADMIN — INSULIN ASPART 3 UNITS: 100 INJECTION, SOLUTION INTRAVENOUS; SUBCUTANEOUS at 08:08

## 2020-01-01 RX ADMIN — REMDESIVIR 200 MG: 100 INJECTION, POWDER, LYOPHILIZED, FOR SOLUTION INTRAVENOUS at 06:11

## 2020-01-01 RX ADMIN — MAGNESIUM OXIDE TAB 400 MG (241.3 MG ELEMENTAL MG) 400 MG: 400 (241.3 MG) TAB at 08:08

## 2020-01-01 RX ADMIN — Medication 250 MCG: at 09:11

## 2020-01-01 RX ADMIN — Medication 175 MCG: at 01:11

## 2020-01-01 RX ADMIN — POLYETHYLENE GLYCOL 3350 17 G: 17 POWDER, FOR SOLUTION ORAL at 02:08

## 2020-01-01 RX ADMIN — CALCIUM CHLORIDE 500 MG: 100 INJECTION, SOLUTION INTRAVENOUS at 08:08

## 2020-01-01 RX ADMIN — VASOPRESSIN 0.04 UNITS/MIN: 20 INJECTION INTRAVENOUS at 05:12

## 2020-01-01 RX ADMIN — INSULIN ASPART 8 UNITS: 100 INJECTION, SOLUTION INTRAVENOUS; SUBCUTANEOUS at 05:11

## 2020-01-01 RX ADMIN — POTASSIUM CHLORIDE: 14.9 INJECTION, SOLUTION INTRAVENOUS at 09:08

## 2020-01-01 RX ADMIN — CHLORHEXIDINE GLUCONATE 0.12% ORAL RINSE 10 ML: 1.2 LIQUID ORAL at 12:08

## 2020-01-01 RX ADMIN — CEFEPIME 2 G: 2 INJECTION, POWDER, FOR SOLUTION INTRAVENOUS at 12:08

## 2020-01-01 RX ADMIN — Medication 250 MCG/HR: at 08:11

## 2020-01-01 RX ADMIN — GANCICLOVIR SODIUM 385 MG: 500 INJECTION, POWDER, LYOPHILIZED, FOR SOLUTION INTRAVENOUS at 08:12

## 2020-01-01 RX ADMIN — Medication 250 MCG: at 09:12

## 2020-01-01 RX ADMIN — MEROPENEM 2 G: 1 INJECTION, POWDER, FOR SOLUTION INTRAVENOUS at 04:12

## 2020-01-01 RX ADMIN — NITROGLYCERIN 50 MCG: 5 INJECTION, SOLUTION INTRAVENOUS at 06:08

## 2020-01-01 RX ADMIN — MAGNESIUM SULFATE IN WATER 2 G: 40 INJECTION, SOLUTION INTRAVENOUS at 06:11

## 2020-01-01 RX ADMIN — LAMIVUDINE 150 MG: 10 SOLUTION ORAL at 11:11

## 2020-01-01 RX ADMIN — HYDROXYZINE HYDROCHLORIDE 50 MG: 25 TABLET, FILM COATED ORAL at 08:11

## 2020-01-01 RX ADMIN — DEXMEDETOMIDINE HYDROCHLORIDE 0.2 MCG/KG/HR: 4 INJECTION, SOLUTION INTRAVENOUS at 03:11

## 2020-01-01 RX ADMIN — POTASSIUM CHLORIDE 40 MEQ: 1.5 FOR SOLUTION ORAL at 08:11

## 2020-01-01 RX ADMIN — HYDROCORTISONE SODIUM SUCCINATE 100 MG: 100 INJECTION, POWDER, FOR SOLUTION INTRAMUSCULAR; INTRAVENOUS at 06:11

## 2020-01-01 RX ADMIN — VASOPRESSIN 0.04 UNITS/MIN: 20 INJECTION INTRAVENOUS at 11:11

## 2020-01-01 RX ADMIN — MEROPENEM 2 G: 1 INJECTION, POWDER, FOR SOLUTION INTRAVENOUS at 04:11

## 2020-01-01 RX ADMIN — SODIUM CHLORIDE, SODIUM LACTATE, POTASSIUM CHLORIDE, AND CALCIUM CHLORIDE 500 ML: .6; .31; .03; .02 INJECTION, SOLUTION INTRAVENOUS at 05:11

## 2020-01-01 RX ADMIN — TACROLIMUS 0.5 MG: 1 CAPSULE, GELATIN COATED ORAL at 09:11

## 2020-01-01 RX ADMIN — OXYCODONE HYDROCHLORIDE 10 MG: 10 TABLET ORAL at 06:08

## 2020-01-01 RX ADMIN — PANTOPRAZOLE SODIUM 40 MG: 40 GRANULE, DELAYED RELEASE ORAL at 09:11

## 2020-01-01 RX ADMIN — INSULIN ASPART 1 UNITS: 100 INJECTION, SOLUTION INTRAVENOUS; SUBCUTANEOUS at 08:11

## 2020-01-01 RX ADMIN — MEROPENEM 1 G: 1 INJECTION, POWDER, FOR SOLUTION INTRAVENOUS at 06:11

## 2020-01-01 RX ADMIN — DEXTROSE 300 MG: 5 SOLUTION INTRAVENOUS at 09:12

## 2020-01-01 RX ADMIN — NICARDIPINE HYDROCHLORIDE 15 MG/HR: 0.2 INJECTION, SOLUTION INTRAVENOUS at 11:11

## 2020-01-01 RX ADMIN — PREDNISONE 35 MG: 20 TABLET ORAL at 08:08

## 2020-01-01 RX ADMIN — POTASSIUM CHLORIDE 40 MEQ: 29.8 INJECTION, SOLUTION INTRAVENOUS at 12:11

## 2020-01-01 RX ADMIN — INSULIN ASPART 6 UNITS: 100 INJECTION, SOLUTION INTRAVENOUS; SUBCUTANEOUS at 04:11

## 2020-01-01 RX ADMIN — TACROLIMUS 1 MG: 1 CAPSULE, GELATIN COATED ORAL at 09:12

## 2020-01-01 RX ADMIN — SODIUM CHLORIDE: 0.9 INJECTION, SOLUTION INTRAVENOUS at 02:11

## 2020-01-01 RX ADMIN — ACETAMINOPHEN 650 MG: 325 TABLET ORAL at 05:11

## 2020-01-01 RX ADMIN — INSULIN ASPART 2 UNITS: 100 INJECTION, SOLUTION INTRAVENOUS; SUBCUTANEOUS at 03:11

## 2020-01-01 RX ADMIN — Medication 225 MCG: at 03:12

## 2020-01-01 RX ADMIN — NOREPINEPHRINE BITARTRATE 1 MCG/KG/MIN: 1 INJECTION, SOLUTION, CONCENTRATE INTRAVENOUS at 05:12

## 2020-01-01 RX ADMIN — PROTAMINE SULFATE 235 MG: 10 INJECTION, SOLUTION INTRAVENOUS at 08:08

## 2020-01-01 RX ADMIN — PROPOFOL 20 MCG/KG/MIN: 10 INJECTION, EMULSION INTRAVENOUS at 05:11

## 2020-01-01 RX ADMIN — FAMOTIDINE 20 MG: 10 INJECTION INTRAVENOUS at 12:08

## 2020-01-01 RX ADMIN — NOREPINEPHRINE BITARTRATE 1 MCG/KG/MIN: 1 INJECTION, SOLUTION, CONCENTRATE INTRAVENOUS at 10:12

## 2020-01-01 RX ADMIN — PROPOFOL 10 MCG/KG/MIN: 10 INJECTION, EMULSION INTRAVENOUS at 09:11

## 2020-01-01 RX ADMIN — CISATRACURIUM BESYLATE 3 MCG/KG/MIN: 10 INJECTION INTRAVENOUS at 02:12

## 2020-01-01 RX ADMIN — Medication 0.28 MCG/KG/MIN: at 11:12

## 2020-01-01 RX ADMIN — MAGNESIUM SULFATE IN WATER 2 G: 40 INJECTION, SOLUTION INTRAVENOUS at 02:12

## 2020-01-01 RX ADMIN — HYDROCORTISONE SODIUM SUCCINATE 100 MG: 100 INJECTION, POWDER, FOR SOLUTION INTRAMUSCULAR; INTRAVENOUS at 01:12

## 2020-01-01 RX ADMIN — INSULIN HUMAN 5 UNITS: 100 INJECTION, SOLUTION PARENTERAL at 07:08

## 2020-01-01 RX ADMIN — TACROLIMUS 0.5 MG: 1 CAPSULE, GELATIN COATED ORAL at 09:12

## 2020-01-01 RX ADMIN — PROPOFOL 25 MCG/KG/MIN: 10 INJECTION, EMULSION INTRAVENOUS at 02:11

## 2020-01-01 RX ADMIN — PROPOFOL 50 MG: 10 INJECTION, EMULSION INTRAVENOUS at 05:08

## 2020-01-01 RX ADMIN — FUROSEMIDE 20 MG: 10 INJECTION, SOLUTION INTRAMUSCULAR; INTRAVENOUS at 03:08

## 2020-01-01 RX ADMIN — PROPOFOL 50 MCG/KG/MIN: 10 INJECTION, EMULSION INTRAVENOUS at 11:11

## 2020-01-01 RX ADMIN — HYDROCORTISONE SODIUM SUCCINATE 100 MG: 100 INJECTION, POWDER, FOR SOLUTION INTRAMUSCULAR; INTRAVENOUS at 03:11

## 2020-01-01 RX ADMIN — GANCICLOVIR SODIUM 385 MG: 500 INJECTION, POWDER, LYOPHILIZED, FOR SOLUTION INTRAVENOUS at 10:11

## 2020-01-01 RX ADMIN — Medication 250 MCG/HR: at 10:11

## 2020-01-01 RX ADMIN — PROPOFOL 40 MCG/KG/MIN: 10 INJECTION, EMULSION INTRAVENOUS at 08:11

## 2020-01-01 RX ADMIN — SULFAMETHOXAZOLE AND TRIMETHOPRIM 1 TABLET: 400; 80 TABLET ORAL at 09:12

## 2020-01-01 RX ADMIN — PROPOFOL 40 MCG/KG/MIN: 10 INJECTION, EMULSION INTRAVENOUS at 09:12

## 2020-01-01 RX ADMIN — Medication 2500 MCG: at 10:11

## 2020-01-01 RX ADMIN — MEROPENEM 2 G: 1 INJECTION, POWDER, FOR SOLUTION INTRAVENOUS at 11:12

## 2020-01-01 RX ADMIN — SODIUM CHLORIDE 0.5 UNITS/HR: 9 INJECTION, SOLUTION INTRAVENOUS at 01:08

## 2020-01-01 RX ADMIN — Medication 150 MCG: at 08:11

## 2020-01-01 RX ADMIN — INSULIN ASPART 6 UNITS: 100 INJECTION, SOLUTION INTRAVENOUS; SUBCUTANEOUS at 05:11

## 2020-01-01 RX ADMIN — PREDNISONE 20 MG: 5 TABLET ORAL at 09:11

## 2020-01-01 RX ADMIN — PROPOFOL 35 MCG/KG/MIN: 10 INJECTION, EMULSION INTRAVENOUS at 04:11

## 2020-01-01 RX ADMIN — MAGNESIUM SULFATE IN WATER 2 G: 40 INJECTION, SOLUTION INTRAVENOUS at 11:11

## 2020-01-01 RX ADMIN — POTASSIUM CHLORIDE 40 MEQ: 29.8 INJECTION, SOLUTION INTRAVENOUS at 04:11

## 2020-01-01 RX ADMIN — LEVALBUTEROL 1.25 MG: 1.25 SOLUTION, CONCENTRATE RESPIRATORY (INHALATION) at 01:08

## 2020-01-01 RX ADMIN — PROPOFOL 20 MCG/KG/MIN: 10 INJECTION, EMULSION INTRAVENOUS at 09:11

## 2020-01-01 RX ADMIN — PROPOFOL 30 MCG/KG/MIN: 10 INJECTION, EMULSION INTRAVENOUS at 04:08

## 2020-01-01 RX ADMIN — SODIUM CHLORIDE 1.2 UNITS/HR: 9 INJECTION, SOLUTION INTRAVENOUS at 04:11

## 2020-01-01 RX ADMIN — GABAPENTIN 100 MG: 100 CAPSULE ORAL at 03:08

## 2020-01-01 RX ADMIN — MIDAZOLAM HYDROCHLORIDE 2 MG: 1 INJECTION, SOLUTION INTRAMUSCULAR; INTRAVENOUS at 05:08

## 2020-01-01 RX ADMIN — FENTANYL CITRATE 25 MCG: 50 INJECTION, SOLUTION INTRAMUSCULAR; INTRAVENOUS at 08:09

## 2020-01-01 RX ADMIN — SENNOSIDES 17.2 MG: 8.6 TABLET, FILM COATED ORAL at 03:08

## 2020-01-01 RX ADMIN — ACETAMINOPHEN 1000 MG: 10 INJECTION, SOLUTION INTRAVENOUS at 06:08

## 2020-01-01 RX ADMIN — DEXMEDETOMIDINE HYDROCHLORIDE 0.8 MCG/KG/HR: 4 INJECTION, SOLUTION INTRAVENOUS at 08:11

## 2020-01-01 RX ADMIN — ALBUMIN HUMAN 25 G: 50 SOLUTION INTRAVENOUS at 12:08

## 2020-01-01 RX ADMIN — Medication 250 MCG/HR: at 04:11

## 2020-01-01 RX ADMIN — PROPOFOL 50 MCG/KG/MIN: 10 INJECTION, EMULSION INTRAVENOUS at 09:08

## 2020-01-01 RX ADMIN — NICARDIPINE HYDROCHLORIDE 7.5 MG/HR: 0.2 INJECTION, SOLUTION INTRAVENOUS at 08:08

## 2020-01-01 RX ADMIN — Medication 100 MCG: at 06:11

## 2020-01-01 RX ADMIN — INSULIN ASPART 3 UNITS: 100 INJECTION, SOLUTION INTRAVENOUS; SUBCUTANEOUS at 09:08

## 2020-01-01 RX ADMIN — OXYCODONE HYDROCHLORIDE 10 MG: 10 TABLET ORAL at 12:08

## 2020-01-01 RX ADMIN — PROPOFOL 20 MCG/KG/MIN: 10 INJECTION, EMULSION INTRAVENOUS at 06:11

## 2020-01-01 RX ADMIN — Medication: at 12:11

## 2020-01-01 RX ADMIN — Medication 0.2 MCG/KG/MIN: at 02:12

## 2020-01-01 RX ADMIN — CISATRACURIUM BESYLATE 3 MCG/KG/MIN: 10 INJECTION INTRAVENOUS at 03:11

## 2020-01-01 RX ADMIN — FENTANYL CITRATE 50 MCG: 50 INJECTION INTRAMUSCULAR; INTRAVENOUS at 08:08

## 2020-01-01 RX ADMIN — POLYETHYLENE GLYCOL 3350 17 G: 17 POWDER, FOR SOLUTION ORAL at 09:08

## 2020-01-01 RX ADMIN — Medication 2500 MCG: at 09:11

## 2020-01-01 RX ADMIN — LIDOCAINE AND PRILOCAINE: 25; 25 CREAM TOPICAL at 04:11

## 2020-01-01 RX ADMIN — PROPOFOL 45 MCG/KG/MIN: 10 INJECTION, EMULSION INTRAVENOUS at 07:12

## 2020-01-01 RX ADMIN — DEXAMETHASONE SODIUM PHOSPHATE 6 MG: 4 INJECTION INTRA-ARTICULAR; INTRALESIONAL; INTRAMUSCULAR; INTRAVENOUS; SOFT TISSUE at 11:11

## 2020-01-01 RX ADMIN — CYCLOBENZAPRINE HYDROCHLORIDE 10 MG: 5 TABLET, FILM COATED ORAL at 06:08

## 2020-01-01 RX ADMIN — FUROSEMIDE 20 MG: 10 INJECTION, SOLUTION INTRAMUSCULAR; INTRAVENOUS at 10:08

## 2020-01-01 RX ADMIN — POTASSIUM CHLORIDE 60 MEQ: 14.9 INJECTION, SOLUTION INTRAVENOUS at 07:11

## 2020-01-01 RX ADMIN — DEXMEDETOMIDINE HYDROCHLORIDE 1.4 MCG/KG/HR: 4 INJECTION, SOLUTION INTRAVENOUS at 04:11

## 2020-01-01 RX ADMIN — ACETAZOLAMIDE SODIUM 500 MG: 500 INJECTION, POWDER, LYOPHILIZED, FOR SOLUTION INTRAVENOUS at 01:11

## 2020-01-01 RX ADMIN — Medication 0.02 MCG/KG/MIN: at 02:11

## 2020-01-01 RX ADMIN — Medication 100 MCG/HR: at 10:11

## 2020-01-01 RX ADMIN — LIDOCAINE HYDROCHLORIDE 5 ML: 20 SOLUTION OROPHARYNGEAL at 08:09

## 2020-01-01 RX ADMIN — POTASSIUM PHOSPHATE, MONOBASIC AND POTASSIUM PHOSPHATE, DIBASIC 20 MMOL: 224; 236 INJECTION, SOLUTION, CONCENTRATE INTRAVENOUS at 09:11

## 2020-01-01 RX ADMIN — CYCLOBENZAPRINE HYDROCHLORIDE 10 MG: 5 TABLET, FILM COATED ORAL at 03:08

## 2020-01-01 RX ADMIN — LACTULOSE 20 G: 20 SOLUTION ORAL at 05:08

## 2020-01-01 RX ADMIN — VASOPRESSIN 0.04 UNITS/MIN: 20 INJECTION INTRAVENOUS at 06:11

## 2020-01-01 RX ADMIN — PREDNISONE 45 MG: 20 TABLET ORAL at 08:08

## 2020-01-01 RX ADMIN — CISATRACURIUM BESYLATE 3 MCG/KG/MIN: 10 INJECTION INTRAVENOUS at 05:11

## 2020-01-01 RX ADMIN — OXYCODONE HYDROCHLORIDE 5 MG: 5 TABLET ORAL at 05:08

## 2020-01-01 RX ADMIN — PROPOFOL 5 MCG/KG/MIN: 10 INJECTION, EMULSION INTRAVENOUS at 04:11

## 2020-01-01 RX ADMIN — DEXMEDETOMIDINE HYDROCHLORIDE 1.3 MCG/KG/HR: 4 INJECTION, SOLUTION INTRAVENOUS at 07:11

## 2020-01-01 RX ADMIN — VASOPRESSIN 0.04 UNITS/MIN: 20 INJECTION INTRAVENOUS at 03:12

## 2020-01-01 RX ADMIN — NICARDIPINE HYDROCHLORIDE 5 MG/HR: 0.2 INJECTION, SOLUTION INTRAVENOUS at 09:11

## 2020-01-01 RX ADMIN — ALTEPLASE 2 MG: 2.2 INJECTION, POWDER, LYOPHILIZED, FOR SOLUTION INTRAVENOUS at 06:11

## 2020-01-01 RX ADMIN — Medication 0.5 MCG/KG/MIN: at 03:11

## 2020-01-01 RX ADMIN — PROPOFOL 30 MCG/KG/MIN: 10 INJECTION, EMULSION INTRAVENOUS at 01:08

## 2020-01-01 RX ADMIN — FENTANYL CITRATE 100 MCG: 50 INJECTION, SOLUTION INTRAMUSCULAR; INTRAVENOUS at 08:08

## 2020-01-01 RX ADMIN — DEXTROSE MONOHYDRATE AND SODIUM CHLORIDE: 5; .45 INJECTION, SOLUTION INTRAVENOUS at 08:02

## 2020-01-01 RX ADMIN — Medication 200 MCG/HR: at 11:12

## 2020-01-01 RX ADMIN — PROPOFOL 30 MCG/KG/MIN: 10 INJECTION, EMULSION INTRAVENOUS at 05:11

## 2020-01-01 RX ADMIN — SODIUM CHLORIDE 2.4 UNITS/HR: 9 INJECTION, SOLUTION INTRAVENOUS at 07:11

## 2020-01-01 RX ADMIN — PANTOPRAZOLE SODIUM 40 MG: 40 TABLET, DELAYED RELEASE ORAL at 08:11

## 2020-01-01 RX ADMIN — HYDROXYZINE HYDROCHLORIDE 50 MG: 25 TABLET, FILM COATED ORAL at 03:11

## 2020-01-01 RX ADMIN — INSULIN DETEMIR 7 UNITS: 100 INJECTION, SOLUTION SUBCUTANEOUS at 08:11

## 2020-01-01 RX ADMIN — VASOPRESSIN 0.04 UNITS/MIN: 20 INJECTION INTRAVENOUS at 06:08

## 2020-01-01 RX ADMIN — PROPOFOL 35 MCG/KG/MIN: 10 INJECTION, EMULSION INTRAVENOUS at 12:11

## 2020-01-01 RX ADMIN — INSULIN ASPART 10 UNITS: 100 INJECTION, SOLUTION INTRAVENOUS; SUBCUTANEOUS at 06:11

## 2020-01-01 RX ADMIN — DEXMEDETOMIDINE HYDROCHLORIDE 0.2 MCG/KG/HR: 4 INJECTION, SOLUTION INTRAVENOUS at 06:11

## 2020-01-01 RX ADMIN — POTASSIUM CHLORIDE 40 MEQ: 29.8 INJECTION, SOLUTION INTRAVENOUS at 02:12

## 2020-01-01 RX ADMIN — HYDROCORTISONE SODIUM SUCCINATE 100 MG: 100 INJECTION, POWDER, FOR SOLUTION INTRAMUSCULAR; INTRAVENOUS at 02:11

## 2020-01-01 RX ADMIN — MINERAL OIL AND WHITE PETROLATUM: 150; 830 OINTMENT OPHTHALMIC at 11:11

## 2020-01-01 RX ADMIN — SODIUM CHLORIDE 3 UNITS/HR: 9 INJECTION, SOLUTION INTRAVENOUS at 11:11

## 2020-01-01 RX ADMIN — TRANEXAMIC ACID 1 MG/KG/HR: 100 INJECTION, SOLUTION INTRAVENOUS at 05:08

## 2020-01-01 RX ADMIN — PROPOFOL 50 MCG/KG/MIN: 10 INJECTION, EMULSION INTRAVENOUS at 12:08

## 2020-01-01 RX ADMIN — NICARDIPINE HYDROCHLORIDE 2.5 MG/HR: 0.2 INJECTION, SOLUTION INTRAVENOUS at 02:08

## 2020-01-01 RX ADMIN — NEOSTIGMINE METHYLSULFATE 3 MG: 0.5 INJECTION INTRAVENOUS at 10:08

## 2020-01-01 RX ADMIN — LAMIVUDINE 150 MG: 10 SOLUTION ORAL at 11:12

## 2020-01-01 RX ADMIN — LIDOCAINE HYDROCHLORIDE 8 ML: 10 INJECTION, SOLUTION INFILTRATION; PERINEURAL at 08:09

## 2020-01-01 RX ADMIN — Medication 250 MCG/HR: at 02:12

## 2020-01-01 RX ADMIN — SODIUM CHLORIDE 3 UNITS/HR: 9 INJECTION, SOLUTION INTRAVENOUS at 12:08

## 2020-01-01 RX ADMIN — Medication 0.25 MCG/KG/MIN: at 11:11

## 2020-01-01 RX ADMIN — POTASSIUM CHLORIDE 20 MEQ: 14.9 INJECTION, SOLUTION INTRAVENOUS at 10:08

## 2020-01-01 RX ADMIN — FAMOTIDINE 20 MG: 20 TABLET ORAL at 08:08

## 2020-01-01 RX ADMIN — FUROSEMIDE 80 MG: 10 INJECTION, SOLUTION INTRAMUSCULAR; INTRAVENOUS at 12:11

## 2020-01-01 RX ADMIN — PROPOFOL 40 MCG/KG/MIN: 10 INJECTION, EMULSION INTRAVENOUS at 03:11

## 2020-01-01 RX ADMIN — SODIUM CHLORIDE 0.6 UNITS/HR: 9 INJECTION, SOLUTION INTRAVENOUS at 02:08

## 2020-01-01 RX ADMIN — PROPOFOL 30 MCG/KG/MIN: 10 INJECTION, EMULSION INTRAVENOUS at 07:08

## 2020-01-01 RX ADMIN — METHYLPREDNISOLONE SODIUM SUCCINATE 173 MG: 125 INJECTION, POWDER, FOR SOLUTION INTRAMUSCULAR; INTRAVENOUS at 10:08

## 2020-01-01 RX ADMIN — DEXMEDETOMIDINE HYDROCHLORIDE 1.4 MCG/KG/HR: 4 INJECTION, SOLUTION INTRAVENOUS at 08:11

## 2020-01-01 RX ADMIN — Medication 150 MCG: at 04:11

## 2020-01-01 RX ADMIN — ACETAMINOPHEN 650 MG: 325 TABLET ORAL at 09:11

## 2020-01-01 RX ADMIN — HEPARIN SODIUM 26000 UNITS: 1000 INJECTION, SOLUTION INTRAVENOUS; SUBCUTANEOUS at 06:08

## 2020-01-01 RX ADMIN — Medication 0.02 MCG/KG/MIN: at 04:11

## 2020-01-01 RX ADMIN — POLYETHYLENE GLYCOL 3350 17 G: 17 POWDER, FOR SOLUTION ORAL at 08:08

## 2020-01-01 RX ADMIN — SODIUM CHLORIDE 5 UNITS/HR: 9 INJECTION, SOLUTION INTRAVENOUS at 08:08

## 2020-01-01 RX ADMIN — POLYETHYLENE GLYCOL 3350 17 G: 17 POWDER, FOR SOLUTION ORAL at 01:08

## 2020-01-01 RX ADMIN — POTASSIUM CHLORIDE 20 MEQ: 1.5 POWDER, FOR SOLUTION ORAL at 05:11

## 2020-01-01 RX ADMIN — NIFEDIPINE 30 MG: 30 TABLET, FILM COATED, EXTENDED RELEASE ORAL at 02:08

## 2020-01-01 RX ADMIN — FLUDROCORTISONE ACETATE 100 MCG: 0.1 TABLET ORAL at 09:12

## 2020-01-01 RX ADMIN — NICARDIPINE HYDROCHLORIDE 10 MG/HR: 0.2 INJECTION, SOLUTION INTRAVENOUS at 01:11

## 2020-01-01 RX ADMIN — HYDROCORTISONE SODIUM SUCCINATE 50 MG: 100 INJECTION, POWDER, FOR SOLUTION INTRAMUSCULAR; INTRAVENOUS at 01:11

## 2020-01-01 RX ADMIN — AZITHROMYCIN MONOHYDRATE 500 MG: 500 INJECTION, POWDER, LYOPHILIZED, FOR SOLUTION INTRAVENOUS at 06:11

## 2020-01-01 RX ADMIN — POTASSIUM CHLORIDE 40 MEQ: 400 INJECTION, SOLUTION INTRAVENOUS at 10:08

## 2020-01-01 RX ADMIN — PROPOFOL 15 MCG/KG/MIN: 10 INJECTION, EMULSION INTRAVENOUS at 10:11

## 2020-01-01 RX ADMIN — INSULIN HUMAN 5 UNITS: 100 INJECTION, SOLUTION PARENTERAL at 08:08

## 2020-01-01 RX ADMIN — HEPARIN SODIUM 2500 UNITS: 1000 INJECTION, SOLUTION INTRAVENOUS; SUBCUTANEOUS at 06:08

## 2020-01-01 RX ADMIN — METOPROLOL TARTRATE 25 MG: 25 TABLET, FILM COATED ORAL at 10:08

## 2020-01-01 RX ADMIN — SODIUM CHLORIDE 5 UNITS: 9 INJECTION, SOLUTION INTRAVENOUS at 08:08

## 2020-01-01 RX ADMIN — INSULIN ASPART 10 UNITS: 100 INJECTION, SOLUTION INTRAVENOUS; SUBCUTANEOUS at 05:11

## 2020-01-01 RX ADMIN — DIPHENHYDRAMINE HYDROCHLORIDE 50 MG: 50 INJECTION, SOLUTION INTRAMUSCULAR; INTRAVENOUS at 06:08

## 2020-01-01 RX ADMIN — SODIUM CHLORIDE 500 ML: 0.9 INJECTION, SOLUTION INTRAVENOUS at 08:11

## 2020-01-01 RX ADMIN — Medication 250 MCG/HR: at 06:12

## 2020-01-01 RX ADMIN — ROCURONIUM BROMIDE 100 MG: 10 INJECTION, SOLUTION INTRAVENOUS at 05:08

## 2020-01-01 RX ADMIN — GLYCOPYRROLATE 0.4 MG: 0.2 INJECTION, SOLUTION INTRAMUSCULAR; INTRAVENOUS at 10:08

## 2020-01-01 RX ADMIN — SODIUM CHLORIDE 0.5 UNITS/HR: 9 INJECTION, SOLUTION INTRAVENOUS at 02:08

## 2020-01-01 RX ADMIN — Medication 10 MG: at 09:11

## 2020-01-01 RX ADMIN — CHLORHEXIDINE GLUCONATE 0.12% ORAL RINSE 10 ML: 1.2 LIQUID ORAL at 10:08

## 2020-01-01 RX ADMIN — SODIUM CHLORIDE 1.6 UNITS/HR: 9 INJECTION, SOLUTION INTRAVENOUS at 04:11

## 2020-01-01 RX ADMIN — CALCIUM GLUCONATE 1000 MG: 98 INJECTION, SOLUTION INTRAVENOUS at 05:11

## 2020-01-01 RX ADMIN — CISATRACURIUM BESYLATE 3 MCG/KG/MIN: 10 INJECTION INTRAVENOUS at 07:12

## 2020-01-01 RX ADMIN — CISATRACURIUM BESYLATE 3 MCG/KG/MIN: 10 INJECTION INTRAVENOUS at 03:12

## 2020-01-01 RX ADMIN — VANCOMYCIN HYDROCHLORIDE 1000 MG: 1 INJECTION, POWDER, LYOPHILIZED, FOR SOLUTION INTRAVENOUS at 01:12

## 2020-01-01 RX ADMIN — SULFAMETHOXAZOLE AND TRIMETHOPRIM 1 TABLET: 800; 160 TABLET ORAL at 04:11

## 2020-01-01 RX ADMIN — VANCOMYCIN HYDROCHLORIDE 1750 MG: 750 INJECTION, POWDER, LYOPHILIZED, FOR SOLUTION INTRAVENOUS at 08:12

## 2020-01-01 RX ADMIN — SULFAMETHOXAZOLE AND TRIMETHOPRIM 1 TABLET: 400; 80 TABLET ORAL at 08:12

## 2020-01-01 RX ADMIN — VALGANCICLOVIR 450 MG: 450 TABLET, FILM COATED ORAL at 10:08

## 2020-01-01 RX ADMIN — INSULIN ASPART 4 UNITS: 100 INJECTION, SOLUTION INTRAVENOUS; SUBCUTANEOUS at 09:08

## 2020-01-14 NOTE — TELEPHONE ENCOUNTER
Discussed Ms. Becerra in the Lung Transplant Selection Committee Meeting today.  The right and left heart catheterization from 1/17/19 and echocardiogram from 12/19/19 was reviewed.  Imaging was reviewed.  Plan is to repeat the left heart catheterization and to change patient's status to inactive until after the procedure is completed and reviewed per cardiothoracic surgery.  If Ms. Becerra is reactivated on the wait list, she will be listed for a bilateral lung vs. single right lung vs. single left lung transplant.         Attempted to contact Ms. Becerra.  No answer.  Left a message requesting a return call.    Contacted Ms. Becerra.  Notified her of the discussion in the selection committee meeting today.  Informed her that a repeat left heart catheterization is needed to check for worsening coronary artery disease.  Informed her that her status on the wait list will be changed to inactive until the heart catheterization is repeated and reviewed per cardiothoracic surgery.  Informed her that if she is reactivated on the wait list, she will be listed for a bilateral, left single, or right single lung transplant which means she can receive a bilateral lung transplant or a right or left single lung transplant.   Informed her that she will receive a letter in the mail regarding her status change, and she will not be contacted with any organ offers while she is inactive on the wait list.  Informed her that an appointment with interventional cardiology will be scheduled as soon as possible.  She verbalized her understanding of all discussed.  She inquired if she could potentially be removed from the list.  Informed her that it is a possibility if her coronary artery disease has worsened and is not amenable to any intervention.  She again verbalized her understanding.    Status changed to inactive in [facility].  Also lung preference changed to bilateral, left single, and right single.

## 2020-01-27 PROBLEM — I25.10 CORONARY ARTERY DISEASE: Status: ACTIVE | Noted: 2020-01-01

## 2020-01-27 NOTE — TELEPHONE ENCOUNTER
Jaden Garza and Deonte have requested a repeat LHC on patient before listing for Lung Tx.  OK per Dr. Ellington to admit to SSCU.  Needs labs and consents.  Reviewed all instructions and mailed.  Patient to have LHC, LM FFR.

## 2020-01-27 NOTE — PROGRESS NOTES
OUTPATIENT CATHETERIZATION INSTRUCTIONS    You have been scheduled for a procedure in the catheterization lab on Tuessday, February 18, 2020.     Please report to the Cardiology Waiting Area on the Third floor of the hospital and check in at 8 AM.   You will then be taken to the SSCU (Short Stay Cardiac Unit) and prepared for your procedure. Please be aware that this is not the time of your procedure but the time you are to arrive. The procedures are scheduled on an hourly basis; however, emergency cases take precedence over all other cases.       You may not have anything to eat or drink after midnight the night before your test. You may take your regular morning medications with water. If there are any medications that you should not take you will be instructed to hold them that morning. If you are diabetic and on Metformin (Glucophage) do not take it the day before, the day of, and for 2 days after your procedure.      The procedure will take 1-2 hours to perform. After the procedure, you will return to SSCU on the third floor of the hospital. You will need to lie still (or keep your arm still) for the next 4 to 6 hours to minimize bleeding from the puncture site. Your family may remain in the room with you during this time.       You may be able to be discharged home that same afternoon if there is someone to drive you home and there were no complications. If you have one of the balloon, stent, or device procedures you may spend the night in the hospital. Your doctor will determine, based on your progress, the date and time of your discharge. The results of your procedure will be discussed with you before you are discharged. Any further testing or procedures will be scheduled for you either before you leave or you will be called with these appointments.       If you should have any questions, concerns, or need to change the date of your procedure, please call TREVON Culver @ [phone]    Special  Instructions:  none        THE ABOVE INSTRUCTIONS WERE GIVEN TO THE PATIENT VERBALLY AND THEY VERBALIZED UNDERSTANDING.  THEY DO NOT REQUIRE ANY SPECIAL NEEDS AND DO NOT HAVE ANY LEARNING BARRIERS.          Directions for Reporting to Cardiology Waiting Area in the Hospital  If you park in the Parking Garage:  Take elevators to the1st floor of the parking garage.  Continue past the gift shop, coffee shop, and piano.  Take a right and go to the gold elevators. (Elevator B)  Take the elevator to the 3rd floor.  Follow the arrow on the sign on the wall that says Cath Lab Registration/EP Lab Registration.  Follow the long hallway all the way around until you come to a big open area.  This is the registration area.  Check in at Reception Desk.    OR    If family is dropping you off:  Have them drop you off at the front of the Hospital under the green overhang.  Enter through the doors and take a right.  Take the E elevators to the 3rd floor Cardiology Waiting Area.  Check in at the Reception Desk in the waiting room.

## 2020-01-28 NOTE — TELEPHONE ENCOUNTER
----- Message from Sandrine Romo sent at 1/28/2020  2:44 PM CST -----  Contact: pt  Patient calling to speak with coordinator about 02/13/2020 appt       Call back : [phone]      Contacted Ms. Becerra.  She stated that the heart catheterization is scheduled for 2/12/20.  She also inquired if she needs to keep the appointments on 2/13/20.  Informed her that we want to see her in clinic on 2/13/20 for her 2 month follow-up.  She verbalized her understanding.

## 2020-01-29 NOTE — TELEPHONE ENCOUNTER
Patient's daughter requesting reschedule due to work constraints. She is aware we will attempt to reschedule procedure, contact that department. She is requesting Feb. 17,18 if possible, or Feb 13,14. Daughter is aware we will follow up. Tel [phone].

## 2020-01-29 NOTE — TELEPHONE ENCOUNTER
----- Message from Cecy Guaman sent at 1/29/2020 12:29 PM CST -----  Contact: PTs Daughter Ms Cortez  Daughter called to reschedule appointments that are currently scheduled on 02/12, 02/13 and move them to 2/19, 2/20 please    Callback: [phone]

## 2020-02-17 NOTE — PROGRESS NOTES
Transplant Recipient Adult Psychosocial Assessment- Annual Update    Chely Becerra   [address] [facility] [facility] [address]  Telephone Information:   Mobile [phone]   Home  [phone] (home)  Work  There is no work phone number on file.  E-mail  [email or web address]    Sex: female  YOB: 1955  Age: 64 y.o.    Encounter Date: 2020  [place] Citizen: yes  Primary Language: English   Needed: no    Emergency Contact:  Name: Sushma Kaplan (40)  Relationship: daughter  Address: Same as pt  Phone Numbers:  [phone] (mobile)    Family/Social Support:   Number of dependents/: Pt resides with her 3 young grandchildren.  Zen Kaplan .,children's father, will provide care for them post transplant.   Marital history:  since   Other family dynamics: Pt presents in clinic for annual update with Lung txp . Pt presents with daughters Nabil and Ivy who report will continue to be primary caregivers. Pt reports good support group made up of 3 daughters and 1 son. Pt reports parents are .     Household Composition:  Name: Chely Becerra  Age: 64  Relationship: patient  Does person drive? yes    Name: Sushma Kaplan   Age: 40  Relationship: daughter  Does person drive? yes    Name: Brandy Kaplan  Age: 18  Relationship: granddaughter  Does person drive? no    Name: Zen Kaplan  Age: 16  Relationship: grandson  Does person drive? no    Name: Laney Kaplan  Age: 12  Relationship: granddaughter    Do you and your caregivers have access to reliable transportation? yes  PRIMARY CAREGIVER: Ivy Cam (44), resides in [facility], reports will be primary caregiver, phone number [phone].      provided in-depth information to patient and caregiver regarding pre- and post-transplant caregiver role.   strongly encourages patient and caregiver to have concrete plan regarding post-transplant care giving, including back-up  "caregiver(s) to ensure care giving needs are met as needed.    Patient and Caregiver states understanding all aspects of caregiver role/commitment and is able/willing/committed to being caregiver to the fullest extent necessary.    Patient and Caregiver verbalizes understanding of the education provided today and caregiver responsibilities.         remains available. Patient and Caregiver agree to contact  in a timely manner if concerns arise.      Able to take time off work without financial concerns: yes. Family to rotate    Additional Significant Others who will Assist with Transplant:    Name: Aneesh Leonard (Charles)  Age: 37  City: [place] State: [place]  Relationship: son  Does person drive? yes    Name: Ny Leonard  Age: 29  City: [place] State: [place]  Relationship: daughter  Does person drive? yes     Living Will: yes  Healthcare Power of : yes Ivy Levy (44) is listed as HCPA phone number [phone]  Advance Directives on file: yes in per medical record.  Verbally reviewed LW/HCPA information.   provided patient with copy of LW/HCPA documents and provided education on completion of forms.      Highest Education Level: Attended College/Technical School  Reading Ability: college  Denies difficulty with:  Pt states has felt that she has had some memory issues as she has gotten older. Pt reports sometimes she will forget where she places items. Daughters state help pt with medications and reminders as needed.  Learns Best By:  Written material/Verbal explanation/Demonstration/ Hands on practice     Status: no  VA Benefits: no      Working for Income: No  If no, reason not working: Disability  Patient is disabled.  Prior to disability, patient  was employed as a  at [employer].    Disabled: yes: date disability began: 2013, due to: IPF.    Monthly Income:   Disability: $803  Food [place]: $115  Able to afford all " costs now and if transplanted, including medications: yes  Patient and Caregiver verbalizes understanding of personal responsibilities related to transplant costs and the importance of having a financial plan to ensure that patients transplant costs are fully covered.       provided fundraising information/education. Patient and Caregiververbalizes understanding.   remains available.    Insurance:   Payor/Plan Subscr  Sex Relation Sub. Ins. ID Effective Group Num   1. [place] - [place]* TORRES ADAMS 1955 Female  [id number] 12/1/15                                    [address]   2. [place] TOR[place] ADAMS ZACK 1955 Female  [id number]* 12/1/15                                    [address] [address]     Primary Insurance (for UNOS reporting): Public Insurance - Medicare FFS (Fee For Service)  Secondary Insurance (for UNOS reporting): Public Insurance - Medicaid  Patient and Caregiver verbalizes clear understanding that patient may experience difficulty obtaining and/or be denied insurance coverage post-surgery. This includes and is not limited to disability insurance, life insurance, health insurance, burial insurance, long term care insurance, and other insurances.      Patient and Caregiver also reports understanding that future health concerns related to or unrelated to transplantation may not be covered by patient's insurance.  Resources and information provided and reviewed.     Patient and Caregiver provides verbal permission to release any necessary information to outside resources for patient care and discharge planning.  Resources and information provided are reviewed.      Dialysis: Pt reports no history  Infusion Service: patient utilizing? no  Home Health: patient utilizing? no  DME: yes O2 through [employer]. Pt has a portable and home concentrator  Pulmonary/Cardiac Rehab: Pt reports no history   ADLS:  Pt reports no issues with walking, cooking, eating, bathing,  housekeeping, and shopping. Pt reports to not driving.     Adherence:   Patient reports high level of adherence to current health care regimen.  Adherence education and counseling provided. Pt verbalized understanding of importance of taking medications as instructed, following all team and MD recommendations, and coming to all follow up appointments      Per History Section:  Past Medical History:   Diagnosis Date    Common bile duct dilatation 1/15/2019    Controlled type 2 diabetes mellitus without complication, without long-term current use of insulin 1/17/2019    Essential hypertension 1/16/2019    GERD (gastroesophageal reflux disease)     Hepatitis B core antibody positive 1/15/2019    IPF (idiopathic pulmonary fibrosis)     Obesity (BMI 30-39.9) 1/16/2019    RLS (restless legs syndrome)      Social History     Tobacco Use    Smoking status: Former Smoker     Types: Cigarettes     Last attempt to quit: 12/13/2016     Years since quitting: 3.1    Smokeless tobacco: Never Used   Substance Use Topics    Alcohol use: No     Frequency: Never     Social History     Substance and Sexual Activity   Drug Use No     Social History     Substance and Sexual Activity   Sexual Activity Not on file       Per Today's Psychosocial:  Tobacco: none, patient denies any use since quitting in 2016. Pt states smoked for several years beginning at the age of 24 and quitting 2 years ago at 1/2pp. Pt reports is committed to abstain.  Alcohol: Pt states drinks rarely. Last drink was a sip of alcohol during new years  Illicit Drugs/Non-prescribed Medications: none, patient denies any use.    Patient and Caregiver states clear understanding of the potential impact of substance use as it relates to transplant candidacy and is aware of possible random substance screening.  Substance abstinence/cessation counseling, education and resources provided and reviewed.     Arrests/DWI/Treatment/Rehab: patient denies    Psychiatric  History:    Mental Health: Pt denies any history of depression, anxiety, or any additional current or past mental health issues. Pt verbalized willingness to communicate to team any mental health concerns that may arise and to access appropriate resources in a timely manner as needed and/or as recommended.  Psychiatrist/Counselor: Pt reports no history  Medications:  Pt reports no history  Suicide/Homicide Issues: Pt denies any recent, past, or current feelings of suicide or homicidal issues.   Safety at home: Pt reports no issues. no physical/emotional abuse reported.      Knowledge: Patient and Caregiver states having clear understanding and realistic expectations regarding the potential risks and potential benefits of organ transplantation and organ donation and agrees to discuss with health care team members and support system members, as well as to utilize available resources and express questions and/or concerns in order to further facilitate the pt informed decision-making.  Resources and information provided and reviewed.    Patient and Caregiver is aware of [facility]'s affiliation and/or partnership with agencies in home health care, LTAC, SNF, [facility], and other hospitals and clinics.    Understanding: Patient and Caregiver reports having a clear understanding of the many lifetime commitments involved with being a transplant recipient, including costs, compliance, medications, lab work, procedures, appointments, concrete and financial planning, preparedness, timely and appropriate communication of concerns, abstinence (ETOH, tobacco, illicit non-prescribed drugs), adherence to all health care team recommendations, support system and caregiver involvement, appropriate and timely resource utilization and follow-through, mental health counseling as needed/recommended, and patient and caregiver responsibilities.  Social Service Handbook, resources and detailed educational information provided and reviewed.   Educational information provided.    Patient and Caregiver also reports current and expected compliance with health care regime and states having a clear understanding of the importance of compliance.      Patient and Caregiver reports a clear understanding that risks and benefits may be involved with organ transplantation and with organ donation.       Patient and Caregiver also reports clear understanding that psychosocial risk factors may affect patient, and include but are not limited to feelings of depression, generalized anxiety, anxiety regarding dependence on others, post traumatic stress disorder, feelings of guilt and other emotional and/or mental concerns, and/or exacerbation of existing mental health concerns.  Detailed resources provided and discussed.      Patient and Caregiver agrees to access appropriate resources in a timely manner as needed and/or as recommended, and to communicate concerns appropriately.  Patient and Caregiver also reports a clear understanding of treatment options available.     Patient and Caregiver received education in a group setting.   reviewed education, provided additional information, and answered questions.    Feelings or Concerns:  Pt verbalized no issues or concerns at this time.     Coping: Pt coping adequately with transplant process at this time.     Goals: To improve quality of life by discontinuing dependence on O2 through Lung transplant and to spend more time with her grandchildren.    Interview Behavior: Patient and Caregiver presents as alert and oriented x 4, pleasant, good eye contact, well groomed, recall good, concentration/judgement good and asking and answering questions appropriately. Pt provided permission for family to remain in room during entire interview.          Transplant Social Work - Candidacy  Assessment/Plan:     Psychosocial Suitability: Patient continues to present as a suitable candidate for lung transplant at this time.  Based on psychosocial risk factors, patient presents as low risk, due to pt: reports adequate caregiver/transportation plan, reports financial ability to afford transplant, reports compliance with current medical regimen, reports adequate functional status - currently independent with ADLs, no reported cognitive/developmental/behavioral impairments, no significant mental health concerns reported, no reported substance use/abuse, and reports general understanding and motivation for transplant with adequate coping skills.     Recommendations/Additional Comments: Pt would likely benefit from fundraising and [facility] post transplant: both discussed in detail with pt/caregiver. Pt was provided with a handout outlining housing options post transplant and explaining [facility] amenities.     Pt lives further than 1-hour away from [facility] and will be asked to reside local (within1-hour of [facility]) for an estimated 3 months post transplant. Pt and caregiver were provided with education regarding local housing and given the options of finding their own housing accommodations (hotels/motels/apartments/etc) or residing at [facility]. [facility] are an option available to [facility] patients on a low-cost/sliding-scale rate. Pt was provided with a handout which includes information about [facility]. The patient can find relevant information regarding [facility] policies, sliding scale fee, apartment furnishings, a list of items the patient is expected to provide, as well as other pertinent instructions on this handout.   Pt and caregiver were informed they are not required to make a decision regarding post-transplant housing until the time of transplant, at which point they will be asked to make a post transplant housing plan and share this information with the Transplant  and Lung Transplant Team. Pt and caregiver understand they are under no  obligation to reside at [facility].         Brigid Verma, MOEW

## 2020-02-17 NOTE — LETTER
February 18, 2020        Nitin Ariza  [address]  [address]  [place] [address]  Phone: [phone]  Fax: [phone]             [facility] Trans[facility]  [address]  [place] [address]  Phone: [phone]   Patient: Chely Becerra   MR Number: [id number]   YOB: 1955   Date of Visit: 2/17/2020       Dear Dr. Nitin Ariza    Thank you for referring Chely Becerra to me for evaluation. Attached you will find relevant portions of my assessment and plan of care.    If you have questions, please do not hesitate to call me. I look forward to following Chely Becerra along with you.    Sincerely,    Niraj Garza MD    Enclosure    If you would like to receive this communication electronically, please contact [email or web address] or [phone] to request [employer] access.    [employer] is a tool which provides read-only access to select patient information with whom you have a relationship. Its easy to use and provides real time access to review your patients record including encounter summaries, notes, results, and demographic information.    If you feel you have received this communication in error or would no longer like to receive these types of communications, please e-mail [email or web address]

## 2020-02-17 NOTE — PROCEDURES
Chely Becerra is a 64 y.o.  female patient, who presents for a 6 minute walk test ordered by MD Kayla.  The diagnosis is Pre Lung Transplant Evaluation.  The patient's BMI is 30.1 kg/m2.  Predicted distance (lower limit of normal) is 328.72 meters.      Test Results:    The test was completed without stopping.    The total time walked was 360 seconds.  During walking, the patient reported:  Dyspnea. The patient used no assistive devices and supplemental oxygen during testing.     02/17/2020---------Distance: 396.24 meters (1300 feet)     O2 Sat % Supplemental Oxygen Heart Rate Blood Pressure Jaclyn Scale   Pre-exercise  (Resting) 100 % 2 L/M 75 bpm (!) 124/95 mmHg 1   During Exercise 81 % 2 L/M 97 bpm 137/65 mmHg 4   Post-exercise  (Recovery) 98 %    82 bpm   mmHg       Recovery Time: 77 seconds    Performing nurse/tech: Ganesh LOCO      PREVIOUS STUDY:   The patient had a previous study.  12/19/2019---------Distance: 335.28 meters (1100 feet)       O2 Sat % Supplemental Oxygen Heart Rate Blood Pressure Jaclyn Scale   Pre-exercise  (Resting) 100 % 2 L/M 68 bpm 123/68 mmHg 0.5   During Exercise 86 % 2 L/M 103 bpm 145/70 mmHg 4   Post-exercise  (Recovery) 98 % 2 L/M  76 bpm 131/66 mmHg            CLINICAL INTERPRETATION:  Six minute walk distance is 396.24 meters (1300 feet) with somewhat heavy dyspnea.  During exercise, there was significant desaturation while breathing supplemental oxygen.  Blood pressure remained stable and Heart rate increased significantly with walking.  This may represent a tachycardic response to exercise.  The patient reported non-pulmonary symptoms during exercise.  Since the previous study in December 2019, exercise capacity is unchanged.  Based upon age and body mass index, exercise capacity is normal.

## 2020-02-18 NOTE — BRIEF OP NOTE
"Post Cath Note  Referring Physician: Trey Evans MD  Procedure: Left heart cath (Left), Fractional Flow [place] (FFR), Coronary       Access: Right radial    Coronary angiography revealed non-obstructive coronary disease in the left main coronary artery (30%), unchanged from prior, confirmed by iFR 0.97. The rest of the coronary arteries were normal.    See full report for further details    Intervention:     Closure device: Radial band    Post Cath Exam:   /68 (BP Location: Left arm, Patient Position: Lying)   Pulse 69   Temp 97.1 °F (36.2 °C) (Oral)   Resp 18   Ht 5' 7" (1.702 m)   Wt 86.6 kg (191 lb)   SpO2 100%   Breastfeeding? No   BMI 29.91 kg/m²   No unusual pain, hematoma, thrill or bruit at vascular access site.  Distal pulse present without signs of ischemia.    Recommendations:   - Routine post-cath care  - IVF at 3 cc/kg/hr for 4 hrs  - Continue medical management, Risk factor reduction, Follow-up with outpatient cardiologist    Venkat Otto MD  PGY-V      "

## 2020-02-18 NOTE — Clinical Note
Catheter is removed from the ostium   right coronary artery. Angiography performed  in multiple views.

## 2020-02-18 NOTE — HPI
63 y/o F, with PMH ILD on home oxygen being evaluated for lung transplant, also has non-obstructive CAD, WHO III pHTN here for C with FFR of left main coronary artery. She continues to have dyspnea on exertion but carries out all ADLs (cleaning, cooking, laundry) with some difficulty. When she becomes short of breath with exertion she also does feel mild chest discomfort. She denies orthopnea, PND or peripheral edema. She denies symptoms of claudication or syncope.      She has no hx of MI/CAD/CVA, she has 20 pack year hx of smoking, quit smoking 3 years ago, not a diabetic, she has HTN. She has a fmhx of CAD, mother with MI at age 65 and brother with CAD.

## 2020-02-18 NOTE — HOSPITAL COURSE
Coronary angiography revealed non-obstructive left main disease confirmed by iFR 0.97. She tolerated the procedure well and was discharged home in stable condition.

## 2020-02-18 NOTE — PROGRESS NOTES
LUNG TRANSPLANT PRE FOLLOW-UP    Referring Physician: Nitin Ariza    Reason for Visit:  Pre-lung transplant follow-up.         Date of Initial Evaluation: 1/14/2019                                                                                             History of Present Illness: Chely Becerra is a 64 y.o. female who is on 2L of oxygen. She is on no assisted ventilation.  Her [place] Heart Association Class is III and a Karnofsky score of 70% - Cares for self: Unable to carry on normal activity or active work. She is not diabetic. She presents today for follow up while on the lung transplant wait list. She is listed for a left single lung transplant for her IPF.     Since her last clinic visit she has been doing well. Tires easily. She has been inactive because her LHC needs to be repeated as per protocol. She has not had any ED visits or hospitalizations because of her IPF. She continues to use O2 at rest and exertion. Continues to be compliant with nintedanib.     Review of Systems   Constitutional: Negative for chills, diaphoresis, fever, malaise/fatigue and weight loss.   HENT: Negative for congestion, ear discharge, ear pain, hearing loss, nosebleeds and sore throat.    Eyes: Negative for blurred vision, double vision and photophobia.   Respiratory: Positive for cough and sputum production. Negative for hemoptysis, shortness of breath and wheezing.    Cardiovascular: Negative for chest pain, palpitations, orthopnea, claudication, leg swelling and PND.   Gastrointestinal: Negative for abdominal pain, blood in stool, constipation, diarrhea, heartburn, melena, nausea and vomiting.   Genitourinary: Negative for dysuria, flank pain, frequency, hematuria and urgency.   Musculoskeletal: Negative for back pain, falls, joint pain, myalgias and neck pain.   Skin: Negative for itching and rash.   Neurological: Negative for dizziness, tremors, sensory change, loss of consciousness, weakness and headaches.  "  Endo/Heme/Allergies: Does not bruise/bleed easily.   Psychiatric/Behavioral: Negative for depression, hallucinations and memory loss. The patient is not nervous/anxious and does not have insomnia.      Objective:   /67   Pulse 68   Temp 97.7 °F (36.5 °C) (Oral)   Resp 20   Ht 5' 7" (1.702 m)   Wt 87.1 kg (192 lb)   SpO2 96% Comment: 3 liters  BMI 30.07 kg/m²      Physical Exam   Constitutional: She is oriented to person, place, and time and well-developed, well-nourished, and in no distress. No distress.   HENT:   Head: Normocephalic and atraumatic.   Nose: Nose normal.   Mouth/Throat: Oropharynx is clear and moist. No oropharyngeal exudate.   Eyes: Pupils are equal, round, and reactive to light. Conjunctivae and EOM are normal. No scleral icterus.   Neck: Normal range of motion. Neck supple. No JVD present. No tracheal deviation present. No thyromegaly present.   Cardiovascular: Normal rate, regular rhythm, normal heart sounds and intact distal pulses. Exam reveals no gallop and no friction rub.   No murmur heard.  Pulmonary/Chest: Effort normal. No stridor. No respiratory distress. She has no wheezes. She has rales in the right middle field, the right lower field, the left middle field and the left lower field. She exhibits no tenderness.   Abdominal: Soft. Bowel sounds are normal. She exhibits no distension and no mass. There is no tenderness. There is no rebound and no guarding.   Musculoskeletal: Normal range of motion. She exhibits no edema.   Lymphadenopathy:     She has no cervical adenopathy.   Neurological: She is alert and oriented to person, place, and time. No cranial nerve deficit. Gait normal. Coordination normal.   Skin: Skin is warm and dry. No rash noted. She is not diaphoretic. No erythema. No pallor.   Psychiatric: Mood and affect normal.     SPPB score:     Labs:  Hospital Outpatient Visit on 02/17/2020   Component Date Value    Physician Comment 02/17/2020                      " Value:Spirometry shows severe restriction based on the reduction in FVC.     Notes:  Consider lung volume determination to confirm the degree of restriction. Also consider DLCO determination if clinically indicated.       Pre FVC 02/17/2020 1.31*    PRE FEV5 02/17/2020 1.07*    Pre FEV1 02/17/2020 1.17*    Pre FEV1 FVC 02/17/2020 89.28     Pre FEF 25 75 02/17/2020 3.07     Pre PEF 02/17/2020 4.58     Pre  02/17/2020 6.32     FVC Ref 02/17/2020 2.92     FVC LLN 02/17/2020 2.14     FVC Pre Ref 02/17/2020 44.7     FEV05 REF 02/17/2020 1.96     FEV05 LLN 02/17/2020 1.10     PRE FEV05 REF 02/17/2020 54.8     FEV1 Ref 02/17/2020 2.30     FEV1 LLN 02/17/2020 1.66     FEV1 Pre Ref 02/17/2020 50.7     FEV1 FVC Ref 02/17/2020 79     FEV1 FVC LLN 02/17/2020 67     FEV1 FVC Pre Ref 02/17/2020 112.9     FEF 25 75 Ref 02/17/2020 2.03     FEF 25 75 LLN 02/17/2020 0.85     FEF 25 75 Pre Ref 02/17/2020 150.9     PEF Ref 02/17/2020 5.95     PEF LLN 02/17/2020 3.76     PEF Pre Ref 02/17/2020 76.8        Pulmonary Function Tests 2/17/2020 12/19/2019 10/10/2019 8/15/2019 6/13/2019 4/11/2019 11/20/2018   FVC 1.31 1.35 1.29 1.33 1.28 1.23 1.36   FEV1 1.17 1.19 1.15 1.15 1.12 1.1 1.2   TLC (liters) - - - - - - 1.89   DLCO (ml/mmHg sec) - 8.1 - - 6.4 - 7.3   FVC% 45 46 44 40 39 37 41   FEV1% 51 52 50 44 43 42 46   FEF 25-75 - - - - - 1.25 2.33   FEF 25-75% - - - - - 48 90   TLC% - - - - - - 35   RV - - - - - - 0.58   RV% - - - - - - 28   DLCO% - 34 - - 33 - 37     6MW 2/17/2020 12/19/2019 10/10/2019 8/15/2019 6/13/2019 4/11/2019 1/16/2019   6MWT Status completed without stopping completed without stopping completed without stopping completed without stopping completed without stopping completed without stopping completed without stopping   Patient Reported Dyspnea Dyspnea Dyspnea;Chest pain Dyspnea Dyspnea Dyspnea Chest pain;Dyspnea   Was O2 used? Yes Yes Yes Yes Yes Yes Yes   Delivery Method Cannula;Pull  Tank;Continuous Flow Cannula;Pull Tank;Continuous Flow Cannula;Pull Tank;Continuous Flow Cannula Cannula;Pull Tank Cannula;Pull Tank;Continuous Flow Cannula;Pull Tank;Continuous Flow   6MW Distance walked (feet) 1300 1100 1150 1162 1200 1200 1100   Distance walked (meters) 396.24 335.28 350.52 354.18 365.76 365.76 335.28   Did patient stop? No No No No No No No   Oxygen Saturation 100 100 99 97 98 98 98   Supplemental Oxygen 2 L/M 2 L/M 3 L/M 2 L/M 2 L/M 2 L/M 2 L/M   Heart Rate 75 68 77 75 74 75 88   Blood Pressure 124/95 123/68 146/73 120/62 123/59 137/73 129/69   Jaclyn Dyspnea Rating  very light very, very light (just noticeable) moderate moderate very light very, very light (just noticeable) very light   Oxygen Saturation 81 86 89 86 79 82 83   Supplemental Oxygen 2 L/M 2 L/M 3 L/M 2 L/M 2 L/M 2 L/M 2 L/M   Heart Rate 97 103 108 108 102 113 119   Blood Pressure 137/65 145/70 148/81 126/73 144/68 144/76 151/72   Jaclyn Dyspnea Rating  somewhat heavy somewhat heavy somewhat heavy somewhat heavy somewhat heavy moderate somewhat heavy   Recovery Time (seconds) 77 241 121 100 251 90 161   Oxygen Saturation 98 98 99 - 98 98 98   Supplemental Oxygen - 2 L/M 3 L/M 2 L/M 2 L/M 2 L/M 2 L/M   Heart Rate 82 76 85 88 72 88 98     Results for orders placed during the hospital encounter of 12/19/19   CT Chest Without Contrast    Narrative EXAMINATION:  CT CHEST WITHOUT CONTRAST    CLINICAL HISTORY:  IPF; listed for lung transplant; with smoking history; Awaiting organ transplant status    TECHNIQUE:  Low dose axial images, sagittal and coronal reformations were obtained from the thoracic inlet to the lung bases. Contrast was not administered.    COMPARISON:  Chest radiograph 08/15/2019.  CT chest 12/13/2018.    FINDINGS:  Base of Neck: Left thyroid lobe hypodense lesion measuring 1.5 cm unchanged.    Thoracic soft tissues: Normal.    Aorta: Left-sided aortic arch.  No aneurysm.    Heart: Normal size.  No effusion.  Mild coronary  atherosclerosis.    Pulmonary vasculature: Pulmonary arteries distribute normally.  Upper limits of normal size of the main pulmonary artery.    Aurea/Mediastinum: Several calcified lymph nodes in the mediastinum and right aurea, likely old granulomatous disease, including possible sarcoidosis.    Airways: Trachea and proximal airways are patent.    Lungs: Widespread abnormal pulmonary parenchyma with peripheral predominant reticulation, architectural distortion, traction bronchiectasis, and pleural based honeycombing.  Findings are consistent with patient's history of interstitial lung disease; imaging pattern is consistent with usual interstitial pneumonia (UIP).  No significant detrimental change since prior exam.  No large abnormal masses or new consolidation.    Elevation right hemidiaphragm, unchanged.    Pleura: No pleural thickening or effusion.    Esophagus: Normal.    Upper Abdomen: Small hiatal hernia.  Stable 2.3 cm left renal cyst.  Accessory splenule.  Status post cholecystectomy.  Several punctate splenic calcifications.  Postoperative changes partially visualized in a loop of bowel in the right upper quadrant.    Bones: No acute fracture. No suspicious lytic or sclerotic lesions.      Impression Widespread abnormal pulmonary parenchyma, consistent with chronic interstitial lung disease such as UIP, stable from last year..  Sarcoidosis or other granulomatous process also considered in the differential, noting hilar/mediastinal calcified lymph nodes.    Small hiatal hernia.    Left thyroid hypodense 1.5 cm lesion, unchanged.  Consideration of thyroid ultrasound is recommended if not already performed.    Left renal cyst, unchanged.    Additional stable incidental findings, as above.    Electronically signed by resident: Scott Ellis  Date:    12/19/2019  Time:    10:49    Electronically signed by: Cruz Bernal MD  Date:    12/19/2019  Time:    11:29         Assessment:-  1. IPF (idiopathic pulmonary  fibrosis)    2. Chronic respiratory failure with hypoxia    3. Obesity (BMI 30.0-34.9)    4. Awaiting transplantation of lung      Plan:   1. She will continue nintedanib for her IPF. Her FVC has remained stable.     2. Continue oxygen supplementation.     3. Encouraged to maintain her current weight and continue to lose as much as she can.     4. She will remain inactive on the lung transplant wait list until LHC is repeated.    5. RTC in 2 months or earlier if necessary.

## 2020-02-18 NOTE — PLAN OF CARE
Report received from TREVON Knapp. Patient s/p [facility]. R radial band intact. No bleeding or hematoma. IVF infusing. Call light in reach. Will monitor.

## 2020-02-18 NOTE — SUBJECTIVE & OBJECTIVE
"Past Medical History:   Diagnosis Date    Common bile duct dilatation 1/15/2019    Controlled type 2 diabetes mellitus without complication, without long-term current use of insulin 1/17/2019    Essential hypertension 1/16/2019    GERD (gastroesophageal reflux disease)     Hepatitis B core antibody positive 1/15/2019    IPF (idiopathic pulmonary fibrosis)     Obesity (BMI 30-39.9) 1/16/2019    RLS (restless legs syndrome)        Past Surgical History:   Procedure Laterality Date    CATHETERIZATION OF BOTH LEFT AND RIGHT HEART N/A 1/17/2019    Procedure: CATHETERIZATION, HEART, BOTH LEFT AND RIGHT;  Surgeon: Trey Evans MD;  Location: [facility] CATH LAB;  Service: Cardiology;  Laterality: N/A;    CHOLECYSTECTOMY      COLONOSCOPY      ESOPHAGOGASTRODUODENOSCOPY      HERNIA REPAIR      SMALL INTESTINE SURGERY      mass removed (benign)       Review of patient's allergies indicates:   Allergen Reactions    Boniva [ibandronate] Other (See Comments)     "chest cramps"       PTA Medications   Medication Sig    ADVAIR DISKUS 250-50 mcg/dose diskus inhaler Inhale 1 puff into the lungs 2 (two) times daily.     aspirin (ECOTRIN) 81 MG EC tablet Take 81 mg by mouth once daily.    benzonatate (TESSALON) 200 MG capsule Take 200 mg by mouth 3 (three) times daily as needed for Cough.    calcium carbonate (TUMS) 200 mg calcium (500 mg) chewable tablet Take 1 tablet by mouth once daily.    diltiaZEM HCl (TIAZAC) 120 mg 24 hr capsule Take 120 mg by mouth once daily.     ergocalciferol (ERGOCALCIFEROL) 50,000 unit Cap Take 50,000 Units by mouth every 14 (fourteen) days.    ferrous sulfate (FEOSOL) 325 mg (65 mg iron) Tab tablet Take 325 mg by mouth daily with breakfast.    gabapentin (NEURONTIN) 100 MG capsule Take 100 mg by mouth 3 (three) times daily.     lansoprazole (PREVACID) 15 MG capsule Take 15 mg by mouth once daily.    loperamide (IMODIUM A-D) 2 mg Tab Take 2 mg by mouth 4 (four) times daily as " needed.    losartan-hydrochlorothiazide 100-25 mg (HYZAAR) 100-25 mg per tablet Take 1 tablet by mouth once daily.     OFEV 150 mg Cap TAKE 1 CAPSULE BY MOUTH TWICE A DAY  EVERY 12 HOURS  AS DIRECTED WITH FOOD    VENTOLIN HFA 90 mcg/actuation inhaler Inhale 1 puff into the lungs every 6 (six) hours as needed.      Family History     None        Tobacco Use    Smoking status: Former Smoker     Types: Cigarettes     Last attempt to quit: 12/13/2016     Years since quitting: 3.1    Smokeless tobacco: Never Used   Substance and Sexual Activity    Alcohol use: No     Frequency: Never    Drug use: No    Sexual activity: Not on file     Review of Systems   All other systems reviewed and are negative.    Objective:     Vital Signs (Most Recent):  Temp: 97.1 °F (36.2 °C) (02/18/20 0845)  Pulse: 69 (02/18/20 0845)  Resp: 18 (02/18/20 0845)  BP: 112/68 (02/18/20 0846)  SpO2: 100 % (02/18/20 0845) Vital Signs (24h Range):  Temp:  [97.1 °F (36.2 °C)-97.7 °F (36.5 °C)] 97.1 °F (36.2 °C)  Pulse:  [68-69] 69  Resp:  [18-20] 18  SpO2:  [96 %-100 %] 100 %  BP: (112-120)/(67-76) 112/68     Weight: 86.6 kg (191 lb)  Body mass index is 29.91 kg/m².    SpO2: 100 %  O2 Device (Oxygen Therapy): nasal cannula    No intake or output data in the 24 hours ending 02/18/20 0925    Lines/Drains/Airways     None                 Physical Exam   Constitutional: She is oriented to person, place, and time. She appears well-developed and well-nourished. No distress.   HENT:   Head: Normocephalic and atraumatic.   Nasal cannula   Neck: No JVD present.   Cardiovascular: Normal rate, regular rhythm, normal heart sounds and intact distal pulses. Exam reveals no gallop and no friction rub.   No murmur heard.  Pulses:       Radial pulses are 2+ on the right side, and 2+ on the left side.        Femoral pulses are 2+ on the right side, and 2+ on the left side.       Dorsalis pedis pulses are 2+ on the right side, and 2+ on the left side.         Posterior tibial pulses are 2+ on the right side, and 2+ on the left side.   Normal Johnnie's test   Pulmonary/Chest: Effort normal. No respiratory distress. She has no wheezes. She has no rales.   Bilateral, panlobular crackles   Abdominal: Soft. Bowel sounds are normal. She exhibits no distension. There is no tenderness.   Musculoskeletal: She exhibits no edema.   Neurological: She is alert and oriented to person, place, and time.   Skin: She is not diaphoretic.       Significant Labs:     Recent Results (from the past 24 hour(s))   Spirometry w/tracing    Collection Time: 02/17/20  2:55 PM   Result Value Ref Range    Physician Comment       Spirometry shows severe restriction based on the reduction in FVC.     Notes:  Consider lung volume determination to confirm the degree of restriction. Also consider DLCO determination if clinically indicated.       Pre FVC 1.31 (L) 2.14 - 3.71 L    PRE FEV5 1.07 (L) 1.10 - 2.81 L    Pre FEV1 1.17 (L) 1.66 - 2.94 L    Pre FEV1 FVC 89.28 67.18 - 91.01 %    Pre FEF 25 75 3.07 0.85 - 3.22 L/s    Pre PEF 4.58 3.76 - 8.15 L/s    Pre  6.32 sec    FVC Ref 2.92     FVC LLN 2.14     FVC Pre Ref 44.7 %    FEV05 REF 1.96     FEV05 LLN 1.10     PRE FEV05 REF 54.8 %    FEV1 Ref 2.30     FEV1 LLN 1.66     FEV1 Pre Ref 50.7 %    FEV1 FVC Ref 79     FEV1 FVC LLN 67     FEV1 FVC Pre Ref 112.9 %    FEF 25 75 Ref 2.03     FEF 25 75 LLN 0.85     FEF 25 75 Pre Ref 150.9 %    PEF Ref 5.95     PEF LLN 3.76     PEF Pre Ref 76.8 %         Significant Imaging:     I have reviewed all pertinent imaging studies

## 2020-02-18 NOTE — ASSESSMENT & PLAN NOTE
1. Cardiac catheterization with FFR of left main and possible PCI.   2. Antiplatelets: ASA, plavix  3. Access: right radial  4. Catheters: Jasen  5. Pt is a OMAIRA candidate and understands the importance of taking plavix for at least one year, understands that in case of receiving a drug coated stent the failure to comply with dual anti-platelet therapy is likely to result in stent clothing, heart attack and death.   6. The risks, benefits, and alternatives of coronary vascular angiography and possible intervention were discussed with the patient. All questions were answered and informed consent was obtained. I had a detailed discussion with the patient regarding risk of stroke, MI, bleeding access site complications including limb loss, allergy, kidney failure including dialysis and death.  7. The patient understands the risks and benefits and wishes to go ahead with the procedure.  8. All patient's questions were answered

## 2020-02-18 NOTE — H&P
[facility]  Interventional Cardiology  H&P    Patient Name: Chely Becerra  MRN: [id number]  Admission Date: 2/18/2020  Code Status: No Order   Attending Provider: Trey Evans MD   Primary Care Physician: ZAHIDA Stack MD  Principal Problem:<principal problem not specified>    Patient information was obtained from patient and ER records.     Subjective:     Chief Complaint:  Coronary artery disease     HPI:  63 y/o F, with PMH ILD on home oxygen being evaluated for lung transplant, also has non-obstructive CAD, WHO III pHTN here for [facility] with FFR of left main coronary artery. She continues to have dyspnea on exertion but carries out all ADLs (cleaning, cooking, laundry) with some difficulty. When she becomes short of breath with exertion she also does feel mild chest discomfort. She denies orthopnea, PND or peripheral edema. She denies symptoms of claudication or syncope.      She has no hx of MI/CAD/CVA, she has 20 pack year hx of smoking, quit smoking 3 years ago, not a diabetic, she has HTN. She has a fmhx of CAD, mother with MI at age 65 and brother with CAD.    Past Medical History:   Diagnosis Date    Common bile duct dilatation 1/15/2019    Controlled type 2 diabetes mellitus without complication, without long-term current use of insulin 1/17/2019    Essential hypertension 1/16/2019    GERD (gastroesophageal reflux disease)     Hepatitis B core antibody positive 1/15/2019    IPF (idiopathic pulmonary fibrosis)     Obesity (BMI 30-39.9) 1/16/2019    RLS (restless legs syndrome)        Past Surgical History:   Procedure Laterality Date    CATHETERIZATION OF BOTH LEFT AND RIGHT HEART N/A 1/17/2019    Procedure: CATHETERIZATION, HEART, BOTH LEFT AND RIGHT;  Surgeon: Trey Evans MD;  Location: [facility] CATH LAB;  Service: Cardiology;  Laterality: N/A;    CHOLECYSTECTOMY      COLONOSCOPY      ESOPHAGOGASTRODUODENOSCOPY      HERNIA REPAIR      SMALL  "INTESTINE SURGERY      mass removed (benign)       Review of patient's allergies indicates:   Allergen Reactions    Boniva [ibandronate] Other (See Comments)     "chest cramps"       PTA Medications   Medication Sig    ADVAIR DISKUS 250-50 mcg/dose diskus inhaler Inhale 1 puff into the lungs 2 (two) times daily.     aspirin (ECOTRIN) 81 MG EC tablet Take 81 mg by mouth once daily.    benzonatate (TESSALON) 200 MG capsule Take 200 mg by mouth 3 (three) times daily as needed for Cough.    calcium carbonate (TUMS) 200 mg calcium (500 mg) chewable tablet Take 1 tablet by mouth once daily.    diltiaZEM HCl (TIAZAC) 120 mg 24 hr capsule Take 120 mg by mouth once daily.     ergocalciferol (ERGOCALCIFEROL) 50,000 unit Cap Take 50,000 Units by mouth every 14 (fourteen) days.    ferrous sulfate (FEOSOL) 325 mg (65 mg iron) Tab tablet Take 325 mg by mouth daily with breakfast.    gabapentin (NEURONTIN) 100 MG capsule Take 100 mg by mouth 3 (three) times daily.     lansoprazole (PREVACID) 15 MG capsule Take 15 mg by mouth once daily.    loperamide (IMODIUM A-D) 2 mg Tab Take 2 mg by mouth 4 (four) times daily as needed.    losartan-hydrochlorothiazide 100-25 mg (HYZAAR) 100-25 mg per tablet Take 1 tablet by mouth once daily.     OFEV 150 mg Cap TAKE 1 CAPSULE BY MOUTH TWICE A DAY  EVERY 12 HOURS  AS DIRECTED WITH FOOD    VENTOLIN HFA 90 mcg/actuation inhaler Inhale 1 puff into the lungs every 6 (six) hours as needed.      Family History     None        Tobacco Use    Smoking status: Former Smoker     Types: Cigarettes     Last attempt to quit: 12/13/2016     Years since quitting: 3.1    Smokeless tobacco: Never Used   Substance and Sexual Activity    Alcohol use: No     Frequency: Never    Drug use: No    Sexual activity: Not on file     Review of Systems   All other systems reviewed and are negative.    Objective:     Vital Signs (Most Recent):  Temp: 97.1 °F (36.2 °C) (02/18/20 0845)  Pulse: 69 (02/18/20 " 0845)  Resp: 18 (02/18/20 0845)  BP: 112/68 (02/18/20 0846)  SpO2: 100 % (02/18/20 0845) Vital Signs (24h Range):  Temp:  [97.1 °F (36.2 °C)-97.7 °F (36.5 °C)] 97.1 °F (36.2 °C)  Pulse:  [68-69] 69  Resp:  [18-20] 18  SpO2:  [96 %-100 %] 100 %  BP: (112-120)/(67-76) 112/68     Weight: 86.6 kg (191 lb)  Body mass index is 29.91 kg/m².    SpO2: 100 %  O2 Device (Oxygen Therapy): nasal cannula    No intake or output data in the 24 hours ending 02/18/20 0925    Lines/Drains/Airways     None                 Physical Exam   Constitutional: She is oriented to person, place, and time. She appears well-developed and well-nourished. No distress.   HENT:   Head: Normocephalic and atraumatic.   Nasal cannula   Neck: No JVD present.   Cardiovascular: Normal rate, regular rhythm, normal heart sounds and intact distal pulses. Exam reveals no gallop and no friction rub.   No murmur heard.  Pulses:       Radial pulses are 2+ on the right side, and 2+ on the left side.        Femoral pulses are 2+ on the right side, and 2+ on the left side.       Dorsalis pedis pulses are 2+ on the right side, and 2+ on the left side.        Posterior tibial pulses are 2+ on the right side, and 2+ on the left side.   Normal Johnnie's test   Pulmonary/Chest: Effort normal. No respiratory distress. She has no wheezes. She has no rales.   Bilateral, panlobular crackles   Abdominal: Soft. Bowel sounds are normal. She exhibits no distension. There is no tenderness.   Musculoskeletal: She exhibits no edema.   Neurological: She is alert and oriented to person, place, and time.   Skin: She is not diaphoretic.       Significant Labs:     Recent Results (from the past 24 hour(s))   Spirometry w/tracing    Collection Time: 02/17/20  2:55 PM   Result Value Ref Range    Physician Comment       Spirometry shows severe restriction based on the reduction in FVC.     Notes:  Consider lung volume determination to confirm the degree of restriction. Also consider DLCO  determination if clinically indicated.       Pre FVC 1.31 (L) 2.14 - 3.71 L    PRE FEV5 1.07 (L) 1.10 - 2.81 L    Pre FEV1 1.17 (L) 1.66 - 2.94 L    Pre FEV1 FVC 89.28 67.18 - 91.01 %    Pre FEF 25 75 3.07 0.85 - 3.22 L/s    Pre PEF 4.58 3.76 - 8.15 L/s    Pre  6.32 sec    FVC Ref 2.92     FVC LLN 2.14     FVC Pre Ref 44.7 %    FEV05 REF 1.96     FEV05 LLN 1.10     PRE FEV05 REF 54.8 %    FEV1 Ref 2.30     FEV1 LLN 1.66     FEV1 Pre Ref 50.7 %    FEV1 FVC Ref 79     FEV1 FVC LLN 67     FEV1 FVC Pre Ref 112.9 %    FEF 25 75 Ref 2.03     FEF 25 75 LLN 0.85     FEF 25 75 Pre Ref 150.9 %    PEF Ref 5.95     PEF LLN 3.76     PEF Pre Ref 76.8 %     Labs pending    Significant Imaging:     I have reviewed all pertinent imaging studies      Assessment and Plan:     Coronary artery disease  1. Cardiac catheterization with FFR of left main and possible PCI.   2. Antiplatelets: ASA, plavix  3. Access: right radial  4. Catheters: Jasen  5. Pt is a OMAIRA candidate and understands the importance of taking plavix for at least one year, understands that in case of receiving a drug coated stent the failure to comply with dual anti-platelet therapy is likely to result in stent clothing, heart attack and death.   6. The risks, benefits, and alternatives of coronary vascular angiography and possible intervention were discussed with the patient. All questions were answered and informed consent was obtained. I had a detailed discussion with the patient regarding risk of stroke, MI, bleeding access site complications including limb loss, allergy, kidney failure including dialysis and death.  7. The patient understands the risks and benefits and wishes to go ahead with the procedure.  8. All patient's questions were answered            VTE Risk Mitigation (From admission, onward)    None          Venkat Otto MD  Interventional Cardiology   [facility]

## 2020-02-18 NOTE — DISCHARGE SUMMARY
[facility]  Interventional Cardiology  Discharge Summary      Patient Name: Chely Becerra  MRN: [id number]  Admission Date: 2/18/2020  Hospital Length of Stay: 0 days  Discharge Date and Time:  02/18/2020 1:05 PM  Attending Physician: Trey Evans MD  Discharging Provider: Venkat Otto MD  Primary Care Physician: ZAHIDA Stack MD    HPI:  65 y/o F, with PMH ILD on home oxygen being evaluated for lung transplant, also has non-obstructive CAD, WHO III pHTN here for [facility] with FFR of left main coronary artery. She continues to have dyspnea on exertion but carries out all ADLs (cleaning, cooking, laundry) with some difficulty. When she becomes short of breath with exertion she also does feel mild chest discomfort. She denies orthopnea, PND or peripheral edema. She denies symptoms of claudication or syncope.      She has no hx of MI/CAD/CVA, she has 20 pack year hx of smoking, quit smoking 3 years ago, not a diabetic, she has HTN. She has a fmhx of CAD, mother with MI at age 65 and brother with CAD.    Procedure(s) (LRB):  Left heart cath (Left)  Fractional Flow [place] (FFR), Coronary     Indwelling Lines/Drains at time of discharge:  Lines/Drains/Airways     None                 Hospital Course:  Coronary angiography revealed non-obstructive left main disease confirmed by iFR 0.97. She tolerated the procedure well and was discharged home in stable condition.      Significant Diagnostic Studies:   Recent Results (from the past 24 hour(s))   Spirometry w/tracing    Collection Time: 02/17/20  2:55 PM   Result Value Ref Range    Physician Comment       Spirometry shows severe restriction based on the reduction in FVC.     Notes:  Consider lung volume determination to confirm the degree of restriction. Also consider DLCO determination if clinically indicated.       Pre FVC 1.31 (L) 2.14 - 3.71 L    PRE FEV5 1.07 (L) 1.10 - 2.81 L    Pre FEV1 1.17 (L) 1.66 - 2.94 L    Pre  FEV1 FVC 89.28 67.18 - 91.01 %    Pre FEF 25 75 3.07 0.85 - 3.22 L/s    Pre PEF 4.58 3.76 - 8.15 L/s    Pre  6.32 sec    FVC Ref 2.92     FVC LLN 2.14     FVC Pre Ref 44.7 %    FEV05 REF 1.96     FEV05 LLN 1.10     PRE FEV05 REF 54.8 %    FEV1 Ref 2.30     FEV1 LLN 1.66     FEV1 Pre Ref 50.7 %    FEV1 FVC Ref 79     FEV1 FVC LLN 67     FEV1 FVC Pre Ref 112.9 %    FEF 25 75 Ref 2.03     FEF 25 75 LLN 0.85     FEF 25 75 Pre Ref 150.9 %    PEF Ref 5.95     PEF LLN 3.76     PEF Pre Ref 76.8 %   Basic metabolic panel    Collection Time: 02/18/20  8:18 AM   Result Value Ref Range    Sodium 141 136 - 145 mmol/L    Potassium 3.5 3.5 - 5.1 mmol/L    Chloride 103 95 - 110 mmol/L    CO2 30 (H) 23 - 29 mmol/L    Glucose 105 70 - 110 mg/dL    BUN, Bld 11 8 - 23 mg/dL    Creatinine 0.7 0.5 - 1.4 mg/dL    Calcium 9.1 8.7 - 10.5 mg/dL    Anion Gap 8 8 - 16 mmol/L    eGFR if African American >60.0 >60 mL/min/1.73 m^2    eGFR if non African American >60.0 >60 mL/min/1.73 m^2   CBC Without Differential    Collection Time: 02/18/20  8:18 AM   Result Value Ref Range    WBC 6.69 3.90 - 12.70 K/uL    RBC 3.65 (L) 4.00 - 5.40 M/uL    Hemoglobin 10.9 (L) 12.0 - 16.0 g/dL    Hematocrit 35.8 (L) 37.0 - 48.5 %    Mean Corpuscular Volume 98 82 - 98 fL    Mean Corpuscular Hemoglobin 29.9 27.0 - 31.0 pg    Mean Corpuscular Hemoglobin Conc 30.4 (L) 32.0 - 36.0 g/dL    RDW 12.6 11.5 - 14.5 %    Platelets 270 150 - 350 K/uL    MPV 9.5 9.2 - 12.9 fL   Protime-INR    Collection Time: 02/18/20  8:18 AM   Result Value Ref Range    Prothrombin Time 9.7 9.0 - 12.5 sec    INR 0.9 0.8 - 1.2   Type & Screen    Collection Time: 02/18/20  8:18 AM   Result Value Ref Range    Group & Rh O POS     Indirect Bert NEG          Pending Diagnostic Studies:     None        Discharged Condition: fair    Follow Up: Dr. Garza     Medications:  Reconciled Home Medications:      Medication List      CONTINUE taking these medications    Advair Diskus 250-50  mcg/dose diskus inhaler  Generic drug:  fluticasone-salmeterol 250-50 mcg/dose  Inhale 1 puff into the lungs 2 (two) times daily.     aspirin 81 MG EC tablet  Commonly known as:  ECOTRIN  Take 81 mg by mouth once daily.     benzonatate 200 MG capsule  Commonly known as:  TESSALON  Take 200 mg by mouth 3 (three) times daily as needed for Cough.     calcium carbonate 200 mg calcium (500 mg) chewable tablet  Commonly known as:  TUMS  Take 1 tablet by mouth once daily.     diltiaZEM HCl 120 mg 24 hr capsule  Commonly known as:  TIAZAC  Take 120 mg by mouth once daily.     ergocalciferol 50,000 unit Cap  Commonly known as:  ERGOCALCIFEROL  Take 50,000 Units by mouth every 14 (fourteen) days.     ferrous sulfate 325 mg (65 mg iron) Tab tablet  Commonly known as:  FEOSOL  Take 325 mg by mouth daily with breakfast.     gabapentin 100 MG capsule  Commonly known as:  NEURONTIN  Take 100 mg by mouth 3 (three) times daily.     lansoprazole 15 MG capsule  Commonly known as:  PREVACID  Take 15 mg by mouth once daily.     loperamide 2 mg Tab  Commonly known as:  IMODIUM A-D  Take 2 mg by mouth 4 (four) times daily as needed.     losartan-hydrochlorothiazide 100-25 mg 100-25 mg per tablet  Commonly known as:  HYZAAR  Take 1 tablet by mouth once daily.     Ofev 150 mg Cap  Generic drug:  nintedanib  TAKE 1 CAPSULE BY MOUTH TWICE A DAY  EVERY 12 HOURS  AS DIRECTED WITH FOOD     Ventolin HFA 90 mcg/actuation inhaler  Generic drug:  albuterol  Inhale 1 puff into the lungs every 6 (six) hours as needed.            Time spent on the discharge of patient: 30 minutes    Venkat Otto MD  Interventional Cardiology  [facility]

## 2020-02-18 NOTE — TELEPHONE ENCOUNTER
Listed patient review conducted today during the Lung Transplant QAPI meeting.  Members in attendance were: Jaden Garza, Angelica, & Weill (Pulmonary Disease); Jaden Clemons, Julieth, & Malou (CTS); Rogelio Gregory (pharm D); Mayuri Nath (PA); Kalani Etienne (CTS RN); Marissa Leonard (RN); Amisha Quiroz (RN); Adrianne Brown (RN); Mindi Irving (RN); Jessie Santamaria (RN); and Brigid Verma ([facility]).  Left heart catheterization from today was reviewed.  Instructed to reactivate Ms. Becerra on the wait list.  She will be listed for a bilateral,  single right, and single left lung transplant.      Contacted Ms. Becerra.  Informed her that the heart catheterization was reviewed today with the Lung Transplant Team.  Informed her that she will be reactivated on the lung transplant wait list.  Informed her that once she is reactivated, she can be contacted at any time with an organ offer.  Reminded her that she will receive a letter in the mail with the status update.  She verbalized her understanding of all discussed.    Status changed to active in [facility].

## 2020-02-19 NOTE — TELEPHONE ENCOUNTER
Contacted Maria Alejandra, Dr. Champagne's nurse, to determine when she is due for a repeat colonoscopy.  Informed per Maria Alejandra that she is due for a repeat colonoscopy in 2023 (5 years from her last one).

## 2020-02-19 NOTE — TELEPHONE ENCOUNTER
Contacted Ms. Becerra to see if she has a mammogram and her annual gyn exam scheduled.  She stated that the mammogram is scheduled for either March 10th or 11th.  She will contact her doctor's office to schedule the annual gyn (well woman) exam.

## 2020-02-24 NOTE — TELEPHONE ENCOUNTER
----- Message from Honey Fagan sent at 2/24/2020 12:04 PM CST -----  Pt is calling to speak to coordinator      Pt contact [phone]    Contacted Ms. Becerra.  She stated that she scheduled a well woman visit with her GYN.  She requested our fax number to have the note and the mammogram faxed.  She also inquired as to her placement on the wait list.  Notified Ms. Becerra of her placement on the wait list.  Reminded her that if the donor is within [place] of another transplant center, then the lungs will be offered to the individual with the highest lung allocation score for the donor's blood type.  Reminded her that the list is ever changing due to patients being added, removed, or becoming sicker.  She verbalized her understanding of all discussed.

## 2020-03-17 NOTE — TELEPHONE ENCOUNTER
Contacted Ms. Becerra to inform her that we are currently still transplanting patients and are taking the necessary precautions.  Also informed her that we are still seeing the listed patients every 2 months in clinic.  Informed her of the checkpoints and the visitor policy.  Informed her that she is allowed only one visitor.  Informed her that this is a very fluid situation and is subject to change day to day.  She verbalized her understanding of all discussed.

## 2020-04-07 NOTE — TELEPHONE ENCOUNTER
Received a call from Ivy, patient's daughter, inquiring about patient's appointment next week.  Ivy inquired if patient has to come to the appointment.  She stated that patient is not comfortable with coming to [place] due to the COVID-19 pandemic.  Informed Ivy that we will have a meeting tomorrow to discuss all the patients that are scheduled for next week.  Informed her that we are not making patients come to their appointments if they don't want to.  Informed her that we are not removing patients from the list or making them inactive if they don't come for their appointments.  Informed Ivy that since we are still transplanting patients, we need to ensure that they still meet our criteria for lung transplantation.  Informed her that we may be able to order the required testing locally and then schedule a virtual visit with one of our providers with the Short Physical Performance Battery.  Informed Ivy that the patient has to have access to an iPhone, iPad or Android to download the alessandro.  Informed Ivy that I would contact her mom tomorrow after our meeting with a plan.  Ivy verbalized her understanding.  She stated that the virtual visit would be best.  She stated that her mom has an iPad.  She requested that I call her back tomorrow with the plan as well.  Informed Ivy that I would call her tomorrow.

## 2020-04-08 NOTE — TELEPHONE ENCOUNTER
Informed Dr. Garza of my conversation with patient's daughter yesterday during the team discussion.  Instructed to change patient's appointment next week to a virtual visit and to schedule a chest xray and a 6 minute walk test locally.  Instructed to order a retrospective crossmatch at time of the organ offer instead of a virtual crossmatch.  Also instructed to cancel the HLA PRA screens every 60 days.  HLA PRA screen will be ordered with any desensitizing event.    Contacted Ms. Becerra and notified her of Dr. Garza's instructions to change her appointment to a virtual visit with the Short Physical Performance Battery.  Informed her that we will attempt to schedule a chest xray and a 6 minute walk test locally.  Informed her that she does not need to have HLA testing drawn with each visit any longer since her last 3 PRA screens have been negative.  Inquired as to where she can go locally for testing.  She stated that she usually goes to [facility].  She stated that Dr. Ariza has scheduled PFTs, imaging, and 6 minute walk test there in the past.  Informed her that I will call to see if the testing can be done.  Informed her that I would call her back with the outcome.  She verbalized her understanding of all discussed.  Informed Ms. Becerra that I would contact Ivy regarding the plan per her request.  Ms. Becerra requested that I contact Ivy with the plan.    Contacted [place] with Dr. Ariza' office to see if [facility] was able to perform testing.  She stated that [facility] is only doing imaging.  She stated that [facility] is not doing echos, PFTs, or 6 minute walk tests.  She recommended that I contact [facility] to see if they are able to schedule a 6 minute walk test.  Phone number [phone].    Attempted to contact Ivy, patient's daughter, per her request.  No answer.  Left a message informing her that the appointment next week will be changed to a virtual visit.  Also informed her that we will  attempt to schedule the testing locally if we are able.  Requested that she return the call for any questions.    Contacted [facility].  Transferred to the scheduling department.  Inquired if a chest xray and a 6 minute walk test can be scheduled.  She stated that a chest xray doesn't have to be scheduled.  An order would just need to be sent to admitting.  She is uncertain if a 6 minute walk test can be performed.  Requested a transfer to the respiratory department.  Transferred to Delia in respiratory.  Inquired if they are still performing testing (PFTs & 6 minute walk tests).  She stated that they are only doing PFTs for surgery clearance.  Inquired if they could do a 6 minute walk test.  She stated that she can schedule a 6 minute walk test, but she will need to fax an attestation for the physician to sign.  She stated that she would fax the form tomorrow.  She requested that I send the signed form and orders for the chest xray and 6 minute walk test to her at [phone].  Once the requested information is received, she will contact the patient to schedule.  Patient name, date of birth, and phone numbers given to Delia.  Informed her that patient is scheduled for a virtual visit on 4/16/20.  Inquired if the testing could be scheduled prior to then.  She stated that it would be scheduled prior to 4/16/20.     Contacted Ms. Becerra.  Informed her that [facility] is not performing 6 minute walk tests.  Informed her that [facility] can do the chest xray and the 6 minute walk test.  Informed her that an attestation form will be faxed tomorrow for the physician's signature.  Informed her that once the attestation form and orders are received, then she will be contacted by Delia in the Respiratory Therapy department to schedule the testing.  Instructed her to contact me with the date of the appointments.  Informed her that I will send instructions on the virtual visit.  Requested that she complete  the pre check prior to the appointment.  Also instructed her to log in for the visit 15 minutes prior to her appointment time.  She again verbalized her understanding.

## 2020-04-09 NOTE — PROGRESS NOTES
Attestation for 6 minute walk test and orders for a chest xray and a 6 minute walk test were faxed to Delia at [facility].      Contacted Delia to ensure the information was received.  She stated that she did receive the fax.  She will contact patient today to schedule the 6 minute walk test prior to her virtual visit on Thursday, 4/16/20

## 2020-04-13 NOTE — TELEPHONE ENCOUNTER
Patient called to verify 'how far she is supposed to walk for the 6MWT'? Informed patient that she needs to walk a minimum of 800 ft to qualify for active lung transplant waitlist status. She verbalized understanding. She is also asking for fax number to send records from facility ([facility]) - number provided to patient. All questions answered at this time. She is aware she will be contacted to pre-chart / prepare for visit before Thursday.

## 2020-04-15 NOTE — NURSING
"Contacted Ms. Becerra.  Reviewed her medications and allergies prior to her virtual visit tomorrow.  Inquired if she did her pre-check.  She stated that she did.  Reminded her to log on 15 minutes prior to her appointment time tomorrow.  Also reminded her that the Short Physical Performance Battery will be performed during the visit tomorrow.  Instructed her to gómez off 98" for the walking portion of the test.  She verbalized her understanding of all discussed.  "

## 2020-04-16 NOTE — LETTER
April 16, 2020        Nitin Ariza  [address] [facility]  [address]  [place] [address]  Phone: [phone]  Fax: [phone]             [facility] Trans[facility]  [address]  [place] [address]  Phone: [phone]   Patient: Chely Becerra   MR Number: [id number]   YOB: 1955   Date of Visit: 4/16/2020       Dear Dr. Nitin Ariza    Thank you for referring Chely Becerra to me for evaluation. Attached you will find relevant portions of my assessment and plan of care.    If you have questions, please do not hesitate to call me. I look forward to following Chely Beecrra along with you.    Sincerely,    Patricia Dominguez, DO    Enclosure    If you would like to receive this communication electronically, please contact [email or web address] or [phone] to request [email or web address] Link access.    [email or web address] Link is a tool which provides read-only access to select patient information with whom you have a relationship. Its easy to use and provides real time access to review your patients record including encounter summaries, notes, results, and demographic information.    If you feel you have received this communication in error or would no longer like to receive these types of communications, please e-mail [email or web address]

## 2020-04-16 NOTE — PROGRESS NOTES
The patient location is: home  The chief complaint leading to consultation is: routine follow-up  Visit type: audiovisual  Total time spent with patient: 15  Each patient to whom he or she provides medical services by telemedicine is:  (1) informed of the relationship between the physician and patient and the respective role of any other health care provider with respect to management of the patient; and (2) notified that he or she may decline to receive medical services by telemedicine and may withdraw from such care at any time.    LUNG TRANSPLANT PRE FOLLOW-UP    Referring Physician: Nitin Ariza    Reason for Visit:  Pre-lung transplant follow-up.         Date of Initial Evaluation: 1/14/2019                                                                                             History of Present Illness: Chely Becerra is a 64 y.o. female who is on 3L of oxygen. She is on no assisted ventilation.  Her New York Heart Association Class is III and a Karnofsky score of 60% - Requires occasional assistance but is able to care for needs. She is not diabetic.  She presents today via video visit for routine pre-transplant follow-up.  Since her last visit, she has been doing well.  Denies any changes in her breathing or cough.  Takes OFEV and inhalers as directed.  No recent illnesses/sick contacts.  She has been social distancing/isolating.  Overall she remains unchanged.    Review of Systems   Constitutional: Negative for chills, diaphoresis, fever, malaise/fatigue and weight loss.   HENT: Negative for congestion, ear discharge, ear pain, hearing loss, nosebleeds and sore throat.    Eyes: Negative for blurred vision, double vision and photophobia.   Respiratory: Positive for cough and shortness of breath. Negative for hemoptysis, sputum production and wheezing.    Cardiovascular: Negative for chest pain, palpitations, orthopnea, claudication, leg swelling and PND.   Gastrointestinal: Negative for abdominal pain,  "blood in stool, constipation, diarrhea, heartburn, melena, nausea and vomiting.   Genitourinary: Negative for dysuria, flank pain, frequency, hematuria and urgency.   Musculoskeletal: Negative for back pain, falls, joint pain, myalgias and neck pain.   Skin: Negative for itching and rash.   Neurological: Negative for dizziness, tremors, sensory change, loss of consciousness, weakness and headaches.   Endo/Heme/Allergies: Does not bruise/bleed easily.   Psychiatric/Behavioral: Negative for depression, hallucinations and memory loss. The patient is not nervous/anxious and does not have insomnia.      Objective:   Ht 5' 7" (1.702 m) Comment: reported  Wt 87.1 kg (192 lb) Comment: reported  SpO2 (!) 92% Comment: 2 L NC, patient reported  BMI 30.07 kg/m²   Physical Exam   Constitutional: She is oriented to person, place, and time. She appears well-developed and well-nourished. No distress.   HENT:   Head: Normocephalic.   Neurological: She is alert and oriented to person, place, and time.   Psychiatric: She has a normal mood and affect. Judgment normal.   Limited exam due to video visit.    Labs:  No visits with results within 7 Day(s) from this visit.   Latest known visit with results is:   Admission on 02/18/2020, Discharged on 02/18/2020   Component Date Value    Sodium 02/18/2020 141     Potassium 02/18/2020 3.5     Chloride 02/18/2020 103     CO2 02/18/2020 30*    Glucose 02/18/2020 105     BUN, Bld 02/18/2020 11     Creatinine 02/18/2020 0.7     Calcium 02/18/2020 9.1     Anion Gap 02/18/2020 8     eGFR if African American 02/18/2020 >60.0     eGFR if non African Amer* 02/18/2020 >60.0     WBC 02/18/2020 6.69     RBC 02/18/2020 3.65*    Hemoglobin 02/18/2020 10.9*    Hematocrit 02/18/2020 35.8*    Mean Corpuscular Volume 02/18/2020 98     Mean Corpuscular Hemoglo* 02/18/2020 29.9     Mean Corpuscular Hemoglo* 02/18/2020 30.4*    RDW 02/18/2020 12.6     Platelets 02/18/2020 270     MPV " 02/18/2020 9.5     Prothrombin Time 02/18/2020 9.7     INR 02/18/2020 0.9     Group & Rh 02/18/2020 O POS     Indirect Bert 02/18/2020 NEG        Pulmonary Function Tests 2/17/2020 12/19/2019 10/10/2019 8/15/2019 6/13/2019 4/11/2019 11/20/2018   FVC 1.31 1.35 1.29 1.33 1.28 1.23 1.36   FEV1 1.17 1.19 1.15 1.15 1.12 1.1 1.2   TLC (liters) - - - - - - 1.89   DLCO (ml/mmHg sec) - 8.1 - - 6.4 - 7.3   FVC% 45 46 44 40 39 37 41   FEV1% 51 52 50 44 43 42 46   FEF 25-75 - - - - - 1.25 2.33   FEF 25-75% - - - - - 48 90   TLC% - - - - - - 35   RV - - - - - - 0.58   RV% - - - - - - 28   DLCO% - 34 - - 33 - 37     6MW 2/17/2020 12/19/2019 10/10/2019 8/15/2019 6/13/2019 4/11/2019 1/16/2019   6MWT Status completed without stopping completed without stopping completed without stopping completed without stopping completed without stopping completed without stopping completed without stopping   Patient Reported Dyspnea Dyspnea Dyspnea;Chest pain Dyspnea Dyspnea Dyspnea Chest pain;Dyspnea   Was O2 used? Yes Yes Yes Yes Yes Yes Yes   Delivery Method Cannula;Pull Tank;Continuous Flow Cannula;Pull Tank;Continuous Flow Cannula;Pull Tank;Continuous Flow Cannula Cannula;Pull Tank Cannula;Pull Tank;Continuous Flow Cannula;Pull Tank;Continuous Flow   6MW Distance walked (feet) 1300 1100 1150 1162 1200 1200 1100   Distance walked (meters) 396.24 335.28 350.52 354.18 365.76 365.76 335.28   Did patient stop? No No No No No No No   Oxygen Saturation 100 100 99 97 98 98 98   Supplemental Oxygen 2 L/M 2 L/M 3 L/M 2 L/M 2 L/M 2 L/M 2 L/M   Heart Rate 75 68 77 75 74 75 88   Blood Pressure 124/95 123/68 146/73 120/62 123/59 137/73 129/69   Jaclyn Dyspnea Rating  very light very, very light (just noticeable) moderate moderate very light very, very light (just noticeable) very light   Oxygen Saturation 81 86 89 86 79 82 83   Supplemental Oxygen 2 L/M 2 L/M 3 L/M 2 L/M 2 L/M 2 L/M 2 L/M   Heart Rate 97 103 108 108 102 113 119   Blood Pressure 137/65  145/70 148/81 126/73 144/68 144/76 151/72   Jaclyn Dyspnea Rating  somewhat heavy somewhat heavy somewhat heavy somewhat heavy somewhat heavy moderate somewhat heavy   Recovery Time (seconds) 77 241 121 100 251 90 161   Oxygen Saturation 98 98 99 - 98 98 98   Supplemental Oxygen - 2 L/M 3 L/M 2 L/M 2 L/M 2 L/M 2 L/M   Heart Rate 82 76 85 88 72 88 98       Assessment:-  1. Awaiting transplantation of lung    2. IPF (idiopathic pulmonary fibrosis)    3. Chronic respiratory failure with hypoxia    4. Obesity (BMI 30.0-34.9)      Plan:   1. Followed for IPF.  Continue with OFEV as prescribed.  Symptoms stable.    2. Continue with supplemental oxygen as prescribed.  Currently on 3L but increases her flow with exertion due to desaturations.  Does not require NIPPV.    3. Continue with exercise and diet to limit weight gain as much as possible.  Current BMI 30.      4. She has been active on the lung transplant waitlist since 4/25/2019 with an LAS of 35.8.  Her most recent 6MWT distance was 1300feet.  Most recent albumin is 3.8 and Hgb 11.9.  She is encouraged to remain active and continue with weight loss attempts.  She is otherwise stable and will remain active on the lung transplant wait list.    5. Follow-up in 2 months.      Patricia Dominguez D.O.  Lung Transplant  Pulmonary/Critical Care Medicine

## 2020-05-19 NOTE — TELEPHONE ENCOUNTER
Review of listed patients conducted during the QAPI meeting today.  Recent labs and testing reviewed with the multidisciplinary team.  Patient will remain active on the wait list.  No additional orders necessary at this time.

## 2020-05-26 NOTE — TELEPHONE ENCOUNTER
Attempted to contact Ms. Becerra regarding a follow-up appointment.  No answer.  Left a message requesting a return call.

## 2020-06-01 NOTE — TELEPHONE ENCOUNTER
Contacted patient regarding scheduling a follow-up appointment.  Informed her that we are now seeing patients in clinic and she is due for a follow-up appointment.  Inquired about her availability.  She stated that she would need to speak with her daughter, Sushma.  Informed Ms. Becerra that we have safety measures in place for our patients (screening questions, temperature checks, masks for everyone while in the facility, and restrictions on visitors).  Informed her that if she requires assistance due to needing a wheelchair, then she will be allowed only one adult visitor.  Informed her that if she does not require assistance, then she would not be allowed any visitors.  Also informed her of our policy for a negative COVID-19 test within 48 hours of PFTs.  Informed her that we prefer patients to have the COVID-19 testing at an [facility] facility.  Informed her I can also see about scheduling the PFTs locally.  She verbalized her understanding of all discussed.  She requested to have PFTs scheduled locally if able at either [facility] or [facility].  Informed her that I would contact both facilities to see about scheduling the PFTs and I would call her back with a plan.    Contacted Dr. Ariza' office and spoke with Kameron.  Inquired if [facility] is scheduling PFTs.  She stated that [facility] is scheduling PFTs again, but COVID-19 testing is required within 24 hours of the appointment.  Phone number obtained to schedule PFTs ([phone] option 2).    Contacted [facility].  Transferred to Ifeoma.  She stated that she would need to speak with the pulmonary lab.  Ifeoma stated that she would give my message to Delia Armas in the pulmonary lab and have her call me back.  Phone number given to Ifeoma.  Will await return call.     Received a return call from Delia Armas from the respiratory department.  She stated that they are scheduling PFTs.  She stated that she has openings on 6/11/20 and 6/12/20.   She requested that I contact the scheduling department at [phone] to schedule.  Obtained Delia's number for future reference (cell: [phone] and Respiratory: [phone]).    Contacted Ms. Becerra and notified her of my conversation with Kameron and Delia.  She verbalized her understanding and requested that I schedule the PFTs at [facility] since it is closer to her house.  Inquired if she spoke with Sushma.  She stated that she has.  She stated that Sushma just needs to give her boss a couple of weeks notice.  Informed her that we will plan to schedule the appointment the week of June 15th.  She verbalized her understanding.    Attempted to contact the scheduling department at [facility].  No answer.  Left a message requesting a return call to schedule PFTs.

## 2020-06-02 NOTE — TELEPHONE ENCOUNTER
"Contacted the scheduling department at [facility] regarding scheduling spirometry.  Spoke with Leydi.  She requested that I call back tomorrow to schedule the appointment since their "system is being upgraded".  Fax number obtained [phone].  Will call back tomorrow to schedule the appointment.    6/3/20 - Contacted the scheduling department at [facility].  Spoke with Jeanie.  PFTs scheduled for 6/15/20 at 1:30pm.    Attempted to contact patient regarding appointment date and time.  No answer.  Left a message informing her of the date and time of the appointment for PFTs at [facility].  Also left a message inquiring about her annual mammogram and well woman visit.  Requested a return call or message via the patient portal.      "

## 2020-06-03 NOTE — TELEPHONE ENCOUNTER
----- Message from Benjamín Aranda sent at 6/3/2020 11:38 AM CDT -----  Contact: pt  Calling to speak with coordinator     Call back: [phone]    Contacted patient and notified her of the date and time of the PFT appointment at [facility].  She verbalized her understanding.  Also inquired if she had her mammogram and well woman visit.  She stated that she had both.  Informed her that I would request the results.  She verbalized her understanding.

## 2020-06-04 NOTE — TELEPHONE ENCOUNTER
Lung Transplant Organ Offer    Notified by Dr. Dominguez that an organ offer has been accepted for this patient. Contacted Ridge Barry, , to confirm donor and recipient information, which was read back for verification.  Notified that the transplant will be performed by Dr. Clemons on June 5 at 0630.   Called patient to discuss the organ offer, that she is backup for left single lung transplant. Patient identified recent yeast infection, treated for three days with OTC marilee, but no other medical issues during the telephone assessment.     Note patient did request to speak with Dr. Dominguez prior to accepting organ offer, message sent / patient did accept offer.    Informed patient that the organ comes from a Standard donor: The patient accepted the organ offer.  Provided the following instructions to the patient:  · Be NPO starting time of call (1642) last food 1400  · Take a shower  · Leave valuables at home but bring any belongings needed for a comfortable hospital stay  · Bring insurance card and medication list  · Bring enough Oxygen for travel to [facility] and for travel home should surgery be cancelled  · Bring N/A. Patient states she does not use BiPAP/CPAP machine to use while in the hospital  · Keep cell phone charged and audible so I can contact you with any additional information. Note - did update patient's contact information in Epic.  · Call the hospital  at [phone] and ask for the lung transplant coordinator on call to be contacted should you have any questions or needs  · Report to the ED registration desk  to check in for admission    Explained that that the surgery is scheduled at June 5 at 0630 am and that she is backup.  Reminded the patient that surgery could still be cancelled at any time should the donor lung(s) become unsuitable for transplant.  The patient was given the opportunity to ask questions.  She then verbalized understanding of all information  discussed. She reported that the ETA is 2200 on June 4 .    Contacted the following staff to notify them about the pending admission for this patient at 2200 ETA to have a Left single lung transplant surgery with Dr. Clemons on June 5 at 0630 (backup) :  · Lung transplant provider: Liz   · Admit office for hospital reservation: Chucky (@1720)  · Nursing supervisor: Johnathan (@2001)  · ICU charge nurse: Alfonso (@ 1724)  · TSU charge nurse: Laura @1725  · OR desk: Genie @1723  · CTS resident Nima @ 1733 to request OR entry of OR case request and completion of surgery consent  · HLA  not notified at this time, patient is retrospective crossmatch backup), who read back the donor and recipient information correctly and stated that Dr. Hill and Ridge Barry, , will be notified of the Retrospective crossmatch results.

## 2020-06-05 PROBLEM — J96.11 CHRONIC HYPOXEMIC RESPIRATORY FAILURE: Status: ACTIVE | Noted: 2019-04-11

## 2020-06-08 NOTE — TELEPHONE ENCOUNTER
"----- Message from Benjamín Aranda sent at 6/8/2020  1:48 PM CDT -----  Contact: pt  Calling to speak with coordinator     Call back: [phone]    Contacted Ms. Becerra.  She stated, "I was calling to see if the doctor would also prescribe a cream, but I'm not worried about that anymore."  She stated that her vaginal area is very irritated.  She will start the antibiotic (cipro) and will contact us for any new or worsening complaints.  She will also call her GYN's office for an appointment if her symptoms don't improve.  Ms. Becerra will keep us updated.    "

## 2020-06-08 NOTE — TELEPHONE ENCOUNTER
----- Message from Adam Hill MD sent at 6/8/2020 11:01 AM CDT -----  rx for ciprofloxacin 500 mg q 12 h x 7 days    Contacted Ms. Becerra.  Notified her of the urine culture results and Dr. Hill's orders for cipro 500mg every 12 hours for 7 days.  Inquired if she is having symptoms.  She stated that she is having symptoms.  She left a message for her local physician regarding her symptoms.  Informed her that the prescription will be sent today.  Instructed her to contact us should she develop new or worsening symptoms.  She verbalized her understanding.

## 2020-06-16 NOTE — TELEPHONE ENCOUNTER
Review of listed patients conducted today.  Recent labs and testing reviewed with the multidisciplinary team.  All members present agreed that patient will remain active on the wait list.  No additional orders are necessary at this time.

## 2020-06-18 NOTE — PROGRESS NOTES
LUNG TRANSPLANT PRE FOLLOW-UP    Referring Physician: Nitin Ariza    Reason for Visit:  Pre-lung transplant follow-up.         Date of Initial Evaluation: 1/14/2019                                                                                             History of Present Illness: Chely Becerra is a 64 y.o. female hx/o IPF with resultant chronic hypoxemic respiratory failure on 2L/rest and 3L/exertion of oxygen. She is on no assisted ventilation.  Her New York Heart Association Class is III and a Karnofsky score of 60% - Requires occasional assistance but is able to care for needs. She is not diabetic.      Patient is actively listed for lung transplant on [facility] under our institution since 04/25/19 with latest LAS 35.8 (01/2020).  She was recently treated for a UTI (proteus mirabilis, E. Coli)  and reports completing her course of cipro with some improvement in her symptoms.  She continue to non-bloody, clear vaginal discharge.  She admits to inguinal itching and below the abdominal itching.  She reports a change in washing detergent.  Daughter has similar symptoms.  She denies fever, chills, changes in bowel habits.  She feels otherwise well and denies any new respiratory concerns or limitations.        Review of Systems   Constitutional: Negative for chills, diaphoresis, fever, malaise/fatigue and weight loss.   HENT: Negative for congestion, ear discharge, ear pain, hearing loss, nosebleeds and sore throat.    Eyes: Negative for blurred vision, double vision and photophobia.   Respiratory: Positive for cough and shortness of breath. Negative for hemoptysis, sputum production and wheezing.    Cardiovascular: Negative for chest pain, palpitations, orthopnea, claudication, leg swelling and PND.   Gastrointestinal: Negative for abdominal pain, blood in stool, constipation, diarrhea, heartburn, melena, nausea and vomiting.   Genitourinary: Negative for dysuria, flank pain, frequency, hematuria and urgency.  "  Musculoskeletal: Negative for back pain, falls, joint pain, myalgias and neck pain.   Skin: Negative for itching and rash.   Neurological: Negative for dizziness, tremors, sensory change, loss of consciousness, weakness and headaches.   Endo/Heme/Allergies: Does not bruise/bleed easily.   Psychiatric/Behavioral: Negative for depression, hallucinations and memory loss. The patient is not nervous/anxious and does not have insomnia.      Objective:   /64   Pulse 63   Temp 97.1 °F (36.2 °C) (Oral)   Resp 20   Ht 5' 7" (1.702 m)   Wt 87.1 kg (192 lb)   LMP  (LMP Unknown)   SpO2 98% Comment: 2 liters  BMI 30.07 kg/m²   Physical Exam  Constitutional:       General: She is not in acute distress.     Appearance: She is well-developed.   HENT:      Head: Normocephalic.   Neurological:      Mental Status: She is alert and oriented to person, place, and time.   Psychiatric:         Judgment: Judgment normal.     Limited exam due to video visit.    Labs:  Lab Visit on 06/18/2020   Component Date Value    WBC 06/18/2020 10.63     RBC 06/18/2020 3.74*    Hemoglobin 06/18/2020 11.4*    Hematocrit 06/18/2020 36.9*    Mean Corpuscular Volume 06/18/2020 99*    Mean Corpuscular Hemoglo* 06/18/2020 30.5     Mean Corpuscular Hemoglo* 06/18/2020 30.9*    RDW 06/18/2020 12.5     Platelets 06/18/2020 313     MPV 06/18/2020 9.2     Immature Granulocytes 06/18/2020 0.3     Gran # (ANC) 06/18/2020 4.2     Immature Grans (Abs) 06/18/2020 0.03     Lymph # 06/18/2020 4.6     Mono # 06/18/2020 1.0     Eos # 06/18/2020 0.7*    Baso # 06/18/2020 0.09     nRBC 06/18/2020 0     Gran% 06/18/2020 39.0     Lymph% 06/18/2020 43.4     Mono% 06/18/2020 9.5     Eosinophil% 06/18/2020 7.0     Basophil% 06/18/2020 0.8     Differential Method 06/18/2020 Automated     Sodium 06/18/2020 139     Potassium 06/18/2020 4.0     Chloride 06/18/2020 100     CO2 06/18/2020 33*    Glucose 06/18/2020 88     BUN, Bld 06/18/2020 " 12     Creatinine 06/18/2020 0.8     Calcium 06/18/2020 9.7     Total Protein 06/18/2020 7.5     Albumin 06/18/2020 3.5     Total Bilirubin 06/18/2020 0.4     Alkaline Phosphatase 06/18/2020 79     AST 06/18/2020 14     ALT 06/18/2020 15     Anion Gap 06/18/2020 6*    eGFR if African American 06/18/2020 >60.0     eGFR if non African Amer* 06/18/2020 >60.0     Hemoglobin A1C 06/18/2020 6.0*    Estimated Avg Glucose 06/18/2020 126        Pulmonary Function Tests 6/15/2020 2/17/2020 12/19/2019 10/10/2019 8/15/2019 6/13/2019 4/11/2019   FVC 1.32 1.31 1.35 1.29 1.33 1.28 1.23   FEV1 1.16 1.17 1.19 1.15 1.15 1.12 1.1   TLC (liters) 2.1 - - - - - -   DLCO (ml/mmHg sec) 5.31 - 8.1 - - 6.4 -   FVC% 45 45 46 44 40 39 37   FEV1% 50 51 52 50 44 43 42   FEF 25-75 - - - - - - 1.25   FEF 25-75% - - - - - - 48   TLC% 43 - - - - - -   RV - - - - - - -   RV% - - - - - - -   DLCO% 25 - 34 - - 33 -     6MW 6/18/2020 2/17/2020 12/19/2019 10/10/2019 8/15/2019 6/13/2019 4/11/2019   6MWT Status completed without stopping completed without stopping completed without stopping completed without stopping completed without stopping completed without stopping completed without stopping   Patient Reported Dyspnea Dyspnea Dyspnea Dyspnea;Chest pain Dyspnea Dyspnea Dyspnea   Was O2 used? Yes Yes Yes Yes Yes Yes Yes   Delivery Method Cannula;Pull Tank;Continuous Flow Cannula;Pull Tank;Continuous Flow Cannula;Pull Tank;Continuous Flow Cannula;Pull Tank;Continuous Flow Cannula Cannula;Pull Tank Cannula;Pull Tank;Continuous Flow   6MW Distance walked (feet) 1077 1300 1100 1150 1162 1200 1200   Distance walked (meters) 328.27 396.24 335.28 350.52 354.18 365.76 365.76   Did patient stop? No No No No No No No   Oxygen Saturation 99 100 100 99 97 98 98   Supplemental Oxygen 2 L/M 2 L/M 2 L/M 3 L/M 2 L/M 2 L/M 2 L/M   Heart Rate 71 75 68 77 75 74 75   Blood Pressure 103/55 124/95 123/68 146/73 120/62 123/59 137/73   Jaclyn Dyspnea Rating  nothing  at all very light very, very light (just noticeable) moderate moderate very light very, very light (just noticeable)   Oxygen Saturation 88 81 86 89 86 79 82   Supplemental Oxygen 2 L/M 2 L/M 2 L/M 3 L/M 2 L/M 2 L/M 2 L/M   Heart Rate 92 97 103 108 108 102 113   Blood Pressure 106/61 137/65 145/70 148/81 126/73 144/68 144/76   Jaclyn Dyspnea Rating  somewhat heavy somewhat heavy somewhat heavy somewhat heavy somewhat heavy somewhat heavy moderate   Recovery Time (seconds) 66 77 241 121 100 251 90   Oxygen Saturation 97 98 98 99 - 98 98   Supplemental Oxygen 2 L/M - 2 L/M 3 L/M 2 L/M 2 L/M 2 L/M   Heart Rate 81 82 76 85 88 72 88       Assessment:-  1. Awaiting transplantation of lung    2. IPF (idiopathic pulmonary fibrosis)    3. Chronic respiratory failure with hypoxia and hypercapnia    4. Urinary tract infection without hematuria, site unspecified    5. Contact dermatitis, unspecified contact dermatitis type, unspecified trigger      Plan:   1. Spirometric values appear to grossly stable except for drop in DLCO.  Clinically stable from chronic lung disease POV.  Followed for IPF.  Continue with OFEV as prescribed.      2. Continue with supplemental oxygen as prescribed.      3. She has been active on the lung transplant waitlist since 4/25/2019 with an LAS of 35.8.  Update LAS score per schedule.    4. Completed cipro for proteus and Ecoli associated UTI.  Will get UA today to evaluate for clearance.      5.  Topical corticosteroids for probable contact dermatitis.  Recommended change to hypoallergenic detergent.      Follow-up in 2 months     Adam Hill MD  Pulmonary/Critical Care Medicnie/Transplant Pulmonology  [facility]  6/18/2020

## 2020-06-18 NOTE — PROCEDURES
Chely Becerra is a 64 y.o.  female patient, who presents for a 6 minute walk test ordered by MD Liz.  The diagnosis is Pre Lung Transplant Evaluation; Pulmonary Fibrosis.  The patient's BMI is 30.1 kg/m2.  Predicted distance (lower limit of normal) is 328.72 meters.      Test Results:    The test was completed without stopping.  The total time walked was 360 seconds.  During walking, the patient reported:  Dyspnea.  The patient used supplemental oxygen during testing.     06/18/2020---------Distance: 328.27 meters (1077 feet)     O2 Sat % Supplemental Oxygen Heart Rate Blood Pressure Jaclyn Scale   Pre-exercise  (Resting) 99 % 2 L/M 71 bpm 103/55 mmHg 0   During Exercise 88 % 2 L/M 92 bpm 106/61 mmHg 4   Post-exercise  (Recovery) 97 % 2 L/M  81 bpm       Recovery Time: 66 seconds    Performing nurse/tech: Ganesh Armas      PREVIOUS STUDY:   02/17/2020---------Distance: 396.24 meters (1300 feet)       O2 Sat % Supplemental Oxygen Heart Rate Blood Pressure Jaclyn Scale   Pre-exercise  (Resting) 100 % 2 L/M 75 bpm (!) 124/95 mmHg 1   During Exercise   81 % 2 L/M 97 bpm 137/65 mmHg 4   Post-exercise  (Recovery) 98 %    82 bpm   mmHg         CLINICAL INTERPRETATION:  Six minute walk distance is 328.27 meters (1077 feet) with somewhat heavy dyspnea.  During exercise, there was significant desaturation while breathing supplemental oxygen.  Blood pressure remained stable and Heart rate increased significantly with walking.  The patient did not report non-pulmonary symptoms during exercise.  Since the previous study in February 2020, exercise capacity is significantly worse.  Based upon age and body mass index, exercise capacity is less than predicted.

## 2020-06-18 NOTE — PROGRESS NOTES
Reminded Ms. Becerra to remain active despite COVID-19.  Discussed with patient her follow-up with her local pulmonologist.  She stated that she has an appointment scheduled on Monday with a CXR, which was rescheduled from March.  Informed her that she can obtain a CD from her CXR dated 6/5/20 here, or from her CXR in April that was done at [facility].  She stated that her local pulmonologist prefers to do his own CXR.  She stated that she was concerned with having so many xrays.  Reminded patient that we will order a CXR every 6 months since she is listed for lung transplant.  Encouraged her to discuss with her local pulmonologist the need for CXR every 6 months with him as well.  She stated that she would discuss with him, but he manages all her medications.  Reminded her that we can prescribe her pulmonary medications if necessary.  Again encouraged her to speak with her local pulmonologist.  Informed her that it is recommended that she have a local pulmonologist in case of emergencies since she lives a great distance from [facility].  She verbalized her understanding of all discussed.

## 2020-06-18 NOTE — LETTER
June 18, 2020        Nitin Ariza  [address]  [address]  [place] [address]  Phone: [phone]  Fax: [phone]             [facility] - Lung Transplant  [address]  [place] [address]  Phone: [phone]   Patient: Chely Becerra   MR Number: [id number]   YOB: 1955   Date of Visit: 6/18/2020       Dear Dr. Nitin Ariza    Thank you for referring Chely Becerra to me for evaluation. Attached you will find relevant portions of my assessment and plan of care.    If you have questions, please do not hesitate to call me. I look forward to following Chely Becerra along with you.    Sincerely,    Adam Hill MD    Enclosure    If you would like to receive this communication electronically, please contact [email or web address] or [phone] to request [employer] Link access.    [employer] Link is a tool which provides read-only access to select patient information with whom you have a relationship. Its easy to use and provides real time access to review your patients record including encounter summaries, notes, results, and demographic information.    If you feel you have received this communication in error or would no longer like to receive these types of communications, please e-mail [email or web address]

## 2020-06-24 NOTE — TELEPHONE ENCOUNTER
Contacted patient, she requested to pre-schedule her follow up appointment, she is aware the provider schedules are not available for August at this time. Patient requesting appt. On August 20 with Dr. Hill if she is in clinic. Patient states she is requesting this date since her daughter from [place] would be able to take time off to bring her. Message to coordinator, patient is aware this may not work out. All questions answered at this time.

## 2020-06-24 NOTE — TELEPHONE ENCOUNTER
----- Message from Desirae Ruiz sent at 6/24/2020 11:38 AM CDT -----  Regarding: Speak with nurse  Contact: Chely Monreal is calling to speak with nurse.    Pt# [phone]

## 2020-07-21 NOTE — TELEPHONE ENCOUNTER
----- Message from Desirae Ruiz sent at 7/21/2020 11:02 AM CDT -----  Regarding: Speak with office  Contact: Chely Monreal calling to speak with nurse to see if she is on transplant list.      [phone]    Contacted patient.  She stated that she went out of town to [facility] in [place].  She stated that she called and left a message.  She wanted to inform us that she was back home and to ensure that she was active on the wait list.  Informed her that I was unaware that she went out of town.  Informed her that she is active on the wait list.  She verbalized her understanding.      Inquired if she wanted to have her PFTs (spirometry) at [facility] again.  She stated that she would prefer to have the PFTs locally.  Informed her that I would call this week to schedule the testing before her appointment on 8/20/20.  She verbalized her understanding.  She stated that she is available any day or time.

## 2020-07-22 NOTE — TELEPHONE ENCOUNTER
Listed Patient Review - 7/21/20    Members    [x] Transplant Physician - Pulmonary Disease  [x] Transplant Cardiothoracic Surgery  [] Dietician  [x] Transplant   [x] Pharmacist  [] Transplant   [x] Transplant Nurse Coordinator  [x] Transplant Supervisor    Team Discussion  [x] Recent lab results, physical health including trends in six-minute walk test plus frailty assessments, imaging,       and cardiac studies  [x] Psychosocial status per  and   [x] Medication management  [x] Nutritional status    Follow up care needed  [] Labs: N/A  [] Imaging: N/A  [] Cardiac studies: N/A  [] : N/A  [] : N/A  [] Pharmacist: N/A  [] Dietician: N/A  [x] Other: CTS appointment every other visit (schedule with next visit on 8/20/20)    Status  [x] Remain active  [] Inactive  [] Reactivate

## 2020-07-23 NOTE — TELEPHONE ENCOUNTER
Contacted the scheduling department at [facility] to schedule PFTs prior to patient's appointment on 8/20/20.  Appointment scheduled for 8/17/20 at 12:00pm.      Attempted to contact patient regarding the date and time of the spirometry appointment and to discuss the appointment with CTS on 8/20/20.  No answer.  Left a message requesting a return call.    Received a return call from patient.  Informed her that the breathing test (spirometry) has been scheduled on 8/17/20 at 12:00pm.  Appointment date and time written down and read back per patient.  Also informed her of our meeting this week to discuss the listed patients.  Informed her that the surgeons want to see all listed patients in clinic.  Informed her that she will see a surgeon every other visit, in addition to, the transplant pulmonologist.  Informed her that the surgeons want to ensure that all of our listed patients remain a good transplant candidate from a surgical standpoint.  Informed her that an appointment has been scheduled with Dr. Clancy at 1:45 pm on 8/20/20.  She verbalized her understanding of all discussed.

## 2020-08-09 NOTE — H&P
[facility]  Lung Transplant  H&P    Patient Name: Chely Becerra  MRN: [id number]  Admission Date: 8/9/2020  Attending Physician: Niraj Garza MD  Primary Care Provider: ZAHIDA Stack MD     Subjective:     History of Present Illness:  Ms Becerra is a 64 y/o woman with a history of IPF who  is on 2L of oxygen.  She is on no assisted ventilation.  Her New York Heart Association Class is 60% - Requires occasional assistance but is able to care for needs.  She is not diabetic. She is being admitted tonight for potential left single lung transplant.     Her donor is not a PHS increased risk nor a Hep C+ donor.     Since her last clinic visit she has not required outpatient antibiotics or steroids. Has not visited the ED or been hospitalized. She says that her cough and shortness of breath on exertion are at her baseline.       Past Medical History:   Diagnosis Date    Common bile duct dilatation 1/15/2019    Controlled type 2 diabetes mellitus without complication, without long-term current use of insulin 1/17/2019    Essential hypertension 1/16/2019    GERD (gastroesophageal reflux disease)     Hepatitis B core antibody positive 1/15/2019    IPF (idiopathic pulmonary fibrosis)     Obesity (BMI 30-39.9) 1/16/2019    RLS (restless legs syndrome)        Past Surgical History:   Procedure Laterality Date    CATHETERIZATION OF BOTH LEFT AND RIGHT HEART N/A 1/17/2019    Procedure: CATHETERIZATION, HEART, BOTH LEFT AND RIGHT;  Surgeon: Trey Evans MD;  Location: [facility] CATH LAB;  Service: Cardiology;  Laterality: N/A;    CHOLECYSTECTOMY      COLONOSCOPY      ESOPHAGOGASTRODUODENOSCOPY      HERNIA REPAIR      LEFT HEART CATHETERIZATION Left 2/18/2020    Procedure: Left heart cath;  Surgeon: Trey Evans MD;  Location: [facility] CATH LAB;  Service: Cardiology;  Laterality: Left;    SMALL INTESTINE SURGERY      mass removed (benign)       Review of patient's allergies indicates:  "  Allergen Reactions    Boniva [ibandronate] Other (See Comments)     "chest cramps"       PTA Medications   Medication Sig    ADVAIR DISKUS 250-50 mcg/dose diskus inhaler Inhale 1 puff into the lungs 2 (two) times daily.     aspirin (ECOTRIN) 81 MG EC tablet Take 81 mg by mouth once daily.    atorvastatin (LIPITOR) 20 MG tablet Take 20 mg by mouth once daily.    benzonatate (TESSALON) 200 MG capsule Take 200 mg by mouth 3 (three) times daily as needed for Cough.    betamethasone dipropionate (DIPROLENE) 0.05 % cream APPLY TO AFFECTED AREA(S) TWO TIMES A DAY    ciprofloxacin HCl (CIPRO) 500 MG tablet Take 1 tablet (500 mg total) by mouth every 12 (twelve) hours.    ergocalciferol (ERGOCALCIFEROL) 50,000 unit Cap Take 50,000 Units by mouth every 14 (fourteen) days.    ferrous sulfate (FEOSOL) 325 mg (65 mg iron) Tab tablet Take 325 mg by mouth daily with breakfast.    gabapentin (NEURONTIN) 100 MG capsule Take 100 mg by mouth 3 (three) times daily.     lansoprazole (PREVACID) 15 MG capsule Take 15 mg by mouth once daily.    loperamide (IMODIUM A-D) 2 mg Tab Take 2 mg by mouth 4 (four) times daily as needed.    metoprolol succinate (TOPROL-XL) 25 MG 24 hr tablet Take 25 mg by mouth once daily.    OFEV 150 mg Cap TAKE 1 CAPSULE BY MOUTH TWICE A DAY  EVERY 12 HOURS  AS DIRECTED WITH FOOD    olmesartan-hydrochlorothiazide (BENICAR HCT) 40-12.5 mg Tab Take 1 tablet by mouth once daily.    VENTOLIN HFA 90 mcg/actuation inhaler Inhale 1 puff into the lungs every 6 (six) hours as needed.      Family History     None        Tobacco Use    Smoking status: Former Smoker     Types: Cigarettes     Quit date: 12/13/2016     Years since quitting: 3.6    Smokeless tobacco: Never Used   Substance and Sexual Activity    Alcohol use: No     Frequency: Never    Drug use: No    Sexual activity: Not on file     Review of Systems   Constitutional: Negative for activity change, appetite change, chills, diaphoresis, " fatigue and fever.   HENT: Negative for congestion, ear discharge, ear pain, facial swelling, hearing loss, mouth sores, nosebleeds, postnasal drip, rhinorrhea, sinus pressure, sore throat, trouble swallowing and voice change.    Eyes: Negative for pain, discharge, redness and itching.   Respiratory: Positive for cough and shortness of breath. Negative for apnea, choking, chest tightness, wheezing and stridor.    Cardiovascular: Negative for chest pain, palpitations and leg swelling.   Gastrointestinal: Negative for abdominal distention, abdominal pain, anal bleeding, blood in stool, constipation, diarrhea, nausea, rectal pain and vomiting.   Endocrine: Negative for cold intolerance and heat intolerance.   Genitourinary: Negative for difficulty urinating, dysuria and flank pain.   Musculoskeletal: Negative for arthralgias, back pain, joint swelling, myalgias and neck pain.   Neurological: Negative for dizziness, tremors, light-headedness, numbness and headaches.   Psychiatric/Behavioral: Negative for agitation and hallucinations. The patient is nervous/anxious.      Objective:     Vital Signs (Most Recent):  Temp: 98.2 °F (36.8 °C) (08/09/20 1805)  Pulse: 66 (08/09/20 1805)  Resp: 18 (08/09/20 1805)  BP: 138/68 (08/09/20 1805)  SpO2: 99 % (08/09/20 1828) Vital Signs (24h Range):  Temp:  [98.2 °F (36.8 °C)] 98.2 °F (36.8 °C)  Pulse:  [66] 66  Resp:  [18] 18  SpO2:  [98 %-99 %] 99 %  BP: (138)/(68) 138/68     Weight: 86.4 kg (190 lb 9.4 oz)  Body mass index is 29.85 kg/m².    No intake or output data in the 24 hours ending 08/09/20 1845    Physical Exam  Constitutional:       General: She is not in acute distress.     Appearance: She is well-developed. She is not diaphoretic.   HENT:      Head: Normocephalic and atraumatic.      Nose: Nose normal.      Comments: NC in place     Mouth/Throat:      Pharynx: No oropharyngeal exudate.   Eyes:      General: No scleral icterus.        Right eye: No discharge.         Left  eye: No discharge.      Conjunctiva/sclera: Conjunctivae normal.      Pupils: Pupils are equal, round, and reactive to light.   Neck:      Musculoskeletal: Normal range of motion and neck supple.      Thyroid: No thyromegaly.      Vascular: No JVD.      Trachea: No tracheal deviation.   Cardiovascular:      Rate and Rhythm: Normal rate and regular rhythm.      Heart sounds: Normal heart sounds. No murmur. No friction rub. No gallop.    Pulmonary:      Effort: Pulmonary effort is normal. No respiratory distress.      Breath sounds: No stridor. Examination of the right-middle field reveals rales. Examination of the left-middle field reveals rales. Examination of the right-lower field reveals rales. Examination of the left-lower field reveals rales. Rales present. No wheezing.   Chest:      Chest wall: No tenderness.   Abdominal:      General: Bowel sounds are normal. There is no distension.      Palpations: Abdomen is soft. There is no mass.      Tenderness: There is no abdominal tenderness. There is no guarding or rebound.   Musculoskeletal: Normal range of motion.         General: No tenderness.   Lymphadenopathy:      Cervical: No cervical adenopathy.   Skin:     General: Skin is warm and dry.      Coloration: Skin is not pale.      Findings: No erythema or rash.   Neurological:      Mental Status: She is alert and oriented to person, place, and time.         Significant Labs:  CBC:  No results for input(s): WBC, RBC, HGB, HCT, PLT, MCV, MCH, MCHC in the last 72 hours.  BMP:  No results for input(s): GLUCOSE, NA, K, CL, CO2, BUN, CREATININE, CALCIUM in the last 72 hours.     I have reviewed all pertinent labs within the past 24 hours.    Diagnostic Results:  X-Ray: Reviewed  Chronic fibrotic changes, no consolidation, or nodules.     Assessment/Plan:     * Awaiting transplantation of lung  Will prepare patient for left single lung transplant. Final determination on adequacy of donor lungs will be made by procuring  surgeon.     Chronic hypoxemic respiratory failure  Continue oxygen supplementation.     IPF (idiopathic pulmonary fibrosis)  On nintedanib at home, will resume if transplant does not happen.         Niraj Garza MD  Lung Transplant  [facility]

## 2020-08-09 NOTE — ASSESSMENT & PLAN NOTE
Will prepare patient for left single lung transplant. Final determination on adequacy of donor lungs will be made by procuring surgeon.

## 2020-08-09 NOTE — NURSING
Pt arrived to TSU for L lung txp. Pt AAOx4, VSS, afebrile. Sats stable on 2L NC - home O2 at bedside. VANESA Garza MD aware of pts arrival. Orders placed and released. Pt oriented to room, instructed to use call bell for assistance - pt demonstrated and verbalized understanding. Skin intact. Pt able to ambulate independently. Pt in no apparent distress. Will continue to monitor pt.

## 2020-08-09 NOTE — TELEPHONE ENCOUNTER
Lung Transplant Organ Offer Note    1125: Notified by Dr. Garza that an organ offer for a left single lung transplant had been accepted for this patient, with a retrospective crossmatch needed.  Received a telephone order from Dr. Garza to arrange hospital admission.  1128: Contacted Sea Rodriguez, , to confirm donor and recipient information, which was read back for verification. Surgeon and OR time had yet to be determined.  1131: Called patient to discuss the organ offer and identified no acute medical issues during the telephone assessment.    Informed patient that the organ comes from a Standard donor: The patient accepted the organ offer.  Provided the following instructions to the patient:  · Be NPO starting at the time of our phone conversation.  When asked, the patient reported eating last at 1030 this morning.  · Take a shower  · Leave valuables at home but bring any belongings needed for a comfortable hospital stay  · Bring insurance card and medication list  · Bring enough Oxygen for travel to [facility] and for travel home should surgery be cancelled.  Patient denied the use of CPAP/BiPAP or equipment/supplies for blood sugar monitoring.  · Keep cell phone charged and audible so I can contact you with any additional information  · Call the hospital  at [phone] and ask for the lung transplant coordinator on call to be contacted should you have any questions or needs  · Report to the 1st floor admit office to check in for admission  · Bring one caregiver only.  Caregiver can stay with patient while she awaits surgery and then during the procedure so she, the patient's daughter, can receive updates from the operating room team.  Once surgery is completed, the caregiver can visit the patient in ICU, once she is settled, between the visiting hours of 0800 - 1800.  If surgery is completed during non-visiting hours, the caregiver will be asked to leave the hospital once  she has been updated by the surgeon and to return during visiting hours.  · Explained that that surgery time had yet to be determined but she should report to the hospital for admission.  Reminded the patient that surgery could still be cancelled at any time should the donor lung become unsuitable for transplant.  The patient and her daughter, both of whom were on speaker phone, were given the opportunity to ask questions which were answered to their satisfaction.  Both then verbalized their understanding of all information discussed. The patient reported that her ETA is 1700 .    1145 - 1207: Contacted the following staff to notify them about the pending admission for this patient at 1700 to have a left single lung transplant with the surgeon and OR time yet to be determined:  - Lung transplant provider: Dr. Garza  - Admit office for hospital reservation: Esther  - Nursing supervisor: Johnathan  - ICU charge nurse: Gregoria  - TSU charge nurse: Alhaji  - OR desk: Karishma    1623:  Called patient to ask if her hospital ETA is still 1700.  The patient reported being stuck in traffic in [place] and would arrive later than expected.  Informed the patient and her daughter via speaker phone that a surgery time had still not been determined so they should not rush travel to the hospital.  Explained that I will call them back with instructions re: where they should go upon hospital arrival since the admission office closes at 1700.  They verbalized their understanding.  1628:  Notified nursing supervisor Johnathan that the patient will arrive later than expected, likely closer to 1800, due to traffic.  Johnathan stated that the patient should report to TSU for direct admission upon her arrival.  1629:  Contacted TSU charge nurse Alhaji to inform her that patient will arrive later than expected.  Alhaji verbalized her understanding and verified that the patient should report to TSU upon arrival for admission.  1631:  Notified Dr. Garza that the patient had been delayed by traffic and hopes to arrive by 1800.  1632: Notified the patient and her daughter via speaker phone that they should report directly to \[facility]\"" upon hospital arrival for admission.  They denied having any questions and verbalized their understanding.    1745:  Called patient to determine her location.  She reported that she had arrived at the hospital and was in transit to \[facility]\"".  1747:  Notified Dr. Garza about the patient's hospital arrival.    1948:  Notified by  Sea Rodriguez that the OR time for this patient has been set at 0430 tomorrow morning.  1951:  Notified Dr. Garza that the OR time for this patient has been scheduled for 0430.  Per Dr. Garza, the patient can have oral fluids only between now and 2030 then should resume strict NPO.  1954:  Notified by Dr. Clemons that he will perform the left single lung transplant for this patient.  Home medications were reviewed with Dr. Clemons.  2002:  Notified the patient about the OR time of 0430.  Explained that she can have oral fluids only between now and 2030 then she should resume strict NPO status.  Reminded her that surgery can still be cancelled at any time should the donor lung become unsuitable for transplant.  The patient asked questions, which were answered to her satisfaction, and she verbalized her understanding of all information discussed.   2004 - 2009:  Notified the following staff that Dr. Clemons will be the surgeon for this patient's left lung transplant at 0430 tomorrow morning:  Nursing supervisor Regan ECKERT charge nurse Betty -explained Dr. Garza's order for oral fluids only between now and 2030 then resumption of strict NPO status  [facility] charge [facility]  [facility]  CTS resident Dr. Combs, who stated that she had completed the surgical consent with the patient and will enter the OR case request for Dr. Clemons.

## 2020-08-09 NOTE — SUBJECTIVE & OBJECTIVE
"Past Medical History:   Diagnosis Date    Common bile duct dilatation 1/15/2019    Controlled type 2 diabetes mellitus without complication, without long-term current use of insulin 1/17/2019    Essential hypertension 1/16/2019    GERD (gastroesophageal reflux disease)     Hepatitis B core antibody positive 1/15/2019    IPF (idiopathic pulmonary fibrosis)     Obesity (BMI 30-39.9) 1/16/2019    RLS (restless legs syndrome)        Past Surgical History:   Procedure Laterality Date    CATHETERIZATION OF BOTH LEFT AND RIGHT HEART N/A 1/17/2019    Procedure: CATHETERIZATION, HEART, BOTH LEFT AND RIGHT;  Surgeon: Trey Evans MD;  Location: [facility] CATH [facility];  Service: Cardiology;  Laterality: N/A;    CHOLECYSTECTOMY      COLONOSCOPY      ESOPHAGOGASTRODUODENOSCOPY      HERNIA REPAIR      LEFT HEART CATHETERIZATION Left 2/18/2020    Procedure: Left heart cath;  Surgeon: Trey Evans MD;  Location: [facility] CATH [facility];  Service: Cardiology;  Laterality: Left;    SMALL INTESTINE SURGERY      mass removed (benign)       Review of patient's allergies indicates:   Allergen Reactions    Boniva [ibandronate] Other (See Comments)     "chest cramps"       PTA Medications   Medication Sig    ADVAIR DISKUS 250-50 mcg/dose diskus inhaler Inhale 1 puff into the lungs 2 (two) times daily.     aspirin (ECOTRIN) 81 MG EC tablet Take 81 mg by mouth once daily.    atorvastatin (LIPITOR) 20 MG tablet Take 20 mg by mouth once daily.    benzonatate (TESSALON) 200 MG capsule Take 200 mg by mouth 3 (three) times daily as needed for Cough.    betamethasone dipropionate (DIPROLENE) 0.05 % cream APPLY TO AFFECTED AREA(S) TWO TIMES A DAY    ciprofloxacin HCl (CIPRO) 500 MG tablet Take 1 tablet (500 mg total) by mouth every 12 (twelve) hours.    ergocalciferol (ERGOCALCIFEROL) 50,000 unit Cap Take 50,000 Units by mouth every 14 (fourteen) days.    ferrous sulfate (FEOSOL) 325 mg (65 mg iron) Tab tablet Take 325 mg by " mouth daily with breakfast.    gabapentin (NEURONTIN) 100 MG capsule Take 100 mg by mouth 3 (three) times daily.     lansoprazole (PREVACID) 15 MG capsule Take 15 mg by mouth once daily.    loperamide (IMODIUM A-D) 2 mg Tab Take 2 mg by mouth 4 (four) times daily as needed.    metoprolol succinate (TOPROL-XL) 25 MG 24 hr tablet Take 25 mg by mouth once daily.    OFEV 150 mg Cap TAKE 1 CAPSULE BY MOUTH TWICE A DAY  EVERY 12 HOURS  AS DIRECTED WITH FOOD    olmesartan-hydrochlorothiazide (BENICAR HCT) 40-12.5 mg Tab Take 1 tablet by mouth once daily.    VENTOLIN HFA 90 mcg/actuation inhaler Inhale 1 puff into the lungs every 6 (six) hours as needed.      Family History     None        Tobacco Use    Smoking status: Former Smoker     Types: Cigarettes     Quit date: 12/13/2016     Years since quitting: 3.6    Smokeless tobacco: Never Used   Substance and Sexual Activity    Alcohol use: No     Frequency: Never    Drug use: No    Sexual activity: Not on file     Review of Systems   Constitutional: Negative for activity change, appetite change, chills, diaphoresis, fatigue and fever.   HENT: Negative for congestion, ear discharge, ear pain, facial swelling, hearing loss, mouth sores, nosebleeds, postnasal drip, rhinorrhea, sinus pressure, sore throat, trouble swallowing and voice change.    Eyes: Negative for pain, discharge, redness and itching.   Respiratory: Positive for cough and shortness of breath. Negative for apnea, choking, chest tightness, wheezing and stridor.    Cardiovascular: Negative for chest pain, palpitations and leg swelling.   Gastrointestinal: Negative for abdominal distention, abdominal pain, anal bleeding, blood in stool, constipation, diarrhea, nausea, rectal pain and vomiting.   Endocrine: Negative for cold intolerance and heat intolerance.   Genitourinary: Negative for difficulty urinating, dysuria and flank pain.   Musculoskeletal: Negative for arthralgias, back pain, joint swelling,  myalgias and neck pain.   Neurological: Negative for dizziness, tremors, light-headedness, numbness and headaches.   Psychiatric/Behavioral: Negative for agitation and hallucinations. The patient is nervous/anxious.      Objective:     Vital Signs (Most Recent):  Temp: 98.2 °F (36.8 °C) (08/09/20 1805)  Pulse: 66 (08/09/20 1805)  Resp: 18 (08/09/20 1805)  BP: 138/68 (08/09/20 1805)  SpO2: 99 % (08/09/20 1828) Vital Signs (24h Range):  Temp:  [98.2 °F (36.8 °C)] 98.2 °F (36.8 °C)  Pulse:  [66] 66  Resp:  [18] 18  SpO2:  [98 %-99 %] 99 %  BP: (138)/(68) 138/68     Weight: 86.4 kg (190 lb 9.4 oz)  Body mass index is 29.85 kg/m².    No intake or output data in the 24 hours ending 08/09/20 1845    Physical Exam  Constitutional:       General: She is not in acute distress.     Appearance: She is well-developed. She is not diaphoretic.   HENT:      Head: Normocephalic and atraumatic.      Nose: Nose normal.      Comments: NC in place     Mouth/Throat:      Pharynx: No oropharyngeal exudate.   Eyes:      General: No scleral icterus.        Right eye: No discharge.         Left eye: No discharge.      Conjunctiva/sclera: Conjunctivae normal.      Pupils: Pupils are equal, round, and reactive to light.   Neck:      Musculoskeletal: Normal range of motion and neck supple.      Thyroid: No thyromegaly.      Vascular: No JVD.      Trachea: No tracheal deviation.   Cardiovascular:      Rate and Rhythm: Normal rate and regular rhythm.      Heart sounds: Normal heart sounds. No murmur. No friction rub. No gallop.    Pulmonary:      Effort: Pulmonary effort is normal. No respiratory distress.      Breath sounds: No stridor. Examination of the right-middle field reveals rales. Examination of the left-middle field reveals rales. Examination of the right-lower field reveals rales. Examination of the left-lower field reveals rales. Rales present. No wheezing.   Chest:      Chest wall: No tenderness.   Abdominal:      General: Bowel  sounds are normal. There is no distension.      Palpations: Abdomen is soft. There is no mass.      Tenderness: There is no abdominal tenderness. There is no guarding or rebound.   Musculoskeletal: Normal range of motion.         General: No tenderness.   Lymphadenopathy:      Cervical: No cervical adenopathy.   Skin:     General: Skin is warm and dry.      Coloration: Skin is not pale.      Findings: No erythema or rash.   Neurological:      Mental Status: She is alert and oriented to person, place, and time.         Significant Labs:  CBC:  No results for input(s): WBC, RBC, HGB, HCT, PLT, MCV, MCH, MCHC in the last 72 hours.  BMP:  No results for input(s): GLUCOSE, NA, K, CL, CO2, BUN, CREATININE, CALCIUM in the last 72 hours.     I have reviewed all pertinent labs within the past 24 hours.    Diagnostic Results:  X-Ray: Reviewed  Chronic fibrotic changes, no consolidation, or nodules.

## 2020-08-09 NOTE — HPI
Ms Becerra is a 64 y/o woman with a history of IPF who  is on 2L of oxygen.  She is on no assisted ventilation.  Her New York Heart Association Class is 60% - Requires occasional assistance but is able to care for needs.  She is not diabetic. She is being admitted tonight for potential left single lung transplant.     Her donor is not a PHS increased risk nor a Hep C+ donor.     Since her last clinic visit she has not required outpatient antibiotics or steroids. Has not visited the ED or been hospitalized. She says that her cough and shortness of breath on exertion are at her baseline.

## 2020-08-10 PROBLEM — T38.0X5A ADRENAL CORTICAL STEROIDS CAUSING ADVERSE EFFECT IN THERAPEUTIC USE: Status: ACTIVE | Noted: 2020-01-01

## 2020-08-10 PROBLEM — E66.3 OVERWEIGHT (BMI 25.0-29.9): Status: ACTIVE | Noted: 2020-01-01

## 2020-08-10 PROBLEM — D69.6 THROMBOCYTOPENIA, UNSPECIFIED: Status: ACTIVE | Noted: 2020-01-01

## 2020-08-10 PROBLEM — D72.829 LEUKOCYTOSIS: Status: ACTIVE | Noted: 2020-01-01

## 2020-08-10 PROBLEM — Z79.2 PROPHYLACTIC ANTIBIOTIC: Status: ACTIVE | Noted: 2020-01-01

## 2020-08-10 PROBLEM — E66.3 OVERWEIGHT (BMI 25.0-29.9): Status: RESOLVED | Noted: 2020-01-01 | Resolved: 2020-01-01

## 2020-08-10 PROBLEM — Z94.2 S/P LUNG TRANSPLANT: Status: ACTIVE | Noted: 2020-01-01

## 2020-08-10 PROBLEM — I95.81 POSTPROCEDURAL HYPOTENSION: Status: ACTIVE | Noted: 2020-01-01

## 2020-08-10 PROBLEM — D84.9 IMMUNOSUPPRESSION: Status: ACTIVE | Noted: 2020-01-01

## 2020-08-10 PROBLEM — D62 ACUTE BLOOD LOSS ANEMIA: Status: ACTIVE | Noted: 2020-01-01

## 2020-08-10 NOTE — ASSESSMENT & PLAN NOTE
POD #0 s/p uncomplicated LSLT for IPF. Currently PGD 1. Recommend gentle diuresis as tolerated. CT output stable. CTS primary and LuT following for immunosuppression and OIP.

## 2020-08-10 NOTE — HPI
66 y/o F h/o IPF on home oxygen and nintedanib, DM2, HTN s/p b/l sequential lung transplant (CMV D+/R-, basiliximab and SM induction, mmf/tacro, Donor HbcAb+) OR course notable for Significant pulmonary artery reconstruction bilaterally with pericardium Bilateral reconstruction pulmonary vein cuffs with the donor pericardium resulting in extremely challenging case increasing the operative time more than 2 hours.

## 2020-08-10 NOTE — CARE UPDATE
Pt transferred to SICU Room [id number] per Anesthesia team.   Pt connected to VS monitor, IV pole, ambu bag.     CTS Residents at bedside.   SICU Resident at bedside.   ICU Charge at bedside.     Pt intubated.   Sedation paused during assessment.     Pt arouses to voice.   Following all commands.   Nods/gestures appropriately.   Moving all extremities freely/equally.      Propofol gtt titrated per comfort.     Vent Settings: AC/VC, Rate: 16, FiO2: 50:, PEEP: 8.   Q1H ABGs.     SvO2- 74  CO- 4.2-4.7  CI- 2.3-2.6  [id number]  PVR-120-160    Goal to maintain MAP 70-80.   Vaso @0.04units/min.     CVP 10, HOB Flat.   UOP ~75cc/hr.    Chest tubes placed to wall suction.   Q1H chest tube out noted.   See flowsheet for details.     Q2H Restraints.   Q4H Labs.     Plan of care reviewed with pt.   See flowsheet for details.

## 2020-08-10 NOTE — ANESTHESIA PROCEDURE NOTES
MATT    Diagnosis: Awaiting transplantation of lung (Z76.82)  Patient location during procedure: OR  Procedure start time: 8/10/2020 6:00 AM  Surgery related to: IPF  Exam type: Baseline  Staffing  Anesthesiologist: Dmitriy Zamudio MD  Performed: fellow and anesthesiologist   Preanesthetic Checklist  Completed: patient identified, surgical consent, pre-op evaluation, timeout performed, risks and benefits discussed, monitors and equipment checked, anesthesia consent given, oxygen available, suction available, hand hygiene performed and patient being monitored  Setup & Induction  Patient preparation: bite block inserted  Probe Insertion: easy  Exam: complete      Findings  Impression  Other Findings  Pre:    Normal biventricular systolic function   Trace TR  Trace PI  No effusions  Probe Removal      Exam     Left Heart  Left Atruim: normal        LVAD:no  Estimated Ejection Fraction: > 55% normal            Right Heart  Right Ventricle: normal  Right Ventricle Function: normal  Right Atria: cm and normal    Intra Atrial Septum  PFO: no shunt by color flow doppler          Right Ventricle  Size: normal    Aortic Valve:  Stenosis: none  Morphology: trileaflet  Regurgitation: no aortic valve regurgitation         Tricuspid Valve:  Morphology: normal  Regurgitation: mild    Pulmonic Valve:  Morphology:normal  Regurgitation(color flow): mild    Great Vessels  Ascending Aorta Atherosclerosis: 1=nl-min dz  Aortic Arch Atherosclerosis: 1=nl-min dz  IABP: no  Descending Aorta Atherosclerosis: 1=nl-min dz  Aorta    Descending aorta IABP: no    Effusions  Effusions: none    Summary  Findings discussed with surgeon.    Other Findings   Pre:    Normal biventricular systolic function   Trace TR  Trace PI  No effusions

## 2020-08-10 NOTE — ANESTHESIA PROCEDURE NOTES
[facility] [facility]    Diagnosis: IPF  Patient location during procedure: done in OR  Procedure start time: 8/10/2020 5:33 AM  Timeout: 8/10/2020 5:32 AM  Procedure end time: 8/10/2020 5:45 AM    Staffing  Authorizing Provider: Dmitriy Zamudio MD  Performing Provider: Moriah Mahoney MD    Anesthesiologist was present at the time of the procedure.  Preanesthetic Checklist  Completed: patient identified, site marked, surgical consent, pre-op evaluation, timeout performed, IV checked, risks and benefits discussed, monitors and equipment checked and anesthesia consent given  [place] [facility]  Skin Prep: chlorhexidine gluconate and isopropyl alcohol  Local Infiltration: none  Location: right,  internal jugular vein  Vessel Caliber: medium, patent, compressibility normal  Coaxial introducer size: 9F double. manometry used.  Device: CCO/Oximetric Catheter  Catheter placement by yes. Heme positive aspiration all ports. PAC floated with balloon up until wedgedSterile sheath usedInsertion Attempts: 1  Indication: intravenous therapy, hemodynamic monitoring  Ultrasound Guidance  Needle advanced into vessel with real time Ultrasound guidance.  Image recorded and saved.  Assessment  Central Line Bundle Protocol followed. Hand hygiene before procedure, surgical cap worn, surgical mask worn, sterile surgical gloves worn, large sterile drape used.  Patient: Tolerated Well  Additional Notes  No effusions on echo

## 2020-08-10 NOTE — TRANSFER OF CARE
"Anesthesia Transfer of Care Note    Patient: Chely Becerra    Procedure(s) Performed: Procedure(s) (LRB):  TRANSPLANT, LUNG - 4:30AM start (Left)  APPLICATION, WOUND VAC, 40 x 5cm (Left)  RESECTION, LUNG (Left)    Patient location: ICU    Anesthesia Type: general    Transport from OR: Transported from OR intubated on 100% O2 by AMBU with assisted ventilation. Upon arrival to PACU/ICU, patient attached to ventilator and auscultated to confirm bilateral breath sounds and adequate TV. Continuous ECG monitoring in transport. Continuous SpO2 monitoring in transport. Continuos invasive BP monitoring in transport    Post pain: adequate analgesia    Post assessment: no apparent anesthetic complications    Post vital signs: stable    Level of consciousness: sedated    Nausea/Vomiting: no nausea/vomiting    Complications: none    Transfer of care protocol was followed      Last vitals:   Visit Vitals  BP (!) 95/55 (BP Location: Left arm, Patient Position: Lying)   Pulse 68   Temp 37.1 °C (98.7 °F) (Oral)   Resp 16   Ht 5' 7" (1.702 m)   Wt 86.4 kg (190 lb 9.4 oz)   LMP  (LMP Unknown)   SpO2 97%   Breastfeeding No   BMI 29.85 kg/m²     "

## 2020-08-10 NOTE — ANESTHESIA PROCEDURE NOTES
Intubation  Performed by: Moriah Mahoney MD  Authorized by: Dmitriy Zamudio MD     Intubation:     Induction:  Intravenous    Intubated:  Postinduction    Mask Ventilation:  Easy mask    Attempts:  1    Attempted By:  Staff anesthesiologist    Blade:  Guallpa 2    Laryngeal View Grade: Grade I - full view of chords      Difficult Airway Encountered?: No      Complications:  None    Airway Device:  Oral endotracheal tube    Airway Device Size:  8.0    Style/Cuff Inflation:  Cuffed (inflated to minimal occlusive pressure)    Inflation Amount (mL):  5    Tube secured:  21    Secured at:  The lips    Placement Verified By:  Capnometry    Complicating Factors:  None    Findings Post-Intubation:  BS equal bilateral

## 2020-08-10 NOTE — PROGRESS NOTES
"Pharmacokinetic Initial Assessment: IV Vancomycin    Assessment/Plan:    · Vancomycin 1000 mg given once pre-op s/p of lung transplant today  · Will start prophylaxis Vancomycin 1250 mg q12h  · Desired empiric serum trough concentration is 10 to 20 mcg/mL   · Draw vancomycin trough level 30 min prior to fourth dose on 8/11 at approximately 1800  Pharmacy will continue to follow and monitor vancomycin.      Please contact pharmacy at extension [phone] with any questions regarding this assessment.     Thank you for the consult,   Trino Moore       Patient brief summary:  Chely Becerra is a 65 y.o. female initiated on antimicrobial therapy with IV Vancomycin for Lung transplant prophylaxis    Drug Allergies:   Review of patient's allergies indicates:   Allergen Reactions    Boniva [ibandronate] Other (See Comments)     "chest cramps"       Actual Body Weight:   86.4 kg    Renal Function:   Estimated Creatinine Clearance: 93 mL/min (based on SCr of 0.7 mg/dL).,     Dialysis Method (if applicable):  N/A    CBC (last 72 hours):  Recent Labs   Lab Result Units 08/09/20  1902 08/10/20  1020   WBC K/uL 8.91 12.84*   Hemoglobin g/dL 11.8* 8.1*   Hematocrit % 37.9 25.1*   Platelets K/uL 284 112*   Gran% % 48.1 85.5*   Lymph% % 39.6 7.9*   Mono% % 7.5 5.6   Eosinophil% % 3.8 0.2   Basophil% % 0.8 0.2   Differential Method  Automated Automated       Metabolic Panel (last 72 hours):  Recent Labs   Lab Result Units 08/09/20  1821 08/09/20  1902   Sodium mmol/L  --  139   Potassium mmol/L  --  3.5   Chloride mmol/L  --  100   CO2 mmol/L  --  31*   Glucose mg/dL  --  92   Glucose, UA  Negative  --    BUN, Bld mg/dL  --  12   Creatinine mg/dL  --  0.7   Albumin g/dL  --  3.8   Total Bilirubin mg/dL  --  0.3   Alkaline Phosphatase U/L  --  80   AST U/L  --  18   ALT U/L  --  18       Drug levels (last 3 results):  No results for input(s): VANCOMYCINRA, VANCOMYCINPE, VANCOMYCINTR in the last 72 hours.    Microbiologic " Results:  Microbiology Results (last 7 days)     Procedure Component Value Units Date/Time    Aerobic culture [[id number]] Collected: 08/10/20 0758    Order Status: Sent Specimen: Body Fluid from Lung, Left Updated: 08/10/20 1021    AFB Culture & Smear [[id number]] Collected: 08/10/20 0758    Order Status: Sent Specimen: Body Fluid from Lung, Left Updated: 08/10/20 1021    Fungus culture [[id number]] Collected: 08/10/20 0758    Order Status: Sent Specimen: Body Fluid from Lung, Left Updated: 08/10/20 1021    Culture, Anaerobe [[id number]] Collected: 08/10/20 0758    Order Status: Sent Specimen: Body Fluid from Lung, Left Updated: 08/10/20 1021    Gram stain [[id number]] Collected: 08/10/20 0758    Order Status: Sent Specimen: Body Fluid from Lung, Left Updated: 08/10/20 1020

## 2020-08-10 NOTE — PROGRESS NOTES
Contacted [facility].  Transferred to Respiratory Care Services.  Spoke with Diann.  Informed Diann of patient's current admission.  Requested that she cancel the PFTs that were scheduled on 8/17/20 at 12:00pm.  She verbalized her understanding and stated that the testing would be canceled.

## 2020-08-10 NOTE — TELEPHONE ENCOUNTER
Received a call from Yuri, HLA .  Confirmed for him that a retrospective crossmatch was needed for this patient and donor ID [id number]. Instructed Yuri to notify Dr. Hill of the result.  Yuri correctly repeated all information and denied having any questions.

## 2020-08-10 NOTE — PROGRESS NOTES
SW attempted to meet with pt re: admit note, however pt out of room for procedure. SW to return at a later date/time.

## 2020-08-10 NOTE — ASSESSMENT & PLAN NOTE
S/p lung transplant on 8/10/20  Managed per primary team  Avoid hypoglycemia  Optimize BG control for surgical wound healing

## 2020-08-10 NOTE — NURSING
Pt left unit via bed for OR. 2 L NC with oxygen tank. TEDs/SCDs on. Protective mask in place. Chart with consents sent with pt.

## 2020-08-10 NOTE — ASSESSMENT & PLAN NOTE
Induction with solumedrol and basiliximab. Repeat basiliximab on POD#4. Tacrolimus, MMF, and prednisone taper ordered.

## 2020-08-10 NOTE — ANESTHESIA PROCEDURE NOTES
Final MATT     Diagnosis: respiratory failure   Patient location during procedure: OR  Procedure start time: 8/10/2020 9:11 AM  Procedure end time: 8/10/2020 9:51 AM  Exam type: Final  Staffing  Anesthesiologist: AMBER Araujo MD  Performed: anesthesiologist and fellow   Setup & Induction      Findings  Impression  Other Findings  Normal LV size and function  Mildly depressed RV function   Mild TR / PI.   No other valvular abnormalities.  Normal pulmonary vein velocities   No aortic dissection   Probe Removal      Exam         LVAD:no  Estimated Ejection Fraction: > 55% normal  Regional Wall Abnormalities: no RWMA                Aortic Valve:  Stenosis: none  Morphology: trileaflet  Regurgitation: no aortic valve regurgitation     Mitral Valve:  Morphology:normal  Jet Description: none    Tricuspid Valve:  Morphology: normal  Regurgitation: moderate    Pulmonic Valve:  Morphology:normal  Regurgitation(color flow): mild    Great Vessels  Ascending Aorta Atherosclerosis: 2=mild dz (<3mm)  Aortic Arch Atherosclerosis: 2=mild dz (<3mm)  IABP: no  Descending Aorta Atherosclerosis: 2=mild dz (<3mm)  Aorta    Descending aorta IABP: no    Effusions  Effusions: none    Summary  Findings discussed with surgeon.    Other Findings   Normal LV size and function  Mildly depressed RV function   Mild TR / PI.   No other valvular abnormalities.  Normal pulmonary vein velocities   No aortic dissection

## 2020-08-10 NOTE — CONSULTS
[facility]  Endocrinology  Diabetes Consult Note    Consult Requested by: Niraj Garza MD   Reason for admit: S/P lung transplant    HISTORY OF PRESENT ILLNESS:  Reason for Consult: Management of T2DM, Hyperglycemia     Surgical Procedure and Date: Left lung transplant and wound vac application 8/10/20    Diabetes diagnosis year: NAVEEN    Lab Results   Component Value Date    HGBA1C 6.0 (H) 06/18/2020       Home Diabetes Medications: none (per chart review)    How often checking glucose at home? NAVEEN  BG readings on regimen: NAVEEN  Hypoglycemia on the regimen? NAVEEN  Missed doses on regimen? NAVEEN    Diabetes Complications include:     NAVEEN    Complicating diabetes co morbidities:   Glucocorticoid use  s/p lung transplant      HPI:   Patient is a 65 y.o. female with a diagnosis of DM2, IPF, and chronic respiratory failure, who is s/p left lung transplant on 8/10/20.  Patient with controlled DM2 on no medications per chart review.  Endocrinology consulted for DM/BG management.            Interval HPI:   Overnight events: Received in ICU, intubated.  BG well controlled on intensive insulin protocol, rates ranging from 5-8 u/hr.  Received solu medrol 500 mg IV this morning.  Eating:   NPO  Nausea: No  Hypoglycemia and intervention: No  Fever: No  TPN and/or TF: No    PMH, PSH, FH, SH reviewed     ROS:  Unable to obtain due to: Sedation,Intubation,Altered mental status,Critical illness,Reviewed ROS from note dated 8/9/20 by Niraj Garza MD.      Current Medications and/or Treatments Impacting Glycemic Control  Immunotherapy:    Immunosuppressants         Stop Route Frequency     mycophenolate mofetil 200 mg/mL suspension 500 mg      -- PER NG TUBE 2 times daily     tacrolimus (PROGRAF) 1 mg/mL oral syringe      -- PER NG TUBE 2 times daily     basiliximab (SIMULECT) 20 mg in dextrose 5 % 50 mL infusion      08/18 1114 IV Every 96 hours        Steroids:   Hormones (From admission, onward)    Start      Stop Route Frequency Ordered    08/13/20 0900  methylPREDNISolone sodium succinate injection 173 mg  (methylprednisolone panel)      08/14 0859 IV Daily 08/10/20 1015    08/12/20 0900  methylPREDNISolone sodium succinate (SOLU-MEDROL) 259.5 mg in dextrose 5 % 100 mL IVPB  (methylprednisolone panel)      08/13 0859 IV Daily 08/10/20 1015    08/11/20 0900  methylPREDNISolone sodium succinate (SOLU-MEDROL) 432.5 mg in dextrose 5 % 100 mL IVPB  (methylprednisolone panel)      08/12 0859 IV Daily 08/10/20 1015    08/10/20 1145  vasopressin (PITRESSIN) 0.2 Units/mL in dextrose 5 % 100 mL infusion      -- IV Continuous 08/10/20 1035        Pressors:    Autonomic Drugs (From admission, onward)    Start     Stop Route Frequency Ordered    08/10/20 1145  norepinephrine 4 mg in dextrose 5% 250 mL infusion (premix) (titrating)     Question Answer Comment   Titrate by: (in mcg/kg/min) 0.02    Titrate interval: (in minutes) 5    Titrate to maintain: (MAP or SBP) MAP    Greater than: (in mmHg) 70    Maximum dose: (in mcg/kg/min) 3        -- IV Continuous 08/10/20 1035    08/10/20 1115  EPINEPHrine (ADRENALIN) 5 mg in sodium chloride 0.9% 250 mL infusion     Question Answer Comment   Titrate by: (in mcg/kg/min) 0.05    Titrate interval: (in minutes) 5    Titrate to maintain: (SBP or MAP or Cardiac Index) MAP    Greater than: (in mmHg) 60    Cardiac index greater than: (in L/min) 2.2    Maximum dose: (in mcg/kg/min) 2        -- IV Continuous 08/10/20 1015        Hyperglycemia/Diabetes Medications:   Antihyperglycemics (From admission, onward)    Start     Stop Route Frequency Ordered    08/10/20 1115  insulin regular 100 Units in sodium chloride 0.9% 100 mL infusion     Question:  Insulin rate changes (DO NOT MODIFY ANSWER)  Answer:  [email or web address]    -- IV Continuous 08/10/20 1015             PHYSICAL EXAMINATION:  Vitals:    08/10/20 1110   BP:    Pulse: 69   Resp: 16    Temp:      Body mass index is 29.85 kg/m².    Physical Exam     Constitutional: Well developed, well nourished, NAD.  ENT: External ears no masses with nose patent  Neck: Supple; trachea midline  Cardiovascular: Normal heart sounds, no LE edema. DP +2 bilaterally.  Lungs: Normal effort; lungs anterior bilaterally clear to auscultation.  Intubated on a ventilator.  Abdomen: Soft, no masses, no hernias.  Hypoactive BS noted.  MS: No clubbing or cyanosis of nails noted; unable to assess gait.  Skin: No rashes, lesions, or ulcers; no nodules.  Mid-sternal incision with telfa island dressing, CDI.  CT x 3.  Psychiatric: NAVEEN  Neurological: NAVEEN  Foot: Nails in good condition, no amputations noted        Labs Reviewed and Include   Recent Labs   Lab 08/09/20  1902   GLU 92   CALCIUM 9.5   ALBUMIN 3.8   PROT 7.6      K 3.5   CO2 31*      BUN 12   CREATININE 0.7   ALKPHOS 80   ALT 18   AST 18   BILITOT 0.3     Lab Results   Component Value Date    WBC 12.84 (H) 08/10/2020    HGB 8.1 (L) 08/10/2020    HCT 25.1 (L) 08/10/2020    MCV 95 08/10/2020     (L) 08/10/2020     No results for input(s): TSH, FREET4 in the last 168 hours.  Lab Results   Component Value Date    HGBA1C 6.0 (H) 06/18/2020       Nutritional status:   Body mass index is 29.85 kg/m².  Lab Results   Component Value Date    ALBUMIN 3.8 08/09/2020    ALBUMIN 3.5 06/18/2020    ALBUMIN 3.6 06/05/2020     No results found for: PREALBUMIN    Estimated Creatinine Clearance: 93 mL/min (based on SCr of 0.7 mg/dL).    Accu-Checks  Recent Labs     08/10/20  1013 08/10/20  1119   POCTGLUCOSE 149* 122*        ASSESSMENT and PLAN    * S/P lung transplant  S/p lung transplant on 8/10/20  Managed per primary team  Avoid hypoglycemia  Optimize BG control for surgical wound healing        Controlled type 2 diabetes mellitus without complication, without long-term current use of insulin  BG goal 140 - 180     Continue IV insulin infusion protocol  Requires  intensive BG monitoring while on protocol     Discharge planning: TBD      Adrenal cortical steroids causing adverse effect in therapeutic use  On steroid taper per transplant team; may elevate BG readings            Plan discussed with RN at bedside.     Mendez Dsouza NP  Endocrinology  [facility]

## 2020-08-10 NOTE — OP NOTE
Date of service:  08/10/2020     Preoperative diagnosis:   1.  End-stage lung failure secondary to idiopathic pulmonary fibrosis  2.   chronic hypoxemia respiratory failure  3.  Controlled type 2 diabetes mellitus  4.  Dyspnea on exertion  5.  Hepatic steatosis  6.  Hepatitis-B core antibody positive  7.  Obesity BMI 30-39.9  8.  Essential hypertension     Postop diagnosis:  Same     Operation:  1.   left lung transplantation on cardiopulmonary bypass  2.  Back table preparation of donor lung  3.  Native left lung pneumonectomy  4.  40 x 5 cm wound VAC due to extremely large body habitus increasing the risk of sternal infection and wound dehiscence   5.  Extremely challenging case due to the significant size mismatch of the pulmonary artery, massive amounts of adhesions causing the pulmonary artery to be completely plastered to the bronchus and the whole hilar structures was matted together with all the lymph nodes.  Electrocautery was used to carry out transection of the hilum and after that bleeding structures were individually identified as pulmonary artery and pulmonary vein.  To further at to the challenge was extremely large heart that was shifted towards the left side causing extreme difficulty in visualization requiring use of off pump protractor to enable some form of exposure.       Staff surgeon:  Benedict Clemons  First assistant:   Refugio Johnston  Second assistant: Marcia Ruiz  Anesthesia:  GTA  Estimated blood loss:  150cc     Key findings of the operation:  1.  Significant size mismatch of Donor Bronchus- intusseption of donor bronchus into recipient brinchus  2.  Extremely challenging case due to hilar structures were completely matted together and poor visualization due to the heart completely turning into the left chest     Indication of operation:  This is a    65 year-old  female who presented with end-stage idiopathic pulmonary disease and was worked up for lung transplantation. Patient was  discussed at the multidisciplinary selection meeting and was found to be an acceptable candidate for lung transplantation.  Patient was listed for right/left/bilateral lung transplantation.  Donor organs were made available.  Donor lung was evaluated by Dr. Garza  and found the donor organ to be acceptable for this patient.  On-site evaluation of this organ was done by Dr. Byrne and  the organs to be acceptable for this patient.  Donor specifics are present in the donor net.  Patient understood the risks and benefits and different treatment options.  An informed consent was then obtained.     Operation:  Patient was brought to the operating room and placed in supine position.  After induction of anesthesia area was prepped and draped in the usual sterile fashion.  An upper midline incision was made which was carried all the way down to the sternum.  A median sternotomy was then performed.  Sternal edges were cauterized.  Chest retractor was placed.  Pericardium was then opened up.  Pericardial well was created.  Systemic heparinization was then out.  ACT greater than 450 was obtained.  Cannulation stitches were placed.  Arterial cannula placed in the ascending aorta.  Right atrium was used to place the venous cannula.  Patient was placed on full cardiopulmonary bypass.  Left pleural space was then opened widely.  Visualization of the lungs were carried out.  Left hilar structures were then dissected.  Extensive adhesions were noted on the left side requiring careful dissection.  Left inferior pulmonary ligament was then taken down under direct vision with good hemostasis.  Once that was done the inferior  right pulmonary veins were then dissected circumferentially and umbilical tapes were passed around them.  Meticulous dissection was then carried out around the phrenic nerve to avoid any thermal conduction to the nerve.  At this stage the whole-structure was extremely matted together and structures could not  be identified separately.  Large amounts of lymph nodes for the challenged and obstructed the hilar review.  Visualization was extremely challenging which increased the complexity and duration of the operation by more than 90 min.  Off pump suction cup was brought to the field to retract the heart on 1 side.  After that an Estech retractor was placed to for the help exposure of the hilar structures.  Due to extensive an intense adhesion of all hilar structures into a mass a decision was made to transect the hilum using electrocautery this was essential to prevent bleeding and to restrict the amount of lymphatic leaks from extensive lymph nodes that were present around the hilar structures.  On transection of the hilum the lung was then mobilized and extensive amount of adhesions on the a pickle and lateral aspect was then divided with electrocautery.  Once the lung was passed of the field the hilar structures were noted to bleeding holes were noted the superior 1 was the pulmonary artery which was then dissected and  from the matted structures.  Then attention was directed towards the superior pulmonary vein and bronchus was then dissected for the for anastomosis purposes.  Good hemostasis was ensured.  Back table preparation of the donor lung was then initiated.  The bronchus was prepared after sending microbiology and removing secretions.  The pulmonary artery and pulmonary vein anastomotic cuffs was then prepared.  Lung was then lowered into the left chest cavity on a bed of ice.  Bronchial anastomosis was initiated by using 4-0 PDS suture.  Due to significant amount of donor size mismatch the donor bronchus was intercepted into the recipient bronchus.  Fiberoptic bronchoscopy was then carried out by anesthesia services to check for patency of the bronchial anastomosis.  The bronchial anastomosis was found to be widely patent.  Attention was then directed towards the pulmonary artery anastomosis.  After  trimming the pulmonary arteries to appropriate length 4-0 Prolene stitch was used to perform a continuous running anastomosis.  Once that was done then attention was directed towards the left pulmonary vein anastomosis.  Due to significant poor visualization open anastomosis was carried out the left superior pulmonary vein was transected and carried towards the bifurcation using a 4-0 Prolene stitch in continuous running anastomosis was performed in such a way that there was endothelial to endothelial apposition.  Once that was done the clamp that had been applied on the pulmonary artery was removed and efflux of the pneumo plegia was then released through the pulmonary artery in the pulmonary vein anastomosis.  Both these anastomosis were then tied down.  The left chest cavity was drained and was filled with warm saline.  Valsalva maneuver was carried out to test for air tightness of the left bronchial anastomosis.  Prior to Valsalva a root vent was placed in the ascending aorta to the moved any intra left atrial and ventricular air.  CO2 had been used throughout the case to Flood the operative field to decrease the chances of any air embolism.  Once bronchial anastomosis was tested for air tightness the left pleural space was then drained of the remaining saline.  Two left pleural tubes were placed brought through separate skin incision and a right pleural drain was also placed and brought through separate skin incision all these were connected to separate pleura vacs.  After ensuring adequate electrolytes patient was weaned off from cardiopulmonary bypass after ensuring absence of any intracardiac air on MATT examination.  Test dose of protamine was given followed by full dose of protamine to reverse the effects of systemic heparin.  [place] does all the various catheters and cannulas were then removed.  Pulmonary vein velocity on the left was noted to be 0.8 m/sec.  Patient tolerated procedure well.  After  ensuring good hemostasis sternum was then approximated by # 6 Stainless steel wires.  Skin and subcutaneous tissues were closed in multiple layers.  Due to the extremely large body habitus and morbid obesity and increased risks for sternal dehiscence and wound complication a 40 x 5 cm wound VAC was placed.  Sterile dressing was applied.   The lung was then removed from the chest cavity and passed of the field.      Terminal count of needles, sponges and instruments were found to be correct.     Medicare attestation:  Due to unavailability of any adequate cardiothoracic surgery resident at performed all parts of the operation myself.

## 2020-08-10 NOTE — SUBJECTIVE & OBJECTIVE
Subjective:     Interval History: POD#0 s/p LSLT for IPF. Received 1 unit PRBCs and 2 cryo in OR. Sedated with propofol and fentanyl. Briefly required vaso upon arrival to ICU, but has weaned off. UOP and chest tube output appropriate. On VC ventilation.     Continuous Infusions:   epinephrine Stopped (08/10/20 1100)    fentanyl      insulin (HUMAN R) infusion (adults) 3 Units/hr (08/10/20 1230)    norepinephrine bitartrate-D5W Stopped (08/10/20 1100)    propofoL 30 mcg/kg/min (08/10/20 1300)    vasopressin (PITRESSIN) infusion Stopped (08/10/20 1200)     Scheduled Meds:   aspirin  325 mg Per NG tube Once    [START ON 8/11/2020] aspirin  325 mg Oral Daily    basiliximab (SIMULECT) infusion  20 mg Intravenous Q96H    ceFEPime (MAXIPIME) IVPB  2 g Intravenous Q12H    chlorhexidine  10 mL Mouth/Throat BID    famotidine (PF)  20 mg Intravenous BID    ganciclovir (CYTOVENE) IVPB  5 mg/kg Intravenous Q12H    [START ON 8/11/2020] methylPREDNISolone (SOLU-Medrol) IVPB (doses > 250 mg)   Intravenous Daily    Followed by    [START ON 8/12/2020] methylPREDNISolone (SOLU-Medrol) IVPB (doses > 250 mg)   Intravenous Daily    Followed by    [START ON 8/13/2020] methylPREDNISolone sodium succinate  2 mg/kg Intravenous Daily    mupirocin  1 g Nasal BID    [START ON 8/12/2020] mycophenolate mofetil  500 mg Per NG tube BID    polyethylene glycol  17 g Oral Daily    sulfamethoxazole-trimethoprim 200-40 mg/5 ml  20 mL Per NG tube Every Mon, Wed, Fri    [START ON 8/12/2020] tacrolimus  0.5 mg Per NG tube BID    vancomycin (VANCOCIN) IVPB  1,250 mg Intravenous Q12H    [START ON 8/11/2020] vorconazole (VFEND) IVPB  4 mg/kg Intravenous Q12H    vorconazole (VFEND) IVPB  6 mg/kg Intravenous Q12H     PRN Meds:acetaminophen, dextrose 50%, dextrose 50%, dextrose 50%, dextrose 50%, lactulose, magnesium sulfate IVPB, metoclopramide HCl, ondansetron, oxyCODONE, oxyCODONE, potassium chloride in water **AND** potassium  "chloride in water **AND** potassium chloride in water, sodium phosphate IVPB, sodium phosphate IVPB, sodium phosphate IVPB, Pharmacy to dose Vancomycin consult **AND** vancomycin - pharmacy to dose    Review of patient's allergies indicates:   Allergen Reactions    Boniva [ibandronate] Other (See Comments)     "chest cramps"       Review of Systems   Unable to perform ROS: Intubated     Objective:   Physical Exam  Vitals signs and nursing note reviewed.   Constitutional:       Appearance: Normal appearance. She is overweight.      Interventions: She is intubated.   HENT:      Head: Normocephalic and atraumatic.      Nose: Nose normal.      Mouth/Throat:      Mouth: Mucous membranes are dry.      Comments: ET/OGT in place  Eyes:      General: No scleral icterus.     Conjunctiva/sclera: Conjunctivae normal.   Neck:      Comments: Right IJ catheter and cordis c/d/i  Cardiovascular:      Rate and Rhythm: Normal rate and regular rhythm.      Heart sounds: No murmur. Friction rub present. No gallop.    Pulmonary:      Effort: She is intubated.      Breath sounds: Examination of the right-middle field reveals rales. Examination of the right-lower field reveals decreased breath sounds and rales. Examination of the left-lower field reveals decreased breath sounds. Decreased breath sounds and rales present. No wheezing or rhonchi.      Comments: Mechanical BS bilateral, 2 left pleural and 1 right pleural chest tubes in place with sanguinous output, no airleaks  Abdominal:      General: Bowel sounds are decreased. There is no distension.      Palpations: Abdomen is soft.   Genitourinary:     Comments: pelletier  Musculoskeletal:      Right lower leg: No edema.      Left lower leg: No edema.   Skin:     General: Skin is warm and dry.      Capillary Refill: Capillary refill takes less than 2 seconds.      Comments: Left radial arterial line c/d/i   Neurological:      Comments: sedated   Psychiatric:      Comments: sedated "           Vital Signs (Most Recent):  Temp: 96.7 °F (35.9 °C) (08/10/20 1200)  Pulse: 75 (08/10/20 1200)  Resp: 16 (08/10/20 1200)  BP: (!) 95/55 (08/10/20 0411)  SpO2: 100 % (08/10/20 1200) Vital Signs (24h Range):  Temp:  [96.6 °F (35.9 °C)-98.7 °F (37.1 °C)] 96.7 °F (35.9 °C)  Pulse:  [57-79] 75  Resp:  [16-19] 16  SpO2:  [97 %-100 %] 100 %  BP: ()/(55-84) 95/55  Arterial Line BP: (112-149)/(57-79) 120/64     Weight: 86.4 kg (190 lb 9.4 oz)  Body mass index is 29.85 kg/m².      Intake/Output Summary (Last 24 hours) at 8/10/2020 1239  Last data filed at 8/10/2020 1200  Gross per 24 hour   Intake 2123 ml   Output 960 ml   Net 1163 ml       Ventilator Data:     Vent Mode: A/C  Oxygen Concentration (%):  [28-50] 50  Resp Rate Total:  [16 br/min-18 br/min] 17 br/min  Vt Set:  [370 mL-450 mL] 370 mL  PEEP/CPAP:  [8 cmH20] 8 cmH20  Mean Airway Pressure:  [12 swJ09-70 cmH20] 12 cmH20    Hemodynamic Parameters:  PAP: (32-39)/(14-19) 32/14  PAP (Mean):  [22 mmHg-28 mmHg] 22 mmHg  CO:  [4.8 L/min-5.2 L/min] 5.2 L/min  CI:  [2.4 L/min/m2-2.6 L/min/m2] 2.6 L/min/m2    Lines/Drains:  Pulmonary Artery Catheter Assessment  08/10/20 9783 (Active)   Verification by X-ray Yes 08/10/20 1100   Site Assessment No drainage;No redness;No swelling;No warmth 08/10/20 1100   Line Securement Device Secured with sutures 08/10/20 1100   Dressing Type Biopatch in place;Central line dressing with pants 08/10/20 1100   Dressing Status Clean;Dry;Intact 08/10/20 1100   Dressing Intervention Integrity maintained 08/10/20 1100   Date on Dressing 08/17/20 08/10/20 1100   Dressing Due to be Changed 08/17/20 08/10/20 1100   Balloon Patency/Care flushed w/o difficulty;normal saline locked;infusing 08/10/20 1100   Thermistor Patency/Care flushed w/o difficulty;normal saline locked;infusing 08/10/20 1100   Proximal Patency/Care flushed w/o difficulty;normal saline locked;infusing 08/10/20 1100   Distal Patency/Care flushed w/o difficulty;normal  saline locked;infusing 08/10/20 1100   Extra Patency/Care flushed w/o difficulty;normal saline locked;infusing 08/10/20 1100   Waveform Normal 08/10/20 1100   Line Necessity Review Large/frequent boluses;Longterm central access required;Medication caustic to vasculature;Poor venous access 08/10/20 1100   Number of days: 0        Introducer with Double Lumen 08/10/20 right internal jugular (Active)   Site Assessment No drainage;No redness;No swelling;No warmth 08/10/20 1100   Line Securement Device Secured with sutures 08/10/20 1100   Dressing Type Biopatch in place;Central line dressing with pants 08/10/20 1100   Dressing Status Clean;Dry;Intact 08/10/20 1100   Dressing Intervention Integrity maintained 08/10/20 1100   Date on Dressing 08/17/20 08/10/20 1100   Dressing Due to be Changed 08/17/20 08/10/20 1100   Distal Patency/Care flushed w/o difficulty;blood return present;infusing 08/10/20 1100   Proximal 1 Patency/Care flushed w/o difficulty;blood return present;infusing 08/10/20 1100   Line Necessity Review Large/frequent boluses;Longterm central access required;Medication caustic to vasculature;Poor venous access 08/10/20 1100   Number of days: 0            Peripheral IV - Single Lumen 08/09/20 2000 22 G Right Antecubital (Active)   Site Assessment Clean;Dry;Intact;No redness;No swelling 08/10/20 1100   Line Status Blood return noted;Flushed;Infusing 08/10/20 1100   Dressing Status Clean;Dry;Intact 08/10/20 1100   Dressing Intervention First dressing 08/10/20 1100   Dressing Change Due 08/14/20 08/10/20 1100   Site Change Due 08/14/20 08/10/20 1100   Reason Not Rotated Not due 08/10/20 1100   Number of days: 0            Peripheral IV - Single Lumen 08/10/20 0555 16 G Left Forearm (Active)   Site Assessment Clean;Dry;Intact;No redness;No swelling 08/10/20 1100   Line Status Blood return noted;Flushed;Saline locked 08/10/20 1100   Dressing Status Clean;Dry;Intact 08/10/20 1100   Dressing Intervention First  dressing 08/10/20 1100   Dressing Change Due 08/14/20 08/10/20 1100   Site Change Due 08/14/20 08/10/20 1100   Reason Not Rotated Not due 08/10/20 1100   Number of days: 0            Arterial Line 08/10/20 0531 Left Radial (Active)   Site Assessment Clean;Dry;Intact;No redness;No swelling 08/10/20 1100   Line Status Pulsatile blood flow 08/10/20 1100   Art Line Waveform Appropriate;Square wave test performed 08/10/20 1100   Arterial Line Interventions Zeroed and calibrated;Leveled;Connections checked and tightened;Flushed per protocol;Line pulled back 08/10/20 1100   Color/Movement/Sensation Capillary refill less than 3 sec 08/10/20 1100   Dressing Type Central line dressing with pants 08/10/20 1100   Dressing Status Clean;Dry;Intact 08/10/20 1100   Dressing Intervention Integrity maintained 08/10/20 1100   Dressing Change Due 08/17/20 08/10/20 1100   Number of days: 0            Chest Tube 08/10/20 1100 1 Left Pleural (Active)   Chest Tube WDL ex 08/10/20 1100   Function -20 cm H2O 08/10/20 1100   Air Leak/Fluctuation air leak not present;fluctuation not present;connections tightened;dependent drainage cleared 08/10/20 1100   Safety all tubing connections taped;2 rubber-tipped hemostats w/ patient;all connections secured;suction checked 08/10/20 1100   Securement tubing secured to body distal to insertion site w/ tape 08/10/20 1100   Patency Intervention Tip/tilt 08/10/20 1100   Drainage Description Sanguineous 08/10/20 1100   Dressing Appearance occlusive gauze dressing intact;clean and dry 08/10/20 1100   Dressing Care dressing reinforced 08/10/20 1100   Left Subcutaneous Emphysema none present 08/10/20 1100   Right Subcutaneous Emphysema none present 08/10/20 1100   Site Assessment Not assessed;Other (Comment) 08/10/20 1100   Surrounding Skin Unable to view 08/10/20 1100   Output (mL) 10 mL 08/10/20 1100   Number of days: 0            Chest Tube 08/10/20 1100 1 Right Pleural (Active)   Chest Tube WDL ex 08/10/20  1100   Function -20 cm H2O 08/10/20 1100   Air Leak/Fluctuation air leak not present;fluctuation not present 08/10/20 1100   Safety all tubing connections taped;2 rubber-tipped hemostats w/ patient;all connections secured;suction checked 08/10/20 1100   Securement tubing secured to body distal to insertion site w/ tape 08/10/20 1100   Patency Intervention Tip/tilt 08/10/20 1100   Drainage Description Sanguineous 08/10/20 1100   Dressing Appearance occlusive gauze dressing intact;clean and dry 08/10/20 1100   Dressing Care dressing reinforced 08/10/20 1100   Left Subcutaneous Emphysema none present 08/10/20 1100   Right Subcutaneous Emphysema none present 08/10/20 1100   Site Assessment Not assessed;Other (Comment) 08/10/20 1100   Surrounding Skin Unable to view;Other (Comment) 08/10/20 1100   Output (mL) 15 mL 08/10/20 1100   Number of days: 0            Chest Tube 08/10/20 1100 2 Left Pleural (Active)   Chest Tube WDL ex 08/10/20 1100   Function -20 cm H2O 08/10/20 1100   Air Leak/Fluctuation air leak not present;fluctuation not present 08/10/20 1100   Safety all tubing connections taped;2 rubber-tipped hemostats w/ patient;all connections secured;suction checked 08/10/20 1100   Securement tubing secured to body distal to insertion site w/ tape 08/10/20 1100   Patency Intervention Tip/tilt 08/10/20 1100   Drainage Description Sanguineous 08/10/20 1100   Dressing Appearance occlusive gauze dressing intact;clean and dry 08/10/20 1100   Dressing Care dressing reinforced 08/10/20 1100   Left Subcutaneous Emphysema none present 08/10/20 1100   Right Subcutaneous Emphysema none present 08/10/20 1100   Site Assessment Not assessed;Other (Comment) 08/10/20 1100   Surrounding Skin Unable to view;Other (Comment) 08/10/20 1100   Output (mL) 20 mL 08/10/20 1100   Number of days: 0            NG/OG Tube 08/10/20 1100 (Active)   Placement Check placement verified by aspirate characteristics;placement verified by x-ray 08/10/20  "1100   Advancement advanced manually 08/10/20 1100   Tolerance no signs/symptoms of discomfort 08/10/20 1100   Securement secured to commercial device 08/10/20 1100   Clamp Status/Tolerance clamped;no abdominal discomfort;no abdominal distention;no emesis;no nausea;no residual;no restlessness 08/10/20 1100   Suction Setting/Drainage Method dependent drainage 08/10/20 1100   Insertion Site Appearance no redness, warmth, tenderness, skin breakdown, drainage 08/10/20 1100   Number of days: 0            Urethral Catheter 08/10/20 0535 Non-latex;Straight-tip 16 Fr. (Active)   Site Assessment Clean;Intact 08/10/20 1100   Collection Container Urimeter 08/10/20 1100   Securement Method secured to top of thigh w/ adhesive device 08/10/20 1100   Catheter Care Performed yes 08/10/20 1100   Reason for Continuing Urinary Catheterization Critically ill in ICU requiring intensive monitoring 08/10/20 1100   CAUTI Prevention Bundle StatLock in place w 1" slack;Intact seal between catheter & drainage tubing;Drainage bag/urimeter off the floor;Green sheeting clip in use;No dependent loops or kinks;Drainage bag/urimeter not overfilled (<2/3 full);Drainage bag/urimeter below bladder 08/10/20 1100   Output (mL) 100 mL 08/10/20 1200   Number of days: 0       Significant Labs:  CBC:  Recent Labs   Lab 08/10/20  1020   WBC 12.84*   RBC 2.63*   HGB 8.1*   HCT 25.1*   *   MCV 95   MCH 30.8   MCHC 32.3     BMP:  Recent Labs   Lab 08/10/20  1020      K 3.3*  3.3*      CO2 23   BUN 9   CREATININE 0.6   CALCIUM 6.9*      Tacrolimus Levels:  No results for input(s): TACROLIMUS in the last 72 hours.  Microbiology:  Microbiology Results (last 7 days)     Procedure Component Value Units Date/Time    Gram stain [[id number]] Collected: 08/10/20 5765    Order Status: Completed Specimen: Body Fluid from Lung, Left Updated: 08/10/20 6719     Gram Stain Result No WBC's      No organisms seen    Narrative:      Bronchus Donor Lung Left " (Aerobic, Anaerobic, AFB, Fungus,  Gram)    Aerobic culture [[id number]] Collected: 08/10/20 0758    Order Status: Sent Specimen: Body Fluid from Lung, Left Updated: 08/10/20 1021    AFB Culture & Smear [[id number]] Collected: 08/10/20 0758    Order Status: Sent Specimen: Body Fluid from Lung, Left Updated: 08/10/20 1021    Fungus culture [[id number]] Collected: 08/10/20 0758    Order Status: Sent Specimen: Body Fluid from Lung, Left Updated: 08/10/20 1021    Culture, Anaerobe [[id number]] Collected: 08/10/20 0758    Order Status: Sent Specimen: Body Fluid from Lung, Left Updated: 08/10/20 1021          I have reviewed all pertinent labs within the past 24 hours.    Diagnostic Results:  Chest X-Ray: I personally reviewed the films and findings are:, pulmonary edema, perihilar opacitites bilaterally     No Further

## 2020-08-10 NOTE — ASSESSMENT & PLAN NOTE
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 Bactrim, ganciclovir, and voriconazole for OIP. Will follow up on donor cultures and adjust therapy as needed.

## 2020-08-10 NOTE — ASSESSMENT & PLAN NOTE
BG goal 140 - 180     Continue IV insulin infusion protocol  Requires intensive BG monitoring while on protocol     Discharge planning: [facility]

## 2020-08-10 NOTE — PROGRESS NOTES
Analilia Castellon MD  Christo Clifton  1952 08/31/2017    Patient Active Problem List   Diagnosis   • IDDM (insulin dependent diabetes mellitus)   • ASCVD (arteriosclerotic cardiovascular disease)   • S/P CABG x 3   • Essential hypertension   • Dyslipidemia   • Skin ulcer   • Post-operative state   • Preop cardiovascular exam       Dear Analilia Castellon MD:    Subjective     Christo Clifton is a 65 y.o. male with the above medical problems who is being seen for follow up. According to the patient he has been doing fairly well since his last visit.  Continues to complain of shortness of breath on exertion that appears to be related to COPD due to his previous extensive history of smoking.  This is being managed by the primary team.  Denies any episodes of chest pain, palpitations, orthopnea, PND, lower extremity edema, dizziness or syncope.  He underwent toe amputation without any complications.      Current Outpatient Prescriptions:   •  aspirin 81 MG EC tablet, Take 81 mg by mouth Every Night., Disp: , Rfl:   •  atorvastatin (LIPITOR) 80 MG tablet, Take 1 tablet by mouth daily. (Patient taking differently: Take 80 mg by mouth Every Night.), Disp: 90 tablet, Rfl: 3  •  diltiaZEM CD (CARTIA XT) 180 MG 24 hr capsule, Take 180 mg by mouth Daily., Disp: , Rfl:   •  escitalopram (LEXAPRO) 10 MG tablet, Take 10 mg by mouth daily., Disp: , Rfl:   •  insulin aspart prot-insulin aspart (novoLOG 70/30) (70-30) 100 UNIT/ML injection, Inject 45 Units under the skin Every Night., Disp: , Rfl:   •  insulin aspart protamine-insulin aspart (NovoLOG 70/30) (70-30) 100 UNIT/ML injection, Inject 60 Units under the skin Every Morning., Disp: , Rfl:   •  levothyroxine (SYNTHROID, LEVOTHROID) 50 MCG tablet, Take 50 mcg by mouth daily., Disp: , Rfl:   •  lisinopril (PRINIVIL,ZESTRIL) 40 MG tablet, Take 40 mg by mouth daily., Disp: , Rfl:   •  metoprolol tartrate (LOPRESSOR) 25 MG tablet, Take 25 mg by mouth 2 (Two) Times a Day., Disp: ,  [facility]  Lung Transplant  Progress Note - Critical Care    Patient Name: Chely Becerra  MRN: [id number]  Admission Date: 8/9/2020  Hospital Length of Stay: 1 days  Post-Operative Day: 0  Attending Physician: Niraj Garza MD  Primary Care Provider: ZAHIDA Stack MD     Subjective:     Interval History: POD#0 s/p LSLT for IPF. Received 1 unit PRBCs and 2 cryo in OR. Sedated with propofol and fentanyl. Briefly required vaso upon arrival to ICU, but has weaned off. UOP and chest tube output appropriate. On VC ventilation.     Continuous Infusions:   epinephrine Stopped (08/10/20 1100)    fentanyl      insulin (HUMAN R) infusion (adults) 3 Units/hr (08/10/20 1230)    norepinephrine bitartrate-D5W Stopped (08/10/20 1100)    propofoL 30 mcg/kg/min (08/10/20 1300)    vasopressin (PITRESSIN) infusion Stopped (08/10/20 1200)     Scheduled Meds:   aspirin  325 mg Per NG tube Once    [START ON 8/11/2020] aspirin  325 mg Oral Daily    basiliximab (SIMULECT) infusion  20 mg Intravenous Q96H    ceFEPime (MAXIPIME) IVPB  2 g Intravenous Q12H    chlorhexidine  10 mL Mouth/Throat BID    famotidine (PF)  20 mg Intravenous BID    ganciclovir (CYTOVENE) IVPB  5 mg/kg Intravenous Q12H    [START ON 8/11/2020] methylPREDNISolone (SOLU-Medrol) IVPB (doses > 250 mg)   Intravenous Daily    Followed by    [START ON 8/12/2020] methylPREDNISolone (SOLU-Medrol) IVPB (doses > 250 mg)   Intravenous Daily    Followed by    [START ON 8/13/2020] methylPREDNISolone sodium succinate  2 mg/kg Intravenous Daily    mupirocin  1 g Nasal BID    [START ON 8/12/2020] mycophenolate mofetil  500 mg Per NG tube BID    polyethylene glycol  17 g Oral Daily    sulfamethoxazole-trimethoprim 200-40 mg/5 ml  20 mL Per NG tube Every Mon, Wed, Fri    [START ON 8/12/2020] tacrolimus  0.5 mg Per NG tube BID    vancomycin (VANCOCIN) IVPB  1,250 mg Intravenous Q12H    [START ON 8/11/2020] vorconazole (VFEND) IVPB  4  "mg/kg Intravenous Q12H    vorconazole (VFEND) IVPB  6 mg/kg Intravenous Q12H     PRN Meds:acetaminophen, dextrose 50%, dextrose 50%, dextrose 50%, dextrose 50%, lactulose, magnesium sulfate IVPB, metoclopramide HCl, ondansetron, oxyCODONE, oxyCODONE, potassium chloride in water **AND** potassium chloride in water **AND** potassium chloride in water, sodium phosphate IVPB, sodium phosphate IVPB, sodium phosphate IVPB, Pharmacy to dose Vancomycin consult **AND** vancomycin - pharmacy to dose    Review of patient's allergies indicates:   Allergen Reactions    Boniva [ibandronate] Other (See Comments)     "chest cramps"       Review of Systems   Unable to perform ROS: Intubated     Objective:   Physical Exam  Vitals signs and nursing note reviewed.   Constitutional:       Appearance: Normal appearance. She is overweight.      Interventions: She is intubated.   HENT:      Head: Normocephalic and atraumatic.      Nose: Nose normal.      Mouth/Throat:      Mouth: Mucous membranes are dry.      Comments: ET/OGT in place  Eyes:      General: No scleral icterus.     Conjunctiva/sclera: Conjunctivae normal.   Neck:      Comments: Right IJ catheter and cordis c/d/i  Cardiovascular:      Rate and Rhythm: Normal rate and regular rhythm.      Heart sounds: No murmur. Friction rub present. No gallop.    Pulmonary:      Effort: She is intubated.      Breath sounds: Examination of the right-middle field reveals rales. Examination of the right-lower field reveals decreased breath sounds and rales. Examination of the left-lower field reveals decreased breath sounds. Decreased breath sounds and rales present. No wheezing or rhonchi.      Comments: Mechanical BS bilateral, 2 left pleural and 1 right pleural chest tubes in place with sanguinous output, no airleaks  Abdominal:      General: Bowel sounds are decreased. There is no distension.      Palpations: Abdomen is soft.   Genitourinary:     Comments: pelletier  Musculoskeletal:      " Rfl:   •  OMEGA-3 KRILL OIL PO, Take 350 mg by mouth Daily., Disp: , Rfl:   •  phenytoin (DILANTIN) 100 MG ER capsule, Take 300 mg by mouth 2 (Two) Times a Day., Disp: , Rfl:   •  CARTIA  MG 24 hr capsule, TAKE 1 CAPSULE EVERY DAY, Disp: 30 capsule, Rfl: 0  •  clindamycin (CLEOCIN) 300 MG capsule, Take 1 capsule by mouth 3 (Three) Times a Day., Disp: 30 capsule, Rfl: 2  •  HYDROcodone-acetaminophen (NORCO)  MG per tablet, Take 1 tablet by mouth Every 6 (Six) Hours As Needed for Moderate Pain ., Disp: 45 tablet, Rfl: 0  •  ibuprofen (ADVIL,MOTRIN) 800 MG tablet, Take 1 tablet by mouth Every 6 (Six) Hours As Needed for Mild Pain ., Disp: 60 tablet, Rfl: 0  •  vitamin C (ASCORBIC ACID) 500 MG tablet, Take 1 tablet by mouth Daily., Disp: 60 tablet, Rfl: 1    The following portions of the patient's history were reviewed and updated as appropriate: allergies, current medications, past family history, past medical history, past social history, past surgical history and problem list.    Review of Systems   Constitution: Negative for chills, diaphoresis and fever.   HENT: Negative for congestion, ear pain and sore throat.    Eyes: Negative for blurred vision, pain and redness.   Cardiovascular: Negative for chest pain, dyspnea on exertion, irregular heartbeat, leg swelling, near-syncope, orthopnea, palpitations, paroxysmal nocturnal dyspnea and syncope.   Respiratory: Positive for shortness of breath. Negative for cough, hemoptysis and wheezing.    Endocrine: Negative for cold intolerance and heat intolerance.   Hematologic/Lymphatic: Does not bruise/bleed easily.   Skin: Negative for rash.   Musculoskeletal: Negative for myalgias.   Gastrointestinal: Negative for abdominal pain, constipation, diarrhea, hematemesis, hematochezia, melena, nausea and vomiting.   Genitourinary: Negative for dysuria and hematuria.   Neurological: Negative for dizziness, focal weakness, light-headedness and numbness.  "  Psychiatric/Behavioral: Positive for depression. The patient is not nervous/anxious.        Objective   Blood pressure 119/70, pulse 67, resp. rate 16, height 180.3 cm (71\"), weight 95.7 kg (211 lb), SpO2 98 %.    Physical Exam   Constitutional: He is oriented to person, place, and time. He appears well-developed and well-nourished.   Elderly white male sitting comfortably on a chair.   HENT:   Mouth/Throat: Oropharynx is clear and moist.   Eyes: EOM are normal. Pupils are equal, round, and reactive to light.   Neck: Neck supple. No JVD present. No tracheal deviation present. No thyromegaly present.   Cardiovascular: Normal rate, regular rhythm, S1 normal and S2 normal.  Exam reveals no gallop and no friction rub.    No murmur heard.  Pulmonary/Chest: Effort normal and breath sounds normal. No respiratory distress. He has no wheezes. He has no rales.   Abdominal: Soft. Bowel sounds are normal. He exhibits no mass. There is tenderness (Epigastric tenderness to palpation.).   Musculoskeletal: Normal range of motion. He exhibits no edema.   Lymphadenopathy:     He has no cervical adenopathy.   Neurological: He is alert and oriented to person, place, and time.   Skin: Skin is warm and dry. No rash noted.   Psychiatric: He has a normal mood and affect.         ECG 12 Lead  Date/Time: 4/11/2018 8:15 AM  Performed by: ERIKA ARAIZA  Authorized by: ERIKA ARAIAZ   Comparison: compared with previous ECG from 8/31/2017  Similar to previous ECG  Rhythm: sinus rhythm  Rate: normal  BPM: 65  Conduction: conduction normal  ST Segments: ST segments normal  T flattening: V1 and II  QRS axis: normal  Other: no other findings  Clinical impression: non-specific ECG            Assessment/Plan     1.  ASCVD: Patient with a history of ASCVD currently on aspirin, statin, beta blocker, ACE inhibitor and statin.  At this point he is chest pain-free.  We will continue on current regimen.    2.  " Right lower leg: No edema.      Left lower leg: No edema.   Skin:     General: Skin is warm and dry.      Capillary Refill: Capillary refill takes less than 2 seconds.      Comments: Left radial arterial line c/d/i   Neurological:      Comments: sedated   Psychiatric:      Comments: sedated           Vital Signs (Most Recent):  Temp: 96.7 °F (35.9 °C) (08/10/20 1200)  Pulse: 75 (08/10/20 1200)  Resp: 16 (08/10/20 1200)  BP: (!) 95/55 (08/10/20 0411)  SpO2: 100 % (08/10/20 1200) Vital Signs (24h Range):  Temp:  [96.6 °F (35.9 °C)-98.7 °F (37.1 °C)] 96.7 °F (35.9 °C)  Pulse:  [57-79] 75  Resp:  [16-19] 16  SpO2:  [97 %-100 %] 100 %  BP: ()/(55-84) 95/55  Arterial Line BP: (112-149)/(57-79) 120/64     Weight: 86.4 kg (190 lb 9.4 oz)  Body mass index is 29.85 kg/m².      Intake/Output Summary (Last 24 hours) at 8/10/2020 1239  Last data filed at 8/10/2020 1200  Gross per 24 hour   Intake 2123 ml   Output 960 ml   Net 1163 ml       Ventilator Data:     Vent Mode: A/C  Oxygen Concentration (%):  [28-50] 50  Resp Rate Total:  [16 br/min-18 br/min] 17 br/min  Vt Set:  [370 mL-450 mL] 370 mL  PEEP/CPAP:  [8 cmH20] 8 cmH20  Mean Airway Pressure:  [12 [id number] cmH20] 12 cmH20    Hemodynamic Parameters:  PAP: (32-39)/(14-19) 32/14  PAP (Mean):  [22 mmHg-28 mmHg] 22 mmHg  CO:  [4.8 L/min-5.2 L/min] 5.2 L/min  CI:  [2.4 L/min/m2-2.6 L/min/m2] 2.6 L/min/m2    Lines/Drains:  Pulmonary Artery Catheter Assessment  08/10/20 2945 (Active)   Verification by X-ray Yes 08/10/20 1100   Site Assessment No drainage;No redness;No swelling;No warmth 08/10/20 1100   Line Securement Device Secured with sutures 08/10/20 1100   Dressing Type Biopatch in place;Central line dressing with pants 08/10/20 1100   Dressing Status Clean;Dry;Intact 08/10/20 1100   Dressing Intervention Integrity maintained 08/10/20 1100   Date on Dressing 08/17/20 08/10/20 1100   Dressing Due to be Changed 08/17/20 08/10/20 1100   Balloon Patency/Care flushed w/o  Hypertension: The patient has a history of hypertension which appears to be well-controlled on current regimen.  At this point will continue.  We will check a CMP.    3.  Dyslipidemia: Patient with a history of dyslipidemia with no recent lipid panel on records.  Will check.     Diagnosis Plan   1. ASCVD (arteriosclerotic cardiovascular disease)  ECG 12 Lead   2. Essential hypertension  Comprehensive Metabolic Panel   3. Dyslipidemia  Lipid Panel            Return in about 6 months (around 10/11/2018).    I appreciate the opportunity to participate in this patient's cardiovascular care.    Best Regards    David Dumas       difficulty;normal saline locked;infusing 08/10/20 1100   Thermistor Patency/Care flushed w/o difficulty;normal saline locked;infusing 08/10/20 1100   Proximal Patency/Care flushed w/o difficulty;normal saline locked;infusing 08/10/20 1100   Distal Patency/Care flushed w/o difficulty;normal saline locked;infusing 08/10/20 1100   Extra Patency/Care flushed w/o difficulty;normal saline locked;infusing 08/10/20 1100   Waveform Normal 08/10/20 1100   Line Necessity Review Large/frequent boluses;Longterm central access required;Medication caustic to vasculature;Poor venous access 08/10/20 1100   Number of days: 0        Introducer with Double Lumen 08/10/20 right internal jugular (Active)   Site Assessment No drainage;No redness;No swelling;No warmth 08/10/20 1100   Line Securement Device Secured with sutures 08/10/20 1100   Dressing Type Biopatch in place;Central line dressing with pants 08/10/20 1100   Dressing Status Clean;Dry;Intact 08/10/20 1100   Dressing Intervention Integrity maintained 08/10/20 1100   Date on Dressing 08/17/20 08/10/20 1100   Dressing Due to be Changed 08/17/20 08/10/20 1100   Distal Patency/Care flushed w/o difficulty;blood return present;infusing 08/10/20 1100   Proximal 1 Patency/Care flushed w/o difficulty;blood return present;infusing 08/10/20 1100   Line Necessity Review Large/frequent boluses;Longterm central access required;Medication caustic to vasculature;Poor venous access 08/10/20 1100   Number of days: 0            Peripheral IV - Single Lumen 08/09/20 2000 22 G Right Antecubital (Active)   Site Assessment Clean;Dry;Intact;No redness;No swelling 08/10/20 1100   Line Status Blood return noted;Flushed;Infusing 08/10/20 1100   Dressing Status Clean;Dry;Intact 08/10/20 1100   Dressing Intervention First dressing 08/10/20 1100   Dressing Change Due 08/14/20 08/10/20 1100   Site Change Due 08/14/20 08/10/20 1100   Reason Not Rotated Not due 08/10/20 1100   Number of days: 0             Peripheral IV - Single Lumen 08/10/20 0555 16 G Left Forearm (Active)   Site Assessment Clean;Dry;Intact;No redness;No swelling 08/10/20 1100   Line Status Blood return noted;Flushed;Saline locked 08/10/20 1100   Dressing Status Clean;Dry;Intact 08/10/20 1100   Dressing Intervention First dressing 08/10/20 1100   Dressing Change Due 08/14/20 08/10/20 1100   Site Change Due 08/14/20 08/10/20 1100   Reason Not Rotated Not due 08/10/20 1100   Number of days: 0            Arterial Line 08/10/20 0531 Left Radial (Active)   Site Assessment Clean;Dry;Intact;No redness;No swelling 08/10/20 1100   Line Status Pulsatile blood flow 08/10/20 1100   Art Line Waveform Appropriate;Square wave test performed 08/10/20 1100   Arterial Line Interventions Zeroed and calibrated;Leveled;Connections checked and tightened;Flushed per protocol;Line pulled back 08/10/20 1100   Color/Movement/Sensation Capillary refill less than 3 sec 08/10/20 1100   Dressing Type Central line dressing with pants 08/10/20 1100   Dressing Status Clean;Dry;Intact 08/10/20 1100   Dressing Intervention Integrity maintained 08/10/20 1100   Dressing Change Due 08/17/20 08/10/20 1100   Number of days: 0            Chest Tube 08/10/20 1100 1 Left Pleural (Active)   Chest Tube WDL ex 08/10/20 1100   Function -20 cm H2O 08/10/20 1100   Air Leak/Fluctuation air leak not present;fluctuation not present;connections tightened;dependent drainage cleared 08/10/20 1100   Safety all tubing connections taped;2 rubber-tipped hemostats w/ patient;all connections secured;suction checked 08/10/20 1100   Securement tubing secured to body distal to insertion site w/ tape 08/10/20 1100   Patency Intervention Tip/tilt 08/10/20 1100   Drainage Description Sanguineous 08/10/20 1100   Dressing Appearance occlusive gauze dressing intact;clean and dry 08/10/20 1100   Dressing Care dressing reinforced 08/10/20 1100   Left Subcutaneous Emphysema none present 08/10/20 1100   Right Subcutaneous  Emphysema none present 08/10/20 1100   Site Assessment Not assessed;Other (Comment) 08/10/20 1100   Surrounding Skin Unable to view 08/10/20 1100   Output (mL) 10 mL 08/10/20 1100   Number of days: 0            Chest Tube 08/10/20 1100 1 Right Pleural (Active)   Chest Tube WDL ex 08/10/20 1100   Function -20 cm H2O 08/10/20 1100   Air Leak/Fluctuation air leak not present;fluctuation not present 08/10/20 1100   Safety all tubing connections taped;2 rubber-tipped hemostats w/ patient;all connections secured;suction checked 08/10/20 1100   Securement tubing secured to body distal to insertion site w/ tape 08/10/20 1100   Patency Intervention Tip/tilt 08/10/20 1100   Drainage Description Sanguineous 08/10/20 1100   Dressing Appearance occlusive gauze dressing intact;clean and dry 08/10/20 1100   Dressing Care dressing reinforced 08/10/20 1100   Left Subcutaneous Emphysema none present 08/10/20 1100   Right Subcutaneous Emphysema none present 08/10/20 1100   Site Assessment Not assessed;Other (Comment) 08/10/20 1100   Surrounding Skin Unable to view;Other (Comment) 08/10/20 1100   Output (mL) 15 mL 08/10/20 1100   Number of days: 0            Chest Tube 08/10/20 1100 2 Left Pleural (Active)   Chest Tube WDL ex 08/10/20 1100   Function -20 cm H2O 08/10/20 1100   Air Leak/Fluctuation air leak not present;fluctuation not present 08/10/20 1100   Safety all tubing connections taped;2 rubber-tipped hemostats w/ patient;all connections secured;suction checked 08/10/20 1100   Securement tubing secured to body distal to insertion site w/ tape 08/10/20 1100   Patency Intervention Tip/tilt 08/10/20 1100   Drainage Description Sanguineous 08/10/20 1100   Dressing Appearance occlusive gauze dressing intact;clean and dry 08/10/20 1100   Dressing Care dressing reinforced 08/10/20 1100   Left Subcutaneous Emphysema none present 08/10/20 1100   Right Subcutaneous Emphysema none present 08/10/20 1100   Site Assessment Not assessed;Other  "(Comment) 08/10/20 1100   Surrounding Skin Unable to view;Other (Comment) 08/10/20 1100   Output (mL) 20 mL 08/10/20 1100   Number of days: 0            NG/OG Tube 08/10/20 1100 (Active)   Placement Check placement verified by aspirate characteristics;placement verified by x-ray 08/10/20 1100   Advancement advanced manually 08/10/20 1100   Tolerance no signs/symptoms of discomfort 08/10/20 1100   Securement secured to commercial device 08/10/20 1100   Clamp Status/Tolerance clamped;no abdominal discomfort;no abdominal distention;no emesis;no nausea;no residual;no restlessness 08/10/20 1100   Suction Setting/Drainage Method dependent drainage 08/10/20 1100   Insertion Site Appearance no redness, warmth, tenderness, skin breakdown, drainage 08/10/20 1100   Number of days: 0            Urethral Catheter 08/10/20 0535 Non-latex;Straight-tip 16 Fr. (Active)   Site Assessment Clean;Intact 08/10/20 1100   Collection Container Urimeter 08/10/20 1100   Securement Method secured to top of thigh w/ adhesive device 08/10/20 1100   Catheter Care Performed yes 08/10/20 1100   Reason for Continuing Urinary Catheterization Critically ill in ICU requiring intensive monitoring 08/10/20 1100   CAUTI Prevention Bundle StatLock in place w 1" slack;Intact seal between catheter & drainage tubing;Drainage bag/urimeter off the floor;Green sheeting clip in use;No dependent loops or kinks;Drainage bag/urimeter not overfilled (<2/3 full);Drainage bag/urimeter below bladder 08/10/20 1100   Output (mL) 100 mL 08/10/20 1200   Number of days: 0       Significant Labs:  CBC:  Recent Labs   Lab 08/10/20  1020   WBC 12.84*   RBC 2.63*   HGB 8.1*   HCT 25.1*   *   MCV 95   MCH 30.8   MCHC 32.3     BMP:  Recent Labs   Lab 08/10/20  1020      K 3.3*  3.3*      CO2 23   BUN 9   CREATININE 0.6   CALCIUM 6.9*      Tacrolimus Levels:  No results for input(s): TACROLIMUS in the last 72 hours.  Microbiology:  Microbiology Results (last 7 " days)     Procedure Component Value Units Date/Time    Gram stain [[id number]] Collected: 08/10/20 0758    Order Status: Completed Specimen: Body Fluid from Lung, Left Updated: 08/10/20 1143     Gram Stain Result No WBC's      No organisms seen    Narrative:      Bronchus Donor Lung Left (Aerobic, Anaerobic, AFB, Fungus,  Gram)    Aerobic culture [[id number]] Collected: 08/10/20 0758    Order Status: Sent Specimen: Body Fluid from Lung, Left Updated: 08/10/20 1021    AFB Culture & Smear [[id number]] Collected: 08/10/20 0758    Order Status: Sent Specimen: Body Fluid from Lung, Left Updated: 08/10/20 1021    Fungus culture [[id number]] Collected: 08/10/20 0758    Order Status: Sent Specimen: Body Fluid from Lung, Left Updated: 08/10/20 1021    Culture, Anaerobe [[id number]] Collected: 08/10/20 0758    Order Status: Sent Specimen: Body Fluid from Lung, Left Updated: 08/10/20 1021          I have reviewed all pertinent labs within the past 24 hours.    Diagnostic Results:  Chest X-Ray: I personally reviewed the films and findings are:, pulmonary edema, perihilar opacitites bilaterally     No Further        Assessment/Plan:     * S/P lung transplant  POD #0 s/p uncomplicated LSLT for IPF. Currently PGD 1. Recommend gentle diuresis as tolerated. CT output stable. CTS primary and LuT following for immunosuppression and OIP.     Immunosuppression  Induction with solumedrol and basiliximab. Repeat basiliximab on POD#4. Tacrolimus, MMF, and prednisone taper ordered.     Prophylactic antibiotic  CMV D-/R+ On vancomycin and cefepime for routine surgical prophylaxis. Bactrim, ganciclovir, and voriconazole for OIP. Will follow up on donor cultures and adjust therapy as needed.     Controlled type 2 diabetes mellitus without complication, without long-term current use of insulin  Endocrine following, appreciate recs.     Postprocedural hypotension  Expected post-op. Maintain MAPs per CTS.    Acute blood loss anemia  Expected post-op.  Continue to trend and transfuse per CTS thresholds.     Thrombocytopenia, unspecified  Expected post-op. Transfuse per CTS thresholds.     Leukocytosis  Expected post-op. Continue to trend.         Preventive Measures: per primary    Counseling/Consultation:I discussed the case with Dr. Hill.      Mayuri Nath PA-C  Lung Transplant  [facility]

## 2020-08-10 NOTE — CARE UPDATE
ALAYNA Clemons MD at bedside.   MD notified of pts current gtts, rates, VS, UOP, CVP, CO/CI.   MAP >80, sustaining.   50mcg IVP Fentanyl Q4H  PRN administered per MD order.     CVP 8, HOB Flat.   UOP ~45cc/hr.   500 Albumin/1 hour administered per MD order.   See flowsheet for details.

## 2020-08-10 NOTE — HPI
Reason for Consult: Management of pre-dm, Hyperglycemia     Surgical Procedure and Date: Left lung transplant and wound vac application 8/10/20    Diabetes diagnosis year: pre-dm - diet controlled     Lab Results   Component Value Date    HGBA1C 6.0 (H) 06/18/2020       Home Diabetes Medications: none (per chart review)    How often checking glucose at home? Not checking    Diabetes Complications include:     none    Complicating diabetes co morbidities:   Glucocorticoid use  s/p lung transplant      HPI:   Patient is a 65 y.o. female with a diagnosis of DM2, IPF, and chronic respiratory failure, who is s/p left lung transplant on 8/10/20.  Patient with controlled DM2 on no medications per chart review.  Endocrinology consulted for DM/BG management.

## 2020-08-10 NOTE — ANESTHESIA PROCEDURE NOTES
Arterial    Diagnosis: IPF    Patient location during procedure: done in OR  Procedure start time: 8/10/2020 5:31 AM  Timeout: 8/10/2020 5:30 AM  Procedure end time: 8/10/2020 5:32 AM    Staffing  Authorizing Provider: Dmitriy Zamudio MD  Performing Provider: Moriah Mahoney MD    Anesthesiologist was present at the time of the procedure.    Preanesthetic Checklist  Completed: patient identified, site marked, surgical consent, pre-op evaluation, timeout performed, IV checked, risks and benefits discussed, monitors and equipment checked and anesthesia consent givenArterial  Skin Prep: chlorhexidine gluconate and isopropyl alcohol  Local Infiltration: none  Orientation: left  Location: radial  Catheter Size: 20 G  Catheter placement by Anatomical landmarks. Heme positive aspiration all ports.Insertion Attempts: 1  Assessment  Dressing: secured with tape and tegaderm  Patient: Tolerated well

## 2020-08-10 NOTE — PLAN OF CARE
AA&Ox4, VSS, afebrile. Pt scheduled to go for left lung txp at 0430. R 22 G PIV placed in AC - pt tolerated well. Preoperative orders carried out. Covid screening negative. All consents signed. NPO at 2030. Plans to take shower at 0330. Daughter at bedside. Bed in lowest/locked position, nonskid socks on when out of bed and call light within reach. Pt resting comfortably. WCTM.

## 2020-08-10 NOTE — ANESTHESIA PREPROCEDURE EVALUATION
08/09/2020  [facility]  Anesthesia Pre-Operative Evaluation         Patient Name: Chely Becerra  YOB: 1955  MRN: [id number]    SUBJECTIVE:     08/09/2020    Pre-operative evaluation for * Left single lung transplant *    Chely Becerra is a 65 y.o. female with Type 2 Diabetes Mellitus, HTN, GERD, and idiopathic pulmonary fibrosis who presents for the above procedure.     2D Echo 12/19/2019 :  · Normal left ventricular systolic function. The estimated ejection fraction is 65%  · No wall motion abnormalities.  · Normal LV diastolic function.  · Normal right ventricular systolic function.  · Normal central venous pressure (3 mm Hg).  · The estimated PA systolic pressure is 37 mm Hg    LHC:  1. Coronary dominance: right  2. The left main coronary artery (LMCA) has an ostial 30% stenosis. iFR is 0.97. It gives origin to the left anterior descending (LAD) and left circumflex (LCx) arteries. There is no ramus intermedius. There is ALINA 3 flow.  3. The LAD is normal. It gives origin to two large diagonal branch(es). There is ALINA 3 flow.  4. The LCx is normal. It gives origin to two large obtuse marginal branch(es). There is ALINA 3 flow.  5. The RCA is normal. It gives origin to the posterior descending artery and the posterolateral branch.      LDA:   22g Right AC       Previous airway:   None documented.      Drips:   None documented.      Patient Active Problem List   Diagnosis    CONDON (dyspnea on exertion)    ILD (interstitial lung disease)    Lung transplant candidate    History of coronary angiogram    Hepatic steatosis    Hepatitis B core antibody positive    Common bile duct dilatation    Obesity (BMI 30-39.9)    Essential hypertension    Pre-transplant evaluation for lung transplant    Controlled type 2 diabetes mellitus without complication, without long-term  "current use of insulin    Awaiting transplantation of lung    Chronic hypoxemic respiratory failure    IPF (idiopathic pulmonary fibrosis)    Coronary artery disease       Review of patient's allergies indicates:   Allergen Reactions    Boniva [ibandronate] Other (See Comments)     "chest cramps"       Current Inpatient Medications:      No current facility-administered medications on file prior to encounter.      Current Outpatient Medications on File Prior to Encounter   Medication Sig Dispense Refill    ADVAIR DISKUS 250-50 mcg/dose diskus inhaler Inhale 1 puff into the lungs 2 (two) times daily.       aspirin (ECOTRIN) 81 MG EC tablet Take 81 mg by mouth once daily.      atorvastatin (LIPITOR) 20 MG tablet Take 20 mg by mouth once daily.      benzonatate (TESSALON) 200 MG capsule Take 200 mg by mouth 3 (three) times daily as needed for Cough.      betamethasone dipropionate (DIPROLENE) 0.05 % cream APPLY TO AFFECTED AREA(S) TWO TIMES A DAY 30 g 1    ciprofloxacin HCl (CIPRO) 500 MG tablet Take 1 tablet (500 mg total) by mouth every 12 (twelve) hours. 14 tablet 0    ergocalciferol (ERGOCALCIFEROL) 50,000 unit Cap Take 50,000 Units by mouth every 14 (fourteen) days.      ferrous sulfate (FEOSOL) 325 mg (65 mg iron) Tab tablet Take 325 mg by mouth daily with breakfast.      gabapentin (NEURONTIN) 100 MG capsule Take 100 mg by mouth 3 (three) times daily.       lansoprazole (PREVACID) 15 MG capsule Take 15 mg by mouth once daily.      loperamide (IMODIUM A-D) 2 mg Tab Take 2 mg by mouth 4 (four) times daily as needed.      metoprolol succinate (TOPROL-XL) 25 MG 24 hr tablet Take 25 mg by mouth once daily.      OFEV 150 mg Cap TAKE 1 CAPSULE BY MOUTH TWICE A DAY  EVERY 12 HOURS  AS DIRECTED WITH FOOD  6    olmesartan-hydrochlorothiazide (BENICAR HCT) 40-12.5 mg Tab Take 1 tablet by mouth once daily.      VENTOLIN HFA 90 mcg/actuation inhaler Inhale 1 puff into the lungs every 6 (six) hours as " needed.       [DISCONTINUED] betamethasone dipropionate (DIPROLENE) 0.05 % cream Apply topically 2 (two) times daily. 30 g 1       Past Surgical History:   Procedure Laterality Date    CATHETERIZATION OF BOTH LEFT AND RIGHT HEART N/A 1/17/2019    Procedure: CATHETERIZATION, HEART, BOTH LEFT AND RIGHT;  Surgeon: Trey Evans MD;  Location: [facility] CATH LAB;  Service: Cardiology;  Laterality: N/A;    CHOLECYSTECTOMY      COLONOSCOPY      ESOPHAGOGASTRODUODENOSCOPY      HERNIA REPAIR      LEFT HEART CATHETERIZATION Left 2/18/2020    Procedure: Left heart cath;  Surgeon: Trey Evans MD;  Location: [facility] CATH LAB;  Service: Cardiology;  Laterality: Left;    SMALL INTESTINE SURGERY      mass removed (benign)       Social History     Socioeconomic History    Marital status: Single     Spouse name: Not on file    Number of children: Not on file    Years of education: Not on file    Highest education level: Not on file   Occupational History    Not on file   Social Needs    Financial resource strain: Not on file    Food insecurity     Worry: Not on file     Inability: Not on file    Transportation needs     Medical: Not on file     Non-medical: Not on file   Tobacco Use    Smoking status: Former Smoker     Types: Cigarettes     Quit date: 12/13/2016     Years since quitting: 3.6    Smokeless tobacco: Never Used   Substance and Sexual Activity    Alcohol use: No     Frequency: Never    Drug use: No    Sexual activity: Not on file   Lifestyle    Physical activity     Days per week: Not on file     Minutes per session: Not on file    Stress: Not on file   Relationships    Social connections     Talks on phone: Not on file     Gets together: Not on file     Attends Yazidi service: Not on file     Active member of club or organization: Not on file     Attends meetings of clubs or organizations: Not on file     Relationship status: Not on file   Other Topics Concern    Not on file   Social  History Narrative    Not on file       OBJECTIVE:     Vital Signs Range (Last 24H):  Temp:  [36.8 °C (98.2 °F)-36.9 °C (98.4 °F)]   Pulse:  [66]   Resp:  [18]   BP: (138)/(68)   SpO2:  [98 %-99 %]       Significant Labs:  Lab Results   Component Value Date    WBC 8.91 08/09/2020    HGB 11.8 (L) 08/09/2020    HCT 37.9 08/09/2020     08/09/2020    CHOL 180 01/14/2019    TRIG 102 01/14/2019    HDL 57 01/14/2019    ALT 18 08/09/2020    AST 18 08/09/2020     08/09/2020    K 3.5 08/09/2020     08/09/2020    CREATININE 0.7 08/09/2020    BUN 12 08/09/2020    CO2 31 (H) 08/09/2020    TSH 1.345 01/14/2019    INR 0.9 08/09/2020    HGBA1C 6.0 (H) 06/18/2020         Diagnostic Studies:    CXR 8/9/20:  FINDINGS:  Single frontal view the chest indicates that there is diffuse opacification of the right and left lung parenchyma.  On previous dictations this was attributed to chronic interstitial changes.  There does not appear to be any new appreciable changes.  The cardiac silhouette is mainly obscured.  There is elevation of the right hemidiaphragm.  This calcification of the arch of the aorta.      Cardiac Studies:  EKG:   Normal sinus rhythm  LVH  Abnormal ECG  When compared with ECG of 17-JAN-2019 07:56,  Premature ventricular complexes are no longer Present  Confirmed by Roslyn Calvillo MD ([id number]) on 2/18/2020 7:19:04 PM.    2D Echo 12/19/2019 :  · Normal left ventricular systolic function. The estimated ejection fraction is 65%  · No wall motion abnormalities.  · Normal LV diastolic function.  · Normal right ventricular systolic function.  · Normal central venous pressure (3 mm Hg).  · The estimated PA systolic pressure is 37 mm Hg    LHC:  6. Coronary dominance: right  7. The left main coronary artery (LMCA) has an ostial 30% stenosis. iFR is 0.97. It gives origin to the left anterior descending (LAD) and left circumflex (LCx) arteries. There is no ramus intermedius. There is ALINA 3 flow.  8. The LAD is  normal. It gives origin to two large diagonal branch(es). There is ALINA 3 flow.  9. The LCx is normal. It gives origin to two large obtuse marginal branch(es). There is ALINA 3 flow.  10. The RCA is normal. It gives origin to the posterior descending artery and the posterolateral branch.    ASSESSMENT/PLAN:       Anesthesia Evaluation    I have reviewed the Patient Summary Reports.    I have reviewed the Nursing Notes. I have reviewed the NPO Status.      Review of Systems  Anesthesia Hx:   Denies Personal Hx of Anesthesia complications.   Social:  Former Smoker    EENT/Dental:EENT/Dental Normal   Cardiovascular:   Hypertension CAD asymptomatic  CONDON    Pulmonary:   Shortness of breath IPF   Hepatic/GI:   GERD Liver Disease,    Musculoskeletal:  Musculoskeletal Normal    Neurological:  Neurology Normal    Endocrine:   Diabetes, well controlled, type 2    Dermatological:  Skin Normal    Psych:  Psychiatric Normal           Physical Exam  General:  Well nourished    Airway/Jaw/Neck:  Airway Findings: Mouth Opening: Normal Tongue: Normal  Mallampati: III  Improves to II with phonation.  TM Distance: Normal, at least 6 cm  Jaw/Neck Findings:     Neck ROM: Normal ROM     Eyes/Ears/Nose:  Eyes/Ears/Nose Findings: EOMI, MMM    Dental:  Dental Findings: Upper partial dentures    Chest/Lungs:  Chest/Lungs Findings: Rales, Extensive, Normal Respiratory Rate     Heart/Vascular:  Heart Findings: Rate: Normal  Rhythm: Regular Rhythm        Mental Status:  Mental Status Findings:  Cooperative, Alert and Oriented         Anesthesia Plan  Type of Anesthesia, risks & benefits discussed:  Anesthesia Type:  general  Patient's Preference:   Intra-op Monitoring Plan: arterial line, central line, [place] and standard ASA monitors  Intra-op Monitoring Plan Comments:   Post Op Pain Control Plan: multimodal analgesia, IV/PO Opioids PRN and per primary service following discharge from PACU  Post Op Pain Control Plan Comments:   Induction:    IV  Beta Blocker:  Patient is not currently on a Beta-Blocker (No further documentation required).       Informed Consent: Patient understands risks and agrees with Anesthesia plan.  Questions answered. Anesthesia consent signed with patient.  ASA Score: 4  emergent   Day of Surgery Review of History & Physical:        Anesthesia Plan Notes: Patient reports not taking metoprolol succinate         Ready For Surgery From Anesthesia Perspective.

## 2020-08-10 NOTE — CARE UPDATE
Pt arrived to SICU from OR intubated with 8.0 ETT secured 21 cm at the lips. Pt connected to ventilator on documented settings upon arrival. ABG with lactate and electrolytes done. Results reported to David ANDERSON. Will continue to monitor closely.

## 2020-08-10 NOTE — SUBJECTIVE & OBJECTIVE
Interval HPI:   Overnight events: Received in ICU, intubated.  BG well controlled on intensive insulin protocol, rates ranging from 5-8 u/hr.  Received solu medrol 500 mg IV this morning.  Eating:   NPO  Nausea: No  Hypoglycemia and intervention: No  Fever: No  TPN and/or TF: No    PMH, PSH, FH, SH reviewed     ROS:  Unable to obtain due to: Sedation,Intubation,Altered mental status,Critical illness,Reviewed ROS from note dated 8/9/20 by Niraj Garza MD.      Current Medications and/or Treatments Impacting Glycemic Control  Immunotherapy:    Immunosuppressants         Stop Route Frequency     mycophenolate mofetil 200 mg/mL suspension 500 mg      -- PER NG TUBE 2 times daily     tacrolimus (PROGRAF) 1 mg/mL oral syringe      -- PER NG TUBE 2 times daily     basiliximab (SIMULECT) 20 mg in dextrose 5 % 50 mL infusion      08/18 1114 IV Every 96 hours        Steroids:   Hormones (From admission, onward)    Start     Stop Route Frequency Ordered    08/13/20 0900  methylPREDNISolone sodium succinate injection 173 mg  (methylprednisolone panel)      08/14 0859 IV Daily 08/10/20 1015    08/12/20 0900  methylPREDNISolone sodium succinate (SOLU-MEDROL) 259.5 mg in dextrose 5 % 100 mL IVPB  (methylprednisolone panel)      08/13 0859 IV Daily 08/10/20 1015    08/11/20 0900  methylPREDNISolone sodium succinate (SOLU-MEDROL) 432.5 mg in dextrose 5 % 100 mL IVPB  (methylprednisolone panel)      08/12 0859 IV Daily 08/10/20 1015    08/10/20 1145  vasopressin (PITRESSIN) 0.2 Units/mL in dextrose 5 % 100 mL infusion      -- IV Continuous 08/10/20 1035        Pressors:    Autonomic Drugs (From admission, onward)    Start     Stop Route Frequency Ordered    08/10/20 1145  norepinephrine 4 mg in dextrose 5% 250 mL infusion (premix) (titrating)     Question Answer Comment   Titrate by: (in mcg/kg/min) 0.02    Titrate interval: (in minutes) 5    Titrate to maintain: (MAP or SBP) MAP    Greater than: (in mmHg) 70    Maximum dose:  (in mcg/kg/min) 3        -- IV Continuous 08/10/20 1035    08/10/20 1115  EPINEPHrine (ADRENALIN) 5 mg in sodium chloride 0.9% 250 mL infusion     Question Answer Comment   Titrate by: (in mcg/kg/min) 0.05    Titrate interval: (in minutes) 5    Titrate to maintain: (SBP or MAP or Cardiac Index) MAP    Greater than: (in mmHg) 60    Cardiac index greater than: (in L/min) 2.2    Maximum dose: (in mcg/kg/min) 2        -- IV Continuous 08/10/20 1015        Hyperglycemia/Diabetes Medications:   Antihyperglycemics (From admission, onward)    Start     Stop Route Frequency Ordered    08/10/20 1115  insulin regular 100 Units in sodium chloride 0.9% 100 mL infusion     Question:  Insulin rate changes (DO NOT MODIFY ANSWER)  Answer:  \\[email or web address] [email or web address]    -- IV Continuous 08/10/20 1015             PHYSICAL EXAMINATION:  Vitals:    08/10/20 1110   BP:    Pulse: 69   Resp: 16   Temp:      Body mass index is 29.85 kg/m².    Physical Exam     Constitutional: Well developed, well nourished, NAD.  ENT: External ears no masses with nose patent  Neck: Supple; trachea midline  Cardiovascular: Normal heart sounds, no LE edema. DP +2 bilaterally.  Lungs: Normal effort; lungs anterior bilaterally clear to auscultation.  Intubated on a ventilator.  Abdomen: Soft, no masses, no hernias.  Hypoactive BS noted.  MS: No clubbing or cyanosis of nails noted; unable to assess gait.  Skin: No rashes, lesions, or ulcers; no nodules.  Mid-sternal incision with telfa island dressing, CDI.  CT x 3.  Psychiatric: NAVEEN  Neurological: NAVEEN  Foot: Nails in good condition, no amputations noted

## 2020-08-11 PROBLEM — D69.6 THROMBOCYTOPENIA, UNSPECIFIED: Status: RESOLVED | Noted: 2020-01-01 | Resolved: 2020-01-01

## 2020-08-11 PROBLEM — I95.81 POSTPROCEDURAL HYPOTENSION: Status: RESOLVED | Noted: 2020-01-01 | Resolved: 2020-01-01

## 2020-08-11 NOTE — PLAN OF CARE
Recommendations     1. Once extubated, advance PO diet order to Regular as tolerated (texture per SLP). Add Diabetic diet restrictions if necessary (A1C 6.0).  2. If PO intake poor >48 hrs, add Boost Glucose Control TID to supplement intake.   3. If pt remains intubated >72 hrs, begin enteral feeds with Impact Peptide 1.5. (Goal: 45 mL/hr to meet 100% EEN/EPN).  4. RD to monitor & follow-up w/ post transplant diet education.     Goals: Meet % EEN, EPN by RD f/u date  Nutrition Goal Status: new  Communication of RD Recs: reviewed with RN

## 2020-08-11 NOTE — PROGRESS NOTES
[facility]  Critical Care - Surgery  History & Physical    Patient Name: Chely Becerra  MRN: [id number]  Admission Date: 8/9/2020  Code Status: Full Code  Attending Physician: Benedict Clemons MD   Primary Care Provider: ZAHIDA Stack MD   Principal Problem: S/P lung transplant    Subjective:     HPI:Ms Becerra is a 64 y/o woman with a history of IPF who  is on 2L of oxygen.  She is on no assisted ventilation.  Her New [place] Heart Association Class is 60% - Requires occasional assistance but is able to care for needs.  She is not diabetic. She is being admitted tonight for potential left single lung transplant.      Her donor is not a PHS increased risk nor a Hep C+ donor.     Hospital/ICU Course:   NAEON; VSS.   Not requiring any pressors.    Chest tubes in place with minimal output. (85mL from each L1 and R1; 190mL from L2)  UOP adequate.   Propofol (30) and insulin (0.4) gtts this am.  A/C VC+ 30% FiO2, 5 of peep. Vt 370, Ti.74.     Follow-up For: Procedure(s) (LRB):  TRANSPLANT, LUNG - 4:30AM start (Left)  APPLICATION, WOUND VAC, 40 x 5cm (Left)  RESECTION, LUNG (Left)    Post-Operative Day: Day of Surgery     Past Medical History:   Diagnosis Date    Common bile duct dilatation 1/15/2019    Controlled type 2 diabetes mellitus without complication, without long-term current use of insulin 1/17/2019    Essential hypertension 1/16/2019    GERD (gastroesophageal reflux disease)     Hepatitis B core antibody positive 1/15/2019    IPF (idiopathic pulmonary fibrosis)     Obesity (BMI 30-39.9) 1/16/2019    RLS (restless legs syndrome)        Past Surgical History:   Procedure Laterality Date    CATHETERIZATION OF BOTH LEFT AND RIGHT HEART N/A 1/17/2019    Procedure: CATHETERIZATION, HEART, BOTH LEFT AND RIGHT;  Surgeon: Trey Evans MD;  Location: [facility] CATH LAB;  Service: Cardiology;  Laterality: N/A;    CHOLECYSTECTOMY      COLONOSCOPY      ESOPHAGOGASTRODUODENOSCOPY      HERNIA  "REPAIR      LEFT HEART CATHETERIZATION Left 2/18/2020    Procedure: Left heart cath;  Surgeon: Trey Evans MD;  Location: [facility] CATH LAB;  Service: Cardiology;  Laterality: Left;    SMALL INTESTINE SURGERY      mass removed (benign)       Review of patient's allergies indicates:   Allergen Reactions    Boniva [ibandronate] Other (See Comments)     "chest cramps"       Family History     None        Tobacco Use    Smoking status: Former Smoker     Types: Cigarettes     Quit date: 12/13/2016     Years since quitting: 3.6    Smokeless tobacco: Never Used   Substance and Sexual Activity    Alcohol use: No     Frequency: Never    Drug use: No    Sexual activity: Not on file      Review of Systems  Objective:     Vital Signs (Most Recent):  Temp: 98.6 °F (37 °C) (08/11/20 0600)  Pulse: 76 (08/11/20 0615)  Resp: (!) 25 (08/11/20 0615)  BP: (!) 104/59 (08/11/20 0600)  SpO2: 99 % (08/11/20 0615) Vital Signs (24h Range):  Temp:  [95.9 °F (35.5 °C)-99.7 °F (37.6 °C)] 98.6 °F (37 °C)  Pulse:  [63-81] 76  Resp:  [15-26] 25  SpO2:  [95 %-100 %] 99 %  BP: (103-104)/(56-59) 104/59  Arterial Line BP: ()/(50-84) 120/59     Weight: 96.7 kg (213 lb 3 oz)  Body mass index is 33.39 kg/m².      Intake/Output Summary (Last 24 hours) at 8/11/2020 0657  Last data filed at 8/11/2020 0600  Gross per 24 hour   Intake 4346.21 ml   Output 5175 ml   Net -828.79 ml     Physical Exam    Vents:  Vent Mode: A/C (08/11/20 0412)  Ventilator Initiated: Yes (08/10/20 1015)  Set Rate: 16 BPM (08/11/20 0412)  Vt Set: 370 mL (08/11/20 0412)  PEEP/CPAP: 5 cmH20 (08/11/20 0412)  Oxygen Concentration (%): 30 (08/11/20 0615)  Peak Airway Pressure: 24 cmH2O (08/11/20 0412)  Plateau Pressure: 0 cmH20 (08/11/20 0412)  Total Ve: 7.86 mL (08/11/20 0412)  F/VT Ratio<105 (RSBI): (!) 49.63 (08/11/20 0412)    Lines/Drains/Airways     Central Venous Catheter Line             Introducer with Double Lumen 08/10/20 right internal jugular 1 day    " Pulmonary Artery Catheter Assessment  08/10/20 0533 1 day          Drain                 Urethral Catheter 08/10/20 0535 Non-latex;Straight-tip 16 Fr. 1 day         Chest Tube 08/10/20 1100 1 Left Pleural less than 1 day         Chest Tube 08/10/20 1100 1 Right Pleural less than 1 day         Chest Tube 08/10/20 1100 2 Left Pleural less than 1 day         NG/OG Tube 08/10/20 1100 less than 1 day          Airway                 Airway - Non-Surgical 08/10/20 0623 1 day          Arterial Line                 Arterial Line 08/10/20 0531 Left Radial 1 day          Peripheral Intravenous Line                 Peripheral IV - Single Lumen 08/09/20 2000 22 G Right Antecubital 1 day         Peripheral IV - Single Lumen 08/10/20 0555 16 G Left Forearm 1 day                Significant Labs:    CBC/Anemia Profile:  Recent Labs   Lab 08/10/20  1800 08/10/20  2359 08/11/20  0400   WBC 9.79 12.99* 16.07*   HGB 9.1* 9.3* 9.4*   HCT 28.1* 29.3* 28.2*   * 145* 153   MCV 94 95 93   RDW 13.4 13.5 13.8       Chemistries:  Recent Labs   Lab 08/09/20  1902 08/10/20  1020  08/10/20  1800 08/10/20  2000 08/10/20  2359 08/11/20  0400    138   < > 136  --  137 139  139   K 3.5 3.3*  3.3*   < > 3.8  3.8 3.7 4.3  4.3 4.0  4.0  4.0    107   < > 105  --  107 107  107   CO2 31* 23   < > 25  --  24 23  23   BUN 12 9   < > 10  --  9 8  8   CREATININE 0.7 0.6   < > 0.7  --  0.7 0.7  0.7   CALCIUM 9.5 6.9*   < > 8.0*  --  8.1* 8.4*  8.4*   ALBUMIN 3.8  --   --   --   --   --  3.5   PROT 7.6  --   --   --   --   --  5.9*   BILITOT 0.3  --   --   --   --   --  0.5   ALKPHOS 80  --   --   --   --   --  54*   ALT 18  --   --   --   --   --  20   AST 18  --   --   --   --   --  34   MG  --  1.5*  --   --  2.6  --  2.4   PHOS  --  2.2*   < > 2.8  --  2.9 2.9  2.9    < > = values in this interval not displayed.     ABGs:   Recent Labs   Lab 08/11/20  0526   PH 7.404   PCO2 37.8   HCO3 23.7*   POCSATURATED 68*   BE -1      Bilirubin:   Recent Labs   Lab 08/09/20 1902 08/11/20  0400   BILITOT 0.3 0.5     Blood Culture: No results for input(s): LABBLOO in the last 48 hours.  BMP:   Recent Labs   Lab 08/11/20  0400   *  152*     139   K 4.0  4.0  4.0     107   CO2 23  23   BUN 8  8   CREATININE 0.7  0.7   CALCIUM 8.4*  8.4*   MG 2.4     CMP:   Recent Labs   Lab 08/09/20  1902  08/10/20  1800 08/10/20  2000 08/10/20  2359 08/11/20  0400      < > 136  --  137 139  139   K 3.5   < > 3.8  3.8 3.7 4.3  4.3 4.0  4.0  4.0      < > 105  --  107 107  107   CO2 31*   < > 25  --  24 23  23   GLU 92   < > 185*  --  124* 152*  152*   BUN 12   < > 10  --  9 8  8   CREATININE 0.7   < > 0.7  --  0.7 0.7  0.7   CALCIUM 9.5   < > 8.0*  --  8.1* 8.4*  8.4*   PROT 7.6  --   --   --   --  5.9*   ALBUMIN 3.8  --   --   --   --  3.5   BILITOT 0.3  --   --   --   --  0.5   ALKPHOS 80  --   --   --   --  54*   AST 18  --   --   --   --  34   ALT 18  --   --   --   --  20   ANIONGAP 8   < > 6*  --  6* 9  9   EGFRNONAA >60.0   < > >60.0  --  >60.0 >60.0  >60.0    < > = values in this interval not displayed.     Cardiac Markers: No results for input(s): CKMB, TROPONINT, MYOGLOBIN in the last 48 hours.  Coagulation:   Recent Labs   Lab 08/11/20  0400   INR 0.9   APTT 27.0     Lactic Acid: No results for input(s): LACTATE in the last 48 hours.  Lipid Panel: No results for input(s): CHOL, HDL, LDLCALC, TRIG, CHOLHDL in the last 48 hours.    Significant Imaging: I have reviewed and interpreted all pertinent imaging results/findings within the past 24 hours.    Assessment/Plan:   65 year old female with end stage IPF s/p single left lung transplant 8/10/20.     Neuro:   - Chemically sedated and intubated  - Propofol and precedex PRN  - Fentanyl PRN    Cardiac:  - MAP goal 75-85  - continue bere  - continue CVC  - CT, continue to wall suction, will keep today    Pulmonary:   - intubated A/C VC+ 30% FiO2, 5 of  peep. Vt 370, Ti.74.   - Bronchoscopy in ICU while intubated.   -  SBT with extubation after bronchoscopy today.    - PRN breathing treatments  - daily CXR  - ABG (0530) 7.4/37.8/35/23.7/-1    Renal:   - monitor Is and Os  - trend BMP now and daily  - continue pelletier  - Lasix for as needed diuresis    Infectious Disease:   - WBC trending, trend daily  - Vanc, cefepime, voriconazole  - Methylprednisone, Mycophenolate, and tacrolimus    Lab Results   Component Value Date    WBC 16.07 (H) 08/11/2020     Hematology/Oncology:  - H/H trending, monitor now and daily  - No indication for transfusion at this time  Lab Results   Component Value Date    WBC 16.07 (H) 08/11/2020    HGB 9.4 (L) 08/11/2020    HCT 28.2 (L) 08/11/2020    MCV 93 08/11/2020     08/11/2020       Endocrine:  - insulin gtt vs SSI as needed  - endocrinology following    F:   Fluids/Electrolytes (From admission, onward)    Start     Stop Route Frequency Ordered    08/10/20 1256  dextrose 50% injection 25 g      -- IV As needed (PRN) 08/10/20 1157    08/10/20 1256  dextrose 50% injection 12.5 g      -- IV As needed (PRN) 08/10/20 1157    08/10/20 1111  dextrose 50% injection 12.5 g      -- IV As needed (PRN) 08/10/20 1015    08/10/20 1111  dextrose 50% injection 25 g      -- IV As needed (PRN) 08/10/20 1015    08/10/20 1111  potassium chloride 20 mEq in 100 mL IVPB (FOR CENTRAL LINE ADMINISTRATION ONLY)  (Med - Potassium Chloride IVPB for CENTRAL LINE)      -- IV As needed (PRN) 08/10/20 1015    08/10/20 1111  potassium chloride 40 mEq in 100 mL IVPB (FOR CENTRAL LINE ADMINISTRATION ONLY)  (Med - Potassium Chloride IVPB for CENTRAL LINE)      -- IV As needed (PRN) 08/10/20 1015    08/10/20 1111  potassium chloride 20 mEq in 100 mL IVPB (FOR CENTRAL LINE ADMINISTRATION ONLY)  (Med - Potassium Chloride IVPB for CENTRAL LINE)      -- IV As needed (PRN) 08/10/20 1015    08/10/20 1111  magnesium sulfate 2g in water 50mL IVPB (premix)      -- IV As needed  (PRN) 08/10/20 1015         E:   Recent Labs   Lab 08/11/20  0400     139   K 4.0  4.0  4.0     107   CO2 23  23   BUN 8  8   CREATININE 0.7  0.7   MG 2.4      N: NPO    GI:   - replace electrolytes as needed to keep K>4, Mg>2  - bowel reg - daily miralax, docusate  - famotidine for GI prophylaxis    Dispo:  - continue care in the SICU        Jayden Hernandez MD  Critical Care - Surgery  [facility]

## 2020-08-11 NOTE — PROGRESS NOTES
"[facility]  Endocrinology  Progress Note    Admit Date: 8/9/2020     Reason for Consult: Management of T2DM, Hyperglycemia     Surgical Procedure and Date: Left lung transplant and wound vac application 8/10/20    Diabetes diagnosis year: NAVEEN    Lab Results   Component Value Date    HGBA1C 6.0 (H) 06/18/2020       Home Diabetes Medications: none (per chart review)    How often checking glucose at home? NAVEEN  BG readings on regimen: NAVEEN  Hypoglycemia on the regimen? NAVEEN  Missed doses on regimen? ANVEEN    Diabetes Complications include:     NAVEEN    Complicating diabetes co morbidities:   Glucocorticoid use  s/p lung transplant      HPI:   Patient is a 65 y.o. female with a diagnosis of DM2, IPF, and chronic respiratory failure, who is s/p left lung transplant on 8/10/20.  Patient with controlled DM2 on no medications per chart review.  Endocrinology consulted for DM/BG management.            Interval HPI:   Overnight events: Remains in ICU, intubated and sedated. Family at [facility]. BG reasonably   well controlled on IV insulin infusion 0.2-4.7 u/hr.   Eating:   NPO  Nausea: No  Hypoglycemia and intervention: No  Fever: No  TPN and/or TF: No    /65 (BP Location: Left arm, Patient Position: Lying)   Pulse 63   Temp 98.3 °F (36.8 °C) (Core ([place]))   Resp (!) 21   Ht 5' 7" (1.702 m)   Wt 96.7 kg (213 lb 3 oz)   LMP  (LMP Unknown)   SpO2 100%   Breastfeeding No   BMI 33.39 kg/m²     Labs Reviewed and Include    Recent Labs   Lab 08/11/20  0400  08/11/20  1200   *  152*   < > 165*   CALCIUM 8.4*  8.4*   < > 8.3*   ALBUMIN 3.5  --   --    PROT 5.9*  --   --      139   < > 139   K 4.0  4.0  4.0   < > 4.9  4.9   CO2 23  23   < > 26     107   < > 109   BUN 8  8   < > 8   CREATININE 0.7  0.7   < > 0.6   ALKPHOS 54*  --   --    ALT 20  --   --    AST 34  --   --    BILITOT 0.5  --   --     < > = values in this interval not displayed.     Lab Results   Component " Value Date    WBC 16.40 (H) 08/11/2020    HGB 9.2 (L) 08/11/2020    HCT 28.3 (L) 08/11/2020    MCV 94 08/11/2020     (L) 08/11/2020     No results for input(s): TSH, FREET4 in the last 168 hours.  Lab Results   Component Value Date    HGBA1C 6.0 (H) 06/18/2020       Nutritional status:   Body mass index is 33.39 kg/m².  Lab Results   Component Value Date    ALBUMIN 3.5 08/11/2020    ALBUMIN 3.8 08/09/2020    ALBUMIN 3.5 06/18/2020     No results found for: PREALBUMIN    Estimated Creatinine Clearance: 114.5 mL/min (based on SCr of 0.6 mg/dL).    Accu-Checks  Recent Labs     08/11/20  0502 08/11/20  0625 08/11/20  0706 08/11/20  0807 08/11/20  0841 08/11/20  0904 08/11/20  1020 08/11/20  1112 08/11/20  1217 08/11/20  1314   POCTGLUCOSE 158* 167* 173* 136* 178* 182* 166* 184* 150* 155*       Current Medications and/or Treatments Impacting Glycemic Control  Immunotherapy:    Immunosuppressants         Stop Route Frequency     mycophenolate mofetil 200 mg/mL suspension 500 mg      -- PER NG TUBE 2 times daily     tacrolimus (PROGRAF) 1 mg/mL oral syringe      -- PER NG TUBE 2 times daily     basiliximab (SIMULECT) 20 mg in dextrose 5 % 50 mL infusion      08/18 1114 IV Every 96 hours        Steroids:   Hormones (From admission, onward)    Start     Stop Route Frequency Ordered    08/17/20 0900  predniSONE tablet 35 mg      -- Oral Daily 08/11/20 1153    08/16/20 0900  predniSONE tablet 45 mg      08/17 0859 Oral Daily 08/11/20 1153    08/15/20 0900  predniSONE tablet 60 mg      08/16 0859 Oral Daily 08/11/20 1153    08/14/20 0900  predniSONE tablet 90 mg      08/15 0859 Oral Daily 08/11/20 1153    08/13/20 0900  methylPREDNISolone sodium succinate injection 173 mg  (methylprednisolone panel)      08/14 0859 IV Daily 08/10/20 1015    08/12/20 0900  methylPREDNISolone sodium succinate (SOLU-MEDROL) 259.5 mg in dextrose 5 % 100 mL IVPB  (methylprednisolone panel)      08/13 0859 IV Daily 08/10/20 1015    08/10/20  1145  vasopressin (PITRESSIN) 0.2 Units/mL in dextrose 5 % 100 mL infusion      -- IV Continuous 08/10/20 1035        Pressors:    Autonomic Drugs (From admission, onward)    Start     Stop Route Frequency Ordered    08/10/20 1145  norepinephrine 4 mg in dextrose 5% 250 mL infusion (premix) (titrating)     Question Answer Comment   Titrate by: (in mcg/kg/min) 0.02    Titrate interval: (in minutes) 5    Titrate to maintain: (MAP or SBP) MAP    Greater than: (in mmHg) 70    Maximum dose: (in mcg/kg/min) 3        -- IV Continuous 08/10/20 1035    08/10/20 1115  EPINEPHrine (ADRENALIN) 5 mg in sodium chloride 0.9% 250 mL infusion     Question Answer Comment   Titrate by: (in mcg/kg/min) 0.05    Titrate interval: (in minutes) 5    Titrate to maintain: (SBP or MAP or Cardiac Index) MAP    Greater than: (in mmHg) 60    Cardiac index greater than: (in L/min) 2.2    Maximum dose: (in mcg/kg/min) 2        -- IV Continuous 08/10/20 1015        Hyperglycemia/Diabetes Medications:   Antihyperglycemics (From admission, onward)    Start     Stop Route Frequency Ordered    08/10/20 1300  insulin regular 100 Units in sodium chloride 0.9% 100 mL infusion     Question:  Insulin Rate Adjustment (DO NOT MODIFY ANSWER)  Answer:  [email or web address]    -- IV Continuous 08/10/20 1157          ASSESSMENT and PLAN    * S/P lung transplant  S/p lung transplant on 8/10/20  Managed per primary team  Avoid hypoglycemia  Optimize BG control for surgical wound healing        Controlled type 2 diabetes mellitus without complication, without long-term current use of insulin  BG goal 140 - 180     Change to transition insulin infusion at 0.6 u/hr.  BG monitoring every 4 hours and low dose correction scale.     Discharge planning: TBD      Adrenal cortical steroids causing adverse effect in therapeutic use  On steroid taper per transplant team; may elevate BG readings            Nidia Frazier,  NP  Endocrinology  [facility]

## 2020-08-11 NOTE — ASSESSMENT & PLAN NOTE
BG goal 140 - 180     Change to transition insulin infusion at 0.6 u/hr.  BG monitoring every 4 hours and low dose correction scale.     Discharge planning: [facility]

## 2020-08-11 NOTE — PROGRESS NOTES
Admission Note:    Chely Becerra  is a 65 y.o. female who received a L SLT on 8/10/20 secondary to IPF.  The patient received the standard lung transplant immunosuppression which includes methylprednisolone 1 gram IV in the operating room, followed by steroid taper, per protocol.  Basiliximab 20 mg was administered on arrival to the ICU.  Next dose due on POD #4.    Maintenance immunosuppression includes tacrolimus 0.5 mg BID and mycophenolate mofetil 500 mg BID, each will be titrated to goal level or maximum dose.  Opportunistic infection prophylaxis includes valganciclovir 450 mg twice daily, voriconazole 4 mg/kg twice daily, and Sulfamethoximazole/Trimethoprim DS QMWF.   Pertinent home medications have been restarted.  Self medication administration will begin when the patient is moved to the transplant step down unit.  Medication education will begin as well.  Will follow with team.     CMV D(-) / R(+)    Prednisone Taper:  (8/26-9/9):     25 mg daily  (9/10-11/8):   20 mg daily  (11/9-12/8):   15 mg daily  (12/9-2/7):     10 mg daily  (2/8/20):          5 mg daily

## 2020-08-11 NOTE — PROGRESS NOTES
"Pharmacokinetic Assessment Follow Up: IV Vancomycin    Vancomycin serum concentration assessment/plan:    · Continue vancomycin 1250 mg every 12 hours  · Trough level for 8/12/20 at 0630  · Goal 15-20 mg/dL      Drug levels (last 3 results):  No results for input(s): VANCOMYCINRA, VANCOMYCINPE, VANCOMYCINTR, VANCOTROUGH in the last 72 hours.    Pharmacy will continue to follow and monitor vancomycin.    Please contact pharmacy at extension [phone] for questions regarding this assessment.    Thank you for the consult,   Rogelio Gregory       Patient brief summary:  Chely Becerra is a 65 y.o. female initiated on antimicrobial therapy with IV Vancomycin for treatment of OIP s/p lung transplant        Drug Allergies:   Review of patient's allergies indicates:   Allergen Reactions    Boniva [ibandronate] Other (See Comments)     "chest cramps"       Actual Body Weight:   96 kg    Renal Function:   Estimated Creatinine Clearance: 114.5 mL/min (based on SCr of 0.6 mg/dL).,     Dialysis Method (if applicable):  N/A    CBC (last 72 hours):  Recent Labs   Lab Result Units 08/09/20  1902 08/10/20  1020 08/10/20  1400 08/10/20  1800 08/10/20  2359 08/11/20  0400 08/11/20  0800   WBC K/uL 8.91 12.84* 10.29 9.79 12.99* 16.07* 16.58*   Hemoglobin g/dL 11.8* 8.1* 9.3* 9.1* 9.3* 9.4* 9.2*   Hematocrit % 37.9 25.1* 28.5* 28.1* 29.3* 28.2* 28.6*   Platelets K/uL 284 112* 146* 133* 145* 153 144*   Gran% % 48.1 85.5* 84.7* 85.0* 87.4* 89.4* 88.3*   Lymph% % 39.6 7.9* 8.8* 10.1* 6.8* 5.6* 5.5*   Mono% % 7.5 5.6 5.7 4.3 5.5 4.4 5.6   Eosinophil% % 3.8 0.2 0.1 0.0 0.0 0.0 0.0   Basophil% % 0.8 0.2 0.1 0.1 0.1 0.1 0.1   Differential Method  Automated Automated Automated Automated Automated Automated Automated       Metabolic Panel (last 72 hours):  Recent Labs   Lab Result Units 08/09/20  1821 08/09/20  1902 08/10/20  1020 08/10/20  1400 08/10/20  1800 08/10/20  2000 08/10/20  2359 08/11/20  0400 08/11/20  0800   Sodium mmol/L  --  139 138 " 135* 136  --  137 139  139 137   Potassium mmol/L  --  3.5 3.3*  3.3* 4.8  4.8  4.8 3.8  3.8 3.7 4.3  4.3 4.0  4.0  4.0 3.7  3.7   Chloride mmol/L  --  100 107 105 105  --  107 107  107 106   CO2 mmol/L  --  31* 23 24 25  --  24 23  23 24   Glucose mg/dL  --  92 151* 138* 185*  --  124* 152*  152* 169*   Glucose, UA  Negative  --   --   --   --   --   --   --   --    BUN, Bld mg/dL  --  12 9 10 10  --  9 8  8 7*   Creatinine mg/dL  --  0.7 0.6 0.6 0.7  --  0.7 0.7  0.7 0.6   Albumin g/dL  --  3.8  --   --   --   --   --  3.5  --    Total Bilirubin mg/dL  --  0.3  --   --   --   --   --  0.5  --    Alkaline Phosphatase U/L  --  80  --   --   --   --   --  54*  --    AST U/L  --  18  --   --   --   --   --  34  --    ALT U/L  --  18  --   --   --   --   --  20  --    Magnesium mg/dL  --   --  1.5*  --   --  2.6  --  2.4  --    Phosphorus mg/dL  --   --  2.2* 2.7  2.7 2.8  --  2.9 2.9  2.9 2.7       Vancomycin Administrations:  vancomycin given in the last 96 hours                   vancomycin (VANCOCIN) 1250 mg in 5 % dextrose 250 mL IVPB (mg) 1,250 mg New Bag 08/11/20 0532     1,250 mg New Bag 08/10/20 1730    vancomycin in dextrose 5 % 1 gram/250 mL IVPB 1,000 mg (mg) 1,000 mg Given 08/10/20 0627                Microbiologic Results:  Microbiology Results (last 7 days)     Procedure Component Value Units Date/Time    AFB Culture & Smear [[id number]]     Order Status: No result Specimen: Respiratory from BAL, JOSETTE     Fungus culture [[id number]]     Order Status: No result Specimen: Respiratory from BAL, JOSETTE     Culture, Respiratory with Gram Stain [[id number]]     Order Status: No result Specimen: Respiratory from BAL, JOSETTE     Culture, Anaerobe [[id number]] Collected: 08/10/20 0758    Order Status: Completed Specimen: Body Fluid from Lung, Left Updated: 08/11/20 0719     Anaerobic Culture Culture in progress    Narrative:      Bronchus Donor Lung Left (Aerobic, Anaerobic, AFB, Fungus,  Gram)    Aerobic  culture [[id number]] Collected: 08/10/20 0758    Order Status: Completed Specimen: Body Fluid from Lung, Left Updated: 08/11/20 0715     Aerobic Bacterial Culture No growth    Narrative:      Bronchus Donor Lung Left (Aerobic, Anaerobic, AFB, Fungus,  Gram)    Gram stain [[id number]] Collected: 08/10/20 0758    Order Status: Completed Specimen: Body Fluid from Lung, Left Updated: 08/10/20 1143     Gram Stain Result No WBC's      No organisms seen    Narrative:      Bronchus Donor Lung Left (Aerobic, Anaerobic, AFB, Fungus,  Gram)    AFB Culture & Smear [[id number]] Collected: 08/10/20 0758    Order Status: Sent Specimen: Body Fluid from Lung, Left Updated: 08/10/20 1021    Fungus culture [[id number]] Collected: 08/10/20 0758    Order Status: Sent Specimen: Body Fluid from Lung, Left Updated: 08/10/20 1021

## 2020-08-11 NOTE — PT/OT/SLP PROGRESS
Occupational Therapy      Patient Name:  Chely Becerra   MRN:  [id number]    Patient not seen today secondary to pt intubated at this time. Will follow-up tomorrow.    KHOI Fields  8/11/2020

## 2020-08-11 NOTE — SUBJECTIVE & OBJECTIVE
Subjective:     Interval History: POD#1 s/p LSLT for IPF. No acute events overnight. Diuresed well with lasix IVP. Remains on VC ventilation. On propofol for sedation. No pressors. CT output stable.     Continuous Infusions:   epinephrine Stopped (08/10/20 1100)    insulin (HUMAN R) infusion (adults) 1 Units/hr (08/11/20 1100)    nicardipine Stopped (08/10/20 1700)    norepinephrine bitartrate-D5W Stopped (08/10/20 1100)    propofoL 30 mcg/kg/min (08/11/20 1100)    vasopressin (PITRESSIN) infusion Stopped (08/10/20 1200)     Scheduled Meds:   aspirin  325 mg Oral Daily    basiliximab (SIMULECT) infusion  20 mg Intravenous Q96H    ceFEPime (MAXIPIME) IVPB  2 g Intravenous Q12H    chlorhexidine  10 mL Mouth/Throat BID    famotidine (PF)  20 mg Intravenous BID    ganciclovir (CYTOVENE) IVPB  5 mg/kg Intravenous Q12H    [START ON 8/12/2020] methylPREDNISolone (SOLU-Medrol) IVPB (doses > 250 mg)   Intravenous Daily    Followed by    [START ON 8/13/2020] methylPREDNISolone sodium succinate  2 mg/kg Intravenous Daily    mupirocin  1 g Nasal BID    [START ON 8/12/2020] mycophenolate mofetil  500 mg Per NG tube BID    polyethylene glycol  17 g Oral Daily    [START ON 8/14/2020] predniSONE  90 mg Oral Daily    Followed by    [START ON 8/15/2020] predniSONE  60 mg Oral Daily    Followed by    [START ON 8/16/2020] predniSONE  45 mg Oral Daily    Followed by    [START ON 8/17/2020] predniSONE  35 mg Oral Daily    [START ON 8/14/2020] sulfamethoxazole-trimethoprim 200-40 mg/5 ml  20 mL Per NG tube Every Mon, Wed, Fri    [START ON 8/12/2020] tacrolimus  0.5 mg Per NG tube BID    vancomycin (VANCOCIN) IVPB  1,250 mg Intravenous Q12H    vorconazole (VFEND) IVPB  4 mg/kg Intravenous Q12H     PRN Meds:acetaminophen, dextrose 50%, dextrose 50%, dextrose 50%, dextrose 50%, fentaNYL, lactulose, magnesium sulfate IVPB, metoclopramide HCl, ondansetron, oxyCODONE, oxyCODONE, potassium chloride in water **AND**  "potassium chloride in water **AND** potassium chloride in water, sodium phosphate IVPB, sodium phosphate IVPB, sodium phosphate IVPB, Pharmacy to dose Vancomycin consult **AND** vancomycin - pharmacy to dose    Review of patient's allergies indicates:   Allergen Reactions    Boniva [ibandronate] Other (See Comments)     "chest cramps"       Review of Systems   Unable to perform ROS: Intubated     Objective:   Physical Exam  Vitals signs and nursing note reviewed.   Constitutional:       Appearance: Normal appearance. She is overweight.      Interventions: She is intubated.   HENT:      Head: Normocephalic and atraumatic.      Nose: Nose normal.      Mouth/Throat:      Mouth: Mucous membranes are dry.      Comments: ET/OGT in place  Eyes:      General: No scleral icterus.     Conjunctiva/sclera: Conjunctivae normal.   Neck:      Comments: Right IJ catheter and cordis c/d/i  Cardiovascular:      Rate and Rhythm: Normal rate and regular rhythm.      Heart sounds: No murmur. Friction rub present. No gallop.    Pulmonary:      Effort: She is intubated.      Breath sounds: Examination of the right-middle field reveals rales. Examination of the right-lower field reveals decreased breath sounds and rales. Examination of the left-lower field reveals decreased breath sounds. Decreased breath sounds and rales present. No wheezing or rhonchi.      Comments: Mechanical BS bilateral, 2 left pleural and 1 right pleural chest tubes in place with sanguinous output, no airleaks  Abdominal:      General: Bowel sounds are decreased. There is no distension.      Palpations: Abdomen is soft.   Genitourinary:     Comments: pelletier  Musculoskeletal:      Right lower leg: No edema.      Left lower leg: No edema.   Skin:     General: Skin is warm and dry.      Capillary Refill: Capillary refill takes less than 2 seconds.      Comments: Left radial arterial line c/d/i   Neurological:      Comments: sedated   Psychiatric:      Comments: sedated "           Vital Signs (Most Recent):  Temp: 98.3 °F (36.8 °C) (08/11/20 1100)  Pulse: 63 (08/11/20 1100)  Resp: 16 (08/11/20 1100)  BP: (!) 95/56 (08/11/20 1100)  SpO2: 98 % (08/11/20 1100) Vital Signs (24h Range):  Temp:  [95.9 °F (35.5 °C)-99.7 °F (37.6 °C)] 98.3 °F (36.8 °C)  Pulse:  [62-81] 63  Resp:  [15-26] 16  SpO2:  [95 %-100 %] 98 %  BP: ()/(55-61) 95/56  Arterial Line BP: ()/(50-84) 110/54     Weight: 96.7 kg (213 lb 3 oz)  Body mass index is 33.39 kg/m².      Intake/Output Summary (Last 24 hours) at 8/11/2020 1230  Last data filed at 8/11/2020 1200  Gross per 24 hour   Intake 2442.41 ml   Output 5570 ml   Net -3127.59 ml       Ventilator Data:     Vent Mode: A/C  Oxygen Concentration (%):  [30-50] 30  Resp Rate Total:  [16 br/min-27 br/min] 16 br/min  Vt Set:  [370 mL] 370 mL  PEEP/CPAP:  [5 cmH20-8 cmH20] 5 cmH20  Mean Airway Pressure:  [9.6 kkW79-53 cmH20] 12 cmH20    Hemodynamic Parameters:  PAP: (24-38)/(1-16) 28/5  PAP (Mean):  [14 mmHg-24 mmHg] 17 mmHg  PCWP:  [7 mmHg-15 mmHg] 7 mmHg  CO:  [4 L/min-6.1 L/min] 6.1 L/min  CI:  [2.2 L/min/m2-3.9 L/min/m2] 3.1 L/min/m2    Lines/Drains:  Pulmonary Artery Catheter Assessment  08/10/20 9536 (Active)   Verification by X-ray Yes 08/10/20 1100   Site Assessment No drainage;No redness;No swelling;No warmth 08/10/20 1100   Line Securement Device Secured with sutures 08/10/20 1100   Dressing Type Biopatch in place;Central line dressing with pants 08/10/20 1100   Dressing Status Clean;Dry;Intact 08/10/20 1100   Dressing Intervention Integrity maintained 08/10/20 1100   Date on Dressing 08/17/20 08/10/20 1100   Dressing Due to be Changed 08/17/20 08/10/20 1100   Balloon Patency/Care flushed w/o difficulty;normal saline locked;infusing 08/10/20 1100   Thermistor Patency/Care flushed w/o difficulty;normal saline locked;infusing 08/10/20 1100   Proximal Patency/Care flushed w/o difficulty;normal saline locked;infusing 08/10/20 1100   Distal Patency/Care  flushed w/o difficulty;normal saline locked;infusing 08/10/20 1100   Extra Patency/Care flushed w/o difficulty;normal saline locked;infusing 08/10/20 1100   Waveform Normal 08/10/20 1100   Line Necessity Review Large/frequent boluses;Longterm central access required;Medication caustic to vasculature;Poor venous access 08/10/20 1100   Number of days: 0        Introducer with Double Lumen 08/10/20 right internal jugular (Active)   Site Assessment No drainage;No redness;No swelling;No warmth 08/10/20 1100   Line Securement Device Secured with sutures 08/10/20 1100   Dressing Type Biopatch in place;Central line dressing with pants 08/10/20 1100   Dressing Status Clean;Dry;Intact 08/10/20 1100   Dressing Intervention Integrity maintained 08/10/20 1100   Date on Dressing 08/17/20 08/10/20 1100   Dressing Due to be Changed 08/17/20 08/10/20 1100   Distal Patency/Care flushed w/o difficulty;blood return present;infusing 08/10/20 1100   Proximal 1 Patency/Care flushed w/o difficulty;blood return present;infusing 08/10/20 1100   Line Necessity Review Large/frequent boluses;Longterm central access required;Medication caustic to vasculature;Poor venous access 08/10/20 1100   Number of days: 0            Peripheral IV - Single Lumen 08/09/20 2000 22 G Right Antecubital (Active)   Site Assessment Clean;Dry;Intact;No redness;No swelling 08/10/20 1100   Line Status Blood return noted;Flushed;Infusing 08/10/20 1100   Dressing Status Clean;Dry;Intact 08/10/20 1100   Dressing Intervention First dressing 08/10/20 1100   Dressing Change Due 08/14/20 08/10/20 1100   Site Change Due 08/14/20 08/10/20 1100   Reason Not Rotated Not due 08/10/20 1100   Number of days: 0            Peripheral IV - Single Lumen 08/10/20 0555 16 G Left Forearm (Active)   Site Assessment Clean;Dry;Intact;No redness;No swelling 08/10/20 1100   Line Status Blood return noted;Flushed;Saline locked 08/10/20 1100   Dressing Status Clean;Dry;Intact 08/10/20 1100    Dressing Intervention First dressing 08/10/20 1100   Dressing Change Due 08/14/20 08/10/20 1100   Site Change Due 08/14/20 08/10/20 1100   Reason Not Rotated Not due 08/10/20 1100   Number of days: 0            Arterial Line 08/10/20 0531 Left Radial (Active)   Site Assessment Clean;Dry;Intact;No redness;No swelling 08/10/20 1100   Line Status Pulsatile blood flow 08/10/20 1100   Art Line Waveform Appropriate;Square wave test performed 08/10/20 1100   Arterial Line Interventions Zeroed and calibrated;Leveled;Connections checked and tightened;Flushed per protocol;Line pulled back 08/10/20 1100   Color/Movement/Sensation Capillary refill less than 3 sec 08/10/20 1100   Dressing Type Central line dressing with pants 08/10/20 1100   Dressing Status Clean;Dry;Intact 08/10/20 1100   Dressing Intervention Integrity maintained 08/10/20 1100   Dressing Change Due 08/17/20 08/10/20 1100   Number of days: 0            Chest Tube 08/10/20 1100 1 Left Pleural (Active)   Chest Tube WDL ex 08/10/20 1100   Function -20 cm H2O 08/10/20 1100   Air Leak/Fluctuation air leak not present;fluctuation not present;connections tightened;dependent drainage cleared 08/10/20 1100   Safety all tubing connections taped;2 rubber-tipped hemostats w/ patient;all connections secured;suction checked 08/10/20 1100   Securement tubing secured to body distal to insertion site w/ tape 08/10/20 1100   Patency Intervention Tip/tilt 08/10/20 1100   Drainage Description Sanguineous 08/10/20 1100   Dressing Appearance occlusive gauze dressing intact;clean and dry 08/10/20 1100   Dressing Care dressing reinforced 08/10/20 1100   Left Subcutaneous Emphysema none present 08/10/20 1100   Right Subcutaneous Emphysema none present 08/10/20 1100   Site Assessment Not assessed;Other (Comment) 08/10/20 1100   Surrounding Skin Unable to view 08/10/20 1100   Output (mL) 10 mL 08/10/20 1100   Number of days: 0            Chest Tube 08/10/20 1100 1 Right Pleural  (Active)   Chest Tube WDL ex 08/10/20 1100   Function -20 cm H2O 08/10/20 1100   Air Leak/Fluctuation air leak not present;fluctuation not present 08/10/20 1100   Safety all tubing connections taped;2 rubber-tipped hemostats w/ patient;all connections secured;suction checked 08/10/20 1100   Securement tubing secured to body distal to insertion site w/ tape 08/10/20 1100   Patency Intervention Tip/tilt 08/10/20 1100   Drainage Description Sanguineous 08/10/20 1100   Dressing Appearance occlusive gauze dressing intact;clean and dry 08/10/20 1100   Dressing Care dressing reinforced 08/10/20 1100   Left Subcutaneous Emphysema none present 08/10/20 1100   Right Subcutaneous Emphysema none present 08/10/20 1100   Site Assessment Not assessed;Other (Comment) 08/10/20 1100   Surrounding Skin Unable to view;Other (Comment) 08/10/20 1100   Output (mL) 15 mL 08/10/20 1100   Number of days: 0            Chest Tube 08/10/20 1100 2 Left Pleural (Active)   Chest Tube WDL ex 08/10/20 1100   Function -20 cm H2O 08/10/20 1100   Air Leak/Fluctuation air leak not present;fluctuation not present 08/10/20 1100   Safety all tubing connections taped;2 rubber-tipped hemostats w/ patient;all connections secured;suction checked 08/10/20 1100   Securement tubing secured to body distal to insertion site w/ tape 08/10/20 1100   Patency Intervention Tip/tilt 08/10/20 1100   Drainage Description Sanguineous 08/10/20 1100   Dressing Appearance occlusive gauze dressing intact;clean and dry 08/10/20 1100   Dressing Care dressing reinforced 08/10/20 1100   Left Subcutaneous Emphysema none present 08/10/20 1100   Right Subcutaneous Emphysema none present 08/10/20 1100   Site Assessment Not assessed;Other (Comment) 08/10/20 1100   Surrounding Skin Unable to view;Other (Comment) 08/10/20 1100   Output (mL) 20 mL 08/10/20 1100   Number of days: 0            NG/OG Tube 08/10/20 1100 (Active)   Placement Check placement verified by aspirate  "characteristics;placement verified by x-ray 08/10/20 1100   Advancement advanced manually 08/10/20 1100   Tolerance no signs/symptoms of discomfort 08/10/20 1100   Securement secured to commercial device 08/10/20 1100   Clamp Status/Tolerance clamped;no abdominal discomfort;no abdominal distention;no emesis;no nausea;no residual;no restlessness 08/10/20 1100   Suction Setting/Drainage Method dependent drainage 08/10/20 1100   Insertion Site Appearance no redness, warmth, tenderness, skin breakdown, drainage 08/10/20 1100   Number of days: 0            Urethral Catheter 08/10/20 0535 Non-latex;Straight-tip 16 Fr. (Active)   Site Assessment Clean;Intact 08/10/20 1100   Collection Container Urimeter 08/10/20 1100   Securement Method secured to top of thigh w/ adhesive device 08/10/20 1100   Catheter Care Performed yes 08/10/20 1100   Reason for Continuing Urinary Catheterization Critically ill in ICU requiring intensive monitoring 08/10/20 1100   CAUTI Prevention Bundle StatLock in place w 1" slack;Intact seal between catheter & drainage tubing;Drainage bag/urimeter off the floor;Green sheeting clip in use;No dependent loops or kinks;Drainage bag/urimeter not overfilled (<2/3 full);Drainage bag/urimeter below bladder 08/10/20 1100   Output (mL) 100 mL 08/10/20 1200   Number of days: 0       Significant Labs:  CBC:  Recent Labs   Lab 08/11/20  0800   WBC 16.58*   RBC 3.01*   HGB 9.2*   HCT 28.6*   *   MCV 95   MCH 30.6   MCHC 32.2     BMP:  Recent Labs   Lab 08/11/20  0800      K 3.7  3.7      CO2 24   BUN 7*   CREATININE 0.6   CALCIUM 7.9*      Tacrolimus Levels:  No results for input(s): TACROLIMUS in the last 72 hours.  Microbiology:  Microbiology Results (last 7 days)     Procedure Component Value Units Date/Time    AFB Culture & Smear [[id number]]     Order Status: No result Specimen: Respiratory from BAL, JOSETTE     Fungus culture [[id number]]     Order Status: No result Specimen: Respiratory from " BAL, JOSETTE     Culture, Respiratory with Gram Stain [[id number]]     Order Status: No result Specimen: Respiratory from BAL, JOSETTE     Culture, Anaerobe [[id number]] Collected: 08/10/20 0758    Order Status: Completed Specimen: Body Fluid from Lung, Left Updated: 08/11/20 0719     Anaerobic Culture Culture in progress    Narrative:      Bronchus Donor Lung Left (Aerobic, Anaerobic, AFB, Fungus,  Gram)    Aerobic culture [[id number]] Collected: 08/10/20 0758    Order Status: Completed Specimen: Body Fluid from Lung, Left Updated: 08/11/20 0715     Aerobic Bacterial Culture No growth    Narrative:      Bronchus Donor Lung Left (Aerobic, Anaerobic, AFB, Fungus,  Gram)    Gram stain [[id number]] Collected: 08/10/20 0758    Order Status: Completed Specimen: Body Fluid from Lung, Left Updated: 08/10/20 1143     Gram Stain Result No WBC's      No organisms seen    Narrative:      Bronchus Donor Lung Left (Aerobic, Anaerobic, AFB, Fungus,  Gram)    AFB Culture & Smear [[id number]] Collected: 08/10/20 0758    Order Status: Sent Specimen: Body Fluid from Lung, Left Updated: 08/10/20 1021    Fungus culture [[id number]] Collected: 08/10/20 0758    Order Status: Sent Specimen: Body Fluid from Lung, Left Updated: 08/10/20 1021          I have reviewed all pertinent labs within the past 24 hours.    Diagnostic Results:  Chest X-Ray: I personally reviewed the films and findings are:, pulmonary edema, perihilar opacitites bilaterally     No Further

## 2020-08-11 NOTE — PROGRESS NOTES
Patient received a left lung transplant yesterday.  Today, she was sedated and intubated in the ICU.  Per Dr. Hill, the CTS and SICU teams plan to wean sedation in hopes of extubating the patient later today. Patient's daughter was at bedside and updated about the plan of care.  Transplant coordinator will continue to follow with the multi-disciplinary team, and remains available to assist with patient/family care and education.

## 2020-08-11 NOTE — PROGRESS NOTES
[facility]  Lung Transplant  Progress Note - Critical Care    Patient Name: Chely Becerra  MRN: [id number]  Admission Date: 8/9/2020  Hospital Length of Stay: 2 days  Post-Operative Day: 1  Attending Physician: Benedict Clemons MD  Primary Care Provider: ZAHIDA Stack MD     Subjective:     Interval History: POD#1 s/p LSLT for IPF. No acute events overnight. Diuresed well with lasix IVP. Remains on VC ventilation. On propofol for sedation. No pressors. CT output stable.     Continuous Infusions:   epinephrine Stopped (08/10/20 1100)    insulin (HUMAN R) infusion (adults) 1 Units/hr (08/11/20 1100)    nicardipine Stopped (08/10/20 1700)    norepinephrine bitartrate-D5W Stopped (08/10/20 1100)    propofoL 30 mcg/kg/min (08/11/20 1100)    vasopressin (PITRESSIN) infusion Stopped (08/10/20 1200)     Scheduled Meds:   aspirin  325 mg Oral Daily    basiliximab (SIMULECT) infusion  20 mg Intravenous Q96H    ceFEPime (MAXIPIME) IVPB  2 g Intravenous Q12H    chlorhexidine  10 mL Mouth/Throat BID    famotidine (PF)  20 mg Intravenous BID    ganciclovir (CYTOVENE) IVPB  5 mg/kg Intravenous Q12H    [START ON 8/12/2020] methylPREDNISolone (SOLU-Medrol) IVPB (doses > 250 mg)   Intravenous Daily    Followed by    [START ON 8/13/2020] methylPREDNISolone sodium succinate  2 mg/kg Intravenous Daily    mupirocin  1 g Nasal BID    [START ON 8/12/2020] mycophenolate mofetil  500 mg Per NG tube BID    polyethylene glycol  17 g Oral Daily    [START ON 8/14/2020] predniSONE  90 mg Oral Daily    Followed by    [START ON 8/15/2020] predniSONE  60 mg Oral Daily    Followed by    [START ON 8/16/2020] predniSONE  45 mg Oral Daily    Followed by    [START ON 8/17/2020] predniSONE  35 mg Oral Daily    [START ON 8/14/2020] sulfamethoxazole-trimethoprim 200-40 mg/5 ml  20 mL Per NG tube Every Mon, Wed, Fri    [START ON 8/12/2020] tacrolimus  0.5 mg Per NG tube BID    vancomycin (VANCOCIN) IVPB  1,250  "mg Intravenous Q12H    vorconazole (VFEND) IVPB  4 mg/kg Intravenous Q12H     PRN Meds:acetaminophen, dextrose 50%, dextrose 50%, dextrose 50%, dextrose 50%, fentaNYL, lactulose, magnesium sulfate IVPB, metoclopramide HCl, ondansetron, oxyCODONE, oxyCODONE, potassium chloride in water **AND** potassium chloride in water **AND** potassium chloride in water, sodium phosphate IVPB, sodium phosphate IVPB, sodium phosphate IVPB, Pharmacy to dose Vancomycin consult **AND** vancomycin - pharmacy to dose    Review of patient's allergies indicates:   Allergen Reactions    Boniva [ibandronate] Other (See Comments)     "chest cramps"       Review of Systems   Unable to perform ROS: Intubated     Objective:   Physical Exam  Vitals signs and nursing note reviewed.   Constitutional:       Appearance: Normal appearance. She is overweight.      Interventions: She is intubated.   HENT:      Head: Normocephalic and atraumatic.      Nose: Nose normal.      Mouth/Throat:      Mouth: Mucous membranes are dry.      Comments: ET/OGT in place  Eyes:      General: No scleral icterus.     Conjunctiva/sclera: Conjunctivae normal.   Neck:      Comments: Right IJ catheter and cordis c/d/i  Cardiovascular:      Rate and Rhythm: Normal rate and regular rhythm.      Heart sounds: No murmur. Friction rub present. No gallop.    Pulmonary:      Effort: She is intubated.      Breath sounds: Examination of the right-middle field reveals rales. Examination of the right-lower field reveals decreased breath sounds and rales. Examination of the left-lower field reveals decreased breath sounds. Decreased breath sounds and rales present. No wheezing or rhonchi.      Comments: Mechanical BS bilateral, 2 left pleural and 1 right pleural chest tubes in place with sanguinous output, no airleaks  Abdominal:      General: Bowel sounds are decreased. There is no distension.      Palpations: Abdomen is soft.   Genitourinary:     Comments: pelletier  Musculoskeletal: "      Right lower leg: No edema.      Left lower leg: No edema.   Skin:     General: Skin is warm and dry.      Capillary Refill: Capillary refill takes less than 2 seconds.      Comments: Left radial arterial line c/d/i   Neurological:      Comments: sedated   Psychiatric:      Comments: sedated           Vital Signs (Most Recent):  Temp: 98.3 °F (36.8 °C) (08/11/20 1100)  Pulse: 63 (08/11/20 1100)  Resp: 16 (08/11/20 1100)  BP: (!) 95/56 (08/11/20 1100)  SpO2: 98 % (08/11/20 1100) Vital Signs (24h Range):  Temp:  [95.9 °F (35.5 °C)-99.7 °F (37.6 °C)] 98.3 °F (36.8 °C)  Pulse:  [62-81] 63  Resp:  [15-26] 16  SpO2:  [95 %-100 %] 98 %  BP: ()/(55-61) 95/56  Arterial Line BP: ()/(50-84) 110/54     Weight: 96.7 kg (213 lb 3 oz)  Body mass index is 33.39 kg/m².      Intake/Output Summary (Last 24 hours) at 8/11/2020 1230  Last data filed at 8/11/2020 1200  Gross per 24 hour   Intake 2442.41 ml   Output 5570 ml   Net -3127.59 ml       Ventilator Data:     Vent Mode: A/C  Oxygen Concentration (%):  [30-50] 30  Resp Rate Total:  [16 br/min-27 br/min] 16 br/min  Vt Set:  [370 mL] 370 mL  PEEP/CPAP:  [5 cmH20-8 cmH20] 5 cmH20  Mean Airway Pressure:  [9.6 xmV91-90 cmH20] 12 cmH20    Hemodynamic Parameters:  PAP: (24-38)/(1-16) 28/5  PAP (Mean):  [14 mmHg-24 mmHg] 17 mmHg  PCWP:  [7 mmHg-15 mmHg] 7 mmHg  CO:  [4 L/min-6.1 L/min] 6.1 L/min  CI:  [2.2 L/min/m2-3.9 L/min/m2] 3.1 L/min/m2    Lines/Drains:  Pulmonary Artery Catheter Assessment  08/10/20 0533 (Active)   Verification by X-ray Yes 08/10/20 1100   Site Assessment No drainage;No redness;No swelling;No warmth 08/10/20 1100   Line Securement Device Secured with sutures 08/10/20 1100   Dressing Type Biopatch in place;Central line dressing with pants 08/10/20 1100   Dressing Status Clean;Dry;Intact 08/10/20 1100   Dressing Intervention Integrity maintained 08/10/20 1100   Date on Dressing 08/17/20 08/10/20 1100   Dressing Due to be Changed 08/17/20 08/10/20 1100    Balloon Patency/Care flushed w/o difficulty;normal saline locked;infusing 08/10/20 1100   Thermistor Patency/Care flushed w/o difficulty;normal saline locked;infusing 08/10/20 1100   Proximal Patency/Care flushed w/o difficulty;normal saline locked;infusing 08/10/20 1100   Distal Patency/Care flushed w/o difficulty;normal saline locked;infusing 08/10/20 1100   Extra Patency/Care flushed w/o difficulty;normal saline locked;infusing 08/10/20 1100   Waveform Normal 08/10/20 1100   Line Necessity Review Large/frequent boluses;Longterm central access required;Medication caustic to vasculature;Poor venous access 08/10/20 1100   Number of days: 0        Introducer with Double Lumen 08/10/20 right internal jugular (Active)   Site Assessment No drainage;No redness;No swelling;No warmth 08/10/20 1100   Line Securement Device Secured with sutures 08/10/20 1100   Dressing Type Biopatch in place;Central line dressing with pants 08/10/20 1100   Dressing Status Clean;Dry;Intact 08/10/20 1100   Dressing Intervention Integrity maintained 08/10/20 1100   Date on Dressing 08/17/20 08/10/20 1100   Dressing Due to be Changed 08/17/20 08/10/20 1100   Distal Patency/Care flushed w/o difficulty;blood return present;infusing 08/10/20 1100   Proximal 1 Patency/Care flushed w/o difficulty;blood return present;infusing 08/10/20 1100   Line Necessity Review Large/frequent boluses;Longterm central access required;Medication caustic to vasculature;Poor venous access 08/10/20 1100   Number of days: 0            Peripheral IV - Single Lumen 08/09/20 2000 22 G Right Antecubital (Active)   Site Assessment Clean;Dry;Intact;No redness;No swelling 08/10/20 1100   Line Status Blood return noted;Flushed;Infusing 08/10/20 1100   Dressing Status Clean;Dry;Intact 08/10/20 1100   Dressing Intervention First dressing 08/10/20 1100   Dressing Change Due 08/14/20 08/10/20 1100   Site Change Due 08/14/20 08/10/20 1100   Reason Not Rotated Not due 08/10/20 1100    Number of days: 0            Peripheral IV - Single Lumen 08/10/20 0555 16 G Left Forearm (Active)   Site Assessment Clean;Dry;Intact;No redness;No swelling 08/10/20 1100   Line Status Blood return noted;Flushed;Saline locked 08/10/20 1100   Dressing Status Clean;Dry;Intact 08/10/20 1100   Dressing Intervention First dressing 08/10/20 1100   Dressing Change Due 08/14/20 08/10/20 1100   Site Change Due 08/14/20 08/10/20 1100   Reason Not Rotated Not due 08/10/20 1100   Number of days: 0            Arterial Line 08/10/20 0531 Left Radial (Active)   Site Assessment Clean;Dry;Intact;No redness;No swelling 08/10/20 1100   Line Status Pulsatile blood flow 08/10/20 1100   Art Line Waveform Appropriate;Square wave test performed 08/10/20 1100   Arterial Line Interventions Zeroed and calibrated;Leveled;Connections checked and tightened;Flushed per protocol;Line pulled back 08/10/20 1100   Color/Movement/Sensation Capillary refill less than 3 sec 08/10/20 1100   Dressing Type Central line dressing with pants 08/10/20 1100   Dressing Status Clean;Dry;Intact 08/10/20 1100   Dressing Intervention Integrity maintained 08/10/20 1100   Dressing Change Due 08/17/20 08/10/20 1100   Number of days: 0            Chest Tube 08/10/20 1100 1 Left Pleural (Active)   Chest Tube WDL ex 08/10/20 1100   Function -20 cm H2O 08/10/20 1100   Air Leak/Fluctuation air leak not present;fluctuation not present;connections tightened;dependent drainage cleared 08/10/20 1100   Safety all tubing connections taped;2 rubber-tipped hemostats w/ patient;all connections secured;suction checked 08/10/20 1100   Securement tubing secured to body distal to insertion site w/ tape 08/10/20 1100   Patency Intervention Tip/tilt 08/10/20 1100   Drainage Description Sanguineous 08/10/20 1100   Dressing Appearance occlusive gauze dressing intact;clean and dry 08/10/20 1100   Dressing Care dressing reinforced 08/10/20 1100   Left Subcutaneous Emphysema none present  08/10/20 1100   Right Subcutaneous Emphysema none present 08/10/20 1100   Site Assessment Not assessed;Other (Comment) 08/10/20 1100   Surrounding Skin Unable to view 08/10/20 1100   Output (mL) 10 mL 08/10/20 1100   Number of days: 0            Chest Tube 08/10/20 1100 1 Right Pleural (Active)   Chest Tube WDL ex 08/10/20 1100   Function -20 cm H2O 08/10/20 1100   Air Leak/Fluctuation air leak not present;fluctuation not present 08/10/20 1100   Safety all tubing connections taped;2 rubber-tipped hemostats w/ patient;all connections secured;suction checked 08/10/20 1100   Securement tubing secured to body distal to insertion site w/ tape 08/10/20 1100   Patency Intervention Tip/tilt 08/10/20 1100   Drainage Description Sanguineous 08/10/20 1100   Dressing Appearance occlusive gauze dressing intact;clean and dry 08/10/20 1100   Dressing Care dressing reinforced 08/10/20 1100   Left Subcutaneous Emphysema none present 08/10/20 1100   Right Subcutaneous Emphysema none present 08/10/20 1100   Site Assessment Not assessed;Other (Comment) 08/10/20 1100   Surrounding Skin Unable to view;Other (Comment) 08/10/20 1100   Output (mL) 15 mL 08/10/20 1100   Number of days: 0            Chest Tube 08/10/20 1100 2 Left Pleural (Active)   Chest Tube WDL ex 08/10/20 1100   Function -20 cm H2O 08/10/20 1100   Air Leak/Fluctuation air leak not present;fluctuation not present 08/10/20 1100   Safety all tubing connections taped;2 rubber-tipped hemostats w/ patient;all connections secured;suction checked 08/10/20 1100   Securement tubing secured to body distal to insertion site w/ tape 08/10/20 1100   Patency Intervention Tip/tilt 08/10/20 1100   Drainage Description Sanguineous 08/10/20 1100   Dressing Appearance occlusive gauze dressing intact;clean and dry 08/10/20 1100   Dressing Care dressing reinforced 08/10/20 1100   Left Subcutaneous Emphysema none present 08/10/20 1100   Right Subcutaneous Emphysema none present 08/10/20 1100  "  Site Assessment Not assessed;Other (Comment) 08/10/20 1100   Surrounding Skin Unable to view;Other (Comment) 08/10/20 1100   Output (mL) 20 mL 08/10/20 1100   Number of days: 0            NG/OG Tube 08/10/20 1100 (Active)   Placement Check placement verified by aspirate characteristics;placement verified by x-ray 08/10/20 1100   Advancement advanced manually 08/10/20 1100   Tolerance no signs/symptoms of discomfort 08/10/20 1100   Securement secured to commercial device 08/10/20 1100   Clamp Status/Tolerance clamped;no abdominal discomfort;no abdominal distention;no emesis;no nausea;no residual;no restlessness 08/10/20 1100   Suction Setting/Drainage Method dependent drainage 08/10/20 1100   Insertion Site Appearance no redness, warmth, tenderness, skin breakdown, drainage 08/10/20 1100   Number of days: 0            Urethral Catheter 08/10/20 0535 Non-latex;Straight-tip 16 Fr. (Active)   Site Assessment Clean;Intact 08/10/20 1100   Collection Container Urimeter 08/10/20 1100   Securement Method secured to top of thigh w/ adhesive device 08/10/20 1100   Catheter Care Performed yes 08/10/20 1100   Reason for Continuing Urinary Catheterization Critically ill in ICU requiring intensive monitoring 08/10/20 1100   CAUTI Prevention Bundle StatLock in place w 1" slack;Intact seal between catheter & drainage tubing;Drainage bag/urimeter off the floor;Green sheeting clip in use;No dependent loops or kinks;Drainage bag/urimeter not overfilled (<2/3 full);Drainage bag/urimeter below bladder 08/10/20 1100   Output (mL) 100 mL 08/10/20 1200   Number of days: 0       Significant Labs:  CBC:  Recent Labs   Lab 08/11/20  0800   WBC 16.58*   RBC 3.01*   HGB 9.2*   HCT 28.6*   *   MCV 95   MCH 30.6   MCHC 32.2     BMP:  Recent Labs   Lab 08/11/20  0800      K 3.7  3.7      CO2 24   BUN 7*   CREATININE 0.6   CALCIUM 7.9*      Tacrolimus Levels:  No results for input(s): TACROLIMUS in the last 72 " hours.  Microbiology:  Microbiology Results (last 7 days)     Procedure Component Value Units Date/Time    AFB Culture & Smear [[id number]]     Order Status: No result Specimen: Respiratory from BAL, JOSETTE     Fungus culture [[id number]]     Order Status: No result Specimen: Respiratory from BAL, JOSETTE     Culture, Respiratory with Gram Stain [[id number]]     Order Status: No result Specimen: Respiratory from BAL, JOSETTE     Culture, Anaerobe [[id number]] Collected: 08/10/20 0758    Order Status: Completed Specimen: Body Fluid from Lung, Left Updated: 08/11/20 0719     Anaerobic Culture Culture in progress    Narrative:      Bronchus Donor Lung Left (Aerobic, Anaerobic, AFB, Fungus,  Gram)    Aerobic culture [[id number]] Collected: 08/10/20 0758    Order Status: Completed Specimen: Body Fluid from Lung, Left Updated: 08/11/20 0715     Aerobic Bacterial Culture No growth    Narrative:      Bronchus Donor Lung Left (Aerobic, Anaerobic, AFB, Fungus,  Gram)    Gram stain [[id number]] Collected: 08/10/20 0758    Order Status: Completed Specimen: Body Fluid from Lung, Left Updated: 08/10/20 1143     Gram Stain Result No WBC's      No organisms seen    Narrative:      Bronchus Donor Lung Left (Aerobic, Anaerobic, AFB, Fungus,  Gram)    AFB Culture & Smear [[id number]] Collected: 08/10/20 0758    Order Status: Sent Specimen: Body Fluid from Lung, Left Updated: 08/10/20 1021    Fungus culture [[id number]] Collected: 08/10/20 0758    Order Status: Sent Specimen: Body Fluid from Lung, Left Updated: 08/10/20 1021          I have reviewed all pertinent labs within the past 24 hours.    Diagnostic Results:  Chest X-Ray: I personally reviewed the films and findings are:, pulmonary edema, perihilar opacitites bilaterally     No Further        Assessment/Plan:     * S/P lung transplant  POD #1 s/p uncomplicated LSLT for IPF. Currently PGD 2. Recommend continued diuresis as tolerated. CT output stable. CTS primary and LuT following for  immunosuppression and OIP. Bronchoscopy today.     Immunosuppression  Induction with solumedrol and basiliximab. Repeat basiliximab on POD#4. Tacrolimus, MMF, and prednisone taper ordered.     Prophylactic antibiotic  CMV D-/R+ On vancomycin and cefepime for routine surgical prophylaxis. Bactrim, ganciclovir, and voriconazole for OIP. Will follow up on donor cultures and adjust therapy as needed.     Controlled type 2 diabetes mellitus without complication, without long-term current use of insulin  Endocrine following, appreciate recs.     Postprocedural hypotension-resolved as of 8/11/2020  Expected post-op. Maintain MAPs per CTS.    Acute blood loss anemia  Expected post-op. Continue to trend and transfuse per CTS thresholds.     Leukocytosis  Expected post-op. Continue to trend.         Preventive Measures: per primary    Counseling/Consultation:I discussed the case with Dr. Hill.      Mayuri Nath PA-C  Lung Transplant  [facility]

## 2020-08-11 NOTE — CONSULTS
"  [facility]  Adult Nutrition  Consult Note    SUMMARY     Recommendations    1. Once extubated, advance PO diet order to Regular as tolerated (texture per SLP). Add Diabetic diet restrictions if necessary (A1C 6.0).  2. If PO intake poor >48 hrs, add Boost Glucose Control TID to supplement intake.   3. If pt remains intubated >72 hrs, begin enteral feeds with Impact Peptide 1.5. (Goal: 45 mL/hr to meet 100% EEN/EPN).  4. RD to monitor & follow-up w/ post transplant diet education.    Goals: Meet % EEN, EPN by RD f/u date  Nutrition Goal Status: new  Communication of RD Recs: reviewed with RN    Reason for Assessment    Reason For Assessment: consult  Diagnosis: other (see comments)(S/p L. Lung tx)  Relevant Medical History: DM, HTN, IPF  Interdisciplinary Rounds: did not attend    General Information Comments: Pt intubated/sedated, 1 day post-op L. Lung transplant. Spoke w/ pt's family member at bedside, who reports pt w/ good appetite PTA & intentional weight loss of 10#. Chart review confirms weight history. NFPE not warranted, pt w/ no indicators of malnutrition. Possible bronch & extubation today.  Nutrition Discharge Planning: TSU RD to provide post-transplant diet education when appropriate.    Nutrition/Diet History    Patient Reported Diet/Restrictions/Preferences: heart healthy  Factors Affecting Nutritional Intake: NPO, on mechanical ventilation    Anthropometrics    Temp: 98.1 °F (36.7 °C)  Height Method: Stated  Height: 5' 7" (170.2 cm)  Height (inches): 67 in  Weight Method: Bed Scale  Weight: 96.7 kg (213 lb 3 oz)  Weight (lb): 213.19 lb  Ideal Body Weight (IBW), Female: 135 lb  % Ideal Body Weight, Female (lb): 157.92 %  BMI (Calculated): 33.4  BMI Grade: 30 - 34.9- obesity - grade I    Lab/Procedures/Meds    Pertinent Labs Reviewed: reviewed  Pertinent Medications Reviewed: reviewed  Pertinent Medications Comments: Insulin, Propofol    Estimated/Assessed Needs    Weight " Used For Calorie Calculations: 96.7 kg (213 lb 3 oz)     Energy Calorie Requirements (kcal): 1624 kcal/d  Energy Need Method: [place]     Protein Requirements:  g/d (1.5-2 g/kg IBW)  Weight Used For Protein Calculations: 61.4 kg (135 lb 5.8 oz)     Estimated Fluid Requirement Method: other (see comments)(Per MD or 1 mL/kcal)    Nutrition Prescription Ordered    Current Diet Order: NPO    Evaluation of Received Nutrient/Fluid Intake    Other Calories (kcal): 412 (Propofol)    Comments: LBM: 8/9    Nutrition Risk    Level of Risk/Frequency of Follow-up: (2x/week)     Assessment and Plan    Nutrition Problem  Increased nutrient needs    Related to (etiology):   Physiological causes    Signs and Symptoms (as evidenced by):   S/p L. Lung tx     Interventions(treatment strategy):  Collaboration of nutrition care w/ other providers     Nutrition Diagnosis Status:   New     Monitor and Evaluation    Food and Nutrient Intake: energy intake, food and beverage intake, enteral nutrition intake  Food and Nutrient Adminstration: diet order, enteral and parenteral nutrition administration  Physical Activity and Function: nutrition-related ADLs and IADLs  Anthropometric Measurements: weight, weight change  Biochemical Data, Medical Tests and Procedures: glucose/endocrine profile, inflammatory profile, lipid profile, gastrointestinal profile, electrolyte and renal panel  Nutrition-Focused Physical Findings: overall appearance     Nutrition Follow-Up    RD Follow-up?: Yes

## 2020-08-11 NOTE — H&P
[facility]  Critical Care - Surgery  History & Physical    Patient Name: Chely Becerra  MRN: [id number]  Admission Date: 8/9/2020  Code Status: Full Code  Attending Physician: Benedict Clemons MD   Primary Care Provider: ZAHIDA Stack MD   Principal Problem: S/P lung transplant    Subjective:     HPI:Ms Becerra is a 66 y/o woman with a history of IPF who  is on 2L of oxygen.  She is on no assisted ventilation.  Her New [place] Heart Association Class is 60% - Requires occasional assistance but is able to care for needs.  She is not diabetic. She is being admitted tonight for potential left single lung transplant.      Her donor is not a PHS increased risk nor a Hep C+ donor.      Since her last clinic visit she has not required outpatient antibiotics or steroids. Has not visited the ED or been hospitalized. She says that her cough and shortness of breath on exertion are at her baseline.     Hospital/ICU Course: Patient went to the OR on 8/10 for left lung transplant.      EBL 150cc.    Significant size mismatch of donor bronchus.    Brought to the SICU intubated.  Not requiring any pressors.  Chest tubes in place with minimal output.      Follow-up For: Procedure(s) (LRB):  TRANSPLANT, LUNG - 4:30AM start (Left)  APPLICATION, WOUND VAC, 40 x 5cm (Left)  RESECTION, LUNG (Left)    Post-Operative Day: Day of Surgery     Past Medical History:   Diagnosis Date    Common bile duct dilatation 1/15/2019    Controlled type 2 diabetes mellitus without complication, without long-term current use of insulin 1/17/2019    Essential hypertension 1/16/2019    GERD (gastroesophageal reflux disease)     Hepatitis B core antibody positive 1/15/2019    IPF (idiopathic pulmonary fibrosis)     Obesity (BMI 30-39.9) 1/16/2019    RLS (restless legs syndrome)        Past Surgical History:   Procedure Laterality Date    CATHETERIZATION OF BOTH LEFT AND RIGHT HEART N/A 1/17/2019    Procedure: CATHETERIZATION, HEART,  "BOTH LEFT AND RIGHT;  Surgeon: Trey Evans MD;  Location: [facility] CATH LAB;  Service: Cardiology;  Laterality: N/A;    CHOLECYSTECTOMY      COLONOSCOPY      ESOPHAGOGASTRODUODENOSCOPY      HERNIA REPAIR      LEFT HEART CATHETERIZATION Left 2/18/2020    Procedure: Left heart cath;  Surgeon: Trey Evans MD;  Location: [facility] CATH LAB;  Service: Cardiology;  Laterality: Left;    SMALL INTESTINE SURGERY      mass removed (benign)       Review of patient's allergies indicates:   Allergen Reactions    Boniva [ibandronate] Other (See Comments)     "chest cramps"       Family History     None        Tobacco Use    Smoking status: Former Smoker     Types: Cigarettes     Quit date: 12/13/2016     Years since quitting: 3.6    Smokeless tobacco: Never Used   Substance and Sexual Activity    Alcohol use: No     Frequency: Never    Drug use: No    Sexual activity: Not on file      Review of Systems  Objective:     Vital Signs (Most Recent):  Temp: 96.7 °F (35.9 °C) (08/10/20 2000)  Pulse: 65 (08/10/20 2000)  Resp: 16 (08/10/20 2000)  BP: (!) 95/55 (08/10/20 0411)  SpO2: 98 % (08/10/20 2000) Vital Signs (24h Range):  Temp:  [95.9 °F (35.5 °C)-98.7 °F (37.1 °C)] 96.7 °F (35.9 °C)  Pulse:  [57-79] 65  Resp:  [15-25] 16  SpO2:  [95 %-100 %] 98 %  BP: ()/(55-63) 95/55  Arterial Line BP: ()/(50-84) 119/64     Weight: 86.4 kg (190 lb 9.4 oz)  Body mass index is 29.85 kg/m².      Intake/Output Summary (Last 24 hours) at 8/10/2020 2030  Last data filed at 8/10/2020 2000  Gross per 24 hour   Intake 3971 ml   Output 3205 ml   Net 766 ml       Physical Exam    Vents:  Vent Mode: A/C (08/10/20 1942)  Ventilator Initiated: Yes (08/10/20 1015)  Set Rate: 16 BPM (08/10/20 1942)  Vt Set: 370 mL (08/10/20 1942)  PEEP/CPAP: 8 cmH20 (08/10/20 1942)  Oxygen Concentration (%): 30 (08/10/20 2000)  Peak Airway Pressure: 26 cmH2O (08/10/20 1942)  Plateau Pressure: 0 cmH20 (08/10/20 1942)  Total Ve: 4.94 mL (08/10/20 " 1942)  F/VT Ratio<105 (RSBI): (!) 52.12 (08/10/20 1942)    Lines/Drains/Airways     Central Venous Catheter Line             Introducer with Double Lumen 08/10/20 right internal jugular less than 1 day    Pulmonary Artery Catheter Assessment  08/10/20 0533 less than 1 day          Drain                 Chest Tube 08/10/20 1100 1 Left Pleural less than 1 day         Chest Tube 08/10/20 1100 1 Right Pleural less than 1 day         Chest Tube 08/10/20 1100 2 Left Pleural less than 1 day         NG/OG Tube 08/10/20 1100 less than 1 day         Urethral Catheter 08/10/20 0535 Non-latex;Straight-tip 16 Fr. less than 1 day          Airway                 Airway - Non-Surgical 08/10/20 0623 less than 1 day          Arterial Line                 Arterial Line 08/10/20 0531 Left Radial less than 1 day          Peripheral Intravenous Line                 Peripheral IV - Single Lumen 08/09/20 2000 22 G Right Antecubital 1 day         Peripheral IV - Single Lumen 08/10/20 0555 16 G Left Forearm less than 1 day                Significant Labs:    CBC/Anemia Profile:  Recent Labs   Lab 08/10/20  1020  08/10/20  1400 08/10/20  1600 08/10/20  1800   WBC 12.84*  --  10.29  --  9.79   HGB 8.1*  --  9.3*  --  9.1*   HCT 25.1*   < > 28.5* 28* 28.1*   *  --  146*  --  133*   MCV 95  --  94  --  94   RDW 12.9  --  13.4  --  13.4    < > = values in this interval not displayed.        Chemistries:  Recent Labs   Lab 08/09/20  1902 08/10/20  1020 08/10/20  1400 08/10/20  1800    138 135* 136   K 3.5 3.3*  3.3* 4.8  4.8  4.8 3.8  3.8    107 105 105   CO2 31* 23 24 25   BUN 12 9 10 10   CREATININE 0.7 0.6 0.6 0.7   CALCIUM 9.5 6.9* 7.5* 8.0*   ALBUMIN 3.8  --   --   --    PROT 7.6  --   --   --    BILITOT 0.3  --   --   --    ALKPHOS 80  --   --   --    ALT 18  --   --   --    AST 18  --   --   --    MG  --  1.5*  --   --    PHOS  --  2.2* 2.7  2.7 2.8       Francis:   Recent Labs   Lab 08/10/20  1600   PH 7.354   PCO2  43.0   HCO3 23.9*   POCSATURATED 94*   BE -2     Bilirubin:   Recent Labs   Lab 08/09/20 1902   BILITOT 0.3     Blood Culture: No results for input(s): LABBLOO in the last 48 hours.  BMP:   Recent Labs   Lab 08/10/20  1020  08/10/20  1800   *   < > 185*      < > 136   K 3.3*  3.3*   < > 3.8  3.8      < > 105   CO2 23   < > 25   BUN 9   < > 10   CREATININE 0.6   < > 0.7   CALCIUM 6.9*   < > 8.0*   MG 1.5*  --   --     < > = values in this interval not displayed.     CMP:   Recent Labs   Lab 08/09/20  1902 08/10/20  1020 08/10/20  1400 08/10/20  1800    138 135* 136   K 3.5 3.3*  3.3* 4.8  4.8  4.8 3.8  3.8    107 105 105   CO2 31* 23 24 25   GLU 92 151* 138* 185*   BUN 12 9 10 10   CREATININE 0.7 0.6 0.6 0.7   CALCIUM 9.5 6.9* 7.5* 8.0*   PROT 7.6  --   --   --    ALBUMIN 3.8  --   --   --    BILITOT 0.3  --   --   --    ALKPHOS 80  --   --   --    AST 18  --   --   --    ALT 18  --   --   --    ANIONGAP 8 8 6* 6*   EGFRNONAA >60.0 >60.0 >60.0 >60.0     Cardiac Markers: No results for input(s): CKMB, TROPONINT, MYOGLOBIN in the last 48 hours.  Coagulation:   Recent Labs   Lab 08/10/20  1800   INR 0.9   APTT 36.2*     Lactic Acid: No results for input(s): LACTATE in the last 48 hours.  Lipid Panel: No results for input(s): CHOL, HDL, LDLCALC, TRIG, CHOLHDL in the last 48 hours.    Significant Imaging: I have reviewed and interpreted all pertinent imaging results/findings within the past 24 hours.    Assessment/Plan:     Active Diagnoses:    Diagnosis Date Noted POA    PRINCIPAL PROBLEM:  S/P lung transplant [Z94.2] 08/10/2020 Not Applicable    Adrenal cortical steroids causing adverse effect in therapeutic use [T38.0X5A] 08/10/2020 No    Immunosuppression [D89.9] 08/10/2020 No    Prophylactic antibiotic [Z79.2] 08/10/2020 Not Applicable    Postprocedural hypotension [I95.81] 08/10/2020 No    Acute blood loss anemia [D62] 08/10/2020 No    Thrombocytopenia, unspecified  [D69.6] 08/10/2020 No    Leukocytosis [D72.829] 08/10/2020 No    Controlled type 2 diabetes mellitus without complication, without long-term current use of insulin [E11.9] 01/17/2019 Yes      Problems Resolved During this Admission:    Diagnosis Date Noted Date Resolved POA    Overweight (BMI 25.0-29.9) [E66.3] 08/10/2020 08/10/2020 Yes       Neuro:   - Chemically sedated and intubated  - Propofol and precedex PRN  - Fentanyl PRN    Cardiac:  - MAP goal 75-85  - continue bere  - continue CVC  - CT, continue to wall suction, will keep today    Pulmonary:   - intubated  - daily SBT  - PRN breathing treatments  - daily CXR    Vent Mode: A/C  Oxygen Concentration (%):  [28-50] 30  Resp Rate Total:  [16 br/min-25 br/min] 16 br/min  Vt Set:  [370 mL-450 mL] 370 mL  PEEP/CPAP:  [8 cmH20] 8 cmH20  Mean Airway Pressure:  [11 gzE41-75 cmH20] 12 cmH20    Renal:   - monitor Is and Os  - trend BMP now and daily  - continue pelletier  - 10mg lasix    - BUN/Cr   BUN, Bld   Date Value Ref Range Status   08/10/2020 10 8 - 23 mg/dL Final   08/10/2020 10 8 - 23 mg/dL Final     Creatinine   Date Value Ref Range Status   08/10/2020 0.7 0.5 - 1.4 mg/dL Final   08/10/2020 0.6 0.5 - 1.4 mg/dL Final       Infectious Disease:   - WBC trending, trend daily  - Vanc, cefepime, voriconazole  - Methylprednisone, Mycophenolate, and tacrolimus    Lab Results   Component Value Date    WBC 9.79 08/10/2020       Hematology/Oncology:  - H/H trending, monitor now and daily  - No indication for transfusion at this time    Lab Results   Component Value Date    WBC 9.79 08/10/2020    HGB 9.1 (L) 08/10/2020    HCT 28.1 (L) 08/10/2020    MCV 94 08/10/2020     (L) 08/10/2020       Endocrine:  - insulin gtt vs SSI as needed  - endocrinology following    F:   Fluids/Electrolytes (From admission, onward)    Start     Stop Route Frequency Ordered    08/10/20 1256  dextrose 50% injection 25 g      -- IV As needed (PRN) 08/10/20 1157    08/10/20 1254   dextrose 50% injection 12.5 g      -- IV As needed (PRN) 08/10/20 1157    08/10/20 1111  dextrose 50% injection 12.5 g      -- IV As needed (PRN) 08/10/20 1015    08/10/20 1111  dextrose 50% injection 25 g      -- IV As needed (PRN) 08/10/20 1015    08/10/20 1111  potassium chloride 20 mEq in 100 mL IVPB (FOR CENTRAL LINE ADMINISTRATION ONLY)  (Med - Potassium Chloride IVPB for CENTRAL LINE)      -- IV As needed (PRN) 08/10/20 1015    08/10/20 1111  potassium chloride 40 mEq in 100 mL IVPB (FOR CENTRAL LINE ADMINISTRATION ONLY)  (Med - Potassium Chloride IVPB for CENTRAL LINE)      -- IV As needed (PRN) 08/10/20 1015    08/10/20 1111  potassium chloride 20 mEq in 100 mL IVPB (FOR CENTRAL LINE ADMINISTRATION ONLY)  (Med - Potassium Chloride IVPB for CENTRAL LINE)      -- IV As needed (PRN) 08/10/20 1015    08/10/20 1111  magnesium sulfate 2g in water 50mL IVPB (premix)      -- IV As needed (PRN) 08/10/20 1015         E:   Recent Labs   Lab 08/10/20  1020  08/10/20  1800      < > 136   K 3.3*  3.3*   < > 3.8  3.8      < > 105   CO2 23   < > 25   BUN 9   < > 10   CREATININE 0.6   < > 0.7   MG 1.5*  --   --     < > = values in this interval not displayed.      N: NPO    GI:   - replace electrolytes as needed to keep K>4, Mg>2  - bowel reg - daily miralax, docusate  - famotidine for GI prophylaxis    Dispo:  - continue care in the SICU      Critical care was time spent personally by me on the following activities: development of treatment plan with patient or surrogate and bedside caregivers, discussions with consultants, evaluation of patient's response to treatment, examination of patient, ordering and performing treatments and interventions, ordering and review of laboratory studies, ordering and review of radiographic studies, pulse oximetry, re-evaluation of patient's condition.  This critical care time did not overlap with that of any other provider or involve time for any procedures.     Jayden MILES  MD David  Critical Care - Surgery  [facility]

## 2020-08-11 NOTE — ANESTHESIA POSTPROCEDURE EVALUATION
Anesthesia Post Evaluation    Patient: Chely Becerra    Procedure(s) Performed: Procedure(s) (LRB):  TRANSPLANT, LUNG - 4:30AM start (Left)  APPLICATION, WOUND VAC, 40 x 5cm (Left)  RESECTION, LUNG (Left)    Final Anesthesia Type: general    Patient location during evaluation: ICU  Patient participation: No - Unable to Participate, Intubation  Level of consciousness: sedated  Post-procedure vital signs: reviewed and stable  Pain management: adequate  Airway patency: patent    PONV status at discharge: No PONV  Anesthetic complications: no      Cardiovascular status: hemodynamically stable  Respiratory status: ETT and ventilator  Hydration status: euvolemic            Vitals Value Taken Time   /60 08/11/20 1501   Temp 36.8 °C (98.3 °F) 08/11/20 1100   Pulse 106 08/11/20 1522   Resp 23 08/11/20 1522   SpO2 97 % 08/11/20 1522   Vitals shown include unvalidated device data.      No case tracking events are documented in the log.      Pain/Tracy Score: Pain Rating Prior to Med Admin: 10 (8/10/2020  7:23 PM)

## 2020-08-11 NOTE — CARE UPDATE
CVP 8, HOB flat.   UOP ~45cc/hr.     10mg IVP Lasix administered per MD order.   See flowsheet for details.

## 2020-08-11 NOTE — ASSESSMENT & PLAN NOTE
POD #1 s/p uncomplicated LSLT for IPF. Currently PGD 2. Recommend continued diuresis as tolerated. CT output stable. CTS primary and LuT following for immunosuppression and OIP. Bronchoscopy today.

## 2020-08-11 NOTE — PROGRESS NOTES
Admit Note     Met with daughter, Ny Leonard ([phone]) to assess patients needs due to pt is intubated/sedated.  Patient is a 65 y.o.  female, admitted for lung transplant. Pt is s/p lung txp on 8/10/2020.    Patient admitted from home on 8/9/2020 .  At this time, patient presents as intubated.  At this time, patients caregiver presents as alert and oriented x 4, pleasant, good eye contact, well groomed, concentration/judgement good, calm, communicative and asking and answering questions appropriately.      Household/Family Systems (as reported by patients caregiver)     Patient resides with patient's daughter and grandchildren, at [address] [place] [address].  Support system includes daughters, grandchildren, and son.  Patient does have dependents that are in need of being cared for. Patient's grandchildren are being cared for by their father, Zen Kaplan.     Patients primary caregiver is Ivy Levy ([phone]), patients daughter, phone number [phone].  Confirmed patients contact information is [phone] (home);   Telephone Information:   Mobile [phone]     During admission, patient's caregiver plans to stay locally.  Confirmed patient and patients caregivers do have access to reliable transportation.    Cognitive Status/Learning     Patients caregiver reports patients reading ability as college and states patient does have difficulty with memory.  Patients caregiver reports patient learns best by written material/verbal explanation/demonstration/hands on practice.   Needed: No.   Highest education level: Attended College/Technical School    Vocation/Disability (as reported by patients caregiver)    Working for Income: No  If no, reason not working: Disability  Patient is disabled due to IPF since 2013.  Prior to disability, patient  was employed as  at [employer].    Adherence     Patients caregiver  reports patient has a high level of adherence to patients health care regimen.  Adherence counseling and education provided.  Patient's caregiver verbalizes understanding.    Substance Use    Patients caregiver reports patients substance usage as the following:    Tobacco: former smoker; quit in 2016.  Alcohol: none, patient denies any use.  Illicit Drugs/Non-prescribed Medications: none, patient denies any use.  Patients caregiver states clear understanding of the potential impact of substance use.  Substance abstinence/cessation counseling, education and resources provided and reviewed.     Services Utilizing/ADLS (as reported by patients caregiver)    Infusion Service: Prior to admission, patient utilizing? no  Home Health: Prior to admission, patient utilizing? no  DME: Prior to admission, yes; 02 supplies through [employer], portable and home concentrator  Pulmonary/Cardiac Rehab: Prior to admission, no  Dialysis:  Prior to admission, no  Transplant Specialty Pharmacy:  Prior to admission, no.    Prior to admission, patients caregiver reports patient was independent with ADLS and was driving.  Patients caregiver reports patient is not able to care for self at this time due to compromised medical condition (as documented in medical record) and physical weakness..  Patients caregiver reports patient indicates a willingness to care for self once medically cleared to do so.    Insurance/Medications    Insured by   Payor/Plan Subscr  Sex Relation Sub. Ins. ID Effective Group Num   1. [place] - [place]* TORRES ADAMS ZACK 1955 Female  [id number] 12/1/15                                    [address]   2. [place] TORRES ADAMS ZACK 1955 Female  [id number]* 12/1/15                                    [address] [address]      Primary Insurance (for UNOS reporting): Public Insurance - Medicare FFS (Fee For Service)  Secondary Insurance (for UNOS reporting): Public Insurance - Medicaid    Patients  caregiver reports patient is able to obtain and afford medications at this time and at time of discharge.    Living Will/Healthcare Power of     Patients caregiver reports patient has a LW and/or HCPA. Pt's HCPA is Ivy Levy ([phone])   provided education regarding LW and HCPA and the completion of forms.    Coping/Mental Health (as reported by patients caregiver)    Patient is coping adequately with the aid of  family members.  Patients caregiver is coping adequately with the aid of  family members and friends.      Discharge Planning (as reported by patients caregiver)    At time of discharge, patient plans to return to [place] under the care of daughter. SW discussed post txp housing options with pt's daughter. SW provided education and answered pt's daughters questions.  Patients daughter will transport patient.  Per rounds today, expected discharge date has not been medically determined at this time. Patients caretaker verbalizes understanding and is involved in treatment planning and discharge process.    Additional Concerns    Patient's caretaker denies additional needs and/or concerns at this time. Patient is being followed for needs, education, resources, information, emotional support, supportive counseling, and for supportive and skilled discharge plan of care. Patient's caregiver verbalizes understanding and agreement with information reviewed,  availability and how to access available resources as needed.

## 2020-08-11 NOTE — CONSULTS
[facility]  Infectious Disease  Consult Note    Patient Name: Chely Becerra  MRN: [id number]  Admission Date: 8/9/2020  Hospital Length of Stay: 1 days  Attending Physician: Benedict Clemons MD  Primary Care Provider: ZAHIDA Stack MD     Isolation Status: No active isolations    Patient information was obtained from past medical records and ER records.      Consult to Infectious Diseases  Consult performed by: Naveen Owen MD  Consult ordered by: Refugio Johnston MD  Reason for consult: s/p lung transplant        Assessment/Plan:     Prophylactic antibiotic  66 y/o F h/o IPF on home oxygen and nintedanib, DM2, HTN s/p L single lung transplant (CMV D+/R-, basiliximab and SM induction, mmf/tacro, Donor HbcAb+) OR course notable for Significant pulmonary artery reconstruction bilaterally with pericardium Bilateral reconstruction pulmonary vein cuffs with the donor pericardium resulting in extremely challenging case increasing the operative time more than 2 hours  - f/u donor cultures  - continue antimicrobial prophylaxis per protocol        Thank you for your consult. I will follow-up with patient. Please contact us if you have any additional questions.    Naveen Owen MD  Infectious Disease  [facility]    Subjective:     Principal Problem: S/P lung transplant    HPI: 66 y/o F h/o IPF on home oxygen and nintedanib, DM2, HTN s/p b/l sequential lung transplant (CMV D+/R-, basiliximab and SM induction, mmf/tacro, Donor HbcAb+) OR course notable for Significant pulmonary artery reconstruction bilaterally with pericardium Bilateral reconstruction pulmonary vein cuffs with the donor pericardium resulting in extremely challenging case increasing the operative time more than 2 hours.     Past Medical History:   Diagnosis Date    Common bile duct dilatation 1/15/2019    Controlled type 2 diabetes mellitus without complication, without long-term current use of insulin  "1/17/2019    Essential hypertension 1/16/2019    GERD (gastroesophageal reflux disease)     Hepatitis B core antibody positive 1/15/2019    IPF (idiopathic pulmonary fibrosis)     Obesity (BMI 30-39.9) 1/16/2019    RLS (restless legs syndrome)        Past Surgical History:   Procedure Laterality Date    CATHETERIZATION OF BOTH LEFT AND RIGHT HEART N/A 1/17/2019    Procedure: CATHETERIZATION, HEART, BOTH LEFT AND RIGHT;  Surgeon: Trey Evans MD;  Location: [facility] CATH LAB;  Service: Cardiology;  Laterality: N/A;    CHOLECYSTECTOMY      COLONOSCOPY      ESOPHAGOGASTRODUODENOSCOPY      HERNIA REPAIR      LEFT HEART CATHETERIZATION Left 2/18/2020    Procedure: Left heart cath;  Surgeon: Trey Evans MD;  Location: [facility] CATH LAB;  Service: Cardiology;  Laterality: Left;    SMALL INTESTINE SURGERY      mass removed (benign)       Review of patient's allergies indicates:   Allergen Reactions    Boniva [ibandronate] Other (See Comments)     "chest cramps"       Medications:  Medications Prior to Admission   Medication Sig    ADVAIR DISKUS 250-50 mcg/dose diskus inhaler Inhale 1 puff into the lungs 2 (two) times daily.     aspirin (ECOTRIN) 81 MG EC tablet Take 81 mg by mouth once daily.    atorvastatin (LIPITOR) 20 MG tablet Take 20 mg by mouth once daily.    benzonatate (TESSALON) 200 MG capsule Take 200 mg by mouth 3 (three) times daily as needed for Cough.    betamethasone dipropionate (DIPROLENE) 0.05 % cream APPLY TO AFFECTED AREA(S) TWO TIMES A DAY    ciprofloxacin HCl (CIPRO) 500 MG tablet Take 1 tablet (500 mg total) by mouth every 12 (twelve) hours.    ergocalciferol (ERGOCALCIFEROL) 50,000 unit Cap Take 50,000 Units by mouth every 14 (fourteen) days.    ferrous sulfate (FEOSOL) 325 mg (65 mg iron) Tab tablet Take 325 mg by mouth daily with breakfast.    gabapentin (NEURONTIN) 100 MG capsule Take 100 mg by mouth 3 (three) times daily.     lansoprazole (PREVACID) 15 MG capsule " Take 15 mg by mouth once daily.    loperamide (IMODIUM A-D) 2 mg Tab Take 2 mg by mouth 4 (four) times daily as needed.    metoprolol succinate (TOPROL-XL) 25 MG 24 hr tablet Take 25 mg by mouth once daily.    OFEV 150 mg Cap TAKE 1 CAPSULE BY MOUTH TWICE A DAY  EVERY 12 HOURS  AS DIRECTED WITH FOOD    olmesartan-hydrochlorothiazide (BENICAR HCT) 40-12.5 mg Tab Take 1 tablet by mouth once daily.    VENTOLIN HFA 90 mcg/actuation inhaler Inhale 1 puff into the lungs every 6 (six) hours as needed.      Antibiotics (From admission, onward)    Start     Stop Route Frequency Ordered    08/14/20 0900  sulfamethoxazole-trimethoprim 200-40 mg/5 ml suspension 20 mL      -- PER NG TUBE Every Mon, Wed, Fri 08/10/20 1313    08/10/20 1830  vancomycin (VANCOCIN) 1250 mg in 5 % dextrose 250 mL IVPB      -- IV Every 12 hours (non-standard times) 08/10/20 1209    08/10/20 1130  ceFEPIme injection 2 g      -- IV Every 12 hours (non-standard times) 08/10/20 1030    08/10/20 1115  mupirocin 2 % ointment 1 g      08/15 0859 Nasl 2 times daily 08/10/20 1015    08/10/20 1111  vancomycin - pharmacy to dose  (vancomycin IVPB)      -- IV pharmacy to manage frequency 08/10/20 1015        Antifungals (From admission, onward)    Start     Stop Route Frequency Ordered    08/11/20 1200  voriconazole (VFEND) 350 mg in dextrose 5 % 250 mL IVPB      -- IV Every 12 hours (non-standard times) 08/10/20 1030    08/10/20 1200  voriconazole (VFEND) 520 mg in dextrose 5 % 250 mL IVPB      08/11 1159 IV Every 12 hours (non-standard times) 08/10/20 1030        Antivirals (From admission, onward)        Stop Route Frequency     ganciclovir (CYTOVENE) injection      -- IV Every 12 hours (non-standard times)           Immunization History   Administered Date(s) Administered    Hepatitis A, Adult 01/16/2019    Pneumococcal Polysaccharide - 23 Valent 01/16/2019    Tdap 01/16/2019       Family History     None        Social History     Socioeconomic History     Marital status: Single     Spouse name: Not on file    Number of children: Not on file    Years of education: Not on file    Highest education level: Not on file   Occupational History    Not on file   Social Needs    Financial resource strain: Not on file    Food insecurity     Worry: Not on file     Inability: Not on file    Transportation needs     Medical: Not on file     Non-medical: Not on file   Tobacco Use    Smoking status: Former Smoker     Types: Cigarettes     Quit date: 12/13/2016     Years since quitting: 3.6    Smokeless tobacco: Never Used   Substance and Sexual Activity    Alcohol use: No     Frequency: Never    Drug use: No    Sexual activity: Not on file   Lifestyle    Physical activity     Days per week: Not on file     Minutes per session: Not on file    Stress: Not on file   Relationships    Social connections     Talks on phone: Not on file     Gets together: Not on file     Attends [employer] service: Not on file     Active member of club or organization: Not on file     Attends meetings of clubs or organizations: Not on file     Relationship status: Not on file   Other Topics Concern    Not on file   Social History Narrative    Not on file     Review of Systems   Unable to perform ROS: Intubated     Objective:     Vital Signs (Most Recent):  Temp: (!) 95.9 °F (35.5 °C)([employer] ) (08/10/20 1901)  Pulse: 64 (08/10/20 1945)  Resp: 16 (08/10/20 1945)  BP: (!) 95/55 (08/10/20 0411)  SpO2: 97 % (08/10/20 1945) Vital Signs (24h Range):  Temp:  [95.9 °F (35.5 °C)-98.7 °F (37.1 °C)] 95.9 °F (35.5 °C)  Pulse:  [57-79] 64  Resp:  [15-25] 16  SpO2:  [95 %-100 %] 97 %  BP: ()/(55-84) 95/55  Arterial Line BP: ()/(50-84) 121/62     Weight: 86.4 kg (190 lb 9.4 oz)  Body mass index is 29.85 kg/m².    Estimated Creatinine Clearance: 93 mL/min (based on SCr of 0.7 mg/dL).    Physical Exam  Constitutional:       Appearance: She is well-developed.      Comments:  intubated   HENT:      Head: Normocephalic and atraumatic.   Eyes:      Pupils: Pupils are equal, round, and reactive to light.   Neck:      Musculoskeletal: Normal range of motion and neck supple.   Cardiovascular:      Rate and Rhythm: Normal rate.   Pulmonary:      Effort: Pulmonary effort is normal.      Breath sounds: Normal breath sounds.   Abdominal:      General: Bowel sounds are normal.      Palpations: Abdomen is soft.   Musculoskeletal:         General: No tenderness.   Skin:     General: Skin is warm and dry.   Neurological:      Comments: sedated         Significant Labs:   CBC:   Recent Labs   Lab 08/10/20  1020  08/10/20  1400 08/10/20  1600 08/10/20  1800   WBC 12.84*  --  10.29  --  9.79   HGB 8.1*  --  9.3*  --  9.1*   HCT 25.1*   < > 28.5* 28* 28.1*   *  --  146*  --  133*    < > = values in this interval not displayed.     CMP:   Recent Labs   Lab 08/09/20  1902 08/10/20  1020 08/10/20  1400 08/10/20  1800    138 135* 136   K 3.5 3.3*  3.3* 4.8  4.8  4.8 3.8  3.8    107 105 105   CO2 31* 23 24 25   GLU 92 151* 138* 185*   BUN 12 9 10 10   CREATININE 0.7 0.6 0.6 0.7   CALCIUM 9.5 6.9* 7.5* 8.0*   PROT 7.6  --   --   --    ALBUMIN 3.8  --   --   --    BILITOT 0.3  --   --   --    ALKPHOS 80  --   --   --    AST 18  --   --   --    ALT 18  --   --   --    ANIONGAP 8 8 6* 6*   EGFRNONAA >60.0 >60.0 >60.0 >60.0       Significant Imaging: I have reviewed all pertinent imaging results/findings within the past 24 hours.

## 2020-08-11 NOTE — SUBJECTIVE & OBJECTIVE
"Interval HPI:   Overnight events: Remains in ICU, intubated and sedated. Family at [facility]. BG reasonably   well controlled on IV insulin infusion 0.2-4.7 u/hr.   Eating:   NPO  Nausea: No  Hypoglycemia and intervention: No  Fever: No  TPN and/or TF: No    /65 (BP Location: Left arm, Patient Position: Lying)   Pulse 63   Temp 98.3 °F (36.8 °C) (Core ([place]))   Resp (!) 21   Ht 5' 7" (1.702 m)   Wt 96.7 kg (213 lb 3 oz)   LMP  (LMP Unknown)   SpO2 100%   Breastfeeding No   BMI 33.39 kg/m²     Labs Reviewed and Include    Recent Labs   Lab 08/11/20  0400  08/11/20  1200   *  152*   < > 165*   CALCIUM 8.4*  8.4*   < > 8.3*   ALBUMIN 3.5  --   --    PROT 5.9*  --   --      139   < > 139   K 4.0  4.0  4.0   < > 4.9  4.9   CO2 23  23   < > 26     107   < > 109   BUN 8  8   < > 8   CREATININE 0.7  0.7   < > 0.6   ALKPHOS 54*  --   --    ALT 20  --   --    AST 34  --   --    BILITOT 0.5  --   --     < > = values in this interval not displayed.     Lab Results   Component Value Date    WBC 16.40 (H) 08/11/2020    HGB 9.2 (L) 08/11/2020    HCT 28.3 (L) 08/11/2020    MCV 94 08/11/2020     (L) 08/11/2020     No results for input(s): TSH, FREET4 in the last 168 hours.  Lab Results   Component Value Date    HGBA1C 6.0 (H) 06/18/2020       Nutritional status:   Body mass index is 33.39 kg/m².  Lab Results   Component Value Date    ALBUMIN 3.5 08/11/2020    ALBUMIN 3.8 08/09/2020    ALBUMIN 3.5 06/18/2020     No results found for: PREALBUMIN    Estimated Creatinine Clearance: 114.5 mL/min (based on SCr of 0.6 mg/dL).    Accu-Checks  Recent Labs     08/11/20  0502 08/11/20  0625 08/11/20  0706 08/11/20  0807 08/11/20  0841 08/11/20  0904 08/11/20  1020 08/11/20  1112 08/11/20  1217 08/11/20  1314   POCTGLUCOSE 158* 167* 173* 136* 178* 182* 166* 184* 150* 155*       Current Medications and/or Treatments Impacting Glycemic Control  Immunotherapy:    Immunosuppressants         Stop " Route Frequency     mycophenolate mofetil 200 mg/mL suspension 500 mg      -- PER NG TUBE 2 times daily     tacrolimus (PROGRAF) 1 mg/mL oral syringe      -- PER NG TUBE 2 times daily     basiliximab (SIMULECT) 20 mg in dextrose 5 % 50 mL infusion      08/18 1114 IV Every 96 hours        Steroids:   Hormones (From admission, onward)    Start     Stop Route Frequency Ordered    08/17/20 0900  predniSONE tablet 35 mg      -- Oral Daily 08/11/20 1153    08/16/20 0900  predniSONE tablet 45 mg      08/17 0859 Oral Daily 08/11/20 1153    08/15/20 0900  predniSONE tablet 60 mg      08/16 0859 Oral Daily 08/11/20 1153    08/14/20 0900  predniSONE tablet 90 mg      08/15 0859 Oral Daily 08/11/20 1153    08/13/20 0900  methylPREDNISolone sodium succinate injection 173 mg  (methylprednisolone panel)      08/14 0859 IV Daily 08/10/20 1015    08/12/20 0900  methylPREDNISolone sodium succinate (SOLU-MEDROL) 259.5 mg in dextrose 5 % 100 mL IVPB  (methylprednisolone panel)      08/13 0859 IV Daily 08/10/20 1015    08/10/20 1145  vasopressin (PITRESSIN) 0.2 Units/mL in dextrose 5 % 100 mL infusion      -- IV Continuous 08/10/20 1035        Pressors:    Autonomic Drugs (From admission, onward)    Start     Stop Route Frequency Ordered    08/10/20 1145  norepinephrine 4 mg in dextrose 5% 250 mL infusion (premix) (titrating)     Question Answer Comment   Titrate by: (in mcg/kg/min) 0.02    Titrate interval: (in minutes) 5    Titrate to maintain: (MAP or SBP) MAP    Greater than: (in mmHg) 70    Maximum dose: (in mcg/kg/min) 3        -- IV Continuous 08/10/20 1035    08/10/20 1115  EPINEPHrine (ADRENALIN) 5 mg in sodium chloride 0.9% 250 mL infusion     Question Answer Comment   Titrate by: (in mcg/kg/min) 0.05    Titrate interval: (in minutes) 5    Titrate to maintain: (SBP or MAP or Cardiac Index) MAP    Greater than: (in mmHg) 60    Cardiac index greater than: (in L/min) 2.2    Maximum dose: (in mcg/kg/min) 2        -- IV Continuous  08/10/20 1015        Hyperglycemia/Diabetes Medications:   Antihyperglycemics (From admission, onward)    Start     Stop Route Frequency Ordered    08/10/20 1300  insulin regular 100 Units in sodium chloride 0.9% 100 mL infusion     Question:  Insulin Rate Adjustment (DO NOT MODIFY ANSWER)  Answer:  [email or web address]    -- IV Continuous 08/10/20 1157

## 2020-08-11 NOTE — PROGRESS NOTES
POD1 from single lung transplant (left)    IOANA overnight    I/O  4362/5325 net: -963  uop 4645  Chest tubes 360  - L pleural 85  - R pleural 85  - L pleural 190    A/P  Progressing    - Bronch this am. SBT and possible extubation after   - wean sedation  - continue chest tubes  - pulm hygiene   - cont pelletier for strict I/O  - tk-op abx  - IS per pulm txp      Attending: Julieth

## 2020-08-11 NOTE — PLAN OF CARE
Patient continues to display clinical improvement.   Extubation pending.   LASIX (10 mg) administered.   Insulin infusion transitioned.   Nicardipine restarted for MAP goal <80.   Will continue to monitor patient progress.   Please see flowsheet data for full assessment detail.

## 2020-08-11 NOTE — PLAN OF CARE
Problem: Adult Inpatient Plan of Care  Goal: Plan of Care Review  Outcome: Ongoing, Progressing  No acute events throughout the night. VSS. Pt remains intubated A/C VC+ 30% FiO2, 5 of peep. Vt 370, Ti.74. Gtts infusing: Propofol currently at 30mcg/kg/min and Insulin at 0.4u/hr titrated per algorithm. Pt awakens and follows all commands. Chest tubes with minimal serosanguinous output. UOP excellent. See flowsheets for assessments and intake and output. Plans for possible bronch today and extubation. Daughter aware of patients current plan. Will continue to monitor.

## 2020-08-11 NOTE — ASSESSMENT & PLAN NOTE
64 y/o F h/o IPF on home oxygen and nintedanib, DM2, HTN s/p L single lung transplant (CMV D+/R-, basiliximab and SM induction, mmf/tacro, Donor HbcAb+) OR course notable for Significant pulmonary artery reconstruction bilaterally with pericardium Bilateral reconstruction pulmonary vein cuffs with the donor pericardium resulting in extremely challenging case increasing the operative time more than 2 hours  - f/u donor cultures  - continue antimicrobial prophylaxis per protocol

## 2020-08-11 NOTE — PT/OT/SLP PROGRESS
Physical Therapy      Patient Name:  Chely Becerra   MRN:  [id number]    Patient not seen today secondary to patient intubated and sedated. Will follow-up tomorrow as available and appropriate for therapy.     Britany Del Cid, PT   8/11/2020

## 2020-08-12 PROBLEM — R73.03 PREDIABETES: Status: ACTIVE | Noted: 2019-01-17

## 2020-08-12 NOTE — PROGRESS NOTES
"Pharmacokinetic Assessment Follow Up: IV Vancomycin     Vancomycin serum concentration assessment/plan:     · Vancomycin not yet at goal.  Expect level to increase after tacrolimus is started.  · Continue vancomycin 1250 mg every 12 hours  · Trough level for 8/14/20 at 0630  · Goal 15-20 mg/dL    Drug levels (last 3 results):  Recent Labs   Lab Result Units 08/12/20  0355   Vancomycin-Trough ug/mL 9.6*       Pharmacy will continue to follow and monitor vancomycin.    Please contact pharmacy at extension [phone] for questions regarding this assessment.    Thank you for the consult,   Rogelio Gregory       Patient brief summary:  Chely Becerra is a 65 y.o. female initiated on antimicrobial therapy with IV Vancomycin for treatment of OIP s/p lung transplant      Drug Allergies:   Review of patient's allergies indicates:   Allergen Reactions    Boniva [ibandronate] Other (See Comments)     "chest cramps"       Actual Body Weight:   96 kg    Renal Function:   Estimated Creatinine Clearance: 98.2 mL/min (based on SCr of 0.7 mg/dL).,     Dialysis Method (if applicable):  N/A    CBC (last 72 hours):  Recent Labs   Lab Result Units 08/09/20  1902 08/10/20  1020 08/10/20  1400 08/10/20  1800 08/10/20  2359 08/11/20  0400 08/11/20  0800 08/11/20  1200 08/12/20  0356   WBC K/uL 8.91 12.84* 10.29 9.79 12.99* 16.07* 16.58* 16.40* 20.33*   Hemoglobin g/dL 11.8* 8.1* 9.3* 9.1* 9.3* 9.4* 9.2* 9.2* 8.9*   Hematocrit % 37.9 25.1* 28.5* 28.1* 29.3* 28.2* 28.6* 28.3* 28.6*   Platelets K/uL 284 112* 146* 133* 145* 153 144* 148* 157   Gran% % 48.1 85.5* 84.7* 85.0* 87.4* 89.4* 88.3* 90.1* 88.5*   Lymph% % 39.6 7.9* 8.8* 10.1* 6.8* 5.6* 5.5* 4.9* 4.5*   Mono% % 7.5 5.6 5.7 4.3 5.5 4.4 5.6 4.3 6.0   Eosinophil% % 3.8 0.2 0.1 0.0 0.0 0.0 0.0 0.0 0.0   Basophil% % 0.8 0.2 0.1 0.1 0.1 0.1 0.1 0.2 0.1   Differential Method  Automated Automated Automated Automated Automated Automated Automated Automated Automated       Metabolic Panel (last 72 " hours):  Recent Labs   Lab Result Units 08/09/20  1821 08/09/20  1902 08/10/20  1020 08/10/20  1400 08/10/20  1800 08/10/20  2000 08/10/20  2359 08/11/20  0400 08/11/20  0800 08/11/20  1200 08/12/20  0356   Sodium mmol/L  --  139 138 135* 136  --  137 139  139 137 139 140   Potassium mmol/L  --  3.5 3.3*  3.3* 4.8  4.8  4.8 3.8  3.8 3.7 4.3  4.3 4.0  4.0  4.0 3.7  3.7 4.9  4.9 4.3   Ch[place]ide mmol/L  --  100 107 105 105  --  107 107  107 106 109 108   CO2 mmol/L  --  31* 23 24 25  --  24 23  23 24 26 25   Glucose mg/dL  --  92 151* 138* 185*  --  124* 152*  152* 169* 165* 165*   Glucose, UA  Negative  --   --   --   --   --   --   --   --   --   --    BUN, Bld mg/dL  --  12 9 10 10  --  9 8  8 7* 8 12   Creatinine mg/dL  --  0.7 0.6 0.6 0.7  --  0.7 0.7  0.7 0.6 0.6 0.7   Albumin g/dL  --  3.8  --   --   --   --   --  3.5  --   --  3.2*   Total Bilirubin mg/dL  --  0.3  --   --   --   --   --  0.5  --   --  0.4   Alkaline Phosphatase U/L  --  80  --   --   --   --   --  54*  --   --  54*   AST U/L  --  18  --   --   --   --   --  34  --   --  23   ALT U/L  --  18  --   --   --   --   --  20  --   --  18   Magnesium mg/dL  --   --  1.5*  --   --  2.6  --  2.4  --   --  2.1   Phosphorus mg/dL  --   --  2.2* 2.7  2.7 2.8  --  2.9 2.9  2.9 2.7 2.5*  --        Vancomycin Administrations:  vancomycin given in the last 96 hours                   vancomycin (VANCOCIN) 1250 mg in 5 % dextrose 250 mL IVPB (mg) 1,250 mg New Bag 08/12/20 0655     1,250 mg New Bag 08/11/20 1801    vancomycin (VANCOCIN) 1250 mg in 5 % dextrose 250 mL IVPB (mg) 1,250 mg New Bag 08/11/20 0532     1,250 mg New Bag 08/10/20 1730    vancomycin in dextrose 5 % 1 gram/250 mL IVPB 1,000 mg (mg) 1,000 mg Given 08/10/20 0627                Microbiologic Results:  Microbiology Results (last 7 days)     Procedure Component Value Units Date/Time    Culture, Respiratory with Gram Stain [[id number]] Collected: 08/11/20 1315    Order Status:  Completed Specimen: Respiratory from BAL, JOSETTE Updated: 08/12/20 0935     Respiratory Culture Normal respiratory yoly     Gram Stain (Respiratory) Rare WBC's     Gram Stain (Respiratory) No organisms seen    Culture, Anaerobe [[id number]] Collected: 08/10/20 0758    Order Status: Completed Specimen: Body Fluid from Lung, Left Updated: 08/12/20 0831     Anaerobic Culture Culture in progress    Narrative:      Bronchus Donor Lung Left (Aerobic, Anaerobic, AFB, Fungus,  Gram)    AFB Culture & Smear [[id number]] Collected: 08/10/20 0758    Order Status: Completed Specimen: Body Fluid from Lung, Left Updated: 08/11/20 2127     AFB Culture & Smear Culture in progress     AFB CULTURE STAIN No acid fast bacilli seen.    Narrative:      Bronchus Donor Lung Left (Aerobic, Anaerobic, AFB, Fungus,  Gram)    AFB Culture & Smear [[id number]] Collected: 08/11/20 1315    Order Status: Sent Specimen: Respiratory from BAL, JOSETTE Updated: 08/11/20 1505    Fungus culture [[id number]] Collected: 08/11/20 1315    Order Status: Sent Specimen: Respiratory from BAL, JOSETTE Updated: 08/11/20 1504    Aerobic culture [[id number]] Collected: 08/10/20 0758    Order Status: Completed Specimen: Body Fluid from Lung, Left Updated: 08/11/20 0715     Aerobic Bacterial Culture No growth    Narrative:      Bronchus Donor Lung Left (Aerobic, Anaerobic, AFB, Fungus,  Gram)    Gram stain [[id number]] Collected: 08/10/20 0758    Order Status: Completed Specimen: Body Fluid from Lung, Left Updated: 08/10/20 1143     Gram Stain Result No WBC's      No organisms seen    Narrative:      Bronchus Donor Lung Left (Aerobic, Anaerobic, AFB, Fungus,  Gram)    Fungus culture [[id number]] Collected: 08/10/20 0758    Order Status: Sent Specimen: Body Fluid from Lung, Left Updated: 08/10/20 1021

## 2020-08-12 NOTE — PLAN OF CARE
Bedside swallow assessment completed. Recommend regular texture diet with thin liquids. ST to f/u x1-2 to ensure tolerance. Mary Giordano CCC-SLP 8/12/2020 1:02 PM

## 2020-08-12 NOTE — PROGRESS NOTES
Patient was seen with Dr. Hill, Mayuri Nath PA-C, Rogelio Gregory, JuliocesarD, and Olga Lidia aHnd LMSW.  Patient was awake, alert, and OOB to chair with sats = 100% on Oxygen 10 lpm via nasal cannula.  The plan of care was reviewed with the patient and her daughter Ivy as follows:  1.  Wean Oxygen as tolerated to keep sats > 92%.  2.  Begin OT and PT  3.  Transfer to the Transplant Stepdown Unit (TSU) today once a bed is available  Provided brief education about the self-medication process that will begin upon arrival to TSU as well as the plan for ongoing teaching to be done by transplant team members throughout the remainder of the hospital stay.  The patient and her daughter asked questions, which were answered to their satisfaction, and verbalized their understanding of all information discussed.  Transplant coordinator will continue to follow with the multi-disciplinary team, and remains available to assist with patient/family care and education.

## 2020-08-12 NOTE — SUBJECTIVE & OBJECTIVE
"Interval HPI:   Overnight events: Remains in ICU. Extubated. NAEON. BG at or slightly above goal on IV insulin infusion at 0.6 u/hr with prn correction scale. Diet advancing. Steroids per primary.   Eatin%  Diet Adult Regular (IDDSI Level 7)  Nausea: No  Hypoglycemia and intervention: No  Fever: No  TPN and/or TF: No    /72 (BP Location: Left arm, Patient Position: Lying)   Pulse 86   Temp 98.3 °F (36.8 °C) (Oral)   Resp (!) 23   Ht 5' 7" (1.702 m)   Wt 96.7 kg (213 lb 3 oz)   LMP  (LMP Unknown)   SpO2 99%   Breastfeeding No   BMI 33.39 kg/m²     Labs Reviewed and Include    Recent Labs   Lab 20  0356   *   CALCIUM 8.0*   ALBUMIN 3.2*   PROT 6.0      K 4.3   CO2 25      BUN 12   CREATININE 0.7   ALKPHOS 54*   ALT 18   AST 23   BILITOT 0.4     Lab Results   Component Value Date    WBC 20.33 (H) 2020    HGB 8.9 (L) 2020    HCT 28.6 (L) 2020    MCV 98 2020     2020     No results for input(s): TSH, FREET4 in the last 168 hours.  Lab Results   Component Value Date    HGBA1C 6.0 (H) 2020       Nutritional status:   Body mass index is 33.39 kg/m².  Lab Results   Component Value Date    ALBUMIN 3.2 (L) 2020    ALBUMIN 3.5 2020    ALBUMIN 3.8 2020     No results found for: PREALBUMIN    Estimated Creatinine Clearance: 98.2 mL/min (based on SCr of 0.7 mg/dL).    Accu-Checks  Recent Labs     20  1112 20  1217 20  1314 20  1421 20  1631 20  2029 20  2308 20  0341 20  0821 20  1417   POCTGLUCOSE 184* 150* 155* 192* 194* 211* 163* 178* 159* 205*       Current Medications and/or Treatments Impacting Glycemic Control  Immunotherapy:    Immunosuppressants         Stop Route Frequency     mycophenolate capsule 500 mg      -- Oral 2 times daily     tacrolimus capsule 0.5 mg      -- Oral 2 times daily     basiliximab (SIMULECT) 20 mg in dextrose 5 % 50 mL infusion      " 08/18 1114 IV Every 96 hours        Steroids:   Hormones (From admission, onward)    Start     Stop Route Frequency Ordered    08/17/20 0900  predniSONE tablet 35 mg      -- Oral Daily 08/11/20 1153    08/16/20 0900  predniSONE tablet 45 mg      08/17 0859 Oral Daily 08/11/20 1153    08/15/20 0900  predniSONE tablet 60 mg      08/16 0859 Oral Daily 08/11/20 1153    08/14/20 0900  predniSONE tablet 90 mg      08/15 0859 Oral Daily 08/11/20 1153    08/13/20 0900  methylPREDNISolone sodium succinate injection 173 mg  (methylprednisolone panel)      08/14 0859 IV Daily 08/10/20 1015    08/11/20 2023  melatonin tablet 9 mg      -- Oral Nightly PRN 08/11/20 1924        Pressors:    Autonomic Drugs (From admission, onward)    None        Hyperglycemia/Diabetes Medications:   Antihyperglycemics (From admission, onward)    Start     Stop Route Frequency Ordered    08/11/20 1500  insulin regular 100 Units in sodium chloride 0.9% 100 mL infusion      -- IV Continuous 08/11/20 1351    08/11/20 1451  insulin aspart U-100 pen 0-5 Units      -- SubQ As needed (PRN) 08/11/20 1351

## 2020-08-12 NOTE — SUBJECTIVE & OBJECTIVE
Interval History: Afebrile. Awake and interactive. Waiting cultures.    Review of Systems   Unable to perform ROS: Intubated     Objective:     Vital Signs (Most Recent):  Temp: 98.9 °F (37.2 °C) (08/11/20 1901)  Pulse: (!) 113 (08/11/20 1930)  Resp: (!) 28 (08/11/20 1930)  BP: (!) 147/73 (08/11/20 1915)  SpO2: 99 % (08/11/20 1930) Vital Signs (24h Range):  Temp:  [98.1 °F (36.7 °C)-99.7 °F (37.6 °C)] 98.9 °F (37.2 °C)  Pulse:  [] 113  Resp:  [15-39] 28  SpO2:  [95 %-100 %] 99 %  BP: ()/(55-73) 147/73  Arterial Line BP: (106-138)/(47-66) 120/57     Weight: 96.7 kg (213 lb 3 oz)  Body mass index is 33.39 kg/m².    Estimated Creatinine Clearance: 114.5 mL/min (based on SCr of 0.6 mg/dL).    Physical Exam  Constitutional:       Appearance: She is well-developed.      Comments: intubated   HENT:      Head: Normocephalic and atraumatic.   Eyes:      Pupils: Pupils are equal, round, and reactive to light.   Neck:      Musculoskeletal: Normal range of motion and neck supple.   Cardiovascular:      Rate and Rhythm: Normal rate.   Pulmonary:      Effort: Pulmonary effort is normal.      Breath sounds: Normal breath sounds.   Abdominal:      General: Bowel sounds are normal.      Palpations: Abdomen is soft.   Musculoskeletal:         General: No tenderness.   Skin:     General: Skin is warm and dry.         Significant Labs:   BMP:   Recent Labs   Lab 08/11/20  0400  08/11/20  1200   *  152*   < > 165*     139   < > 139   K 4.0  4.0  4.0   < > 4.9  4.9     107   < > 109   CO2 23  23   < > 26   BUN 8  8   < > 8   CREATININE 0.7  0.7   < > 0.6   CALCIUM 8.4*  8.4*   < > 8.3*   MG 2.4  --   --     < > = values in this interval not displayed.     CBC:   Recent Labs   Lab 08/11/20  0400 08/11/20  0800 08/11/20  1200   WBC 16.07* 16.58* 16.40*   HGB 9.4* 9.2* 9.2*   HCT 28.2* 28.6* 28.3*    144* 148*     CMP:   Recent Labs   Lab 08/11/20  0400 08/11/20  0800 08/11/20  1200      139 137 139   K 4.0  4.0  4.0 3.7  3.7 4.9  4.9     107 106 109   CO2 23  23 24 26   *  152* 169* 165*   BUN 8  8 7* 8   CREATININE 0.7  0.7 0.6 0.6   CALCIUM 8.4*  8.4* 7.9* 8.3*   PROT 5.9*  --   --    ALBUMIN 3.5  --   --    BILITOT 0.5  --   --    ALKPHOS 54*  --   --    AST 34  --   --    ALT 20  --   --    ANIONGAP 9  9 7* 4*   EGFRNONAA >60.0  >60.0 >60.0 >60.0     Microbiology Results (last 7 days)     Procedure Component Value Units Date/Time    Culture, Respiratory with Gram Stain [[id number]] Collected: 08/11/20 1315    Order Status: Completed Specimen: Respiratory from BAL, JOSETTE Updated: 08/11/20 1810     Gram Stain (Respiratory) Rare WBC's     Gram Stain (Respiratory) No organisms seen    AFB Culture & Smear [[id number]] Collected: 08/10/20 0758    Order Status: Completed Specimen: Body Fluid from Lung, Left Updated: 08/11/20 1516     AFB CULTURE STAIN No acid fast bacilli seen.    Narrative:      Bronchus Donor Lung Left (Aerobic, Anaerobic, AFB, Fungus,  Gram)    AFB Culture & Smear [[id number]] Collected: 08/11/20 1315    Order Status: Sent Specimen: Respiratory from BAL, JOSETTE Updated: 08/11/20 1505    Fungus culture [[id number]] Collected: 08/11/20 1315    Order Status: Sent Specimen: Respiratory from BAL, JOSETTE Updated: 08/11/20 1504    Culture, Anaerobe [[id number]] Collected: 08/10/20 0758    Order Status: Completed Specimen: Body Fluid from Lung, Left Updated: 08/11/20 0719     Anaerobic Culture Culture in progress    Narrative:      Bronchus Donor Lung Left (Aerobic, Anaerobic, AFB, Fungus,  Gram)    Aerobic culture [[id number]] Collected: 08/10/20 0758    Order Status: Completed Specimen: Body Fluid from Lung, Left Updated: 08/11/20 0715     Aerobic Bacterial Culture No growth    Narrative:      Bronchus Donor Lung Left (Aerobic, Anaerobic, AFB, Fungus,  Gram)    Gram stain [[id number]] Collected: 08/10/20 0758    Order Status: Completed Specimen: Body Fluid from Lung, Left  Updated: 08/10/20 1143     Gram Stain Result No WBC's      No organisms seen    Narrative:      Bronchus Donor Lung Left (Aerobic, Anaerobic, AFB, Fungus,  Gram)    Fungus culture [[id number]] Collected: 08/10/20 0758    Order Status: Sent Specimen: Body Fluid from Lung, Left Updated: 08/10/20 1021          Significant Imaging: I have reviewed all pertinent imaging results/findings within the past 24 hours.

## 2020-08-12 NOTE — PLAN OF CARE
Problem: Occupational Therapy Goal  Goal: Occupational Therapy Goal  Description: Goals to be met by: 7 days 8/19/2020     Patient will increase functional independence with ADLs by performing:    Pt to complete standing g/h skills with supervision.  Pt to complete UE dressing with set-up  Pt to complete LE dressing with SBA  Pt to complete toileting with supervision  Pt to complete t/f skills including bed, chair and commode with supervision.     Outcome: Ongoing, Progressing

## 2020-08-12 NOTE — PT/OT/SLP EVAL
"Physical Therapy Evaluation    Patient Name:  Chely Becerra   MRN:  [id number]    Recommendations:     Discharge Recommendations:  home, home health PT   Discharge Equipment Recommendations: none   Barriers to discharge: None    Assessment:     Chely Becerra is a 65 y.o. female admitted with a medical diagnosis of S/P lung transplant.  She presents with the following impairments/functional limitations:  weakness, impaired endurance, impaired self care skills, impaired functional mobilty, gait instability, impaired balance, decreased upper extremity function, pain, decreased ROM, orthopedic precautions Eval completed with OT. Patient pleasant and agreeable to therapy with good activity tolerance. PT anticipates patient will be safe to return home once medically ready and would benefit from home health physical therapy in order to improve safety and independence in the home environment.     Rehab Prognosis: Good; patient would benefit from acute skilled PT services to address these deficits and reach maximum level of function.    Recent Surgery: Procedure(s) (LRB):  TRANSPLANT, LUNG - 4:30AM start (Left)  APPLICATION, WOUND VAC, 40 x 5cm (Left)  RESECTION, LUNG (Left) 2 Days Post-Op    Plan:     During this hospitalization, patient to be seen 3 x/week to address the identified rehab impairments via gait training, therapeutic activities, therapeutic exercises, neuromuscular re-education and progress toward the following goals:    · Plan of Care Expires:  09/11/20    Subjective     Chief Complaint: sternal pain  Patient/Family Comments/goals: "I know I need to work with ya'll if I want to get out of here quicker"  Pain/Comfort:  · Pain Rating 1: 7/10  · Location - Orientation 1: generalized  · Location 1: sternal  · Pain Addressed 1: Pre-medicate for activity, Reposition, Distraction  · Pain Rating Post-Intervention 1: 7/10    Patients cultural, spiritual, Tenriism conflicts given the current situation: " no    Living Environment:  Lives with sara in 1SH with no DEBBIE.   Prior to admission, patients level of function was Independent, including driving and running errands. 2L O2 at home and 3L O2 in the community.  Equipment used at home: none.  DME owned (not currently used): shower chair.  Upon discharge, patient will have assistance from daughter who works daily as well as 3 grand children who can assist as needed.    Objective:     Communicated with nurse prior to session.  Patient found up in chair with arterial line, blood pressure cuff, chest tube, oxygen, peripheral IV, pulse ox (continuous)  upon PT entry to room.    General Precautions: Standard, fall, sternal   Orthopedic Precautions:N/A   Braces: N/A     Exams:  · RLE ROM: WNL  · RLE Strength: WNL  · LLE ROM: WNL  · LLE Strength: WNL    Functional Mobility:  · Bed Mobility:     · Transfers:     · Sit to Stand:  Contact guard assistance with no AD, v.t cues for sternal precautions and technique for improved mobility.  · Gait: ~25' within room, CGA. Limited by lines. Increased DRAKE, increased ML sway, decreased step length, decreased sanna, decreased gait speed. No LOB. No SOB. No reported dizziness.     Therapeutic Activities and Exercises:   Patient educated on role of PT in the hospital. Pt educated on plan and goals with physical therapy. Pt educated on importance of OOB activity to decrease the risks associated with bed rest.   Instruction provided for safety and technique for gait and transfers.  Educated on sternal precautions and adhering to them with mobility. Patient demonstrated understanding.     AM-PAC 6 CLICK MOBILITY  Total Score:18     Patient left up in chair with all lines intact, call button in reach, nurse notified and daughter present.    GOALS:   Multidisciplinary Problems     Physical Therapy Goals        Problem: Physical Therapy Goal    Goal Priority Disciplines Outcome Goal Variances Interventions   Physical Therapy Goal     PT,  PT/OT Ongoing, Progressing     Description: Goals to be met by: 20     Patient will increase functional independence with mobility by performin. Supine to sit with Modified [place]  2. Sit to supine with Modified [place]  3. Sit to stand transfer with Modified [place]  4. Bed to chair transfer with Modified [place] using no AD.  5. Gait  x 250 feet with Modified [place] using No AD.   6. Lower extremity exercise program x30 reps per handout, with independence  7. Recalls and demonstrates understanding of 3/3 sternal precautions.                     History:     Past Medical History:   Diagnosis Date    Common bile duct dilatation 1/15/2019    Controlled type 2 diabetes mellitus without complication, without long-term current use of insulin 2019    Essential hypertension 2019    GERD (gastroesophageal reflux disease)     Hepatitis B core antibody positive 1/15/2019    IPF (idiopathic pulmonary fibrosis)     Obesity (BMI 30-39.9) 2019    RLS (restless legs syndrome)        Past Surgical History:   Procedure Laterality Date    APPLICATION OF WOUND VACUUM-ASSISTED CLOSURE DEVICE Left 8/10/2020    Procedure: APPLICATION, WOUND VAC, 40 x 5cm;  Surgeon: Benedict Clemons MD;  Location: [address];  Service: Cardiovascular;  Laterality: Left;    CATHETERIZATION OF BOTH LEFT AND RIGHT HEART N/A 2019    Procedure: CATHETERIZATION, HEART, BOTH LEFT AND RIGHT;  Surgeon: Trey Evans MD;  Location: [facility] CATH LAB;  Service: Cardiology;  Laterality: N/A;    CHOLECYSTECTOMY      COLONOSCOPY      ESOPHAGOGASTRODUODENOSCOPY      HERNIA REPAIR      LEFT HEART CATHETERIZATION Left 2020    Procedure: Left heart cath;  Surgeon: Trey Evans MD;  Location: [facility] CATH LAB;  Service: Cardiology;  Laterality: Left;    LUNG TRANSPLANT Left 8/10/2020    Procedure: TRANSPLANT, LUNG - 4:30AM start;  Surgeon: Benedict Clemons MD;  Location: [address]  [facility];  Service: Cardiovascular;  Laterality: Left;    SMALL INTESTINE SURGERY      mass removed (benign)       Time Tracking:     PT Received On: 08/12/20  PT Start Time: 1118     PT Stop Time: 1134  PT Total Time (min): 16 min     Billable Minutes: Evaluation 6 and Gait Training 10      Britany Del Cid, PT  08/12/2020

## 2020-08-12 NOTE — PLAN OF CARE
Problem: Physical Therapy Goal  Goal: Physical Therapy Goal  Description: Goals to be met by: 20     Patient will increase functional independence with mobility by performin. Supine to sit with Modified [place]  2. Sit to supine with Modified [place]  3. Sit to stand transfer with Modified [place]  4. Bed to chair transfer with Modified [place] using no AD.  5. Gait  x 250 feet with Modified [place] using No AD.   6. Lower extremity exercise program x30 reps per handout, with independence  7. Recalls and demonstrates understanding of 3/3 sternal precautions.    Outcome: Ongoing, Progressing     PT eval complete. Patient is safe to return home once medically ready and would benefit from home health physical therapy in order to improve safety and independence in the home environment.     Britany Del Cid, PT, DPT  2020

## 2020-08-12 NOTE — ASSESSMENT & PLAN NOTE
POD #2 s/p uncomplicated LSLT for IPF. PGD 2 for 24 hours. CT output stable. Underwent bronchoscopy and successfully extubated to HFNC on POD#1. LuT now primary. Continue immunosuppression and prophylaxis. IS/PT/OT/CPT ordered. Transfer to TSU today.

## 2020-08-12 NOTE — ASSESSMENT & PLAN NOTE
66 y/o F h/o IPF on home oxygen and nintedanib, DM2, HTN s/p L single lung transplant (CMV D+/R-, basiliximab and SM induction, mmf/tacro, Donor HbcAb+) OR course notable for Significant pulmonary artery reconstruction bilaterally with pericardium Bilateral reconstruction pulmonary vein cuffs with the donor pericardium resulting in extremely challenging case increasing the operative time more than 2 hours    - f/u donor cultures  - f/u OR cultures  - continue antimicrobial prophylaxis per protocol

## 2020-08-12 NOTE — PROGRESS NOTES
"[facility]  Endocrinology  Progress Note    Admit Date: 2020     Reason for Consult: Management of pre-dm, Hyperglycemia     Surgical Procedure and Date: Left lung transplant and wound vac application 8/10/20    Diabetes diagnosis year: pre-dm - diet controlled     Lab Results   Component Value Date    HGBA1C 6.0 (H) 2020       Home Diabetes Medications: none (per chart review)    How often checking glucose at home? Not checking    Diabetes Complications include:     none    Complicating diabetes co morbidities:   Glucocorticoid use  s/p lung transplant      HPI:   Patient is a 65 y.o. female with a diagnosis of DM2, IPF, and chronic respiratory failure, who is s/p left lung transplant on 8/10/20.  Patient with controlled DM2 on no medications per chart review.  Endocrinology consulted for DM/BG management.            Interval HPI:   Overnight events: Remains in ICU. Extubated. NAEON. BG at or slightly above goal on IV insulin infusion at 0.6 u/hr with prn correction scale. Diet advancing. Steroids per primary.   Eatin%  Diet Adult Regular (IDDSI Level 7)  Nausea: No  Hypoglycemia and intervention: No  Fever: No  TPN and/or TF: No    /72 (BP Location: Left arm, Patient Position: Lying)   Pulse 86   Temp 98.3 °F (36.8 °C) (Oral)   Resp (!) 23   Ht 5' 7" (1.702 m)   Wt 96.7 kg (213 lb 3 oz)   LMP  (LMP Unknown)   SpO2 99%   Breastfeeding No   BMI 33.39 kg/m²     Labs Reviewed and Include    Recent Labs   Lab 20  0356   *   CALCIUM 8.0*   ALBUMIN 3.2*   PROT 6.0      K 4.3   CO2 25      BUN 12   CREATININE 0.7   ALKPHOS 54*   ALT 18   AST 23   BILITOT 0.4     Lab Results   Component Value Date    WBC 20.33 (H) 2020    HGB 8.9 (L) 2020    HCT 28.6 (L) 2020    MCV 98 2020     2020     No results for input(s): TSH, FREET4 in the last 168 hours.  Lab Results   Component Value Date    HGBA1C 6.0 (H) 2020 "       Nutritional status:   Body mass index is 33.39 kg/m².  Lab Results   Component Value Date    ALBUMIN 3.2 (L) 08/12/2020    ALBUMIN 3.5 08/11/2020    ALBUMIN 3.8 08/09/2020     No results found for: PREALBUMIN    Estimated Creatinine Clearance: 98.2 mL/min (based on SCr of 0.7 mg/dL).    Accu-Checks  Recent Labs     08/11/20  1112 08/11/20  1217 08/11/20  1314 08/11/20  1421 08/11/20  1631 08/11/20  2029 08/11/20  2308 08/12/20  0341 08/12/20  0821 08/12/20  1417   POCTGLUCOSE 184* 150* 155* 192* 194* 211* 163* 178* 159* 205*       Current Medications and/or Treatments Impacting Glycemic Control  Immunotherapy:    Immunosuppressants         Stop Route Frequency     mycophenolate capsule 500 mg      -- Oral 2 times daily     tacrolimus capsule 0.5 mg      -- Oral 2 times daily     basiliximab (SIMULECT) 20 mg in dextrose 5 % 50 mL infusion      08/18 1114 IV Every 96 hours        Steroids:   Hormones (From admission, onward)    Start     Stop Route Frequency Ordered    08/17/20 0900  predniSONE tablet 35 mg      -- Oral Daily 08/11/20 1153    08/16/20 0900  predniSONE tablet 45 mg      08/17 0859 Oral Daily 08/11/20 1153    08/15/20 0900  predniSONE tablet 60 mg      08/16 0859 Oral Daily 08/11/20 1153    08/14/20 0900  predniSONE tablet 90 mg      08/15 0859 Oral Daily 08/11/20 1153    08/13/20 0900  methylPREDNISolone sodium succinate injection 173 mg  (methylprednisolone panel)      08/14 0859 IV Daily 08/10/20 1015    08/11/20 2023  melatonin tablet 9 mg      -- Oral Nightly PRN 08/11/20 1924        Pressors:    Autonomic Drugs (From admission, onward)    None        Hyperglycemia/Diabetes Medications:   Antihyperglycemics (From admission, onward)    Start     Stop Route Frequency Ordered    08/11/20 1500  insulin regular 100 Units in sodium chloride 0.9% 100 mL infusion      -- IV Continuous 08/11/20 1351    08/11/20 1451  insulin aspart U-100 pen 0-5 Units      -- SubQ As needed (PRN) 08/11/20 3107           ASSESSMENT and PLAN    * S/P lung transplant  S/p lung transplant on 8/10/20  Managed per primary team  Avoid hypoglycemia  Optimize BG control for surgical wound healing        Prediabetes  BG goal 140 - 180     continue transition insulin infusion at 0.6 u/hr.  BG monitoring ac/hs/0200 and change to moderate dose correction scale.   Monitor for prandial excursions.     Discharge planning: TBD      Adrenal cortical steroids causing adverse effect in therapeutic use  On steroid taper per transplant team; may elevate BG readings            Nidia Frazier NP  Endocrinology  [facility]

## 2020-08-12 NOTE — PROGRESS NOTES
[facility]  Infectious Disease  Progress Note    Patient Name: Chely Becerra  MRN: [id number]  Admission Date: 8/9/2020  Length of Stay: 2 days  Attending Physician: Benedict Clemons MD  Primary Care Provider: ZAHIDA Stack MD    Isolation Status: No active isolations  Assessment/Plan:      * S/P lung transplant  66 y/o F h/o IPF on home oxygen and nintedanib, DM2, HTN s/p L single lung transplant (CMV D+/R-, basiliximab and SM induction, mmf/tacro, Donor HbcAb+) OR course notable for Significant pulmonary artery reconstruction bilaterally with pericardium Bilateral reconstruction pulmonary vein cuffs with the donor pericardium resulting in extremely challenging case increasing the operative time more than 2 hours    - f/u donor cultures  - f/u OR cultures  - continue antimicrobial prophylaxis per protocol        Thank you for your consult. I will follow-up with patient. Please contact us if you have any additional questions.    Jovani Larsen MD  Infectious Disease  [facility]    Subjective:     Principal Problem:S/P lung transplant    HPI: 66 y/o F h/o IPF on home oxygen and nintedanib, DM2, HTN s/p b/l sequential lung transplant (CMV D+/R-, basiliximab and SM induction, mmf/tacro, Donor HbcAb+) OR course notable for Significant pulmonary artery reconstruction bilaterally with pericardium Bilateral reconstruction pulmonary vein cuffs with the donor pericardium resulting in extremely challenging case increasing the operative time more than 2 hours.   Interval History: Afebrile. Awake and interactive. Waiting cultures.    Review of Systems   Unable to perform ROS: Intubated     Objective:     Vital Signs (Most Recent):  Temp: 98.9 °F (37.2 °C) (08/11/20 1901)  Pulse: (!) 113 (08/11/20 1930)  Resp: (!) 28 (08/11/20 1930)  BP: (!) 147/73 (08/11/20 1915)  SpO2: 99 % (08/11/20 1930) Vital Signs (24h Range):  Temp:  [98.1 °F (36.7 °C)-99.7 °F (37.6 °C)] 98.9 °F (37.2  °C)  Pulse:  [] 113  Resp:  [15-39] 28  SpO2:  [95 %-100 %] 99 %  BP: ()/(55-73) 147/73  Arterial Line BP: (106-138)/(47-66) 120/57     Weight: 96.7 kg (213 lb 3 oz)  Body mass index is 33.39 kg/m².    Estimated Creatinine Clearance: 114.5 mL/min (based on SCr of 0.6 mg/dL).    Physical Exam  Constitutional:       Appearance: She is well-developed.      Comments: intubated   HENT:      Head: Normocephalic and atraumatic.   Eyes:      Pupils: Pupils are equal, round, and reactive to light.   Neck:      Musculoskeletal: Normal range of motion and neck supple.   Cardiovascular:      Rate and Rhythm: Normal rate.   Pulmonary:      Effort: Pulmonary effort is normal.      Breath sounds: Normal breath sounds.   Abdominal:      General: Bowel sounds are normal.      Palpations: Abdomen is soft.   Musculoskeletal:         General: No tenderness.   Skin:     General: Skin is warm and dry.         Significant Labs:   BMP:   Recent Labs   Lab 08/11/20  0400 08/11/20  1200   *  152*   < > 165*     139   < > 139   K 4.0  4.0  4.0   < > 4.9  4.9     107   < > 109   CO2 23  23   < > 26   BUN 8  8   < > 8   CREATININE 0.7  0.7   < > 0.6   CALCIUM 8.4*  8.4*   < > 8.3*   MG 2.4  --   --     < > = values in this interval not displayed.     CBC:   Recent Labs   Lab 08/11/20 0400 08/11/20  0800 08/11/20  1200   WBC 16.07* 16.58* 16.40*   HGB 9.4* 9.2* 9.2*   HCT 28.2* 28.6* 28.3*    144* 148*     CMP:   Recent Labs   Lab 08/11/20 0400 08/11/20  0800 08/11/20  1200     139 137 139   K 4.0  4.0  4.0 3.7  3.7 4.9  4.9     107 106 109   CO2 23  23 24 26   *  152* 169* 165*   BUN 8  8 7* 8   CREATININE 0.7  0.7 0.6 0.6   CALCIUM 8.4*  8.4* 7.9* 8.3*   PROT 5.9*  --   --    ALBUMIN 3.5  --   --    BILITOT 0.5  --   --    ALKPHOS 54*  --   --    AST 34  --   --    ALT 20  --   --    ANIONGAP 9  9 7* 4*   EGFRNONAA >60.0  >60.0 >60.0 >60.0     Microbiology  Results (last 7 days)     Procedure Component Value Units Date/Time    Culture, Respiratory with Gram Stain [[id number]] Collected: 08/11/20 1315    Order Status: Completed Specimen: Respiratory from BAL, JOSETTE Updated: 08/11/20 1810     Gram Stain (Respiratory) Rare WBC's     Gram Stain (Respiratory) No organisms seen    AFB Culture & Smear [[id number]] Collected: 08/10/20 0758    Order Status: Completed Specimen: Body Fluid from Lung, Left Updated: 08/11/20 1516     AFB CULTURE STAIN No acid fast bacilli seen.    Narrative:      [facility] Donor Lung Left (Aerobic, Anaerobic, AFB, Fungus,  Gram)    AFB Culture & Smear [[id number]] Collected: 08/11/20 1315    Order Status: Sent Specimen: Respiratory from BAL, JOSETTE Updated: 08/11/20 1505    Fungus culture [[id number]] Collected: 08/11/20 1315    Order Status: Sent Specimen: Respiratory from BAL, JOSETTE Updated: 08/11/20 1504    Culture, Anaerobe [[id number]] Collected: 08/10/20 0758    Order Status: Completed Specimen: Body Fluid from Lung, Left Updated: 08/11/20 0719     Anaerobic Culture Culture in progress    Narrative:      [facility] Donor Lung Left (Aerobic, Anaerobic, AFB, Fungus,  Gram)    Aerobic culture [[id number]] Collected: 08/10/20 0758    Order Status: Completed Specimen: Body Fluid from Lung, Left Updated: 08/11/20 0715     Aerobic Bacterial Culture No growth    Narrative:      Bron[facility] Donor Lung Left (Aerobic, Anaerobic, AFB, Fungus,  Gram)    Gram stain [[id number]] Collected: 08/10/20 0758    Order Status: Completed Specimen: Body Fluid from Lung, Left Updated: 08/10/20 1143     Gram Stain Result No WBC's      No organisms seen    Narrative:      Bron[facility] Donor Lung Left (Aerobic, Anaerobic, AFB, Fungus,  Gram)    Fungus culture [[id number]] Collected: 08/10/20 0758    Order Status: Sent Specimen: Body Fluid from Lung, Left Updated: 08/10/20 1021          Significant Imaging: I have reviewed all pertinent imaging results/findings within the past 24 hours.

## 2020-08-12 NOTE — ASSESSMENT & PLAN NOTE
BG goal 140 - 180     continue transition insulin infusion at 0.6 u/hr.  BG monitoring ac/hs/0200 and change to moderate dose correction scale.   Monitor for prandial excursions.     Discharge planning: GWENDOLYN

## 2020-08-12 NOTE — PROGRESS NOTES
[facility]  Lung Transplant  Progress Note - Critical Care    Patient Name: Chely Becerra  MRN: [id number]  Admission Date: 8/9/2020  Hospital Length of Stay: 3 days  Post-Operative Day: 2  Attending Physician: Adam Hill MD  Primary Care Provider: ZAHIDA Stack MD     Subjective:     Interval History: POD#2 s/p LSLT for IPF. No acute events overnight. Extubated yesterday afternoon. OOBTC this morning. Awaiting transfer to TSU.     Continuous Infusions:   insulin (HUMAN R) infusion (adults) 0.6 Units/hr (08/12/20 1000)     Scheduled Meds:   aspirin  325 mg Oral Daily    basiliximab (SIMULECT) infusion  20 mg Intravenous Q96H    ceFEPime (MAXIPIME) IVPB  2 g Intravenous Q12H    chlorhexidine  10 mL Mouth/Throat BID    enoxaparin  40 mg Subcutaneous Q24H    famotidine  20 mg Oral BID    gabapentin  100 mg Oral TID    levalbuterol  1.25 mg Nebulization Q6H WAKE    [START ON 8/13/2020] methylPREDNISolone sodium succinate  2 mg/kg Intravenous Daily    metoprolol tartrate  25 mg Oral BID    mupirocin  1 g Nasal BID    mycophenolate  500 mg Oral BID    polyethylene glycol  17 g Oral Daily    [START ON 8/14/2020] predniSONE  90 mg Oral Daily    Followed by    [START ON 8/15/2020] predniSONE  60 mg Oral Daily    Followed by    [START ON 8/16/2020] predniSONE  45 mg Oral Daily    Followed by    [START ON 8/17/2020] predniSONE  35 mg Oral Daily    [START ON 8/14/2020] sulfamethoxazole-trimethoprim 800-160mg  1 tablet Oral Every Mon, Wed, Fri    tacrolimus  0.5 mg Oral BID    valGANciclovir  450 mg Oral BID    vancomycin (VANCOCIN) IVPB  1,250 mg Intravenous Q12H    voriconazole  350 mg Oral BID     PRN Meds:acetaminophen, dextrose 50%, dextrose 50%, fentaNYL, glucagon (human recombinant), glucose, glucose, insulin aspart U-100, lactulose, magnesium sulfate IVPB, melatonin, metoclopramide HCl, ondansetron, oxyCODONE, oxyCODONE, potassium chloride **AND** potassium chloride  "**AND** potassium chloride, Pharmacy to dose Vancomycin consult **AND** vancomycin - pharmacy to dose    Review of patient's allergies indicates:   Allergen Reactions    Boniva [ibandronate] Other (See Comments)     "chest cramps"       Review of Systems   Constitutional: Negative for activity change, appetite change, chills, diaphoresis, fatigue and fever.   HENT: Negative for congestion, ear discharge, ear pain, facial swelling, hearing loss, mouth sores, nosebleeds, postnasal drip, rhinorrhea, sinus pressure, sore throat, trouble swallowing and voice change.    Eyes: Negative for pain, discharge, redness and itching.   Respiratory: Positive for shortness of breath. Negative for apnea, cough, choking, chest tightness, wheezing and stridor.    Cardiovascular: Positive for chest pain (incisional). Negative for palpitations and leg swelling.   Gastrointestinal: Negative for abdominal distention, abdominal pain, anal bleeding, blood in stool, constipation, diarrhea, nausea, rectal pain and vomiting.   Endocrine: Negative for cold intolerance and heat intolerance.   Genitourinary: Negative for difficulty urinating, dysuria and flank pain.   Musculoskeletal: Negative for arthralgias, back pain, joint swelling, myalgias and neck pain.   Allergic/Immunologic: Positive for immunocompromised state.   Neurological: Negative for dizziness, tremors, light-headedness, numbness and headaches.   Psychiatric/Behavioral: Negative for agitation and hallucinations. The patient is nervous/anxious.      Objective:   Physical Exam  Vitals signs and nursing note reviewed.   Constitutional:       Appearance: Normal appearance. She is overweight.      Interventions: Nasal cannula in place.   HENT:      Head: Normocephalic and atraumatic.      Nose: Nose normal.      Mouth/Throat:      Mouth: Mucous membranes are dry.   Eyes:      General: No scleral icterus.     Conjunctiva/sclera: Conjunctivae normal.   Neck:      Comments: Right IJ " catheter and cordis c/d/i  Cardiovascular:      Rate and Rhythm: Normal rate and regular rhythm.      Heart sounds: No murmur. Friction rub present. No gallop.    Pulmonary:      Breath sounds: Examination of the right-middle field reveals rales. Examination of the right-lower field reveals decreased breath sounds and rales. Examination of the left-lower field reveals decreased breath sounds. Decreased breath sounds and rales present. No wheezing or rhonchi.      Comments: 2 left pleural and 1 right pleural chest tubes in place with sanguinous output, no airleaks  Abdominal:      General: Bowel sounds are decreased. There is no distension.      Palpations: Abdomen is soft.   Genitourinary:     Comments: pelletier  Musculoskeletal:      Right lower leg: No edema.      Left lower leg: No edema.   Skin:     General: Skin is warm and dry.      Capillary Refill: Capillary refill takes less than 2 seconds.      Comments: Left radial arterial line c/d/i           Vital Signs (Most Recent):  Temp: 98.4 °F (36.9 °C) (08/12/20 0700)  Pulse: 99 (08/12/20 1000)  Resp: (!) 30 (08/12/20 1207)  BP: (!) 145/69 (08/12/20 1000)  SpO2: 98 % (08/12/20 1000) Vital Signs (24h Range):  Temp:  [98.4 °F (36.9 °C)-99 °F (37.2 °C)] 98.4 °F (36.9 °C)  Pulse:  [] 99  Resp:  [9-39] 30  SpO2:  [95 %-100 %] 98 %  BP: (108-147)/(60-87) 145/69  Arterial Line BP: ()/(47-73) 137/66     Weight: 96.7 kg (213 lb 3 oz)  Body mass index is 33.39 kg/m².      Intake/Output Summary (Last 24 hours) at 8/12/2020 1225  Last data filed at 8/12/2020 1000  Gross per 24 hour   Intake 525.22 ml   Output 1910 ml   Net -1384.78 ml       Ventilator Data:     Vent Mode: Spont  Oxygen Concentration (%):  [30] 30  Resp Rate Total:  [0 br/min-28 br/min] 0 br/min  PEEP/CPAP:  [5 cmH20] 5 cmH20  Pressure Support:  [8 cmH20] 8 cmH20  Mean Airway Pressure:  [7.8 cmH20] 7.8 cmH20    Hemodynamic Parameters:  PAP: (27)/(4) 27/4  PAP (Mean):  [16 mmHg] 16  mmHg    Lines/Drains:  Pulmonary Artery Catheter Assessment  08/10/20 0533 (Active)   Verification by X-ray Yes 08/10/20 1100   Site Assessment No drainage;No redness;No swelling;No warmth 08/10/20 1100   Line Securement Device Secured with sutures 08/10/20 1100   Dressing Type Biopatch in place;Central line dressing with pants 08/10/20 1100   Dressing Status Clean;Dry;Intact 08/10/20 1100   Dressing Intervention Integrity maintained 08/10/20 1100   Date on Dressing 08/17/20 08/10/20 1100   Dressing Due to be Changed 08/17/20 08/10/20 1100   Balloon Patency/Care flushed w/o difficulty;normal saline locked;infusing 08/10/20 1100   Thermistor Patency/Care flushed w/o difficulty;normal saline locked;infusing 08/10/20 1100   Proximal Patency/Care flushed w/o difficulty;normal saline locked;infusing 08/10/20 1100   Distal Patency/Care flushed w/o difficulty;normal saline locked;infusing 08/10/20 1100   Extra Patency/Care flushed w/o difficulty;normal saline locked;infusing 08/10/20 1100   Waveform Normal 08/10/20 1100   Line Necessity Review Large/frequent boluses;Longterm central access required;Medication caustic to vasculature;Poor venous access 08/10/20 1100   Number of days: 0        Introducer with Double Lumen 08/10/20 right internal jugular (Active)   Site Assessment No drainage;No redness;No swelling;No warmth 08/10/20 1100   Line Securement Device Secured with sutures 08/10/20 1100   Dressing Type Biopatch in place;Central line dressing with pants 08/10/20 1100   Dressing Status Clean;Dry;Intact 08/10/20 1100   Dressing Intervention Integrity maintained 08/10/20 1100   Date on Dressing 08/17/20 08/10/20 1100   Dressing Due to be Changed 08/17/20 08/10/20 1100   Distal Patency/Care flushed w/o difficulty;blood return present;infusing 08/10/20 1100   Proximal 1 Patency/Care flushed w/o difficulty;blood return present;infusing 08/10/20 1100   Line Necessity Review Large/frequent boluses;Longterm central access  required;Medication caustic to vasculature;Poor venous access 08/10/20 1100   Number of days: 0            Peripheral IV - Single Lumen 08/09/20 2000 22 G Right Antecubital (Active)   Site Assessment Clean;Dry;Intact;No redness;No swelling 08/10/20 1100   Line Status Blood return noted;Flushed;Infusing 08/10/20 1100   Dressing Status Clean;Dry;Intact 08/10/20 1100   Dressing Intervention First dressing 08/10/20 1100   Dressing Change Due 08/14/20 08/10/20 1100   Site Change Due 08/14/20 08/10/20 1100   Reason Not Rotated Not due 08/10/20 1100   Number of days: 0            Peripheral IV - Single Lumen 08/10/20 0555 16 G Left Forearm (Active)   Site Assessment Clean;Dry;Intact;No redness;No swelling 08/10/20 1100   Line Status Blood return noted;Flushed;Saline locked 08/10/20 1100   Dressing Status Clean;Dry;Intact 08/10/20 1100   Dressing Intervention First dressing 08/10/20 1100   Dressing Change Due 08/14/20 08/10/20 1100   Site Change Due 08/14/20 08/10/20 1100   Reason Not Rotated Not due 08/10/20 1100   Number of days: 0            Arterial Line 08/10/20 0531 Left Radial (Active)   Site Assessment Clean;Dry;Intact;No redness;No swelling 08/10/20 1100   Line Status Pulsatile blood flow 08/10/20 1100   Art Line Waveform Appropriate;Square wave test performed 08/10/20 1100   Arterial Line Interventions Zeroed and calibrated;Leveled;Connections checked and tightened;Flushed per protocol;Line pulled back 08/10/20 1100   Color/Movement/Sensation Capillary refill less than 3 sec 08/10/20 1100   Dressing Type Central line dressing with pants 08/10/20 1100   Dressing Status Clean;Dry;Intact 08/10/20 1100   Dressing Intervention Integrity maintained 08/10/20 1100   Dressing Change Due 08/17/20 08/10/20 1100   Number of days: 0            Chest Tube 08/10/20 1100 1 Left Pleural (Active)   Chest Tube WDL ex 08/10/20 1100   Function -20 cm H2O 08/10/20 1100   Air Leak/Fluctuation air leak not present;fluctuation not  present;connections tightened;dependent drainage cleared 08/10/20 1100   Safety all tubing connections taped;2 rubber-tipped hemostats w/ patient;all connections secured;suction checked 08/10/20 1100   Securement tubing secured to body distal to insertion site w/ tape 08/10/20 1100   Patency Intervention Tip/tilt 08/10/20 1100   Drainage Description Sanguineous 08/10/20 1100   Dressing Appearance occlusive gauze dressing intact;clean and dry 08/10/20 1100   Dressing Care dressing reinforced 08/10/20 1100   Left Subcutaneous Emphysema none present 08/10/20 1100   Right Subcutaneous Emphysema none present 08/10/20 1100   Site Assessment Not assessed;Other (Comment) 08/10/20 1100   Surrounding Skin Unable to view 08/10/20 1100   Output (mL) 10 mL 08/10/20 1100   Number of days: 0            Chest Tube 08/10/20 1100 1 Right Pleural (Active)   Chest Tube WDL ex 08/10/20 1100   Function -20 cm H2O 08/10/20 1100   Air Leak/Fluctuation air leak not present;fluctuation not present 08/10/20 1100   Safety all tubing connections taped;2 rubber-tipped hemostats w/ patient;all connections secured;suction checked 08/10/20 1100   Securement tubing secured to body distal to insertion site w/ tape 08/10/20 1100   Patency Intervention Tip/tilt 08/10/20 1100   Drainage Description Sanguineous 08/10/20 1100   Dressing Appearance occlusive gauze dressing intact;clean and dry 08/10/20 1100   Dressing Care dressing reinforced 08/10/20 1100   Left Subcutaneous Emphysema none present 08/10/20 1100   Right Subcutaneous Emphysema none present 08/10/20 1100   Site Assessment Not assessed;Other (Comment) 08/10/20 1100   Surrounding Skin Unable to view;Other (Comment) 08/10/20 1100   Output (mL) 15 mL 08/10/20 1100   Number of days: 0            Chest Tube 08/10/20 1100 2 Left Pleural (Active)   Chest Tube WDL ex 08/10/20 1100   Function -20 cm H2O 08/10/20 1100   Air Leak/Fluctuation air leak not present;fluctuation not present 08/10/20 1100  "  Safety all tubing connections taped;2 rubber-tipped hemostats w/ patient;all connections secured;suction checked 08/10/20 1100   Securement tubing secured to body distal to insertion site w/ tape 08/10/20 1100   Patency Intervention Tip/tilt 08/10/20 1100   Drainage Description Sanguineous 08/10/20 1100   Dressing Appearance occlusive gauze dressing intact;clean and dry 08/10/20 1100   Dressing Care dressing reinforced 08/10/20 1100   Left Subcutaneous Emphysema none present 08/10/20 1100   Right Subcutaneous Emphysema none present 08/10/20 1100   Site Assessment Not assessed;Other (Comment) 08/10/20 1100   Surrounding Skin Unable to view;Other (Comment) 08/10/20 1100   Output (mL) 20 mL 08/10/20 1100   Number of days: 0            NG/OG Tube 08/10/20 1100 (Active)   Placement Check placement verified by aspirate characteristics;placement verified by x-ray 08/10/20 1100   Advancement advanced manually 08/10/20 1100   Tolerance no signs/symptoms of discomfort 08/10/20 1100   Securement secured to commercial device 08/10/20 1100   Clamp Status/Tolerance clamped;no abdominal discomfort;no abdominal distention;no emesis;no nausea;no residual;no restlessness 08/10/20 1100   Suction Setting/Drainage Method dependent drainage 08/10/20 1100   Insertion Site Appearance no redness, warmth, tenderness, skin breakdown, drainage 08/10/20 1100   Number of days: 0            Urethral Catheter 08/10/20 0535 Non-latex;Straight-tip 16 Fr. (Active)   Site Assessment Clean;Intact 08/10/20 1100   Collection Container Urimeter 08/10/20 1100   Securement Method secured to top of thigh w/ adhesive device 08/10/20 1100   Catheter Care Performed yes 08/10/20 1100   Reason for Continuing Urinary Catheterization Critically ill in ICU requiring intensive monitoring 08/10/20 1100   CAUTI Prevention Bundle StatLock in place w 1" slack;Intact seal between catheter & drainage tubing;Drainage bag/urimeter off the floor;Green sheeting clip in " use;No dependent loops or kinks;Drainage bag/urimeter not overfilled (<2/3 full);Drainage bag/urimeter below bladder 08/10/20 1100   Output (mL) 100 mL 08/10/20 1200   Number of days: 0       Significant Labs:  CBC:  Recent Labs   Lab 08/12/20  0356   WBC 20.33*   RBC 2.92*   HGB 8.9*   HCT 28.6*      MCV 98   MCH 30.5   MCHC 31.1*     BMP:  Recent Labs   Lab 08/12/20  0356      K 4.3      CO2 25   BUN 12   CREATININE 0.7   CALCIUM 8.0*      Tacrolimus Levels:  No results for input(s): TACROLIMUS in the last 72 hours.  Microbiology:  Microbiology Results (last 7 days)     Procedure Component Value Units Date/Time    Culture, Respiratory with Gram Stain [[id number]] Collected: 08/11/20 1315    Order Status: Completed Specimen: Respiratory from BAL, JOSETTE Updated: 08/12/20 0935     Respiratory Culture Normal respiratory yoly     Gram Stain (Respiratory) Rare WBC's     Gram Stain (Respiratory) No organisms seen    Culture, Anaerobe [[id number]] Collected: 08/10/20 0758    Order Status: Completed Specimen: Body Fluid from Lung, Left Updated: 08/12/20 0831     Anaerobic Culture Culture in progress    Narrative:      Bron[facility] Donor Lung Left (Aerobic, Anaerobic, AFB, Fungus,  Gram)    AFB Culture & Smear [[id number]] Collected: 08/10/20 0758    Order Status: Completed Specimen: Body Fluid from Lung, Left Updated: 08/11/20 2127     AFB Culture & Smear Culture in progress     AFB CULTURE STAIN No acid fast bacilli seen.    Narrative:      Bron[facility] Donor Lung Left (Aerobic, Anaerobic, AFB, Fungus,  Gram)    AFB Culture & Smear [[id number]] Collected: 08/11/20 1315    Order Status: Sent Specimen: Respiratory from BAL, JOSETTE Updated: 08/11/20 1505    Fungus culture [[id number]] Collected: 08/11/20 1315    Order Status: Sent Specimen: Respiratory from BAL, JOSETTE Updated: 08/11/20 1504    Aerobic culture [[id number]] Collected: 08/10/20 0758    Order Status: Completed Specimen: Body Fluid from Lung, Left Updated:  08/11/20 0715     Aerobic Bacterial Culture No growth    Narrative:      Bron[facility] Donor Lung Left (Aerobic, Anaerobic, AFB, Fungus,  Gram)    Gram stain [[id number]] Collected: 08/10/20 0758    Order Status: Completed Specimen: Body Fluid from Lung, Left Updated: 08/10/20 1143     Gram Stain Result No WBC's      No organisms seen    Narrative:      Bron[facility] Donor Lung Left (Aerobic, Anaerobic, AFB, Fungus,  Gram)    Fungus culture [[id number]] Collected: 08/10/20 0758    Order Status: Sent Specimen: Body Fluid from Lung, Left Updated: 08/10/20 1021          I have reviewed all pertinent labs within the past 24 hours.    Diagnostic Results:  Chest X-Ray: I personally reviewed the films and findings are:, pulmonary edema, perihilar opacitites bilaterally     No Further        Assessment/Plan:     * S/P lung transplant  POD #2 s/p uncomplicated LSLT for IPF. PGD 2 for 24 hours. CT output stable. Underwent bronchoscopy and successfully extubated to [facility] on POD#1. LuT now primary. Continue immunosuppression and prophylaxis. IS/PT/OT/CPT ordered. Transfer to TSU today.     Immunosuppression  Induction with solumedrol and basiliximab. Repeat basiliximab on POD#4. Tacrolimus, MMF, and prednisone taper ordered.     Prophylactic antibiotic  CMV D-/R+ On vancomycin and cefepime for routine surgical prophylaxis. Bactrim, ganciclovir, and voriconazole for OIP. Will follow up on donor cultures and adjust therapy as needed.     Controlled type 2 diabetes mellitus without complication, without long-term current use of insulin  Endocrine following, appreciate recs.     Acute blood loss anemia  Expected post-op. Continue to trend.     Leukocytosis  Expected post-op. Continue to trend.         Preventive Measures: Nutrition: Goal: advance diet as tolerated, Stress Ulcer: continue prophyllaxis, DVT: continue prophyllaxis, Head of Bet: elevated, Reposition: per unit routine, Physical Therapy: as tolerated    Counseling/Consultation:I  discussed the case with Dr. Hill.      Mayuri Nath PA-C  Lung Transplant  [facility]

## 2020-08-12 NOTE — PT/OT/SLP EVAL
Occupational Therapy   Evaluation    Name: Chely Becerra  MRN: [id number]  Admitting Diagnosis:  S/P lung transplant 2 Days Post-Op   Lung transplant 8/10/2020  Recommendations:     Discharge Recommendations: home with home health    Assessment:     Chely Becerra is a 65 y.o. female with a medical diagnosis of S/P lung transplant. Performance deficits affecting function: weakness, impaired endurance, impaired self care skills, impaired functional mobilty, gait instability, pain.    Pt tolerated session well with good effort and performance. OT anticipates pt will demo good progress and will be ready for d/c home with family support when medically stable.   Rehab Prognosis: Good; patient would benefit from acute skilled OT services to address these deficits and reach maximum level of function.       Plan:     Patient to be seen 3 x/week to address the above listed problems via self-care/home management, therapeutic activities, therapeutic exercises  · Plan of Care Expires:    · Plan of Care Reviewed with: patient, daughter    Subjective     Pt agreeable to therapy     Occupational Profile:  Pt resides with daughter in one story home with no DEBBIE. Pt has a shower chair. Pt was independent prior to arrival including driving. Pt was wearing 2 LPM oxygen at home and 3 LPM oxygen in the community.  Pt reports her daughter works daily  But ha 3 grandchildren who are able to assist as needed.     Pain/Comfort:  · Pain Rating 1: 7/10  · Location 1: chest  · Pain Addressed 1: Reposition, Distraction    Patients cultural, spiritual, Quaker conflicts given the current situation: no    Objective:     Communicated with: nsmejia prior to session. Pt found seated in chair    General Precautions: Standard, fall, sternal       Occupational Performance:    Functional Mobility/Transfers:  · Sit>stand with CGA with cues for sternal precautions.     Activities of Daily Living:  · Feeding: independent  · G/H: seated with  set-up    Cognitive/Visual Perceptual  Pt awake, alert and following commands. Pt demo good motivation.     Physical Exam:  Pt reports she is ambidextrous and demo WFL UE strength/ROM, coordination and sensation.     AMPAC 6 Click ADL:  AMPAC Total Score: 15    Treatment & Education:  Education provided re: sternal precautions and implications on functional activity. Education provided to pt and daughter who both verb understanding. Pt needing minimal cues to adhere to precautions during session.  Pt able to stand and take few steps in room with CGA. Pt with improved ease of motion as session continued.   Education provided re: OT POC an safety with functional mobility/ADl skills.   Patient left up in chair with all lines intact, call button in reach and nsg notified    GOALS:   Multidisciplinary Problems     Occupational Therapy Goals        Problem: Occupational Therapy Goal    Goal Priority Disciplines Outcome Interventions   Occupational Therapy Goal     OT, PT/OT Ongoing, Progressing    Description: Goals to be met by: 7 days 8/19/2020     Patient will increase functional independence with ADLs by performing:    Pt to complete standing g/h skills with supervision.  Pt to complete UE dressing with set-up  Pt to complete LE dressing with SBA  Pt to complete toileting with supervisionPt to complete t/f skills including bed, chair and commode with supervision.                      History:     Past Medical History:   Diagnosis Date    Common bile duct dilatation 1/15/2019    Controlled type 2 diabetes mellitus without complication, without long-term current use of insulin 1/17/2019    Essential hypertension 1/16/2019    GERD (gastroesophageal reflux disease)     Hepatitis B core antibody positive 1/15/2019    IPF (idiopathic pulmonary fibrosis)     Obesity (BMI 30-39.9) 1/16/2019    RLS (restless legs syndrome)        Past Surgical History:   Procedure Laterality Date    APPLICATION OF WOUND  VACUUM-ASSISTED CLOSURE DEVICE Left 8/10/2020    Procedure: APPLICATION, WOUND VAC, 40 x 5cm;  Surgeon: Benedict Clemons MD;  Location: [facility] OR [address] [place] [address];  Service: Cardiovascular;  Laterality: Left;    CATHETERIZATION OF BOTH LEFT AND RIGHT HEART N/A 1/17/2019    Procedure: CATHETERIZATION, HEART, BOTH LEFT AND RIGHT;  Surgeon: Trey Evans MD;  Location: [facility] CATH LAB;  Service: Cardiology;  Laterality: N/A;    CHOLECYSTECTOMY      COLONOSCOPY      ESOPHAGOGASTRODUODENOSCOPY      HERNIA REPAIR      LEFT HEART CATHETERIZATION Left 2/18/2020    Procedure: Left heart cath;  Surgeon: Trey Evans MD;  Location: [facility] CATH LAB;  Service: Cardiology;  Laterality: Left;    LUNG TRANSPLANT Left 8/10/2020    Procedure: TRANSPLANT, LUNG - 4:30AM start;  Surgeon: Benedict Clemons MD;  Location: [facility] OR [address] [place] [address];  Service: Cardiovascular;  Laterality: Left;    SMALL INTESTINE SURGERY      mass removed (benign)       Time Tracking:     OT Date of Treatment: 08/12/20  OT Start Time: 1116  OT Stop Time: 1134  OT Total Time (min): 18 min    Billable Minutes:Evaluation 9  Therapeutic Activity 9    KHOI Perez  8/12/2020

## 2020-08-12 NOTE — SUBJECTIVE & OBJECTIVE
"Subjective:     Interval History: POD#2 s/p LSLT for IPF. No acute events overnight. Extubated yesterday afternoon. OOBTC this morning. Awaiting transfer to [facility].     Continuous Infusions:   insulin (HUMAN R) infusion (adults) 0.6 Units/hr (08/12/20 1000)     Scheduled Meds:   aspirin  325 mg Oral Daily    basiliximab (SIMULECT) infusion  20 mg Intravenous Q96H    ceFEPime (MAXIPIME) IVPB  2 g Intravenous Q12H    chlorhexidine  10 mL Mouth/Throat BID    enoxaparin  40 mg Subcutaneous Q24H    famotidine  20 mg Oral BID    gabapentin  100 mg Oral TID    levalbuterol  1.25 mg Nebulization Q6H WAKE    [START ON 8/13/2020] methylPREDNISolone sodium succinate  2 mg/kg Intravenous Daily    metoprolol tartrate  25 mg Oral BID    mupirocin  1 g Nasal BID    mycophenolate  500 mg Oral BID    polyethylene glycol  17 g Oral Daily    [START ON 8/14/2020] predniSONE  90 mg Oral Daily    Followed by    [START ON 8/15/2020] predniSONE  60 mg Oral Daily    Followed by    [START ON 8/16/2020] predniSONE  45 mg Oral Daily    Followed by    [START ON 8/17/2020] predniSONE  35 mg Oral Daily    [START ON 8/14/2020] sulfamethoxazole-trimethoprim 800-160mg  1 tablet Oral Every Mon, Wed, Fri    tacrolimus  0.5 mg Oral BID    valGANciclovir  450 mg Oral BID    vancomycin (VANCOCIN) IVPB  1,250 mg Intravenous Q12H    voriconazole  350 mg Oral BID     PRN Meds:acetaminophen, dextrose 50%, dextrose 50%, fentaNYL, glucagon (human recombinant), glucose, glucose, insulin aspart U-100, lactulose, magnesium sulfate IVPB, melatonin, metoclopramide HCl, ondansetron, oxyCODONE, oxyCODONE, potassium chloride **AND** potassium chloride **AND** potassium chloride, Pharmacy to dose Vancomycin consult **AND** vancomycin - pharmacy to dose    Review of patient's allergies indicates:   Allergen Reactions    Boniva [ibandronate] Other (See Comments)     "chest cramps"       Review of Systems   Constitutional: Negative for activity " change, appetite change, chills, diaphoresis, fatigue and fever.   HENT: Negative for congestion, ear discharge, ear pain, facial swelling, hearing loss, mouth sores, nosebleeds, postnasal drip, rhinorrhea, sinus pressure, sore throat, trouble swallowing and voice change.    Eyes: Negative for pain, discharge, redness and itching.   Respiratory: Positive for shortness of breath. Negative for apnea, cough, choking, chest tightness, wheezing and stridor.    Cardiovascular: Positive for chest pain (incisional). Negative for palpitations and leg swelling.   Gastrointestinal: Negative for abdominal distention, abdominal pain, anal bleeding, blood in stool, constipation, diarrhea, nausea, rectal pain and vomiting.   Endocrine: Negative for cold intolerance and heat intolerance.   Genitourinary: Negative for difficulty urinating, dysuria and flank pain.   Musculoskeletal: Negative for arthralgias, back pain, joint swelling, myalgias and neck pain.   Allergic/Immunologic: Positive for immunocompromised state.   Neurological: Negative for dizziness, tremors, light-headedness, numbness and headaches.   Psychiatric/Behavioral: Negative for agitation and hallucinations. The patient is nervous/anxious.      Objective:   Physical Exam  Vitals signs and nursing note reviewed.   Constitutional:       Appearance: Normal appearance. She is overweight.      Interventions: Nasal cannula in place.   HENT:      Head: Normocephalic and atraumatic.      Nose: Nose normal.      Mouth/Throat:      Mouth: Mucous membranes are dry.   Eyes:      General: No scleral icterus.     Conjunctiva/sclera: Conjunctivae normal.   Neck:      Comments: Right IJ catheter and cordis c/d/i  Cardiovascular:      Rate and Rhythm: Normal rate and regular rhythm.      Heart sounds: No murmur. Friction rub present. No gallop.    Pulmonary:      Breath sounds: Examination of the right-middle field reveals rales. Examination of the right-lower field reveals  decreased breath sounds and rales. Examination of the left-lower field reveals decreased breath sounds. Decreased breath sounds and rales present. No wheezing or rhonchi.      Comments: 2 left pleural and 1 right pleural chest tubes in place with sanguinous output, no airleaks  Abdominal:      General: Bowel sounds are decreased. There is no distension.      Palpations: Abdomen is soft.   Genitourinary:     Comments: pelletier  Musculoskeletal:      Right lower leg: No edema.      Left lower leg: No edema.   Skin:     General: Skin is warm and dry.      Capillary Refill: Capillary refill takes less than 2 seconds.      Comments: Left radial arterial line c/d/i           Vital Signs (Most Recent):  Temp: 98.4 °F (36.9 °C) (08/12/20 0700)  Pulse: 99 (08/12/20 1000)  Resp: (!) 30 (08/12/20 1207)  BP: (!) 145/69 (08/12/20 1000)  SpO2: 98 % (08/12/20 1000) Vital Signs (24h Range):  Temp:  [98.4 °F (36.9 °C)-99 °F (37.2 °C)] 98.4 °F (36.9 °C)  Pulse:  [] 99  Resp:  [9-39] 30  SpO2:  [95 %-100 %] 98 %  BP: (108-147)/(60-87) 145/69  Arterial Line BP: ()/(47-73) 137/66     Weight: 96.7 kg (213 lb 3 oz)  Body mass index is 33.39 kg/m².      Intake/Output Summary (Last 24 hours) at 8/12/2020 1225  Last data filed at 8/12/2020 1000  Gross per 24 hour   Intake 525.22 ml   Output 1910 ml   Net -1384.78 ml       Ventilator Data:     Vent Mode: Spont  Oxygen Concentration (%):  [30] 30  Resp Rate Total:  [0 br/min-28 br/min] 0 br/min  PEEP/CPAP:  [5 cmH20] 5 cmH20  Pressure Support:  [8 cmH20] 8 cmH20  Mean Airway Pressure:  [7.8 cmH20] 7.8 cmH20    Hemodynamic Parameters:  PAP: (27)/(4) 27/4  PAP (Mean):  [16 mmHg] 16 mmHg    Lines/Drains:  Pulmonary Artery Catheter Assessment  08/10/20 0533 (Active)   Verification by X-ray Yes 08/10/20 1100   Site Assessment No drainage;No redness;No swelling;No warmth 08/10/20 1100   Line Securement Device Secured with sutures 08/10/20 1100   Dressing Type Biopatch in place;Central line  dressing with pants 08/10/20 1100   Dressing Status Clean;Dry;Intact 08/10/20 1100   Dressing Intervention Integrity maintained 08/10/20 1100   Date on Dressing 08/17/20 08/10/20 1100   Dressing Due to be Changed 08/17/20 08/10/20 1100   Balloon Patency/Care flushed w/o difficulty;normal saline locked;infusing 08/10/20 1100   Thermistor Patency/Care flushed w/o difficulty;normal saline locked;infusing 08/10/20 1100   Proximal Patency/Care flushed w/o difficulty;normal saline locked;infusing 08/10/20 1100   Distal Patency/Care flushed w/o difficulty;normal saline locked;infusing 08/10/20 1100   Extra Patency/Care flushed w/o difficulty;normal saline locked;infusing 08/10/20 1100   Waveform Normal 08/10/20 1100   Line Necessity Review Large/frequent boluses;Longterm central access required;Medication caustic to vasculature;Poor venous access 08/10/20 1100   Number of days: 0        Introducer with Double Lumen 08/10/20 right internal jugular (Active)   Site Assessment No drainage;No redness;No swelling;No warmth 08/10/20 1100   Line Securement Device Secured with sutures 08/10/20 1100   Dressing Type Biopatch in place;Central line dressing with pants 08/10/20 1100   Dressing Status Clean;Dry;Intact 08/10/20 1100   Dressing Intervention Integrity maintained 08/10/20 1100   Date on Dressing 08/17/20 08/10/20 1100   Dressing Due to be Changed 08/17/20 08/10/20 1100   Distal Patency/Care flushed w/o difficulty;blood return present;infusing 08/10/20 1100   Proximal 1 Patency/Care flushed w/o difficulty;blood return present;infusing 08/10/20 1100   Line Necessity Review Large/frequent boluses;Longterm central access required;Medication caustic to vasculature;Poor venous access 08/10/20 1100   Number of days: 0            Peripheral IV - Single Lumen 08/09/20 2000 22 G Right Antecubital (Active)   Site Assessment Clean;Dry;Intact;No redness;No swelling 08/10/20 1100   Line Status Blood return noted;Flushed;Infusing 08/10/20  1100   Dressing Status Clean;Dry;Intact 08/10/20 1100   Dressing Intervention First dressing 08/10/20 1100   Dressing Change Due 08/14/20 08/10/20 1100   Site Change Due 08/14/20 08/10/20 1100   Reason Not Rotated Not due 08/10/20 1100   Number of days: 0            Peripheral IV - Single Lumen 08/10/20 0555 16 G Left Forearm (Active)   Site Assessment Clean;Dry;Intact;No redness;No swelling 08/10/20 1100   Line Status Blood return noted;Flushed;Saline locked 08/10/20 1100   Dressing Status Clean;Dry;Intact 08/10/20 1100   Dressing Intervention First dressing 08/10/20 1100   Dressing Change Due 08/14/20 08/10/20 1100   Site Change Due 08/14/20 08/10/20 1100   Reason Not Rotated Not due 08/10/20 1100   Number of days: 0            Arterial Line 08/10/20 0531 Left Radial (Active)   Site Assessment Clean;Dry;Intact;No redness;No swelling 08/10/20 1100   Line Status Pulsatile blood flow 08/10/20 1100   Art Line Waveform Appropriate;Square wave test performed 08/10/20 1100   Arterial Line Interventions Zeroed and calibrated;Leveled;Connections checked and tightened;Flushed per protocol;Line pulled back 08/10/20 1100   Color/Movement/Sensation Capillary refill less than 3 sec 08/10/20 1100   Dressing Type Central line dressing with pants 08/10/20 1100   Dressing Status Clean;Dry;Intact 08/10/20 1100   Dressing Intervention Integrity maintained 08/10/20 1100   Dressing Change Due 08/17/20 08/10/20 1100   Number of days: 0            Chest Tube 08/10/20 1100 1 Left Pleural (Active)   Chest Tube WDL ex 08/10/20 1100   Function -20 cm H2O 08/10/20 1100   Air Leak/Fluctuation air leak not present;fluctuation not present;connections tightened;dependent drainage cleared 08/10/20 1100   Safety all tubing connections taped;2 rubber-tipped hemostats w/ patient;all connections secured;suction checked 08/10/20 1100   Securement tubing secured to body distal to insertion site w/ tape 08/10/20 1100   Patency Intervention Tip/tilt  08/10/20 1100   Drainage Description Sanguineous 08/10/20 1100   Dressing Appearance occlusive gauze dressing intact;clean and dry 08/10/20 1100   Dressing Care dressing reinforced 08/10/20 1100   Left Subcutaneous Emphysema none present 08/10/20 1100   Right Subcutaneous Emphysema none present 08/10/20 1100   Site Assessment Not assessed;Other (Comment) 08/10/20 1100   Surrounding Skin Unable to view 08/10/20 1100   Output (mL) 10 mL 08/10/20 1100   Number of days: 0            Chest Tube 08/10/20 1100 1 Right Pleural (Active)   Chest Tube WDL ex 08/10/20 1100   Function -20 cm H2O 08/10/20 1100   Air Leak/Fluctuation air leak not present;fluctuation not present 08/10/20 1100   Safety all tubing connections taped;2 rubber-tipped hemostats w/ patient;all connections secured;suction checked 08/10/20 1100   Securement tubing secured to body distal to insertion site w/ tape 08/10/20 1100   Patency Intervention Tip/tilt 08/10/20 1100   Drainage Description Sanguineous 08/10/20 1100   Dressing Appearance occlusive gauze dressing intact;clean and dry 08/10/20 1100   Dressing Care dressing reinforced 08/10/20 1100   Left Subcutaneous Emphysema none present 08/10/20 1100   Right Subcutaneous Emphysema none present 08/10/20 1100   Site Assessment Not assessed;Other (Comment) 08/10/20 1100   Surrounding Skin Unable to view;Other (Comment) 08/10/20 1100   Output (mL) 15 mL 08/10/20 1100   Number of days: 0            Chest Tube 08/10/20 1100 2 Left Pleural (Active)   Chest Tube WDL ex 08/10/20 1100   Function -20 cm H2O 08/10/20 1100   Air Leak/Fluctuation air leak not present;fluctuation not present 08/10/20 1100   Safety all tubing connections taped;2 rubber-tipped hemostats w/ patient;all connections secured;suction checked 08/10/20 1100   Securement tubing secured to body distal to insertion site w/ tape 08/10/20 1100   Patency Intervention Tip/tilt 08/10/20 1100   Drainage Description Sanguineous 08/10/20 1100   Dressing  "Appearance occlusive gauze dressing intact;clean and dry 08/10/20 1100   Dressing Care dressing reinforced 08/10/20 1100   Left Subcutaneous Emphysema none present 08/10/20 1100   Right Subcutaneous Emphysema none present 08/10/20 1100   Site Assessment Not assessed;Other (Comment) 08/10/20 1100   Surrounding Skin Unable to view;Other (Comment) 08/10/20 1100   Output (mL) 20 mL 08/10/20 1100   Number of days: 0            NG/OG Tube 08/10/20 1100 (Active)   Placement Check placement verified by aspirate characteristics;placement verified by x-ray 08/10/20 1100   Advancement advanced manually 08/10/20 1100   Tolerance no signs/symptoms of discomfort 08/10/20 1100   Securement secured to commercial device 08/10/20 1100   Clamp Status/Tolerance clamped;no abdominal discomfort;no abdominal distention;no emesis;no nausea;no residual;no restlessness 08/10/20 1100   Suction Setting/Drainage Method dependent drainage 08/10/20 1100   Insertion Site Appearance no redness, warmth, tenderness, skin breakdown, drainage 08/10/20 1100   Number of days: 0            Urethral Catheter 08/10/20 0535 Non-latex;Straight-tip 16 Fr. (Active)   Site Assessment Clean;Intact 08/10/20 1100   Collection Container Urimeter 08/10/20 1100   Securement Method secured to top of thigh w/ adhesive device 08/10/20 1100   Catheter Care Performed yes 08/10/20 1100   Reason for Continuing Urinary Catheterization Critically ill in ICU requiring intensive monitoring 08/10/20 1100   CAUTI Prevention Bundle StatLock in place w 1" slack;Intact seal between catheter & drainage tubing;Drainage bag/urimeter off the floor;Green sheeting clip in use;No dependent loops or kinks;Drainage bag/urimeter not overfilled (<2/3 full);Drainage bag/urimeter below bladder 08/10/20 1100   Output (mL) 100 mL 08/10/20 1200   Number of days: 0       Significant Labs:  CBC:  Recent Labs   Lab 08/12/20  0356   WBC 20.33*   RBC 2.92*   HGB 8.9*   HCT 28.6*      MCV 98   MCH " 30.5   MCHC 31.1*     BMP:  Recent Labs   Lab 08/12/20  0356      K 4.3      CO2 25   BUN 12   CREATININE 0.7   CALCIUM 8.0*      Tacrolimus Levels:  No results for input(s): TACROLIMUS in the last 72 hours.  Microbiology:  Microbiology Results (last 7 days)     Procedure Component Value Units Date/Time    Culture, Respiratory with Gram Stain [[id number]] Collected: 08/11/20 1315    Order Status: Completed Specimen: Respiratory from BAL, JOSETTE Updated: 08/12/20 0935     Respiratory Culture Normal respiratory yoly     Gram Stain (Respiratory) Rare WBC's     Gram Stain (Respiratory) No organisms seen    Culture, Anaerobe [[id number]] Collected: 08/10/20 0758    Order Status: Completed Specimen: Body Fluid from Lung, Left Updated: 08/12/20 0831     Anaerobic Culture Culture in progress    Narrative:      Bronchus Donor Lung Left (Aerobic, Anaerobic, AFB, Fungus,  Gram)    AFB Culture & Smear [[id number]] Collected: 08/10/20 0758    Order Status: Completed Specimen: Body Fluid from Lung, Left Updated: 08/11/20 2127     AFB Culture & Smear Culture in progress     AFB CULTURE STAIN No acid fast bacilli seen.    Narrative:      Bronchus Donor Lung Left (Aerobic, Anaerobic, AFB, Fungus,  Gram)    AFB Culture & Smear [[id number]] Collected: 08/11/20 1315    Order Status: Sent Specimen: Respiratory from BAL, JOSETTE Updated: 08/11/20 1505    Fungus culture [[id number]] Collected: 08/11/20 1315    Order Status: Sent Specimen: Respiratory from BAL, JOSETTE Updated: 08/11/20 1504    Aerobic culture [[id number]] Collected: 08/10/20 0758    Order Status: Completed Specimen: Body Fluid from Lung, Left Updated: 08/11/20 0715     Aerobic Bacterial Culture No growth    Narrative:      Bronchus Donor Lung Left (Aerobic, Anaerobic, AFB, Fungus,  Gram)    Gram stain [[id number]] Collected: 08/10/20 0758    Order Status: Completed Specimen: Body Fluid from Lung, Left Updated: 08/10/20 1143     Gram Stain Result No WBC's      No organisms seen     Narrative:      Bronchus Donor Lung Left (Aerobic, Anaerobic, AFB, Fungus,  Gram)    Fungus culture [[id number]] Collected: 08/10/20 0758    Order Status: Sent Specimen: Body Fluid from Lung, Left Updated: 08/10/20 1021          I have reviewed all pertinent labs within the past 24 hours.    Diagnostic Results:  Chest X-Ray: I personally reviewed the films and findings are:, pulmonary edema, perihilar opacitites bilaterally     No Further

## 2020-08-12 NOTE — PT/OT/SLP EVAL
Speech Language Pathology Evaluation  Bedside Swallow    Patient Name:  Chely Becerra   MRN:  [id number]  Admitting Diagnosis: S/P lung transplant    Recommendations:                 General Recommendations:  Dysphagia therapy  Diet recommendations:  Regular, Thin   Aspiration Precautions: 1 bite/sip at a time, Frequent oral care, HOB to 90 degrees, Remain upright 30 minutes post meal, Small bites/sips and Standard aspiration precautions   General Precautions: Standard, aspiration  Communication strategies:  none    History:     Past Medical History:   Diagnosis Date    Common bile duct dilatation 1/15/2019    Controlled type 2 diabetes mellitus without complication, without long-term current use of insulin 1/17/2019    Essential hypertension 1/16/2019    GERD (gastroesophageal reflux disease)     Hepatitis B core antibody positive 1/15/2019    IPF (idiopathic pulmonary fibrosis)     Obesity (BMI 30-39.9) 1/16/2019    RLS (restless legs syndrome)        Past Surgical History:   Procedure Laterality Date    APPLICATION OF WOUND VACUUM-ASSISTED CLOSURE DEVICE Left 8/10/2020    Procedure: APPLICATION, WOUND VAC, 40 x 5cm;  Surgeon: Benedict Clemons MD;  Location: [address];  Service: Cardiovascular;  Laterality: Left;    CATHETERIZATION OF BOTH LEFT AND RIGHT HEART N/A 1/17/2019    Procedure: CATHETERIZATION, HEART, BOTH LEFT AND RIGHT;  Surgeon: Trey Evans MD;  Location: [facility] CATH LAB;  Service: Cardiology;  Laterality: N/A;    CHOLECYSTECTOMY      COLONOSCOPY      ESOPHAGOGASTRODUODENOSCOPY      HERNIA REPAIR      LEFT HEART CATHETERIZATION Left 2/18/2020    Procedure: Left heart cath;  Surgeon: Trey Evans MD;  Location: [facility] CATH LAB;  Service: Cardiology;  Laterality: Left;    LUNG TRANSPLANT Left 8/10/2020    Procedure: TRANSPLANT, LUNG - 4:30AM start;  Surgeon: Benedict Clemons MD;  Location: [address];  Service: Cardiovascular;  Laterality: Left;    SMALL INTESTINE  SURGERY      mass removed (benign)       Prior Intubation HX:  8/10-8/11 for surgery    Chest X-Rays: 8/12- Low lung volumes persist.  Slightly increased patchy mid and lower lung zone opacities relative to the prior study.  No definite pneumothorax.  Unchanged cardiomediastinal silhouette.  Prominent perihilar markings persist.    Prior diet: regular/thin    Subjective     Spoke with RN prior to entering pt's room . Pt seen in bedside chair for session. Alert and agreeable to ST.       Pain/Comfort:  · Pain Rating 1: 1/10  · Pain Rating Post-Intervention 1: 0/10    Objective:     Oral Musculature Evaluation  · Oral Musculature: WFL  · Dentition: scattered dentition  · Secretion Management: adequate  · Mucosal Quality: adequate  · Oral Labial Strength and Mobility: WFL  · Lingual Strength and Mobility: WFL  · Volitional Cough: elicited  · Volitional Swallow: elicited  · Voice Prior to PO Intake: hoarse    Bedside Swallow Eval:   Consistencies Assessed:  · Thin liquids via single and consecutive cup sips  · Puree applesauce  · Solids cracker portions     Oral Phase:   · WFL    Pharyngeal Phase:   · no overt clinical signs/symptoms of aspiration  · no overt clinical signs/symptoms of pharyngeal dysphagia    Compensatory Strategies  · None    Treatment: Provided education to pt and daughter re: role of ST, POC, swallow precautions, recommendations for regular consistency diet with thin liquids, and plan to f/u to ensure tolerance. They verbalized understanding. White board updated. RN and MD notified of results and recs.      Assessment:     Chely Becerra is a 65 y.o. female with an SLP diagnosis of functional oropharyngeal swallow.  ST to f/u to ensure safety with regular diet with thin liquids.     Goals:   Multidisciplinary Problems     SLP Goals        Problem: SLP Goal    Goal Priority Disciplines Outcome   SLP Goal     SLP    Description: Goals expected to be met by 8/19:  1. Pt will tolerate regular diet with  thin liquids without overt s/sx airway threat.                    Plan:     · Patient to be seen:  3 x/week   · Plan of Care expires:  09/10/20  · Plan of Care reviewed with:  patient, daughter   · SLP Follow-Up:  Yes       Discharge recommendations:  other (see comments)(tbd)   Barriers to Discharge:  Level of Skilled Assistance Needed .    Time Tracking:     SLP Treatment Date:   08/12/20  Speech Start Time:  1004  Speech Stop Time:  1015     Speech Total Time (min):  11 min    Billable Minutes: Eval Swallow and Oral Function 11 minutes    Mary Girodano CCC-SLP  08/12/2020

## 2020-08-12 NOTE — PLAN OF CARE
Problem: Adult Inpatient Plan of Care  Goal: Plan of Care Review  Outcome: Ongoing, Progressing  No acute events throughout the night. VSS. Pt remains on high flow nasal cannula on 10L high flow nasal cannula. Gtts infusing: Insulin at 0.6u/hr and Nicardipine weaned off. Pt  AAOx4. Chest tubes with minimal serosanguinous output. UOP excellent. See flowsheets for assessments and intake and output. Plans for possible transfer to lung transplant service. Patient and daughter aware of current plan. Will continue to monitor.

## 2020-08-12 NOTE — PROGRESS NOTES
[facility]  Critical Care - Surgery  History & Physical    Patient Name: Chely Becerra  MRN: [id number]  Admission Date: 8/9/2020  Code Status: Full Code  Attending Physician: Benedict Clemons MD   Primary Care Provider: ZAHIDA Stack MD   Principal Problem: S/P lung transplant    Subjective:     HPI:Ms Becerra is a 64 y/o woman with a history of IPF who  is on 2L of oxygen.  She is on no assisted ventilation.  Her [place] Heart Association Class is 60% - Requires occasional assistance but is able to care for needs.  She is not diabetic. She is being admitted tonight for potential left single lung transplant.      Her donor is not a PHS increased risk nor a Hep C+ donor.     Hospital/ICU Course:   NAEON; VSS.   Extubated successfully yesterday after bronch unremarkable.   High flow NC 10L this am  Chest tubes with total of 255 out over 24 (55, 120, and 80mL per tube)  Only drip that is still going is insulin.     Follow-up For: Procedure(s) (LRB):  TRANSPLANT, LUNG - 4:30AM start (Left)  APPLICATION, WOUND VAC, 40 x 5cm (Left)  RESECTION, LUNG (Left)    Post-Operative Day: Day of Surgery     Past Medical History:   Diagnosis Date    Common bile duct dilatation 1/15/2019    Controlled type 2 diabetes mellitus without complication, without long-term current use of insulin 1/17/2019    Essential hypertension 1/16/2019    GERD (gastroesophageal reflux disease)     Hepatitis B core antibody positive 1/15/2019    IPF (idiopathic pulmonary fibrosis)     Obesity (BMI 30-39.9) 1/16/2019    RLS (restless legs syndrome)        Past Surgical History:   Procedure Laterality Date    APPLICATION OF WOUND VACUUM-ASSISTED CLOSURE DEVICE Left 8/10/2020    Procedure: APPLICATION, WOUND VAC, 40 x 5cm;  Surgeon: Benedict Clemons MD;  Location: [facility] OR [address] [place] [address];  Service: Cardiovascular;  Laterality: Left;    CATHETERIZATION OF BOTH LEFT AND RIGHT HEART N/A 1/17/2019    Procedure: CATHETERIZATION, HEART, BOTH  "LEFT AND RIGHT;  Surgeon: Trey Evans MD;  Location: [facility] CATH LAB;  Service: Cardiology;  Laterality: N/A;    CHOLECYSTECTOMY      COLONOSCOPY      ESOPHAGOGASTRODUODENOSCOPY      HERNIA REPAIR      LEFT HEART CATHETERIZATION Left 2/18/2020    Procedure: Left heart cath;  Surgeon: Trey Evans MD;  Location: [facility] CATH LAB;  Service: Cardiology;  Laterality: Left;    LUNG TRANSPLANT Left 8/10/2020    Procedure: TRANSPLANT, LUNG - 4:30AM start;  Surgeon: Benedict Clemons MD;  Location: [facility] OR [address] [place] [address];  Service: Cardiovascular;  Laterality: Left;    SMALL INTESTINE SURGERY      mass removed (benign)       Review of patient's allergies indicates:   Allergen Reactions    Boniva [ibandronate] Other (See Comments)     "chest cramps"       Family History     None        Tobacco Use    Smoking status: Former Smoker     Types: Cigarettes     Quit date: 12/13/2016     Years since quitting: 3.6    Smokeless tobacco: Never Used   Substance and Sexual Activity    Alcohol use: No     Frequency: Never    Drug use: No    Sexual activity: Not on file      Review of Systems   Objective:     Vital Signs (Most Recent):  Temp: 99 °F (37.2 °C) (08/12/20 0300)  Pulse: 94 (08/12/20 0515)  Resp: 12 (08/12/20 0515)  BP: 112/78 (08/12/20 0500)  SpO2: 100 % (08/12/20 0515) Vital Signs (24h Range):  Temp:  [98.1 °F (36.7 °C)-99 °F (37.2 °C)] 99 °F (37.2 °C)  Pulse:  [] 94  Resp:  [9-39] 12  SpO2:  [95 %-100 %] 100 %  BP: ()/(55-86) 112/78  Arterial Line BP: ()/(47-73) 135/73     Weight: 96.7 kg (213 lb 3 oz)  Body mass index is 33.39 kg/m².      Intake/Output Summary (Last 24 hours) at 8/12/2020 0622  Last data filed at 8/12/2020 0520  Gross per 24 hour   Intake 1228.22 ml   Output 2935 ml   Net -1706.78 ml     Physical Exam    Vents:  Vent Mode: Spont (08/11/20 1425)  Ventilator Initiated: Yes(chart correction) (08/10/20 1015)  Set Rate: 16 BPM (08/11/20 1039)  Vt Set: 370 mL (08/11/20 " 1039)  Pressure Support: 8 cmH20 (08/11/20 1425)  PEEP/CPAP: 5 cmH20 (08/11/20 1425)  Oxygen Concentration (%): 30 (08/11/20 1500)  Peak Airway Pressure: 13 cmH2O (08/11/20 1425)  Plateau Pressure: 0 cmH20 (08/11/20 1425)  Total Ve: 7.87 mL (08/11/20 1425)  F/VT Ratio<105 (RSBI): (!) 64.29 (08/11/20 1425)    Lines/Drains/Airways     Central Venous Catheter Line             Introducer with Double Lumen 08/10/20 right internal jugular 2 days          Drain                 Urethral Catheter 08/10/20 0535 Non-latex;Straight-tip 16 Fr. 2 days         Chest Tube 08/10/20 1100 1 Left Pleural 1 day         Chest Tube 08/10/20 1100 1 Right Pleural 1 day         Chest Tube 08/10/20 1100 2 Left Pleural 1 day          Arterial Line                 Arterial Line 08/10/20 0531 Left Radial 2 days          Peripheral Intravenous Line                 Peripheral IV - Single Lumen 08/10/20 0555 16 G Left Forearm 2 days              Significant Labs:    CBC/Anemia Profile:  Recent Labs   Lab 08/11/20  0800 08/11/20  1200 08/12/20  0356   WBC 16.58* 16.40* 20.33*   HGB 9.2* 9.2* 8.9*   HCT 28.6* 28.3* 28.6*   * 148* 157   MCV 95 94 98   RDW 13.7 13.9 13.9       Chemistries:  Recent Labs   Lab 08/10/20  2000  08/11/20  0400 08/11/20  0800 08/11/20  1200 08/12/20  0356   NA  --    < > 139  139 137 139 140   K 3.7   < > 4.0  4.0  4.0 3.7  3.7 4.9  4.9 4.3   CL  --    < > 107  107 106 109 108   CO2  --    < > 23  23 24 26 25   BUN  --    < > 8  8 7* 8 12   CREATININE  --    < > 0.7  0.7 0.6 0.6 0.7   CALCIUM  --    < > 8.4*  8.4* 7.9* 8.3* 8.0*   ALBUMIN  --   --  3.5  --   --  3.2*   PROT  --   --  5.9*  --   --  6.0   BILITOT  --   --  0.5  --   --  0.4   ALKPHOS  --   --  54*  --   --  54*   ALT  --   --  20  --   --  18   AST  --   --  34  --   --  23   MG 2.6  --  2.4  --   --  2.1   PHOS  --    < > 2.9  2.9 2.7 2.5*  --     < > = values in this interval not displayed.     ABGs:   CarFin   Lab 08/11/20  1525   PH  7.422   PCO2 34.0*   HCO3 22.2*   POCSATURATED 95   BE -2     Bilirubin:   Recent Labs   Lab 08/09/20  1902 08/11/20  0400 08/12/20  0356   BILITOT 0.3 0.5 0.4     Blood Culture: No results for input(s): LABBLOO in the last 48 hours.  BMP:   Recent Labs   Lab 08/12/20  0356   *      K 4.3      CO2 25   BUN 12   CREATININE 0.7   CALCIUM 8.0*   MG 2.1     CMP:   Recent Labs   Lab 08/11/20  0400 08/11/20  0800 08/11/20  1200 08/12/20  0356     139 137 139 140   K 4.0  4.0  4.0 3.7  3.7 4.9  4.9 4.3     107 106 109 108   CO2 23  23 24 26 25   *  152* 169* 165* 165*   BUN 8  8 7* 8 12   CREATININE 0.7  0.7 0.6 0.6 0.7   CALCIUM 8.4*  8.4* 7.9* 8.3* 8.0*   PROT 5.9*  --   --  6.0   ALBUMIN 3.5  --   --  3.2*   BILITOT 0.5  --   --  0.4   ALKPHOS 54*  --   --  54*   AST 34  --   --  23   ALT 20  --   --  18   ANIONGAP 9  9 7* 4* 7*   EGFRNONAA >60.0  >60.0 >60.0 >60.0 >60.0     Cardiac Markers: No results for input(s): CKMB, TROPONINT, MYOGLOBIN in the last 48 hours.  Coagulation:   Recent Labs   Lab 08/11/20  1200   INR 0.9   APTT 25.5     Lactic Acid: No results for input(s): LACTATE in the last 48 hours.  Lipid Panel: No results for input(s): CHOL, HDL, LDLCALC, TRIG, CHOLHDL in the last 48 hours.    Significant Imaging: I have reviewed and interpreted all pertinent imaging results/findings within the past 24 hours.    Assessment/Plan:   65 year old female with end stage IPF s/p single left lung transplant 8/10/20.     Neuro:   - Awake and alert   - Pain controlled     Cardiac:  - MAP goal 75-85  - Chest tubes in place with 255 out over 24 hours.     Pulmonary:  - Bronchoscopy unremarkable yesterday; extubated successfully   - PRN breathing treatments; IS  - NC prn     Renal:   - monitor Is and Os; good UOP over last 24 hours.   - trend BMP now and daily    Infectious Disease:   - WBC trending, trend daily (20.2 this am)   - Vanc, cefepime, voriconazole  -  Methylprednisone, Mycophenolate, and tacrolimus    Lab Results   Component Value Date    WBC 20.33 (H) 08/12/2020     Hematology/Oncology:  - H/H trending, monitor now and daily  - No indication for transfusion at this time  Lab Results   Component Value Date    WBC 20.33 (H) 08/12/2020    HGB 8.9 (L) 08/12/2020    HCT 28.6 (L) 08/12/2020    MCV 98 08/12/2020     08/12/2020       Endocrine:  - insulin gtt vs SSI as needed  - endocrinology following    F:   Fluids/Electrolytes (From admission, onward)    Start     Stop Route Frequency Ordered    08/11/20 1450  glucose chewable tablet 16 g      -- Oral As needed (PRN) 08/11/20 1351    08/11/20 1450  glucose chewable tablet 24 g      -- Oral As needed (PRN) 08/11/20 1351    08/11/20 1450  dextrose 50% injection 12.5 g      -- IV As needed (PRN) 08/11/20 1351    08/11/20 1450  dextrose 50% injection 25 g      -- IV As needed (PRN) 08/11/20 1351    08/11/20 1450  glucagon (human recombinant) injection 1 mg      -- IM As needed (PRN) 08/11/20 1351    08/10/20 1111  potassium chloride 20 mEq in 100 mL IVPB (FOR CENTRAL LINE ADMINISTRATION ONLY)  (Med - Potassium Chloride IVPB for CENTRAL LINE)      -- IV As needed (PRN) 08/10/20 1015    08/10/20 1111  potassium chloride 40 mEq in 100 mL IVPB (FOR CENTRAL LINE ADMINISTRATION ONLY)  (Med - Potassium Chloride IVPB for CENTRAL LINE)      -- IV As needed (PRN) 08/10/20 1015    08/10/20 1111  potassium chloride 20 mEq in 100 mL IVPB (FOR CENTRAL LINE ADMINISTRATION ONLY)  (Med - Potassium Chloride IVPB for CENTRAL LINE)      -- IV As needed (PRN) 08/10/20 1015    08/10/20 1111  magnesium sulfate 2g in water 50mL IVPB (premix)      -- IV As needed (PRN) 08/10/20 1015         E:   Recent Labs   Lab 08/12/20  0356      K 4.3      CO2 25   BUN 12   CREATININE 0.7   MG 2.1      N: Regular     GI:   - replace electrolytes as needed to keep K>4, Mg>2  - bowel reg - daily miralax, docusate  - famotidine for GI  prophylaxis    Dispo:  - Likely step down to transplant floor today.         Meme Berrios MD  Critical Care - Surgery  [facility]

## 2020-08-12 NOTE — NURSING TRANSFER
Nursing Transfer Note      8/12/2020     Transfer To: TSU [id number] to [id number]    Transfer via bed    Transfer with  to O2, cardiac monitoring    Transported by BRITTNI Fonseca RN    Medicines sent: Yes    Chart send with patient: Yes    Notified: daughter    Patient reassessed at: 8/12/2020 1100     Upon arrival to floor: cardiac monitor applied, patient oriented to room, call bell in reach and bed in lowest position.

## 2020-08-12 NOTE — PROGRESS NOTES
Pt transferred from SICU to TSU bed [id number]. AAOx4. VSS. NAD. SpO2 97% on 4L HFNC. Wean as tolerated. Insulin gtt @ 0.6u/hr. R pleural CT x1 with SS output, L pleural CT x2 with SS output all to wall suction. Midsternal incision with wound vac. Skin otherwise intact. Pt oriented to room and plan of care reviewed. Daughter at bedside during visiting hours. Bed in lowest, locked position. Bed rails up x3. Call light and personal belongings within reach. Will continue to monitor.

## 2020-08-13 PROBLEM — J84.112 IPF (IDIOPATHIC PULMONARY FIBROSIS): Status: RESOLVED | Noted: 2019-04-11 | Resolved: 2020-01-01

## 2020-08-13 PROBLEM — J96.11 CHRONIC HYPOXEMIC RESPIRATORY FAILURE: Status: RESOLVED | Noted: 2019-04-11 | Resolved: 2020-01-01

## 2020-08-13 NOTE — SUBJECTIVE & OBJECTIVE
"Interval HPI:   Overnight events: Transferred to [facility]. BG reasonably well controlled on IV insulin infusion; rate decreased to 0.5 u/hr. Solumedrol per primary.   Eating:   <25%  Nausea: No  Hypoglycemia and intervention: No  Fever: No  TPN and/or TF: No    BP (!) 182/108   Pulse 90   Temp 97.4 °F (36.3 °C) (Oral)   Resp 18   Ht 5' 7" (1.702 m)   Wt 96.7 kg (213 lb 3 oz)   LMP  (LMP Unknown)   SpO2 (!) 90%   Breastfeeding No   BMI 33.39 kg/m²     Labs Reviewed and Include    Recent Labs   Lab 08/13/20  0619   *   CALCIUM 9.1   ALBUMIN 3.4*   PROT 6.6      K 4.5   CO2 28      BUN 25*   CREATININE 0.7   ALKPHOS 68   ALT 16   AST 17   BILITOT 0.4     Lab Results   Component Value Date    WBC 19.68 (H) 08/13/2020    HGB 9.4 (L) 08/13/2020    HCT 30.1 (L) 08/13/2020    MCV 98 08/13/2020     08/13/2020     No results for input(s): TSH, FREET4 in the last 168 hours.  Lab Results   Component Value Date    HGBA1C 6.0 (H) 06/18/2020       Nutritional status:   Body mass index is 33.39 kg/m².  Lab Results   Component Value Date    ALBUMIN 3.4 (L) 08/13/2020    ALBUMIN 3.2 (L) 08/12/2020    ALBUMIN 3.5 08/11/2020     No results found for: PREALBUMIN    Estimated Creatinine Clearance: 98.2 mL/min (based on SCr of 0.7 mg/dL).    Accu-Checks  Recent Labs     08/11/20 2029 08/11/20  2308 08/12/20  0341 08/12/20  0821 08/12/20  1417 08/12/20  1658 08/12/20  2000 08/13/20  0206 08/13/20  0814 08/13/20  1216   POCTGLUCOSE 211* 163* 178* 159* 205* 195* 150* 138* 126* 175*       Current Medications and/or Treatments Impacting Glycemic Control  Immunotherapy:    Immunosuppressants         Stop Route Frequency     mycophenolate capsule 500 mg      -- Oral 2 times daily     tacrolimus capsule 0.5 mg      -- Oral 2 times daily     basiliximab (SIMULECT) 20 mg in dextrose 5 % 50 mL infusion      08/18 1114 IV Every 96 hours        Steroids:   Hormones (From admission, onward)    Start     Stop Route " Frequency Ordered    08/17/20 0900  predniSONE tablet 35 mg      -- Oral Daily 08/11/20 1153    08/16/20 0900  predniSONE tablet 45 mg      08/17 0859 Oral Daily 08/11/20 1153    08/15/20 0900  predniSONE tablet 60 mg      08/16 0859 Oral Daily 08/11/20 1153    08/14/20 0900  predniSONE tablet 90 mg      08/15 0859 Oral Daily 08/11/20 1153    08/11/20 2023  melatonin tablet 9 mg      -- Oral Nightly PRN 08/11/20 1924        Pressors:    Autonomic Drugs (From admission, onward)    None        Hyperglycemia/Diabetes Medications:   Antihyperglycemics (From admission, onward)    Start     Stop Route Frequency Ordered    08/12/20 1556  insulin aspart U-100 pen 0-10 Units      -- SubQ As needed (PRN) 08/12/20 1456    08/11/20 1500  insulin regular 100 Units in sodium chloride 0.9% 100 mL infusion      -- IV Continuous 08/11/20 0501

## 2020-08-13 NOTE — ASSESSMENT & PLAN NOTE
Induction with solumedrol and basiliximab. Repeat basiliximab on POD#4.  Continue tacrolimus, MMF, and prednisone taper.

## 2020-08-13 NOTE — PLAN OF CARE
AAOx4, afebrile, VSS. Weaned O2 to 1L NC overnight, O2 sat at goal of >92%. Midsternal incision w prevena 7 day WV. Pleural CT x3 remain to cont low wall suction, sanguineous output noted, refer to flowsheet for amount. CT site dressed with occlusive gauze dressing daily. Pt remains on transitional insulin gt @ 0.5u/h, monitoring BG ACHS + 2a and administering SSI as needed per orders. Prophylactic IV abx continued, bronch cx pending. Tolerating regular diet, but does not have much of appetite. Pt up to [facility] with standby assist, UOP measured via hat, refer to flowsheet. -flatus, -bm since surgery, on bowel regimen. Pt performing IS independently. Clearing secretions well, using yanker independently, changing suction catheter frequently. Self med box completed, pt/caregiver to be educated. Albuterol treatment x1 done for wheezing. Pain controlled with PRN pain regimen, PRN Melatonin administered x1 for trouble sleeping. Pt resting well, will continue to monitor.

## 2020-08-13 NOTE — PT/OT/SLP PROGRESS
Speech Language Pathology Treatment  Discharge    Patient Name:  Chely Becerra   MRN:  [id number]  Admitting Diagnosis: S/P lung transplant    Recommendations:                 General Recommendations:  Follow-up not indicated  Diet recommendations:  Regular, Liquid Diet Level: Thin   Aspiration Precautions: Feed only when awake/alert, HOB to 90 degrees, Small bites/sips and Standard aspiration precautions   General Precautions: Standard, fall, sternal  Communication strategies:  none    Subjective     Awake/alert    Pain/Comfort:  · Pain Rating 1: 0/10  · Pain Rating Post-Intervention 1: 0/10    Objective:     Has the patient been evaluated by SLP for swallowing?   Yes  Keep patient NPO? No   Current Respiratory Status: nasal cannula      Pt repositioned upright in bed for PO trials. She denied any difficulty with PO intake and stated her voice is continually improving. She tolerated thin liquids x5 and cracker x2 with timely swallow initiation and no overt clinical signs of airway compromise. SLP reviewed diet recs and swallow precautions with pt who verbalized understanding. No further ST warranted. Continue regular diet/thin liquids at this time.     Assessment:     Chely Becerra is a 65 y.o. female with oropharyngeal swallow deemed WFL. No further ST warranted.     Goals:   Multidisciplinary Problems     SLP Goals        Problem: SLP Goal    Goal Priority Disciplines Outcome   SLP Goal     SLP Ongoing, Progressing   Description: Goals expected to be met by 8/19:  1. Pt will tolerate regular diet with thin liquids without overt s/sx airway threat.                    Plan:     · Plan of Care reviewed with:  patient   · SLP Follow-Up:  No       Discharge recommendations:  home       Time Tracking:     SLP Treatment Date:   08/13/20  Speech Start Time:  0751  Speech Stop Time:  0759     Speech Total Time (min):  8 min    Billable Minutes: Treatment Swallowing Dysfunction 8    Cecy Cohn  CCC-SLP  08/13/2020

## 2020-08-13 NOTE — PLAN OF CARE
Problem: Physical Therapy Goal  Goal: Physical Therapy Goal  Description: Goals to be met by: 20     Patient will increase functional independence with mobility by performin. Supine to sit with Modified [place]  2. Sit to supine with Modified [place]  3. Sit to stand transfer with Modified [place]  4. Bed to chair transfer with Modified [place] using no AD.  5. Gait  x 250 feet with Modified [place] using No AD.   6. Lower extremity exercise program x30 reps per handout, with independence  7. Recalls and demonstrates understanding of 3/3 sternal precautions.    Outcome: Ongoing, Progressing   Pt progressing towards goals. continue with PT POC.Goals remain appropriate.  Regan Carbajal PTA

## 2020-08-13 NOTE — ASSESSMENT & PLAN NOTE
POD #3 s/p uncomplicated LSLT for IPF. PGD 2 for 24 hours. CT output stable. Underwent bronchoscopy and successfully extubated to HFNC on POD#1. Continue immunosuppression and prophylaxis. Continue IS/PT/OT/CPT.

## 2020-08-13 NOTE — PLAN OF CARE
Problem: SLP Goal  Goal: SLP Goal  Description: Goals expected to be met by 8/19:  1. Pt will tolerate regular diet with thin liquids without overt s/sx airway threat.   Outcome: Ongoing, Progressing   No further ST warranted.   Cecy Cohn, SUKHJINDER-SLP  8/13/2020

## 2020-08-13 NOTE — SUBJECTIVE & OBJECTIVE
"Subjective:     Interval History: No acute events overnight.  Weaning oxygen as tolerated    Continuous Infusions:   insulin (HUMAN R) infusion (adults) 0.5 Units/hr (08/13/20 0207)     Scheduled Meds:   [START ON 8/14/2020] aspirin  81 mg Oral Daily    basiliximab (SIMULECT) infusion  20 mg Intravenous Q96H    ceFEPime (MAXIPIME) IVPB  2 g Intravenous Q12H    chlorhexidine  10 mL Mouth/Throat BID    docusate sodium  100 mg Oral BID    enoxaparin  40 mg Subcutaneous Q24H    furosemide (LASIX) IV  20 mg Intravenous Daily    gabapentin  100 mg Oral TID    levalbuterol  1.25 mg Nebulization Q6H WAKE    lidocaine  1 patch Transdermal Q24H    metoprolol tartrate  25 mg Oral BID    mupirocin  1 g Nasal BID    mycophenolate  500 mg Oral BID    pantoprazole  40 mg Oral Daily    polyethylene glycol  17 g Oral Daily    [START ON 8/14/2020] predniSONE  90 mg Oral Daily    Followed by    [START ON 8/15/2020] predniSONE  60 mg Oral Daily    Followed by    [START ON 8/16/2020] predniSONE  45 mg Oral Daily    Followed by    [START ON 8/17/2020] predniSONE  35 mg Oral Daily    senna  17.2 mg Oral Daily    [START ON 8/14/2020] sulfamethoxazole-trimethoprim 800-160mg  1 tablet Oral Every Mon, Wed, Fri    tacrolimus  0.5 mg Oral BID    valGANciclovir  450 mg Oral BID    vancomycin (VANCOCIN) IVPB  1,250 mg Intravenous Q12H    voriconazole  350 mg Oral BID     PRN Meds:acetaminophen, cyclobenzaprine, dextrose 50%, dextrose 50%, glucagon (human recombinant), glucose, glucose, insulin aspart U-100, lactulose, levalbuterol, magnesium sulfate IVPB, melatonin, metoclopramide HCl, ondansetron, oxyCODONE, oxyCODONE, potassium chloride **AND** potassium chloride **AND** potassium chloride, Pharmacy to dose Vancomycin consult **AND** vancomycin - pharmacy to dose    Review of patient's allergies indicates:   Allergen Reactions    Boniva [ibandronate] Other (See Comments)     "chest cramps"       Review of Systems "   Constitutional: Negative for activity change, appetite change, chills, diaphoresis, fatigue and fever.   HENT: Negative for congestion, ear discharge, ear pain, facial swelling, hearing loss, mouth sores, nosebleeds, postnasal drip, rhinorrhea, sinus pressure, sore throat, trouble swallowing and voice change.    Eyes: Negative for pain, discharge, redness and itching.   Respiratory: Positive for shortness of breath. Negative for apnea, cough, choking, chest tightness, wheezing and stridor.    Cardiovascular: Positive for chest pain (incisional). Negative for palpitations and leg swelling.   Gastrointestinal: Negative for abdominal distention, abdominal pain, anal bleeding, blood in stool, constipation, diarrhea, nausea, rectal pain and vomiting.   Endocrine: Negative for cold intolerance and heat intolerance.   Genitourinary: Negative for difficulty urinating, dysuria and flank pain.   Musculoskeletal: Negative for arthralgias, back pain, joint swelling, myalgias and neck pain.   Allergic/Immunologic: Positive for immunocompromised state.   Neurological: Negative for dizziness, tremors, light-headedness, numbness and headaches.   Psychiatric/Behavioral: Negative for agitation and hallucinations. The patient is nervous/anxious.      Objective:   Physical Exam  Vitals signs and nursing note reviewed.   Constitutional:       Appearance: Normal appearance. She is overweight.      Interventions: Nasal cannula in place.   HENT:      Head: Normocephalic and atraumatic.   Eyes:      General: No scleral icterus.     Conjunctiva/sclera: Conjunctivae normal.   Cardiovascular:      Rate and Rhythm: Normal rate and regular rhythm.      Heart sounds: No murmur. Friction rub present. No gallop.    Pulmonary:      Breath sounds: Examination of the right-middle field reveals rales. Examination of the right-lower field reveals decreased breath sounds and rales. Examination of the left-lower field reveals decreased breath sounds.  Decreased breath sounds and rales present. No wheezing or rhonchi.      Comments: 2 left pleural and 1 right pleural chest tubes in place with sanguinous output, no airleaks  Abdominal:      General: Bowel sounds are normal. There is no distension.      Palpations: Abdomen is soft.   Musculoskeletal:      Right lower leg: No edema.      Left lower leg: No edema.   Skin:     General: Skin is warm and dry.      Capillary Refill: Capillary refill takes less than 2 seconds.   Neurological:      General: No focal deficit present.      Mental Status: She is oriented to person, place, and time.   Psychiatric:         Mood and Affect: Mood normal.         Behavior: Behavior normal.           Vital Signs (Most Recent):  Temp: 97.4 °F (36.3 °C) (08/13/20 1149)  Pulse: 87 (08/13/20 0915)  Resp: 18 (08/13/20 1148)  BP: (!) 147/105 (08/13/20 0915)  SpO2: 95 % (08/13/20 0915) Vital Signs (24h Range):  Temp:  [97.3 °F (36.3 °C)-98.1 °F (36.7 °C)] 97.4 °F (36.3 °C)  Pulse:  [70-93] 87  Resp:  [7-30] 18  SpO2:  [95 %-99 %] 95 %  BP: (115-148)/() 147/105     Weight: 96.7 kg (213 lb 3 oz)  Body mass index is 33.39 kg/m².      Intake/Output Summary (Last 24 hours) at 8/13/2020 1158  Last data filed at 8/13/2020 1100  Gross per 24 hour   Intake 640.01 ml   Output 1080 ml   Net -439.99 ml       Significant Labs:  CBC:  Recent Labs   Lab 08/13/20 0619   WBC 19.68*   RBC 3.08*   HGB 9.4*   HCT 30.1*      MCV 98   MCH 30.5   MCHC 31.2*     BMP:  Recent Labs   Lab 08/13/20 0619      K 4.5      CO2 28   BUN 25*   CREATININE 0.7   CALCIUM 9.1      Tacrolimus Levels:  Recent Labs   Lab 08/13/20 0619   TACROLIMUS 5.7     Microbiology:  Microbiology Results (last 7 days)     Procedure Component Value Units Date/Time    Aerobic culture [[id number]] Collected: 08/10/20 0758    Order Status: Completed Specimen: Body Fluid from Lung, Left Updated: 08/13/20 1017     Aerobic Bacterial Culture No growth    Narrative:       Bronchus Donor Lung Left (Aerobic, Anaerobic, AFB, Fungus,  Gram)    Culture, Respiratory with Gram Stain [[id number]] Collected: 08/11/20 1315    Order Status: Completed Specimen: Respiratory from BAL, JOSETTE Updated: 08/13/20 0958     Respiratory Culture Normal respiratory yoly      No S aureus or Pseudomonas isolated.     Gram Stain (Respiratory) Rare WBC's     Gram Stain (Respiratory) No organisms seen    AFB Culture & Smear [[id number]] Collected: 08/11/20 1315    Order Status: Completed Specimen: Respiratory from BAL, JOSETTE Updated: 08/12/20 2127     AFB Culture & Smear Culture in progress     AFB CULTURE STAIN No acid fast bacilli seen.    Fungus culture [[id number]] Collected: 08/11/20 1315    Order Status: Completed Specimen: Respiratory from BAL, JOSETTE Updated: 08/12/20 1226     Fungus (Mycology) Culture Culture in progress    Fungus culture [[id number]] Collected: 08/10/20 0758    Order Status: Completed Specimen: Body Fluid from Lung, Left Updated: 08/12/20 1225     Fungus (Mycology) Culture Culture in progress    Narrative:      Bronchus Donor Lung Left (Aerobic, Anaerobic, AFB, Fungus,  Gram)    Culture, Anaerobe [[id number]] Collected: 08/10/20 0758    Order Status: Completed Specimen: Body Fluid from Lung, Left Updated: 08/12/20 0831     Anaerobic Culture Culture in progress    Narrative:      Bronchus Donor Lung Left (Aerobic, Anaerobic, AFB, Fungus,  Gram)    AFB Culture & Smear [[id number]] Collected: 08/10/20 0758    Order Status: Completed Specimen: Body Fluid from Lung, Left Updated: 08/11/20 2127     AFB Culture & Smear Culture in progress     AFB CULTURE STAIN No acid fast bacilli seen.    Narrative:      Bronchus Donor Lung Left (Aerobic, Anaerobic, AFB, Fungus,  Gram)    Gram stain [[id number]] Collected: 08/10/20 0758    Order Status: Completed Specimen: Body Fluid from Lung, Left Updated: 08/10/20 1143     Gram Stain Result No WBC's      No organisms seen    Narrative:      Bronchus Donor Lung Left  (Aerobic, Anaerobic, AFB, Fungus,  Gram)          I have reviewed all pertinent labs within the past 24 hours.

## 2020-08-13 NOTE — NURSING
"Pt AAOx4.   Pleural CT x3 to waterseal with serosanguinous drainage. Pt started on nifedipine today d/t increased systolic BP. Pt had a couple episodes of PVS and PACs- transplant team aware. Pt states she is "scared to eat since throwing up this morning" and "wants to wait to eat until having a bowel movement."    Pt clears secretions independently with any. Pt using IS independently. Reviewed self med box with patient and daughter, both verbalized understanding     Bed locked in lowest position, call bell within reach. No skid socks on. Will continue to monitor.   "

## 2020-08-13 NOTE — ASSESSMENT & PLAN NOTE
BG goal 140 - 180     Rewrite transition insulin infusion at 0.5 u/hr.  BG monitoring ac/hs/0200 and  moderate dose correction scale.   Monitor for prandial excursions.     Discharge planning: GWENDOLYN

## 2020-08-13 NOTE — PROGRESS NOTES
"[facility]  Endocrinology  Progress Note    Admit Date: 8/9/2020     Reason for Consult: Management of pre-dm, Hyperglycemia     Surgical Procedure and Date: Left lung transplant and wound vac application 8/10/20    Diabetes diagnosis year: pre-dm - diet controlled     Lab Results   Component Value Date    HGBA1C 6.0 (H) 06/18/2020       Home Diabetes Medications: none (per chart review)    How often checking glucose at home? Not checking    Diabetes Complications include:     none    Complicating diabetes co morbidities:   Glucocorticoid use  s/p lung transplant      HPI:   Patient is a 65 y.o. female with a diagnosis of DM2, IPF, and chronic respiratory failure, who is s/p left lung transplant on 8/10/20.  Patient with controlled DM2 on no medications per chart review.  Endocrinology consulted for DM/BG management.            Interval HPI:   Overnight events: Transferred to [facility]. BG reasonably well controlled on IV insulin infusion; rate decreased to 0.5 u/hr. Solumedrol per primary.   Eating:   <25%  Nausea: No  Hypoglycemia and intervention: No  Fever: No  TPN and/or TF: No    BP (!) 182/108   Pulse 90   Temp 97.4 °F (36.3 °C) (Oral)   Resp 18   Ht 5' 7" (1.702 m)   Wt 96.7 kg (213 lb 3 oz)   LMP  (LMP Unknown)   SpO2 (!) 90%   Breastfeeding No   BMI 33.39 kg/m²     Labs Reviewed and Include    Recent Labs   Lab 08/13/20  0619   *   CALCIUM 9.1   ALBUMIN 3.4*   PROT 6.6      K 4.5   CO2 28      BUN 25*   CREATININE 0.7   ALKPHOS 68   ALT 16   AST 17   BILITOT 0.4     Lab Results   Component Value Date    WBC 19.68 (H) 08/13/2020    HGB 9.4 (L) 08/13/2020    HCT 30.1 (L) 08/13/2020    MCV 98 08/13/2020     08/13/2020     No results for input(s): TSH, FREET4 in the last 168 hours.  Lab Results   Component Value Date    HGBA1C 6.0 (H) 06/18/2020       Nutritional status:   Body mass index is 33.39 kg/m².  Lab Results   Component Value Date    ALBUMIN 3.4 " (L) 08/13/2020    ALBUMIN 3.2 (L) 08/12/2020    ALBUMIN 3.5 08/11/2020     No results found for: PREALBUMIN    Estimated Creatinine Clearance: 98.2 mL/min (based on SCr of 0.7 mg/dL).    Accu-Checks  Recent Labs     08/11/20  2029 08/11/20  2308 08/12/20  0341 08/12/20  0821 08/12/20  1417 08/12/20  1658 08/12/20  2000 08/13/20  0206 08/13/20  0814 08/13/20  1216   POCTGLUCOSE 211* 163* 178* 159* 205* 195* 150* 138* 126* 175*       Current Medications and/or Treatments Impacting Glycemic Control  Immunotherapy:    Immunosuppressants         Stop Route Frequency     mycophenolate capsule 500 mg      -- Oral 2 times daily     tacrolimus capsule 0.5 mg      -- Oral 2 times daily     basiliximab (SIMULECT) 20 mg in dextrose 5 % 50 mL infusion      08/18 1114 IV Every 96 hours        Steroids:   Hormones (From admission, onward)    Start     Stop Route Frequency Ordered    08/17/20 0900  predniSONE tablet 35 mg      -- Oral Daily 08/11/20 1153    08/16/20 0900  predniSONE tablet 45 mg      08/17 0859 Oral Daily 08/11/20 1153    08/15/20 0900  predniSONE tablet 60 mg      08/16 0859 Oral Daily 08/11/20 1153    08/14/20 0900  predniSONE tablet 90 mg      08/15 0859 Oral Daily 08/11/20 1153    08/11/20 2023  melatonin tablet 9 mg      -- Oral Nightly PRN 08/11/20 1924        Pressors:    Autonomic Drugs (From admission, onward)    None        Hyperglycemia/Diabetes Medications:   Antihyperglycemics (From admission, onward)    Start     Stop Route Frequency Ordered    08/12/20 1556  insulin aspart U-100 pen 0-10 Units      -- SubQ As needed (PRN) 08/12/20 1456    08/11/20 1500  insulin regular 100 Units in sodium chloride 0.9% 100 mL infusion      -- IV Continuous 08/11/20 1351          ASSESSMENT and PLAN    * S/P lung transplant  S/p lung transplant on 8/10/20  Managed per primary team  Avoid hypoglycemia  Optimize BG control for surgical wound healing        Prediabetes  BG goal 140 - 180     Rewrite transition insulin  infusion at 0.5 u/hr.  BG monitoring ac/hs/0200 and  moderate dose correction scale.   Monitor for prandial excursions.     Discharge planning: TBD      Immunosuppression  May increase insulin resistance.         Adrenal cortical steroids causing adverse effect in therapeutic use  On steroid taper per transplant team; may elevate BG readings            Nidia Frazier NP  Endocrinology  [facility]

## 2020-08-13 NOTE — PT/OT/SLP PROGRESS
Physical Therapy Treatment    Patient Name:  Chely Becerra   MRN:  [id number]    Recommendations:     Discharge Recommendations:  home health PT   Discharge Equipment Recommendations: none   Barriers to discharge: None    Assessment:     Chely Becerra is a 65 y.o. female admitted with a medical diagnosis of S/P lung transplant.  She presents with the following impairments/functional limitations:  weakness, impaired endurance, impaired self care skills, impaired functional mobilty, gait instability, impaired balance, decreased upper extremity function, pain, decreased ROM, impaired cardiopulmonary response to activity .Pt Progressing with PT Intervention. Pt Progressing with improving gait distance. Pt would continue to benefit from skilled PT to address overall functional mobility and goals. Goals remain appropriate.      Rehab Prognosis: Good; patient would benefit from acute skilled PT services to address these deficits and reach maximum level of function.    Recent Surgery: Procedure(s) (LRB):  TRANSPLANT, LUNG - 4:30AM start (Left)  APPLICATION, WOUND VAC, 40 x 5cm (Left)  RESECTION, LUNG (Left) 3 Days Post-Op    Plan:     During this hospitalization, patient to be seen 3 x/week to address the identified rehab impairments via gait training, therapeutic activities, therapeutic exercises, neuromuscular re-education and progress toward the following goals:    · Plan of Care Expires:  09/11/20    Subjective     Chief Complaint: pain  Patient/Family Comments/goals: it feels good to move  Pain/Comfort:  · Pain Rating 1: (not rated)  · Location - Orientation 1: generalized  · Location 1: sternal  · Pain Addressed 1: Reposition, Pre-medicate for activity, Distraction      Objective:     Communicated with RN prior to session.  Patient found up in chair with chest tube, peripheral IV, oxygen, pulse ox (continuous), telemetry, wound vac upon PT entry to room.     General Precautions: Standard, fall, sternal    Orthopedic Precautions:N/A   Braces: N/A     Functional Mobility:  · Sit to Stand:  Contact guard assistance with no AD,pt with vcs for sternal precautions and technique for improved mobility.  · Gait: 110 ft x 2 with  CGA with HHA with mask on in [place].pt demo  increased ML sway, decreased step length, decreased sanna, decreased gait speed. No LOB. No SOB. Pt required seated rest break between trials. Pt O2 sats from 87%-95% on RA  AM-PAC 6 CLICK MOBILITY  Turning over in bed (including adjusting bedclothes, sheets and blankets)?: 3  Sitting down on and standing up from a chair with arms (e.g., wheelchair, bedside commode, etc.): 3  Moving from lying on back to sitting on the side of the bed?: 3  Moving to and from a bed to a chair (including a wheelchair)?: 3  Need to walk in hospital room?: 3  Climbing 3-5 steps with a railing?: 3  Basic Mobility Total Score: 18       Therapeutic Activities and Exercises:   Therapist provided instruction and educated of patient on progress, safety,d/c,PT POC,   proper body mechanics, energy conservation, and fall prevention strategies during tasks listed above, on the effects of prolonged immobility and the importance of performing OOB activity and exercises to promote healing and reduce recovery time   Patient facilitated therex  seated in bedside chair B LE AROM AP, LAQ, Hip Flexion, Hip Abd/Add with facilitation for correct performance and sequencing. Exercises performed to develop and maintain pt's strength, endurance and flexibility.   Updated white board with appropriate PT mobility information for medical team notification  Donned an extra gown  Call nursing/pct to transfer to chair/use bathroom. Pt stated understanding  Bedside table in front of patient and area set up for function, convenience, and safety. RN aware of patient's mobility needs and status. Questions/concerns addressed within PTA scope of practice; patient with no further questions. Time was provided  for active listening, discussion of health disposition, and discussion of safe discharge. Pt?verbalized?agreement .  Sternal Precautions: patient able to voice?3/3 precautions without assistance.?Pt provided education of sternal precautions.?Patient able to maintain precautions throughout session with?min?verbal cues      Patient left up in chair with all lines intact, call button in reach, nsg notified and family present..    GOALS:   Multidisciplinary Problems     Physical Therapy Goals        Problem: Physical Therapy Goal    Goal Priority Disciplines Outcome Goal Variances Interventions   Physical Therapy Goal     PT, PT/OT Ongoing, Progressing     Description: Goals to be met by: 20     Patient will increase functional independence with mobility by performin. Supine to sit with Modified [place]  2. Sit to supine with Modified [place]  3. Sit to stand transfer with Modified [place]  4. Bed to chair transfer with Modified [place] using no AD.  5. Gait  x 250 feet with Modified [place] using No AD.   6. Lower extremity exercise program x30 reps per handout, with independence  7. Recalls and demonstrates understanding of 3/3 sternal precautions.                     Time Tracking:     PT Received On: 20  PT Start Time: 1152     PT Stop Time: 1246  PT Total Time (min): 54 min     Billable Minutes: Gait Training 30, Therapeutic Activity 14 and Therapeutic Exercise 10    Treatment Type: Treatment  PT/PTA: PTA     PTA Visit Number: 1     Regan Carbajal, PTA  2020

## 2020-08-13 NOTE — NURSING
Notified Savage of pts systolic blood pressure elevated into 180's, as well as increased runs of PVS's noted on tele strip. Pt started on Nifedipine. Will continue to monitor.

## 2020-08-13 NOTE — PROGRESS NOTES
[facility]  Lung Transplant  Progress Note - Floor    Patient Name: Chely Becerra  MRN: [id number]  Admission Date: 8/9/2020  Hospital Length of Stay: 4 days  Post-Operative Day: 3  Attending Physician: Adam Hill MD  Primary Care Provider: ZAHIDA Stack MD     Subjective:     Interval History: No acute events overnight.  Weaning oxygen as tolerated    Continuous Infusions:   insulin (HUMAN R) infusion (adults) 0.5 Units/hr (08/13/20 0207)     Scheduled Meds:   [START ON 8/14/2020] aspirin  81 mg Oral Daily    basiliximab (SIMULECT) infusion  20 mg Intravenous Q96H    ceFEPime (MAXIPIME) IVPB  2 g Intravenous Q12H    chlorhexidine  10 mL Mouth/Throat BID    docusate sodium  100 mg Oral BID    enoxaparin  40 mg Subcutaneous Q24H    furosemide (LASIX) IV  20 mg Intravenous Daily    gabapentin  100 mg Oral TID    levalbuterol  1.25 mg Nebulization Q6H WAKE    lidocaine  1 patch Transdermal Q24H    metoprolol tartrate  25 mg Oral BID    mupirocin  1 g Nasal BID    mycophenolate  500 mg Oral BID    pantoprazole  40 mg Oral Daily    polyethylene glycol  17 g Oral Daily    [START ON 8/14/2020] predniSONE  90 mg Oral Daily    Followed by    [START ON 8/15/2020] predniSONE  60 mg Oral Daily    Followed by    [START ON 8/16/2020] predniSONE  45 mg Oral Daily    Followed by    [START ON 8/17/2020] predniSONE  35 mg Oral Daily    senna  17.2 mg Oral Daily    [START ON 8/14/2020] sulfamethoxazole-trimethoprim 800-160mg  1 tablet Oral Every Mon, Wed, Fri    tacrolimus  0.5 mg Oral BID    valGANciclovir  450 mg Oral BID    vancomycin (VANCOCIN) IVPB  1,250 mg Intravenous Q12H    voriconazole  350 mg Oral BID     PRN Meds:acetaminophen, cyclobenzaprine, dextrose 50%, dextrose 50%, glucagon (human recombinant), glucose, glucose, insulin aspart U-100, lactulose, levalbuterol, magnesium sulfate IVPB, melatonin, metoclopramide HCl, ondansetron, oxyCODONE, oxyCODONE, potassium  "chloride **AND** potassium chloride **AND** potassium chloride, Pharmacy to dose Vancomycin consult **AND** vancomycin - pharmacy to dose    Review of patient's allergies indicates:   Allergen Reactions    Boniva [ibandronate] Other (See Comments)     "chest cramps"       Review of Systems   Constitutional: Negative for activity change, appetite change, chills, diaphoresis, fatigue and fever.   HENT: Negative for congestion, ear discharge, ear pain, facial swelling, hearing loss, mouth sores, nosebleeds, postnasal drip, rhinorrhea, sinus pressure, sore throat, trouble swallowing and voice change.    Eyes: Negative for pain, discharge, redness and itching.   Respiratory: Positive for shortness of breath. Negative for apnea, cough, choking, chest tightness, wheezing and stridor.    Cardiovascular: Positive for chest pain (incisional). Negative for palpitations and leg swelling.   Gastrointestinal: Negative for abdominal distention, abdominal pain, anal bleeding, blood in stool, constipation, diarrhea, nausea, rectal pain and vomiting.   Endocrine: Negative for cold intolerance and heat intolerance.   Genitourinary: Negative for difficulty urinating, dysuria and flank pain.   Musculoskeletal: Negative for arthralgias, back pain, joint swelling, myalgias and neck pain.   Allergic/Immunologic: Positive for immunocompromised state.   Neurological: Negative for dizziness, tremors, light-headedness, numbness and headaches.   Psychiatric/Behavioral: Negative for agitation and hallucinations. The patient is nervous/anxious.      Objective:   Physical Exam  Vitals signs and nursing note reviewed.   Constitutional:       Appearance: Normal appearance. She is overweight.      Interventions: Nasal cannula in place.   HENT:      Head: Normocephalic and atraumatic.   Eyes:      General: No scleral icterus.     Conjunctiva/sclera: Conjunctivae normal.   Cardiovascular:      Rate and Rhythm: Normal rate and regular rhythm.      " Heart sounds: No murmur. Friction rub present. No gallop.    Pulmonary:      Breath sounds: Examination of the right-middle field reveals rales. Examination of the right-lower field reveals decreased breath sounds and rales. Examination of the left-lower field reveals decreased breath sounds. Decreased breath sounds and rales present. No wheezing or rhonchi.      Comments: 2 left pleural and 1 right pleural chest tubes in place with sanguinous output, no airleaks  Abdominal:      General: Bowel sounds are normal. There is no distension.      Palpations: Abdomen is soft.   Musculoskeletal:      Right lower leg: No edema.      Left lower leg: No edema.   Skin:     General: Skin is warm and dry.      Capillary Refill: Capillary refill takes less than 2 seconds.   Neurological:      General: No focal deficit present.      Mental Status: She is oriented to person, place, and time.   Psychiatric:         Mood and Affect: Mood normal.         Behavior: Behavior normal.           Vital Signs (Most Recent):  Temp: 97.4 °F (36.3 °C) (08/13/20 1149)  Pulse: 87 (08/13/20 0915)  Resp: 18 (08/13/20 1148)  BP: (!) 147/105 (08/13/20 0915)  SpO2: 95 % (08/13/20 0915) Vital Signs (24h Range):  Temp:  [97.3 °F (36.3 °C)-98.1 °F (36.7 °C)] 97.4 °F (36.3 °C)  Pulse:  [70-93] 87  Resp:  [7-30] 18  SpO2:  [95 %-99 %] 95 %  BP: (115-148)/() 147/105     Weight: 96.7 kg (213 lb 3 oz)  Body mass index is 33.39 kg/m².      Intake/Output Summary (Last 24 hours) at 8/13/2020 1158  Last data filed at 8/13/2020 1100  Gross per 24 hour   Intake 640.01 ml   Output 1080 ml   Net -439.99 ml       Significant Labs:  CBC:  Recent Labs   Lab 08/13/20 0619   WBC 19.68*   RBC 3.08*   HGB 9.4*   HCT 30.1*      MCV 98   MCH 30.5   MCHC 31.2*     BMP:  Recent Labs   Lab 08/13/20 0619      K 4.5      CO2 28   BUN 25*   CREATININE 0.7   CALCIUM 9.1      Tacrolimus Levels:  Recent Labs   Lab 08/13/20  0619   TACROLIMUS 5.7      Microbiology:  Microbiology Results (last 7 days)     Procedure Component Value Units Date/Time    Aerobic culture [[id number]] Collected: 08/10/20 0758    Order Status: Completed Specimen: Body Fluid from Lung, Left Updated: 08/13/20 1017     Aerobic Bacterial Culture No growth    Narrative:      Bronchus Donor Lung Left (Aerobic, Anaerobic, AFB, Fungus,  Gram)    Culture, Respiratory with Gram Stain [[id number]] Collected: 08/11/20 1315    Order Status: Completed Specimen: Respiratory from BAL, JOSETTE Updated: 08/13/20 0958     Respiratory Culture Normal respiratory yoly      No S aureus or Pseudomonas isolated.     Gram Stain (Respiratory) Rare WBC's     Gram Stain (Respiratory) No organisms seen    AFB Culture & Smear [[id number]] Collected: 08/11/20 1315    Order Status: Completed Specimen: Respiratory from BAL, JOSETTE Updated: 08/12/20 2127     AFB Culture & Smear Culture in progress     AFB CULTURE STAIN No acid fast bacilli seen.    Fungus culture [[id number]] Collected: 08/11/20 1315    Order Status: Completed Specimen: Respiratory from BAL, JOSETTE Updated: 08/12/20 1226     Fungus (Mycology) Culture Culture in progress    Fungus culture [[id number]] Collected: 08/10/20 0758    Order Status: Completed Specimen: Body Fluid from Lung, Left Updated: 08/12/20 1225     Fungus (Mycology) Culture Culture in progress    Narrative:      Bronchus Donor Lung Left (Aerobic, Anaerobic, AFB, Fungus,  Gram)    Culture, Anaerobe [[id number]] Collected: 08/10/20 0758    Order Status: Completed Specimen: Body Fluid from Lung, Left Updated: 08/12/20 0831     Anaerobic Culture Culture in progress    Narrative:      Bronchus Donor Lung Left (Aerobic, Anaerobic, AFB, Fungus,  Gram)    AFB Culture & Smear [[id number]] Collected: 08/10/20 0758    Order Status: Completed Specimen: Body Fluid from Lung, Left Updated: 08/11/20 2127     AFB Culture & Smear Culture in progress     AFB CULTURE STAIN No acid fast bacilli seen.    Narrative:       Bronchus Donor Lung Left (Aerobic, Anaerobic, AFB, Fungus,  Gram)    Gram stain [[id number]] Collected: 08/10/20 0664    Order Status: Completed Specimen: Body Fluid from Lung, Left Updated: 08/10/20 1142     Gram Stain Result No WBC's      No organisms seen    Narrative:      Bronchus Donor Lung Left (Aerobic, Anaerobic, AFB, Fungus,  Gram)          I have reviewed all pertinent labs within the past 24 hours.        Assessment/Plan:     * S/P lung transplant  POD #3 s/p uncomplicated LSLT for IPF. PGD 2 for 24 hours. CT output stable. Underwent bronchoscopy and successfully extubated to [facility] on POD#1. Continue immunosuppression and prophylaxis. Continue IS/PT/OT/CPT.    Immunosuppression  Induction with solumedrol and basiliximab. Repeat basiliximab on POD#4.  Continue tacrolimus, MMF, and prednisone taper.    Prophylactic antibiotic  CMV D-/R+ On vancomycin and cefepime for routine surgical prophylaxis. Bactrim, ganciclovir, and voriconazole for OIP. Will follow up on donor cultures and adjust therapy as needed.     Prediabetes  Endocrine following, appreciate recs.     Acute blood loss anemia  Expected post-op. Continue to trend.     Leukocytosis  Expected post-op. Continue to trend.         Hussein Guallpa NP  Lung Transplant  [facility]

## 2020-08-14 NOTE — ASSESSMENT & PLAN NOTE
BG goal 140 - 180     continue transition insulin infusion at 0.5 u/hr.  BG monitoring ac/hs/0200 and  moderate dose correction scale.   Start novolog 3 units with meals.     Discharge planning: GWENDOLYN

## 2020-08-14 NOTE — PROGRESS NOTES
"[facility]  Endocrinology  Progress Note    Admit Date: 2020     Reason for Consult: Management of pre-dm, Hyperglycemia     Surgical Procedure and Date: Left lung transplant and wound vac application 8/10/20    Diabetes diagnosis year: pre-dm - diet controlled     Lab Results   Component Value Date    HGBA1C 6.0 (H) 2020       Home Diabetes Medications: none (per chart review)    How often checking glucose at home? Not checking    Diabetes Complications include:     none    Complicating diabetes co morbidities:   Glucocorticoid use  s/p lung transplant      HPI:   Patient is a 65 y.o. female with a diagnosis of DM2, IPF, and chronic respiratory failure, who is s/p left lung transplant on 8/10/20.  Patient with controlled DM2 on no medications per chart review.  Endocrinology consulted for DM/BG management.            Interval HPI:   Overnight events:Remains in TSU. NAEON. BG reasonably well controlled on IV insulin infusion at 0.5 u/hr; not requiring correction scale. Prednisone 90 mg daily; steroids per primary.   Eatin%  Nausea: No  Hypoglycemia and intervention: No  Fever: No  TPN and/or TF: No    BP (!) 142/82 (BP Location: Left arm, Patient Position: Sitting)   Pulse 103   Temp 97.6 °F (36.4 °C) (Oral)   Resp 18   Ht 5' 7" (1.702 m)   Wt 96.7 kg (213 lb 3 oz)   LMP  (LMP Unknown)   SpO2 100%   Breastfeeding No   BMI 33.39 kg/m²     Labs Reviewed and Include    Recent Labs   Lab 20  0635   *   CALCIUM 9.5   ALBUMIN 3.1*   PROT 6.3   *   K 4.1   CO2 28   CL 98   BUN 30*   CREATININE 0.7   ALKPHOS 68   ALT 30   AST 27   BILITOT 0.5     Lab Results   Component Value Date    WBC 12.78 (H) 2020    HGB 9.5 (L) 2020    HCT 28.9 (L) 2020    MCV 94 2020     2020     No results for input(s): TSH, FREET4 in the last 168 hours.  Lab Results   Component Value Date    HGBA1C 6.0 (H) 2020       Nutritional status: "   Body mass index is 33.39 kg/m².  Lab Results   Component Value Date    ALBUMIN 3.1 (L) 08/14/2020    ALBUMIN 3.4 (L) 08/13/2020    ALBUMIN 3.2 (L) 08/12/2020     No results found for: PREALBUMIN    Estimated Creatinine Clearance: 98.2 mL/min (based on SCr of 0.7 mg/dL).    Accu-Checks  Recent Labs     08/12/20  0821 08/12/20  1417 08/12/20  1658 08/12/20  2000 08/13/20  0206 08/13/20  0814 08/13/20  1216 08/13/20  1715 08/13/20  2056 08/14/20  0835   POCTGLUCOSE 159* 205* 195* 150* 138* 126* 175* 178* 153* 159*       Current Medications and/or Treatments Impacting Glycemic Control  Immunotherapy:    Immunosuppressants         Stop Route Frequency     mycophenolate capsule 500 mg      -- Oral 2 times daily     tacrolimus capsule 0.5 mg      -- Oral 2 times daily     basiliximab (SIMULECT) 20 mg in dextrose 5 % 50 mL infusion      08/18 1114 IV Every 96 hours        Steroids:   Hormones (From admission, onward)    Start     Stop Route Frequency Ordered    08/17/20 0900  predniSONE tablet 35 mg      -- Oral Daily 08/11/20 1153    08/16/20 0900  predniSONE tablet 45 mg      08/17 0859 Oral Daily 08/11/20 1153    08/15/20 0900  predniSONE tablet 60 mg      08/16 0859 Oral Daily 08/11/20 1153    08/11/20 2023  melatonin tablet 9 mg      -- Oral Nightly PRN 08/11/20 1924        Pressors:    Autonomic Drugs (From admission, onward)    None        Hyperglycemia/Diabetes Medications:   Antihyperglycemics (From admission, onward)    Start     Stop Route Frequency Ordered    08/12/20 1556  insulin aspart U-100 pen 0-10 Units      -- SubQ As needed (PRN) 08/12/20 1456    08/11/20 1500  insulin regular 100 Units in sodium chloride 0.9% 100 mL infusion      -- IV Continuous 08/11/20 1351          ASSESSMENT and PLAN    * S/P lung transplant  S/p lung transplant on 8/10/20  Managed per primary team  Avoid hypoglycemia  Optimize BG control for surgical wound healing        Prediabetes  BG goal 140 - 180     continue transition  insulin infusion at 0.5 u/hr.  BG monitoring ac/hs/0200 and  moderate dose correction scale.   Monitor for prandial excursions.     Discharge planning: TBD      Immunosuppression  May increase insulin resistance.         Adrenal cortical steroids causing adverse effect in therapeutic use  On steroid taper per transplant team; may elevate BG readings            Nidia Frazier NP  Endocrinology  [facility]

## 2020-08-14 NOTE — SUBJECTIVE & OBJECTIVE
"Subjective:     Interval History: No acute events overnight.  Continues to progress    Continuous Infusions:   insulin (HUMAN R) infusion (adults) 0.5 Units/hr (08/13/20 1334)     Scheduled Meds:   aspirin  81 mg Oral Daily    basiliximab (SIMULECT) infusion  20 mg Intravenous Q96H    ceFEPime (MAXIPIME) IVPB  2 g Intravenous Q12H    chlorhexidine  10 mL Mouth/Throat BID    docusate sodium  100 mg Oral BID    enoxaparin  40 mg Subcutaneous Q24H    furosemide (LASIX) IV  20 mg Intravenous Daily    gabapentin  100 mg Oral TID    levalbuterol  1.25 mg Nebulization Q6H WAKE    lidocaine  1 patch Transdermal Q24H    metoprolol tartrate  25 mg Oral BID    mupirocin  1 g Nasal BID    mycophenolate  500 mg Oral BID    NIFEdipine  30 mg Oral Daily    pantoprazole  40 mg Oral Daily    polyethylene glycol  17 g Oral Daily    [START ON 8/15/2020] predniSONE  60 mg Oral Daily    Followed by    [START ON 8/16/2020] predniSONE  45 mg Oral Daily    Followed by    [START ON 8/17/2020] predniSONE  35 mg Oral Daily    senna  17.2 mg Oral Daily    sulfamethoxazole-trimethoprim 800-160mg  1 tablet Oral Every Mon, Wed, Fri    tacrolimus  0.5 mg Oral BID    valGANciclovir  450 mg Oral BID    vancomycin (VANCOCIN) IVPB  1,250 mg Intravenous Q12H    voriconazole  350 mg Oral BID     PRN Meds:acetaminophen, cyclobenzaprine, dextrose 50%, dextrose 50%, glucagon (human recombinant), glucose, glucose, insulin aspart U-100, lactulose, levalbuterol, magnesium sulfate IVPB, melatonin, metoclopramide HCl, ondansetron, oxyCODONE, oxyCODONE, potassium chloride **AND** potassium chloride **AND** potassium chloride, Pharmacy to dose Vancomycin consult **AND** vancomycin - pharmacy to dose    Review of patient's allergies indicates:   Allergen Reactions    Boniva [ibandronate] Other (See Comments)     "chest cramps"       Review of Systems   Constitutional: Negative for activity change, appetite change, chills, diaphoresis, " fatigue and fever.   HENT: Negative for congestion, ear discharge, ear pain, facial swelling, hearing loss, mouth sores, nosebleeds, postnasal drip, rhinorrhea, sinus pressure, sore throat, trouble swallowing and voice change.    Eyes: Negative for pain, discharge, redness and itching.   Respiratory: Positive for shortness of breath (with exertion, improving). Negative for apnea, cough, choking, chest tightness, wheezing and stridor.    Cardiovascular: Positive for chest pain (incisional). Negative for palpitations and leg swelling.   Gastrointestinal: Negative for abdominal distention, abdominal pain, anal bleeding, blood in stool, constipation, diarrhea, nausea, rectal pain and vomiting.   Endocrine: Negative for cold intolerance and heat intolerance.   Genitourinary: Negative for difficulty urinating, dysuria and flank pain.   Musculoskeletal: Negative for arthralgias, back pain, joint swelling, myalgias and neck pain.   Allergic/Immunologic: Positive for immunocompromised state.   Neurological: Negative for dizziness, tremors, light-headedness, numbness and headaches.   Psychiatric/Behavioral: Negative for agitation and hallucinations. The patient is nervous/anxious.      Objective:   Physical Exam  Vitals signs and nursing note reviewed.   Constitutional:       Appearance: Normal appearance. She is overweight.      Interventions: Nasal cannula in place.   HENT:      Head: Normocephalic and atraumatic.   Eyes:      General: No scleral icterus.     Conjunctiva/sclera: Conjunctivae normal.   Cardiovascular:      Rate and Rhythm: Normal rate and regular rhythm.      Heart sounds: No murmur. Friction rub present. No gallop.    Pulmonary:      Breath sounds: Examination of the right-middle field reveals rales. Examination of the right-lower field reveals decreased breath sounds and rales. Examination of the left-lower field reveals decreased breath sounds. Decreased breath sounds and rales present. No wheezing or  rhonchi.      Comments: 2 left pleural and 1 right pleural chest tubes in place with sanguinous output, no airleaks  Abdominal:      General: Bowel sounds are normal. There is no distension.      Palpations: Abdomen is soft.   Musculoskeletal:      Right lower leg: No edema.      Left lower leg: No edema.   Skin:     General: Skin is warm and dry.      Capillary Refill: Capillary refill takes less than 2 seconds.   Neurological:      General: No focal deficit present.      Mental Status: She is oriented to person, place, and time.   Psychiatric:         Mood and Affect: Mood normal.         Behavior: Behavior normal.           Vital Signs (Most Recent):  Temp: 97.6 °F (36.4 °C) (08/14/20 0810)  Pulse: 103 (08/14/20 1010)  Resp: 18 (08/14/20 0950)  BP: (!) 142/82 (08/14/20 1010)  SpO2: 100 % (08/14/20 0950) Vital Signs (24h Range):  Temp:  [97.4 °F (36.3 °C)-98.3 °F (36.8 °C)] 97.6 °F (36.4 °C)  Pulse:  [] 103  Resp:  [9-18] 18  SpO2:  [90 %-100 %] 100 %  BP: (118-182)/() 142/82     Weight: 96.7 kg (213 lb 3 oz)  Body mass index is 33.39 kg/m².      Intake/Output Summary (Last 24 hours) at 8/14/2020 1111  Last data filed at 8/14/2020 0500  Gross per 24 hour   Intake 412 ml   Output 1750 ml   Net -1338 ml       Significant Labs:  CBC:  Recent Labs   Lab 08/14/20  0635   WBC 12.78*   RBC 3.07*   HGB 9.5*   HCT 28.9*      MCV 94   MCH 30.9   MCHC 32.9     BMP:  Recent Labs   Lab 08/14/20  0635   *   K 4.1   CL 98   CO2 28   BUN 30*   CREATININE 0.7   CALCIUM 9.5      Tacrolimus Levels:  Recent Labs   Lab 08/14/20  0635   TACROLIMUS 6.2     Microbiology:  Microbiology Results (last 7 days)     Procedure Component Value Units Date/Time    Culture, Anaerobe [[id number]] Collected: 08/10/20 075    Order Status: Completed Specimen: Body Fluid from Lung, Left Updated: 08/14/20 0844     Anaerobic Culture Culture in progress    Narrative:      Bronchus Donor Lung Left (Aerobic, Anaerobic, AFB,  Fungus,  Gram)    Aerobic culture [[id number]] Collected: 08/10/20 0758    Order Status: Completed Specimen: Body Fluid from Lung, Left Updated: 08/13/20 1017     Aerobic Bacterial Culture No growth    Narrative:      Bronchus Donor Lung Left (Aerobic, Anaerobic, AFB, Fungus,  Gram)    Culture, Respiratory with Gram Stain [[id number]] Collected: 08/11/20 1315    Order Status: Completed Specimen: Respiratory from BAL, JOSETTE Updated: 08/13/20 0958     Respiratory Culture Normal respiratory yoly      No S aureus or Pseudomonas isolated.     Gram Stain (Respiratory) Rare WBC's     Gram Stain (Respiratory) No organisms seen    AFB Culture & Smear [[id number]] Collected: 08/11/20 1315    Order Status: Completed Specimen: Respiratory from BAL, JOSETTE Updated: 08/12/20 2127     AFB Culture & Smear Culture in progress     AFB CULTURE STAIN No acid fast bacilli seen.    Fungus culture [[id number]] Collected: 08/11/20 1315    Order Status: Completed Specimen: Respiratory from BAL, JOSETTE Updated: 08/12/20 1226     Fungus (Mycology) Culture Culture in progress    Fungus culture [[id number]] Collected: 08/10/20 0758    Order Status: Completed Specimen: Body Fluid from Lung, Left Updated: 08/12/20 1225     Fungus (Mycology) Culture Culture in progress    Narrative:      Bronchus Donor Lung Left (Aerobic, Anaerobic, AFB, Fungus,  Gram)    AFB Culture & Smear [[id number]] Collected: 08/10/20 0758    Order Status: Completed Specimen: Body Fluid from Lung, Left Updated: 08/11/20 2127     AFB Culture & Smear Culture in progress     AFB CULTURE STAIN No acid fast bacilli seen.    Narrative:      Bronchus Donor Lung Left (Aerobic, Anaerobic, AFB, Fungus,  Gram)    Gram stain [[id number]] Collected: 08/10/20 0758    Order Status: Completed Specimen: Body Fluid from Lung, Left Updated: 08/10/20 1143     Gram Stain Result No WBC's      No organisms seen    Narrative:      Bronchus Donor Lung Left (Aerobic, Anaerobic, AFB, Fungus,  Gram)          I have  reviewed all pertinent labs within the past 24 hours.    Diagnostic Results:  X-Ray: Reviewed  The transplanted left lung demonstrates some degree of mid lower lung zone increased density, which could represent atelectasis.  There is prominence of the central pulmonary vasculature which may represent mild congest

## 2020-08-14 NOTE — PLAN OF CARE
Pt aaox4 vswnl and c/o muscle spasm relieved by flexeril. Bed in low position and callbell within reach. Telemetry maintained nsr. MS incision aria with wound vac intact. Chest tubes x3 patent/intact with serosang drainage. Accu ck 151 at 2100. Insulin gtt @0.5u/hr. pt ambulates to bedside commode with standby assist. Lidocaine patch left hip. Pt pulled meds 100%

## 2020-08-14 NOTE — PLAN OF CARE
Glucoses monitored. Insulin gtt continues at 0.5units/hr. Pt has decreased appetite. Vomited X1 today. Boost ordered and diet changed by PA. CT X3 remain in place to water seal draining small amount sero/sang drainage. PCXR done today. Plan for PA & Lat CXR in am. Pt has sat in chair most of day. Uses BSC prn. Having BM's today. Requested laxatives to be held today. Colace given. C/O muscle spasms to L shoulder. Flexeril given and spasms relieved. Afebrile. Cefepime and Vanc continue. Vanc trough this am=14.2. Prevena wound vac in use to MSI. POD #4 Simulect given as ordered. Daughter prepared self-meds correctly. Pt had 8 beat run of V-Tach. Pt assymptomatic. BP stable. Mg+2=1.9. Mg+2 rider given per prn order. K+=4.1. No replacement indicated per order. UOP adequate. Lasix continues. Denies SOB. PO stable on RA. Reinforced to wear non-skid socks and call for assistance with mobility to prevent falling. Verbalized understanding. Skin remains intact to back.

## 2020-08-14 NOTE — CONSULTS
Wound care consult received. Pt with Prevena vac intact, no leaks, and on day 3.  Recommend to leave this disposable dressing intact until 7th day and  remove and discard dressing.  Re-assess incision and re-consult wound care team if needed.  [id number]

## 2020-08-14 NOTE — PROGRESS NOTES
"[facility]  Adult Nutrition  Progress Note    SUMMARY       Recommendations    1. Continue regular diet. 2. Add Boost Glucose Control TID.  Goals: Meet % EEN, EPN by RD f/u date  Nutrition Goal Status: progressing towards goal  Communication of RD Recs: reviewed with physician    Reason for Assessment    Reason For Assessment: RD follow-up  Diagnosis: other (see comments)(S/p L. Lung tx)  Relevant Medical History: DM, HTN, IPF  Interdisciplinary Rounds: did not attend  General Information Comments: Pt s/p lung Tx 8/10 is advanced to regular diet with fair po intake meals (50-75%). Pt reports good appetite and stable wt PTA, NFPE not warranted. Per pt, her throat is very sore and she has not been eating as well today.  Nutrition Discharge Planning: Post transplant nutrition education provided 8/14/20. Food safety/drug interactions emphasized. General low salt/healthy diet recommended. No other needs identified. Caregiver (daughter) present.    Nutrition Risk Screen    Nutrition Risk Screen: no indicators present    Nutrition/Diet History    Patient Reported Diet/Restrictions/Preferences: heart healthy  Spiritual, Cultural Beliefs, Synagogue Practices, Values that Affect Care: no  Factors Affecting Nutritional Intake: None identified at this time    Anthropometrics    Temp: 98.2 °F (36.8 °C)  Height Method: Stated  Height: 5' 7" (170.2 cm)  Height (inches): 67 in  Weight Method: Bed Scale  Weight: 96.7 kg (213 lb 3 oz)  Weight (lb): 213.19 lb  Ideal Body Weight (IBW), Female: 135 lb  % Ideal Body Weight, Female (lb): 157.92 %  BMI (Calculated): 33.4  BMI Grade: 30 - 34.9- obesity - grade I       Lab/Procedures/Meds    Pertinent Labs Reviewed: reviewed  Pertinent Labs Comments: Na 135, BUN 30, Glu 136, A1c 6%  Pertinent Medications Reviewed: reviewed  Pertinent Medications Comments: colace, lasix, pantoprazole, prednisone, tacrolimus    Estimated/Assessed Needs    Weight Used For Calorie " Calculations: 96.7 kg (213 lb 3 oz)  Energy Calorie Requirements (kcal): 1943 kcal  Energy Need Method: [place]  Protein Requirements:  g/d (1.5-2 g/kg IBW)  Weight Used For Protein Calculations: 61.4 kg (135 lb 5.8 oz)     Estimated Fluid Requirement Method: other (see comments)(Per MD or 1 mL/kcal)  RDA Method (mL): 1943         Nutrition Prescription Ordered    Current Diet Order: Regular  Nutrition Order Comments: (NA)    Evaluation of Received Nutrient/Fluid Intake    Other Calories (kcal): (NA)  I/O: -1.5L since admission  Comments: LBM 8/09  % Intake of Estimated Energy Needs: 50 - 75 %  % Meal Intake: 50 - 75 %    Nutrition Risk    Level of Risk/Frequency of Follow-up: low     Assessment and Plan  Nutrition Problem  Inadequate oral intake    Related to (etiology):   Sore throat    Signs and Symptoms (as evidenced by):   Pt s/p lung transplant 8/10    Interventions(treatment strategy):  Collaboration of care with providers.  Nutrition education: post transplant food safety and medication interaction    Nutrition Diagnosis Status:   New       Monitor and Evaluation    Food and Nutrient Intake: energy intake, food and beverage intake  Food and Nutrient Adminstration: diet order  Physical Activity and Function: nutrition-related ADLs and IADLs  Anthropometric Measurements: weight, weight change  Biochemical Data, Medical Tests and Procedures: glucose/endocrine profile, inflammatory profile, lipid profile, gastrointestinal profile, electrolyte and renal panel  Nutrition-Focused Physical Findings: overall appearance     Malnutrition Assessment         Pt does not meet criteria for malnutrition.    Nutrition Follow-Up    RD Follow-up?: Yes

## 2020-08-14 NOTE — PROGRESS NOTES
[facility]  Lung Transplant  Progress Note - Floor    Patient Name: Chely Becerra  MRN: [id number]  Admission Date: 8/9/2020  Hospital Length of Stay: 5 days  Post-Operative Day: 4  Attending Physician: Adam Hlil MD  Primary Care Provider: ZAHIDA Stack MD     Subjective:     Interval History: No acute events overnight.  Continues to progress    Continuous Infusions:   insulin (HUMAN R) infusion (adults) 0.5 Units/hr (08/13/20 1334)     Scheduled Meds:   aspirin  81 mg Oral Daily    basiliximab (SIMULECT) infusion  20 mg Intravenous Q96H    ceFEPime (MAXIPIME) IVPB  2 g Intravenous Q12H    chlorhexidine  10 mL Mouth/Throat BID    docusate sodium  100 mg Oral BID    enoxaparin  40 mg Subcutaneous Q24H    furosemide (LASIX) IV  20 mg Intravenous Daily    gabapentin  100 mg Oral TID    levalbuterol  1.25 mg Nebulization Q6H WAKE    lidocaine  1 patch Transdermal Q24H    metoprolol tartrate  25 mg Oral BID    mupirocin  1 g Nasal BID    mycophenolate  500 mg Oral BID    NIFEdipine  30 mg Oral Daily    pantoprazole  40 mg Oral Daily    polyethylene glycol  17 g Oral Daily    [START ON 8/15/2020] predniSONE  60 mg Oral Daily    Followed by    [START ON 8/16/2020] predniSONE  45 mg Oral Daily    Followed by    [START ON 8/17/2020] predniSONE  35 mg Oral Daily    senna  17.2 mg Oral Daily    sulfamethoxazole-trimethoprim 800-160mg  1 tablet Oral Every Mon, Wed, Fri    tacrolimus  0.5 mg Oral BID    valGANciclovir  450 mg Oral BID    vancomycin (VANCOCIN) IVPB  1,250 mg Intravenous Q12H    voriconazole  350 mg Oral BID     PRN Meds:acetaminophen, cyclobenzaprine, dextrose 50%, dextrose 50%, glucagon (human recombinant), glucose, glucose, insulin aspart U-100, lactulose, levalbuterol, magnesium sulfate IVPB, melatonin, metoclopramide HCl, ondansetron, oxyCODONE, oxyCODONE, potassium chloride **AND** potassium chloride **AND** potassium chloride, Pharmacy to dose  "Vancomycin consult **AND** vancomycin - pharmacy to dose    Review of patient's allergies indicates:   Allergen Reactions    Boniva [ibandronate] Other (See Comments)     "chest cramps"       Review of Systems   Constitutional: Negative for activity change, appetite change, chills, diaphoresis, fatigue and fever.   HENT: Negative for congestion, ear discharge, ear pain, facial swelling, hearing loss, mouth sores, nosebleeds, postnasal drip, rhinorrhea, sinus pressure, sore throat, trouble swallowing and voice change.    Eyes: Negative for pain, discharge, redness and itching.   Respiratory: Positive for shortness of breath (with exertion, improving). Negative for apnea, cough, choking, chest tightness, wheezing and stridor.    Cardiovascular: Positive for chest pain (incisional). Negative for palpitations and leg swelling.   Gastrointestinal: Negative for abdominal distention, abdominal pain, anal bleeding, blood in stool, constipation, diarrhea, nausea, rectal pain and vomiting.   Endocrine: Negative for cold intolerance and heat intolerance.   Genitourinary: Negative for difficulty urinating, dysuria and flank pain.   Musculoskeletal: Negative for arthralgias, back pain, joint swelling, myalgias and neck pain.   Allergic/Immunologic: Positive for immunocompromised state.   Neurological: Negative for dizziness, tremors, light-headedness, numbness and headaches.   Psychiatric/Behavioral: Negative for agitation and hallucinations. The patient is nervous/anxious.      Objective:   Physical Exam  Vitals signs and nursing note reviewed.   Constitutional:       Appearance: Normal appearance. She is overweight.      Interventions: Nasal cannula in place.   HENT:      Head: Normocephalic and atraumatic.   Eyes:      General: No scleral icterus.     Conjunctiva/sclera: Conjunctivae normal.   Cardiovascular:      Rate and Rhythm: Normal rate and regular rhythm.      Heart sounds: No murmur. Friction rub present. No gallop.  "   Pulmonary:      Breath sounds: Examination of the right-middle field reveals rales. Examination of the right-lower field reveals decreased breath sounds and rales. Examination of the left-lower field reveals decreased breath sounds. Decreased breath sounds and rales present. No wheezing or rhonchi.      Comments: 2 left pleural and 1 right pleural chest tubes in place with sanguinous output, no airleaks  Abdominal:      General: Bowel sounds are normal. There is no distension.      Palpations: Abdomen is soft.   Musculoskeletal:      Right lower leg: No edema.      Left lower leg: No edema.   Skin:     General: Skin is warm and dry.      Capillary Refill: Capillary refill takes less than 2 seconds.   Neurological:      General: No focal deficit present.      Mental Status: She is oriented to person, place, and time.   Psychiatric:         Mood and Affect: Mood normal.         Behavior: Behavior normal.           Vital Signs (Most Recent):  Temp: 97.6 °F (36.4 °C) (08/14/20 0810)  Pulse: 103 (08/14/20 1010)  Resp: 18 (08/14/20 0950)  BP: (!) 142/82 (08/14/20 1010)  SpO2: 100 % (08/14/20 0950) Vital Signs (24h Range):  Temp:  [97.4 °F (36.3 °C)-98.3 °F (36.8 °C)] 97.6 °F (36.4 °C)  Pulse:  [] 103  Resp:  [9-18] 18  SpO2:  [90 %-100 %] 100 %  BP: (118-182)/() 142/82     Weight: 96.7 kg (213 lb 3 oz)  Body mass index is 33.39 kg/m².      Intake/Output Summary (Last 24 hours) at 8/14/2020 1111  Last data filed at 8/14/2020 0500  Gross per 24 hour   Intake 412 ml   Output 1750 ml   Net -1338 ml       Significant Labs:  CBC:  Recent Labs   Lab 08/14/20  0635   WBC 12.78*   RBC 3.07*   HGB 9.5*   HCT 28.9*      MCV 94   MCH 30.9   MCHC 32.9     BMP:  Recent Labs   Lab 08/14/20  0635   *   K 4.1   CL 98   CO2 28   BUN 30*   CREATININE 0.7   CALCIUM 9.5      Tacrolimus Levels:  Recent Labs   Lab 08/14/20  0635   TACROLIMUS 6.2     Microbiology:  Microbiology Results (last 7 days)     Procedure  Component Value Units Date/Time    Culture, Anaerobe [[id number]] Collected: 08/10/20 0758    Order Status: Completed Specimen: Body Fluid from Lung, Left Updated: 08/14/20 0844     Anaerobic Culture Culture in progress    Narrative:      Bronchus Donor Lung Left (Aerobic, Anaerobic, AFB, Fungus,  Gram)    Aerobic culture [[id number]] Collected: 08/10/20 0758    Order Status: Completed Specimen: Body Fluid from Lung, Left Updated: 08/13/20 1017     Aerobic Bacterial Culture No growth    Narrative:      Bronchus Donor Lung Left (Aerobic, Anaerobic, AFB, Fungus,  Gram)    Culture, Respiratory with Gram Stain [[id number]] Collected: 08/11/20 1315    Order Status: Completed Specimen: Respiratory from BAL, JOSETTE Updated: 08/13/20 0958     Respiratory Culture Normal respiratory yoly      No S aureus or Pseudomonas isolated.     Gram Stain (Respiratory) Rare WBC's     Gram Stain (Respiratory) No organisms seen    AFB Culture & Smear [[id number]] Collected: 08/11/20 1315    Order Status: Completed Specimen: Respiratory from BAL, JOSETTE Updated: 08/12/20 2127     AFB Culture & Smear Culture in progress     AFB CULTURE STAIN No acid fast bacilli seen.    Fungus culture [[id number]] Collected: 08/11/20 1315    Order Status: Completed Specimen: Respiratory from BAL, JOSETTE Updated: 08/12/20 1226     Fungus (Mycology) Culture Culture in progress    Fungus culture [[id number]] Collected: 08/10/20 0758    Order Status: Completed Specimen: Body Fluid from Lung, Left Updated: 08/12/20 1225     Fungus (Mycology) Culture Culture in progress    Narrative:      Bronchus Donor Lung Left (Aerobic, Anaerobic, AFB, Fungus,  Gram)    AFB Culture & Smear [[id number]] Collected: 08/10/20 0758    Order Status: Completed Specimen: Body Fluid from Lung, Left Updated: 08/11/20 2127     AFB Culture & Smear Culture in progress     AFB CULTURE STAIN No acid fast bacilli seen.    Narrative:      Bronchus Donor Lung Left (Aerobic, Anaerobic, AFB, Fungus,  Gram)     Gram stain [[id number]] Collected: 08/10/20 0758    Order Status: Completed Specimen: Body Fluid from Lung, Left Updated: 08/10/20 8220     Gram Stain Result No WBC's      No organisms seen    Narrative:      Bronchus Donor Lung Left (Aerobic, Anaerobic, AFB, Fungus,  Gram)          I have reviewed all pertinent labs within the past 24 hours.    Diagnostic Results:  X-Ray: Reviewed  The transplanted left lung demonstrates some degree of mid lower lung zone increased density, which could represent atelectasis.  There is prominence of the central pulmonary vasculature which may represent mild congest      Assessment/Plan:     * S/P lung transplant  POD #4 s/p uncomplicated LSLT for IPF. PGD 2 for 24 hours. CT output stable. Underwent bronchoscopy and successfully extubated to [facility] on POD#1. Continue immunosuppression and prophylaxis. Continue IS/PT/OT/CPT.    Immunosuppression  Induction with solumedrol and basiliximab. Repeat basiliximab on POD#4.  Continue tacrolimus, MMF, and prednisone taper.    Prophylactic antibiotic  CMV D-/R+ On vancomycin and cefepime for routine surgical prophylaxis. Bactrim, ganciclovir, and voriconazole for OIP. Will follow up on donor cultures and adjust therapy as needed.     Prediabetes  Endocrine following, appreciate recs.     Acute blood loss anemia  Expected post-op. Continue to trend.     Leukocytosis  Expected post-op. Continue to trend.     Essential hypertension  Continue nifedipine        Hussein Guallpa NP  Lung Transplant  [facility]

## 2020-08-14 NOTE — ASSESSMENT & PLAN NOTE
BG goal 140 - 180     continue transition insulin infusion at 0.5 u/hr.  BG monitoring ac/hs/0200 and  moderate dose correction scale.   Monitor for prandial excursions.     Discharge planning: GWENDOLYN

## 2020-08-14 NOTE — SUBJECTIVE & OBJECTIVE
"Interval HPI:   Overnight events:Remains in TSU. NAEON. BG reasonably well controlled on IV insulin infusion at 0.5 u/hr; not requiring correction scale. Prednisone 90 mg daily; steroids per primary.   Eatin%  Nausea: No  Hypoglycemia and intervention: No  Fever: No  TPN and/or TF: No    BP (!) 142/82 (BP Location: Left arm, Patient Position: Sitting)   Pulse 103   Temp 97.6 °F (36.4 °C) (Oral)   Resp 18   Ht 5' 7" (1.702 m)   Wt 96.7 kg (213 lb 3 oz)   LMP  (LMP Unknown)   SpO2 100%   Breastfeeding No   BMI 33.39 kg/m²     Labs Reviewed and Include    Recent Labs   Lab 20  0635   *   CALCIUM 9.5   ALBUMIN 3.1*   PROT 6.3   *   K 4.1   CO2 28   CL 98   BUN 30*   CREATININE 0.7   ALKPHOS 68   ALT 30   AST 27   BILITOT 0.5     Lab Results   Component Value Date    WBC 12.78 (H) 2020    HGB 9.5 (L) 2020    HCT 28.9 (L) 2020    MCV 94 2020     2020     No results for input(s): TSH, FREET4 in the last 168 hours.  Lab Results   Component Value Date    HGBA1C 6.0 (H) 2020       Nutritional status:   Body mass index is 33.39 kg/m².  Lab Results   Component Value Date    ALBUMIN 3.1 (L) 2020    ALBUMIN 3.4 (L) 2020    ALBUMIN 3.2 (L) 2020     No results found for: PREALBUMIN    Estimated Creatinine Clearance: 98.2 mL/min (based on SCr of 0.7 mg/dL).    Accu-Checks  Recent Labs     20  0821 20  1417 20  1658 20  2000 20  0206 20  0814 20  1216 20  1715 20  2056 20  0835   POCTGLUCOSE 159* 205* 195* 150* 138* 126* 175* 178* 153* 159*       Current Medications and/or Treatments Impacting Glycemic Control  Immunotherapy:    Immunosuppressants         Stop Route Frequency     mycophenolate capsule 500 mg      -- Oral 2 times daily     tacrolimus capsule 0.5 mg      -- Oral 2 times daily     basiliximab (SIMULECT) 20 mg in dextrose 5 % 50 mL infusion       111 IV Every 96 " hours        Steroids:   Hormones (From admission, onward)    Start     Stop Route Frequency Ordered    08/17/20 0900  predniSONE tablet 35 mg      -- Oral Daily 08/11/20 1153    08/16/20 0900  predniSONE tablet 45 mg      08/17 0859 Oral Daily 08/11/20 1153    08/15/20 0900  predniSONE tablet 60 mg      08/16 0859 Oral Daily 08/11/20 1153    08/11/20 2023  melatonin tablet 9 mg      -- Oral Nightly PRN 08/11/20 1924        Pressors:    Autonomic Drugs (From admission, onward)    None        Hyperglycemia/Diabetes Medications:   Antihyperglycemics (From admission, onward)    Start     Stop Route Frequency Ordered    08/12/20 1556  insulin aspart U-100 pen 0-10 Units      -- SubQ As needed (PRN) 08/12/20 1456    08/11/20 1500  insulin regular 100 Units in sodium chloride 0.9% 100 mL infusion      -- IV Continuous 08/11/20 6966

## 2020-08-14 NOTE — NURSING
Pt had 8 beat run of V-tach whilw sitting on side of bed. Pt asymptomatic. BP stable. Dr. Hill notified. No new orders received. See strip.

## 2020-08-14 NOTE — PLAN OF CARE
Problem: Oral Intake Inadequate  Goal: Improved Oral Intake  8/14/2020 1430 by Malena Smith RD  Outcome: Ongoing, Progressing  8/14/2020 1429 by Malena Smith RD  Outcome: Ongoing, Progressing   Recommendations     1. Continue regular diet. 2. Add Boost Glucose Control TID.  Goals: Meet % EEN, EPN by RD f/u date  Nutrition Goal Status: progressing towards goal  Communication of RD Recs: reviewed with physician

## 2020-08-14 NOTE — SUBJECTIVE & OBJECTIVE
"Interval HPI:   Overnight events: Remains in TSU. NAEON. BG above goal on IV insulin infusion at 0.5 u/hr with prn correction scale. Prednisone 60 mg.   Eatin%  Nausea: No  Hypoglycemia and intervention: No  Fever: No  TPN and/or TF: No      /84 (BP Location: Right arm, Patient Position: Sitting)   Pulse 100   Temp 98.2 °F (36.8 °C) (Oral)   Resp 18   Ht 5' 7" (1.702 m)   Wt 96.7 kg (213 lb 3 oz)   LMP  (LMP Unknown)   SpO2 (!) 91%   Breastfeeding No   BMI 33.39 kg/m²     Labs Reviewed and Include    Recent Labs   Lab 20  0635   *   CALCIUM 9.5   ALBUMIN 3.1*   PROT 6.3   *   K 4.1   CO2 28   CL 98   BUN 30*   CREATININE 0.7   ALKPHOS 68   ALT 30   AST 27   BILITOT 0.5     Lab Results   Component Value Date    WBC 12.78 (H) 2020    HGB 9.5 (L) 2020    HCT 28.9 (L) 2020    MCV 94 2020     2020     No results for input(s): TSH, FREET4 in the last 168 hours.  Lab Results   Component Value Date    HGBA1C 6.0 (H) 2020       Nutritional status:   Body mass index is 33.39 kg/m².  Lab Results   Component Value Date    ALBUMIN 3.1 (L) 2020    ALBUMIN 3.4 (L) 2020    ALBUMIN 3.2 (L) 2020     No results found for: PREALBUMIN    Estimated Creatinine Clearance: 98.2 mL/min (based on SCr of 0.7 mg/dL).    Accu-Checks  Recent Labs     20  0821 20  1417 20  1658 20  2000 20  0206 20  0814 20  1216 20  1715 20  2056 20  0835   POCTGLUCOSE 159* 205* 195* 150* 138* 126* 175* 178* 153* 159*       Current Medications and/or Treatments Impacting Glycemic Control  Immunotherapy:    Immunosuppressants         Stop Route Frequency     mycophenolate capsule 500 mg      -- Oral 2 times daily     tacrolimus capsule 0.5 mg      -- Oral 2 times daily     basiliximab (SIMULECT) 20 mg in dextrose 5 % 50 mL infusion       1114 IV Every 96 hours        Steroids:   Hormones (From " admission, onward)    Start     Stop Route Frequency Ordered    08/17/20 0900  predniSONE tablet 35 mg      -- Oral Daily 08/11/20 1153    08/16/20 0900  predniSONE tablet 45 mg      08/17 0859 Oral Daily 08/11/20 1153    08/15/20 0900  predniSONE tablet 60 mg      08/16 0859 Oral Daily 08/11/20 1153    08/11/20 2023  melatonin tablet 9 mg      -- Oral Nightly PRN 08/11/20 1924        Pressors:    Autonomic Drugs (From admission, onward)    None        Hyperglycemia/Diabetes Medications:   Antihyperglycemics (From admission, onward)    Start     Stop Route Frequency Ordered    08/12/20 1556  insulin aspart U-100 pen 0-10 Units      -- SubQ As needed (PRN) 08/12/20 1456    08/11/20 1500  insulin regular 100 Units in sodium chloride 0.9% 100 mL infusion      -- IV Continuous 08/11/20 4043

## 2020-08-15 NOTE — PLAN OF CARE
* AAO x 4  * Blood glucose readings AC HS and 2 AM. Blood glucoses readings 242 Novolog 4 units administered per sliding scale. See chart for all blood glucose readings.  * Insulin 0.5 units/hr verified by this RN and ROSALINDA Almaguer RN. New bag to be hung at 1448 on 8/15/20, blue tubing in use.  * Right chest tube #1 (8/10/20), -20 cm water seal maintained. Serosanguineous drainage noted see flow sheet for output totals. Dressing clean dry and intact, dressing due to be changed on 8/15/20.  * Left chest tube #1 (8/10/20), -20 cm water seal maintained. Serosanguineous drainage noted see flow sheet for output totals. Dressing clean dry and intact, dressing due to be changed on 8/15/20.  * Left chest tube #2 (8/10/20), -20 cm water seal maintained. Sanguineous drainage noted see flow sheet for output totals. Dressing clean dry and intact, dressing due to be changed on 8/15/20.  * Low fiber/residue low chol/sat fat diet patient tolerating well. See chart for % eaten.   * Bed at lowest position and locked, call light within reach, non-slip socks on, and side rails up x 2.  Encouraged patient to call for assistance when needed patient stated understanding. This RN and PCT assisted patient with moving to bed from chair patient did well just needed help with all attached equipment.   * Mid-sternal incision with 7 day wound vac (8/10/20) site clean dry and intact no signs or symptoms of infection noted.  * Right AC PIV 20 G (8/12/20) patent, dressing clean dry and intact no signs or symptoms of infection noted.  * Left hand PIV 22 G (8/14/20) patent, dressing clean dry and intact no signs or symptoms of infection noted.  * Oxygen saturation 92-96 % on room air.  Respirations even and unlabored no signs or symptoms of distress noted.  * Patient has complaints of pain to the left shoulder PRN pain medication administered. Full relief noted.  * Self medications preformed by daughter, blue card updated and medication bags refilled by  day shift RN. All questions and concerns addressed by this RN.   * Skin assessment preformed no breakdown noted.  * Standard precautions maintained.  * Continuous telemetry monitoring continued SR 80-90.  * Patient able to turn self no assistance needed.  * Patient voiding clear yellow urine.  See flow sheet for intake and output totals.  * Visi continuous monitoring in use, see flow sheet for readings

## 2020-08-15 NOTE — PROGRESS NOTES
"  Pharmacokinetic Assessment Follow Up: IV Vancomycin     Vancomycin serum concentration assessment/plan:     · Continue vancomycin 1250 mg every 12 hours  · Trough level for 8/16/20 at 0530  · Goal 10-20 mg/dL      Drug levels (last 3 results):  Recent Labs   Lab Result Units 08/12/20 0355 08/14/20  0635   Vancomycin-Trough ug/mL 9.6* 14.2       Pharmacy will continue to follow and monitor vancomycin.    Please contact pharmacy at extension [phone] for questions regarding this assessment.    Thank you for the consult,   Rogelio Gregory       Patient brief summary:  Chely Becerra is a 65 y.o. female initiated on antimicrobial therapy with IV Vancomycin for treatment of surg px in lung transplant  BALs with NGTD.    The patient's current regimen is 1250 mg every 12 hours    Drug Allergies:   Review of patient's allergies indicates:   Allergen Reactions    Boniva [ibandronate] Other (See Comments)     "chest cramps"       Actual Body Weight:   96 kg    Renal Function:   Estimated Creatinine Clearance: 98.2 mL/min (based on SCr of 0.7 mg/dL).,     Dialysis Method (if applicable):  N/A    CBC (last 72 hours):  Recent Labs   Lab Result Units 08/12/20  0356 08/13/20 0619 08/14/20  0635   WBC K/uL 20.33* 19.68* 12.78*   Hemoglobin g/dL 8.9* 9.4* 9.5*   Hematocrit % 28.6* 30.1* 28.9*   Platelets K/uL 157 190 214   Gran% % 88.5* 86.8* 82.1*   Lymph% % 4.5* 7.1* 9.9*   Mono% % 6.0 5.2 7.0   Eosinophil% % 0.0 0.0 0.0   Basophil% % 0.1 0.1 0.1   Differential Method  Automated Automated Automated       Metabolic Panel (last 72 hours):  Recent Labs   Lab Result Units 08/12/20  0356 08/13/20 0619 08/14/20  0635   Sodium mmol/L 140 136 135*   Potassium mmol/L 4.3 4.5 4.1   Chloride mmol/L 108 101 98   CO2 mmol/L 25 28 28   Glucose mg/dL 165* 136* 136*   BUN, Bld mg/dL 12 25* 30*   Creatinine mg/dL 0.7 0.7 0.7   Albumin g/dL 3.2* 3.4* 3.1*   Total Bilirubin mg/dL 0.4 0.4 0.5   Alkaline Phosphatase U/L 54* 68 68   AST U/L 23 17 27 "   ALT U/L 18 16 30   Magnesium mg/dL 2.1 2.1 1.9       Vancomycin Administrations:  vancomycin given in the last 96 hours                   vancomycin (VANCOCIN) 1250 mg in 5 % dextrose 250 mL IVPB (mg) 1,250 mg New Bag 08/14/20 1826     1,250 mg New Bag  0643     1,250 mg New Bag 08/13/20 1737     1,250 mg New Bag  0614     1,250 mg New Bag 08/12/20 2002     1,250 mg New Bag  0655     1,250 mg New Bag 08/11/20 1801    vancomycin (VANCOCIN) 1250 mg in 5 % dextrose 250 mL IVPB (mg) 1,250 mg New Bag 08/11/20 0532                Microbiologic Results:  Microbiology Results (last 7 days)     Procedure Component Value Units Date/Time    Culture, Anaerobe [[id number]] Collected: 08/10/20 0758    Order Status: Completed Specimen: Body Fluid from Lung, Left Updated: 08/14/20 0844     Anaerobic Culture Culture in progress    Narrative:      Bronchus Donor Lung Left (Aerobic, Anaerobic, AFB, Fungus,  Gram)    Aerobic culture [[id number]] Collected: 08/10/20 0758    Order Status: Completed Specimen: Body Fluid from Lung, Left Updated: 08/13/20 1017     Aerobic Bacterial Culture No growth    Narrative:      Bronchus Donor Lung Left (Aerobic, Anaerobic, AFB, Fungus,  Gram)    Culture, Respiratory with Gram Stain [[id number]] Collected: 08/11/20 1315    Order Status: Completed Specimen: Respiratory from BAL, JOSETTE Updated: 08/13/20 0958     Respiratory Culture Normal respiratory yoly      No S aureus or Pseudomonas isolated.     Gram Stain (Respiratory) Rare WBC's     Gram Stain (Respiratory) No organisms seen    AFB Culture & Smear [[id number]] Collected: 08/11/20 1315    Order Status: Completed Specimen: Respiratory from BAL, JOSETTE Updated: 08/12/20 2127     AFB Culture & Smear Culture in progress     AFB CULTURE STAIN No acid fast bacilli seen.    Fungus culture [[id number]] Collected: 08/11/20 1315    Order Status: Completed Specimen: Respiratory from BAL, JOSETTE Updated: 08/12/20 1226     Fungus (Mycology) Culture Culture in progress     Fungus culture [[id number]] Collected: 08/10/20 0758    Order Status: Completed Specimen: Body Fluid from Lung, Left Updated: 08/12/20 1225     Fungus (Mycology) Culture Culture in progress    Narrative:      Bronchus Donor Lung Left (Aerobic, Anaerobic, AFB, Fungus,  Gram)    AFB Culture & Smear [[id number]] Collected: 08/10/20 0758    Order Status: Completed Specimen: Body Fluid from Lung, Left Updated: 08/11/20 2127     AFB Culture & Smear Culture in progress     AFB CULTURE STAIN No acid fast bacilli seen.    Narrative:      Bronchus Donor Lung Left (Aerobic, Anaerobic, AFB, Fungus,  Gram)    Gram stain [[id number]] Collected: 08/10/20 0758    Order Status: Completed Specimen: Body Fluid from Lung, Left Updated: 08/10/20 1143     Gram Stain Result No WBC's      No organisms seen    Narrative:      Bronchus Donor Lung Left (Aerobic, Anaerobic, AFB, Fungus,  Gram)

## 2020-08-15 NOTE — SUBJECTIVE & OBJECTIVE
"Subjective:     Interval History: No acute events overnight.  Continues to progress    Continuous Infusions:   insulin (HUMAN R) infusion (adults) 0.5 Units/hr (08/14/20 1448)     Scheduled Meds:   aspirin  81 mg Oral Daily    ceFEPime (MAXIPIME) IVPB  2 g Intravenous Q12H    docusate sodium  100 mg Oral BID    enoxaparin  40 mg Subcutaneous Q24H    furosemide (LASIX) IV  20 mg Intravenous Daily    gabapentin  100 mg Oral TID    insulin aspart U-100  3 Units Subcutaneous TIDWM    levalbuterol  1.25 mg Nebulization Q6H WAKE    lidocaine  1 patch Transdermal Q24H    metoprolol tartrate  25 mg Oral BID    mycophenolate  500 mg Oral BID    NIFEdipine  30 mg Oral Daily    pantoprazole  40 mg Oral Daily    polyethylene glycol  17 g Oral Daily    [START ON 8/16/2020] predniSONE  45 mg Oral Daily    Followed by    [START ON 8/17/2020] predniSONE  35 mg Oral Daily    senna  17.2 mg Oral Daily    sulfamethoxazole-trimethoprim 800-160mg  1 tablet Oral Every Mon, Wed, Fri    tacrolimus  0.5 mg Oral BID    valGANciclovir  450 mg Oral BID    vancomycin (VANCOCIN) IVPB  1,250 mg Intravenous Q12H    voriconazole  350 mg Oral BID     PRN Meds:acetaminophen, calcium carbonate, cyclobenzaprine, dextrose 50%, dextrose 50%, glucagon (human recombinant), glucose, glucose, insulin aspart U-100, lactulose, levalbuterol, magnesium sulfate IVPB, melatonin, metoclopramide HCl, ondansetron, oxyCODONE, oxyCODONE, potassium chloride **AND** potassium chloride **AND** potassium chloride, Pharmacy to dose Vancomycin consult **AND** vancomycin - pharmacy to dose    Review of patient's allergies indicates:   Allergen Reactions    Boniva [ibandronate] Other (See Comments)     "chest cramps"       Review of Systems   Constitutional: Negative for activity change, appetite change, chills, diaphoresis, fatigue and fever.   HENT: Negative for congestion, ear discharge, ear pain, facial swelling, hearing loss, mouth sores, " nosebleeds, postnasal drip, rhinorrhea, sinus pressure, sore throat, trouble swallowing and voice change.    Eyes: Negative for pain, discharge, redness and itching.   Respiratory: Positive for shortness of breath (with exertion, improving). Negative for apnea, cough, choking, chest tightness, wheezing and stridor.    Cardiovascular: Positive for chest pain (incisional). Negative for palpitations and leg swelling.   Gastrointestinal: Negative for abdominal distention, abdominal pain, anal bleeding, blood in stool, constipation, diarrhea, nausea, rectal pain and vomiting.   Endocrine: Negative for cold intolerance and heat intolerance.   Genitourinary: Negative for difficulty urinating, dysuria and flank pain.   Musculoskeletal: Negative for arthralgias, back pain, joint swelling, myalgias and neck pain.   Allergic/Immunologic: Positive for immunocompromised state.   Neurological: Negative for dizziness, tremors, light-headedness, numbness and headaches.   Psychiatric/Behavioral: Negative for agitation and hallucinations. The patient is not nervous/anxious.      Objective:   Physical Exam  Vitals signs and nursing note reviewed.   Constitutional:       Appearance: Normal appearance. She is overweight.      Interventions: Nasal cannula in place.   HENT:      Head: Normocephalic and atraumatic.   Eyes:      General: No scleral icterus.     Conjunctiva/sclera: Conjunctivae normal.   Cardiovascular:      Rate and Rhythm: Normal rate and regular rhythm.      Heart sounds: No murmur. Friction rub present. No gallop.    Pulmonary:      Breath sounds: Examination of the right-middle field reveals rales. Examination of the right-lower field reveals decreased breath sounds and rales. Examination of the left-lower field reveals decreased breath sounds. Decreased breath sounds and rales present. No wheezing or rhonchi.      Comments: 2 left pleural and 1 right pleural chest tubes in place with sanguinous output, no  airleaks  Abdominal:      General: Bowel sounds are normal. There is no distension.      Palpations: Abdomen is soft.   Musculoskeletal:      Right lower leg: No edema.      Left lower leg: No edema.   Skin:     General: Skin is warm and dry.      Capillary Refill: Capillary refill takes less than 2 seconds.   Neurological:      General: No focal deficit present.      Mental Status: She is oriented to person, place, and time.   Psychiatric:         Mood and Affect: Mood normal.         Behavior: Behavior normal.           Vital Signs (Most Recent):  Temp: 98.2 °F (36.8 °C) (08/15/20 1119)  Pulse: 94 (08/15/20 1144)  Resp: 13 (08/15/20 1144)  BP: 104/73 (08/15/20 1144)  SpO2: 95 % (08/15/20 1144) Vital Signs (24h Range):  Temp:  [97.9 °F (36.6 °C)-98.2 °F (36.8 °C)] 98.2 °F (36.8 °C)  Pulse:  [] 94  Resp:  [13-22] 13  SpO2:  [91 %-100 %] 95 %  BP: (101-143)/(71-88) 104/73     Weight: 93.1 kg (205 lb 4 oz)  Body mass index is 32.15 kg/m².      Intake/Output Summary (Last 24 hours) at 8/15/2020 1358  Last data filed at 8/15/2020 1000  Gross per 24 hour   Intake 853 ml   Output 2550 ml   Net -1697 ml       Significant Labs:  CBC:  Recent Labs   Lab 08/14/20  0635   WBC 12.78*   RBC 3.07*   HGB 9.5*   HCT 28.9*      MCV 94   MCH 30.9   MCHC 32.9     BMP:  Recent Labs   Lab 08/15/20  0659   *   K 4.1   CL 99   CO2 26   BUN 22   CREATININE 0.7   CALCIUM 9.2      Tacrolimus Levels:  Recent Labs   Lab 08/15/20  0659   TACROLIMUS 10.4     Microbiology:  Microbiology Results (last 7 days)     Procedure Component Value Units Date/Time    Culture, Anaerobe [[id number]] Collected: 08/10/20 0758    Order Status: Completed Specimen: Body Fluid from Lung, Left Updated: 08/14/20 0844     Anaerobic Culture Culture in progress    Narrative:      Bronchus Donor Lung Left (Aerobic, Anaerobic, AFB, Fungus,  Gram)    Aerobic culture [[id number]] Collected: 08/10/20 0758    Order Status: Completed Specimen: Body Fluid from  Lung, Left Updated: 08/13/20 1017     Aerobic Bacterial Culture No growth    Narrative:      Bronchus Donor Lung Left (Aerobic, Anaerobic, AFB, Fungus,  Gram)    Culture, Respiratory with Gram Stain [[id number]] Collected: 08/11/20 1315    Order Status: Completed Specimen: Respiratory from BAL, JOSETTE Updated: 08/13/20 0958     Respiratory Culture Normal respiratory yoly      No S aureus or Pseudomonas isolated.     Gram Stain (Respiratory) Rare WBC's     Gram Stain (Respiratory) No organisms seen    AFB Culture & Smear [[id number]] Collected: 08/11/20 1315    Order Status: Completed Specimen: Respiratory from BAL, JOSETTE Updated: 08/12/20 2127     AFB Culture & Smear Culture in progress     AFB CULTURE STAIN No acid fast bacilli seen.    Fungus culture [[id number]] Collected: 08/11/20 1315    Order Status: Completed Specimen: Respiratory from BAL, OJSETTE Updated: 08/12/20 1226     Fungus (Mycology) Culture Culture in progress    Fungus culture [[id number]] Collected: 08/10/20 0758    Order Status: Completed Specimen: Body Fluid from Lung, Left Updated: 08/12/20 1225     Fungus (Mycology) Culture Culture in progress    Narrative:      Bronchus Donor Lung Left (Aerobic, Anaerobic, AFB, Fungus,  Gram)    AFB Culture & Smear [[id number]] Collected: 08/10/20 0758    Order Status: Completed Specimen: Body Fluid from Lung, Left Updated: 08/11/20 2127     AFB Culture & Smear Culture in progress     AFB CULTURE STAIN No acid fast bacilli seen.    Narrative:      Bronchus Donor Lung Left (Aerobic, Anaerobic, AFB, Fungus,  Gram)    Gram stain [[id number]] Collected: 08/10/20 0758    Order Status: Completed Specimen: Body Fluid from Lung, Left Updated: 08/10/20 1143     Gram Stain Result No WBC's      No organisms seen    Narrative:      Bronchus Donor Lung Left (Aerobic, Anaerobic, AFB, Fungus,  Gram)          I have reviewed all pertinent labs within the past 24 hours.    Diagnostic Results:  X-Ray: Reviewed  Well aerated left graft.  Hardware in place and no pneumothorax. Fibrotic native riight lung.

## 2020-08-15 NOTE — PROGRESS NOTES
"[facility]  Endocrinology  Progress Note    Admit Date: 2020     Reason for Consult: Management of pre-dm, Hyperglycemia     Surgical Procedure and Date: Left lung transplant and wound vac application 8/10/20    Diabetes diagnosis year: pre-dm - diet controlled     Lab Results   Component Value Date    HGBA1C 6.0 (H) 2020       Home Diabetes Medications: none (per chart review)    How often checking glucose at home? Not checking    Diabetes Complications include:     none    Complicating diabetes co morbidities:   Glucocorticoid use  s/p lung transplant      HPI:   Patient is a 65 y.o. female with a diagnosis of DM2, IPF, and chronic respiratory failure, who is s/p left lung transplant on 8/10/20.  Patient with controlled DM2 on no medications per chart review.  Endocrinology consulted for DM/BG management.            Interval HPI:   Overnight events: Remains in TSU. NAEON. BG above goal on IV insulin infusion at 0.5 u/hr with prn correction scale. Prednisone 60 mg.   Eatin%  Nausea: No  Hypoglycemia and intervention: No  Fever: No  TPN and/or TF: No      /84 (BP Location: Right arm, Patient Position: Sitting)   Pulse 100   Temp 98.2 °F (36.8 °C) (Oral)   Resp 18   Ht 5' 7" (1.702 m)   Wt 96.7 kg (213 lb 3 oz)   LMP  (LMP Unknown)   SpO2 (!) 91%   Breastfeeding No   BMI 33.39 kg/m²     Labs Reviewed and Include    Recent Labs   Lab 20  0635   *   CALCIUM 9.5   ALBUMIN 3.1*   PROT 6.3   *   K 4.1   CO2 28   CL 98   BUN 30*   CREATININE 0.7   ALKPHOS 68   ALT 30   AST 27   BILITOT 0.5     Lab Results   Component Value Date    WBC 12.78 (H) 2020    HGB 9.5 (L) 2020    HCT 28.9 (L) 2020    MCV 94 2020     2020     No results for input(s): TSH, FREET4 in the last 168 hours.  Lab Results   Component Value Date    HGBA1C 6.0 (H) 2020       Nutritional status:   Body mass index is 33.39 kg/m².  Lab Results "   Component Value Date    ALBUMIN 3.1 (L) 08/14/2020    ALBUMIN 3.4 (L) 08/13/2020    ALBUMIN 3.2 (L) 08/12/2020     No results found for: PREALBUMIN    Estimated Creatinine Clearance: 98.2 mL/min (based on SCr of 0.7 mg/dL).    Accu-Checks  Recent Labs     08/12/20  0821 08/12/20  1417 08/12/20  1658 08/12/20  2000 08/13/20  0206 08/13/20  0814 08/13/20  1216 08/13/20  1715 08/13/20  2056 08/14/20  0835   POCTGLUCOSE 159* 205* 195* 150* 138* 126* 175* 178* 153* 159*       Current Medications and/or Treatments Impacting Glycemic Control  Immunotherapy:    Immunosuppressants         Stop Route Frequency     mycophenolate capsule 500 mg      -- Oral 2 times daily     tacrolimus capsule 0.5 mg      -- Oral 2 times daily     basiliximab (SIMULECT) 20 mg in dextrose 5 % 50 mL infusion      08/18 1114 IV Every 96 hours        Steroids:   Hormones (From admission, onward)    Start     Stop Route Frequency Ordered    08/17/20 0900  predniSONE tablet 35 mg      -- Oral Daily 08/11/20 1153    08/16/20 0900  predniSONE tablet 45 mg      08/17 0859 Oral Daily 08/11/20 1153    08/15/20 0900  predniSONE tablet 60 mg      08/16 0859 Oral Daily 08/11/20 1153    08/11/20 2023  melatonin tablet 9 mg      -- Oral Nightly PRN 08/11/20 1924        Pressors:    Autonomic Drugs (From admission, onward)    None        Hyperglycemia/Diabetes Medications:   Antihyperglycemics (From admission, onward)    Start     Stop Route Frequency Ordered    08/12/20 1556  insulin aspart U-100 pen 0-10 Units      -- SubQ As needed (PRN) 08/12/20 1456    08/11/20 1500  insulin regular 100 Units in sodium chloride 0.9% 100 mL infusion      -- IV Continuous 08/11/20 1351          ASSESSMENT and PLAN    * S/P lung transplant  S/p lung transplant on 8/10/20  Managed per primary team  Avoid hypoglycemia  Optimize BG control for surgical wound healing        Prediabetes  BG goal 140 - 180     continue transition insulin infusion at 0.5 u/hr.  BG monitoring  ac/hs/0200 and  moderate dose correction scale.   Start novolog 3 units with meals.     Discharge planning: TBD      Immunosuppression  May increase insulin resistance.         Adrenal cortical steroids causing adverse effect in therapeutic use  On steroid taper per transplant team; may elevate BG readings            Nidia Frazier NP  Endocrinology  [facility]

## 2020-08-15 NOTE — NURSING
Patient has complaints of GERD notified Dr Rodriguez order received for TUMS will continue to monitor.

## 2020-08-15 NOTE — ASSESSMENT & PLAN NOTE
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 Bactrim, ganciclovir, and voriconazole for OIP. Will follow up on donor cultures and adjust therapy as needed.     Donor culture growing Actinomycete. Will switch from cefepime to amoxicillin.

## 2020-08-15 NOTE — PLAN OF CARE
-Pt free from fall or injury so far this shift. Instructed to call if assistance needed, verbalized understanding.   -Cardiac monitoring in progress.   -Sats mid 90's on RA. PA & LAT today. 3 CT's remain in place, possible removal of some tomorrow.   -Insulin gtt continued at 0.5 units/hr. MTI ordered. Last . Pt ate 50% on lunch, 3 units Novolog given.   -Oxycodone given once for incisional pain.   -In and out the chair throughout the shift.   -Afebrile. Cefepime DC'd, Augmentin started. Daily labs monitored.   -Self meds pulled with 100% accuracy.

## 2020-08-15 NOTE — PROGRESS NOTES
[facility]  Lung Transplant  Progress Note - Floor    Patient Name: Chely Becerra  MRN: [id number]  Admission Date: 8/9/2020  Hospital Length of Stay: 6 days  Post-Operative Day: 5  Attending Physician: Niraj Garza MD  Primary Care Provider: ZAHIDA Stack MD     Subjective:     Interval History: No acute events overnight.  Continues to progress    Continuous Infusions:   insulin (HUMAN R) infusion (adults) 0.5 Units/hr (08/14/20 1448)     Scheduled Meds:   aspirin  81 mg Oral Daily    ceFEPime (MAXIPIME) IVPB  2 g Intravenous Q12H    docusate sodium  100 mg Oral BID    enoxaparin  40 mg Subcutaneous Q24H    furosemide (LASIX) IV  20 mg Intravenous Daily    gabapentin  100 mg Oral TID    insulin aspart U-100  3 Units Subcutaneous TIDWM    levalbuterol  1.25 mg Nebulization Q6H WAKE    lidocaine  1 patch Transdermal Q24H    metoprolol tartrate  25 mg Oral BID    mycophenolate  500 mg Oral BID    NIFEdipine  30 mg Oral Daily    pantoprazole  40 mg Oral Daily    polyethylene glycol  17 g Oral Daily    [START ON 8/16/2020] predniSONE  45 mg Oral Daily    Followed by    [START ON 8/17/2020] predniSONE  35 mg Oral Daily    senna  17.2 mg Oral Daily    sulfamethoxazole-trimethoprim 800-160mg  1 tablet Oral Every Mon, Wed, Fri    tacrolimus  0.5 mg Oral BID    valGANciclovir  450 mg Oral BID    vancomycin (VANCOCIN) IVPB  1,250 mg Intravenous Q12H    voriconazole  350 mg Oral BID     PRN Meds:acetaminophen, calcium carbonate, cyclobenzaprine, dextrose 50%, dextrose 50%, glucagon (human recombinant), glucose, glucose, insulin aspart U-100, lactulose, levalbuterol, magnesium sulfate IVPB, melatonin, metoclopramide HCl, ondansetron, oxyCODONE, oxyCODONE, potassium chloride **AND** potassium chloride **AND** potassium chloride, Pharmacy to dose Vancomycin consult **AND** vancomycin - pharmacy to dose    Review of patient's allergies indicates:   Allergen Reactions     "Boniva [ibandronate] Other (See Comments)     "chest cramps"       Review of Systems   Constitutional: Negative for activity change, appetite change, chills, diaphoresis, fatigue and fever.   HENT: Negative for congestion, ear discharge, ear pain, facial swelling, hearing loss, mouth sores, nosebleeds, postnasal drip, rhinorrhea, sinus pressure, sore throat, trouble swallowing and voice change.    Eyes: Negative for pain, discharge, redness and itching.   Respiratory: Positive for shortness of breath (with exertion, improving). Negative for apnea, cough, choking, chest tightness, wheezing and stridor.    Cardiovascular: Positive for chest pain (incisional). Negative for palpitations and leg swelling.   Gastrointestinal: Negative for abdominal distention, abdominal pain, anal bleeding, blood in stool, constipation, diarrhea, nausea, rectal pain and vomiting.   Endocrine: Negative for cold intolerance and heat intolerance.   Genitourinary: Negative for difficulty urinating, dysuria and flank pain.   Musculoskeletal: Negative for arthralgias, back pain, joint swelling, myalgias and neck pain.   Allergic/Immunologic: Positive for immunocompromised state.   Neurological: Negative for dizziness, tremors, light-headedness, numbness and headaches.   Psychiatric/Behavioral: Negative for agitation and hallucinations. The patient is not nervous/anxious.      Objective:   Physical Exam  Vitals signs and nursing note reviewed.   Constitutional:       Appearance: Normal appearance. She is overweight.      Interventions: Nasal cannula in place.   HENT:      Head: Normocephalic and atraumatic.   Eyes:      General: No scleral icterus.     Conjunctiva/sclera: Conjunctivae normal.   Cardiovascular:      Rate and Rhythm: Normal rate and regular rhythm.      Heart sounds: No murmur. Friction rub present. No gallop.    Pulmonary:      Breath sounds: Examination of the right-middle field reveals rales. Examination of the right-lower " field reveals decreased breath sounds and rales. Examination of the left-lower field reveals decreased breath sounds. Decreased breath sounds and rales present. No wheezing or rhonchi.      Comments: 2 left pleural and 1 right pleural chest tubes in place with sanguinous output, no airleaks  Abdominal:      General: Bowel sounds are normal. There is no distension.      Palpations: Abdomen is soft.   Musculoskeletal:      Right lower leg: No edema.      Left lower leg: No edema.   Skin:     General: Skin is warm and dry.      Capillary Refill: Capillary refill takes less than 2 seconds.   Neurological:      General: No focal deficit present.      Mental Status: She is oriented to person, place, and time.   Psychiatric:         Mood and Affect: Mood normal.         Behavior: Behavior normal.           Vital Signs (Most Recent):  Temp: 98.2 °F (36.8 °C) (08/15/20 1119)  Pulse: 94 (08/15/20 1144)  Resp: 13 (08/15/20 1144)  BP: 104/73 (08/15/20 1144)  SpO2: 95 % (08/15/20 1144) Vital Signs (24h Range):  Temp:  [97.9 °F (36.6 °C)-98.2 °F (36.8 °C)] 98.2 °F (36.8 °C)  Pulse:  [] 94  Resp:  [13-22] 13  SpO2:  [91 %-100 %] 95 %  BP: (101-143)/(71-88) 104/73     Weight: 93.1 kg (205 lb 4 oz)  Body mass index is 32.15 kg/m².      Intake/Output Summary (Last 24 hours) at 8/15/2020 1358  Last data filed at 8/15/2020 1000  Gross per 24 hour   Intake 853 ml   Output 2550 ml   Net -1697 ml       Significant Labs:  CBC:  Recent Labs   Lab 08/14/20  0635   WBC 12.78*   RBC 3.07*   HGB 9.5*   HCT 28.9*      MCV 94   MCH 30.9   MCHC 32.9     BMP:  Recent Labs   Lab 08/15/20  0659   *   K 4.1   CL 99   CO2 26   BUN 22   CREATININE 0.7   CALCIUM 9.2      Tacrolimus Levels:  Recent Labs   Lab 08/15/20  0659   TACROLIMUS 10.4     Microbiology:  Microbiology Results (last 7 days)     Procedure Component Value Units Date/Time    Culture, Anaerobe [[id number]] Collected: 08/10/20 0758    Order Status: Completed Specimen:  Body Fluid from Lung, Left Updated: 08/14/20 0844     Anaerobic Culture Culture in progress    Narrative:      Bronchus Donor Lung Left (Aerobic, Anaerobic, AFB, Fungus,  Gram)    Aerobic culture [[id number]] Collected: 08/10/20 0758    Order Status: Completed Specimen: Body Fluid from Lung, Left Updated: 08/13/20 1017     Aerobic Bacterial Culture No growth    Narrative:      Bronchus Donor Lung Left (Aerobic, Anaerobic, AFB, Fungus,  Gram)    Culture, Respiratory with Gram Stain [[id number]] Collected: 08/11/20 1315    Order Status: Completed Specimen: Respiratory from BAL, JOSETTE Updated: 08/13/20 0958     Respiratory Culture Normal respiratory yoly      No S aureus or Pseudomonas isolated.     Gram Stain (Respiratory) Rare WBC's     Gram Stain (Respiratory) No organisms seen    AFB Culture & Smear [[id number]] Collected: 08/11/20 1315    Order Status: Completed Specimen: Respiratory from BAL, JOSETTE Updated: 08/12/20 2127     AFB Culture & Smear Culture in progress     AFB CULTURE STAIN No acid fast bacilli seen.    Fungus culture [[id number]] Collected: 08/11/20 1315    Order Status: Completed Specimen: Respiratory from BAL, JOSETTE Updated: 08/12/20 1226     Fungus (Mycology) Culture Culture in progress    Fungus culture [[id number]] Collected: 08/10/20 0758    Order Status: Completed Specimen: Body Fluid from Lung, Left Updated: 08/12/20 1225     Fungus (Mycology) Culture Culture in progress    Narrative:      Bronchus Donor Lung Left (Aerobic, Anaerobic, AFB, Fungus,  Gram)    AFB Culture & Smear [[id number]] Collected: 08/10/20 0758    Order Status: Completed Specimen: Body Fluid from Lung, Left Updated: 08/11/20 2127     AFB Culture & Smear Culture in progress     AFB CULTURE STAIN No acid fast bacilli seen.    Narrative:      Bronchus Donor Lung Left (Aerobic, Anaerobic, AFB, Fungus,  Gram)    Gram stain [[id number]] Collected: 08/10/20 0758    Order Status: Completed Specimen: Body Fluid from Lung, Left Updated:  08/10/20 1143     Gram Stain Result No WBC's      No organisms seen    Narrative:      Bronchus Donor Lung Left (Aerobic, Anaerobic, AFB, Fungus,  Gram)          I have reviewed all pertinent labs within the past 24 hours.    Diagnostic Results:  X-Ray: Reviewed  Well aerated left graft. Hardware in place and no pneumothorax. Fibrotic native riight lung.      Assessment/Plan:     * S/P lung transplant  POD #5 s/p uncomplicated LSLT for IPF. PGD 2 for 24 hours. CT output stable.     Immunosuppression  Induction with solumedrol and basiliximab. Repeat basiliximab on POD#4.  Continue tacrolimus, MMF, and prednisone taper.    Prophylactic antibiotic  CMV D-/R+ On vancomycin and cefepime for routine surgical prophylaxis. Bactrim, ganciclovir, and voriconazole for OIP. Will follow up on donor cultures and adjust therapy as needed.     Donor culture growing Actinomycete. Will switch from cefepime to amoxicillin.     Essential hypertension  Continue nifedipine    Prediabetes  Endocrine following, appreciate recs.     Acute blood loss anemia  Expected post-op. Continue to trend.     Leukocytosis  Expected post-op. Continue to trend.         Niraj Garza MD  Lung Transplant  [facility]

## 2020-08-16 NOTE — PROGRESS NOTES
"[facility]  Endocrinology  Progress Note    Admit Date: 2020     Reason for Consult: Management of pre-dm, Hyperglycemia     Surgical Procedure and Date: Left lung transplant and wound vac application 8/10/20    Diabetes diagnosis year: pre-dm - diet controlled     Lab Results   Component Value Date    HGBA1C 6.0 (H) 2020       Home Diabetes Medications: none (per chart review)    How often checking glucose at home? Not checking    Diabetes Complications include:     none    Complicating diabetes co morbidities:   Glucocorticoid use  s/p lung transplant      HPI:   Patient is a 65 y.o. female with a diagnosis of DM2, IPF, and chronic respiratory failure, who is s/p left lung transplant on 8/10/20.  Patient with controlled DM2 on no medications per chart review.  Endocrinology consulted for DM/BG management.            Interval HPI:   Overnight events:  BG is within goal on current IV/SQ insulin regimen.   Diet low fiber/residue [facility]; Low Chol/Sat Fat  6 Days Post-Op    Eatin%  Nausea: No  Hypoglycemia and intervention: No  Fever: No  TPN and/or TF: No  If yes, type of TF/TPN and rate: None    /80   Pulse 92   Temp 97.5 °F (36.4 °C) (Oral)   Resp 16   Ht 5' 7" (1.702 m)   Wt 93.1 kg (205 lb 4 oz)   LMP  (LMP Unknown)   SpO2 96%   Breastfeeding No   BMI 32.15 kg/m²     Labs Reviewed and Include    Recent Labs   Lab 20  0640   *   CALCIUM 9.2   ALBUMIN 2.9*   PROT 6.0      K 4.3   CO2 27   CL 99   BUN 23   CREATININE 0.7   ALKPHOS 70   ALT 53*   AST 28   BILITOT 0.5     Lab Results   Component Value Date    WBC 12.78 (H) 2020    HGB 9.5 (L) 2020    HCT 28.9 (L) 2020    MCV 94 2020     2020     No results for input(s): TSH, FREET4 in the last 168 hours.  Lab Results   Component Value Date    HGBA1C 6.0 (H) 2020       Nutritional status:   Body mass index is 32.15 kg/m².  Lab Results   Component " Value Date    ALBUMIN 2.9 (L) 08/16/2020    ALBUMIN 2.9 (L) 08/15/2020    ALBUMIN 3.1 (L) 08/14/2020     No results found for: PREALBUMIN    Estimated Creatinine Clearance: 96.4 mL/min (based on SCr of 0.7 mg/dL).    Accu-Checks  Recent Labs     08/14/20  1303 08/14/20  1712 08/14/20  2154 08/15/20  0221 08/15/20  0749 08/15/20  1155 08/15/20  1657 08/15/20  2201 08/16/20  0148 08/16/20  0808   POCTGLUCOSE 184* 204* 242* 173* 179* 142* 217* 303* 163* 152*       Current Medications and/or Treatments Impacting Glycemic Control  Immunotherapy:    Immunosuppressants         Stop Route Frequency     mycophenolate capsule 500 mg      -- Oral 2 times daily     tacrolimus capsule 0.5 mg      -- Oral 2 times daily        Steroids:   Hormones (From admission, onward)    Start     Stop Route Frequency Ordered    08/17/20 0900  predniSONE tablet 35 mg      -- Oral Daily 08/11/20 1153    08/11/20 2023  melatonin tablet 9 mg      -- Oral Nightly PRN 08/11/20 1924        Pressors:    Autonomic Drugs (From admission, onward)    None        Hyperglycemia/Diabetes Medications:   Antihyperglycemics (From admission, onward)    Start     Stop Route Frequency Ordered    08/15/20 1130  insulin aspart U-100 pen 3 Units      -- SubQ 3 times daily with meals 08/15/20 0834    08/12/20 1556  insulin aspart U-100 pen 0-10 Units      -- SubQ As needed (PRN) 08/12/20 1456    08/11/20 1500  insulin regular 100 Units in sodium chloride 0.9% 100 mL infusion      -- IV Continuous 08/11/20 1351          ASSESSMENT and PLAN    * S/P lung transplant  S/p lung transplant on 8/10/20  Managed per primary team  Avoid hypoglycemia  Optimize BG control for surgical wound healing        Prediabetes  BG goal 140 - 180     -  transition IV insulin infusion with stepdown parameters. Initial rate starting at 0.5 unit/hr.   - Novolog 3 units TIDWM.   - Moderate Dose SQ Insulin Correction Scale.  - BG Monitoring AC/HS/0200    ** Please call Endocrine for any BG  related issues **  ** Please notify Endocrine for any change and/or advance in diet**    Discharge Planning:   TBD. Please notify endocrinology prior to discharge.         Adrenal cortical steroids causing adverse effect in therapeutic use  On steroid taper per transplant team; may elevate BG readings            Bhupendra Rey, NP  Endocrinology  [facility]

## 2020-08-16 NOTE — NURSING
Patient with complaints of muscle spasms to the left shoulder mostly anterior patient states she only gets them when she gets up to use the bed side commode. Vital signs, heart rate, and heart rhythm stable no changes noted. MD on call notified of patient complaints pain medication administered per MAR. Patient is also requesting muscle relaxer PRN medication informed patient will allow a little time between pain medication and muscle relaxer. Will monitor.

## 2020-08-16 NOTE — SUBJECTIVE & OBJECTIVE
"Interval HPI:   Overnight events:  BG is within goal on current IV/SQ insulin regimen.   Diet low fiber/residue [facility]; Low Chol/Sat Fat  6 Days Post-Op    Eatin%  Nausea: No  Hypoglycemia and intervention: No  Fever: No  TPN and/or TF: No  If yes, type of TF/TPN and rate: None    /80   Pulse 92   Temp 97.5 °F (36.4 °C) (Oral)   Resp 16   Ht 5' 7" (1.702 m)   Wt 93.1 kg (205 lb 4 oz)   LMP  (LMP Unknown)   SpO2 96%   Breastfeeding No   BMI 32.15 kg/m²     Labs Reviewed and Include    Recent Labs   Lab 20  0640   *   CALCIUM 9.2   ALBUMIN 2.9*   PROT 6.0      K 4.3   CO2 27   CL 99   BUN 23   CREATININE 0.7   ALKPHOS 70   ALT 53*   AST 28   BILITOT 0.5     Lab Results   Component Value Date    WBC 12.78 (H) 2020    HGB 9.5 (L) 2020    HCT 28.9 (L) 2020    MCV 94 2020     2020     No results for input(s): TSH, FREET4 in the last 168 hours.  Lab Results   Component Value Date    HGBA1C 6.0 (H) 2020       Nutritional status:   Body mass index is 32.15 kg/m².  Lab Results   Component Value Date    ALBUMIN 2.9 (L) 2020    ALBUMIN 2.9 (L) 08/15/2020    ALBUMIN 3.1 (L) 2020     No results found for: PREALBUMIN    Estimated Creatinine Clearance: 96.4 mL/min (based on SCr of 0.7 mg/dL).    Accu-Checks  Recent Labs     20  1303 20  1712 20  2154 08/15/20  0221 08/15/20  0749 08/15/20  1155 08/15/20  1657 08/15/20  2201 20  0148 20  0808   POCTGLUCOSE 184* 204* 242* 173* 179* 142* 217* 303* 163* 152*       Current Medications and/or Treatments Impacting Glycemic Control  Immunotherapy:    Immunosuppressants         Stop Route Frequency     mycophenolate capsule 500 mg      -- Oral 2 times daily     tacrolimus capsule 0.5 mg      -- Oral 2 times daily        Steroids:   Hormones (From admission, onward)    Start     Stop Route Frequency Ordered    20 0900  predniSONE tablet 35 mg      -- " Oral Daily 08/11/20 1153    08/11/20 2023  melatonin tablet 9 mg      -- Oral Nightly PRN 08/11/20 1924        Pressors:    Autonomic Drugs (From admission, onward)    None        Hyperglycemia/Diabetes Medications:   Antihyperglycemics (From admission, onward)    Start     Stop Route Frequency Ordered    08/15/20 1130  insulin aspart U-100 pen 3 Units      -- SubQ 3 times daily with meals 08/15/20 0834    08/12/20 1556  insulin aspart U-100 pen 0-10 Units      -- SubQ As needed (PRN) 08/12/20 1456    08/11/20 1500  insulin regular 100 Units in sodium chloride 0.9% 100 mL infusion      -- IV Continuous 08/11/20 9791

## 2020-08-16 NOTE — PLAN OF CARE
-Pt free from fall or injury so far this shift. Instructed to call if assistance needed, verbalized understanding.   -Cardiac monitoring in progress.   -Sats mid 90's on RA. All 3 CT's removed today. PA & LAT scheduled for the morning. IS in use and respiratory treatments continued.   -Insulin gtt continued at 0.5 units/hr. Last . MTI and SSI continued.    -Mg level 1.7, IV replacement given.   -Flexeril given once for left shoulder pain.   -In the chair all shift, and ambulated the halls with this RN.   -Afebrile. Augmentin and Vancomycin continued. Daily labs monitored.

## 2020-08-16 NOTE — SUBJECTIVE & OBJECTIVE
"Subjective:     Interval History: No acute events overnight.  Continues to progress    Continuous Infusions:   insulin (HUMAN R) infusion (adults) 0.5 Units/hr (08/15/20 2038)     Scheduled Meds:   amoxicillin-clavulanate 500-125mg  1 tablet Oral BID    aspirin  81 mg Oral Daily    docusate sodium  100 mg Oral BID    enoxaparin  40 mg Subcutaneous Q24H    gabapentin  100 mg Oral TID    insulin aspart U-100  3 Units Subcutaneous TIDWM    levalbuterol  1.25 mg Nebulization Q6H WAKE    lidocaine  1 patch Transdermal Q24H    metoprolol tartrate  25 mg Oral BID    mycophenolate  500 mg Oral BID    NIFEdipine  30 mg Oral Daily    pantoprazole  40 mg Oral Daily    polyethylene glycol  17 g Oral Daily    [START ON 8/17/2020] predniSONE  35 mg Oral Daily    senna  17.2 mg Oral Daily    sulfamethoxazole-trimethoprim 800-160mg  1 tablet Oral Every Mon, Wed, Fri    tacrolimus  0.5 mg Oral BID    valGANciclovir  450 mg Oral BID    vancomycin (VANCOCIN) IVPB  1,250 mg Intravenous Q12H    voriconazole  350 mg Oral BID     PRN Meds:acetaminophen, calcium carbonate, cyclobenzaprine, dextrose 50%, dextrose 50%, glucagon (human recombinant), glucose, glucose, insulin aspart U-100, lactulose, levalbuterol, magnesium sulfate IVPB, melatonin, metoclopramide HCl, ondansetron, oxyCODONE, oxyCODONE, potassium chloride **AND** potassium chloride **AND** potassium chloride, Pharmacy to dose Vancomycin consult **AND** vancomycin - pharmacy to dose    Review of patient's allergies indicates:   Allergen Reactions    Boniva [ibandronate] Other (See Comments)     "chest cramps"       Review of Systems   Constitutional: Negative for activity change, appetite change, chills, diaphoresis, fatigue and fever.   HENT: Negative for congestion, ear discharge, ear pain, facial swelling, hearing loss, mouth sores, nosebleeds, postnasal drip, rhinorrhea, sinus pressure, sore throat, trouble swallowing and voice change.    Eyes: Negative " for pain, discharge, redness and itching.   Respiratory: Positive for shortness of breath (with exertion, improving). Negative for apnea, cough, choking, chest tightness, wheezing and stridor.    Cardiovascular: Positive for chest pain (incisional). Negative for palpitations and leg swelling.   Gastrointestinal: Negative for abdominal distention, abdominal pain, anal bleeding, blood in stool, constipation, diarrhea, nausea, rectal pain and vomiting.   Endocrine: Negative for cold intolerance and heat intolerance.   Genitourinary: Negative for difficulty urinating, dysuria and flank pain.   Musculoskeletal: Negative for arthralgias, back pain, joint swelling, myalgias and neck pain.   Allergic/Immunologic: Positive for immunocompromised state.   Neurological: Negative for dizziness, tremors, light-headedness, numbness and headaches.   Psychiatric/Behavioral: Negative for agitation and hallucinations. The patient is not nervous/anxious.      Objective:   Physical Exam  Vitals signs and nursing note reviewed.   Constitutional:       Appearance: Normal appearance. She is overweight.      Interventions: Nasal cannula in place.   HENT:      Head: Normocephalic and atraumatic.   Eyes:      General: No scleral icterus.     Conjunctiva/sclera: Conjunctivae normal.   Cardiovascular:      Rate and Rhythm: Normal rate and regular rhythm.      Heart sounds: No murmur. Friction rub present. No gallop.    Pulmonary:      Breath sounds: Examination of the right-middle field reveals rales. Examination of the right-lower field reveals decreased breath sounds and rales. Examination of the left-lower field reveals decreased breath sounds. Decreased breath sounds and rales present. No wheezing or rhonchi.      Comments: 2 left pleural and 1 right pleural chest tubes in place with sanguinous output, no airleaks  Abdominal:      General: Bowel sounds are normal. There is no distension.      Palpations: Abdomen is soft.   Musculoskeletal:       Right lower leg: No edema.      Left lower leg: No edema.   Skin:     General: Skin is warm and dry.      Capillary Refill: Capillary refill takes less than 2 seconds.   Neurological:      General: No focal deficit present.      Mental Status: She is oriented to person, place, and time.   Psychiatric:         Mood and Affect: Mood normal.         Behavior: Behavior normal.           Vital Signs (Most Recent):  Temp: 97.7 °F (36.5 °C) (08/16/20 1548)  Pulse: 95 (08/16/20 1635)  Resp: 18 (08/16/20 1600)  BP: 121/89 (08/16/20 1600)  SpO2: 95 % (08/16/20 1600) Vital Signs (24h Range):  Temp:  [97.5 °F (36.4 °C)-98.5 °F (36.9 °C)] 97.7 °F (36.5 °C)  Pulse:  [] 95  Resp:  [12-20] 18  SpO2:  [95 %-96 %] 95 %  BP: (102-121)/(70-89) 121/89     Weight: 93.1 kg (205 lb 4 oz)  Body mass index is 32.15 kg/m².      Intake/Output Summary (Last 24 hours) at 8/16/2020 1827  Last data filed at 8/16/2020 1700  Gross per 24 hour   Intake 1858 ml   Output 1220 ml   Net 638 ml       Significant Labs:  CBC:  Recent Labs   Lab 08/14/20  0635   WBC 12.78*   RBC 3.07*   HGB 9.5*   HCT 28.9*      MCV 94   MCH 30.9   MCHC 32.9     BMP:  Recent Labs   Lab 08/16/20  0640      K 4.3   CL 99   CO2 27   BUN 23   CREATININE 0.7   CALCIUM 9.2      Tacrolimus Levels:  Recent Labs   Lab 08/16/20  0640   TACROLIMUS 7.8     Microbiology:  Microbiology Results (last 7 days)     Procedure Component Value Units Date/Time    Culture, Anaerobe [[id number]] Collected: 08/10/20 0758    Order Status: Completed Specimen: Body Fluid from Lung, Left Updated: 08/14/20 0852     Anaerobic Culture Culture in progress    Narrative:      Bronchus Donor Lung Left (Aerobic, Anaerobic, AFB, Fungus,  Gram)    Aerobic culture [[id number]] Collected: 08/10/20 0758    Order Status: Completed Specimen: Body Fluid from Lung, Left Updated: 08/13/20 1017     Aerobic Bacterial Culture No growth    Narrative:      Bronchus Donor Lung Left (Aerobic, Anaerobic,  AFB, Fungus,  Gram)    Culture, Respiratory with Gram Stain [[id number]] Collected: 08/11/20 1315    Order Status: Completed Specimen: Respiratory from BAL, JOSETTE Updated: 08/13/20 0958     Respiratory Culture Normal respiratory yoly      No S aureus or Pseudomonas isolated.     Gram Stain (Respiratory) Rare WBC's     Gram Stain (Respiratory) No organisms seen    AFB Culture & Smear [[id number]] Collected: 08/11/20 1315    Order Status: Completed Specimen: Respiratory from BAL, JOSETTE Updated: 08/12/20 2127     AFB Culture & Smear Culture in progress     AFB CULTURE STAIN No acid fast bacilli seen.    Fungus culture [[id number]] Collected: 08/11/20 1315    Order Status: Completed Specimen: Respiratory from BAL, JOSETTE Updated: 08/12/20 1226     Fungus (Mycology) Culture Culture in progress    Fungus culture [[id number]] Collected: 08/10/20 0758    Order Status: Completed Specimen: Body Fluid from Lung, Left Updated: 08/12/20 1225     Fungus (Mycology) Culture Culture in progress    Narrative:      Bronchus Donor Lung Left (Aerobic, Anaerobic, AFB, Fungus,  Gram)    AFB Culture & Smear [[id number]] Collected: 08/10/20 0758    Order Status: Completed Specimen: Body Fluid from Lung, Left Updated: 08/11/20 2127     AFB Culture & Smear Culture in progress     AFB CULTURE STAIN No acid fast bacilli seen.    Narrative:      Bronchus Donor Lung Left (Aerobic, Anaerobic, AFB, Fungus,  Gram)    Gram stain [[id number]] Collected: 08/10/20 0758    Order Status: Completed Specimen: Body Fluid from Lung, Left Updated: 08/10/20 1143     Gram Stain Result No WBC's      No organisms seen    Narrative:      Bronchus Donor Lung Left (Aerobic, Anaerobic, AFB, Fungus,  Gram)          I have reviewed all pertinent labs within the past 24 hours.    Diagnostic Results:  X-Ray: Reviewed  Well aerated left graft. Hardware in place and no pneumothorax. Fibrotic native riight lung.

## 2020-08-16 NOTE — PROGRESS NOTES
"Pharmacokinetic Assessment Follow Up: IV Vancomycin    Vancomycin serum concentration assessment(s):    The trough level was drawn correctly and can be used to guide therapy at this time. The measurement is within the desired definitive target range of 10 to 20 mcg/mL.    Vancomycin Regimen Plan:    Continue regimen to Vancomycin 1250 mg IV every 12 hours with next serum trough concentration measured at 0500 prior to 5th dose on 8/18    Drug levels (last 3 results):  Recent Labs   Lab Result Units 08/14/20  0635 08/16/20  0640   Vancomycin-Trough ug/mL 14.2 15.7       Pharmacy will continue to follow and monitor vancomycin.    Please contact pharmacy at extension [phone] for questions regarding this assessment.    Thank you for the consult,   Joanie Mak       Patient brief summary:  Chely Becerra is a 65 y.o. female initiated on antimicrobial therapy with IV Vancomycin for treatment of surgical prophy    The patient's current regimen is vanc 1250mg q12    Drug Allergies:   Review of patient's allergies indicates:   Allergen Reactions    Boniva [ibandronate] Other (See Comments)     "chest cramps"       Actual Body Weight:   93kg    Renal Function:   Estimated Creatinine Clearance: 96.4 mL/min (based on SCr of 0.7 mg/dL).,     Dialysis Method (if applicable):  N/A    CBC (last 72 hours):  Recent Labs   Lab Result Units 08/14/20  0635   WBC K/uL 12.78*   Hemoglobin g/dL 9.5*   Hematocrit % 28.9*   Platelets K/uL 214   Gran% % 82.1*   Lymph% % 9.9*   Mono% % 7.0   Eosinophil% % 0.0   Basophil% % 0.1   Differential Method  Automated       Metabolic Panel (last 72 hours):  Recent Labs   Lab Result Units 08/14/20  0635 08/15/20  0659 08/16/20  0640   Sodium mmol/L 135* 135* 136   Potassium mmol/L 4.1 4.1 4.3   Chloride mmol/L 98 99 99   CO2 mmol/L 28 26 27   Glucose mg/dL 136* 161* 119*   BUN, Bld mg/dL 30* 22 23   Creatinine mg/dL 0.7 0.7 0.7   Albumin g/dL 3.1* 2.9* 2.9*   Total Bilirubin mg/dL 0.5 0.5 0.5 "   Alkaline Phosphatase U/L 68 68 70   AST U/L 27 26 28   ALT U/L 30 35 53*   Magnesium mg/dL 1.9 2.0 1.7       Vancomycin Administrations:  vancomycin given in the last 96 hours                   vancomycin (VANCOCIN) 1250 mg in 5 % dextrose 250 mL IVPB (mg) 1,250 mg New Bag 08/16/20 0643     1,250 mg New Bag 08/15/20 1734     1,250 mg New Bag  0601     1,250 mg New Bag 08/14/20 1826     1,250 mg New Bag  0643     1,250 mg New Bag 08/13/20 1737     1,250 mg New Bag  0614     1,250 mg New Bag 08/12/20 2002                Microbiologic Results:  Microbiology Results (last 7 days)     Procedure Component Value Units Date/Time    Culture, Anaerobe [[id number]] Collected: 08/10/20 0758    Order Status: Completed Specimen: Body Fluid from Lung, Left Updated: 08/14/20 0844     Anaerobic Culture Culture in progress    Narrative:      Bronchus Donor Lung Left (Aerobic, Anaerobic, AFB, Fungus,  Gram)    Aerobic culture [[id number]] Collected: 08/10/20 0758    Order Status: Completed Specimen: Body Fluid from Lung, Left Updated: 08/13/20 1017     Aerobic Bacterial Culture No growth    Narrative:      Bronchus Donor Lung Left (Aerobic, Anaerobic, AFB, Fungus,  Gram)    Culture, Respiratory with Gram Stain [[id number]] Collected: 08/11/20 1315    Order Status: Completed Specimen: Respiratory from BAL, JOSETTE Updated: 08/13/20 0958     Respiratory Culture Normal respiratory yoly      No S aureus or Pseudomonas isolated.     Gram Stain (Respiratory) Rare WBC's     Gram Stain (Respiratory) No organisms seen    AFB Culture & Smear [[id number]] Collected: 08/11/20 1315    Order Status: Completed Specimen: Respiratory from BAL, JOSETTE Updated: 08/12/20 2127     AFB Culture & Smear Culture in progress     AFB CULTURE STAIN No acid fast bacilli seen.    Fungus culture [[id number]] Collected: 08/11/20 1315    Order Status: Completed Specimen: Respiratory from BAL, JOSETTE Updated: 08/12/20 1226     Fungus (Mycology) Culture Culture in progress     Fungus culture [[id number]] Collected: 08/10/20 0758    Order Status: Completed Specimen: Body Fluid from Lung, Left Updated: 08/12/20 1225     Fungus (Mycology) Culture Culture in progress    Narrative:      Bronchus Donor Lung Left (Aerobic, Anaerobic, AFB, Fungus,  Gram)    AFB Culture & Smear [[id number]] Collected: 08/10/20 0758    Order Status: Completed Specimen: Body Fluid from Lung, Left Updated: 08/11/20 2127     AFB Culture & Smear Culture in progress     AFB CULTURE STAIN No acid fast bacilli seen.    Narrative:      Bronchus Donor Lung Left (Aerobic, Anaerobic, AFB, Fungus,  Gram)    Gram stain [[id number]] Collected: 08/10/20 0758    Order Status: Completed Specimen: Body Fluid from Lung, Left Updated: 08/10/20 1143     Gram Stain Result No WBC's      No organisms seen    Narrative:      Bronchus Donor Lung Left (Aerobic, Anaerobic, AFB, Fungus,  Gram)

## 2020-08-16 NOTE — ASSESSMENT & PLAN NOTE
BG goal 140 - 180     -  transition IV insulin infusion with stepdown parameters. Initial rate starting at 0.5 unit/hr.   - Novolog 3 units TIDWM.   - Moderate Dose SQ Insulin Correction Scale.  - BG Monitoring /HS/0200    ** Please call Endocrine for any BG related issues **  ** Please notify Endocrine for any change and/or advance in diet**    Discharge Planning:   TBD. Please notify endocrinology prior to discharge.

## 2020-08-16 NOTE — PROGRESS NOTES
"[facility]  Lung Transplant  Progress Note - Floor    Patient Name: Chely Becerra  MRN: [id number]  Admission Date: 8/9/2020  Hospital Length of Stay: 7 days  Post-Operative Day: 6  Attending Physician: Niraj Garza MD  Primary Care Provider: ZAHIDA Stack MD     Subjective:     Interval History: No acute events overnight.  Continues to progress    Continuous Infusions:   insulin (HUMAN R) infusion (adults) 0.5 Units/hr (08/15/20 2038)     Scheduled Meds:   amoxicillin-clavulanate 500-125mg  1 tablet Oral BID    aspirin  81 mg Oral Daily    docusate sodium  100 mg Oral BID    enoxaparin  40 mg Subcutaneous Q24H    gabapentin  100 mg Oral TID    insulin aspart U-100  3 Units Subcutaneous TIDWM    levalbuterol  1.25 mg Nebulization Q6H WAKE    lidocaine  1 patch Transdermal Q24H    metoprolol tartrate  25 mg Oral BID    mycophenolate  500 mg Oral BID    NIFEdipine  30 mg Oral Daily    pantoprazole  40 mg Oral Daily    polyethylene glycol  17 g Oral Daily    [START ON 8/17/2020] predniSONE  35 mg Oral Daily    senna  17.2 mg Oral Daily    sulfamethoxazole-trimethoprim 800-160mg  1 tablet Oral Every Mon, Wed, Fri    tacrolimus  0.5 mg Oral BID    valGANciclovir  450 mg Oral BID    vancomycin (VANCOCIN) IVPB  1,250 mg Intravenous Q12H    voriconazole  350 mg Oral BID     PRN Meds:acetaminophen, calcium carbonate, cyclobenzaprine, dextrose 50%, dextrose 50%, glucagon (human recombinant), glucose, glucose, insulin aspart U-100, lactulose, levalbuterol, magnesium sulfate IVPB, melatonin, metoclopramide HCl, ondansetron, oxyCODONE, oxyCODONE, potassium chloride **AND** potassium chloride **AND** potassium chloride, Pharmacy to dose Vancomycin consult **AND** vancomycin - pharmacy to dose    Review of patient's allergies indicates:   Allergen Reactions    Boniva [ibandronate] Other (See Comments)     "chest cramps"       Review of Systems   Constitutional: Negative for " activity change, appetite change, chills, diaphoresis, fatigue and fever.   HENT: Negative for congestion, ear discharge, ear pain, facial swelling, hearing loss, mouth sores, nosebleeds, postnasal drip, rhinorrhea, sinus pressure, sore throat, trouble swallowing and voice change.    Eyes: Negative for pain, discharge, redness and itching.   Respiratory: Positive for shortness of breath (with exertion, improving). Negative for apnea, cough, choking, chest tightness, wheezing and stridor.    Cardiovascular: Positive for chest pain (incisional). Negative for palpitations and leg swelling.   Gastrointestinal: Negative for abdominal distention, abdominal pain, anal bleeding, blood in stool, constipation, diarrhea, nausea, rectal pain and vomiting.   Endocrine: Negative for cold intolerance and heat intolerance.   Genitourinary: Negative for difficulty urinating, dysuria and flank pain.   Musculoskeletal: Negative for arthralgias, back pain, joint swelling, myalgias and neck pain.   Allergic/Immunologic: Positive for immunocompromised state.   Neurological: Negative for dizziness, tremors, light-headedness, numbness and headaches.   Psychiatric/Behavioral: Negative for agitation and hallucinations. The patient is not nervous/anxious.      Objective:   Physical Exam  Vitals signs and nursing note reviewed.   Constitutional:       Appearance: Normal appearance. She is overweight.      Interventions: Nasal cannula in place.   HENT:      Head: Normocephalic and atraumatic.   Eyes:      General: No scleral icterus.     Conjunctiva/sclera: Conjunctivae normal.   Cardiovascular:      Rate and Rhythm: Normal rate and regular rhythm.      Heart sounds: No murmur. Friction rub present. No gallop.    Pulmonary:      Breath sounds: Examination of the right-middle field reveals rales. Examination of the right-lower field reveals decreased breath sounds and rales. Examination of the left-lower field reveals decreased breath sounds.  Decreased breath sounds and rales present. No wheezing or rhonchi.      Comments: 2 left pleural and 1 right pleural chest tubes in place with sanguinous output, no airleaks  Abdominal:      General: Bowel sounds are normal. There is no distension.      Palpations: Abdomen is soft.   Musculoskeletal:      Right lower leg: No edema.      Left lower leg: No edema.   Skin:     General: Skin is warm and dry.      Capillary Refill: Capillary refill takes less than 2 seconds.   Neurological:      General: No focal deficit present.      Mental Status: She is oriented to person, place, and time.   Psychiatric:         Mood and Affect: Mood normal.         Behavior: Behavior normal.           Vital Signs (Most Recent):  Temp: 97.7 °F (36.5 °C) (08/16/20 1548)  Pulse: 95 (08/16/20 1635)  Resp: 18 (08/16/20 1600)  BP: 121/89 (08/16/20 1600)  SpO2: 95 % (08/16/20 1600) Vital Signs (24h Range):  Temp:  [97.5 °F (36.4 °C)-98.5 °F (36.9 °C)] 97.7 °F (36.5 °C)  Pulse:  [] 95  Resp:  [12-20] 18  SpO2:  [95 %-96 %] 95 %  BP: (102-121)/(70-89) 121/89     Weight: 93.1 kg (205 lb 4 oz)  Body mass index is 32.15 kg/m².      Intake/Output Summary (Last 24 hours) at 8/16/2020 1827  Last data filed at 8/16/2020 1700  Gross per 24 hour   Intake 1858 ml   Output 1220 ml   Net 638 ml       Significant Labs:  CBC:  Recent Labs   Lab 08/14/20  0635   WBC 12.78*   RBC 3.07*   HGB 9.5*   HCT 28.9*      MCV 94   MCH 30.9   MCHC 32.9     BMP:  Recent Labs   Lab 08/16/20  0640      K 4.3   CL 99   CO2 27   BUN 23   CREATININE 0.7   CALCIUM 9.2      Tacrolimus Levels:  Recent Labs   Lab 08/16/20  0640   TACROLIMUS 7.8     Microbiology:  Microbiology Results (last 7 days)     Procedure Component Value Units Date/Time    Culture, Anaerobe [[id number]] Collected: 08/10/20 2456    Order Status: Completed Specimen: Body Fluid from Lung, Left Updated: 08/14/20 0844     Anaerobic Culture Culture in progress    Narrative:      Bronchus Donor  Lung Left (Aerobic, Anaerobic, AFB, Fungus,  Gram)    Aerobic culture [[id number]] Collected: 08/10/20 0758    Order Status: Completed Specimen: Body Fluid from Lung, Left Updated: 08/13/20 1017     Aerobic Bacterial Culture No growth    Narrative:      Bronchus Donor Lung Left (Aerobic, Anaerobic, AFB, Fungus,  Gram)    Culture, Respiratory with Gram Stain [[id number]] Collected: 08/11/20 1315    Order Status: Completed Specimen: Respiratory from BAL, JOSETTE Updated: 08/13/20 0958     Respiratory Culture Normal respiratory yoly      No S aureus or Pseudomonas isolated.     Gram Stain (Respiratory) Rare WBC's     Gram Stain (Respiratory) No organisms seen    AFB Culture & Smear [[id number]] Collected: 08/11/20 1315    Order Status: Completed Specimen: Respiratory from BAL, JOSETTE Updated: 08/12/20 2127     AFB Culture & Smear Culture in progress     AFB CULTURE STAIN No acid fast bacilli seen.    Fungus culture [[id number]] Collected: 08/11/20 1315    Order Status: Completed Specimen: Respiratory from BAL, JOSETTE Updated: 08/12/20 1226     Fungus (Mycology) Culture Culture in progress    Fungus culture [[id number]] Collected: 08/10/20 0758    Order Status: Completed Specimen: Body Fluid from Lung, Left Updated: 08/12/20 1225     Fungus (Mycology) Culture Culture in progress    Narrative:      Bronchus Donor Lung Left (Aerobic, Anaerobic, AFB, Fungus,  Gram)    AFB Culture & Smear [[id number]] Collected: 08/10/20 0758    Order Status: Completed Specimen: Body Fluid from Lung, Left Updated: 08/11/20 2127     AFB Culture & Smear Culture in progress     AFB CULTURE STAIN No acid fast bacilli seen.    Narrative:      Bronchus Donor Lung Left (Aerobic, Anaerobic, AFB, Fungus,  Gram)    Gram stain [[id number]] Collected: 08/10/20 0758    Order Status: Completed Specimen: Body Fluid from Lung, Left Updated: 08/10/20 1143     Gram Stain Result No WBC's      No organisms seen    Narrative:      Bronchus Donor Lung Left (Aerobic, Anaerobic,  AFB, Fungus,  Gram)          I have reviewed all pertinent labs within the past 24 hours.    Diagnostic Results:  X-Ray: Reviewed  Well aerated left graft. Hardware in place and no pneumothorax. Fibrotic native riight lung.      Assessment/Plan:     * S/P lung transplant  POD #5 s/p uncomplicated LSLT for IPF. PGD 2 for 24 hours. CT output stable and they were removed today. CXR tomorrow.    Immunosuppression  Induction with solumedrol and basiliximab. Repeat basiliximab on POD#4.  Continue tacrolimus, MMF, and prednisone taper.    Prophylactic antibiotic  CMV D-/R+ On vancomycin and cefepime for routine surgical prophylaxis. Bactrim, ganciclovir, and voriconazole for OIP. Will follow up on donor cultures and adjust therapy as needed.     Donor culture growing Actinomycete. Will switch from cefepime to amoxicillin.     Essential hypertension  Continue nifedipine    Prediabetes  Endocrine following, appreciate recs.     Acute blood loss anemia  Expected post-op. Continue to trend.     Leukocytosis  Expected post-op. Continue to trend.         Niraj Garza MD  Lung Transplant  [facility]

## 2020-08-16 NOTE — PLAN OF CARE
* AAO x 4  * Blood glucose readings AC HS and 2 AM. Blood glucoses readings 303 Novolog 6 units administered per sliding scale. See chart for all blood glucose readings.  * Insulin 0.5 units/hr verified by this RN and KONSTNATIN Jamison RN. New bag to be hung at 2038 on 8/16/20, blue tubing in use.  * Right chest tube #1 (8/10/20), -20 cm water seal maintained. Scant Serosanguineous drainage noted see flow sheet for output totals. Dressing clean dry and intact, dressing due to be changed on 8/16/20.  * Left chest tube #1 (8/10/20), -20 cm water seal maintained. Scant serosanguineous drainage noted see flow sheet for output totals. Dressing clean dry and intact, dressing due to be changed on 8/16/20.  * Left chest tube #2 (8/10/20), -20 cm water seal maintained. Scant sanguineous drainage noted see flow sheet for output totals. Dressing clean dry and intact, dressing due to be changed on 8/16/20.  * Low fiber/residue low chol/sat fat diet patient tolerating well. See chart for % eaten.   * Bed at lowest position and locked, call light within reach, non-slip socks on, and side rails up x 2.  Encouraged patient to call for assistance when needed patient stated understanding.   * Mid-sternal incision with 7 day wound vac (8/10/20) site clean dry and intact no signs or symptoms of infection noted.  * Left hand PIV 22 G (8/14/20) patent, dressing clean dry and intact no signs or symptoms of infection noted.  * Oxygen saturation 94-98 % on room air.  Respirations even and unlabored no signs or symptoms of distress noted.  * Patient has no complaints of pain  * Self medications preformed by son 100 % correct, blue card updated and medication bags refilled by day shift RN. All questions and concerns addressed by this RN.   * Skin assessment preformed no breakdown noted.  * Standard precautions maintained.  * Continuous telemetry monitoring continued SR/-80.  * Patient able to turn self no assistance needed.  * Patient voiding  clear yellow urine.  See flow sheet for intake and output totals.  * Visi continuous monitoring in use, see flow sheet for readings

## 2020-08-16 NOTE — ASSESSMENT & PLAN NOTE
POD #5 s/p uncomplicated LSLT for IPF. PGD 2 for 24 hours. CT output stable and they were removed today. CXR tomorrow.

## 2020-08-17 NOTE — PLAN OF CARE
Pt AAOx4, VSS, afebrile, 95% on RA throughout shift. NSR on tele monitor. Insulin gtt continued at 0.5 units/hr. Last . Pt complained of pain 8/10 - 10 mg Oxy given. Midsternal incision with wound vac in place.   Pt up by stand-by assist to bedside commode.   Pt free from injury or fall this shift. Call light in reach, bed in low position.   Will continue to monitor.

## 2020-08-17 NOTE — ASSESSMENT & PLAN NOTE
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 Bactrim, ganciclovir, and voriconazole for OIP. Will follow up on donor cultures and adjust therapy as needed.   Donor culture growing Actinomycete, so switched from cefepime to amoxicillin.

## 2020-08-17 NOTE — PROGRESS NOTES
"[facility]  Endocrinology  Progress Note    Admit Date: 2020     Reason for Consult: Management of pre-dm, Hyperglycemia     Surgical Procedure and Date: Left lung transplant and wound vac application 8/10/20    Diabetes diagnosis year: pre-dm - diet controlled     Lab Results   Component Value Date    HGBA1C 6.0 (H) 2020       Home Diabetes Medications: none (per chart review)    How often checking glucose at home? Not checking    Diabetes Complications include:     none    Complicating diabetes co morbidities:   Glucocorticoid use  s/p lung transplant      HPI:   Patient is a 65 y.o. female with a diagnosis of DM2, IPF, and chronic respiratory failure, who is s/p left lung transplant on 8/10/20.  Patient with controlled DM2 on no medications per chart review.  Endocrinology consulted for DM/BG management.            Interval HPI:   Overnight events:  BG is below goal and now reasonably controlled on current IV/SQ insulin regimen.   Diet low fiber/residue [facility]; Low Chol/Sat Fat  7 Days Post-Op    Eatin%  Nausea: No  Hypoglycemia and intervention: No  Fever: No  TPN and/or TF: No  If yes, type of TF/TPN and rate: None    /69 (BP Location: Left arm, Patient Position: Lying)   Pulse 91   Temp 97.2 °F (36.2 °C) (Oral)   Resp 13   Ht 5' 7" (1.702 m)   Wt 90.4 kg (199 lb 4.7 oz)   LMP  (LMP Unknown)   SpO2 (!) 93%   Breastfeeding No   BMI 31.21 kg/m²     Labs Reviewed and Include    Recent Labs   Lab 20  0631   *   CALCIUM 8.9   ALBUMIN 2.8*   PROT 5.4*   *   K 3.9   CO2 27   CL 98   BUN 22   CREATININE 0.6   ALKPHOS 69   ALT 59*   AST 31   BILITOT 0.4     Lab Results   Component Value Date    WBC 17.46 (H) 2020    HGB 9.6 (L) 2020    HCT 31.0 (L) 2020    MCV 98 2020     2020     No results for input(s): TSH, FREET4 in the last 168 hours.  Lab Results   Component Value Date    HGBA1C 6.0 (H) 2020 "       Nutritional status:   Body mass index is 31.21 kg/m².  Lab Results   Component Value Date    ALBUMIN 2.8 (L) 08/17/2020    ALBUMIN 2.9 (L) 08/16/2020    ALBUMIN 2.9 (L) 08/15/2020     No results found for: PREALBUMIN    Estimated Creatinine Clearance: 110.7 mL/min (based on SCr of 0.6 mg/dL).    Accu-Checks  Recent Labs     08/15/20  1155 08/15/20  1657 08/15/20  2201 08/16/20  0148 08/16/20  0808 08/16/20  1234 08/16/20  1710 08/16/20  2104 08/17/20  0231 08/17/20  0826   POCTGLUCOSE 142* 217* 303* 163* 152* 168* 281* 220* 161* 138*       Current Medications and/or Treatments Impacting Glycemic Control  Immunotherapy:    Immunosuppressants         Stop Route Frequency     mycophenolate capsule 500 mg      -- Oral 2 times daily     tacrolimus capsule 0.5 mg      -- Oral 2 times daily        Steroids:   Hormones (From admission, onward)    Start     Stop Route Frequency Ordered    08/17/20 0900  predniSONE tablet 35 mg      -- Oral Daily 08/11/20 1153    08/11/20 2023  melatonin tablet 9 mg      -- Oral Nightly PRN 08/11/20 1924        Pressors:    Autonomic Drugs (From admission, onward)    None        Hyperglycemia/Diabetes Medications:   Antihyperglycemics (From admission, onward)    Start     Stop Route Frequency Ordered    08/15/20 1130  insulin aspart U-100 pen 3 Units      -- SubQ 3 times daily with meals 08/15/20 0834    08/12/20 1556  insulin aspart U-100 pen 0-10 Units      -- SubQ As needed (PRN) 08/12/20 1456    08/11/20 1500  insulin regular 100 Units in sodium chloride 0.9% 100 mL infusion      -- IV Continuous 08/11/20 1351          ASSESSMENT and PLAN    * S/P lung transplant  S/p lung transplant on 8/10/20  Managed per primary team  Avoid hypoglycemia  Optimize BG control for surgical wound healing        Prediabetes  BG goal 140 - 180     - Discontinue  transition IV insulin infusion with stepdown parameters. Will challenge pancrease.   - Start Low Dose SQ Insulin Correction Scale PRN BG  excursions  - BG Monitoring AC/HS     ** Please call Endocrine for any BG related issues **  ** Please notify Endocrine for any change and/or advance in diet**    Discharge Planning:   TBD. Please notify endocrinology prior to discharge.         Adrenal cortical steroids causing adverse effect in therapeutic use  On steroid taper per transplant team; may elevate BG readings            Bhupendra Rey NP  Endocrinology  [facility]

## 2020-08-17 NOTE — SUBJECTIVE & OBJECTIVE
"Interval HPI:   Overnight events:  BG is below goal and now reasonably controlled on current IV/SQ insulin regimen.   Diet low fiber/residue [facility]; Low Chol/Sat Fat  7 Days Post-Op    Eatin%  Nausea: No  Hypoglycemia and intervention: No  Fever: No  TPN and/or TF: No  If yes, type of TF/TPN and rate: None    /69 (BP Location: Left arm, Patient Position: Lying)   Pulse 91   Temp 97.2 °F (36.2 °C) (Oral)   Resp 13   Ht 5' 7" (1.702 m)   Wt 90.4 kg (199 lb 4.7 oz)   LMP  (LMP Unknown)   SpO2 (!) 93%   Breastfeeding No   BMI 31.21 kg/m²     Labs Reviewed and Include    Recent Labs   Lab 20  0631   *   CALCIUM 8.9   ALBUMIN 2.8*   PROT 5.4*   *   K 3.9   CO2 27   CL 98   BUN 22   CREATININE 0.6   ALKPHOS 69   ALT 59*   AST 31   BILITOT 0.4     Lab Results   Component Value Date    WBC 17.46 (H) 2020    HGB 9.6 (L) 2020    HCT 31.0 (L) 2020    MCV 98 2020     2020     No results for input(s): TSH, FREET4 in the last 168 hours.  Lab Results   Component Value Date    HGBA1C 6.0 (H) 2020       Nutritional status:   Body mass index is 31.21 kg/m².  Lab Results   Component Value Date    ALBUMIN 2.8 (L) 2020    ALBUMIN 2.9 (L) 2020    ALBUMIN 2.9 (L) 08/15/2020     No results found for: PREALBUMIN    Estimated Creatinine Clearance: 110.7 mL/min (based on SCr of 0.6 mg/dL).    Accu-Checks  Recent Labs     08/15/20  1155 08/15/20  1657 08/15/20  2201 20  0148 20  0808 20  1234 20  1710 20  2104 20  0231 20  0826   POCTGLUCOSE 142* 217* 303* 163* 152* 168* 281* 220* 161* 138*       Current Medications and/or Treatments Impacting Glycemic Control  Immunotherapy:    Immunosuppressants         Stop Route Frequency     mycophenolate capsule 500 mg      -- Oral 2 times daily     tacrolimus capsule 0.5 mg      -- Oral 2 times daily        Steroids:   Hormones (From admission, onward)    " Start     Stop Route Frequency Ordered    08/17/20 0900  predniSONE tablet 35 mg      -- Oral Daily 08/11/20 1153    08/11/20 2023  melatonin tablet 9 mg      -- Oral Nightly PRN 08/11/20 1924        Pressors:    Autonomic Drugs (From admission, onward)    None        Hyperglycemia/Diabetes Medications:   Antihyperglycemics (From admission, onward)    Start     Stop Route Frequency Ordered    08/15/20 1130  insulin aspart U-100 pen 3 Units      -- SubQ 3 times daily with meals 08/15/20 0834    08/12/20 1556  insulin aspart U-100 pen 0-10 Units      -- SubQ As needed (PRN) 08/12/20 1456    08/11/20 1500  insulin regular 100 Units in sodium chloride 0.9% 100 mL infusion      -- IV Continuous 08/11/20 1458

## 2020-08-17 NOTE — PLAN OF CARE
Problem: Oral Intake Inadequate  Goal: Improved Oral Intake  Outcome: Ongoing, Progressing   Recommendations      1. Continue low fiber/residue diet with Boost Glucose Control TID.  Goals: Meet % EEN, EPN by RD f/u date  Nutrition Goal Status: goal not met  Communication of RD Recs: (POC)

## 2020-08-17 NOTE — SUBJECTIVE & OBJECTIVE
"Subjective:     Interval History: No acute events overnight.  Plan for discharge this week    Continuous Infusions:  Scheduled Meds:   amoxicillin-clavulanate 500-125mg  1 tablet Oral BID    aspirin  81 mg Oral Daily    docusate sodium  100 mg Oral BID    enoxaparin  40 mg Subcutaneous Q24H    gabapentin  100 mg Oral TID    levalbuterol  1.25 mg Nebulization Q6H WAKE    lidocaine  1 patch Transdermal Q24H    metoprolol tartrate  25 mg Oral BID    mycophenolate  500 mg Oral BID    NIFEdipine  30 mg Oral Daily    pantoprazole  40 mg Oral Daily    polyethylene glycol  17 g Oral Daily    predniSONE  35 mg Oral Daily    senna  17.2 mg Oral Daily    sulfamethoxazole-trimethoprim 800-160mg  1 tablet Oral Every Mon, Wed, Fri    tacrolimus  0.5 mg Oral BID    valGANciclovir  450 mg Oral BID    voriconazole  350 mg Oral BID     PRN Meds:acetaminophen, calcium carbonate, cyclobenzaprine, dextrose 50%, dextrose 50%, glucagon (human recombinant), glucose, glucose, insulin aspart U-100, lactulose, levalbuterol, magnesium sulfate IVPB, melatonin, metoclopramide HCl, ondansetron, oxyCODONE, oxyCODONE, potassium chloride **AND** potassium chloride **AND** potassium chloride, Pharmacy to dose Vancomycin consult **AND** vancomycin - pharmacy to dose    Review of patient's allergies indicates:   Allergen Reactions    Boniva [ibandronate] Other (See Comments)     "chest cramps"       Review of Systems   Constitutional: Negative for activity change, appetite change, chills, diaphoresis, fatigue and fever.   HENT: Negative for congestion, ear discharge, ear pain, facial swelling, hearing loss, mouth sores, nosebleeds, postnasal drip, rhinorrhea, sinus pressure, sore throat, trouble swallowing and voice change.    Eyes: Negative for pain, discharge, redness and itching.   Respiratory: Positive for shortness of breath (with exertion, improving). Negative for apnea, cough, choking, chest tightness, wheezing and stridor.  "   Cardiovascular: Positive for chest pain (incisional). Negative for palpitations and leg swelling.   Gastrointestinal: Negative for abdominal distention, abdominal pain, anal bleeding, blood in stool, constipation, diarrhea, nausea, rectal pain and vomiting.   Endocrine: Negative for cold intolerance and heat intolerance.   Genitourinary: Negative for difficulty urinating, dysuria and flank pain.   Musculoskeletal: Negative for arthralgias, back pain, joint swelling, myalgias and neck pain.   Allergic/Immunologic: Positive for immunocompromised state.   Neurological: Negative for dizziness, tremors, light-headedness, numbness and headaches.   Psychiatric/Behavioral: Negative for agitation and hallucinations. The patient is not nervous/anxious.      Objective:   Physical Exam  Vitals signs and nursing note reviewed.   Constitutional:       Appearance: Normal appearance. She is overweight.      Interventions: Nasal cannula in place.   HENT:      Head: Normocephalic and atraumatic.   Eyes:      General: No scleral icterus.     Conjunctiva/sclera: Conjunctivae normal.   Cardiovascular:      Rate and Rhythm: Normal rate and regular rhythm.      Heart sounds: No murmur. No friction rub. No gallop.    Pulmonary:      Breath sounds: Examination of the right-lower field reveals decreased breath sounds. Examination of the left-lower field reveals decreased breath sounds. Decreased breath sounds present. No wheezing, rhonchi or rales.   Abdominal:      General: Bowel sounds are normal. There is no distension.      Palpations: Abdomen is soft.   Musculoskeletal:      Right lower leg: No edema.      Left lower leg: No edema.   Skin:     General: Skin is warm and dry.      Capillary Refill: Capillary refill takes less than 2 seconds.   Neurological:      General: No focal deficit present.      Mental Status: She is oriented to person, place, and time.   Psychiatric:         Mood and Affect: Mood normal.         Behavior:  Behavior normal.           Vital Signs (Most Recent):  Temp: 97.2 °F (36.2 °C) (08/17/20 0835)  Pulse: 104 (08/17/20 1102)  Resp: 13 (08/17/20 0835)  BP: 102/69 (08/17/20 0835)  SpO2: (!) 93 % (08/17/20 0835) Vital Signs (24h Range):  Temp:  [97.2 °F (36.2 °C)-98.4 °F (36.9 °C)] 97.2 °F (36.2 °C)  Pulse:  [] 104  Resp:  [13-21] 13  SpO2:  [93 %-97 %] 93 %  BP: ()/(65-89) 102/69     Weight: 90.4 kg (199 lb 4.7 oz)  Body mass index is 31.21 kg/m².      Intake/Output Summary (Last 24 hours) at 8/17/2020 1112  Last data filed at 8/17/2020 0515  Gross per 24 hour   Intake 964 ml   Output 1450 ml   Net -486 ml       Significant Labs:  CBC:  Recent Labs   Lab 08/17/20  0631   WBC 17.46*   RBC 3.15*   HGB 9.6*   HCT 31.0*      MCV 98   MCH 30.5   MCHC 31.0*     BMP:  Recent Labs   Lab 08/17/20  0631   *   K 3.9   CL 98   CO2 27   BUN 22   CREATININE 0.6   CALCIUM 8.9      Tacrolimus Levels:  Recent Labs   Lab 08/17/20  0631   TACROLIMUS 7.5     Microbiology:  Microbiology Results (last 7 days)     Procedure Component Value Units Date/Time    Culture, Anaerobe [[id number]] Collected: 08/10/20 0758    Order Status: Completed Specimen: Body Fluid from Lung, Left Updated: 08/17/20 0731     Anaerobic Culture No anaerobes isolated    Narrative:      [facility] Donor Lung Left (Aerobic, Anaerobic, AFB, Fungus,  Gram)    Aerobic culture [[id number]] Collected: 08/10/20 0758    Order Status: Completed Specimen: Body Fluid from Lung, Left Updated: 08/13/20 1017     Aerobic Bacterial Culture No growth    Narrative:      [facility] Lung Left (Aerobic, Anaerobic, AFB, Fungus,  Gram)    Culture, Respiratory with Gram Stain [[id number]] Collected: 08/11/20 1315    Order Status: Completed Specimen: Respiratory from BAL, JOSETTE Updated: 08/13/20 0975     Respiratory Culture Normal respiratory yoly      No S aureus or Pseudomonas isolated.     Gram Stain (Respiratory) Rare WBC's     Gram Stain (Respiratory) No organisms  seen    AFB Culture & Smear [[id number]] Collected: 08/11/20 1315    Order Status: Completed Specimen: Respiratory from BAL, JOSETTE Updated: 08/12/20 2127     AFB Culture & Smear Culture in progress     AFB CULTURE STAIN No acid fast bacilli seen.    Fungus culture [[id number]] Collected: 08/11/20 1315    Order Status: Completed Specimen: Respiratory from BAL, JOSETTE Updated: 08/12/20 1226     Fungus (Mycology) Culture Culture in progress    Fungus culture [[id number]] Collected: 08/10/20 0758    Order Status: Completed Specimen: Body Fluid from Lung, Left Updated: 08/12/20 1225     Fungus (Mycology) Culture Culture in progress    Narrative:      [facility] Lung Left (Aerobic, Anaerobic, AFB, Fungus,  Gram)    AFB Culture & Smear [[id number]] Collected: 08/10/20 0758    Order Status: Completed Specimen: Body Fluid from Lung, Left Updated: 08/11/20 2127     AFB Culture & Smear Culture in progress     AFB CULTURE STAIN No acid fast bacilli seen.    Narrative:      [facility] Lung Left (Aerobic, Anaerobic, AFB, Fungus,  Gram)    Gram stain [[id number]] Collected: 08/10/20 0758    Order Status: Completed Specimen: Body Fluid from Lung, Left Updated: 08/10/20 1143     Gram Stain Result No WBC's      No organisms seen    Narrative:      [facility] Lung Left (Aerobic, Anaerobic, AFB, Fungus,  Gram)          I have reviewed all pertinent labs within the past 24 hours.

## 2020-08-17 NOTE — PLAN OF CARE
Problem: Physical Therapy Goal  Goal: Physical Therapy Goal  Description: Goals to be met by: 20     Patient will increase functional independence with mobility by performin. Supine to sit with Modified Jerome  2. Sit to supine with Modified Jerome  3. Sit to stand transfer with Modified Jerome  4. Bed to chair transfer with Modified Jerome using no AD.  5. Gait  x 250 feet with Modified Jerome using No AD.   6. Lower extremity exercise program x30 reps per handout, with independence  7. Recalls and demonstrates understanding of 3/3 sternal precautions.    Outcome: Ongoing, Progressing

## 2020-08-17 NOTE — ASSESSMENT & PLAN NOTE
BG goal 140 - 180     - Discontinue  transition IV insulin infusion with stepdown parameters. Will challenge pancrease.   - Start Low Dose SQ Insulin Correction Scale PRN BG excursions  - BG Monitoring AC/HS     ** Please call Endocrine for any BG related issues **  ** Please notify Endocrine for any change and/or advance in diet**    Discharge Planning:   TBD. Please notify endocrinology prior to discharge.

## 2020-08-17 NOTE — PROGRESS NOTES
"[facility]  Lung Transplant  Progress Note - Floor    Patient Name: Chely Becerra  MRN: [id number]  Admission Date: 8/9/2020  Hospital Length of Stay: 8 days  Post-Operative Day: 7  Attending Physician: Niraj Garza MD  Primary Care Provider: ZAHIDA Stack MD     Subjective:     Interval History: No acute events overnight.  Plan for discharge this week    Continuous Infusions:  Scheduled Meds:   amoxicillin-clavulanate 500-125mg  1 tablet Oral BID    aspirin  81 mg Oral Daily    docusate sodium  100 mg Oral BID    enoxaparin  40 mg Subcutaneous Q24H    gabapentin  100 mg Oral TID    levalbuterol  1.25 mg Nebulization Q6H WAKE    lidocaine  1 patch Transdermal Q24H    metoprolol tartrate  25 mg Oral BID    mycophenolate  500 mg Oral BID    NIFEdipine  30 mg Oral Daily    pantoprazole  40 mg Oral Daily    polyethylene glycol  17 g Oral Daily    predniSONE  35 mg Oral Daily    senna  17.2 mg Oral Daily    sulfamethoxazole-trimethoprim 800-160mg  1 tablet Oral Every Mon, Wed, Fri    tacrolimus  0.5 mg Oral BID    valGANciclovir  450 mg Oral BID    voriconazole  350 mg Oral BID     PRN Meds:acetaminophen, calcium carbonate, cyclobenzaprine, dextrose 50%, dextrose 50%, glucagon (human recombinant), glucose, glucose, insulin aspart U-100, lactulose, levalbuterol, magnesium sulfate IVPB, melatonin, metoclopramide HCl, ondansetron, oxyCODONE, oxyCODONE, potassium chloride **AND** potassium chloride **AND** potassium chloride, Pharmacy to dose Vancomycin consult **AND** vancomycin - pharmacy to dose    Review of patient's allergies indicates:   Allergen Reactions    Boniva [ibandronate] Other (See Comments)     "chest cramps"       Review of Systems   Constitutional: Negative for activity change, appetite change, chills, diaphoresis, fatigue and fever.   HENT: Negative for congestion, ear discharge, ear pain, facial swelling, hearing loss, mouth sores, nosebleeds, postnasal " drip, rhinorrhea, sinus pressure, sore throat, trouble swallowing and voice change.    Eyes: Negative for pain, discharge, redness and itching.   Respiratory: Positive for shortness of breath (with exertion, improving). Negative for apnea, cough, choking, chest tightness, wheezing and stridor.    Cardiovascular: Positive for chest pain (incisional). Negative for palpitations and leg swelling.   Gastrointestinal: Negative for abdominal distention, abdominal pain, anal bleeding, blood in stool, constipation, diarrhea, nausea, rectal pain and vomiting.   Endocrine: Negative for cold intolerance and heat intolerance.   Genitourinary: Negative for difficulty urinating, dysuria and flank pain.   Musculoskeletal: Negative for arthralgias, back pain, joint swelling, myalgias and neck pain.   Allergic/Immunologic: Positive for immunocompromised state.   Neurological: Negative for dizziness, tremors, light-headedness, numbness and headaches.   Psychiatric/Behavioral: Negative for agitation and hallucinations. The patient is not nervous/anxious.      Objective:   Physical Exam  Vitals signs and nursing note reviewed.   Constitutional:       Appearance: Normal appearance. She is overweight.      Interventions: Nasal cannula in place.   HENT:      Head: Normocephalic and atraumatic.   Eyes:      General: No scleral icterus.     Conjunctiva/sclera: Conjunctivae normal.   Cardiovascular:      Rate and Rhythm: Normal rate and regular rhythm.      Heart sounds: No murmur. No friction rub. No gallop.    Pulmonary:      Breath sounds: Examination of the right-lower field reveals decreased breath sounds. Examination of the left-lower field reveals decreased breath sounds. Decreased breath sounds present. No wheezing, rhonchi or rales.   Abdominal:      General: Bowel sounds are normal. There is no distension.      Palpations: Abdomen is soft.   Musculoskeletal:      Right lower leg: No edema.      Left lower leg: No edema.   Skin:      General: Skin is warm and dry.      Capillary Refill: Capillary refill takes less than 2 seconds.   Neurological:      General: No focal deficit present.      Mental Status: She is oriented to person, place, and time.   Psychiatric:         Mood and Affect: Mood normal.         Behavior: Behavior normal.           Vital Signs (Most Recent):  Temp: 97.2 °F (36.2 °C) (08/17/20 0835)  Pulse: 104 (08/17/20 1102)  Resp: 13 (08/17/20 0835)  BP: 102/69 (08/17/20 0835)  SpO2: (!) 93 % (08/17/20 0835) Vital Signs (24h Range):  Temp:  [97.2 °F (36.2 °C)-98.4 °F (36.9 °C)] 97.2 °F (36.2 °C)  Pulse:  [] 104  Resp:  [13-21] 13  SpO2:  [93 %-97 %] 93 %  BP: ()/(65-89) 102/69     Weight: 90.4 kg (199 lb 4.7 oz)  Body mass index is 31.21 kg/m².      Intake/Output Summary (Last 24 hours) at 8/17/2020 1112  Last data filed at 8/17/2020 0515  Gross per 24 hour   Intake 964 ml   Output 1450 ml   Net -486 ml       Significant Labs:  CBC:  Recent Labs   Lab 08/17/20  0631   WBC 17.46*   RBC 3.15*   HGB 9.6*   HCT 31.0*      MCV 98   MCH 30.5   MCHC 31.0*     BMP:  Recent Labs   Lab 08/17/20 0631   *   K 3.9   CL 98   CO2 27   BUN 22   CREATININE 0.6   CALCIUM 8.9      Tacrolimus Levels:  Recent Labs   Lab 08/17/20  0631   TACROLIMUS 7.5     Microbiology:  Microbiology Results (last 7 days)     Procedure Component Value Units Date/Time    Culture, Anaerobe [[id number]] Collected: 08/10/20 0758    Order Status: Completed Specimen: Body Fluid from Lung, Left Updated: 08/17/20 0731     Anaerobic Culture No anaerobes isolated    Narrative:      Bronchus Donor Lung Left (Aerobic, Anaerobic, AFB, Fungus,  Gram)    Aerobic culture [[id number]] Collected: 08/10/20 0758    Order Status: Completed Specimen: Body Fluid from Lung, Left Updated: 08/13/20 1017     Aerobic Bacterial Culture No growth    Narrative:      Bronchus Donor Lung Left (Aerobic, Anaerobic, AFB, Fungus,  Gram)    Culture, Respiratory with Gram Stain  [[id number]] Collected: 08/11/20 1315    Order Status: Completed Specimen: Respiratory from BAL, JOSETTE Updated: 08/13/20 0958     Respiratory Culture Normal respiratory yoly      No S aureus or Pseudomonas isolated.     Gram Stain (Respiratory) Rare WBC's     Gram Stain (Respiratory) No organisms seen    AFB Culture & Smear [[id number]] Collected: 08/11/20 1315    Order Status: Completed Specimen: Respiratory from BAL, JOSETTE Updated: 08/12/20 2127     AFB Culture & Smear Culture in progress     AFB CULTURE STAIN No acid fast bacilli seen.    Fungus culture [[id number]] Collected: 08/11/20 1315    Order Status: Completed Specimen: Respiratory from BAL, JOSETTE Updated: 08/12/20 1226     Fungus (Mycology) Culture Culture in progress    Fungus culture [[id number]] Collected: 08/10/20 0758    Order Status: Completed Specimen: Body Fluid from Lung, Left Updated: 08/12/20 1225     Fungus (Mycology) Culture Culture in progress    Narrative:      Bronchus Donor Lung Left (Aerobic, Anaerobic, AFB, Fungus,  Gram)    AFB Culture & Smear [[id number]] Collected: 08/10/20 0758    Order Status: Completed Specimen: Body Fluid from Lung, Left Updated: 08/11/20 2127     AFB Culture & Smear Culture in progress     AFB CULTURE STAIN No acid fast bacilli seen.    Narrative:      Bronchus Donor Lung Left (Aerobic, Anaerobic, AFB, Fungus,  Gram)    Gram stain [[id number]] Collected: 08/10/20 0758    Order Status: Completed Specimen: Body Fluid from Lung, Left Updated: 08/10/20 1143     Gram Stain Result No WBC's      No organisms seen    Narrative:      Bronchus Donor Lung Left (Aerobic, Anaerobic, AFB, Fungus,  Gram)          I have reviewed all pertinent labs within the past 24 hours.        Assessment/Plan:     * S/P lung transplant  POD #7 s/p uncomplicated LSLT for IPF. PGD 2 for 24 hours. Plan for discharge Wednesday    Immunosuppression  Induction with solumedrol and basiliximab. Repeat basiliximab on POD#4.  Continue tacrolimus, MMF, and  prednisone taper.    Prophylactic antibiotic  CMV D-/R+ On vancomycin and cefepime for routine surgical prophylaxis. Bactrim, ganciclovir, and voriconazole for OIP. Will follow up on donor cultures and adjust therapy as needed.   Donor culture growing Actinomycete, so switched from cefepime to amoxicillin.     Prediabetes  Endocrine following, appreciate recs.     Acute blood loss anemia  Expected post-op. Continue to trend.     Leukocytosis  Expected post-op. Continue to trend.     Essential hypertension  Continue nifedipine        Hussein Guallpa NP  Lung Transplant  [facility]

## 2020-08-17 NOTE — PROGRESS NOTES
"[facility]  Adult Nutrition  Progress Note    SUMMARY       Recommendations     1. Continue low fiber/residue diet with Boost Glucose Control TID.  Goals: Meet % EEN, EPN by RD f/u date  Nutrition Goal Status: goal not met  Communication of RD Recs: (POC)    Reason for Assessment    Reason For Assessment: RD follow-up  Diagnosis: other (see comments)(S/p L. Lung tx)  Relevant Medical History: DM, HTN, IPF  Interdisciplinary Rounds: did not attend  General Information Comments: Pt s/p lung Tx 8/10 continues with poor to fair intake meals (25-50%). Pt with good appetite and stable wt PTA, NFPE not warranted. Pt reports appetite is improving but her throat is still a little sore. Pt is drinking the Boost Glucose Control. Answered pt's questions about food safety.  Nutrition Discharge Planning: Post transplant nutrition education provided 8/14/20. Food safety/drug interactions emphasized. General low salt/healthy diet recommended. No other needs identified. Caregive present.    Nutrition Risk Screen    Nutrition Risk Screen: no indicators present    Nutrition/Diet History    Patient Reported Diet/Restrictions/Preferences: heart healthy  Spiritual, Cultural Beliefs, Hoahaoism Practices, Values that Affect Care: no  Factors Affecting Nutritional Intake: None identified at this time    Anthropometrics    Temp: 97.2 °F (36.2 °C)  Height Method: Stated  Height: 5' 7" (170.2 cm)  Height (inches): 67 in  Weight Method: Bed Scale  Weight: 90.4 kg (199 lb 4.7 oz)  Weight (lb): 199.3 lb  Ideal Body Weight (IBW), Female: 135 lb  % Ideal Body Weight, Female (lb): 157.92 %  BMI (Calculated): 31.2  BMI Grade: 30 - 34.9- obesity - grade I       Lab/Procedures/Meds    Pertinent Labs Reviewed: reviewed  Pertinent Labs Comments: Na 133, Glu 151, Alb 2.8  Pertinent Medications Reviewed: reviewed  Pertinent Medications Comments: Colace, pantoprazole, prednisone, senna, tacrolimus    Estimated/Assessed " Needs    Weight Used For Calorie Calculations: 96.7 kg (213 lb 3 oz)  Energy Calorie Requirements (kcal): 1943 kcal  Energy Need Method: Riley-[place] Liang  Protein Requirements:  g/d (1.5-2 g/kg IBW)  Weight Used For Protein Calculations: 61.4 kg (135 lb 5.8 oz)     Estimated Fluid Requirement Method: other (see comments)(Per MD or 1 mL/kcal)  RDA Method (mL): 1943         Nutrition Prescription Ordered    Current Diet Order: low fiber/residue  Nutrition Order Comments: (NA)  Oral Nutrition Supplement: Boost Glucose Control TID    Evaluation of Received Nutrient/Fluid Intake    Other Calories (kcal): (NA)  I/O: -5.1L since admission  Comments: LBM 8/15  % Intake of Estimated Energy Needs: 50 - 75 %  % Meal Intake: 25 - 50 %    Nutrition Risk    Level of Risk/Frequency of Follow-up: low     Assessment and Plan    Nutrition Problem  Inadequate oral intake    Related to (etiology):   Sore throat    Signs and Symptoms (as evidenced by):   Pt continues with sore throat, less than usual po intake.    Interventions(treatment strategy):  Collaboration of care with providers.  Commercial Beverage: Boost Glucose Control TID.  Nutrition education: Post transplant food safety and medication interactions.    Nutrition Diagnosis Status:   Improving       Monitor and Evaluation    Food and Nutrient Intake: energy intake, food and beverage intake  Food and Nutrient Adminstration: diet order  Physical Activity and Function: nutrition-related ADLs and IADLs  Anthropometric Measurements: weight, weight change  Biochemical Data, Medical Tests and Procedures: glucose/endocrine profile, inflammatory profile, lipid profile, gastrointestinal profile, electrolyte and renal panel  Nutrition-Focused Physical Findings: overall appearance     Malnutrition Assessment     Pt does not meet criteria for malnutrition at this time.    Nutrition Follow-Up    RD Follow-up?: Yes

## 2020-08-17 NOTE — PROGRESS NOTES
SW met with patient while on rounds with team.  Patient presents awake, alert and oriented x 4 and communicative.  Patient reports coping appropriately with ongoing medical issues.  Pt continues to have good support from family. Pt's daughter at bedside.  Psychosocial support provided to the patient.   Pt to tentatively d/c to [facility] this week.  SW will continue to provide psychosocial support, education, resources and assistance with all d/c planning needs when appropriate. Pt aware of how to contact SW.

## 2020-08-17 NOTE — PT/OT/SLP PROGRESS
Physical Therapy Treatment    Patient Name:  Chely Becerra   MRN:  [id number]    Recommendations:     Discharge Recommendations:  home with home health   Discharge Equipment Recommendations: none   Barriers to discharge: None    Assessment:     Chely Becerra is a 65 y.o. female admitted with a medical diagnosis of S/P lung transplant.  She presents with the following impairments/functional limitations:  weakness, impaired endurance, impaired functional mobilty, gait instability, impaired balance, orthopedic precautions .Pt presents with improved overall functional mobility requiring decreased assistance and increased activity tolerance to perform functional mobility.Pt would continue to benefit from skilled PT to address overall functional mobility and goals. Goals remain appropriate.      Rehab Prognosis: Good; patient would benefit from acute skilled PT services to address these deficits and reach maximum level of function.    Recent Surgery: Procedure(s) (LRB):  TRANSPLANT, LUNG - 4:30AM start (Left)  APPLICATION, WOUND VAC, 40 x 5cm (Left)  RESECTION, LUNG (Left) 7 Days Post-Op    Plan:     During this hospitalization, patient to be seen 3 x/week to address the identified rehab impairments via gait training, therapeutic activities, therapeutic exercises, neuromuscular re-education and progress toward the following goals:    · Plan of Care Expires:  09/11/20    Subjective     Chief Complaint: fatigue  Patient/Family Comments/goals: I need to go slow  Pain/Comfort:  · Pain Rating 1: 0/10  · Location 1: sternal  · Pain Rating Post-Intervention 1: 0/10      Objective:     Communicated with RN prior to session.  Patient found up in chair with pulse ox (continuous), telemetry, wound vac upon PT entry to room.     General Precautions: Standard, fall, sternal   Orthopedic Precautions:N/A   Braces:       Functional Mobility:  · Sit to Stand:  Contact guard assistance with no AD,pt with vcs for sternal precautions and  technique for improved mobility.  · Gait: 110 ft x 2 with  CGA with HHA with mask on in halway.pt demo  decreased step length, decreased sanna, decreased gait speed. No LOB.  Pt required seated rest break between trials. Pt O2 sats from 90%-95% on RA  AM-PAC 6 CLICK MOBILITY  Turning over in bed (including adjusting bedclothes, sheets and blankets)?: 3  Sitting down on and standing up from a chair with arms (e.g., wheelchair, bedside commode, etc.): 3  Moving to and from a bed to a chair (including a wheelchair)?: 3  Need to walk in hospital room?: 3  Climbing 3-5 steps with a railing?: 3       Therapeutic Activities and Exercises:   Therapist provided instruction and educated of patient on progress, safety,d/c,PT POC,   proper body mechanics, energy conservation, and fall prevention strategies during tasks listed above, on the effects of prolonged immobility and the importance of performing OOB activity and exercises to promote healing and reduce recovery time   Patient facilitated therex  seated in bedside chair B LE AROM AP, LAQ, Hip Flexion, Hip Abd/Add with facilitation for correct performance and sequencing. Exercises performed to develop and maintain pt's strength, endurance and flexibility.   Updated white board with appropriate PT mobility information for medical team notification  Donned an extra gown  Call nursing/pct to transfer to chair/use bathroom. Pt stated understanding  Bedside table in front of patient and area set up for function, convenience, and safety. RN aware of patient's mobility needs and status. Questions/concerns addressed within PTA scope of practice; patient with no further questions. Time was provided for active listening, discussion of health disposition, and discussion of safe discharge. Pt?verbalized?agreement .  Sternal Precautions: patient able to voice?3/3 precautions without assistance.?Pt provided education of sternal precautions.?Patient able to maintain precautions  throughout session with?min?verbal cues        Patient left seated on commode with all lines intact, call button in reach, nsg notified and family present  GOALS:   Multidisciplinary Problems     Physical Therapy Goals        Problem: Physical Therapy Goal    Goal Priority Disciplines Outcome Goal Variances Interventions   Physical Therapy Goal     PT, PT/OT Ongoing, Progressing     Description: Goals to be met by: 20     Patient will increase functional independence with mobility by performin. Supine to sit with Modified [place]  2. Sit to supine with Modified [place]  3. Sit to stand transfer with Modified [place]  4. Bed to chair transfer with Modified [place] using no AD.  5. Gait  x 250 feet with Modified [place] using No AD.   6. Lower extremity exercise program x30 reps per handout, with independence  7. Recalls and demonstrates understanding of 3/3 sternal precautions.                     Time Tracking:     PT Received On: 20  PT Total Time (min): 39 min     Billable Minutes: Gait Training 25 and Therapeutic Activity 14    Treatment Type: Treatment  PT/PTA: PTA     PTA Visit Number: 2     Regan Carbajal, PTA  2020

## 2020-08-17 NOTE — PT/OT/SLP PROGRESS
Occupational Therapy   Treatment    Name: Chely Becerra  MRN: [id number]  Admitting Diagnosis:  S/P lung transplant  7 Days Post-Op    Recommendations:     Discharge Recommendations: home with home health  Discharge Equipment Recommendations:     Barriers to discharge:  None    Assessment:     Chely Becerra is a 65 y.o. female with a medical diagnosis of S/P lung transplant. Progressing well - walked 2x with PT/OT today. Encouraged to walk once more with staff. Performance deficits affecting function are weakness, impaired endurance, impaired self care skills, impaired functional mobilty, gait instability, impaired balance.     Rehab Prognosis:  Good; patient would benefit from acute skilled OT services to address these deficits and reach maximum level of function.       Plan:     Patient to be seen 3 x/week to address the above listed problems via self-care/home management, therapeutic activities, therapeutic exercises  · Plan of Care Expires:    · Plan of Care Reviewed with: patient, daughter    Subjective     Pain/Comfort:  · Pain Rating 1: 0/10    Objective:     Communicated with: rn prior to session.  Patient found up in chair with   upon OT entry to room.    General Precautions: Standard, fall, sternal     Occupational Performance:     Functional Mobility/Transfers:  · Patient completed Sit <> Stand Transfer with contact guard assistance  with  no assistive device   · Functional Mobility: Walked ~ 200' SBA/CGA with standing rest breaks.    Activities of Daily Living:  · Feeding:  independence   · Grooming: independence seated.    [facility] 6 Click ADL: 21    Treatment & Education:  Pt declined UE therex due to fatigue so showed her straight arm raises (to 90*) and elbow flexion/extension and encouraged to do later.  Discussed OT POC    Patient left up in chair with all lines intact and call button in reachEducation:      GOALS:   Multidisciplinary Problems     Occupational Therapy Goals        Problem:  Occupational Therapy Goal    Goal Priority Disciplines Outcome Interventions   Occupational Therapy Goal     OT, PT/OT Ongoing, Progressing    Description: Goals to be met by: 7 days 8/19/2020     Patient will increase functional independence with ADLs by performing:    Pt to complete standing g/h skills with supervision.  Pt to complete UE dressing with set-up  Pt to complete LE dressing with SBA  Pt to complete toileting with supervisionPt to complete t/f skills including bed, chair and commode with supervision.                      Time Tracking:     OT Date of Treatment: 08/17/20  OT Start Time: 1325  OT Stop Time: 1340  OT Total Time (min): 15 min    Billable Minutes:Therapeutic Activity 15    KHOI Lara  8/17/2020

## 2020-08-18 NOTE — SUBJECTIVE & OBJECTIVE
"Subjective:     Interval History: No acute events overnight.  Plan for discharge this week    Continuous Infusions:  Scheduled Meds:   amoxicillin-clavulanate 500-125mg  1 tablet Oral BID    aspirin  81 mg Oral Daily    docusate sodium  100 mg Oral BID    enoxaparin  40 mg Subcutaneous Q24H    gabapentin  100 mg Oral TID    insulin aspart U-100  2 Units Subcutaneous TIDWM    levalbuterol  1.25 mg Nebulization Q6H WAKE    lidocaine  1 patch Transdermal Q24H    magnesium oxide  400 mg Oral BID    metoprolol tartrate  25 mg Oral BID    mycophenolate  500 mg Oral BID    NIFEdipine  30 mg Oral Daily    pantoprazole  40 mg Oral Daily    polyethylene glycol  17 g Oral Daily    predniSONE  35 mg Oral Daily    senna  17.2 mg Oral Daily    sulfamethoxazole-trimethoprim 800-160mg  1 tablet Oral Every Mon, Wed, Fri    tacrolimus  0.5 mg Oral BID    valGANciclovir  450 mg Oral BID    voriconazole  350 mg Oral BID     PRN Meds:acetaminophen, calcium carbonate, cyclobenzaprine, dextrose 50%, dextrose 50%, glucagon (human recombinant), glucose, glucose, insulin aspart U-100, lactulose, levalbuterol, magnesium sulfate IVPB, melatonin, metoclopramide HCl, ondansetron, oxyCODONE, oxyCODONE, potassium chloride **AND** potassium chloride **AND** potassium chloride    Review of patient's allergies indicates:   Allergen Reactions    Boniva [ibandronate] Other (See Comments)     "chest cramps"       Review of Systems   Constitutional: Negative for activity change, appetite change, chills, diaphoresis, fatigue and fever.   HENT: Negative for congestion, ear discharge, ear pain, facial swelling, hearing loss, mouth sores, nosebleeds, postnasal drip, rhinorrhea, sinus pressure, sore throat, trouble swallowing and voice change.    Eyes: Negative for pain, discharge, redness and itching.   Respiratory: Positive for shortness of breath (with exertion, improving). Negative for apnea, cough, choking, chest tightness, wheezing " and stridor.    Cardiovascular: Negative for chest pain, palpitations and leg swelling.   Gastrointestinal: Negative for abdominal distention, abdominal pain, anal bleeding, blood in stool, constipation, diarrhea, nausea, rectal pain and vomiting.   Endocrine: Negative for cold intolerance and heat intolerance.   Genitourinary: Negative for difficulty urinating, dysuria and flank pain.   Musculoskeletal: Negative for arthralgias, back pain, joint swelling, myalgias and neck pain.   Allergic/Immunologic: Positive for immunocompromised state.   Neurological: Negative for dizziness, tremors, light-headedness, numbness and headaches.   Psychiatric/Behavioral: Negative for agitation and hallucinations. The patient is not nervous/anxious.      Objective:   Physical Exam  Vitals signs and nursing note reviewed.   Constitutional:       Appearance: Normal appearance. She is overweight.      Interventions: Nasal cannula in place.   HENT:      Head: Normocephalic and atraumatic.   Eyes:      General: No scleral icterus.     Conjunctiva/sclera: Conjunctivae normal.   Cardiovascular:      Rate and Rhythm: Normal rate and regular rhythm.      Heart sounds: No murmur. No friction rub. No gallop.    Pulmonary:      Breath sounds: Examination of the right-lower field reveals decreased breath sounds. Examination of the left-lower field reveals decreased breath sounds. Decreased breath sounds present. No wheezing, rhonchi or rales.   Abdominal:      General: Bowel sounds are normal. There is no distension.      Palpations: Abdomen is soft.   Musculoskeletal:      Right lower leg: No edema.      Left lower leg: No edema.   Skin:     General: Skin is warm and dry.      Capillary Refill: Capillary refill takes less than 2 seconds.   Neurological:      General: No focal deficit present.      Mental Status: She is oriented to person, place, and time.   Psychiatric:         Mood and Affect: Mood normal.         Behavior: Behavior normal.            Vital Signs (Most Recent):  Temp: 98.4 °F (36.9 °C) (08/18/20 0735)  Pulse: 90 (08/18/20 0919)  Resp: (!) 24 (08/18/20 0919)  BP: 135/82 (08/18/20 0930)  SpO2: (!) 91 % (08/18/20 0919) Vital Signs (24h Range):  Temp:  [97.4 °F (36.3 °C)-98.4 °F (36.9 °C)] 98.4 °F (36.9 °C)  Pulse:  [] 90  Resp:  [10-24] 24  SpO2:  [91 %-97 %] 91 %  BP: (101-135)/(66-82) 135/82     Weight: 90.4 kg (199 lb 4.7 oz)  Body mass index is 31.21 kg/m².      Intake/Output Summary (Last 24 hours) at 8/18/2020 1051  Last data filed at 8/18/2020 0925  Gross per 24 hour   Intake 1690 ml   Output 3000 ml   Net -1310 ml       Significant Labs:  CBC:  Recent Labs   Lab 08/18/20  0636   WBC 14.30*   RBC 2.93*   HGB 9.0*   HCT 28.2*      MCV 96   MCH 30.7   MCHC 31.9*     BMP:  Recent Labs   Lab 08/18/20  0636      K 4.1   CL 98   CO2 29   BUN 27*   CREATININE 0.8   CALCIUM 8.7      Tacrolimus Levels:  Recent Labs   Lab 08/18/20  0636   TACROLIMUS 9.0     Microbiology:  Microbiology Results (last 7 days)     Procedure Component Value Units Date/Time    Culture, Anaerobe [[id number]] Collected: 08/10/20 0758    Order Status: Completed Specimen: Body Fluid from Lung, Left Updated: 08/17/20 0731     Anaerobic Culture No anaerobes isolated    Narrative:      [facility] Lung Left (Aerobic, Anaerobic, AFB, Fungus,  Gram)    Aerobic culture [[id number]] Collected: 08/10/20 0758    Order Status: Completed Specimen: Body Fluid from Lung, Left Updated: 08/13/20 1017     Aerobic Bacterial Culture No growth    Narrative:      [facility] Lung Left (Aerobic, Anaerobic, AFB, Fungus,  Gram)    Culture, Respiratory with Gram Stain [[id number]] Collected: 08/11/20 1315    Order Status: Completed Specimen: Respiratory from BAL, JOSETTE Updated: 08/13/20 0957     Respiratory Culture Normal respiratory yoly      No S aureus or Pseudomonas isolated.     Gram Stain (Respiratory) Rare WBC's     Gram Stain (Respiratory) No organisms seen    AFB  Culture & Smear [[id number]] Collected: 08/11/20 1315    Order Status: Completed Specimen: Respiratory from BAL, JOSETTE Updated: 08/12/20 2127     AFB Culture & Smear Culture in progress     AFB CULTURE STAIN No acid fast bacilli seen.    Fungus culture [[id number]] Collected: 08/11/20 1315    Order Status: Completed Specimen: Respiratory from BAL, JOSETTE Updated: 08/12/20 1226     Fungus (Mycology) Culture Culture in progress    Fungus culture [[id number]] Collected: 08/10/20 0758    Order Status: Completed Specimen: Body Fluid from Lung, Left Updated: 08/12/20 1225     Fungus (Mycology) Culture Culture in progress    Narrative:      [facility] Lung Left (Aerobic, Anaerobic, AFB, Fungus,  Gram)    AFB Culture & Smear [[id number]] Collected: 08/10/20 0758    Order Status: Completed Specimen: Body Fluid from Lung, Left Updated: 08/11/20 2127     AFB Culture & Smear Culture in progress     AFB CULTURE STAIN No acid fast bacilli seen.    Narrative:      [facility] Lung Left (Aerobic, Anaerobic, AFB, Fungus,  Gram)          I have reviewed all pertinent labs within the past 24 hours.

## 2020-08-18 NOTE — SUBJECTIVE & OBJECTIVE
"Interval HPI:   Overnight events: Remains in TSU, NAEON.  BG elevated yesterday evening but trended down to below goal overnight on current insulin regimen.  Prednisone 35 mg PO daily.  Eatin%  Nausea: No  Hypoglycemia and intervention: No  Fever: No  TPN and/or TF: No    /66 (BP Location: Left arm, Patient Position: Lying)   Pulse 80   Temp 98.4 °F (36.9 °C) (Oral)   Resp 12   Ht 5' 7" (1.702 m)   Wt 90.4 kg (199 lb 4.7 oz)   LMP  (LMP Unknown)   SpO2 96%   Breastfeeding No   BMI 31.21 kg/m²     Labs Reviewed and Include    No results for input(s): GLU, CALCIUM, ALBUMIN, PROT, NA, K, CO2, CL, BUN, CREATININE, ALKPHOS, ALT, AST, BILITOT in the last 24 hours.  Lab Results   Component Value Date    WBC 14.30 (H) 2020    HGB 9.0 (L) 2020    HCT 28.2 (L) 2020    MCV 96 2020     2020     No results for input(s): TSH, FREET4 in the last 168 hours.  Lab Results   Component Value Date    HGBA1C 6.0 (H) 2020       Nutritional status:   Body mass index is 31.21 kg/m².  Lab Results   Component Value Date    ALBUMIN 2.8 (L) 2020    ALBUMIN 2.9 (L) 2020    ALBUMIN 2.9 (L) 08/15/2020     No results found for: PREALBUMIN    Estimated Creatinine Clearance: 110.7 mL/min (based on SCr of 0.6 mg/dL).    Accu-Checks  Recent Labs     20  0808 20  1234 20  1710 20  2104 20  0231 20  0826 20  1229 20  1705 20  2149 20  0747   POCTGLUCOSE 152* 168* 281* 220* 161* 138* 192* 225* 262* 114*       Current Medications and/or Treatments Impacting Glycemic Control  Immunotherapy:    Immunosuppressants         Stop Route Frequency     mycophenolate capsule 500 mg      -- Oral 2 times daily     tacrolimus capsule 0.5 mg      -- Oral 2 times daily        Steroids:   Hormones (From admission, onward)    Start     Stop Route Frequency Ordered    20 0900  predniSONE tablet 35 mg      -- Oral Daily 20 1153 "    08/11/20 2023  melatonin tablet 9 mg      -- Oral Nightly PRN 08/11/20 1924        Pressors:    Autonomic Drugs (From admission, onward)    None        Hyperglycemia/Diabetes Medications:   Antihyperglycemics (From admission, onward)    Start     Stop Route Frequency Ordered    08/17/20 1033  insulin aspart U-100 pen 0-5 Units      -- SubQ Before meals & nightly PRN 08/17/20 3771

## 2020-08-18 NOTE — PLAN OF CARE
Problem: Physical Therapy Goal  Goal: Physical Therapy Goal  Description: Goals to be met by: 20     Patient will increase functional independence with mobility by performin. Supine to sit with Modified [place]  2. Sit to supine with Modified [place]  3. Sit to stand transfer with Modified [place]  4. Bed to chair transfer with Modified [place] using no AD.  5. Gait  x 250 feet with Modified [place] using No AD.   6. Lower extremity exercise program x30 reps per handout, with independence  7. Recalls and demonstrates understanding of 3/3 sternal precautions.    Outcome: Ongoing, Progressing

## 2020-08-18 NOTE — PROGRESS NOTES
"[facility]  Endocrinology  Progress Note    Admit Date: 2020     Reason for Consult: Management of pre-dm, Hyperglycemia     Surgical Procedure and Date: Left lung transplant and wound vac application 8/10/20    Diabetes diagnosis year: pre-dm - diet controlled     Lab Results   Component Value Date    HGBA1C 6.0 (H) 2020       Home Diabetes Medications: none (per chart review)    How often checking glucose at home? Not checking    Diabetes Complications include:     none    Complicating diabetes co morbidities:   Glucocorticoid use  s/p lung transplant      HPI:   Patient is a 65 y.o. female with a diagnosis of DM2, IPF, and chronic respiratory failure, who is s/p left lung transplant on 8/10/20.  Patient with controlled DM2 on no medications per chart review.  Endocrinology consulted for DM/BG management.            Interval HPI:   Overnight events: Remains in TSU, NAEON.  BG elevated yesterday evening but trended down to below goal overnight on current insulin regimen.  Prednisone 35 mg PO daily.  Eatin%  Nausea: No  Hypoglycemia and intervention: No  Fever: No  TPN and/or TF: No    /66 (BP Location: Left arm, Patient Position: Lying)   Pulse 80   Temp 98.4 °F (36.9 °C) (Oral)   Resp 12   Ht 5' 7" (1.702 m)   Wt 90.4 kg (199 lb 4.7 oz)   LMP  (LMP Unknown)   SpO2 96%   Breastfeeding No   BMI 31.21 kg/m²     Labs Reviewed and Include    No results for input(s): GLU, CALCIUM, ALBUMIN, PROT, NA, K, CO2, CL, BUN, CREATININE, ALKPHOS, ALT, AST, BILITOT in the last 24 hours.  Lab Results   Component Value Date    WBC 14.30 (H) 2020    HGB 9.0 (L) 2020    HCT 28.2 (L) 2020    MCV 96 2020     2020     No results for input(s): TSH, FREET4 in the last 168 hours.  Lab Results   Component Value Date    HGBA1C 6.0 (H) 2020       Nutritional status:   Body mass index is 31.21 kg/m².  Lab Results   Component Value Date    ALBUMIN 2.8 " (L) 08/17/2020    ALBUMIN 2.9 (L) 08/16/2020    ALBUMIN 2.9 (L) 08/15/2020     No results found for: PREALBUMIN    Estimated Creatinine Clearance: 110.7 mL/min (based on SCr of 0.6 mg/dL).    Accu-Checks  Recent Labs     08/16/20  0808 08/16/20  1234 08/16/20  1710 08/16/20  2104 08/17/20  0231 08/17/20  0826 08/17/20  1229 08/17/20  1705 08/17/20  2149 08/18/20  0747   POCTGLUCOSE 152* 168* 281* 220* 161* 138* 192* 225* 262* 114*       Current Medications and/or Treatments Impacting Glycemic Control  Immunotherapy:    Immunosuppressants         Stop Route Frequency     mycophenolate capsule 500 mg      -- Oral 2 times daily     tacrolimus capsule 0.5 mg      -- Oral 2 times daily        Steroids:   Hormones (From admission, onward)    Start     Stop Route Frequency Ordered    08/17/20 0900  predniSONE tablet 35 mg      -- Oral Daily 08/11/20 1153    08/11/20 2023  melatonin tablet 9 mg      -- Oral Nightly PRN 08/11/20 1924        Pressors:    Autonomic Drugs (From admission, onward)    None        Hyperglycemia/Diabetes Medications:   Antihyperglycemics (From admission, onward)    Start     Stop Route Frequency Ordered    08/17/20 1033  insulin aspart U-100 pen 0-5 Units      -- SubQ Before meals & nightly PRN 08/17/20 0933          ASSESSMENT and PLAN    * S/P lung transplant  S/p lung transplant on 8/10/20  Managed per primary team  Avoid hypoglycemia  Optimize BG control for surgical wound healing        Prediabetes  BG goal 140 - 180     Start Novolog 2 units with meals  Low dose correction scale  BG monitoring AC/HS    Discussed discharge options at bedside today with patient and family at length - orals vs insulin.  Will start bedside RN insulin teaching today in preparation for discharge.  Plan to discuss further tomorrow.    Discharge Planning:  TBD. Please notify endocrinology prior to discharge.         Adrenal cortical steroids causing adverse effect in therapeutic use  On steroid taper per transplant  team; may elevate BG readings        Immunosuppression  May increase insulin resistance.             Mendez Dsouza NP  Endocrinology  [facility]

## 2020-08-18 NOTE — PT/OT/SLP PROGRESS
Physical Therapy Treatment    Patient Name:  Chely Becerra   MRN:  [id number]    Recommendations:     Discharge Recommendations:  home with home health   Discharge Equipment Recommendations: none   Barriers to discharge: None    Assessment:     Chely Becerra is a 65 y.o. female admitted with a medical diagnosis of S/P lung transplant.  She presents with the following impairments/functional limitations:  impaired endurance, weakness, impaired self care skills, impaired functional mobilty, gait instability, impaired balance, orthopedic precautions .Pt Progressing with PT Intervention. Pt Progressing with improving gait distance. Pt would continue to benefit from skilled PT to address overall functional mobility and goals. Goals remain appropriate.      Rehab Prognosis: Good; patient would benefit from acute skilled PT services to address these deficits and reach maximum level of function.    Recent Surgery: Procedure(s) (LRB):  TRANSPLANT, LUNG - 4:30AM start (Left)  APPLICATION, WOUND VAC, 40 x 5cm (Left)  RESECTION, LUNG (Left) 8 Days Post-Op    Plan:     During this hospitalization, patient to be seen 3 x/week to address the identified rehab impairments via gait training, therapeutic activities, therapeutic exercises, neuromuscular re-education and progress toward the following goals:    · Plan of Care Expires:  09/11/20    Subjective     Patient/Family Comments/goals: I am getting better  Pain/Comfort:  · Pain Rating 1: 0/10  · Pain Rating Post-Intervention 1: 0/10      Objective:     Communicated with RN prior to session.  Patient found up in chair with wound vac, telemetry, pulse ox (continuous) upon PT entry to room.     General Precautions: Standard, fall, sternal   Orthopedic Precautions:N/A   Braces:       Functional Mobility:  · Sit to Stand:  Contact guard assistance with no AD,pt with vcs for sternal precautions and technique for improved mobility.  · Gait: 150 ft x 2 with  CGA with HHA with mask on  in [place].pt demo  decreased step length, decreased sanna, decreased gait speed. No LOB.  Pt required seated rest break between trials. Pt O2 sats from 90%-95% on RA  AM-PAC 6 CLICK MOBILITY  Turning over in bed (including adjusting bedclothes, sheets and blankets)?: 3  Sitting down on and standing up from a chair with arms (e.g., wheelchair, bedside commode, etc.): 3  Moving from lying on back to sitting on the side of the bed?: 3  Moving to and from a bed to a chair (including a wheelchair)?: 3  Need to walk in hospital room?: 3  Climbing 3-5 steps with a railing?: 3  Basic Mobility Total Score: 18       Therapeutic Activities and Exercises:   Therapist provided instruction and educated of patient on progress, safety,d/c,PT POC,   proper body mechanics, energy conservation, and fall prevention strategies during tasks listed above, on the effects of prolonged immobility and the importance of performing OOB activity and exercises to promote healing and reduce recovery time   Patient facilitated therex  seated in bedside chair B LE AROM AP, LAQ, Hip Flexion, Hip Abd/Add with facilitation for correct performance and sequencing. Exercises performed to develop and maintain pt's strength, endurance and flexibility.   Updated white board with appropriate PT mobility information for medical team notification  Donned an extra gown  Call nursing/pct to transfer to chair/use bathroom. Pt stated understanding  Bedside table in front of patient and area set up for function, convenience, and safety. RN aware of patient's mobility needs and status. Questions/concerns addressed within PTA scope of practice; patient with no further questions. Time was provided for active listening, discussion of health disposition, and discussion of safe discharge. Pt?verbalized?agreement .  Sternal Precautions: patient able to voice?3/3 precautions without assistance.?Pt provided education of sternal precautions.?Patient able to maintain  precautions throughout session with?min?verbal cues  Pt issued and instructed to perform supine and seated HEP 2-3 times daily, with verabal understanding    Patient left up in chair with all lines intact, call button in reach, nsg notified and daughter present..    GOALS:   Multidisciplinary Problems     Physical Therapy Goals        Problem: Physical Therapy Goal    Goal Priority Disciplines Outcome Goal Variances Interventions   Physical Therapy Goal     PT, PT/OT Ongoing, Progressing     Description: Goals to be met by: 20     Patient will increase functional independence with mobility by performin. Supine to sit with Modified [place]  2. Sit to supine with Modified [place]  3. Sit to stand transfer with Modified [place]  4. Bed to chair transfer with Modified [place] using no AD.  5. Gait  x 250 feet with Modified [place] using No AD.   6. Lower extremity exercise program x30 reps per handout, with independence  7. Recalls and demonstrates understanding of 3/3 sternal precautions.                     Time Tracking:     PT Received On: 20  PT Start Time: 0951     PT Stop Time: 1014  PT Total Time (min): 23 min     Billable Minutes: Gait Training 15 and Therapeutic Activity 8    Treatment Type: Treatment  PT/PTA: PTA     PTA Visit Number: 3     Regan Carbajal PTA  2020

## 2020-08-18 NOTE — PROGRESS NOTES
Provided the patient and her daughter Ivy with a binder containing post-lung transplant educational material. Instructed them to review the information in preparation for teaching sessions with transplant team members that will occur prior to discharge.  The patient and her daughter denied having any questions and verbalized their agreement to read the transplant information.

## 2020-08-18 NOTE — PROGRESS NOTES
Discharge Planning:    SW met with pt and pt's daughter while on rounds with team. Pt scheduled to d/c to [facility] this week. Pt's daughter Ivy Levy ([phone]) scheduled to check-in to [facility] tomorrow. SW confirmed w/ pt's daughter that 11:00 am check-in works. Pt was provided with a handout which includes information about [facility] (policies, sliding scale fee, apartment furnishings, a list of items the patient is expected to provide, as well as other pertinent instructions). Profile sheet provided for fee assessment. Pt fee assessed to be $0 per night. Pt coping adequately at this time. Pt and family deny any issues or concerns at this time. SW contact information provided. SW to remain available.

## 2020-08-18 NOTE — ASSESSMENT & PLAN NOTE
BG goal 140 - 180     Start Novolog 2 units with meals  Low dose correction scale  BG monitoring AC/HS    Discussed discharge options at bedside today with patient and family at length - orals vs insulin.  Will start bedside RN insulin teaching today in preparation for discharge.  Plan to discuss further tomorrow.    Discharge Planning:  TBD. Please notify endocrinology prior to discharge.

## 2020-08-18 NOTE — PLAN OF CARE
Pt sat chair all day today and tolerated well. Ambulated in branch X2 with therapy wearing non-skid socks. Tolerating diet better today. Had BM today. Miralax and Senna held. Colace given. Glucoses monitored. Correction Insulin indicated for pre-supper uut=566. Insulin gtt discontinued this am along with meal Insulin. Afebrile. Vanc discontinued. Augmentin continues. Oxycodone relieved pain. Flexeril relieved muscle spasms. Self-meds prepared correctly. Mg+2=1.6. Mg+2 rider given. MgOx po to start tonight.

## 2020-08-18 NOTE — PLAN OF CARE
Pt remains AAO x 4 with VSS throughout shift  Chief complaint of midsternal incisional; moderate relief with PRN oxy and flexeril  PA and LAT performed; results pending  Wound vac remains secure and intact to midsternal incision; to be removed today 8/18  PO intake encouraged as tolerable  I/O documented in flow sheets  BG monitored and controlled; SS insulin administered  Activity encouraged as tolerable; x 1 assist  Tele and visi monitor remain in place  Fall precautions maintained  Pt remains free from falls and injuries  Will continue to monitor

## 2020-08-18 NOTE — PT/OT/SLP PROGRESS
Occupational Therapy   Treatment    Name: Chely Becerra  MRN: [id number]  Admitting Diagnosis:  S/P lung transplant  8 Days Post-Op    Recommendations:     Discharge Recommendations: home with home health  Discharge Equipment Recommendations:  shower chair  Barriers to discharge:  None    Assessment:     Chely Becerra is a 65 y.o. female with a medical diagnosis of S/P lung transplant. Pt progressing very well and has a great attitude and motivation in regards to her recovery. Performance deficits affecting function are weakness, impaired endurance, impaired self care skills, impaired functional mobilty, gait instability, impaired balance, decreased coordination, impaired cardiopulmonary response to activity.     Rehab Prognosis:  Good; patient would benefit from acute skilled OT services to address these deficits and reach maximum level of function.       Plan:     Patient to be seen 3 x/week to address the above listed problems via self-care/home management, therapeutic activities, therapeutic exercises  · Plan of Care Expires:    · Plan of Care Reviewed with: patient, daughter    Subjective     Pain/Comfort:  · Pain Rating 1: 0/10    Objective:     Communicated with: rn prior to session.  Patient found up in chair with peripheral IV upon OT entry to room.    General Precautions: Standard, fall, sternal     Occupational Performance:     Bed Mobility:    · In chair.    Functional Mobility/Transfers:  · Patient completed Sit <> Stand Transfer with stand by assistance  with  no assistive device   · Functional Mobility: Pt walked ~ 200' with standing rest breaks as needed.    [facility] 6 Click ADL: 20    Treatment & Education:  Discussed OT POC and progress.    Patient left up in chair with all lines intact and call button in reachEducation:      GOALS:   Multidisciplinary Problems     Occupational Therapy Goals        Problem: Occupational Therapy Goal    Goal Priority Disciplines Outcome Interventions   Occupational  Therapy Goal     OT, PT/OT Ongoing, Progressing    Description: Goals to be met by: 7 days 8/19/2020     Patient will increase functional independence with ADLs by performing:    Pt to complete standing g/h skills with supervision.  Pt to complete UE dressing with set-up  Pt to complete LE dressing with SBA  Pt to complete toileting with supervisionPt to complete t/f skills including bed, chair and commode with supervision.                      Time Tracking:     OT Date of Treatment: 08/18/20  OT Start Time: 1248  OT Stop Time: 1304  OT Total Time (min): 16 min    Billable Minutes:Therapeutic Activity 16    KHOI Lara  8/18/2020

## 2020-08-18 NOTE — PROGRESS NOTES
"[facility]  Lung Transplant  Progress Note - Floor    Patient Name: Chely Becerra  MRN: [id number]  Admission Date: 8/9/2020  Hospital Length of Stay: 9 days  Post-Operative Day: 8  Attending Physician: Niraj Garza MD  Primary Care Provider: ZAHIDA Stack MD     Subjective:     Interval History: No acute events overnight.  Plan for discharge this week    Continuous Infusions:  Scheduled Meds:   amoxicillin-clavulanate 500-125mg  1 tablet Oral BID    aspirin  81 mg Oral Daily    docusate sodium  100 mg Oral BID    enoxaparin  40 mg Subcutaneous Q24H    gabapentin  100 mg Oral TID    insulin aspart U-100  2 Units Subcutaneous TIDWM    levalbuterol  1.25 mg Nebulization Q6H WAKE    lidocaine  1 patch Transdermal Q24H    magnesium oxide  400 mg Oral BID    metoprolol tartrate  25 mg Oral BID    mycophenolate  500 mg Oral BID    NIFEdipine  30 mg Oral Daily    pantoprazole  40 mg Oral Daily    polyethylene glycol  17 g Oral Daily    predniSONE  35 mg Oral Daily    senna  17.2 mg Oral Daily    sulfamethoxazole-trimethoprim 800-160mg  1 tablet Oral Every Mon, Wed, Fri    tacrolimus  0.5 mg Oral BID    valGANciclovir  450 mg Oral BID    voriconazole  350 mg Oral BID     PRN Meds:acetaminophen, calcium carbonate, cyclobenzaprine, dextrose 50%, dextrose 50%, glucagon (human recombinant), glucose, glucose, insulin aspart U-100, lactulose, levalbuterol, magnesium sulfate IVPB, melatonin, metoclopramide HCl, ondansetron, oxyCODONE, oxyCODONE, potassium chloride **AND** potassium chloride **AND** potassium chloride    Review of patient's allergies indicates:   Allergen Reactions    Boniva [ibandronate] Other (See Comments)     "chest cramps"       Review of Systems   Constitutional: Negative for activity change, appetite change, chills, diaphoresis, fatigue and fever.   HENT: Negative for congestion, ear discharge, ear pain, facial swelling, hearing loss, mouth sores, " nosebleeds, postnasal drip, rhinorrhea, sinus pressure, sore throat, trouble swallowing and voice change.    Eyes: Negative for pain, discharge, redness and itching.   Respiratory: Positive for shortness of breath (with exertion, improving). Negative for apnea, cough, choking, chest tightness, wheezing and stridor.    Cardiovascular: Negative for chest pain, palpitations and leg swelling.   Gastrointestinal: Negative for abdominal distention, abdominal pain, anal bleeding, blood in stool, constipation, diarrhea, nausea, rectal pain and vomiting.   Endocrine: Negative for cold intolerance and heat intolerance.   Genitourinary: Negative for difficulty urinating, dysuria and flank pain.   Musculoskeletal: Negative for arthralgias, back pain, joint swelling, myalgias and neck pain.   Allergic/Immunologic: Positive for immunocompromised state.   Neurological: Negative for dizziness, tremors, light-headedness, numbness and headaches.   Psychiatric/Behavioral: Negative for agitation and hallucinations. The patient is not nervous/anxious.      Objective:   Physical Exam  Vitals signs and nursing note reviewed.   Constitutional:       Appearance: Normal appearance. She is overweight.      Interventions: Nasal cannula in place.   HENT:      Head: Normocephalic and atraumatic.   Eyes:      General: No scleral icterus.     Conjunctiva/sclera: Conjunctivae normal.   Cardiovascular:      Rate and Rhythm: Normal rate and regular rhythm.      Heart sounds: No murmur. No friction rub. No gallop.    Pulmonary:      Breath sounds: Examination of the right-lower field reveals decreased breath sounds. Examination of the left-lower field reveals decreased breath sounds. Decreased breath sounds present. No wheezing, rhonchi or rales.   Abdominal:      General: Bowel sounds are normal. There is no distension.      Palpations: Abdomen is soft.   Musculoskeletal:      Right lower leg: No edema.      Left lower leg: No edema.   Skin:      General: Skin is warm and dry.      Capillary Refill: Capillary refill takes less than 2 seconds.   Neurological:      General: No focal deficit present.      Mental Status: She is oriented to person, place, and time.   Psychiatric:         Mood and Affect: Mood normal.         Behavior: Behavior normal.           Vital Signs (Most Recent):  Temp: 98.4 °F (36.9 °C) (08/18/20 0735)  Pulse: 90 (08/18/20 0919)  Resp: (!) 24 (08/18/20 0919)  BP: 135/82 (08/18/20 0930)  SpO2: (!) 91 % (08/18/20 0919) Vital Signs (24h Range):  Temp:  [97.4 °F (36.3 °C)-98.4 °F (36.9 °C)] 98.4 °F (36.9 °C)  Pulse:  [] 90  Resp:  [10-24] 24  SpO2:  [91 %-97 %] 91 %  BP: (101-135)/(66-82) 135/82     Weight: 90.4 kg (199 lb 4.7 oz)  Body mass index is 31.21 kg/m².      Intake/Output Summary (Last 24 hours) at 8/18/2020 1051  Last data filed at 8/18/2020 0925  Gross per 24 hour   Intake 1690 ml   Output 3000 ml   Net -1310 ml       Significant Labs:  CBC:  Recent Labs   Lab 08/18/20  0636   WBC 14.30*   RBC 2.93*   HGB 9.0*   HCT 28.2*      MCV 96   MCH 30.7   MCHC 31.9*     BMP:  Recent Labs   Lab 08/18/20  0636      K 4.1   CL 98   CO2 29   BUN 27*   CREATININE 0.8   CALCIUM 8.7      Tacrolimus Levels:  Recent Labs   Lab 08/18/20  0636   TACROLIMUS 9.0     Microbiology:  Microbiology Results (last 7 days)     Procedure Component Value Units Date/Time    Culture, Anaerobe [[id number]] Collected: 08/10/20 4972    Order Status: Completed Specimen: Body Fluid from Lung, Left Updated: 08/17/20 0731     Anaerobic Culture No anaerobes isolated    Narrative:      Bronchus Donor Lung Left (Aerobic, Anaerobic, AFB, Fungus,  Gram)    Aerobic culture [[id number]] Collected: 08/10/20 9038    Order Status: Completed Specimen: Body Fluid from Lung, Left Updated: 08/13/20 1017     Aerobic Bacterial Culture No growth    Narrative:      Bronchus Donor Lung Left (Aerobic, Anaerobic, AFB, Fungus,  Gram)    Culture, Respiratory with Gram Stain  [[id number]] Collected: 08/11/20 1315    Order Status: Completed Specimen: Respiratory from BAL, JOSETTE Updated: 08/13/20 0958     Respiratory Culture Normal respiratory yoly      No S aureus or Pseudomonas isolated.     Gram Stain (Respiratory) Rare WBC's     Gram Stain (Respiratory) No organisms seen    AFB Culture & Smear [[id number]] Collected: 08/11/20 1315    Order Status: Completed Specimen: Respiratory from BAL, JOSETTE Updated: 08/12/20 2127     AFB Culture & Smear Culture in progress     AFB CULTURE STAIN No acid fast bacilli seen.    Fungus culture [[id number]] Collected: 08/11/20 1315    Order Status: Completed Specimen: Respiratory from BAL, JOSETTE Updated: 08/12/20 1226     Fungus (Mycology) Culture Culture in progress    Fungus culture [[id number]] Collected: 08/10/20 0758    Order Status: Completed Specimen: Body Fluid from Lung, Left Updated: 08/12/20 1225     Fungus (Mycology) Culture Culture in progress    Narrative:      Bronchus Donor Lung Left (Aerobic, Anaerobic, AFB, Fungus,  Gram)    AFB Culture & Smear [[id number]] Collected: 08/10/20 0758    Order Status: Completed Specimen: Body Fluid from Lung, Left Updated: 08/11/20 2127     AFB Culture & Smear Culture in progress     AFB CULTURE STAIN No acid fast bacilli seen.    Narrative:      Bronchus Donor Lung Left (Aerobic, Anaerobic, AFB, Fungus,  Gram)          I have reviewed all pertinent labs within the past 24 hours.        Assessment/Plan:     * S/P lung transplant  POD #8 s/p uncomplicated LSLT for IPF. PGD 2 for 24 hours. Plan for discharge this week.    Immunosuppression  Induction with solumedrol and basiliximab. Repeat basiliximab on POD#4.  Continue tacrolimus, MMF, and prednisone taper.    Prophylactic antibiotic  CMV D-/R+ On vancomycin and cefepime for routine surgical prophylaxis. Bactrim, ganciclovir, and voriconazole for OIP. Will follow up on donor cultures and adjust therapy as needed.   Donor culture growing Actinomycete, so switched  from cefepime to amoxicillin.     Prediabetes  Endocrine following, appreciate recs.     Acute blood loss anemia  Expected post-op. Continue to trend.     Leukocytosis  Expected post-op. Continue to trend.     Essential hypertension  Continue nifedipine        Hussein Guallpa NP  Lung Transplant  [facility]

## 2020-08-19 NOTE — PROGRESS NOTES
Pt progressing well anticipating dc this week. Pt VSS including 02 sats 94-96% on RA. Pt dsg to inc and chest tube site cdi. Standard precautions maintained, continues amoxicillin. Pt requested po pain med before sleep for incisional pain/chest soreness 6/10 with relief obtained. Bm this shift and adequate uop. Pt remains ffi, calls for assist oob, non slip socks on and call bell within reach. Pt's daughter pulled self meds 100%. This Rn attempted to get pt to admin own insulin coverage over night but pt pleaded for this Rn to do it because her hands were shaking too much. Pt stated she would do it today.

## 2020-08-19 NOTE — SUBJECTIVE & OBJECTIVE
"Subjective:     Interval History: No acute events overnight.  Plan for discharge tomorrow    Continuous Infusions:  Scheduled Meds:   amoxicillin-clavulanate 500-125mg  1 tablet Oral BID    aspirin  81 mg Oral Daily    docusate sodium  100 mg Oral BID    enoxaparin  40 mg Subcutaneous Q24H    gabapentin  100 mg Oral TID    insulin aspart U-100  3 Units Subcutaneous TIDWM    levalbuterol  1.25 mg Nebulization Q6H WAKE    lidocaine  1 patch Transdermal Q24H    magnesium oxide  400 mg Oral BID    metoprolol tartrate  25 mg Oral BID    mycophenolate  500 mg Oral BID    NIFEdipine  30 mg Oral Daily    pantoprazole  40 mg Oral Daily    polyethylene glycol  17 g Oral Daily    [START ON 8/20/2020] predniSONE  25 mg Oral Daily    senna  17.2 mg Oral Daily    sulfamethoxazole-trimethoprim 800-160mg  1 tablet Oral Every Mon, Wed, Fri    tacrolimus  0.5 mg Oral BID    valGANciclovir  450 mg Oral BID    voriconazole  350 mg Oral BID     PRN Meds:acetaminophen, calcium carbonate, cyclobenzaprine, dextrose 50%, dextrose 50%, glucagon (human recombinant), glucose, glucose, insulin aspart U-100, lactulose, levalbuterol, magnesium sulfate IVPB, melatonin, metoclopramide HCl, ondansetron, oxyCODONE, oxyCODONE, potassium chloride **AND** potassium chloride **AND** potassium chloride    Review of patient's allergies indicates:   Allergen Reactions    Boniva [ibandronate] Other (See Comments)     "chest cramps"       Review of Systems   Constitutional: Negative for activity change, appetite change, chills, diaphoresis, fatigue and fever.   HENT: Negative for congestion, ear discharge, ear pain, facial swelling, hearing loss, mouth sores, nosebleeds, postnasal drip, rhinorrhea, sinus pressure, sore throat, trouble swallowing and voice change.    Eyes: Negative for pain, discharge, redness and itching.   Respiratory: Positive for shortness of breath (with exertion, improving). Negative for apnea, cough, choking, chest " tightness, wheezing and stridor.    Cardiovascular: Negative for chest pain, palpitations and leg swelling.   Gastrointestinal: Negative for abdominal distention, abdominal pain, anal bleeding, blood in stool, constipation, diarrhea, nausea, rectal pain and vomiting.   Endocrine: Negative for cold intolerance and heat intolerance.   Genitourinary: Negative for difficulty urinating, dysuria and flank pain.   Musculoskeletal: Negative for arthralgias, back pain, joint swelling, myalgias and neck pain.   Allergic/Immunologic: Positive for immunocompromised state.   Neurological: Negative for dizziness, tremors, light-headedness, numbness and headaches.   Psychiatric/Behavioral: Negative for agitation and hallucinations. The patient is not nervous/anxious.      Objective:   Physical Exam  Vitals signs and nursing note reviewed.   Constitutional:       Appearance: Normal appearance. She is overweight.      Interventions: Nasal cannula in place.   HENT:      Head: Normocephalic and atraumatic.   Eyes:      General: No scleral icterus.     Conjunctiva/sclera: Conjunctivae normal.   Cardiovascular:      Rate and Rhythm: Normal rate and regular rhythm.      Heart sounds: No murmur. No friction rub. No gallop.    Pulmonary:      Breath sounds: Examination of the right-lower field reveals decreased breath sounds. Examination of the left-lower field reveals decreased breath sounds. Decreased breath sounds present. No wheezing, rhonchi or rales.   Abdominal:      General: Bowel sounds are normal. There is no distension.      Palpations: Abdomen is soft.   Musculoskeletal:      Right lower leg: No edema.      Left lower leg: No edema.   Skin:     General: Skin is warm and dry.      Capillary Refill: Capillary refill takes less than 2 seconds.   Neurological:      General: No focal deficit present.      Mental Status: She is oriented to person, place, and time.   Psychiatric:         Mood and Affect: Mood normal.         Behavior:  Behavior normal.           Vital Signs (Most Recent):  Temp: 98.9 °F (37.2 °C) (08/18/20 2014)  Pulse: 91 (08/19/20 0842)  Resp: 18 (08/19/20 0842)  BP: 118/76 (08/19/20 0815)  SpO2: (!) 93 % (08/19/20 0842) Vital Signs (24h Range):  Temp:  [97.8 °F (36.6 °C)-98.9 °F (37.2 °C)] 98.9 °F (37.2 °C)  Pulse:  [] 91  Resp:  [12-28] 18  SpO2:  [92 %-99 %] 93 %  BP: (100-120)/(65-78) 118/76     Weight: 90.4 kg (199 lb 4.7 oz)  Body mass index is 31.21 kg/m².      Intake/Output Summary (Last 24 hours) at 8/19/2020 1017  Last data filed at 8/19/2020 0900  Gross per 24 hour   Intake 770 ml   Output 1550 ml   Net -780 ml       Significant Labs:  CBC:  Recent Labs   Lab 08/19/20  0621   WBC 12.38   RBC 2.83*   HGB 8.6*   HCT 27.2*      MCV 96   MCH 30.4   MCHC 31.6*     BMP:  Recent Labs   Lab 08/19/20  0621   *   K 4.6      CO2 28   BUN 29*   CREATININE 0.8   CALCIUM 8.8      Tacrolimus Levels:  Recent Labs   Lab 08/19/20  0621   TACROLIMUS 8.2     Microbiology:  Microbiology Results (last 7 days)     Procedure Component Value Units Date/Time    Culture, Anaerobe [[id number]] Collected: 08/10/20 0758    Order Status: Completed Specimen: Body Fluid from Lung, Left Updated: 08/17/20 0731     Anaerobic Culture No anaerobes isolated    Narrative:      Bronchus Donor Lung Left (Aerobic, Anaerobic, AFB, Fungus,  Gram)    Aerobic culture [[id number]] Collected: 08/10/20 0758    Order Status: Completed Specimen: Body Fluid from Lung, Left Updated: 08/13/20 1017     Aerobic Bacterial Culture No growth    Narrative:      Bronchus Donor Lung Left (Aerobic, Anaerobic, AFB, Fungus,  Gram)    Culture, Respiratory with Gram Stain [[id number]] Collected: 08/11/20 1315    Order Status: Completed Specimen: Respiratory from BAL, JOSETTE Updated: 08/13/20 0987     Respiratory Culture Normal respiratory yoly      No S aureus or Pseudomonas isolated.     Gram Stain (Respiratory) Rare WBC's     Gram Stain (Respiratory) No organisms  seen    AFB Culture & Smear [[id number]] Collected: 08/11/20 1315    Order Status: Completed Specimen: Respiratory from BAL, JOSETTE Updated: 08/12/20 2127     AFB Culture & Smear Culture in progress     AFB CULTURE STAIN No acid fast bacilli seen.    Fungus culture [[id number]] Collected: 08/11/20 1315    Order Status: Completed Specimen: Respiratory from BAL, JOSETTE Updated: 08/12/20 1226     Fungus (Mycology) Culture Culture in progress    Fungus culture [[id number]] Collected: 08/10/20 0758    Order Status: Completed Specimen: Body Fluid from Lung, Left Updated: 08/12/20 1225     Fungus (Mycology) Culture Culture in progress    Narrative:      Bronchus Donor Lung Left (Aerobic, Anaerobic, AFB, Fungus,  Gram)          I have reviewed all pertinent labs within the past 24 hours.    Diagnostic Results:  X-Ray: Reviewed  Postoperative changes as before.  Heart size normal.  The bilateral chest tube have been removed.  Small right apical pneumothorax as before.  No significant changes.  Elevated right hemidiaphragm similar to the previous study

## 2020-08-19 NOTE — NURSING
Demonstrated to pt and daughter how to apply Betadine to MSI and old CT sites and how to apply dressings. Supplies placed at BS.

## 2020-08-19 NOTE — PLAN OF CARE
"    [facility]    HOME HEALTH ORDERS  FACE TO FACE ENCOUNTER    Patient Name: Chely Becerra  YOB: 1955    PCP: ZAHIDA Stack MD   PCP Address: [address] [place] [address]  PCP Phone Number: [phone]  PCP Fax: [phone]    Discharging Team(s):    [facility]  [facility]    Encounter Date: 08/19/2020    Admit to Home Health    Diagnoses:  Active Hospital Problems    Diagnosis  POA    *S/P lung transplant [Z94.2]  Not Applicable     Priority: 1 - High    Immunosuppression [D89.9]  No     Priority: 2     Prophylactic antibiotic [Z79.2]  Not Applicable     Priority: 3     Prediabetes [R73.03]  Yes     Priority: 4     Acute blood loss anemia [D62]  No     Priority: 5     Leukocytosis [D72.829]  No     Priority: 6     Essential hypertension [I10]  Yes     Priority: 7     Muscular deconditioning [R29.898]  Yes    Adrenal cortical steroids causing adverse effect in therapeutic use [T38.0X5A]  No      Resolved Hospital Problems    Diagnosis Date Resolved POA    Overweight (BMI 25.0-29.9) [E66.3] 08/10/2020 Yes    Postprocedural hypotension [I95.81] 08/11/2020 No    Thrombocytopenia, unspecified [D69.6] 08/11/2020 No       No future appointments.        I have seen and examined this patient face to face today. My clinical findings that support the need for the home health skilled services and home bound status are the following:  Weakness/numbness causing balance and gait disturbance due to Surgery making it taxing to leave home.    Allergies:  Review of patient's allergies indicates:   Allergen Reactions    Boniva [ibandronate] Other (See Comments)     "chest cramps"       Diet: regular diet    Activities: activity as tolerated and sternal precautions    Nursing:   SN to complete comprehensive assessment including routine vital signs. Instruct on disease process and s/s of complications to report to MD. Review/verify medication " list sent home with the patient at time of discharge  and instruct patient/caregiver as needed. Frequency may be adjusted depending on start of care date.    Notify MD if SBP > 160 or < 90; DBP > 90 or < 50; HR > 120 or < 50; Temp > 100.5; Other:   Shortness of breath      CONSULTS:    Physical Therapy to evaluate and treat. Evaluate for home safety and equipment needs; Establish/upgrade home exercise program. Perform / instruct on therapeutic exercises, gait training, transfer training, and Range of Motion.  Occupational Therapy to evaluate and treat. Evaluate home environment for safety and equipment needs. Perform/Instruct on transfers, ADL training, ROM, and therapeutic exercises.    MISCELLANEOUS CARE:  Wound Care Orders:  yes:  Surgical Wound:  Location: sternal    WOUND CARE ORDERS  yes:  Paint sternal incision with betadine and cover with Tefla island dressing at least 3 days per week.  Paint chest tube incisions with betadine and cover with gauze dressing at least 3 days per week       Medications: Review discharge medications with patient and family and provide education.      Current Discharge Medication List      START taking these medications    Details   docusate sodium (COLACE) 100 MG capsule Take 1 capsule (100 mg total) by mouth 2 (two) times daily as needed for Constipation.  Qty: 60 capsule, Refills: 11      magnesium oxide (MAG-OX) 400 mg (241.3 mg magnesium) tablet Take 1 tablet (400 mg total) by mouth 2 (two) times daily.  Qty: 60 tablet, Refills: 11      mycophenolate (CELLCEPT) 250 mg Cap Take 4 capsules (1,000 mg total) by mouth 2 (two) times daily.  Qty: 240 capsule, Refills: 11      NIFEdipine (PROCARDIA-XL) 30 MG (OSM) 24 hr tablet Take 1 tablet (30 mg total) by mouth once daily.  Qty: 30 tablet, Refills: 11    Comments: .      ondansetron (ZOFRAN-ODT) 8 MG TbDL DISSOLVE 1 tablet (8 mg total) by mouth every 8 (eight) hours as needed.  Qty: 15 tablet, Refills: 11      polyethylene glycol  (GLYCOLAX) 17 gram/dose powder Take 17 g by mouth 2 (two) times daily as needed.  Qty: 510 g, Refills: 5      !! predniSONE (DELTASONE) 5 MG tablet Take 4 tablets (20 mg total) by mouth once daily. for 23 days  Qty: 92 tablet, Refills: 0      !! predniSONE (DELTASONE) 5 MG tablet Take 1 tablet (5 mg total) by mouth once daily.  Qty: 30 tablet, Refills: 1      !! predniSONE (DELTASONE) 5 MG tablet Take 1 tablet (5 mg total) by mouth once daily.  Qty: 29 tablet, Refills: 0      !! predniSONE (DELTASONE) 5 MG tablet Take 1 tablet (5 mg total) by mouth once daily.  Qty: 30 tablet, Refills: 1      !! predniSONE (DELTASONE) 5 MG tablet Take 1 tablet (5 mg total) by mouth once daily.  Qty: 30 tablet, Refills: 11      senna (SENOKOT) 8.6 mg tablet Take 2 tablets by mouth once daily.  Qty: 60 tablet, Refills: 11      sulfamethoxazole-trimethoprim 800-160mg (BACTRIM DS) 800-160 mg Tab Take 1 tablet by mouth every Mon, Wed, Fri.  Qty: 15 tablet, Refills: 11      !! tacrolimus (PROGRAF) 0.5 MG Cap Take 1 capsule (0.5 mg total) by mouth every 12 (twelve) hours.  Qty: 60 capsule, Refills: 11      !! tacrolimus (PROGRAF) 1 MG Cap Take 1 capsule (1 mg total) by mouth every 12 (twelve) hours.  Qty: 60 capsule, Refills: 11      valGANciclovir (VALCYTE) 450 mg Tab Take 1 tablet (450 mg total) by mouth 2 (two) times daily.  Qty: 60 tablet, Refills: 11      voriconazole (VFEND) 200 MG Tab Take by mouth 300 mg (1 and 1/2 tablets) by mouth twice daily  Qty: 90 tablet, Refills: 5       !! - Potential duplicate medications found. Please discuss with provider.      CONTINUE these medications which have CHANGED    Details   aspirin (ECOTRIN) 81 MG EC tablet Take 1 tablet (81 mg total) by mouth once daily.  Qty: 30 tablet, Refills: 11      atorvastatin (LIPITOR) 20 MG tablet Take 1 tablet (20 mg total) by mouth once daily.  Qty: 30 tablet, Refills: 11      gabapentin (NEURONTIN) 100 MG capsule Take 1 capsule (100 mg total) by mouth 3 (three)  times daily.  Qty: 90 capsule, Refills: 11      lansoprazole (PREVACID) 15 MG capsule Take 1 capsule (15 mg total) by mouth once daily.  Qty: 30 capsule, Refills: 11         CONTINUE these medications which have NOT CHANGED    Details   betamethasone dipropionate (DIPROLENE) 0.05 % cream APPLY TO AFFECTED AREA(S) TWO TIMES A DAY  Qty: 30 g, Refills: 1      ergocalciferol (ERGOCALCIFEROL) 50,000 unit Cap Take 50,000 Units by mouth every 14 (fourteen) days.      ferrous sulfate (FEOSOL) 325 mg (65 mg iron) Tab tablet Take 325 mg by mouth daily with breakfast.      metoprolol succinate (TOPROL-XL) 25 MG 24 hr tablet Take 25 mg by mouth once daily.      olmesartan-hydrochlorothiazide (BENICAR HCT) 40-12.5 mg Tab Take 1 tablet by mouth once daily.         STOP taking these medications       ADVAIR DISKUS 250-50 mcg/dose diskus inhaler Comments:   Reason for Stopping:         benzonatate (TESSALON) 200 MG capsule Comments:   Reason for Stopping:         ciprofloxacin HCl (CIPRO) 500 MG tablet Comments:   Reason for Stopping:         loperamide (IMODIUM A-D) 2 mg Tab Comments:   Reason for Stopping:         OFEV 150 mg Cap Comments:   Reason for Stopping:         VENTOLIN HFA 90 mcg/actuation inhaler Comments:   Reason for Stopping:               I certify that this patient is confined to her home and needs intermittent skilled nursing care, physical therapy and occupational therapy.

## 2020-08-19 NOTE — NURSING
Demonstrated to pt and daughter how to prepare Insulin pen and self-administer Insulin. Pt practiced Insulin self-administration with assistance.

## 2020-08-19 NOTE — PROGRESS NOTES
Discharge Planning:    SW met with pt to assess needs for tentative discharge tomorrow. Pt scheduled to [facility] with HH for wound care/PT/OT. SW made referral to [facility] ([phone]) and confirmed with Cori ([id number]) referral was received. Pt's first HH visit is scheduled for 8/21/20.    FRANCIS reviewed discharge plan with pt. Pt aware of and involved in discharge plan. Pt to discharge [facility] with the assistance of jyoti, Ivy and Ny. Pt sitting up on bed alert and oriented x4 with pleasant affect.  Pt verbalized no additional needs or concerns at this time.  Contact information provided. SW to remain available.

## 2020-08-19 NOTE — ASSESSMENT & PLAN NOTE
BG goal 140 - 180     Increase Novolog to 3 units with meals, reduce to 2 units with meals tomorrow due to steroid taper  Low dose correction scale  BG monitoring AC/HS    Discussed discharge options further at bedside today with patient and family.  Patient prefers to discharge on insulin, will continue bedside RN insulin teaching today in preparation for discharge.  Dosing TBD tomorrow at lunch after new dose of prednisone administered.    Discharge Planning:  Planning to discharge patient on insulin.  Insulin teaching today, discharge doses TBD after new dose of prednisone tomorrow.  Will need DM testing supplies.

## 2020-08-19 NOTE — PT/OT/SLP PROGRESS
Occupational Therapy   Treatment    Name: Chely Becerra  MRN: [id number]  Admitting Diagnosis:  S/P lung transplant  9 Days Post-Op    Recommendations:     Discharge Recommendations: home with home health  Discharge Equipment Recommendations:  shower chair  Barriers to discharge:  None    Assessment:     Chely Becerra is a 65 y.o. female with a medical diagnosis of S/P lung transplant. Progressing very well and able to tolerate increased increments of activity each session. Performance deficits affecting function are weakness, impaired endurance, impaired self care skills, impaired functional mobilty, gait instability, impaired cardiopulmonary response to activity.     Rehab Prognosis:  Good; patient would benefit from acute skilled OT services to address these deficits and reach maximum level of function.       Plan:     Patient to be seen 3 x/week to address the above listed problems via self-care/home management, therapeutic activities, therapeutic exercises  · Plan of Care Expires:    · Plan of Care Reviewed with: patient, daughter    Subjective     Pain/Comfort:  · Pain Rating 1: 0/10    Objective:     Communicated with: rn prior to session.  Patient found up in chair with peripheral IV upon OT entry to room.    General Precautions: Standard, fall, sternal     Occupational Performance:     Functional Mobility/Transfers:  · Patient completed Sit <> Stand Transfer with stand by assistance  with  no assistive device   · Functional Mobility: Walked ~ 60' x 2 SBA.    [facility] 6 Click ADL: 22    Treatment & Education:  From chair level, pt completed BUE/LE therex (2 x 15 reps each) including:  - straight arm raises to 90*  - bicep curls with 3#  - mini-squats    **Discussed OT POC.    Patient left up in chair with call button in reachEducation:      GOALS:   Multidisciplinary Problems     Occupational Therapy Goals        Problem: Occupational Therapy Goal    Goal Priority Disciplines Outcome Interventions    Occupational Therapy Goal     OT, PT/OT Ongoing, Progressing    Description: Goals to be met by: 7 days 8/26/2020     Patient will increase functional independence with ADLs by performing:    Pt to complete standing g/h skills with supervision.  Pt to complete UE dressing with set-up  Pt to complete LE dressing with SBA  Pt to complete toileting with supervisionPt to complete t/f skills including bed, chair and commode with supervision.                      Time Tracking:     OT Date of Treatment: 08/19/20  OT Start Time: 0922  OT Stop Time: 0950  OT Total Time (min): 28 min    Billable Minutes:Therapeutic Activity 10  Therapeutic Exercise 18    KHOI Lara  8/19/2020

## 2020-08-19 NOTE — PROGRESS NOTES
Met with the patient and two of her [facility] for a post-lung transplant education session.  Reviewed topics covered in our Post-Lung Transplant Binder including:   - Members of the transplant team and how to contact us both during and after office hours for urgent and non-urgent issues.  - Outpatient routine for post-transplant management including chest x-rays, lab work, spirometry, clinic visits, surveillance bronchoscopies and other follow-up care (e.g., home health for OT & PT, endocrinology for blood sugar management).  Also explained COVID-19 testing that will be scheduled prior to each spirometry appointment.   - Vital sign monitoring and parameters for calling the transplant team  - Possible complications, such as rejection, infection, and CLAD, and signs/symptoms for which patient should contact the transplant team  - Medications:  Purpose, schedule, possible side effects, and compliance  - Incisional wound care and sternal precautions  - Infection prevention  - Health maintenance (e.g., importance of sleep, exercise, and adherence to dietary recommendation as well as follow up care with her PCP, a dentist, an ophthalmologist, a dermatologist, and other providers based on her health care needs).  Discussed option of progressing to outpatient PT or pulmonary rehab once goals of home health OT and PT are met.    The patient and her family members actively participated in the teaching session, asking pertinent questions and repeating information correctly.  Reminded them that transplant team members remain available to review information and/or answer any additional questions.  They all verbalized their understanding.

## 2020-08-19 NOTE — PLAN OF CARE
Muscle spasms relieved with Flexeril. Sat in chair all day. Ambulated in branch X2 with OT and PT. Also ambulates to BR with assist. Tolerated well. VSS. Remains on RA.Tolerating po well. Has good appetite. UOP adequate. Having BM's. Miralax held today per pt request. Glucoses monitored. Novolog given as ordered. Started Insulin teaching today. Afebrile. Augmentin continues. Wound vac removed today by BREN Eaton. Wound care teaching started today per her orders. Supplies placed at BS. Daughter prepared self-meds correctly. Mg+2=1.9. Mg+2 rider given. Mg+2 po continues. K+=4.1. No replacement indicated.

## 2020-08-19 NOTE — PLAN OF CARE
VSS  No signs of evident distress.  Pt. Complains of minimal pain.  Medication admin as per MAR  Pt. Complaining of indigestion.  TUMS admin as per MAR  Pt. Ambulating in room with standby assistance.  Pt. Utilizing pillow for splinting.  Surgical breast support worn  Dressings CDI  L FA IV dressing CDI  CBG AC & HS.  See MAR for insulin admin.  Telly normal sinus  VISI in place  Pt. Noted to be hoarse.  Daughter at bedside.  100% on self meds.  Medication box filled and blue card updated as per MAR    Will continue to monitor

## 2020-08-19 NOTE — PROGRESS NOTES
"[facility]  Endocrinology  Progress Note    Admit Date: 2020     Reason for Consult: Management of pre-dm, Hyperglycemia     Surgical Procedure and Date: Left lung transplant and wound vac application 8/10/20    Diabetes diagnosis year: pre-dm - diet controlled     Lab Results   Component Value Date    HGBA1C 6.0 (H) 2020       Home Diabetes Medications: none (per chart review)    How often checking glucose at home? Not checking    Diabetes Complications include:     none    Complicating diabetes co morbidities:   Glucocorticoid use  s/p lung transplant      HPI:   Patient is a 65 y.o. female with a diagnosis of DM2, IPF, and chronic respiratory failure, who is s/p left lung transplant on 8/10/20.  Patient with controlled DM2 on no medications per chart review.  Endocrinology consulted for DM/BG management.            Interval HPI:   Overnight events: Remains in TSU, NAEON.  BG elevated yesterday evening but trended down to below goal overnight on current insulin regimen.  Prednisone 35 mg PO daily.  Possible discharge tomorrow.  Eatin%  Nausea: No  Hypoglycemia and intervention: No  Fever: No  TPN and/or TF: No    /76   Pulse 91   Temp 98.9 °F (37.2 °C) (Oral)   Resp 18   Ht 5' 7" (1.702 m)   Wt 90.4 kg (199 lb 4.7 oz)   LMP  (LMP Unknown)   SpO2 (!) 93%   Breastfeeding No   BMI 31.21 kg/m²     Labs Reviewed and Include    Recent Labs   Lab 20  0621   *   CALCIUM 8.8   ALBUMIN 2.6*   PROT 5.4*   *   K 4.6   CO2 28      BUN 29*   CREATININE 0.8   ALKPHOS 81   ALT 86*   AST 38   BILITOT 0.3     Lab Results   Component Value Date    WBC 12.38 2020    HGB 8.6 (L) 2020    HCT 27.2 (L) 2020    MCV 96 2020     2020     No results for input(s): TSH, FREET4 in the last 168 hours.  Lab Results   Component Value Date    HGBA1C 6.0 (H) 2020       Nutritional status:   Body mass index is 31.21 kg/m².  Lab " Results   Component Value Date    ALBUMIN 2.6 (L) 08/19/2020    ALBUMIN 2.7 (L) 08/18/2020    ALBUMIN 2.8 (L) 08/17/2020     No results found for: PREALBUMIN    Estimated Creatinine Clearance: 83.1 mL/min (based on SCr of 0.8 mg/dL).    Accu-Checks  Recent Labs     08/17/20  0231 08/17/20  0826 08/17/20  1229 08/17/20  1705 08/17/20  2149 08/18/20  0747 08/18/20  1232 08/18/20  1753 08/18/20  2302 08/19/20  0817   POCTGLUCOSE 161* 138* 192* 225* 262* 114* 146* 244* 247* 111*       Current Medications and/or Treatments Impacting Glycemic Control  Immunotherapy:    Immunosuppressants         Stop Route Frequency     mycophenolate capsule 500 mg      -- Oral 2 times daily     tacrolimus capsule 0.5 mg      -- Oral 2 times daily        Steroids:   Hormones (From admission, onward)    Start     Stop Route Frequency Ordered    08/17/20 0900  predniSONE tablet 35 mg      -- Oral Daily 08/11/20 1153    08/11/20 2023  melatonin tablet 9 mg      -- Oral Nightly PRN 08/11/20 1924        Pressors:    Autonomic Drugs (From admission, onward)    None        Hyperglycemia/Diabetes Medications:   Antihyperglycemics (From admission, onward)    Start     Stop Route Frequency Ordered    08/18/20 0800  insulin aspart U-100 pen 2 Units      -- SubQ 3 times daily with meals 08/18/20 0759    08/17/20 1033  insulin aspart U-100 pen 0-5 Units      -- SubQ Before meals & nightly PRN 08/17/20 0933          ASSESSMENT and PLAN    * S/P lung transplant  S/p lung transplant on 8/10/20  Managed per primary team  Avoid hypoglycemia  Optimize BG control for surgical wound healing        Prediabetes  BG goal 140 - 180     Increase Novolog to 3 units with meals, reduce to 2 units with meals tomorrow due to steroid taper  Low dose correction scale  BG monitoring AC/HS    Discussed discharge options further at bedside today with patient and family.  Patient prefers to discharge on insulin, will continue bedside RN insulin teaching today in preparation  for discharge.  Dosing TBD tomorrow at lunch after new dose of prednisone administered.    Discharge Planning:  Planning to discharge patient on insulin.  Insulin teaching today, discharge doses TBD after new dose of prednisone tomorrow.  Will need DM testing supplies.        Adrenal cortical steroids causing adverse effect in therapeutic use  On steroid taper per transplant team; may elevate BG readings        Immunosuppression  May increase insulin resistance.             Mendez Dsouza, HUNTER  Endocrinology  [facility]

## 2020-08-19 NOTE — SUBJECTIVE & OBJECTIVE
"Interval HPI:   Overnight events: Remains in TSU, NAEON.  BG elevated yesterday evening but trended down to below goal overnight on current insulin regimen.  Prednisone 35 mg PO daily.  Possible discharge tomorrow.  Eatin%  Nausea: No  Hypoglycemia and intervention: No  Fever: No  TPN and/or TF: No    /76   Pulse 91   Temp 98.9 °F (37.2 °C) (Oral)   Resp 18   Ht 5' 7" (1.702 m)   Wt 90.4 kg (199 lb 4.7 oz)   LMP  (LMP Unknown)   SpO2 (!) 93%   Breastfeeding No   BMI 31.21 kg/m²     Labs Reviewed and Include    Recent Labs   Lab 20  0621   *   CALCIUM 8.8   ALBUMIN 2.6*   PROT 5.4*   *   K 4.6   CO2 28      BUN 29*   CREATININE 0.8   ALKPHOS 81   ALT 86*   AST 38   BILITOT 0.3     Lab Results   Component Value Date    WBC 12.38 2020    HGB 8.6 (L) 2020    HCT 27.2 (L) 2020    MCV 96 2020     2020     No results for input(s): TSH, FREET4 in the last 168 hours.  Lab Results   Component Value Date    HGBA1C 6.0 (H) 2020       Nutritional status:   Body mass index is 31.21 kg/m².  Lab Results   Component Value Date    ALBUMIN 2.6 (L) 2020    ALBUMIN 2.7 (L) 2020    ALBUMIN 2.8 (L) 2020     No results found for: PREALBUMIN    Estimated Creatinine Clearance: 83.1 mL/min (based on SCr of 0.8 mg/dL).    Accu-Checks  Recent Labs     20  0231 20  0826 20  1229 20  1705 20  2149 20  0747 20  1232 20  1753 20  2302 20  0817   POCTGLUCOSE 161* 138* 192* 225* 262* 114* 146* 244* 247* 111*       Current Medications and/or Treatments Impacting Glycemic Control  Immunotherapy:    Immunosuppressants         Stop Route Frequency     mycophenolate capsule 500 mg      -- Oral 2 times daily     tacrolimus capsule 0.5 mg      -- Oral 2 times daily        Steroids:   Hormones (From admission, onward)    Start     Stop Route Frequency Ordered    20 0900  predniSONE " tablet 35 mg      -- Oral Daily 08/11/20 1153    08/11/20 2023  melatonin tablet 9 mg      -- Oral Nightly PRN 08/11/20 1924        Pressors:    Autonomic Drugs (From admission, onward)    None        Hyperglycemia/Diabetes Medications:   Antihyperglycemics (From admission, onward)    Start     Stop Route Frequency Ordered    08/18/20 0800  insulin aspart U-100 pen 2 Units      -- SubQ 3 times daily with meals 08/18/20 0759    08/17/20 1033  insulin aspart U-100 pen 0-5 Units      -- SubQ Before meals & nightly PRN 08/17/20 0970

## 2020-08-19 NOTE — ASSESSMENT & PLAN NOTE
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 Bactrim, ganciclovir, and voriconazole for OIP.    Donor culture growing Actinomycete, so switched from cefepime to amoxicillin.

## 2020-08-19 NOTE — PT/OT/SLP PROGRESS
Physical Therapy Treatment    Patient Name:  Chely Becerra   MRN:  [id number]    Recommendations:     Discharge Recommendations:  home with home health   Discharge Equipment Recommendations: shower chair   Barriers to discharge: None    Assessment:     Chely Becerra is a 65 y.o. female admitted with a medical diagnosis of S/P lung transplant.  She presents with the following impairments/functional limitations:  weakness, impaired endurance, impaired self care skills, impaired functional mobilty, gait instability, impaired balance, decreased coordination, impaired cardiopulmonary response to activity Pt presents with improved overall functional mobility requiring decreased assistance and increased activity tolerance to perform functional mobility.Pt would continue to benefit from skilled PT to address overall functional mobility and goals. Goals remain appropriate      Rehab Prognosis: Good; patient would benefit from acute skilled PT services to address these deficits and reach maximum level of function.    Recent Surgery: Procedure(s) (LRB):  TRANSPLANT, LUNG - 4:30AM start (Left)  APPLICATION, WOUND VAC, 40 x 5cm (Left)  RESECTION, LUNG (Left) 9 Days Post-Op    Plan:     During this hospitalization, patient to be seen 3 x/week to address the identified rehab impairments via gait training, therapeutic activities, therapeutic exercises, neuromuscular re-education and progress toward the following goals:    · Plan of Care Expires:  09/11/20    Subjective     Patient/Family Comments/goals: I am tired but I will go walk  Pain/Comfort:  · Pain Rating 1: 0/10  · Pain Rating Post-Intervention 1: 0/10      Objective:     Communicated with RN prior to session.  Patient found up in chair with pulse ox (continuous), telemetry upon PT entry to room.     General Precautions: Standard, fall   Orthopedic Precautions:N/A   Braces:       Functional Mobility:  · Sit to Stand:  Contact guard assistance with no AD,pt with vcs for  technique for improved mobility.  · Gait: 250 ft with  CGA/SBA  with mask on in halway.pt demo  decreased step length, decreased sanna, decreased gait speed. No LOB.  Pt required standing rest breaks prn  AM-PAC 6 CLICK MOBILITY  Turning over in bed (including adjusting bedclothes, sheets and blankets)?: 3  Sitting down on and standing up from a chair with arms (e.g., wheelchair, bedside commode, etc.): 3  Moving from lying on back to sitting on the side of the bed?: 3  Moving to and from a bed to a chair (including a wheelchair)?: 3  Need to walk in hospital room?: 3  Climbing 3-5 steps with a railing?: 3  Basic Mobility Total Score: 18       Therapeutic Activities and Exercises:   Therapist provided instruction and educated of patient on progress, safety,d/c,PT POC,   proper body mechanics, energy conservation, and fall prevention strategies during tasks listed above, on the effects of prolonged immobility and the importance of performing OOB activity and exercises to promote healing and reduce recovery time   Patient facilitated therex  seated in bedside chair B LE AROM AP, LAQ, Hip Flexion, Hip Abd/Add with facilitation for correct performance and sequencing. Exercises performed to develop and maintain pt's strength, endurance and flexibility.   Updated white board with appropriate PT mobility information for medical team notification  Donned an extra gown  Call nursing/pct to transfer to chair/use bathroom. Pt stated understanding  Bedside table in front of patient and area set up for function, convenience, and safety. RN aware of patient's mobility needs and status. Questions/concerns addressed within PTA scope of practice; patient with no further questions. Time was provided for active listening, discussion of health disposition, and discussion of safe discharge. Pt?verbalized?agreement .  Sternal Precautions: patient able to voice?3/3 precautions without assistance.?Pt provided education of sternal  precautions.?Patient able to maintain precautions throughout session with?min?verbal cues       Patient left up in chair with all lines intact, call button in reach, nsg notified and daughter present..      GOALS:   Multidisciplinary Problems     Physical Therapy Goals        Problem: Physical Therapy Goal    Goal Priority Disciplines Outcome Goal Variances Interventions   Physical Therapy Goal     PT, PT/OT Ongoing, Progressing     Description: Goals to be met by: 20     Patient will increase functional independence with mobility by performin. Supine to sit with Modified [place]  2. Sit to supine with Modified [place]  3. Sit to stand transfer with Modified [place]  4. Bed to chair transfer with Modified [place] using no AD.  5. Gait  x 250 feet with Modified [place] using No AD.   6. Lower extremity exercise program x30 reps per handout, with independence  7. Recalls and demonstrates understanding of 3/3 sternal precautions.                     Time Tracking:     PT Received On: 20  PT Start Time: 1025     PT Stop Time: 1048  PT Total Time (min): 23 min     Billable Minutes: Gait Training 15 and Therapeutic Activity 8    Treatment Type: Treatment  PT/PTA: PTA     PTA Visit Number: 4     Regan Carbajal, GABBY  2020

## 2020-08-19 NOTE — PLAN OF CARE
Problem: Physical Therapy Goal  Goal: Physical Therapy Goal  Description: Goals to be met by: 20     Patient will increase functional independence with mobility by performin. Supine to sit with Modified [place]  2. Sit to supine with Modified [place]  3. Sit to stand transfer with Modified [place]  4. Bed to chair transfer with Modified [place] using no AD.  5. Gait  x 250 feet with Modified [place] using No AD.   6. Lower extremity exercise program x30 reps per handout, with independence  7. Recalls and demonstrates understanding of 3/3 sternal precautions.    Outcome: Ongoing, Progressing   Pt progressing towards goals. continue with PT POC.Goals remain appropriate.  Regan Carbajal PTA

## 2020-08-19 NOTE — PROGRESS NOTES
"[facility]  Lung Transplant  Progress Note - Floor    Patient Name: Chely Becerra  MRN: [id number]  Admission Date: 8/9/2020  Hospital Length of Stay: 10 days  Post-Operative Day: 9  Attending Physician: Niraj Garza MD  Primary Care Provider: ZAHIDA Stack MD     Subjective:     Interval History: No acute events overnight.  Plan for discharge tomorrow    Continuous Infusions:  Scheduled Meds:   amoxicillin-clavulanate 500-125mg  1 tablet Oral BID    aspirin  81 mg Oral Daily    docusate sodium  100 mg Oral BID    enoxaparin  40 mg Subcutaneous Q24H    gabapentin  100 mg Oral TID    insulin aspart U-100  3 Units Subcutaneous TIDWM    levalbuterol  1.25 mg Nebulization Q6H WAKE    lidocaine  1 patch Transdermal Q24H    magnesium oxide  400 mg Oral BID    metoprolol tartrate  25 mg Oral BID    mycophenolate  500 mg Oral BID    NIFEdipine  30 mg Oral Daily    pantoprazole  40 mg Oral Daily    polyethylene glycol  17 g Oral Daily    [START ON 8/20/2020] predniSONE  25 mg Oral Daily    senna  17.2 mg Oral Daily    sulfamethoxazole-trimethoprim 800-160mg  1 tablet Oral Every Mon, Wed, Fri    tacrolimus  0.5 mg Oral BID    valGANciclovir  450 mg Oral BID    voriconazole  350 mg Oral BID     PRN Meds:acetaminophen, calcium carbonate, cyclobenzaprine, dextrose 50%, dextrose 50%, glucagon (human recombinant), glucose, glucose, insulin aspart U-100, lactulose, levalbuterol, magnesium sulfate IVPB, melatonin, metoclopramide HCl, ondansetron, oxyCODONE, oxyCODONE, potassium chloride **AND** potassium chloride **AND** potassium chloride    Review of patient's allergies indicates:   Allergen Reactions    Boniva [ibandronate] Other (See Comments)     "chest cramps"       Review of Systems   Constitutional: Negative for activity change, appetite change, chills, diaphoresis, fatigue and fever.   HENT: Negative for congestion, ear discharge, ear pain, facial swelling, hearing loss, " mouth sores, nosebleeds, postnasal drip, rhinorrhea, sinus pressure, sore throat, trouble swallowing and voice change.    Eyes: Negative for pain, discharge, redness and itching.   Respiratory: Positive for shortness of breath (with exertion, improving). Negative for apnea, cough, choking, chest tightness, wheezing and stridor.    Cardiovascular: Negative for chest pain, palpitations and leg swelling.   Gastrointestinal: Negative for abdominal distention, abdominal pain, anal bleeding, blood in stool, constipation, diarrhea, nausea, rectal pain and vomiting.   Endocrine: Negative for cold intolerance and heat intolerance.   Genitourinary: Negative for difficulty urinating, dysuria and flank pain.   Musculoskeletal: Negative for arthralgias, back pain, joint swelling, myalgias and neck pain.   Allergic/Immunologic: Positive for immunocompromised state.   Neurological: Negative for dizziness, tremors, light-headedness, numbness and headaches.   Psychiatric/Behavioral: Negative for agitation and hallucinations. The patient is not nervous/anxious.      Objective:   Physical Exam  Vitals signs and nursing note reviewed.   Constitutional:       Appearance: Normal appearance. She is overweight.      Interventions: Nasal cannula in place.   HENT:      Head: Normocephalic and atraumatic.   Eyes:      General: No scleral icterus.     Conjunctiva/sclera: Conjunctivae normal.   Cardiovascular:      Rate and Rhythm: Normal rate and regular rhythm.      Heart sounds: No murmur. No friction rub. No gallop.    Pulmonary:      Breath sounds: Examination of the right-lower field reveals decreased breath sounds. Examination of the left-lower field reveals decreased breath sounds. Decreased breath sounds present. No wheezing, rhonchi or rales.   Abdominal:      General: Bowel sounds are normal. There is no distension.      Palpations: Abdomen is soft.   Musculoskeletal:      Right lower leg: No edema.      Left lower leg: No edema.    Skin:     General: Skin is warm and dry.      Capillary Refill: Capillary refill takes less than 2 seconds.   Neurological:      General: No focal deficit present.      Mental Status: She is oriented to person, place, and time.   Psychiatric:         Mood and Affect: Mood normal.         Behavior: Behavior normal.           Vital Signs (Most Recent):  Temp: 98.9 °F (37.2 °C) (08/18/20 2014)  Pulse: 91 (08/19/20 0842)  Resp: 18 (08/19/20 0842)  BP: 118/76 (08/19/20 0815)  SpO2: (!) 93 % (08/19/20 0842) Vital Signs (24h Range):  Temp:  [97.8 °F (36.6 °C)-98.9 °F (37.2 °C)] 98.9 °F (37.2 °C)  Pulse:  [] 91  Resp:  [12-28] 18  SpO2:  [92 %-99 %] 93 %  BP: (100-120)/(65-78) 118/76     Weight: 90.4 kg (199 lb 4.7 oz)  Body mass index is 31.21 kg/m².      Intake/Output Summary (Last 24 hours) at 8/19/2020 1017  Last data filed at 8/19/2020 0900  Gross per 24 hour   Intake 770 ml   Output 1550 ml   Net -780 ml       Significant Labs:  CBC:  Recent Labs   Lab 08/19/20 0621   WBC 12.38   RBC 2.83*   HGB 8.6*   HCT 27.2*      MCV 96   MCH 30.4   MCHC 31.6*     BMP:  Recent Labs   Lab 08/19/20 0621   *   K 4.6      CO2 28   BUN 29*   CREATININE 0.8   CALCIUM 8.8      Tacrolimus Levels:  Recent Labs   Lab 08/19/20 0621   TACROLIMUS 8.2     Microbiology:  Microbiology Results (last 7 days)     Procedure Component Value Units Date/Time    Culture, Anaerobe [[id number]] Collected: 08/10/20 0758    Order Status: Completed Specimen: Body Fluid from Lung, Left Updated: 08/17/20 0731     Anaerobic Culture No anaerobes isolated    Narrative:      Bronchus Donor Lung Left (Aerobic, Anaerobic, AFB, Fungus,  Gram)    Aerobic culture [[id number]] Collected: 08/10/20 0758    Order Status: Completed Specimen: Body Fluid from Lung, Left Updated: 08/13/20 1017     Aerobic Bacterial Culture No growth    Narrative:      Bronchus Donor Lung Left (Aerobic, Anaerobic, AFB, Fungus,  Gram)    Culture, Respiratory with Gram  Stain [[id number]] Collected: 08/11/20 1315    Order Status: Completed Specimen: Respiratory from BAL, JOSETTE Updated: 08/13/20 0958     Respiratory Culture Normal respiratory yoly      No S aureus or Pseudomonas isolated.     Gram Stain (Respiratory) Rare WBC's     Gram Stain (Respiratory) No organisms seen    AFB Culture & Smear [[id number]] Collected: 08/11/20 1315    Order Status: Completed Specimen: Respiratory from BAL, JOSETTE Updated: 08/12/20 2127     AFB Culture & Smear Culture in progress     AFB CULTURE STAIN No acid fast bacilli seen.    Fungus culture [[id number]] Collected: 08/11/20 1315    Order Status: Completed Specimen: Respiratory from BAL, JOSETTE Updated: 08/12/20 1226     Fungus (Mycology) Culture Culture in progress    Fungus culture [[id number]] Collected: 08/10/20 0758    Order Status: Completed Specimen: Body Fluid from Lung, Left Updated: 08/12/20 1225     Fungus (Mycology) Culture Culture in progress    Narrative:      Bronchus Donor Lung Left (Aerobic, Anaerobic, AFB, Fungus,  Gram)          I have reviewed all pertinent labs within the past 24 hours.    Diagnostic Results:  X-Ray: Reviewed  Postoperative changes as before.  Heart size normal.  The bilateral chest tube have been removed.  Small right apical pneumothorax as before.  No significant changes.  Elevated right hemidiaphragm similar to the previous study      Assessment/Plan:     * S/P lung transplant  POD #9 s/p uncomplicated LSLT for IPF. PGD 2 for 24 hours. Plan for discharge tomorrow.    Immunosuppression  Induction with solumedrol and basiliximab. Repeat basiliximab on POD#4.  Continue tacrolimus, MMF, and prednisone taper.    Prophylactic antibiotic  CMV D-/R+ On vancomycin and cefepime for routine surgical prophylaxis. Bactrim, ganciclovir, and voriconazole for OIP.    Donor culture growing Actinomycete, so switched from cefepime to amoxicillin.     Prediabetes  Endocrine following, appreciate recs.     Acute blood loss  anemia  Expected post-op. Continue to trend.     Leukocytosis  Expected post-op. Continue to trend.     Essential hypertension  Continue nifedipine        Hussein Guallpa NP  Lung Transplant  [facility]

## 2020-08-20 NOTE — PROGRESS NOTES
Patient is scheduled for hospital discharge today.  Orders for ongoing outpatient post-lung transplant management were entered based on department guidelines per instructions from Dr. Dominguez.

## 2020-08-20 NOTE — PROGRESS NOTES
Medication education was provided to Chely Becerra and her caregiver(s).  Reviewed medication section of the Lung Transplant Education book that was provided.  Emphasized the importance of compliance, role of the blue medication card, concerns for drug interactions, and process of obtaining refills.  Counseled regarding Prograf (tacrolimus), Cellcept (mycophenolate), and prednisone, including directions for use, monitoring, how to handle missed doses, and side effects.  Chely Becerra and her caregiver verbalized understanding and had the opportunity to ask questions.

## 2020-08-20 NOTE — HOSPITAL COURSE
Uncomplicated hospital course. Plan for discharge home today with follow up in clinic on 8/24/2020.     * S/P lung transplant  POD #10 s/p uncomplicated LSLT for IPF. PGD 2 for 24 hours.      Immunosuppression  Induction with solumedrol and basiliximab. Repeat basiliximab on POD#4.  Continue tacrolimus, MMF, and prednisone taper.     Prophylactic antibiotic  CMV D-/R+ Received vancomycin and cefepime for routine surgical prophylaxis. Bactrim, ganciclovir, and voriconazole for OIP.    Donor culture growing Actinomycete, so switched from cefepime to augmentin. Will continue for 3 months therapy.      Prediabetes  Management per endocrine     Essential hypertension  Continue nifedipine

## 2020-08-20 NOTE — ASSESSMENT & PLAN NOTE
BG goal 140 - 180     Novolog 2 units with meals  Low dose correction scale  BG monitoring AC/HS    Discharge Planning:  Recommend patient discharge on Novolog 3 units with meals plus correction scale 150/50.  BG logs with dosing instructions provided at bedside.  Instructed patient to submit BG logs for review by Monday 8/24/20 or sooner if experiencing persistent high (>200) or low (<100) blood sugars.  Patient prescriptions sent per transplant pharmacist, patient has all necessary diabetic supplies.  Will send patient portal message to allow ongoing communication for all blood glucose related issues.  Patient deferring clinic appointment for now, will consider later if BG remains elevated or patient cannot wean off of insulin with prednisone taper.  Reviewed topics related to DM including: the need for insulin, how insulin works, what makes it a high risk medication, the importance of follow up with endocrine provider, importance of and how to check BG, how to record BG on logs, how to administer insulin, appropriate insulin administration sites, importance of rotating injection sites, hyper/hypoglycemia, how and when to treat hypoglycemia, when to hold insulin, how the correction scale works, importance of storing unused insulin in the refrigerator, and when to seek medical attention.  Patient verbalized understanding, answered all questions to patient's satisfaction.

## 2020-08-20 NOTE — PLAN OF CARE
-AAOx4  -VSS  -Lt forearm 20g PIV  -mid-sternal incision with dressing CDI  -dressing from previous chest tubes CDI  -pt on telemetry SR  -nighttime BG was 215, covered accordingly  -100% with self meds  -pt is free of falls/injuries during shift  -pt ambulates independently  -bed in lowest locked position  -pt OOB with nonslip socks/shoes  -call light within reach   -plan for DC in the morning

## 2020-08-20 NOTE — PROGRESS NOTES
"DISCHARGE NOTE:    Chely Becerra is a 65 y.o. female s/p L SLT on 8/10/2020 (Lung) secondary to IPF.  Basiliximab induction. CMV D-/R+.     IS:   Tacrolimus - dose increased to 1 mg / 0.5 mg on 8/20.  Goal level 10-12  MMF 1000 mg / 1000 mg  Pred taper    OIP:  Voriconazole -  BAL Asp ag's negative to date  Valganciclovir - CMV D-/R+  SMX/TMP DS     Lamivudine - Hep B Px     Actinomyces on donor Cx - Amox/Clav    Past Medical History:   Diagnosis Date    A-fib around 1996    Chronic hypoxemic respiratory failure 4/11/2019    Common bile duct dilatation 1/15/2019    Controlled type 2 diabetes mellitus without complication, without long-term current use of insulin 1/17/2019    Essential hypertension 1/16/2019    GERD (gastroesophageal reflux disease)     Hepatitis B core antibody positive 1/15/2019    IPF (idiopathic pulmonary fibrosis)     Obesity (BMI 30-39.9) 1/16/2019    RLS (restless legs syndrome)        Allergies:   Review of patient's allergies indicates:   Allergen Reactions    Boniva [ibandronate] Other (See Comments)     "chest cramps"       Patient Pharmacy: [facility]    Discharge Medications:   Chely Becerra   Home Medication Instructions WOLFGANG:[id number]    Printed on:08/20/20 1037   Medication Information                      amoxicillin-clavulanate 875-125mg (AUGMENTIN) 875-125 mg per tablet  Take 1 tablet by mouth every 12 (twelve) hours.             aspirin (ECOTRIN) 81 MG EC tablet  Take 1 tablet (81 mg total) by mouth once daily.             atorvastatin (LIPITOR) 20 MG tablet  Take 1 tablet (20 mg total) by mouth once daily.             blood sugar diagnostic Strp  Use as directed to test blood glucose 3-4 times daily.             blood-glucose meter kit  Use as instructed             cyclobenzaprine (FLEXERIL) 10 MG tablet  Take 1 tablet (10 mg total) by mouth 3 (three) times daily as needed for Muscle spasms.             docusate sodium (COLACE) 100 MG capsule  Take 1 " capsule (100 mg total) by mouth 2 (two) times daily as needed for Constipation.             gabapentin (NEURONTIN) 100 MG capsule  Take 1 capsule (100 mg total) by mouth 3 (three) times daily.             lamiVUDine (EPIVIR) 150 MG Tab  Take 1 tablet (150 mg total) by mouth once daily.             lancets Misc  Use as directed to test blood glucose 3-4 times daily.             lansoprazole (PREVACID) 15 MG capsule  Take 1 capsule (15 mg total) by mouth once daily.             lidocaine (LIDODERM) 5 %  Place 1 patch onto the skin once daily. Remove & Discard patch within 12 hours or as directed by MD             magnesium oxide (MAG-OX) 400 mg (241.3 mg magnesium) tablet  Take 1 tablet (400 mg total) by mouth 2 (two) times daily.             metoprolol tartrate (LOPRESSOR) 100 MG tablet  Take 1 tablet (100 mg total) by mouth 2 (two) times daily.             mycophenolate (CELLCEPT) 250 mg Cap  Take 4 capsules (1,000 mg total) by mouth 2 (two) times daily.             NIFEdipine (PROCARDIA-XL) 30 MG (OSM) 24 hr tablet  Take 1 tablet (30 mg total) by mouth once daily.             ondansetron (ZOFRAN-ODT) 8 MG TbDL  DISSOLVE 1 tablet (8 mg total) by mouth every 8 (eight) hours as needed.             oxyCODONE (ROXICODONE) 5 MG immediate release tablet  Take 1 tablet (5 mg total) by mouth every 6 (six) hours as needed.             polyethylene glycol (GLYCOLAX) 17 gram/dose powder  Take 17 g by mouth 2 (two) times daily as needed.             predniSONE (DELTASONE) 5 MG tablet  From 8/20-9/9 Take 25mg by mouth daily; From 9/10-11/8 take 20mg daily; From 11/9-12/8 take 15mg daily; 12/9-2/7/21 take 10mg daily then 5mg daily thereafter             senna (SENOKOT) 8.6 mg tablet  Take 2 tablets by mouth once daily.             sulfamethoxazole-trimethoprim 800-160mg (BACTRIM DS) 800-160 mg Tab  Take 1 tablet by mouth every Mon, Wed, Fri.             tacrolimus (PROGRAF) 0.5 MG Cap  Take 1 capsule (0.5 mg total) by mouth every  evening.             tacrolimus (PROGRAF) 1 MG Cap  Take 1 capsule (1 mg total) by mouth once daily in the morning             valGANciclovir (VALCYTE) 450 mg Tab  Take 1 tablet (450 mg total) by mouth 2 (two) times daily.             voriconazole (VFEND) 200 MG Tab  Take by mouth 300 mg (1 and 1/2 tablets) by mouth twice daily                 Pharmacy Interventions/Recommendations:  1) Transplant Immunosuppression: tac, MMF, pred    2) Opportunistic Infection prophylaxis: vori, SMX/TMP, valgan, lamivudine    3) Patient Counseling/Education:  Patient and family.  Demonstrated the use of the BP cuff, thermometer.    4) Follow-Up/Discharge Needs:    - tac level    Chely and her caregiver verbalized their understanding and had the opportunity to ask questions.

## 2020-08-20 NOTE — PROGRESS NOTES
Patient is being discharged to a [facility] [place] apartment today.  Discharge instructions and the outpatient plan of care as determined by Dr. Dominguez were discussed with the patient and her daughters as follows:  1.  Home health for intermittent nursing to perform tri-weekly wound care to midsternal chest incision and old chest tube sites as well as therapists to provide OT and PT.  Explained that a representative from [facility] should call them to schedule the first visit for tomorrow, 8/21.  2.  Return for a chest x-ray, lab work, spirometry, transplant coordinator visit, and transplant pulmonologist visit on Monday, 8/24.  Reviewed times, locations, and special instructions (e.g., fast for lab work, take morning Prograf dose after blood is drawn) for these appointments.  Also explained the purpose and timing for pre-PFT COVID - 19  testing, with patient scheduled tomorrow, 8/21, at 1300, in the 2nd floor surgery clinic.    Provided verbal and written instructions re: this discharge plan.  Also reminded them how to contact the lung transplant team both during and after office hours for urgent and non-urgent issues.   The patient and her daughters asked questions, which were answered to their satisfaction.  They all repeated the discharge plan correctly and demonstrated their readiness for discharge.

## 2020-08-20 NOTE — SUBJECTIVE & OBJECTIVE
"Interval HPI:   Overnight events: Remains in TSU, NAALANA.  BG elevated yesterday evening but trended down to below goal overnight on current insulin regimen.  Prednisone 25 mg PO daily.  Possible discharge today.  Eatin%  Nausea: No  Hypoglycemia and intervention: No  Fever: No  TPN and/or TF: No    /76   Pulse 92   Temp 98.4 °F (36.9 °C) (Oral)   Resp (!) 24   Ht 5' 7" (1.702 m)   Wt 91.2 kg (201 lb 1 oz)   LMP  (LMP Unknown)   SpO2 (!) 94%   Breastfeeding No   BMI 31.49 kg/m²     Labs Reviewed and Include    Recent Labs   Lab 20  0628   *   CALCIUM 8.8   ALBUMIN 2.8*   PROT 5.8*   *   K 5.1   CO2 27      BUN 28*   CREATININE 0.8   ALKPHOS 106   *   AST 88*   BILITOT 0.5     Lab Results   Component Value Date    WBC 13.79 (H) 2020    HGB 8.4 (L) 2020    HCT 27.2 (L) 2020    MCV 98 2020     2020     No results for input(s): TSH, FREET4 in the last 168 hours.  Lab Results   Component Value Date    HGBA1C 6.0 (H) 2020       Nutritional status:   Body mass index is 31.49 kg/m².  Lab Results   Component Value Date    ALBUMIN 2.8 (L) 2020    ALBUMIN 2.6 (L) 2020    ALBUMIN 2.7 (L) 2020     No results found for: PREALBUMIN    Estimated Creatinine Clearance: 83.4 mL/min (based on SCr of 0.8 mg/dL).    Accu-Checks  Recent Labs     20  2149 20  0747 20  1232 20  1753 20  2302 20  0817 20  1204 20  1728 20  2159 20  0746   POCTGLUCOSE 262* 114* 146* 244* 247* 111* 143* 263* 215* 99       Current Medications and/or Treatments Impacting Glycemic Control  Immunotherapy:    Immunosuppressants         Stop Route Frequency     tacrolimus capsule 1 mg      -- Oral Every morning     tacrolimus capsule 0.5 mg      -- Oral Every evening     tacrolimus capsule 0.5 mg      -- Oral Once     mycophenolate capsule 1,000 mg      -- Oral 2 times daily        Steroids: "   Hormones (From admission, onward)    Start     Stop Route Frequency Ordered    08/20/20 0900  predniSONE tablet 25 mg      -- Oral Daily 08/19/20 1006    08/11/20 2023  melatonin tablet 9 mg      -- Oral Nightly PRN 08/11/20 1924        Pressors:    Autonomic Drugs (From admission, onward)    None        Hyperglycemia/Diabetes Medications:   Antihyperglycemics (From admission, onward)    Start     Stop Route Frequency Ordered    08/20/20 0715  insulin aspart U-100 pen 2 Units      -- SubQ 3 times daily with meals 08/19/20 2115 08/17/20 1033  insulin aspart U-100 pen 0-5 Units      -- SubQ Before meals & nightly PRN 08/17/20 0952

## 2020-08-20 NOTE — PROGRESS NOTES
Discharge Note:    SW met with pt to assess needs for discharge. Pt scheduled to discharge to [facility] with [facility] ([phone]) for wound care/OT/PT. Pt's first HH visit is scheduled for 8/21/20. SW reviewed discharge plan with pt. Pt aware of, and involved in discharge plan. Pt to discharge home with the assistance of daughters, Ivy and Ny. Pt reports coping adequately with discharge at this time and was sitting up on bed alert and oriented x 4 with pleasant affect.  Pt verbalized no additional needs or concerns at this time.  Contact information provided. SW to remain available.

## 2020-08-20 NOTE — PLAN OF CARE
VSS  No signs of evident distress.  Pain medication given as per MAR  Pt. Ambulating in room independently.  Non slip socks/shoes worn when OOB  Pt. Splinting with pillow as instructed  PIV removed and dressed.  Midline incision dressing CDI. Pt. And family educated on dressing change and incision care.  Daughter at bedside  100% on self meds  Return demonstration of testing CBG and administering insulin  Pt. Expressed understanding and demonstrated understanding of all instruction.  VISI removed, cleaned and placed in holding area  Discharge instructions given to pt. And family.  Understanding expressed by pt and family.  Medications delivered bedside.  Pt. To  insulin supplies at [facility].

## 2020-08-20 NOTE — DISCHARGE SUMMARY
[facility]  Lung Transplant  Discharge Summary      Patient Name: Chely Becerra  MRN: [id number]  Admission Date: 8/9/2020  Hospital Length of Stay: 11 days  Discharge Date and Time: 08/20/2020 1:22 PM  Attending Physician: Patricia Dominguez DO   Discharging Provider: Mayuri Nath PA-C  Primary Care Provider: ZAHIDA Stack MD     HPI: Ms Becerra is a 64 y/o woman with a history of IPF who  is on 2L of oxygen.  She is on no assisted ventilation.  Her [place] Heart Association Class is 60% - Requires occasional assistance but is able to care for needs.  She is not diabetic. She is being admitted tonight for potential left single lung transplant.     Her donor is not a PHS increased risk nor a Hep C+ donor.     Since her last clinic visit she has not required outpatient antibiotics or steroids. Has not visited the ED or been hospitalized. She says that her cough and shortness of breath on exertion are at her baseline.       Procedure(s) (LRB):  TRANSPLANT, LUNG - 4:30AM start (Left)  APPLICATION, WOUND VAC, 40 x 5cm (Left)  RESECTION, LUNG (Left)     Hospital Course: Uncomplicated hospital course. Plan for discharge home today with follow up in clinic on 8/24/2020.     * S/P lung transplant  POD #10 s/p uncomplicated LSLT for IPF. PGD 2 for 24 hours.      Immunosuppression  Induction with solumedrol and basiliximab. Repeat basiliximab on POD#4.  Continue tacrolimus, MMF, and prednisone taper.     Prophylactic antibiotic  CMV D-/R+ Received vancomycin and cefepime for routine surgical prophylaxis. Bactrim, ganciclovir, and voriconazole for OIP.    Donor culture growing Actinomycete, so switched from cefepime to augmentin. Will continue for 3 months therapy.      Prediabetes  Management per endocrine     Essential hypertension  Continue nifedipine    Consults (From admission, onward)        Status Ordering Provider     Consult to Endocrinology  Once     Provider:  (Not yet assigned)     Completed EDDIE NESBITT     Consult to Infectious Diseases  Once     Provider:  (Not yet assigned)    Completed EDDIE NESBITT     Inpatient consult to Midline team  Once     Provider:  (Not yet assigned)    Completed MELODY GOMEZ     Inpatient consult to Registered Dietitian/Nutritionist  Once     Provider:  (Not yet assigned)    Completed EDDIE NESBITT          Significant Diagnostic Studies: Labs: All labs within the past 24 hours have been reviewed  Microbiology:   Blood Culture No results found for: LABBLOO and Sputum Culture   Lab Results   Component Value Date    GSRESP Rare WBC's 08/11/2020    GSRESP No organisms seen 08/11/2020    RESPIRATORYC Normal respiratory yoly 08/11/2020    RESPIRATORYC No S aureus or Pseudomonas isolated. 08/11/2020     Radiology: X-Ray: CXR: X-Ray Chest 1 View (CXR): No results found for this visit on 08/09/20. and X-Ray Chest PA and Lateral (CXR):   Results for orders placed or performed during the hospital encounter of 08/09/20   X-Ray Chest PA And Lateral    Narrative    EXAMINATION:  XR CHEST PA AND LATERAL    CLINICAL HISTORY:  S/P Left single lung transplant;    TECHNIQUE:  PA and lateral views of the chest were performed.    COMPARISON:  None 08/15/2020    FINDINGS:  Postoperative changes as before.  Heart size normal.  The bilateral chest tube have been removed.  Small right apical pneumothorax as before.  No significant changes.  Elevated right hemidiaphragm similar to the previous study      Impression    See above      Electronically signed by: Regan Ruiz MD  Date:    08/18/2020  Time:    08:23       Pending Diagnostic Studies:     None        Final Active Diagnoses:    Diagnosis Date Noted POA    PRINCIPAL PROBLEM:  S/P lung transplant [Z94.2] 08/10/2020 Not Applicable    Immunosuppression [D89.9] 08/10/2020 No    Prophylactic antibiotic [Z79.2] 08/10/2020 Not Applicable    Prediabetes [R73.03] 01/17/2019 Yes    Acute blood loss anemia [D62]  08/10/2020 No    Leukocytosis [D72.829] 08/10/2020 No    Muscular deconditioning [R29.898]  Yes    Adrenal cortical steroids causing adverse effect in therapeutic use [T38.0X5A] 08/10/2020 No    Essential hypertension [I10] 01/16/2019 Yes      Problems Resolved During this Admission:    Diagnosis Date Noted Date Resolved POA    Postprocedural hypotension [I95.81] 08/10/2020 08/11/2020 No    Thrombocytopenia, unspecified [D69.6] 08/10/2020 08/11/2020 No    Overweight (BMI 25.0-29.9) [E66.3] 08/10/2020 08/10/2020 Yes      Discharged Condition: stable    Disposition: Home or Self Care    Medications:  Reconciled Home Medications:      Medication List      START taking these medications    amoxicillin-clavulanate 875-125mg 875-125 mg per tablet  Commonly known as: AUGMENTIN  Take 1 tablet by mouth every 12 (twelve) hours.     blood sugar diagnostic Strp  Use as directed to test blood glucose 3-4 times daily.     blood-glucose meter kit  Use as instructed     cyclobenzaprine 10 MG tablet  Commonly known as: FLEXERIL  Take 1 tablet (10 mg total) by mouth 3 (three) times daily as needed for Muscle spasms.     docusate sodium 100 MG capsule  Commonly known as: COLACE  Take 1 capsule (100 mg total) by mouth 2 (two) times daily as needed for Constipation.     lamiVUDine 150 MG Tab  Commonly known as: EPIVIR  Take 1 tablet (150 mg total) by mouth once daily.     lancets Misc  Use as directed to test blood glucose 3-4 times daily.     lidocaine 5 %  Commonly known as: LIDODERM  Place 1 patch onto the skin once daily. Remove & Discard patch within 12 hours or as directed by MD     magnesium oxide 400 mg (241.3 mg magnesium) tablet  Commonly known as: MAG-OX  Take 1 tablet (400 mg total) by mouth 2 (two) times daily.     metoprolol tartrate 25 MG tablet  Commonly known as: LOPRESSOR  Take 1 tablet (25 mg total) by mouth 2 (two) times daily.     mycophenolate 250 mg Cap  Commonly known as: CELLCEPT  Take 4 capsules (1,000  "mg total) by mouth 2 (two) times daily.     NIFEdipine 30 MG (OSM) 24 hr tablet  Commonly known as: PROCARDIA-XL  Take 1 tablet (30 mg total) by mouth once daily.     ondansetron 8 MG Tbdl  Commonly known as: ZOFRAN-ODT  DISSOLVE 1 tablet (8 mg total) by mouth every 8 (eight) hours as needed.     oxyCODONE 5 MG immediate release tablet  Commonly known as: ROXICODONE  Take 1 tablet (5 mg total) by mouth every 6 (six) hours as needed.     pen needle, diabetic 31 gauge x 5/16" Ndle  Use as directed with insulin pen 3-5 times daily.     polyethylene glycol 17 gram/dose powder  Commonly known as: GLYCOLAX  Take 17 g by mouth 2 (two) times daily as needed.     predniSONE 5 MG tablet  Commonly known as: DELTASONE  From 8/20-9/9 Take 25mg by mouth daily; From 9/10-11/8 take 20mg daily; From 11/9-12/8 take 15mg daily; 12/9-2/7/21 take 10mg daily then 5mg daily thereafter     senna 8.6 mg tablet  Commonly known as: SENOKOT  Take 2 tablets by mouth once daily.     sulfamethoxazole-trimethoprim 800-160mg 800-160 mg Tab  Commonly known as: BACTRIM DS  Take 1 tablet by mouth every Mon, Wed, Fri.     * tacrolimus 1 MG Cap  Commonly known as: PROGRAF  Take 1 capsule (1 mg total) by mouth once daily in the morning     * tacrolimus 0.5 MG Cap  Commonly known as: PROGRAF  Take 1 capsule (0.5 mg total) by mouth every evening.     valGANciclovir 450 mg Tab  Commonly known as: VALCYTE  Take 1 tablet (450 mg total) by mouth 2 (two) times daily.     voriconazole 200 MG Tab  Commonly known as: VFEND  Take by mouth 300 mg (1 and 1/2 tablets) by mouth twice daily         * This list has 2 medication(s) that are the same as other medications prescribed for you. Read the directions carefully, and ask your doctor or other care provider to review them with you.            CONTINUE taking these medications    aspirin 81 MG EC tablet  Commonly known as: ECOTRIN  Take 1 tablet (81 mg total) by mouth once daily.     atorvastatin 20 MG " tablet  Commonly known as: LIPITOR  Take 1 tablet (20 mg total) by mouth once daily.     gabapentin 100 MG capsule  Commonly known as: NEURONTIN  Take 1 capsule (100 mg total) by mouth 3 (three) times daily.     lansoprazole 15 MG capsule  Commonly known as: PREVACID  Take 1 capsule (15 mg total) by mouth once daily.        STOP taking these medications    ADVAIR DISKUS 250-50 mcg/dose diskus inhaler  Generic drug: fluticasone-salmeterol 250-50 mcg/dose     benzonatate 200 MG capsule  Commonly known as: TESSALON     betamethasone dipropionate 0.05 % cream  Commonly known as: DIPROLENE     ciprofloxacin HCl 500 MG tablet  Commonly known as: CIPRO     ergocalciferol 50,000 unit Cap  Commonly known as: ERGOCALCIFEROL     ferrous sulfate 325 mg (65 mg iron) Tab tablet  Commonly known as: FEOSOL     loperamide 2 mg Tab  Commonly known as: IMODIUM A-D     metoprolol succinate 25 MG 24 hr tablet  Commonly known as: TOPROL-XL     OFEV 150 mg Cap  Generic drug: nintedanib     olmesartan-hydrochlorothiazide 40-12.5 mg Tab  Commonly known as: BENICAR HCT     VENTOLIN HFA 90 mcg/actuation inhaler  Generic drug: albuterol          Patient Instructions:      Diet Adult Regular     Notify your health care provider if you experience any of the following:     Notify your health care provider if you experience any of the following:  increased confusion or weakness     Notify your health care provider if you experience any of the following:  persistent dizziness, light-headedness, or visual disturbances     Notify your health care provider if you experience any of the following:  worsening rash     Notify your health care provider if you experience any of the following:  severe persistent headache     Notify your health care provider if you experience any of the following:  difficulty breathing or increased cough     Notify your health care provider if you experience any of the following:  redness, tenderness, or signs of infection  (pain, swelling, redness, odor or green/yellow discharge around incision site)     Notify your health care provider if you experience any of the following:  severe uncontrolled pain     Notify your health care provider if you experience any of the following:  persistent nausea and vomiting or diarrhea     Notify your health care provider if you experience any of the following:  temperature >100.4     Activity as tolerated     Follow Up:  Follow-up Information     Patricia Dominguez DO On 8/24/2020.    Specialties: Transplant, Pulmonary Disease, Critical Care Medicine  Why: Routine follow up after hospital discharge  Contact information:  [address]  [facility] [address]  [phone]                   Mayuri Nath PA-C  Lung Transplant  [facility]

## 2020-08-20 NOTE — PROGRESS NOTES
"[facility]  Endocrinology  Progress Note    Admit Date: 2020     Reason for Consult: Management of pre-dm, Hyperglycemia     Surgical Procedure and Date: Left lung transplant and wound vac application 8/10/20    Diabetes diagnosis year: pre-dm - diet controlled     Lab Results   Component Value Date    HGBA1C 6.0 (H) 2020       Home Diabetes Medications: none (per chart review)    How often checking glucose at home? Not checking    Diabetes Complications include:     none    Complicating diabetes co morbidities:   Glucocorticoid use  s/p lung transplant      HPI:   Patient is a 65 y.o. female with a diagnosis of DM2, IPF, and chronic respiratory failure, who is s/p left lung transplant on 8/10/20.  Patient with controlled DM2 on no medications per chart review.  Endocrinology consulted for DM/BG management.            Interval HPI:   Overnight events: Remains in TSU, NAEON.  BG elevated yesterday evening but trended down to below goal overnight on current insulin regimen.  Prednisone 25 mg PO daily.  Possible discharge today.  Eatin%  Nausea: No  Hypoglycemia and intervention: No  Fever: No  TPN and/or TF: No    /76   Pulse 92   Temp 98.4 °F (36.9 °C) (Oral)   Resp (!) 24   Ht 5' 7" (1.702 m)   Wt 91.2 kg (201 lb 1 oz)   LMP  (LMP Unknown)   SpO2 (!) 94%   Breastfeeding No   BMI 31.49 kg/m²     Labs Reviewed and Include    Recent Labs   Lab 20  0628   *   CALCIUM 8.8   ALBUMIN 2.8*   PROT 5.8*   *   K 5.1   CO2 27      BUN 28*   CREATININE 0.8   ALKPHOS 106   *   AST 88*   BILITOT 0.5     Lab Results   Component Value Date    WBC 13.79 (H) 2020    HGB 8.4 (L) 2020    HCT 27.2 (L) 2020    MCV 98 2020     2020     No results for input(s): TSH, FREET4 in the last 168 hours.  Lab Results   Component Value Date    HGBA1C 6.0 (H) 2020       Nutritional status:   Body mass index is 31.49 " kg/m².  Lab Results   Component Value Date    ALBUMIN 2.8 (L) 08/20/2020    ALBUMIN 2.6 (L) 08/19/2020    ALBUMIN 2.7 (L) 08/18/2020     No results found for: PREALBUMIN    Estimated Creatinine Clearance: 83.4 mL/min (based on SCr of 0.8 mg/dL).    Accu-Checks  Recent Labs     08/17/20  2149 08/18/20  0747 08/18/20  1232 08/18/20  1753 08/18/20  2302 08/19/20  0817 08/19/20  1204 08/19/20  1728 08/19/20  2159 08/20/20  0746   POCTGLUCOSE 262* 114* 146* 244* 247* 111* 143* 263* 215* 99       Current Medications and/or Treatments Impacting Glycemic Control  Immunotherapy:    Immunosuppressants         Stop Route Frequency     tacrolimus capsule 1 mg      -- Oral Every morning     tacrolimus capsule 0.5 mg      -- Oral Every evening     tacrolimus capsule 0.5 mg      -- Oral Once     mycophenolate capsule 1,000 mg      -- Oral 2 times daily        Steroids:   Hormones (From admission, onward)    Start     Stop Route Frequency Ordered    08/20/20 0900  predniSONE tablet 25 mg      -- Oral Daily 08/19/20 1006    08/11/20 2023  melatonin tablet 9 mg      -- Oral Nightly PRN 08/11/20 1924        Pressors:    Autonomic Drugs (From admission, onward)    None        Hyperglycemia/Diabetes Medications:   Antihyperglycemics (From admission, onward)    Start     Stop Route Frequency Ordered    08/20/20 0715  insulin aspart U-100 pen 2 Units      -- SubQ 3 times daily with meals 08/19/20 2115    08/17/20 1033  insulin aspart U-100 pen 0-5 Units      -- SubQ Before meals & nightly PRN 08/17/20 0933          ASSESSMENT and PLAN    * S/P lung transplant  S/p lung transplant on 8/10/20  Managed per primary team  Avoid hypoglycemia  Optimize BG control for surgical wound healing        Prediabetes  BG goal 140 - 180     Novolog 2 units with meals  Low dose correction scale  BG monitoring AC/HS    Discharge Planning:  Recommend patient discharge on Novolog 3 units with meals plus correction scale 150/50.  BG logs with dosing  instructions provided at bedside.  Instructed patient to submit BG logs for review by Monday 8/24/20 or sooner if experiencing persistent high (>200) or low (<100) blood sugars.  Patient prescriptions sent per transplant pharmacist, patient has all necessary diabetic supplies.  Will send patient portal message to allow ongoing communication for all blood glucose related issues.  Patient deferring clinic appointment for now, will consider later if BG remains elevated or patient cannot wean off of insulin with prednisone taper.  Reviewed topics related to DM including: the need for insulin, how insulin works, what makes it a high risk medication, the importance of follow up with endocrine provider, importance of and how to check BG, how to record BG on logs, how to administer insulin, appropriate insulin administration sites, importance of rotating injection sites, hyper/hypoglycemia, how and when to treat hypoglycemia, when to hold insulin, how the correction scale works, importance of storing unused insulin in the refrigerator, and when to seek medical attention.  Reviewed importance of lifestyle modifications to prevent pre-DM progression to DM2.  Patient verbalized understanding, answered all questions to patient's satisfaction.    Adrenal cortical steroids causing adverse effect in therapeutic use  On steroid taper per transplant team; may elevate BG readings        Immunosuppression  May increase insulin resistance.             Mendez Dsouza, HUNTER  Endocrinology  [facility]

## 2020-08-21 NOTE — TELEPHONE ENCOUNTER
Called pt following hospital discharge.  Reiterated the need for pt to clean her incision everyday with soap and water, and to walk as much as she can.  Pt informed me that a nurse from home health has been scheduled to perform wound care to pt's midsternal chest incision and chest tube sites three times per week with betadine, and this occurred this morning.  Reminded pt not to drive for the first 4 weeks after surgery, and to refrain from lifting, pushing, and pulling anything greater than 5 pounds for the first 6 weeks following her surgery.  Pt informed that she will return to the clinic 4 weeks from surgery for CTS to assess her surgical incisions and to remove sutures.  Pt informed that the appt to see CTS will be finalized when pt is scheduled to see lung transplant that week, which is the second week of September.  Pt informed that I will call her with her appt information as the appt is finalized.  Pt instructed to call the clinic with any questions or concerns.  Pt verbalized understanding to all instructions.
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
## 2020-08-24 NOTE — PROGRESS NOTES
LUNG TRANSPLANT RECIPIENT FOLLOW-UP    Reason for Visit: Follow-up after lung transplantation.                                                                                                         Date of Transplant: 8/10/20                                                                               Reason for Transplant: IPF                                                                               Type of Transplant: single left lung                                                                               CMV Status: D- / R+     Hepatitis C Status:  Donor Ab:(--)  Donor JENNY:(--)  Recipient serostatus:(--)  Treatment status: NA                                                                              Major Complications: None                                                                               History of Present Illness: Chely Becerra is a 65 y.o. year old female with the above listed transplant history who presents for her first post operative transplant follow-up.  She is 14 days out from an uncomplicated left single lung txp.  Surgery and recovery was uneventful.  Overall has done very well.  No acute issues since discharge.  Still has wounds healing at her chest tube sites.  Denies any pain.  Still complains of hoarseness.  Takes all medications as directed without side effects.  Overall, has done very well.    Review of Systems   Constitutional: Negative for chills, diaphoresis, fever, malaise/fatigue and weight loss.   HENT: Negative for congestion, ear discharge, ear pain, hearing loss, nosebleeds, sinus pain, sore throat and tinnitus.    Eyes: Negative for blurred vision, double vision, photophobia, pain, discharge and redness.   Respiratory: Positive for cough (occasional and productive of brown sputum. ). Negative for hemoptysis, sputum production, shortness of breath, wheezing and stridor.    Cardiovascular: Positive for chest pain and leg swelling. Negative for palpitations,  "orthopnea, claudication and PND.   Gastrointestinal: Negative for abdominal pain, blood in stool, constipation, diarrhea, heartburn, melena, nausea and vomiting.   Genitourinary: Negative for dysuria, flank pain, frequency, hematuria and urgency.   Musculoskeletal: Negative for back pain, falls, joint pain, myalgias and neck pain.   Skin: Negative for itching and rash.   Neurological: Negative for dizziness, tingling, tremors, sensory change, speech change, focal weakness, seizures, loss of consciousness, weakness and headaches.   Endo/Heme/Allergies: Negative for environmental allergies and polydipsia. Does not bruise/bleed easily.   Psychiatric/Behavioral: Negative for depression, hallucinations, memory loss, substance abuse and suicidal ideas. The patient is not nervous/anxious and does not have insomnia.      /76   Pulse 100   Temp 98.6 °F (37 °C) (Oral)   Resp 20   Ht 5' 7" (1.702 m)   Wt 88 kg (194 lb)   LMP  (LMP Unknown)   SpO2 97%   BMI 30.38 kg/m²     Physical Exam  Vitals signs and nursing note reviewed.   Constitutional:       General: She is not in acute distress.     Appearance: She is well-developed. She is not diaphoretic.   HENT:      Head: Normocephalic and atraumatic.      Nose: Nose normal.      Mouth/Throat:      Pharynx: No oropharyngeal exudate.   Eyes:      General: No scleral icterus.     Conjunctiva/sclera: Conjunctivae normal.      Pupils: Pupils are equal, round, and reactive to light.   Neck:      Musculoskeletal: Normal range of motion and neck supple.      Thyroid: No thyromegaly.      Vascular: No JVD.      Trachea: No tracheal deviation.   Cardiovascular:      Rate and Rhythm: Normal rate and regular rhythm.      Heart sounds: Normal heart sounds. No murmur. No friction rub. No gallop.    Pulmonary:      Effort: Pulmonary effort is normal. No respiratory distress.      Breath sounds: Rales (on right) present. No wheezing.   Abdominal:      General: Bowel sounds are " normal. There is no distension.      Palpations: Abdomen is soft.      Tenderness: There is no abdominal tenderness.   Musculoskeletal: Normal range of motion.         General: No deformity.   Skin:     General: Skin is warm and dry.      Capillary Refill: Capillary refill takes less than 2 seconds.      Coloration: Skin is not pale.      Findings: No erythema or rash.      Comments: Sternal incision healing well.  Chest tube sites clean and covered.   Neurological:      Mental Status: She is alert and oriented to person, place, and time.      Cranial Nerves: No cranial nerve deficit.   Psychiatric:         Judgment: Judgment normal.       Labs:  cbc, cmp, tacrolimus Latest Ref Rng & Units 8/19/2020 8/20/2020 8/24/2020   TACROLIMUS LVL 5.0 - 15.0 ng/mL 8.2 8.1 -   WHITE BLOOD CELL COUNT 3.90 - 12.70 K/uL 12.38 13.79(H) 17.28(H)   RBC 4.00 - 5.40 M/uL 2.83(L) 2.78(L) 3.06(L)   HEMOGLOBIN 12.0 - 16.0 g/dL 8.6(L) 8.4(L) 9.3(L)   HEMATOCRIT 37.0 - 48.5 % 27.2(L) 27.2(L) 30.3(L)   MCV 82 - 98 fL 96 98 99(H)   MCH 27.0 - 31.0 pg 30.4 30.2 30.4   MCHC 32.0 - 36.0 g/dL 31.6(L) 30.9(L) 30.7(L)   RDW 11.5 - 14.5 % 14.3 14.6(H) 14.8(H)   PLATELETS 150 - 350 K/uL 290 342 440(H)   MPV 9.2 - 12.9 fL 9.5 9.5 8.8(L)   GRAN # 1.8 - 7.7 K/uL 8.8(H) 8.9(H) 11.8(H)   LYMPH # 1.0 - 4.8 K/uL 2.2 3.4 4.1   MONO # 0.3 - 1.0 K/uL 0.8 0.8 1.0   EOSINOPHIL% 0.0 - 8.0 % 2.8 3.8 1.2   BASOPHIL% 0.0 - 1.9 % 0.1 0.1 0.2   DIFFERENTIAL METHOD - Automated Automated Automated   SODIUM 136 - 145 mmol/L 135(L) 133(L) 137   POTASSIUM 3.5 - 5.1 mmol/L 4.6 5.1 4.5   CHLORIDE 95 - 110 mmol/L 100 100 104   CO2 23 - 29 mmol/L 28 27 25   GLUCOSE 70 - 110 mg/dL 139(H) 112(H) 124(H)   BUN BLD 8 - 23 mg/dL 29(H) 28(H) 29(H)   CREATININE 0.5 - 1.4 mg/dL 0.8 0.8 1.0   CALCIUM 8.7 - 10.5 mg/dL 8.8 8.8 8.9   PROTEIN TOTAL 6.0 - 8.4 g/dL 5.4(L) 5.8(L) 6.5   ALBUMIN 3.5 - 5.2 g/dL 2.6(L) 2.8(L) 3.2(L)   BILIRUBIN TOTAL 0.1 - 1.0 mg/dL 0.3 0.5 0.3   ALK PHOS 55 - 135  U/L 81 106 102   AST 10 - 40 U/L 38 88(H) 30   ALT 10 - 44 U/L 86(H) 139(H) 128(H)   ANION GAP 8 - 16 mmol/L 7(L) 6(L) 8   EGFR IF AFRICAN AMERICAN >60 mL/min/1.73 m:2 >60.0 >60.0 >60.0   EGFR IF NON-AFRICAN AMERICAN >60 mL/min/1.73 m:2 >60.0 >60.0 >60.0     Pulmonary Function Tests 8/24/2020 6/15/2020 2/17/2020 12/19/2019 10/10/2019 8/15/2019 6/13/2019   FVC 1.5 1.32 1.31 1.35 1.29 1.33 1.28   FEV1 1.33 1.16 1.17 1.19 1.15 1.15 1.12   TLC (liters) - 2.1 - - - - -   DLCO (ml/mmHg sec) - 5.31 - 8.1 - - 6.4   FVC% 51.3 45 45 46 44 40 39   FEV1% 57.9 50 51 52 50 44 43   FEF 25-75 - - - - - - -   FEF 25-75% - - - - - - -   TLC% - 43 - - - - -   RV - - - - - - -   RV% - - - - - - -   DLCO% - 25 - 34 - - 33       Imaging:  Results for orders placed during the hospital encounter of 08/24/20   X-Ray Chest PA And Lateral    Narrative EXAMINATION:  XR CHEST PA AND LATERAL    CLINICAL HISTORY:  left lung transplant 8/10/2020;  Lung transplant status    FINDINGS:  Heart size is normal.  There is right lung interstitial fibrosis and chronic change.  There is a left lung transplant.  There is no complication.  There is DJD.      Impression Left lung transplant no complication seen.      Electronically signed by: Collin Del Valle MD  Date:    08/24/2020  Time:    08:07       Assessment:  1. Encounter for aftercare following lung transplant    2. S/P lung transplant    3. Immunosuppression    4. Prophylactic antibiotic    5. Adrenal cortical steroids causing adverse effect in therapeutic use    6. Hoarseness      Plan:   1. FEV1 improved as expected post LSLT.  CXR without acute changes in the lung allograft.  Symptomatically doing very well.  No concerns for graft dysfunction.  Continue to monitor spirometry and chest imaging on routine visits.      2. Continue with tacrolimus, MMF, and pred taper.  Follow-up tac level and adjust for a goal level of 10-14.    3. Continue with valgan, vori, and bactrim DS.  On lamivudine for HBc  positivity.  Add azithro for DRAKE/CLAD prophylaxis.  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       4. Continue follow-up with endocrine for elevated BG levels.  Currently on insulin therapy and monitoring appropriately.      5. Has had hoarseness post transplant.  Currently not causing any significant problems.  Will continue to monitor and if the problem persists, will refer to ENT.    6. Follow-up in 1 month.      Patricia Dominguez D.O.  Lung Transplant  Pulmonary/Critical Care Medicine

## 2020-08-24 NOTE — PROGRESS NOTES
Patient in clinic today for her first outpatient post lung transplant follow up visit. Arrived ambulatory accompanied by her daughter Justyn  In good spirits and verbalized that she has had no difficulty with the outpatient process, thus far.   Instructed her  to always arrive between 08:00 - 08:30 for lab work and remember not to take am Prograf dose before blood has been drawn. Patient reported a  6 on a 1-10 pain scale for incisional pain (sternal incision) - verified that she is following strict sternal precaution guidelines. Reviewed contact numbers for reaching us during and after office hours - stressed importance of early reporting and plan to discuss anything that is, or may seem like, an issue before the end of the office day. For urgent issues that develop or worsen after hours, stressed importance of contacting Lung Transplant MD on call; reviewed importance of requesting med refills - clarified that she, or a family member, is responsible for calling the pharmacy for refills which should be done when down to no less than 5-7 days left of a medication - reminded her that meds may run out at different rates due to dose changes so she needs to be on top of when each one will run out; if there are no remaining refills, call the pharmacy anyway, they will request the refill auth. Do not wait until Fridays to request refills, especially if you know there are no more automatic refills remaining - refills need to be handled during office hours.   Patient verbalized her understanding of all.

## 2020-08-27 NOTE — TELEPHONE ENCOUNTER
Received written order from Dr. Tena instructing patient to hold tacrolimus tonight and restart on 8/28/20 at 0.5 mg bid.  Message sent to patient instructing her to hold Prograf tonight and restart tomorrow morning at 0.5 mg twice a day.  Instructed pt to increase hydration with water and we will repeat labs on Monday when she returns to clinic.  Asked pt to contact us if she has any questions.     ----- Message from Patricia Dominguez DO sent at 8/27/2020  1:32 PM CDT -----  Hold tonights dose of tac.  Resume at 0.5mg BID.  Increase hydration.

## 2020-08-31 NOTE — PROGRESS NOTES
LUNG TRANSPLANT RECIPIENT FOLLOW-UP    Reason for Visit: Follow-up after lung transplantation.                                                                                                         Date of Transplant: 8/10/20                                                                               Reason for Transplant: IPF                                                                               Type of Transplant: single left lung                                                                               CMV Status: D- / R+     Hepatitis C Status:  Donor Ab:(--)  Donor JENNY:(--)  Recipient serostatus:(--)  Treatment status: NA                                                                              Major Complications: None                                                                               History of Present Illness: Chely Becerra is a 65 y.o. year old female with the above listed transplant history who presents for her first post operative transplant follow-up.  She reports sternal pain attributed to not taking oxycodone today.  Chest tube exit site of the wound are still open.  She has been working with PT 2 x week.  Has lost 20 lbs since the surgery but her apetitie is good.  No other complains and is tolerating her medications.        Review of Systems   Constitutional: Negative for chills, diaphoresis, fever, malaise/fatigue and weight loss.   HENT: Negative for congestion, ear discharge, ear pain, hearing loss, nosebleeds, sinus pain, sore throat and tinnitus.    Eyes: Negative for blurred vision, double vision, photophobia, pain, discharge and redness.   Respiratory: Negative for cough ( ), hemoptysis, sputum production, shortness of breath, wheezing and stridor.    Cardiovascular: Positive for chest pain. Negative for palpitations, orthopnea, claudication, leg swelling and PND.   Gastrointestinal: Negative for abdominal pain, blood in stool, constipation, diarrhea, heartburn,  "melena, nausea and vomiting.   Genitourinary: Negative for dysuria, flank pain, frequency, hematuria and urgency.   Musculoskeletal: Negative for back pain, falls, joint pain, myalgias and neck pain.   Skin: Negative for itching and rash.   Neurological: Negative for dizziness, tingling, tremors, sensory change, speech change, focal weakness, seizures, loss of consciousness, weakness and headaches.   Endo/Heme/Allergies: Negative for environmental allergies and polydipsia. Does not bruise/bleed easily.   Psychiatric/Behavioral: Negative for depression, hallucinations, memory loss, substance abuse and suicidal ideas. The patient is not nervous/anxious and does not have insomnia.      /80   Pulse 101   Temp 96.5 °F (35.8 °C) (Oral)   Resp 20   Ht 5' 7" (1.702 m)   Wt 83.9 kg (185 lb)   LMP  (LMP Unknown)   SpO2 95%   BMI 28.98 kg/m²     Physical Exam  Vitals signs and nursing note reviewed.   Constitutional:       General: She is not in acute distress.     Appearance: She is well-developed. She is not diaphoretic.   HENT:      Head: Normocephalic and atraumatic.      Nose: Nose normal.      Mouth/Throat:      Pharynx: No oropharyngeal exudate.   Eyes:      General: No scleral icterus.     Conjunctiva/sclera: Conjunctivae normal.      Pupils: Pupils are equal, round, and reactive to light.   Neck:      Musculoskeletal: Normal range of motion and neck supple.      Thyroid: No thyromegaly.      Vascular: No JVD.      Trachea: No tracheal deviation.   Cardiovascular:      Rate and Rhythm: Normal rate and regular rhythm.      Heart sounds: Normal heart sounds. No murmur. No friction rub. No gallop.    Pulmonary:      Effort: Pulmonary effort is normal. No respiratory distress.      Breath sounds: No wheezing or rales.   Abdominal:      General: Bowel sounds are normal. There is no distension.      Palpations: Abdomen is soft.      Tenderness: There is no abdominal tenderness.   Musculoskeletal: Normal range " of motion.         General: No deformity.   Skin:     General: Skin is warm and dry.      Capillary Refill: Capillary refill takes less than 2 seconds.      Coloration: Skin is not pale.      Findings: No erythema or rash.      Comments: Sternal incision healing well.  Chest tube sites x3 without suggestion of infection, oozing, drainage, or erythema.  Visible SQ tissue     Neurological:      Mental Status: She is alert and oriented to person, place, and time.      Cranial Nerves: No cranial nerve deficit.   Psychiatric:         Judgment: Judgment normal.       Labs:  cbc, cmp, tacrolimus Latest Ref Rng & Units 8/24/2020 8/27/2020 8/31/2020   TACROLIMUS LVL 5.0 - 15.0 ng/mL 14.5 16.7(H) 14.1   WHITE BLOOD CELL COUNT 3.90 - 12.70 K/uL 17.28(H) 15.90(H) 12.47   RBC 4.00 - 5.40 M/uL 3.06(L) 3.20(L) 3.37(L)   HEMOGLOBIN 12.0 - 16.0 g/dL 9.3(L) 9.8(L) 10.5(L)   HEMATOCRIT 37.0 - 48.5 % 30.3(L) 31.5(L) 32.5(L)   MCV 82 - 98 fL 99(H) 98 96   MCH 27.0 - 31.0 pg 30.4 30.6 31.2(H)   MCHC 32.0 - 36.0 g/dL 30.7(L) 31.1(L) 32.3   RDW 11.5 - 14.5 % 14.8(H) 14.5 14.3   PLATELETS 150 - 350 K/uL 440(H) 453(H) 453(H)   MPV 9.2 - 12.9 fL 8.8(L) 8.4(L) 9.4   GRAN # 1.8 - 7.7 K/uL 11.8(H) 11.1(H) 8.4(H)   LYMPH # 1.0 - 4.8 K/uL 4.1 3.8 3.2   MONO # 0.3 - 1.0 K/uL 1.0 0.7 0.6   EOSINOPHIL% 0.0 - 8.0 % 1.2 1.1 1.7   BASOPHIL% 0.0 - 1.9 % 0.2 0.3 0.2   DIFFERENTIAL METHOD - Automated Automated Automated   SODIUM 136 - 145 mmol/L 137 136 137   POTASSIUM 3.5 - 5.1 mmol/L 4.5 4.9 4.2   CHLORIDE 95 - 110 mmol/L 104 103 102   CO2 23 - 29 mmol/L 25 26 23   GLUCOSE 70 - 110 mg/dL 124(H) 146(H) 149(H)   BUN BLD 8 - 23 mg/dL 29(H) 25(H) 32(H)   CREATININE 0.5 - 1.4 mg/dL 1.0 1.1 1.0   CALCIUM 8.7 - 10.5 mg/dL 8.9 9.5 9.3   PROTEIN TOTAL 6.0 - 8.4 g/dL 6.5 6.8 7.2   ALBUMIN 3.5 - 5.2 g/dL 3.2(L) 3.3(L) 3.5   BILIRUBIN TOTAL 0.1 - 1.0 mg/dL 0.3 0.3 0.4   ALK PHOS 55 - 135 U/L 102 108 135   AST 10 - 40 U/L 30 18 33   ALT 10 - 44 U/L 128(H) 83(H) 70(H)    ANION GAP 8 - 16 mmol/L 8 7(L) 12   EGFR IF AFRICAN AMERICAN >60 mL/min/1.73 m:2 >60.0 >60.0 >60.0   EGFR IF NON-AFRICAN AMERICAN >60 mL/min/1.73 m:2 >60.0 53.6(A) >60.0     Pulmonary Function Tests 8/31/2020 8/24/2020 6/15/2020 2/17/2020 12/19/2019 10/10/2019 8/15/2019   FVC 1.52 1.5 1.32 1.31 1.35 1.29 1.33   FEV1 1.35 1.33 1.16 1.17 1.19 1.15 1.15   TLC (liters) - - 2.1 - - - -   DLCO (ml/mmHg sec) - - 5.31 - 8.1 - -   FVC% 52.1 51.3 45 45 46 44 40   FEV1% 59 57.9 50 51 52 50 44   FEF 25-75 - - - - - - -   FEF 25-75% - - - - - - -   TLC% - - 43 - - - -   RV - - - - - - -   RV% - - - - - - -   DLCO% - - 25 - 34 - -       Imaging:  Results for orders placed during the hospital encounter of 08/31/20   X-Ray Chest PA And Lateral    Narrative EXAMINATION:  XR CHEST PA AND LATERAL    CLINICAL HISTORY:  left lung transplant 8/10/2020; Lung transplant status    TECHNIQUE:  PA and lateral views of the chest were performed.    COMPARISON:  08/24/2020    FINDINGS:  Postoperative changes are again identified in the thorax, compatible with history of left lung transplant.  Cardiomediastinal silhouette is unchanged and pulmonary vascular pattern is stable.  Continued elevation of the right hemidiaphragm with chronic interstitial changes.  Left lung is clear.  No pleural fluid and no definite pneumothorax is seen.  Skeletal structures appear intact.  Accentuation of the thoracic kyphosis with some hypertrophic degenerative changes.  Surgical clips at the upper abdomen.      Impression No significant interval change      Electronically signed by: Gus Johnson MD  Date:    08/31/2020  Time:    08:30       Assessment:  1. Encounter for aftercare following lung transplant    2. Lung replaced by transplant    3. Immunosuppression    4. Prophylactic antibiotic    5. Acute pain    6. Non-healing surgical wound, subsequent encounter      Plan:   1. FEV1 and CXR appear to be grossly stable compared to prior studies.  No concerns for  graft dysfunction.  Continue to monitor spirometry and chest imaging on routine visits.      2. Continue with tacrolimus 0.5/0.5mg, MMF1/1g, and pred taper.  Follow-up tac level and adjust for a goal level of 10-14.    3. Continue with valgan, vori, and bactrim DS.  On lamivudine for HBc positivity.  On Azithro for DRAKE/CLAD prophylaxis.  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       4.  Post surgical sternal pain- recommend sabrina APAP and prn oxycodone     5. Cleaned surgical wounds and apposed it with steri-strip.  Patient scheduled to follow up with CTS on 09/09.      RTC 1 week     Adam Hill MD  Pulmonary/Critical Care Medicine/Transplant Pulmonology  [facility]  9/1/2020

## 2020-08-31 NOTE — TELEPHONE ENCOUNTER
Written order received from Dr. Hill.  Message sent to patient instructing her to increase Magnesium Oxide to three times a day instead of twice a day.  Asked patient to contact us if she has any questions.      ----- Message from Jessie Santamaria RN sent at 8/31/2020  4:03 PM CDT -----    ----- Message -----  From: Adam Hill MD  Sent: 8/31/2020   4:02 PM CDT  To: Jessie Santamaria RN    Increase MgSO4 400 mg TID

## 2020-09-08 NOTE — PROGRESS NOTES
LUNG TRANSPLANT RECIPIENT FOLLOW-UP    Reason for Visit: Follow-up after lung transplantation.                                                                                                         Date of Transplant: 8/10/20                                                                               Reason for Transplant: IPF                                                                               Type of Transplant: single left lung                                                                               CMV Status: D- / R+     Hepatitis C Status:  Donor Ab:(--)  Donor JENNY:(--)  Recipient serostatus:(--)  Treatment status: NA                                                                              Major Complications: None                                                                               History of Present Illness: Chely Becerra is a 65 y.o. year old female with the above listed transplant history who presents for routine post op follow-up.  Since her last visit, she has been doing well from a respiratory standpoint. Denies any dyspnea, cough, fever, or sick contacts.  Has been feeling a bit weaker but attributes this to decreased energy due to insomnia.  She states that the neighbors in the apartments are up late and very loud.  They also smoke heavily and she can smell it coming into her apartment.  Because she is up most of the night, she naps frequently during the day preventing her to be more active.  She is concerned over weight loss given her decreased taste and appetite.  Takes all medications as directed.      Review of Systems   Constitutional: Positive for malaise/fatigue and weight loss. Negative for chills, diaphoresis and fever.   HENT: Negative for congestion, ear discharge, ear pain, hearing loss, nosebleeds, sinus pain, sore throat and tinnitus.    Eyes: Negative for blurred vision, double vision, photophobia, pain, discharge and redness.   Respiratory: Negative for  "cough ( ), hemoptysis, sputum production, shortness of breath, wheezing and stridor.    Cardiovascular: Negative for chest pain, palpitations, orthopnea, claudication, leg swelling and PND.   Gastrointestinal: Negative for abdominal pain, blood in stool, constipation, diarrhea, heartburn, melena, nausea and vomiting.   Genitourinary: Negative for dysuria, flank pain, frequency, hematuria and urgency.   Musculoskeletal: Negative for back pain, falls, joint pain, myalgias and neck pain.   Skin: Negative for itching and rash.   Neurological: Negative for dizziness, tingling, tremors, sensory change, speech change, focal weakness, seizures, loss of consciousness, weakness and headaches.   Endo/Heme/Allergies: Negative for environmental allergies and polydipsia. Does not bruise/bleed easily.   Psychiatric/Behavioral: Negative for depression, hallucinations, memory loss, substance abuse and suicidal ideas. The patient is not nervous/anxious and does not have insomnia.      /86   Pulse 104   Temp 97.2 °F (36.2 °C) (Oral)   Resp 20   Ht 5' 7" (1.702 m)   Wt 79.4 kg (175 lb)   LMP  (LMP Unknown)   SpO2 97%   BMI 27.41 kg/m²     Physical Exam  Vitals signs and nursing note reviewed.   Constitutional:       General: She is not in acute distress.     Appearance: She is well-developed. She is not diaphoretic.   HENT:      Head: Normocephalic and atraumatic.      Nose: Nose normal.      Mouth/Throat:      Pharynx: No oropharyngeal exudate.   Eyes:      General: No scleral icterus.     Conjunctiva/sclera: Conjunctivae normal.      Pupils: Pupils are equal, round, and reactive to light.   Neck:      Musculoskeletal: Normal range of motion and neck supple.      Thyroid: No thyromegaly.      Vascular: No JVD.      Trachea: No tracheal deviation.   Cardiovascular:      Rate and Rhythm: Normal rate and regular rhythm.      Heart sounds: Normal heart sounds. No murmur. No friction rub. No gallop.    Pulmonary:      Effort: " Pulmonary effort is normal. No respiratory distress.      Breath sounds: No wheezing or rales.   Abdominal:      General: Bowel sounds are normal. There is no distension.      Palpations: Abdomen is soft.      Tenderness: There is no abdominal tenderness.   Musculoskeletal: Normal range of motion.         General: No deformity.   Skin:     General: Skin is warm and dry.      Capillary Refill: Capillary refill takes less than 2 seconds.      Coloration: Skin is not pale.      Findings: No erythema or rash.      Comments: Sternal incision healing well.  Chest tube sites x3 without suggestion of infection, oozing, drainage, or erythema.  Visible SQ tissue     Neurological:      Mental Status: She is alert and oriented to person, place, and time.      Cranial Nerves: No cranial nerve deficit.   Psychiatric:         Judgment: Judgment normal.       Labs:  cbc, cmp, tacrolimus Latest Ref Rng & Units 8/27/2020 8/31/2020 9/8/2020   TACROLIMUS LVL 5.0 - 15.0 ng/mL 16.7(H) 14.1 -   WHITE BLOOD CELL COUNT 3.90 - 12.70 K/uL 15.90(H) 12.47 10.03   RBC 4.00 - 5.40 M/uL 3.20(L) 3.37(L) 3.91(L)   HEMOGLOBIN 12.0 - 16.0 g/dL 9.8(L) 10.5(L) 12.0   HEMATOCRIT 37.0 - 48.5 % 31.5(L) 32.5(L) 37.4   MCV 82 - 98 fL 98 96 96   MCH 27.0 - 31.0 pg 30.6 31.2(H) 30.7   MCHC 32.0 - 36.0 g/dL 31.1(L) 32.3 32.1   RDW 11.5 - 14.5 % 14.5 14.3 14.3   PLATELETS 150 - 350 K/uL 453(H) 453(H) 406(H)   MPV 9.2 - 12.9 fL 8.4(L) 9.4 9.5   GRAN # 1.8 - 7.7 K/uL 11.1(H) 8.4(H) 4.0   LYMPH # 1.0 - 4.8 K/uL 3.8 3.2 5.2(H)   MONO # 0.3 - 1.0 K/uL 0.7 0.6 0.5   EOSINOPHIL% 0.0 - 8.0 % 1.1 1.7 2.2   BASOPHIL% 0.0 - 1.9 % 0.3 0.2 0.5   DIFFERENTIAL METHOD - Automated Automated Automated   SODIUM 136 - 145 mmol/L 136 137 136   POTASSIUM 3.5 - 5.1 mmol/L 4.9 4.2 3.9   CHLORIDE 95 - 110 mmol/L 103 102 101   CO2 23 - 29 mmol/L 26 23 25   GLUCOSE 70 - 110 mg/dL 146(H) 149(H) 183(H)   BUN BLD 8 - 23 mg/dL 25(H) 32(H) 23   CREATININE 0.5 - 1.4 mg/dL 1.1 1.0 1.3   CALCIUM  8.7 - 10.5 mg/dL 9.5 9.3 9.4   PROTEIN TOTAL 6.0 - 8.4 g/dL 6.8 7.2 6.9   ALBUMIN 3.5 - 5.2 g/dL 3.3(L) 3.5 3.6   BILIRUBIN TOTAL 0.1 - 1.0 mg/dL 0.3 0.4 0.4   ALK PHOS 55 - 135 U/L 108 135 206(H)   AST 10 - 40 U/L 18 33 29   ALT 10 - 44 U/L 83(H) 70(H) 81(H)   ANION GAP 8 - 16 mmol/L 7(L) 12 10   EGFR IF AFRICAN AMERICAN >60 mL/min/1.73 m:2 >60.0 >60.0 50.5(A)   EGFR IF NON-AFRICAN AMERICAN >60 mL/min/1.73 m:2 53.6(A) >60.0 43.8(A)     Pulmonary Function Tests 9/8/2020 8/31/2020 8/24/2020 6/15/2020 2/17/2020 12/19/2019 10/10/2019   FVC 1.77 1.52 1.5 1.32 1.31 1.35 1.29   FEV1 1.5 1.35 1.33 1.16 1.17 1.19 1.15   TLC (liters) - - - 2.1 - - -   DLCO (ml/mmHg sec) - - - 5.31 - 8.1 -   FVC% 61 52.1 51.3 45 45 46 44   FEV1% 66 59 57.9 50 51 52 50   FEF 25-75 - - - - - - -   FEF 25-75% - - - - - - -   TLC% - - - 43 - - -   RV - - - - - - -   RV% - - - - - - -   DLCO% - - - 25 - 34 -       Imaging:  Results for orders placed during the hospital encounter of 08/31/20   X-Ray Chest PA And Lateral    Narrative EXAMINATION:  XR CHEST PA AND LATERAL    CLINICAL HISTORY:  left lung transplant 8/10/2020; Lung transplant status    TECHNIQUE:  PA and lateral views of the chest were performed.    COMPARISON:  08/24/2020    FINDINGS:  Postoperative changes are again identified in the thorax, compatible with history of left lung transplant.  Cardiomediastinal silhouette is unchanged and pulmonary vascular pattern is stable.  Continued elevation of the right hemidiaphragm with chronic interstitial changes.  Left lung is clear.  No pleural fluid and no definite pneumothorax is seen.  Skeletal structures appear intact.  Accentuation of the thoracic kyphosis with some hypertrophic degenerative changes.  Surgical clips at the upper abdomen.      Impression No significant interval change      Electronically signed by: Gus Johnson MD  Date:    08/31/2020  Time:    08:30       Assessment:  1. Encounter for aftercare following lung transplant     2. Lung replaced by transplant    3. Immunosuppression    4. Prophylactic antibiotic      Plan:   1. FEV1 continues to increase as expected following LSLT.  CXR without acute changes.  Symptomatically doing well.  Insomnia appears situational and will reach out to our  regarding apartment transfer.  Will refer to pulmonary rehab for strength and conditioning.  No concerns for graft dysfunction.  Continue to monitor spirometry and chest imaging with routine visits.      2. Continue with tac, MMF, and prednisone.  Follow-up tac level and adjust to a goal of 10-14 if she can tolerate.    3. Continue with bactrim DS, valgan, vori, lamivudine, and azithro.      4. Follow-up in 1 week.      Patricia Dominguez D.O.  Pulmonary/Critical Care and Lung Transplantation  [facility]

## 2020-09-08 NOTE — TELEPHONE ENCOUNTER
Received written order from Dr. Dominguez instructing patient to hold Prograf tonight and resume tomorrow morning at a decreased dose of 0.5 mg once a day.  Message sent to patient instructing her to make the above dose change.  Pt will have repeat labs on Friday along with a voriconazole level.  Patient responded stating understanding.     ----- Message from Patricia Dominguez DO sent at 9/8/2020  1:24 PM CDT -----  Hold tonights dose of tac.  Resume tomorrow at 0.5mg daily.  Check a vori level with next set of labs.

## 2020-09-08 NOTE — PROGRESS NOTES
Patient seen and examined. Patient is progressively increasing activity. No significant complaints.     Sternum: stable, incision CDI  Chest xray: Acceptable post op chest       Assessment:  S/P LUT      Plan:  Sutures removed  Can begin driving   We will refer to LUT to assume care       No scheduled appointment, RTC prn

## 2020-09-09 NOTE — PROGRESS NOTES
FRANCIS faxed pulmonary rehab referral/spirometry results/chest x-ray/EKG to [facility] (Ph: [phone]  Fax: [phone]). FRANCIS confirmed w/ Melvin at [facility] that referral was received. Melvin reports he is in the process of getting the referral authorized and should have approval by the end of the week. FRANCIS provided direct contact number. FRANCIS to relay this information to pt's coordinator STACIA Quiroz RN. FRANCIS to remain available.     Update: 8:42 AM - Pt has been approved and will be contacted directly by [facility] to schedule first apt.

## 2020-09-11 NOTE — PROGRESS NOTES
Received TORB from Dr. Garza to schedule pt for a 6 wk surv bronch.  Pre-procedure instructions sent to patient via my chart.  A copy of the instructions will be provided when patient comes to clinic on 9/14.

## 2020-09-14 NOTE — PROGRESS NOTES
LUNG TRANSPLANT RECIPIENT FOLLOW-UP    Reason for Visit: Follow-up after lung transplantation.                                                                                                         Date of Transplant: 8/10/20                                                                               Reason for Transplant: IPF                                                                               Type of Transplant: single left lung                                                                               CMV Status: D- / R+     Hepatitis C Status:  Donor Ab:(--)  Donor JENNY:(--)  Recipient serostatus:(--)  Treatment status: NA                                                                              Major Complications: None                                                                               History of Present Illness: Chely Becerra is a 65 y.o. year old female with the above listed transplant history who presents for routine post op follow-up.  Since her last visit, she has been doing well from a respiratory standpoint. Denies any dyspnea, cough, fever, or sick contacts.  Strength and insomnia are improving. Takes all medications as directed.      Review of Systems   Constitutional: Negative for chills, diaphoresis, fever, malaise/fatigue and weight loss.   HENT: Negative for congestion, ear discharge, ear pain, hearing loss, nosebleeds, sinus pain, sore throat and tinnitus.    Eyes: Negative for blurred vision, double vision, photophobia, pain, discharge and redness.   Respiratory: Negative for cough ( ), hemoptysis, sputum production, shortness of breath, wheezing and stridor.    Cardiovascular: Negative for chest pain, palpitations, orthopnea, claudication, leg swelling and PND.   Gastrointestinal: Negative for abdominal pain, blood in stool, constipation, diarrhea, heartburn, melena, nausea and vomiting.   Genitourinary: Negative for dysuria, flank pain, frequency, hematuria and  "urgency.   Musculoskeletal: Negative for back pain, falls, joint pain, myalgias and neck pain.   Skin: Negative for itching and rash.   Neurological: Negative for dizziness, tingling, tremors, sensory change, speech change, focal weakness, seizures, loss of consciousness, weakness and headaches.   Endo/Heme/Allergies: Negative for environmental allergies and polydipsia. Does not bruise/bleed easily.   Psychiatric/Behavioral: Negative for depression, hallucinations, memory loss, substance abuse and suicidal ideas. The patient is not nervous/anxious and does not have insomnia.      /83   Pulse 101   Temp 97.3 °F (36.3 °C) (Oral)   Resp 20   Ht 5' 7" (1.702 m)   Wt 78 kg (172 lb)   LMP  (LMP Unknown)   SpO2 97%   BMI 26.94 kg/m²     Physical Exam  Vitals signs and nursing note reviewed.   Constitutional:       General: She is not in acute distress.     Appearance: She is well-developed. She is not diaphoretic.   HENT:      Head: Normocephalic and atraumatic.      Nose: Nose normal.      Mouth/Throat:      Pharynx: No oropharyngeal exudate.   Eyes:      General: No scleral icterus.     Conjunctiva/sclera: Conjunctivae normal.      Pupils: Pupils are equal, round, and reactive to light.   Neck:      Musculoskeletal: Normal range of motion and neck supple.      Thyroid: No thyromegaly.      Vascular: No JVD.      Trachea: No tracheal deviation.   Cardiovascular:      Rate and Rhythm: Normal rate and regular rhythm.      Heart sounds: Normal heart sounds. No murmur. No friction rub. No gallop.    Pulmonary:      Effort: Pulmonary effort is normal. No respiratory distress.      Breath sounds: No wheezing or rales.   Abdominal:      General: Bowel sounds are normal. There is no distension.      Palpations: Abdomen is soft.      Tenderness: There is no abdominal tenderness.   Musculoskeletal: Normal range of motion.         General: No deformity.   Skin:     General: Skin is warm and dry.      Capillary Refill: " Capillary refill takes less than 2 seconds.      Coloration: Skin is not pale.      Findings: No erythema or rash.      Comments: Sternal incision healing well.  Chest tube sites x3 without suggestion of infection, oozing, drainage, or erythema.  Visible SQ tissue     Neurological:      Mental Status: She is alert and oriented to person, place, and time.      Cranial Nerves: No cranial nerve deficit.   Psychiatric:         Judgment: Judgment normal.       Labs:  cbc, cmp, tacrolimus Latest Ref Rng & Units 8/31/2020 9/8/2020 9/11/2020   TACROLIMUS LVL 5.0 - 15.0 ng/mL 14.1 15.7(H) 11.9   WHITE BLOOD CELL COUNT 3.90 - 12.70 K/uL 12.47 10.03 -   RBC 4.00 - 5.40 M/uL 3.37(L) 3.91(L) -   HEMOGLOBIN 12.0 - 16.0 g/dL 10.5(L) 12.0 -   HEMATOCRIT 37.0 - 48.5 % 32.5(L) 37.4 -   MCV 82 - 98 fL 96 96 -   MCH 27.0 - 31.0 pg 31.2(H) 30.7 -   MCHC 32.0 - 36.0 g/dL 32.3 32.1 -   RDW 11.5 - 14.5 % 14.3 14.3 -   PLATELETS 150 - 350 K/uL 453(H) 406(H) -   MPV 9.2 - 12.9 fL 9.4 9.5 -   GRAN # 1.8 - 7.7 K/uL 8.4(H) 4.0 -   LYMPH # 1.0 - 4.8 K/uL 3.2 5.2(H) -   MONO # 0.3 - 1.0 K/uL 0.6 0.5 -   EOSINOPHIL% 0.0 - 8.0 % 1.7 2.2 -   BASOPHIL% 0.0 - 1.9 % 0.2 0.5 -   DIFFERENTIAL METHOD - Automated Automated -   SODIUM 136 - 145 mmol/L 137 136 136   POTASSIUM 3.5 - 5.1 mmol/L 4.2 3.9 4.6   CHLORIDE 95 - 110 mmol/L 102 101 102   CO2 23 - 29 mmol/L 23 25 22(L)   GLUCOSE 70 - 110 mg/dL 149(H) 183(H) 197(H)   BUN BLD 8 - 23 mg/dL 32(H) 23 20   CREATININE 0.5 - 1.4 mg/dL 1.0 1.3 1.0   CALCIUM 8.7 - 10.5 mg/dL 9.3 9.4 9.6   PROTEIN TOTAL 6.0 - 8.4 g/dL 7.2 6.9 -   ALBUMIN 3.5 - 5.2 g/dL 3.5 3.6 -   BILIRUBIN TOTAL 0.1 - 1.0 mg/dL 0.4 0.4 -   ALK PHOS 55 - 135 U/L 135 206(H) -   AST 10 - 40 U/L 33 29 -   ALT 10 - 44 U/L 70(H) 81(H) -   ANION GAP 8 - 16 mmol/L 12 10 12   EGFR IF AFRICAN AMERICAN >60 mL/min/1.73 m:2 >60.0 50.5(A) >60.0   EGFR IF NON-AFRICAN AMERICAN >60 mL/min/1.73 m:2 >60.0 43.8(A) >60.0     Pulmonary Function Tests 9/14/2020  9/8/2020 8/31/2020 8/24/2020 6/15/2020 2/17/2020 12/19/2019   FVC 1.72 1.77 1.52 1.5 1.32 1.31 1.35   FEV1 1.49 1.5 1.35 1.33 1.16 1.17 1.19   TLC (liters) - - - - 2.1 - -   DLCO (ml/mmHg sec) - - - - 5.31 - 8.1   FVC% 59.1 61 52.1 51.3 45 45 46   FEV1% 65.3 66 59 57.9 50 51 52   FEF 25-75 - - - - - - -   FEF 25-75% - - - - - - -   TLC% - - - - 43 - -   RV - - - - - - -   RV% - - - - - - -   DLCO% - - - - 25 - 34       Imaging:  Results for orders placed during the hospital encounter of 08/31/20   X-Ray Chest PA And Lateral    Narrative EXAMINATION:  XR CHEST PA AND LATERAL    CLINICAL HISTORY:  left lung transplant 8/10/2020; Lung transplant status    TECHNIQUE:  PA and lateral views of the chest were performed.    COMPARISON:  08/24/2020    FINDINGS:  Postoperative changes are again identified in the thorax, compatible with history of left lung transplant.  Cardiomediastinal silhouette is unchanged and pulmonary vascular pattern is stable.  Continued elevation of the right hemidiaphragm with chronic interstitial changes.  Left lung is clear.  No pleural fluid and no definite pneumothorax is seen.  Skeletal structures appear intact.  Accentuation of the thoracic kyphosis with some hypertrophic degenerative changes.  Surgical clips at the upper abdomen.      Impression No significant interval change      Electronically signed by: Gus Johnson MD  Date:    08/31/2020  Time:    08:30       Assessment:  1. Encounter for aftercare following lung transplant    2. Lung replaced by transplant    3. Immunosuppression    4. Prophylactic antibiotic      Plan:   1. FEV1 stable from previous.  CXR without acute changes.  Symptomatically doing well. No concerns for graft dysfunction.  Continue to monitor spirometry and chest imaging with routine visits.      2. Continue with tac, MMF, and prednisone.  Follow-up tac level and adjust to a goal of 10-14 if she can tolerate.    3. Continue with bactrim DS, valgan, vori, lamivudine,  and emil.      4. Follow-up in 2 week.      Patricia Dominguez D.O.  Pulmonary/Critical Care and Lung Transplantation  [facility] [place]

## 2020-09-14 NOTE — H&P (VIEW-ONLY)
LUNG TRANSPLANT RECIPIENT FOLLOW-UP    Reason for Visit: Follow-up after lung transplantation.                                                                                                         Date of Transplant: 8/10/20                                                                               Reason for Transplant: IPF                                                                               Type of Transplant: single left lung                                                                               CMV Status: D- / R+     Hepatitis C Status:  Donor Ab:(--)  Donor JENNY:(--)  Recipient serostatus:(--)  Treatment status: NA                                                                              Major Complications: None                                                                               History of Present Illness: Chely Becerra is a 65 y.o. year old female with the above listed transplant history who presents for routine post op follow-up.  Since her last visit, she has been doing well from a respiratory standpoint. Denies any dyspnea, cough, fever, or sick contacts.  Strength and insomnia are improving. Takes all medications as directed.      Review of Systems   Constitutional: Negative for chills, diaphoresis, fever, malaise/fatigue and weight loss.   HENT: Negative for congestion, ear discharge, ear pain, hearing loss, nosebleeds, sinus pain, sore throat and tinnitus.    Eyes: Negative for blurred vision, double vision, photophobia, pain, discharge and redness.   Respiratory: Negative for cough ( ), hemoptysis, sputum production, shortness of breath, wheezing and stridor.    Cardiovascular: Negative for chest pain, palpitations, orthopnea, claudication, leg swelling and PND.   Gastrointestinal: Negative for abdominal pain, blood in stool, constipation, diarrhea, heartburn, melena, nausea and vomiting.   Genitourinary: Negative for dysuria, flank pain, frequency, hematuria and  "urgency.   Musculoskeletal: Negative for back pain, falls, joint pain, myalgias and neck pain.   Skin: Negative for itching and rash.   Neurological: Negative for dizziness, tingling, tremors, sensory change, speech change, focal weakness, seizures, loss of consciousness, weakness and headaches.   Endo/Heme/Allergies: Negative for environmental allergies and polydipsia. Does not bruise/bleed easily.   Psychiatric/Behavioral: Negative for depression, hallucinations, memory loss, substance abuse and suicidal ideas. The patient is not nervous/anxious and does not have insomnia.      /83   Pulse 101   Temp 97.3 °F (36.3 °C) (Oral)   Resp 20   Ht 5' 7" (1.702 m)   Wt 78 kg (172 lb)   LMP  (LMP Unknown)   SpO2 97%   BMI 26.94 kg/m²     Physical Exam  Vitals signs and nursing note reviewed.   Constitutional:       General: She is not in acute distress.     Appearance: She is well-developed. She is not diaphoretic.   HENT:      Head: Normocephalic and atraumatic.      Nose: Nose normal.      Mouth/Throat:      Pharynx: No oropharyngeal exudate.   Eyes:      General: No scleral icterus.     Conjunctiva/sclera: Conjunctivae normal.      Pupils: Pupils are equal, round, and reactive to light.   Neck:      Musculoskeletal: Normal range of motion and neck supple.      Thyroid: No thyromegaly.      Vascular: No JVD.      Trachea: No tracheal deviation.   Cardiovascular:      Rate and Rhythm: Normal rate and regular rhythm.      Heart sounds: Normal heart sounds. No murmur. No friction rub. No gallop.    Pulmonary:      Effort: Pulmonary effort is normal. No respiratory distress.      Breath sounds: No wheezing or rales.   Abdominal:      General: Bowel sounds are normal. There is no distension.      Palpations: Abdomen is soft.      Tenderness: There is no abdominal tenderness.   Musculoskeletal: Normal range of motion.         General: No deformity.   Skin:     General: Skin is warm and dry.      Capillary Refill: " Capillary refill takes less than 2 seconds.      Coloration: Skin is not pale.      Findings: No erythema or rash.      Comments: Sternal incision healing well.  Chest tube sites x3 without suggestion of infection, oozing, drainage, or erythema.  Visible SQ tissue     Neurological:      Mental Status: She is alert and oriented to person, place, and time.      Cranial Nerves: No cranial nerve deficit.   Psychiatric:         Judgment: Judgment normal.       Labs:  cbc, cmp, tacrolimus Latest Ref Rng & Units 8/31/2020 9/8/2020 9/11/2020   TACROLIMUS LVL 5.0 - 15.0 ng/mL 14.1 15.7(H) 11.9   WHITE BLOOD CELL COUNT 3.90 - 12.70 K/uL 12.47 10.03 -   RBC 4.00 - 5.40 M/uL 3.37(L) 3.91(L) -   HEMOGLOBIN 12.0 - 16.0 g/dL 10.5(L) 12.0 -   HEMATOCRIT 37.0 - 48.5 % 32.5(L) 37.4 -   MCV 82 - 98 fL 96 96 -   MCH 27.0 - 31.0 pg 31.2(H) 30.7 -   MCHC 32.0 - 36.0 g/dL 32.3 32.1 -   RDW 11.5 - 14.5 % 14.3 14.3 -   PLATELETS 150 - 350 K/uL 453(H) 406(H) -   MPV 9.2 - 12.9 fL 9.4 9.5 -   GRAN # 1.8 - 7.7 K/uL 8.4(H) 4.0 -   LYMPH # 1.0 - 4.8 K/uL 3.2 5.2(H) -   MONO # 0.3 - 1.0 K/uL 0.6 0.5 -   EOSINOPHIL% 0.0 - 8.0 % 1.7 2.2 -   BASOPHIL% 0.0 - 1.9 % 0.2 0.5 -   DIFFERENTIAL METHOD - Automated Automated -   SODIUM 136 - 145 mmol/L 137 136 136   POTASSIUM 3.5 - 5.1 mmol/L 4.2 3.9 4.6   CHLORIDE 95 - 110 mmol/L 102 101 102   CO2 23 - 29 mmol/L 23 25 22(L)   GLUCOSE 70 - 110 mg/dL 149(H) 183(H) 197(H)   BUN BLD 8 - 23 mg/dL 32(H) 23 20   CREATININE 0.5 - 1.4 mg/dL 1.0 1.3 1.0   CALCIUM 8.7 - 10.5 mg/dL 9.3 9.4 9.6   PROTEIN TOTAL 6.0 - 8.4 g/dL 7.2 6.9 -   ALBUMIN 3.5 - 5.2 g/dL 3.5 3.6 -   BILIRUBIN TOTAL 0.1 - 1.0 mg/dL 0.4 0.4 -   ALK PHOS 55 - 135 U/L 135 206(H) -   AST 10 - 40 U/L 33 29 -   ALT 10 - 44 U/L 70(H) 81(H) -   ANION GAP 8 - 16 mmol/L 12 10 12   EGFR IF AFRICAN AMERICAN >60 mL/min/1.73 m:2 >60.0 50.5(A) >60.0   EGFR IF NON-AFRICAN AMERICAN >60 mL/min/1.73 m:2 >60.0 43.8(A) >60.0     Pulmonary Function Tests 9/14/2020  9/8/2020 8/31/2020 8/24/2020 6/15/2020 2/17/2020 12/19/2019   FVC 1.72 1.77 1.52 1.5 1.32 1.31 1.35   FEV1 1.49 1.5 1.35 1.33 1.16 1.17 1.19   TLC (liters) - - - - 2.1 - -   DLCO (ml/mmHg sec) - - - - 5.31 - 8.1   FVC% 59.1 61 52.1 51.3 45 45 46   FEV1% 65.3 66 59 57.9 50 51 52   FEF 25-75 - - - - - - -   FEF 25-75% - - - - - - -   TLC% - - - - 43 - -   RV - - - - - - -   RV% - - - - - - -   DLCO% - - - - 25 - 34       Imaging:  Results for orders placed during the hospital encounter of 08/31/20   X-Ray Chest PA And Lateral    Narrative EXAMINATION:  XR CHEST PA AND LATERAL    CLINICAL HISTORY:  left lung transplant 8/10/2020; Lung transplant status    TECHNIQUE:  PA and lateral views of the chest were performed.    COMPARISON:  08/24/2020    FINDINGS:  Postoperative changes are again identified in the thorax, compatible with history of left lung transplant.  Cardiomediastinal silhouette is unchanged and pulmonary vascular pattern is stable.  Continued elevation of the right hemidiaphragm with chronic interstitial changes.  Left lung is clear.  No pleural fluid and no definite pneumothorax is seen.  Skeletal structures appear intact.  Accentuation of the thoracic kyphosis with some hypertrophic degenerative changes.  Surgical clips at the upper abdomen.      Impression No significant interval change      Electronically signed by: Gus Johnson MD  Date:    08/31/2020  Time:    08:30       Assessment:  1. Encounter for aftercare following lung transplant    2. Lung replaced by transplant    3. Immunosuppression    4. Prophylactic antibiotic      Plan:   1. FEV1 stable from previous.  CXR without acute changes.  Symptomatically doing well. No concerns for graft dysfunction.  Continue to monitor spirometry and chest imaging with routine visits.      2. Continue with tac, MMF, and prednisone.  Follow-up tac level and adjust to a goal of 10-14 if she can tolerate.    3. Continue with bactrim DS, valgan, vori, lamivudine,  and emil.      4. Follow-up in 2 week.      Patricia Dominguez D.O.  Pulmonary/Critical Care and Lung Transplantation  [facility]

## 2020-09-23 PROBLEM — Z94.2 H/O LUNG TRANSPLANT: Status: ACTIVE | Noted: 2020-01-01

## 2020-09-23 NOTE — PLAN OF CARE
Patient denies pain and nausea.  Tolerates clear liquids well, no coughing with oral intake noted.  VSS., respirations even and unlabored, denies sob, sputum is now clear in color,  Discharge instructions given to patient and her daughter with verbal understanding received.  Anesthesia and procedure consents in chart. Patient expresses readiness for discharge.

## 2020-09-23 NOTE — DISCHARGE INSTRUCTIONS
Discharge Instructions for Bronchoscopy    Chest X-ray completed and MD notified.    ACTIVITY LEVEL:  If you received sedation or an anesthetic, you may feel sleepy for several hours. Do not drive, operate machinery, make critical decisions, or perform activities that require coordination or balance until tomorrow morning. Please have a responsible person stay with you for at least two (2) hours after you leave the hospital.     DIET:  Do not eat or drink anything until 9:40 a. m. Once you can drink clear liquids without coughing, you can resume your regular diet.     WHAT you may expect over the next 24 hours:  · You may experience a low grade fever.  · You may cough up streaks of blood.  · Take Tylenol as directed for comfort/fever.    Additional Instructions:  · Do not take Aspirin, ibuprofen, naproxen, or any medications containing these items for *** days after the bronchoscopy.  · If you normally take Coumadin or aspirin, you may restart on ***.     COME TO THE EMERGENCY DEPARTMENT IF:  · You cough up more than one (1) tablespoon of blood.  · You have fever over 101F (38.4C) for more than one evening.  · You experience shortness of breath that is of new onset, or that is increased from your usual baseline.  · You experience chest pain.  · You have chills.    RETURN APPOINTMENT:  Follow up as directed.   Comments: ***.    FOR EMERGENCIES:    If any unusual problems or difficulties occur, contact Dr Garza  or the resident at [phone] after hours or at the Clinic office.

## 2020-09-23 NOTE — PROGRESS NOTES
Spoke to BREN Abbasi and notified her that patients cxr was available, also notified her of hemoptysis and course crackles noted to left lung, she stated xray looked good and as long as patient was not producing excess hemoptysis or has any symptoms different than baseline that it would be ok for her to be discharge when her recovery is complete.

## 2020-09-23 NOTE — SEDATION DOCUMENTATION
RN transported patient to bronch suite, H and P updated-yes, patient placed on cardiac monitor, anesthesia Plan:  Conscious sedation, ASA verified-yes, Airway exam performed-yes, Personal or Family history of anesthesia complications-No  Consent signed and witnessed, Cordelia Edge RN

## 2020-09-23 NOTE — SEDATION DOCUMENTATION
Specimens obtained during Bronchoscopy:  BAL JOSETTE 90 ml nacl in 55 ml return also send for Galactomannan, TBBX LLL, Hubert micro R main.  Verbal report given to DOSC RN at bedside  to include documentation charted in procedural sedation documentation.  Patient to be NPO 1 hour post procedure and place in PO tolerance at 0940, CXR needed.  Moderate concious sedation was performed and cardiorespiratory functions were monitored the entire procedure by Cordelia Edge RN.  Sedation began at 0831  and concluded at 0855.  The patient tolerated the procedure well.  Cordelia Edge RN

## 2020-09-23 NOTE — INTERVAL H&P NOTE
The patient has been examined and the H&P has been reviewed:    I concur with the findings and no changes have occurred since H&P was written.    Anesthesia/Surgery risks, benefits and alternative options discussed and understood by patient/family.          Active Hospital Problems    Diagnosis  POA    H/O lung transplant [Z94.2]  Not Applicable      Resolved Hospital Problems   No resolved problems to display.

## 2020-09-24 NOTE — TELEPHONE ENCOUNTER
Spoke with patient over the phone regarding blood sugar logs. BG running in the 130-140s in am and 240s-300 at lunchtime and evening. Instructed patient to increase Novolog to 10 units TID with meals and continue Novolog Correction Scale (150/50). Patient to send logs in 3 days via portal for review. Will schedule patient for clinic follow up apt.

## 2020-09-29 NOTE — TELEPHONE ENCOUNTER
Received the message below from pt's lung tx coordinator, Amisha, and called the pt.  Inquired about redness, drainage, pain and swelling and pt denied that she has experienced any of this from her chest tube sites.  Pt denied fevers.  Pt asked to take and send a picture via [facility] and she stated that steristrips were recently applied to the sites, so she would not be able to.  Pt instructed to wash over the chest tube and mid-sternal incisions everyday with soap and water.  Pt inquired about washing over the steristrips and I instructed her to wash directly over the steristrips everyday and to pat them dry.  Pt informed that in about a week the adhesive from the steristrips will begin loosening up and then the strips will fall off on their own, as the sites heal.  Pt verbalized understanding. I provided pt with my phone number and asked her to call me with any questions or concerns related to her surgical incisions, including but not limited to drainage, redness, swelling, pain, and fevers, and she verbalized understanding. Pt instructed to send a picture to [facility] of the sites, once the steristrips fall off, which she verbalized understanding to.     ----- Message from Ana Lilia CLARK Do, RN sent at 9/29/2020  3:32 PM CDT -----  Regarding: wound  Beni Uriarte,  This pt saw Kierra Lozoya on 9/8 for a post wound check.  Patient was seen by Dr. Dominguez yesterday in clinic and her chest tube sites are not healing.  She would like to have patient see yall for a follow up.    Amisha

## 2020-10-07 NOTE — PROGRESS NOTES
LUNG TRANSPLANT RECIPIENT FOLLOW-UP    Reason for Visit: Follow-up after lung transplantation.                                                                                                         Date of Transplant: 8/10/20                                                                               Reason for Transplant: IPF                                                                               Type of Transplant: single left lung                                                                               CMV Status: D- / R+     Hepatitis C Status:  Donor Ab:(--)  Donor JENNY:(--)  Recipient serostatus:(--)  Treatment status: NA                                                                              Major Complications: None                                                                               History of Present Illness: Chely Becerra is a 65 y.o. year old female with the above listed transplant history who presents for routine post op follow-up.  She is on 0L of oxygen.  She is on no assisted ventilation.  Her New York Heart Association Class is 100% - Normal, No Complaints, no evidence of disease.  She is diabetic insulin dependent    Since her last visit, she has been doing well from a respiratory standpoint. Denies any dyspnea, cough, fever, or sick contacts.  Strength and insomnia are improving. Takes all medications as directed.  Overall, doing very well.      Review of Systems   Constitutional: Negative for chills, diaphoresis, fever, malaise/fatigue and weight loss.   HENT: Negative for congestion, ear discharge, ear pain, hearing loss, nosebleeds, sinus pain, sore throat and tinnitus.    Eyes: Negative for blurred vision, double vision, photophobia, pain, discharge and redness.   Respiratory: Negative for cough ( ), hemoptysis, sputum production, shortness of breath, wheezing and stridor.    Cardiovascular: Negative for chest pain, palpitations, orthopnea, claudication, leg  "swelling and PND.   Gastrointestinal: Negative for abdominal pain, blood in stool, constipation, diarrhea, heartburn, melena, nausea and vomiting.   Genitourinary: Negative for dysuria, flank pain, frequency, hematuria and urgency.   Musculoskeletal: Negative for back pain, falls, joint pain, myalgias and neck pain.   Skin: Negative for itching and rash.   Neurological: Negative for dizziness, tingling, tremors, sensory change, speech change, focal weakness, seizures, loss of consciousness, weakness and headaches.   Endo/Heme/Allergies: Negative for environmental allergies and polydipsia. Does not bruise/bleed easily.   Psychiatric/Behavioral: Negative for depression, hallucinations, memory loss, substance abuse and suicidal ideas. The patient is not nervous/anxious and does not have insomnia.      BP (!) 142/88   Pulse 100   Temp 97.5 °F (36.4 °C) (Oral)   Resp 20   Ht 5' 7" (1.702 m)   Wt 77 kg (169 lb 12.1 oz)   LMP  (LMP Unknown)   SpO2 98%   BMI 26.59 kg/m²     Physical Exam  Vitals signs and nursing note reviewed.   Constitutional:       General: She is not in acute distress.     Appearance: She is well-developed. She is not diaphoretic.   HENT:      Head: Normocephalic and atraumatic.      Nose: Nose normal.      Mouth/Throat:      Pharynx: No oropharyngeal exudate.   Eyes:      General: No scleral icterus.     Conjunctiva/sclera: Conjunctivae normal.      Pupils: Pupils are equal, round, and reactive to light.   Neck:      Musculoskeletal: Normal range of motion and neck supple.      Thyroid: No thyromegaly.      Vascular: No JVD.      Trachea: No tracheal deviation.   Cardiovascular:      Rate and Rhythm: Normal rate and regular rhythm.      Heart sounds: Normal heart sounds. No murmur. No friction rub. No gallop.    Pulmonary:      Effort: Pulmonary effort is normal. No respiratory distress.      Breath sounds: No wheezing or rales.   Abdominal:      General: Bowel sounds are normal. There is no " distension.      Palpations: Abdomen is soft.      Tenderness: There is no abdominal tenderness.   Musculoskeletal: Normal range of motion.         General: No deformity.   Skin:     General: Skin is warm and dry.      Capillary Refill: Capillary refill takes less than 2 seconds.      Coloration: Skin is not pale.      Findings: No erythema or rash.      Comments: Sternal incision and chest tube incision sites healing well.       Neurological:      Mental Status: She is alert and oriented to person, place, and time.      Cranial Nerves: No cranial nerve deficit.   Psychiatric:         Judgment: Judgment normal.       Labs:  cbc, cmp, tacrolimus Latest Ref Rng & Units 9/14/2020 9/28/2020 10/7/2020   TACROLIMUS LVL 5.0 - 15.0 ng/mL 10.9 9.2 -   WHITE BLOOD CELL COUNT 3.90 - 12.70 K/uL 10.86 9.47 10.58   RBC 4.00 - 5.40 M/uL 3.98(L) 3.62(L) 3.85(L)   HEMOGLOBIN 12.0 - 16.0 g/dL 12.1 11.2(L) 12.1   HEMATOCRIT 37.0 - 48.5 % 38.6 36.1(L) 38.0   MCV 82 - 98 fL 97 100(H) 99(H)   MCH 27.0 - 31.0 pg 30.4 30.9 31.4(H)   MCHC 32.0 - 36.0 g/dL 31.3(L) 31.0(L) 31.8(L)   RDW 11.5 - 14.5 % 13.9 14.9(H) 15.7(H)   PLATELETS 150 - 350 K/uL 385(H) 274 280   MPV 9.2 - 12.9 fL 10.0 9.8 9.6   GRAN # 1.8 - 7.7 K/uL 8.3(H) 3.2 4.1   LYMPH # 1.0 - 4.8 K/uL 1.9 5.4(H) 5.6(H)   MONO # 0.3 - 1.0 K/uL 0.6 0.5 0.5   EOSINOPHIL% 0.0 - 8.0 % 0.0 0.5 0.8   BASOPHIL% 0.0 - 1.9 % 0.1 0.3 0.3   DIFFERENTIAL METHOD - Automated Automated Automated   SODIUM 136 - 145 mmol/L 137 139 139   POTASSIUM 3.5 - 5.1 mmol/L 4.4 4.2 5.0   CHLORIDE 95 - 110 mmol/L 101 102 100   CO2 23 - 29 mmol/L 22(L) 25 25   GLUCOSE 70 - 110 mg/dL 222(H) 191(H) 191(H)   BUN BLD 8 - 23 mg/dL 26(H) 16 20   CREATININE 0.5 - 1.4 mg/dL 1.1 0.9 1.1   CALCIUM 8.7 - 10.5 mg/dL 9.8 9.4 9.7   PROTEIN TOTAL 6.0 - 8.4 g/dL 7.3 6.8 7.0   ALBUMIN 3.5 - 5.2 g/dL 3.7 3.8 4.0   BILIRUBIN TOTAL 0.1 - 1.0 mg/dL 0.4 0.5 0.5   ALK PHOS 55 - 135 U/L 252(H) 239(H) 232(H)   AST 10 - 40 U/L 37 35 32   ALT  10 - 44 U/L 81(H) 55(H) 41   ANION GAP 8 - 16 mmol/L 14 12 14   EGFR IF AFRICAN AMERICAN >60 mL/min/1.73 m:2 >60.0 >60.0 >60.0   EGFR IF NON-AFRICAN AMERICAN >60 mL/min/1.73 m:2 53.6(A) >60.0 53.6(A)     Pulmonary Function Tests 10/7/2020 9/28/2020 9/14/2020 9/8/2020 8/31/2020 8/24/2020 6/15/2020   FVC 1.58 1.7 1.72 1.77 1.52 1.5 1.32   FEV1 1.44 1.48 1.49 1.5 1.35 1.33 1.16   TLC (liters) - - - - - - 2.1   DLCO (ml/mmHg sec) - - - - - - 5.31   FVC% 54.3 58.4 59.1 61 52.1 51.3 45   FEV1% 63 64.6 65.3 66 59 57.9 50   FEF 25-75 2.6 1.97 - - - - -   FEF 25-75% 129.3 97.9 - - - - -   TLC% - - - - - - 43   RV - - - - - - -   RV% - - - - - - -   DLCO% - - - - - - 25       Imaging:    Results for orders placed during the hospital encounter of 10/07/20   X-Ray Chest PA And Lateral    Narrative EXAMINATION:  XR CHEST PA AND LATERAL    CLINICAL HISTORY:  left lung transplant 8/10/2020; Lung transplant status    TECHNIQUE:  PA and lateral views of the chest were performed.    COMPARISON:  September 28, 2020    FINDINGS:  Stable postsurgical changes status post sternotomy and left lung transplant.  Stable elevated right hemidiaphragm.  Stable scattered chronic right lung changes.  No new or progressive right or left lung infiltrates, pleural fluid, or pneumothoraces.  Normal heart size.  Arch calcification.  Normal pulmonary vasculature.  Degenerative changes spine.      Impression Chronic and stable findings in the chest as noted when compared September 28, 2020      Electronically signed by: Sheldon Wilcox  Date:    10/07/2020  Time:    08:24       Assessment:  1. Encounter for aftercare following lung transplant    2. Lung replaced by transplant    3. Immunosuppression    4. Prophylactic antibiotic      Plan:   1. FEV1 and CXR appear to be grossly stable compared to prior visit.  DSA and cf-DNA as of 09/2020 negative.  No concerns for graft dysfunction; 6 week surveillance bronchoscopy without rejection or infection.  Continue  to monitor spirometry and chest imaging with routine visits.      2. Continue with tac 0.5mg, MMF1/1g, and prednisone taper.  Follow-up tac level and adjust to a goal of 10-14 if she can tolerate.  On Azithro for DRAKE ppx.      3. Continue with bactrim DS, valgan, vori, and lamivudine.      Follow-up in 2 week.    Adam Hill MD  Pulmonary/Critical Care Medicine/Transplant Pulmonology  [facility]  10/7/2020

## 2020-10-19 NOTE — TELEPHONE ENCOUNTER
Pt is completely out of MMF and needs a partial fill at her local pharmacy until Wednesday when she comes to clinic to  her prescription.  Erx entered and sent to Dr. Hill for review and approval.

## 2020-10-21 NOTE — PROGRESS NOTES
LUNG TRANSPLANT RECIPIENT FOLLOW-UP    Reason for Visit: Follow-up after lung transplantation.                                                                                                         Date of Transplant: 8/10/20                                                                               Reason for Transplant: IPF                                                                               Type of Transplant: single left lung                                                                               CMV Status: D- / R+     Hepatitis C Status:  Donor Ab:(--)  Donor JENNY:(--)  Recipient serostatus:(--)  Treatment status: NA                                                                              Major Complications: None                                                                               History of Present Illness: Chely Becerra is a 65 y.o. year old female with the above listed transplant history who presents for routine post op follow-up.  She is on 0L of oxygen.  She is on no assisted ventilation.  Her New [place] Heart Association Class is 100% - Normal, No Complaints, no evidence of disease.  She is diabetic insulin dependent    Patient comes in for a routine visit.  She feels overall well and denies any new respiratory concerns or limitations.  She remains active by doing household chores.  Her weight is stable and tolerating her medications well.          Review of Systems   Constitutional: Negative for chills, diaphoresis, fever, malaise/fatigue and weight loss.   HENT: Negative for congestion, ear discharge, ear pain, hearing loss, nosebleeds, sinus pain, sore throat and tinnitus.    Eyes: Negative for blurred vision, double vision, photophobia, pain, discharge and redness.   Respiratory: Negative for cough ( ), hemoptysis, sputum production, shortness of breath, wheezing and stridor.    Cardiovascular: Negative for chest pain, palpitations, orthopnea, claudication, leg  "swelling and PND.   Gastrointestinal: Negative for abdominal pain, blood in stool, constipation, diarrhea, heartburn, melena, nausea and vomiting.   Genitourinary: Negative for dysuria, flank pain, frequency, hematuria and urgency.   Musculoskeletal: Negative for back pain, falls, joint pain, myalgias and neck pain.   Skin: Negative for itching and rash.   Neurological: Negative for dizziness, tingling, tremors, sensory change, speech change, focal weakness, seizures, loss of consciousness, weakness and headaches.   Endo/Heme/Allergies: Negative for environmental allergies and polydipsia. Does not bruise/bleed easily.   Psychiatric/Behavioral: Negative for depression, hallucinations, memory loss, substance abuse and suicidal ideas. The patient is not nervous/anxious and does not have insomnia.      /80   Pulse 96   Temp 97.7 °F (36.5 °C) (Oral)   Resp 20   Ht 5' 7" (1.702 m)   Wt 77 kg (169 lb 12.1 oz)   LMP  (LMP Unknown)   SpO2 100%   BMI 26.59 kg/m²     Physical Exam  Vitals signs and nursing note reviewed.   Constitutional:       General: She is not in acute distress.     Appearance: She is well-developed. She is not diaphoretic.   HENT:      Head: Normocephalic and atraumatic.      Nose: Nose normal.      Mouth/Throat:      Pharynx: No oropharyngeal exudate.   Eyes:      General: No scleral icterus.     Conjunctiva/sclera: Conjunctivae normal.      Pupils: Pupils are equal, round, and reactive to light.   Neck:      Musculoskeletal: Normal range of motion and neck supple.      Thyroid: No thyromegaly.      Vascular: No JVD.      Trachea: No tracheal deviation.   Cardiovascular:      Rate and Rhythm: Normal rate and regular rhythm.      Heart sounds: Normal heart sounds. No murmur. No friction rub. No gallop.    Pulmonary:      Effort: Pulmonary effort is normal. No respiratory distress.      Breath sounds: No wheezing or rales.   Abdominal:      General: Bowel sounds are normal. There is no " distension.      Palpations: Abdomen is soft.      Tenderness: There is no abdominal tenderness.   Musculoskeletal: Normal range of motion.         General: No deformity.   Skin:     General: Skin is warm and dry.      Capillary Refill: Capillary refill takes less than 2 seconds.      Coloration: Skin is not pale.      Findings: No erythema or rash.      Comments: Sternal incision and chest tube incision sites healing well.       Neurological:      Mental Status: She is alert and oriented to person, place, and time.      Cranial Nerves: No cranial nerve deficit.   Psychiatric:         Judgment: Judgment normal.       Labs:  cbc, cmp, tacrolimus Latest Ref Rng & Units 9/28/2020 10/7/2020 10/21/2020   TACROLIMUS LVL 5.0 - 15.0 ng/mL 9.2 10.9 -   WHITE BLOOD CELL COUNT 3.90 - 12.70 K/uL 9.47 10.58 8.30   RBC 4.00 - 5.40 M/uL 3.62(L) 3.85(L) 3.48(L)   HEMOGLOBIN 12.0 - 16.0 g/dL 11.2(L) 12.1 11.2(L)   HEMATOCRIT 37.0 - 48.5 % 36.1(L) 38.0 35.4(L)   MCV 82 - 98 fL 100(H) 99(H) 102(H)   MCH 27.0 - 31.0 pg 30.9 31.4(H) 32.2(H)   MCHC 32.0 - 36.0 g/dL 31.0(L) 31.8(L) 31.6(L)   RDW 11.5 - 14.5 % 14.9(H) 15.7(H) 17.4(H)   PLATELETS 150 - 350 K/uL 274 280 279   MPV 9.2 - 12.9 fL 9.8 9.6 9.4   GRAN # 1.8 - 7.7 K/uL 3.2 4.1 3.8   LYMPH # 1.0 - 4.8 K/uL 5.4(H) 5.6(H) 3.7   MONO # 0.3 - 1.0 K/uL 0.5 0.5 0.4   EOSINOPHIL% 0.0 - 8.0 % 0.5 0.8 0.4   BASOPHIL% 0.0 - 1.9 % 0.3 0.3 0.4   DIFFERENTIAL METHOD - Automated Automated Automated   SODIUM 136 - 145 mmol/L 139 139 139   POTASSIUM 3.5 - 5.1 mmol/L 4.2 5.0 4.2   CHLORIDE 95 - 110 mmol/L 102 100 101   CO2 23 - 29 mmol/L 25 25 24   GLUCOSE 70 - 110 mg/dL 191(H) 191(H) 138(H)   BUN BLD 8 - 23 mg/dL 16 20 27(H)   CREATININE 0.5 - 1.4 mg/dL 0.9 1.1 0.8   CALCIUM 8.7 - 10.5 mg/dL 9.4 9.7 9.5   PROTEIN TOTAL 6.0 - 8.4 g/dL 6.8 7.0 7.1   ALBUMIN 3.5 - 5.2 g/dL 3.8 4.0 3.9   BILIRUBIN TOTAL 0.1 - 1.0 mg/dL 0.5 0.5 0.9   ALK PHOS 55 - 135 U/L 239(H) 232(H) 241(H)   AST 10 - 40 U/L 35 32  70(H)   ALT 10 - 44 U/L 55(H) 41 83(H)   ANION GAP 8 - 16 mmol/L 12 14 14   EGFR IF AFRICAN AMERICAN >60 mL/min/1.73 m:2 >60.0 >60.0 >60.0   EGFR IF NON-AFRICAN AMERICAN >60 mL/min/1.73 m:2 >60.0 53.6(A) >60.0     Pulmonary Function Tests 10/21/2020 10/7/2020 9/28/2020 9/14/2020 9/8/2020 8/31/2020 8/24/2020   FVC 1.63 1.58 1.7 1.72 1.77 1.52 1.5   FEV1 1.45 1.44 1.48 1.49 1.5 1.35 1.33   TLC (liters) - - - - - - -   DLCO (ml/mmHg sec) - - - - - - -   FVC% 56.1 54.3 58.4 59.1 61 52.1 51.3   FEV1% 63.6 63 64.6 65.3 66 59 57.9   FEF 25-75 2.56 2.6 1.97 - - - -   FEF 25-75% 127.3 129.3 97.9 - - - -   TLC% - - - - - - -   RV - - - - - - -   RV% - - - - - - -   DLCO% - - - - - - -       Imaging:    Results for orders placed during the hospital encounter of 10/21/20   X-Ray Chest PA And Lateral    Narrative EXAMINATION:  XR CHEST PA AND LATERAL    CLINICAL HISTORY:  left lung transplant 8/10/2020; Lung transplant status    TECHNIQUE:  PA and lateral views of the chest were performed.    COMPARISON:  Non 10/07/2020 e    FINDINGS:  Postoperative changes as before.  The heart is not enlarged.  The right hemidiaphragm is elevated and chronic changes within the right lung identified as before.  The left lung is clear.  No pleural effusion.      Impression See above      Electronically signed by: Regan Ruiz MD  Date:    10/21/2020  Time:    08:50         Assessment:  1. Encounter for aftercare following lung transplant    2. Lung replaced by transplant    3. Immunosuppression    4. Prophylactic antibiotic      Plan:   - FEV1 and CXR appear to be grossly stable compared to prior visit.  DSA and cf-DNA as of 09/2020 negative.  No concerns for graft dysfunction; 6 week surveillance bronchoscopy without rejection or infection.  Continue to monitor spirometry and chest imaging with routine visits.      - Continue with tac 0.5 mg, MMF1/1g, and prednisone taper.  Follow-up tac level and adjust to a goal of 10-14 if she can tolerate.   On Azithro for DRAKE ppx.      3. Continue with bactrim DS, valgan, vori, and lamivudine.      Follow-up in 2 week.    Adam Hill MD  Pulmonary/Critical Care Medicine/Transplant Pulmonology  [facility] [place]  10/21/2020

## 2020-11-02 NOTE — PROGRESS NOTES
Received TORB from Dr. Dominguez to schedule patient for her 3 month surveillance bronch.  appt scheduled.  Pre-procedure instructions reviewed with patient at her clinic visit on 10/21/20.  Patient verbalized understanding.

## 2020-11-04 PROBLEM — J12.82 PNEUMONIA DUE TO COVID-19 VIRUS: Status: ACTIVE | Noted: 2020-01-01

## 2020-11-04 PROBLEM — J96.01 ACUTE HYPOXEMIC RESPIRATORY FAILURE DUE TO COVID-19: Status: ACTIVE | Noted: 2020-01-01

## 2020-11-04 PROBLEM — U07.1 PNEUMONIA DUE TO COVID-19 VIRUS: Status: ACTIVE | Noted: 2020-01-01

## 2020-11-04 NOTE — HPI
Ms. Chely Becerra is a 65 y.o. year old female with a PMHx of diabetes and idiopathic pulmonary fibrosis s/p unilateral (left) lung transplant in August 2020 who presents to the ED complaining of a shortness of breath that started last Friday and progressively got worse. She had a fever of 101 on Sunday, started coughing for the last 3 days nonproductive, denies any chest pain. She was seen in the [facility] for routine testing earlier this morning was found to be hypoxic and she was referred to the emergency department. On arrival her COVID-19 test is positive.     Patient lives with her 2 daughters, 1 of the daughters has been having some respiratory symptoms recently however she thought was secondary to asthma. he is on prednisone taper currently 20 mg daily, she has been compliant with her antirejection medication Prograf and CellCept. She was started on BiPAP in the ED with improvement in her oxygen saturations to the mid 90's.

## 2020-11-04 NOTE — PLAN OF CARE
The [facility] Convalescent Plasma Emergency Use Authorization Fact Sheet was provided to the patient and/or caregiver.  The patient and/or caregiver has been counseled that the FDA has authorized the emergency use of convalescent plasma which is not FDA approved. The significant known and potential risks and benefits of COVID-19 convalescent plasma and the extent to which such risks and benefits are unknown have been discussed with the patient and/or caregiver. The patient and/or caregiver has been given the option to accept or refuse and has verbally agreed to receive convalescent plasma. All questions and concerns were addressed.

## 2020-11-04 NOTE — ED NOTES
Charge nurse aware high suspicion covid on recent lung xplant, sent  Straight to room without vitals

## 2020-11-04 NOTE — SUBJECTIVE & OBJECTIVE
"Past Medical History:   Diagnosis Date    A-fib around 1996    Chronic hypoxemic respiratory failure 4/11/2019    Common bile duct dilatation 1/15/2019    Controlled type 2 diabetes mellitus without complication, without long-term current use of insulin 1/17/2019    Essential hypertension 1/16/2019    GERD (gastroesophageal reflux disease)     Hepatitis B core antibody positive 1/15/2019    IPF (idiopathic pulmonary fibrosis)     Obesity (BMI 30-39.9) 1/16/2019    RLS (restless legs syndrome)        Past Surgical History:   Procedure Laterality Date    APPLICATION OF WOUND VACUUM-ASSISTED CLOSURE DEVICE Left 8/10/2020    Procedure: APPLICATION, WOUND VAC, 40 x 5cm;  Surgeon: Benedict Clemons MD;  Location: [facility] OR [address] [place] [address];  Service: Cardiovascular;  Laterality: Left;    BRONCHOSCOPY N/A 9/23/2020    Procedure: flexible bronchoscopy with tissue biopsy CPT 80159;  Surgeon: [employer];  Location: [facility] OR [address] [place] [address];  Service: Anesthesiology;  Laterality: N/A;    CATHETERIZATION OF BOTH LEFT AND RIGHT HEART N/A 1/17/2019    Procedure: CATHETERIZATION, HEART, BOTH LEFT AND RIGHT;  Surgeon: Trey Evans MD;  Location: [facility] CATH LAB;  Service: Cardiology;  Laterality: N/A;    CHOLECYSTECTOMY      COLONOSCOPY      ESOPHAGOGASTRODUODENOSCOPY      HERNIA REPAIR      LEFT HEART CATHETERIZATION Left 2/18/2020    Procedure: Left heart cath;  Surgeon: Trey Evans MD;  Location: [facility] CATH LAB;  Service: Cardiology;  Laterality: Left;    LUNG TRANSPLANT Left 8/10/2020    Procedure: TRANSPLANT, LUNG - 4:30AM start;  Surgeon: Benedict Clemons MD;  Location: [address];  Service: Cardiovascular;  Laterality: Left;    SMALL INTESTINE SURGERY      mass removed (benign)       Review of patient's allergies indicates:   Allergen Reactions    Boniva [ibandronate] Other (See Comments)     "chest cramps"       Family History     None        Tobacco Use    Smoking status: Former Smoker     " Types: Cigarettes     Quit date: 12/13/2016     Years since quitting: 3.8    Smokeless tobacco: Never Used   Substance and Sexual Activity    Alcohol use: No     Frequency: Never    Drug use: No    Sexual activity: Not on file      Review of Systems   Constitutional: Positive for appetite change and fever.   HENT: Negative for sore throat and trouble swallowing.    Eyes: Negative for visual disturbance.   Respiratory: Positive for cough and shortness of breath.    Cardiovascular: Positive for leg swelling (mild, chronic with steroid use). Negative for chest pain.   Gastrointestinal: Positive for diarrhea (chronic). Negative for abdominal pain, nausea and vomiting.   Genitourinary: Negative for difficulty urinating and dysuria.   Skin: Negative for wound.   Neurological: Negative for weakness and headaches.   Psychiatric/Behavioral: Negative for agitation. The patient is not nervous/anxious.      Objective:     Vital Signs (Most Recent):  Temp: (!) 100.4 °F (38 °C) (11/04/20 1356)  Pulse: (!) 114 (11/04/20 1122)  Resp: (!) 28 (11/04/20 1100)  BP: 118/74 (11/04/20 1202)  SpO2: (!) 93 % (11/04/20 1329) Vital Signs (24h Range):  Temp:  [100.4 °F (38 °C)] 100.4 °F (38 °C)  Pulse:  [114-123] 114  Resp:  [28] 28  SpO2:  [93 %-98 %] 93 %  BP: (115-124)/(74-83) 118/74      There is no height or weight on file to calculate BMI.    No intake or output data in the 24 hours ending 11/04/20 1556    Physical Exam  Vitals signs reviewed.   Constitutional:       General: She is not in acute distress.     Appearance: She is ill-appearing. She is not diaphoretic.      Interventions: Nasal cannula in place.   HENT:      Head: Normocephalic and atraumatic.      Mouth/Throat:      Mouth: Mucous membranes are dry.   Eyes:      General: No scleral icterus.     Pupils: Pupils are equal, round, and reactive to light.   Neck:      Musculoskeletal: No neck rigidity.   Cardiovascular:      Rate and Rhythm: Regular rhythm. Tachycardia  present.      Pulses: Normal pulses.      Heart sounds: Normal heart sounds. No murmur.   Pulmonary:      Effort: Tachypnea present. No accessory muscle usage.      Breath sounds: Rhonchi present. No decreased breath sounds, wheezing or rales.   Chest:      Comments: Midline scar from recent transplant   Abdominal:      Palpations: Abdomen is soft.      Tenderness: There is no guarding.   Musculoskeletal: Normal range of motion.         General: Swelling present.      Right lower le+ Edema present.      Left lower le+ Edema present.   Skin:     General: Skin is warm and dry.      Capillary Refill: Capillary refill takes less than 2 seconds.   Neurological:      General: No focal deficit present.      Mental Status: She is alert and oriented to person, place, and time.      Comments: AAO, follows all commands    Psychiatric:         Mood and Affect: Mood normal.         Behavior: Behavior normal.         Vents:  Oxygen Concentration (%): 75 (20 1356)  Lines/Drains/Airways     Peripheral Intravenous Line                 Peripheral IV - Single Lumen 20 1201 20 G Left Antecubital less than 1 day         Peripheral IV - Single Lumen 20 1241 20 G Right Antecubital less than 1 day              Significant Labs:    CBC/Anemia Profile:  Recent Labs   Lab 20  0932 20  1053 20  1055   WBC 4.44 3.82*  --    HGB 11.8* 12.0  --    HCT 37.8 37.4 38    230  --    * 103*  --    RDW 16.4* 16.4*  --         Chemistries:  Recent Labs   Lab 20  0932 20  1242   * 137   K 4.3 4.4   CL 97 99   CO2 19* 18*   BUN 20 22   CREATININE 0.9 1.1   CALCIUM 9.3 9.3   ALBUMIN 3.2* 3.2*   PROT 7.2 7.4   BILITOT 0.4 0.4   ALKPHOS 157* 148*   ALT 30 29   AST 48* 54*   MG 1.6 1.7       All pertinent labs within the past 24 hours have been reviewed.    Significant Imaging: I have reviewed all pertinent imaging results/findings within the past 24 hours.

## 2020-11-04 NOTE — ED PROVIDER NOTES
CC: Shortness of Breath (brought from lung transplant, post lung transplant, pulse ox in 70% was swabbed for covid but no results yet)      History provided by:   Patient   daughter    HPI: Chely Becerra is a 65 y.o. year old female past medical history of AFib, diabetes, idiopathic pulmonary fibrosis, pulmonary transplant in august  who presents to the ED complaining of a shortness of breath that started on Monday and progressively got worse.  She had a fever of 101 on Sunday, started coughing for the last 3 days nonproductive, denies any chest pain.  She was seen in the [facility] Transplant [facility] earlier this morning was found to be hypoxic and she was referred to the emergency department.  On arrival her COVID-19 test is positive.  Patient in respiratory distress.  History offered by her daughter.  Patient lives with her 2 daughters, 1 of the daughters has been having some respiratory symptoms recently however she thought was secondary to asthma.  Patient has been on Augmentin and azithromycin for weeks also she is on prednisone taper currently 20 mg daily, she has been compliant with her antirejection medication Prograf and CellCept      Chart review September 23, 2020 discharge summary, had surveillance bronchoscopy at that time  COVID positive today    Past Medical History:   Diagnosis Date    A-fib around 1996    Chronic hypoxemic respiratory failure 4/11/2019    Common bile duct dilatation 1/15/2019    Controlled type 2 diabetes mellitus without complication, without long-term current use of insulin 1/17/2019    Essential hypertension 1/16/2019    GERD (gastroesophageal reflux disease)     Hepatitis B core antibody positive 1/15/2019    IPF (idiopathic pulmonary fibrosis)     Obesity (BMI 30-39.9) 1/16/2019    RLS (restless legs syndrome)      Past Surgical History:   Procedure Laterality Date    APPLICATION OF WOUND VACUUM-ASSISTED CLOSURE DEVICE Left 8/10/2020    Procedure: APPLICATION, WOUND  VAC, 40 x 5cm;  Surgeon: Benedict Clemons MD;  Location: [facility] OR [address] [place] [address];  Service: Cardiovascular;  Laterality: Left;    BRONCHOSCOPY N/A 9/23/2020    Procedure: flexible bronchoscopy with tissue biopsy CPT 04218;  Surgeon: Curtisc Diagnostic Provider;  Location: [facility] OR [address] [place] [address];  Service: Anesthesiology;  Laterality: N/A;    CATHETERIZATION OF BOTH LEFT AND RIGHT HEART N/A 1/17/2019    Procedure: CATHETERIZATION, HEART, BOTH LEFT AND RIGHT;  Surgeon: Trey Evans MD;  Location: [facility] CATH LAB;  Service: Cardiology;  Laterality: N/A;    CHOLECYSTECTOMY      COLONOSCOPY      ESOPHAGOGASTRODUODENOSCOPY      HERNIA REPAIR      LEFT HEART CATHETERIZATION Left 2/18/2020    Procedure: Left heart cath;  Surgeon: Trey Evans MD;  Location: [facility] CATH LAB;  Service: Cardiology;  Laterality: Left;    LUNG TRANSPLANT Left 8/10/2020    Procedure: TRANSPLANT, LUNG - 4:30AM start;  Surgeon: Benedict Clemons MD;  Location: [facility] OR [address] [place] [address];  Service: Cardiovascular;  Laterality: Left;    SMALL INTESTINE SURGERY      mass removed (benign)     Family History   Problem Relation Age of Onset    Cirrhosis Neg Hx      No current facility-administered medications on file prior to encounter.      Current Outpatient Medications on File Prior to Encounter   Medication Sig Dispense Refill    amoxicillin-clavulanate 875-125mg (AUGMENTIN) 875-125 mg per tablet Take 1 tablet by mouth every 12 (twelve) hours. 60 tablet 2    aspirin (ECOTRIN) 81 MG EC tablet Take 1 tablet (81 mg total) by mouth once daily. 30 tablet 11    atorvastatin (LIPITOR) 20 MG tablet Take 1 tablet (20 mg total) by mouth once daily. 30 tablet 11    azithromycin (Z-JAYSHREE) 250 MG tablet Take 1 tablet (250 mg total) by mouth every Mon, Wed, Fri. 13 tablet 11    blood sugar diagnostic Strp Use as directed to test blood glucose 3-4 times daily. 100 each 11    blood-glucose meter kit Use as instructed 1 each 0    docusate sodium (COLACE) 100 MG capsule Take 1  "capsule (100 mg total) by mouth 2 (two) times daily as needed for Constipation. 60 capsule 11    flu vacc [id number] 6mos up,PF, (FLUARIX QUAD [id number], PF,) 60 mcg (15 mcg x 4)/0.5 mL Syrg Inject into the skin for one dose. 0.5 mL 0    gabapentin (NEURONTIN) 100 MG capsule Take 1 capsule (100 mg total) by mouth 3 (three) times daily. (Patient taking differently: Take 100 mg by mouth 2 (two) times daily. ) 90 capsule 11    insulin aspart U-100 (NOVOLOG FLEXPEN U-100 INSULIN) 100 unit/mL (3 mL) InPn pen Inject 4 Units into the skin 3 (three) times daily with meals. Plus correction scale; max TDD 27 units (Patient taking differently: Inject 10 Units into the skin 3 (three) times daily with meals. Plus correction scale; max TDD 27 units) 1 Box 3    lamiVUDine (EPIVIR) 150 MG Tab Take 1 tablet (150 mg total) by mouth once daily. 30 tablet 11    lancets Misc Use as directed to test blood glucose 3-4 times daily. 100 each 11    lansoprazole (PREVACID) 15 MG capsule Take 1 capsule (15 mg total) by mouth once daily. 30 capsule 11    magnesium oxide (MAG-OX) 400 mg (241.3 mg magnesium) tablet Take 1 tablet (400 mg total) by mouth 3 (three) times daily. 90 tablet 11    metoprolol tartrate (LOPRESSOR) 25 MG tablet Take 1 tablet (25 mg total) by mouth 2 (two) times daily. 60 tablet 11    mycophenolate (CELLCEPT) 250 mg Cap Take 4 capsules (1,000 mg total) by mouth 2 (two) times daily. 240 capsule 11    NIFEdipine (PROCARDIA-XL) 30 MG (OSM) 24 hr tablet Take 1 tablet (30 mg total) by mouth once daily. 30 tablet 11    ondansetron (ZOFRAN-ODT) 8 MG TbDL DISSOLVE 1 tablet (8 mg total) by mouth every 8 (eight) hours as needed. 15 tablet 11    opw transplant care kit Welcome to [facility].  This is your transplant care kit. 1 each 0    pen needle, diabetic 31 gauge x 5/16" Ndle Use as directed with insulin pen 3-5 times daily. 100 each 11    polyethylene glycol (GLYCOLAX) 17 gram/dose powder Take 17 g " by mouth 2 (two) times daily as needed. 510 g 5    predniSONE (DELTASONE) 5 MG tablet From 8/20-9/9 Take 25mg by mouth daily; From 9/10-11/8 take 20mg daily; From 11/9-12/8 take 15mg daily; 12/9-2/7/21 take 10mg daily then 5mg daily thereafter 140 tablet 11    rosuvastatin (CRESTOR) 10 MG tablet Take 10 mg by mouth once daily.      senna (SENOKOT) 8.6 mg tablet Take 2 tablets by mouth once daily. 60 tablet 11    sulfamethoxazole-trimethoprim 800-160mg (BACTRIM DS) 800-160 mg Tab Take 1 tablet by mouth every Mon, Wed, Fri. 15 tablet 11    tacrolimus (PROGRAF) 0.5 MG Cap Take 1 capsule (0.5 mg total) by mouth once daily. 30 capsule 11    valGANciclovir (VALCYTE) 450 mg Tab Take 1 tablet (450 mg total) by mouth 2 (two) times daily. 60 tablet 11    voriconazole (VFEND) 200 MG Tab Take by mouth 300 mg (1 and 1/2 tablets) by mouth twice daily 90 tablet 5    [DISCONTINUED] betamethasone dipropionate (DIPROLENE) 0.05 % cream Apply topically 2 (two) times daily. 30 g 1     Boniva [ibandronate]  Social History     Socioeconomic History    Marital status: Single     Spouse name: Not on file    Number of children: Not on file    Years of education: Not on file    Highest education level: Not on file   Occupational History    Not on file   Social Needs    Financial resource strain: Not on file    Food insecurity     Worry: Not on file     Inability: Not on file    Transportation needs     Medical: Not on file     Non-medical: Not on file   Tobacco Use    Smoking status: Former Smoker     Types: Cigarettes     Quit date: 12/13/2016     Years since quitting: 3.8    Smokeless tobacco: Never Used   Substance and Sexual Activity    Alcohol use: No     Frequency: Never    Drug use: No    Sexual activity: Not on file   Lifestyle    Physical activity     Days per week: Not on file     Minutes per session: Not on file    Stress: Not on file   Relationships    Social connections     Talks on phone: Not on file      Gets together: Not on file     Attends Spiritism service: Not on file     Active member of club or organization: Not on file     Attends meetings of clubs or organizations: Not on file     Relationship status: Not on file   Other Topics Concern    Not on file   Social History Narrative    Not on file       ROS:     Constitutional :  Positive for fever, neg for weakness  HENT neg for head injury, neg for sore throat  Eyes: neg for visual changes, neg for eye pain  Resp positive for shortness of breath, positive for cough  Cardiac  neg for chest pain, neg for palpitations  GI neg for abd pain, neg for nausea, neg for vomiting   neg for urinary changes  Neuro neg for focal weakness or numbness  Skin neg for skin rash  MSK: neg for myalgia, neg for arthralgia  ALL: Boniva [ibandronate]    PHYSICAL EXAM:  Vitals:    11/04/20 2008   BP:    Pulse: 100   Resp: (!) 27   Temp:          PHYSICAL EXAM:     general:  Sitting up in bed, short of breath using accessory muscles of respiration  VS: triage VS reviewed  HENT: NC/AT  Eyes: PERRL, EOMI  CV: RRR, no  murmurs, no rubs, no gallops, no LE edema  Resp:  In respiratory distress, tachypneic, using accessory muscles of respiration, placed on non-rebreather with oxygen saturation in the 70s, bilateral crackles  ABD:  soft, ND, + normal BS, NT  Renal: No CVAT  Neuro: AAO x 3, 5/5 strength x 4 extremities, sensation intact, face symmetric, speech normal  MSK: no deformity, no edema            DATA & INTERVENTIONS:    LABS reviewed:  Labs Reviewed   LACTIC ACID, PLASMA - Abnormal; Notable for the following components:       Result Value    Lactate (Lactic Acid) 3.3 (*)     All other components within normal limits   TROPONIN I - Abnormal; Notable for the following components:    Troponin I 0.034 (*)     All other components within normal limits   B-TYPE NATRIURETIC PEPTIDE - Abnormal; Notable for the following components:     (*)     All other components within normal  limits   CBC W/ AUTO DIFFERENTIAL - Abnormal; Notable for the following components:    WBC 3.82 (*)     RBC 3.62 (*)      (*)     MCH 33.1 (*)     RDW 16.4 (*)     Gran % 74.0 (*)     Mono % 3.0 (*)     All other components within normal limits   D DIMER, QUANTITATIVE - Abnormal; Notable for the following components:    D-Dimer 1.42 (*)     All other components within normal limits   ISTAT PROCEDURE - Abnormal; Notable for the following components:    POC PO2 20 (*)     POC HCO3 23.2 (*)     POC SATURATED O2 32 (*)     All other components within normal limits   ISTAT PROCEDURE - Abnormal; Notable for the following components:    POC Glucose 222 (*)     POC Sodium 131 (*)     POC Potassium 5.6 (*)     POC TCO2 (MEASURED) 21 (*)     POC Ionized Calcium 1.02 (*)     All other components within normal limits   URINALYSIS, REFLEX TO URINE CULTURE   POCT INFLUENZA A/B MOLECULAR   ISTAT CHEM8   POCT GLUCOSE MONITORING CONTINUOUS       RADIOLOGY reviewed:  Imaging Results          X-Ray Chest AP Portable (Final result)  Result time 11/04/20 11:46:12    Final result by Aby Acevedo MD (11/04/20 11:46:12)                 Impression:      Abnormal chest radiograph similar to the earlier study of 08:56 hours today.      Electronically signed by: Aby Acevedo MD  Date:    11/04/2020  Time:    11:46             Narrative:    EXAMINATION:  XR CHEST AP PORTABLE    CLINICAL HISTORY:  covied 19;    TECHNIQUE:  Single frontal view of the chest was performed.    COMPARISON:  Postoperative chest radiographs: 11/04/2020, 08:56 hours.  10/21/2020.  10/07/2020.  09/28/2020.  08/24/2020.  08/10/2020.    Preoperative chest radiograph: 08/09/2020.    FINDINGS:  Additional history: Left lung transplant 08/10/2020 for idiopathic pulmonary fibrosis.  Recently the patient has had shortness of breath for 2 days with progression of symptoms.  Patient had a fever of 1014 days ago.  Has nonproductive cough.  COVID-19 antigen test is  positive today.    X-ray beam attenuation and scatter occur in generous overlying soft tissues.    Sternal wires are intact and aligned following single left lung transplant on 08/10/2020.    Right hemidiaphragm is elevated as seen on the preoperative study of 08/09/2020.  Patchy opacities in the right lung or more conspicuous than on recent comparison studies including 09/28/2020 raising the question of aspiration or pneumonia.    Volume of the transplanted left lung is preserved as seen on 09/28/2020 and 08/24/2020.    However there appears to be ground-glass attenuation in the left lung similar to the earlier study of 11/04/2020 but that is new since 10/21/2020.  In this patient with a history of COVID antigen positive test today, recent symptoms of shortness of breath, cough and fever, and immunosuppression for left lung transplant, differential diagnosis is broad.  Considerations include COVID-19 lung infection as well as other forms of lung infection, noninfectious inflammation, and aspiration.    I doubt pulmonary edema.  I detect no pleural fluid, pneumothorax, pneumomediastinum, pneumoperitoneum or significant osseous abnormality.                                MEDICATIONS/FLUIDS:  Medications   sodium chloride 0.9% flush 10 mL (has no administration in time range)   dextrose 50% injection 12.5 g (has no administration in time range)   glucagon (human recombinant) injection 1 mg (has no administration in time range)   insulin aspart U-100 pen 0-5 Units (4 Units Subcutaneous Given 11/4/20 0463)   cefTRIAXone 2 gram/50 mL IVPB 2 g (2 g Intravenous New Bag 11/4/20 1535)   dexamethasone injection 6 mg (has no administration in time range)   remdesivir (EUA) 200 mg in sodium chloride 0.9% 250 mL infusion (200 mg Intravenous New Bag 11/4/20 9244)     Followed by   remdesivir (EUA) 100 mg in sodium chloride 0.9% 250 mL infusion (has no administration in time range)   lamiVUDine tablet 150 mg (has no administration  in time range)   voriconazole tablet 300 mg (300 mg Oral Given 11/4/20 2159)   ganciclovir (CYTOVENE) 385 mg in sodium chloride 0.9% 100 mL IVPB (has no administration in time range)   tacrolimus capsule 0.5 mg (has no administration in time range)   enoxaparin injection 80 mg (80 mg Subcutaneous Given 11/4/20 2158)   acetaminophen tablet 650 mg (650 mg Oral Given 11/4/20 1709)   azithromycin 500 mg in dextrose 5 % 250 mL IVPB (ready to mix system) (has no administration in time range)   azithromycin tablet 250 mg (has no administration in time range)   promethazine-codeine 6.25-10 mg/5 ml syrup 5 mL (has no administration in time range)   albuterol-ipratropium 2.5 mg-0.5 mg/3 mL nebulizer solution 3 mL (3 mLs Nebulization Given 11/4/20 2008)   albuterol-ipratropium 2.5 mg-0.5 mg/3 mL nebulizer solution 3 mL (3 mLs Nebulization Given 11/4/20 1110)   dexamethasone injection 6 mg (6 mg Intravenous Given 11/4/20 1101)         MDM:  Chely Becerra is a 65 y.o. year old female who presents to the ED complaining of dyspnea, hypoxic on arrival  Patient placed from nasal cannula to non-rebreather oxygen saturation in the 70s  Started on BiPAP    DDX includes but not limited to: covid 19 pneumonia, pe, acs, rejection    Labs ordered and reviewed:   I-STAT with sodium of 131, potassium 5.6, creatinine normal  A VBG with normal pH, pCO2 36  CBC with white count of 3.8, hemoglobin and platelets within normal limits  cmp  ag 20, bicarb 18,   Mg 1.7  Influenza neg  Lactate elev  Trop 0.034 (likely demand ischemia)  Medication given in the ED: dexamethasone      Old records obtained and reviewed: yes,     Case discussed with the consultant:  Medical ICU    IMPRESSION:  1.)  COVID-19 pneumonia with acute hypoxic respiratory failure      Dispo:  Admit    Aggregate Critical Care Time by Attending (exclusive of procedural time) = 45 minutes         During the Emergency Depment visit, there was a high probability of imminent or  life threatening deterioration in the patient's condition necessitating medical decision  making of a high complexity. Organ systems that were compromised/potentially compromised                      Respiratory     and/or there was potential for sepsis or metabolic failure.     Pt required constant monitoring because of the potential for them to deteriorate at any moment. Critical care was time spent personally by me on the following activities:  Development of treatment plan with patient or surrogate; discussion with consultant, evaluation of patient's response to treatment, examination of patient, obtaining history from patient or surrogate, ordering and performing treatments and interventions, ordering and reviewing of laboratory studies, ordering and review of radiographic studies, vitals, re-evaluation of patient' condition and review of old charts            The failure to initiate the interventions performed in the Emergency Department on an urgent basis would have likely resulted in sudden, clinically significant or life threatening deterioration in the patient's condition.                              Sydney Ponce MD  11/04/20 [id number]

## 2020-11-04 NOTE — PLAN OF CARE
Patient chart checked by Lung Transplant team. Underwent LSLT on 8/10/2020 for IPF. Presented to ED with progressive dyspnea, hypoxemia, and fevers over the past few days. COVID positive. CXR reviewed. Currently requiring BiPAP. Admitted to MICU service. COVID management per [facility] protocol. Will continue to follow along for immunosuppression and OIP. Recommend broad spectrum antibiotics. Please call [phone] with questions.

## 2020-11-04 NOTE — CARE UPDATE
Pt. received from ED on V60, Bipap mode. Settings recorded on flowsheet, will continue  to monitor.

## 2020-11-04 NOTE — HOSPITAL COURSE
Ms. Chely Becerra was admitted to [facility] on 11/04/2020 for management of new acute hypoxemic respiratory failure secondary to COVID-19. Her respiratory status was stabilized on admission alternating between BiPAP and comfort flow. She was initiated on Azithromycin and Ceftriaxone for CAP coverage, in addition to home Voriconazole prophylaxis. She completed a 10-day course of Dexamethasone from 11/04 to 11/13, in addition to a 5 day course of Remdesivir, and one time dose of convalescent plasma. However, by 11/12, her hypoxia progressed, associated with worsening leukocytosis, new DWAYNE, and persistent fevers, requiring intubation at that time. Repeat infectious work-up was significant for blood and respiratory cultures from 11/12 positive for Pseudmonas. ID was consulted and she was initiatedon Meropenem on 11/12 with subsequent blood cultures remaining NGTD. DWAYNE resolved by 11/14 without need for dialysis. Intubation and subsequent sedation were also associated with onset of shock requiring vasopressor support until 11/20. She was initiated on intermittent proning with improvements in oxygenation, however this was limited by intermittent episodes of bradycardia. Endocrinology was consulted on 11/17 for hyperglycemia management. Following weaning sedation by 11/23, she developed worsening hypertension requiring a Cardene drip by 11/24. By 11/25, she was intolerable of weaning ventilator support and sedation and neuromuscular blockade was re-initiated; proning resumed on 11/26 for attempted improvement in oxygenation. On 11/26, Hgb progressively downtrended since admission and PRBC given with adequate response. She completed course of Meropenem on 11/27. Lung transplant team followed closely throughout admission for assistance with home immunosuppressive medications. By 12/03, patient showed only slight improvement in oxygenation with intermittent proning. However, on 12/05, she developed a deterioration of  her respiratory status after attempting to wean paralytic associated with new vasopressor requirements; broad spectrum antibiotics were re-initiated and septic work-up was repeated. On early morning of , she developed an acute decline in respiratory status with saturations unable to be maintaining >80% despite bagging and use of inhaled NO; patient was hemodynamically too unstable to attempt rescue proning. Goals of care discussions held with family and multiple different providers and decision made to make patient DNR. On late evening of , respiratory status continued to decline and patient  with preliminary cause of death: ARDS from COVID-19.

## 2020-11-04 NOTE — ED TRIAGE NOTES
"Chely Becerra, a 65 y.o. female presents to the ED w/ complaint of low o2 sat sent from clinic, o2 sts in 70s    Triage note:  Chief Complaint   Patient presents with    Shortness of Breath     brought from lung transplant, post lung transplant, pulse ox in 70% was swabbed for covid but no results yet     Review of patient's allergies indicates:   Allergen Reactions    Boniva [ibandronate] Other (See Comments)     "chest cramps"     Past Medical History:   Diagnosis Date    A-fib around 1996    Chronic hypoxemic respiratory failure 4/11/2019    Common bile duct dilatation 1/15/2019    Controlled type 2 diabetes mellitus without complication, without long-term current use of insulin 1/17/2019    Essential hypertension 1/16/2019    GERD (gastroesophageal reflux disease)     Hepatitis B core antibody positive 1/15/2019    IPF (idiopathic pulmonary fibrosis)     Obesity (BMI 30-39.9) 1/16/2019    RLS (restless legs syndrome)      LOC: Patient name and date of birth verified. The patient is awake, alert and aware of environment with an appropriate affect, the patient is oriented x 3 and speaking appropriately.   APPEARANCE: Patient resting comfortably, patient is clean and well groomed, patient's clothing is properly fastened.  SKIN: The skin is warm and dry, color consistent with ethnicity, patient has normal skin turgor and moist mucus membranes, skin intact, no breakdown or bruising noted.  MUSCULOSKELETAL: Patient moving all extremities well, no obvious swelling or deformities noted.   RESPIRATORY: Respirations are spontaneous, patient has a normal effort and rate, no accessory muscle use noted.  CARDIAC: Patient has a normal rate and rhythm, no periphreal edema noted, capillary refill < 3 seconds.  ABDOMEN: Soft and non tender to palpation, no distention noted. Bowel sounds present in all four quadrants.  NEUROLOGIC: Eyes open spontaneously, behavior appropriate to situation, follows commands, facial " expression symmetrical, bilateral hand grasp equal and even, purposeful motor response noted, normal sensation in all extremities when touched with a finger.

## 2020-11-04 NOTE — NURSING
Patient arrived to unit via stretcher at 1323. Patient is Awake alert and oriented x4. Follows commands. Sinus tachycardia noted on monitor. Scars noted to chest and abdomen from previous surgery.  Pt has cell phone with white , grey jacket, and blue jeans.

## 2020-11-05 PROBLEM — Z51.5 PALLIATIVE CARE ENCOUNTER: Status: ACTIVE | Noted: 2020-01-01

## 2020-11-05 NOTE — NURSING
At 1711, patient oxygen saturation dropped to 84% on comfort flow 40 liter 100% FiO2. Patient placed on Bipap 12/8 60% FiO2. Pt resting well. Will continue to monitor.

## 2020-11-05 NOTE — ASSESSMENT & PLAN NOTE
S/p left lung transplant in Aug 2020. Has not required home O2 since transplant   -- Lung transplant following peripherally; appreciate assistance  -- continue ppx azithro, bactrim, valgan, and vori  -- continue tacro  -- hold cellcept  -- restart steroid taper after 10 day dexamethasone course

## 2020-11-05 NOTE — PROGRESS NOTES
Vanesa consult note:     Full consult note to follow.  Briefly:    Discussion with the pt and daughter at the bedside to introduce the role of palliative medicine and supportive care in the ICU in the setting of a serious diagnosis of COVID-19. The family was able to demonstrate an excellent understanding of the diagnosis, current care plan, hope for improvement and concern for worsening of the pt's condition.    Experience with critical illness: recently received a lung transplant in August of 2020 and recently returned home.  Had been doing quite well at home setting    Understanding of illness: Good understanding of chronic lung condition, recent transplant course, and struggling with dianosis of COIVD 19.  They just also discovered th ept's other daughter, who works as a pharmacy tech, tested positive    Present for discussion: Pt and daughter Ivy Levy    Goals of care:  Would be accepting of all potential therapies that would give the pt the best opportunity to recover, including invasive life prolonging modalities.    Reviewed the pt's pre-existing HCPOA and LW documents.  Pt in agreement these documents are reflective of her wishes.  Allows for family to stop non-beneficial therapies if it was felt she developed a terminal illness    Thank you for the opportunity to care for this patient and family.     Please call with questions.     Jose Mathew MD  Palliative Medicine

## 2020-11-05 NOTE — PROGRESS NOTES
[facility] - ICU 16   Critical Care Medicine  Progress Note    Patient Name: Chely Becerra  MRN: [id number]  Admission Date: 11/4/2020  Hospital Length of Stay: 1 days  Code Status: Full Code  Attending Provider: Todd Montoya*  Primary Care Provider: ZAHIDA Stack MD   Principal Problem: Acute hypoxemic respiratory failure due to COVID-19    Subjective:     HPI:  Ms. Chely Becerra is a 65 y.o. year old female with a PMHx of diabetes and idiopathic pulmonary fibrosis s/p unilateral (left) lung transplant in August 2020 who presents to the ED complaining of a shortness of breath that started last Friday and progressively got worse. She had a fever of 101 on Sunday, started coughing for the last 3 days nonproductive, denies any chest pain. She was seen in the Lung Transplant Clinic for routine testing earlier this morning was found to be hypoxic and she was referred to the emergency department. On arrival her COVID-19 test is positive.    Patient lives with her 2 daughters, 1 of the daughters has been having some respiratory symptoms recently however she thought was secondary to asthma. he is on prednisone taper currently 20 mg daily, she has been compliant with her antirejection medication Prograf and CellCept. She was started on BiPAP in the ED with improvement in her oxygen saturations to the mid 90's.     Hospital/ICU Course:  Admitted to Critical Care Medicine for acute hypoxic respiratory failure 2/2 to COVID pneumonia. Alternating between BiPAP and comfort flow. Started on dexamethasone. Consented for Remdsivir and Convalescent Plasma by lung transplant.     Interval History/Significant Events: On BiPAP overnight, comfortable on comfort flow this morning. Received convalescent plasma this am.     Review of Systems   Constitutional: Positive for appetite change and fever.   HENT: Negative for sore throat and trouble swallowing.    Eyes: Negative for visual disturbance.    Respiratory: Positive for cough and shortness of breath.    Cardiovascular: Positive for leg swelling (mild, chronic with steroid use). Negative for chest pain.   Gastrointestinal: Positive for diarrhea (chronic). Negative for abdominal pain, nausea and vomiting.   Genitourinary: Negative for difficulty urinating and dysuria.   Skin: Negative for wound.   Neurological: Negative for weakness and headaches.   Psychiatric/Behavioral: Negative for agitation. The patient is not nervous/anxious.      Objective:     Vital Signs (Most Recent):  Temp: 98.7 °F (37.1 °C) (11/05/20 0302)  Pulse: 94 (11/05/20 0849)  Resp: (!) 31 (11/05/20 0849)  BP: 128/87 (11/05/20 0830)  SpO2: (!) 94 % (11/05/20 0849) Vital Signs (24h Range):  Temp:  [98.7 °F (37.1 °C)-101 °F (38.3 °C)] 98.7 °F (37.1 °C)  Pulse:  [] 94  Resp:  [20-46] 31  SpO2:  [84 %-98 %] 94 %  BP: (101-136)/(68-87) 128/87   Weight: 77 kg (169 lb 12.1 oz)  Body mass index is 26.59 kg/m².      Intake/Output Summary (Last 24 hours) at 11/5/2020 0958  Last data filed at 11/4/2020 1845  Gross per 24 hour   Intake 670 ml   Output --   Net 670 ml       Physical Exam  Vitals signs reviewed.   Constitutional:       General: She is not in acute distress.     Appearance: She is ill-appearing. She is not diaphoretic.      Interventions: Nasal cannula in place.   HENT:      Head: Normocephalic and atraumatic.      Mouth/Throat:      Mouth: Mucous membranes are dry.   Eyes:      General: No scleral icterus.     Pupils: Pupils are equal, round, and reactive to light.   Neck:      Musculoskeletal: No neck rigidity.   Cardiovascular:      Rate and Rhythm: Regular rhythm. Tachycardia present.      Pulses: Normal pulses.      Heart sounds: Normal heart sounds. No murmur.   Pulmonary:      Effort: Tachypnea present. No accessory muscle usage.      Breath sounds: Rhonchi present. No decreased breath sounds, wheezing or rales.   Chest:      Comments: Midline scar from recent transplant    Abdominal:      Palpations: Abdomen is soft.      Tenderness: There is no guarding.   Musculoskeletal: Normal range of motion.         General: Swelling present.      Right lower le+ Edema present.      Left lower le+ Edema present.   Skin:     General: Skin is warm and dry.      Capillary Refill: Capillary refill takes less than 2 seconds.   Neurological:      General: No focal deficit present.      Mental Status: She is alert and oriented to person, place, and time.      Comments: AAO, follows all commands    Psychiatric:         Mood and Affect: Mood normal.         Behavior: Behavior normal.         Vents:  Oxygen Concentration (%): 100 (20 0849)  Lines/Drains/Airways     Peripheral Intravenous Line                 Peripheral IV - Single Lumen 20 1201 20 G Left Antecubital less than 1 day         Peripheral IV - Single Lumen 20 1241 20 G Right Antecubital less than 1 day              Significant Labs:    CBC/Anemia Profile:  Recent Labs   Lab 20  0932 20  1053 20  1055 20  0359   WBC 4.44 3.82*  --  2.65*   HGB 11.8* 12.0  --  11.3*   HCT 37.8 37.4 38 34.9*    230  --  208   * 103*  --  100*   RDW 16.4* 16.4*  --  16.2*        Chemistries:  Recent Labs   Lab 20  0932 20  1242 20  0359   * 137 138   K 4.3 4.4 3.7   CL 97 99 102   CO2 19* 18* 21*   BUN 20 22 26*   CREATININE 0.9 1.1 1.0   CALCIUM 9.3 9.3 8.7   ALBUMIN 3.2* 3.2* 2.9*   PROT 7.2 7.4 6.7   BILITOT 0.4 0.4 0.3   ALKPHOS 157* 148* 148*   ALT 30 29 24   AST 48* 54* 40   MG 1.6 1.7 1.8   PHOS  --   --  3.6       All pertinent labs within the past 24 hours have been reviewed.    Significant Imaging:  I have reviewed all pertinent imaging results/findings within the past 24 hours.      ABG  Recent Labs   Lab 20  1055   PH 7.407   PO2 20*   PCO2 36.8   HCO3 23.2*   BE -1     Assessment/Plan:     Pulmonary  S/P lung transplant  S/p left lung transplant in Aug  2020. Has not required home O2 since transplant   -- Lung transplant following peripherally; appreciate assistance  -- continue ppx azithro, bactrim, valgan, and vori  -- continue tacro  -- hold cellcept  -- restart steroid taper after 10 day dexamethasone course     Endocrine  Prediabetes  Steroid induced  -- BG goal 140-180  -- mod dose SSI     Other  * Acute hypoxemic respiratory failure due to COVID-19  COVID + 11/4. CXR with bilateral patchy opacities. Symptoms started 10/30 and steadily progressed. Daughter found to be COVID +    -- Continue alternating BiPAP with comfort flow as tolerated  -- Remdesivir X 5 days  -- Convalescent Plasma  -- Dexamethasone X 10 days  -- Wean FiO2 for SpO2 >88%  -- duonebs  -- CAP coverage; f/u blood and sputum cultures   -- hypercoagulable on full dose lovenox   -- TTE with EF 70%; left ventricular concentric remodeling    Plan discussed with Dr. Montoya.   Daughter updated in [facility].     Critical Care Time: 60 minutes  Critical secondary to Patient has a condition that poses threat to life and bodily function: Respiratory failure 2/2 COVID       Critical care was time spent personally by me on the following activities: development of treatment plan with patient or surrogate and bedside caregivers, discussions with consultants, evaluation of patient's response to treatment, examination of patient, ordering and performing treatments and interventions, ordering and review of laboratory studies, ordering and review of radiographic studies, pulse oximetry, re-evaluation of patient's condition. This critical care time did not overlap with that of any other provider or involve time for any procedures.     Cori Moseley NP  Critical Care Medicine  [facility] - ICU 16 [facility]

## 2020-11-05 NOTE — PLAN OF CARE
Problem: Adult Inpatient Plan of Care  Goal: Plan of Care Review  Outcome: Ongoing, Progressing  Flowsheets (Taken 11/4/2020 8250)  Plan of Care Reviewed With: patient  Goal: Absence of Hospital-Acquired Illness or Injury  Outcome: Ongoing, Progressing  Goal: Optimal Comfort and Wellbeing  Outcome: Ongoing, Progressing  Goal: Readiness for Transition of Care  Outcome: Ongoing, Progressing  Goal: Rounds/Family Conference  Outcome: Ongoing, Progressing     Problem: Coping Ineffective  Goal: Effective Coping  Outcome: Ongoing, Progressing     Problem: Skin Injury Risk Increased  Goal: Skin Health and Integrity  Outcome: Ongoing, Progressing     Problem: Fall Injury Risk  Goal: Absence of Fall and Fall-Related Injury  Outcome: Ongoing, Progressing   Patient currently on Bipap 12/8 60% FiO2. Resting well. Oxygen saturation 94%. Respiratory rate 24.

## 2020-11-05 NOTE — H&P
[facility] - ICU 16   Critical Care Medicine  History & Physical    Patient Name: Chely Becerra  MRN: [id number]  Admission Date: 11/4/2020  Hospital Length of Stay: 0 days  Code Status: Full Code  Attending Physician: Todd Montoya*   Primary Care Provider: ZAHIDA Stack MD   Principal Problem: Acute hypoxemic respiratory failure due to COVID-19    Subjective:     HPI:  Ms. Chely Becerra is a 65 y.o. year old female with a PMHx of diabetes and idiopathic pulmonary fibrosis s/p unilateral (left) lung transplant in August 2020 who presents to the ED complaining of a shortness of breath that started last Friday and progressively got worse. She had a fever of 101 on Sunday, started coughing for the last 3 days nonproductive, denies any chest pain. She was seen in the Lung Transplant Clinic for routine testing earlier this morning was found to be hypoxic and she was referred to the emergency department. On arrival her COVID-19 test is positive.    Patient lives with her 2 daughters, 1 of the daughters has been having some respiratory symptoms recently however she thought was secondary to asthma. he is on prednisone taper currently 20 mg daily, she has been compliant with her antirejection medication Prograf and CellCept. She was started on BiPAP in the ED with improvement in her oxygen saturations to the mid 90's.     Hospital/ICU Course:  Admitted to Critical Care Medicine for acute hypoxic respiratory failure 2/2 to COVID pneumonia. Alternating between BiPAP and comfort flow. Started on dexamethasone. Consented for Remdsivir and Convalescent Plasma by lung transplant.      Past Medical History:   Diagnosis Date    A-fib around 1996    Chronic hypoxemic respiratory failure 4/11/2019    Common bile duct dilatation 1/15/2019    Controlled type 2 diabetes mellitus without complication, without long-term current use of insulin 1/17/2019    Essential hypertension 1/16/2019    GERD  "(gastroesophageal reflux disease)     Hepatitis B core antibody positive 1/15/2019    IPF (idiopathic pulmonary fibrosis)     Obesity (BMI 30-39.9) 1/16/2019    RLS (restless legs syndrome)        Past Surgical History:   Procedure Laterality Date    APPLICATION OF WOUND VACUUM-ASSISTED CLOSURE DEVICE Left 8/10/2020    Procedure: APPLICATION, WOUND VAC, 40 x 5cm;  Surgeon: Benedict Clemons MD;  Location: [facility] OR [address] [place] [address];  Service: Cardiovascular;  Laterality: Left;    BRONCHOSCOPY N/A 9/23/2020    Procedure: flexible bronchoscopy with tissue biopsy CPT 32947;  Surgeon: [facility];  Location: [facility] OR [address] [place] [address];  Service: Anesthesiology;  Laterality: N/A;    CATHETERIZATION OF BOTH LEFT AND RIGHT HEART N/A 1/17/2019    Procedure: CATHETERIZATION, HEART, BOTH LEFT AND RIGHT;  Surgeon: Trey Evans MD;  Location: [facility] CATH LAB;  Service: Cardiology;  Laterality: N/A;    CHOLECYSTECTOMY      COLONOSCOPY      ESOPHAGOGASTRODUODENOSCOPY      HERNIA REPAIR      LEFT HEART CATHETERIZATION Left 2/18/2020    Procedure: Left heart cath;  Surgeon: Trey Evans MD;  Location: [facility] CATH LAB;  Service: Cardiology;  Laterality: Left;    LUNG TRANSPLANT Left 8/10/2020    Procedure: TRANSPLANT, LUNG - 4:30AM start;  Surgeon: Benedict Clemons MD;  Location: [facility] OR [address] [place] [address];  Service: Cardiovascular;  Laterality: Left;    SMALL INTESTINE SURGERY      mass removed (benign)       Review of patient's allergies indicates:   Allergen Reactions    Boniva [ibandronate] Other (See Comments)     "chest cramps"       Family History     None        Tobacco Use    Smoking status: Former Smoker     Types: Cigarettes     Quit date: 12/13/2016     Years since quitting: 3.8    Smokeless tobacco: Never Used   Substance and Sexual Activity    Alcohol use: No     Frequency: Never    Drug use: No    Sexual activity: Not on file      Review of Systems   Constitutional: Positive for appetite change and fever.   HENT: " Negative for sore throat and trouble swallowing.    Eyes: Negative for visual disturbance.   Respiratory: Positive for cough and shortness of breath.    Cardiovascular: Positive for leg swelling (mild, chronic with steroid use). Negative for chest pain.   Gastrointestinal: Positive for diarrhea (chronic). Negative for abdominal pain, nausea and vomiting.   Genitourinary: Negative for difficulty urinating and dysuria.   Skin: Negative for wound.   Neurological: Negative for weakness and headaches.   Psychiatric/Behavioral: Negative for agitation. The patient is not nervous/anxious.      Objective:     Vital Signs (Most Recent):  Temp: (!) 100.4 °F (38 °C) (11/04/20 1356)  Pulse: (!) 114 (11/04/20 1122)  Resp: (!) 28 (11/04/20 1100)  BP: 118/74 (11/04/20 1202)  SpO2: (!) 93 % (11/04/20 1329) Vital Signs (24h Range):  Temp:  [100.4 °F (38 °C)] 100.4 °F (38 °C)  Pulse:  [114-123] 114  Resp:  [28] 28  SpO2:  [93 %-98 %] 93 %  BP: (115-124)/(74-83) 118/74      There is no height or weight on file to calculate BMI.    No intake or output data in the 24 hours ending 11/04/20 1556    Physical Exam  Vitals signs reviewed.   Constitutional:       General: She is not in acute distress.     Appearance: She is ill-appearing. She is not diaphoretic.      Interventions: Nasal cannula in place.   HENT:      Head: Normocephalic and atraumatic.      Mouth/Throat:      Mouth: Mucous membranes are dry.   Eyes:      General: No scleral icterus.     Pupils: Pupils are equal, round, and reactive to light.   Neck:      Musculoskeletal: No neck rigidity.   Cardiovascular:      Rate and Rhythm: Regular rhythm. Tachycardia present.      Pulses: Normal pulses.      Heart sounds: Normal heart sounds. No murmur.   Pulmonary:      Effort: Tachypnea present. No accessory muscle usage.      Breath sounds: Rhonchi present. No decreased breath sounds, wheezing or rales.   Chest:      Comments: Midline scar from recent transplant   Abdominal:       Palpations: Abdomen is soft.      Tenderness: There is no guarding.   Musculoskeletal: Normal range of motion.         General: Swelling present.      Right lower le+ Edema present.      Left lower le+ Edema present.   Skin:     General: Skin is warm and dry.      Capillary Refill: Capillary refill takes less than 2 seconds.   Neurological:      General: No focal deficit present.      Mental Status: She is alert and oriented to person, place, and time.      Comments: AAO, follows all commands    Psychiatric:         Mood and Affect: Mood normal.         Behavior: Behavior normal.         Vents:  Oxygen Concentration (%): 75 (20 1356)  Lines/Drains/Airways     Peripheral Intravenous Line                 Peripheral IV - Single Lumen 20 1201 20 G Left Antecubital less than 1 day         Peripheral IV - Single Lumen 20 1241 20 G Right Antecubital less than 1 day              Significant Labs:    CBC/Anemia Profile:  Recent Labs   Lab 20  0932 20  1053 20  1055   WBC 4.44 3.82*  --    HGB 11.8* 12.0  --    HCT 37.8 37.4 38    230  --    * 103*  --    RDW 16.4* 16.4*  --         Chemistries:  Recent Labs   Lab 20  0932 20  1242   * 137   K 4.3 4.4   CL 97 99   CO2 19* 18*   BUN 20 22   CREATININE 0.9 1.1   CALCIUM 9.3 9.3   ALBUMIN 3.2* 3.2*   PROT 7.2 7.4   BILITOT 0.4 0.4   ALKPHOS 157* 148*   ALT 30 29   AST 48* 54*   MG 1.6 1.7       All pertinent labs within the past 24 hours have been reviewed.    Significant Imaging: I have reviewed all pertinent imaging results/findings within the past 24 hours.    Assessment/Plan:     Pulmonary  S/P lung transplant  S/p left lung transplant in Aug 2020. Has not required home O2 since transplant   -- Lung transplant following peripherally; appreciate assistance  -- continue ppx azithro, bactrim, valgan, and vori  -- continue tacro  -- hold cellcept  -- restart steroid taper after 10 day dexamethasone  course     Endocrine  Prediabetes  Steroid induced  -- BG goal 140-180  -- SSI     Other  * Acute hypoxemic respiratory failure due to COVID-19  COVID + 11/4. CXR with bilateral patchy opacities. Symptoms started 10/30 and steadily progressed. Daughter found to be COVID +    -- Continue alternating BiPAP with comfort flow as tolerated  -- Remdesivir X 5 days  -- Convalescent Plasma  -- Dexamethasone X 10 days  -- Wean FiO2 for SpO2 >88%  -- duonebs  -- CAP coverage; f/u blood and sputum cultures       -- hypercoagulable on full dose lovenox   -- f/u TTE     Critical Care Time: 70 minutes  Critical secondary to Patient has a condition that poses threat to life and bodily function: Severe Respiratory Distress     Critical care was time spent personally by me on the following activities: development of treatment plan with patient or surrogate and bedside caregivers, discussions with consultants, evaluation of patient's response to treatment, examination of patient, ordering and performing treatments and interventions, ordering and review of laboratory studies, ordering and review of radiographic studies, pulse oximetry, re-evaluation of patient's condition. This critical care time did not overlap with that of any other provider or involve time for any procedures.     Cori Moseley NP  Critical Care Medicine  [facility] - ICU 16 [facility]

## 2020-11-05 NOTE — PLAN OF CARE
11/4/2020 10:25 AM   Pneumonia due to COVID-19 virus [U07.1, J12.89]   Covid+ 11/4.  Patient is S/P Lung transplant 8/10/2020 and is being followed by transplant SW/CM.      Patient chart checked by Lung Transplant team. Underwent LSLT on 8/10/2020 for IPF. Presented to ED with progressive dyspnea, hypoxemia, and fevers over the past few days. COVID positive. CXR reviewed. Currently requiring BiPAP. Admitted to MICU service. COVID management per [facility] protocol. Will continue to follow along for immunosuppression and OIP. Recommend broad spectrum antibiotics. Please call [phone] with questions.   Cosigned by: Patricia Dominguez DO at 11/4/2020  2:31 PM    Due to Covid precautions and pt's medical status, CM contacted pt's daughter Ivy Levy [phone] to complete discharge assessment and confirm follow up by transplant team.  Pt's daughter states she was contacted earlier today by transplant team.      Pt lives with one of her daughters.  Her daughter Ivy will provide transportation home.    Equipment Used at Home:  shower chair.  She is not a dialysis pt and is not on Coumadin.      Pt not medically stable for discharge - on CF alternating with BIPAP.  When medically stable, anticipate discharge plan A - home with family or plan B - home with home health.      Transplant team (FRNACIS/CHANTAL)will coordinate discharge planning needs.      CHARLES ReyesN RN  Case Management  [id number]         C Maritza Stack MD     Payor: MEDICARE / Plan: MEDICARE PART A & B / Product Type: [facility] /        [facility] [place]  [address]  [facility] [address]  Phone: [phone] Fax: [phone]    [id number] - [place] - [address]  [place] [address]  Phone: [phone] Fax: [phone]    [facility]/pharmacy #[id number] - [place] - [address]  [address]  [place] [address]  Phone: [phone] Fax: [phone]        Extended Emergency Contact Information  Primary Emergency Contact: Ivy Levy  Address: [address]           [place] [address] [facility]  Mobile Phone: [phone]  Relation: Daughter  Secondary Emergency Contact: Irma Kaplan  Work Phone: [phone]  Mobile Phone: [phone]  Relation: Daughter  Preferred language: English   needed? No        11/05/20 1359   Discharge Assessment   Assessment Type Discharge Planning Assessment   Confirmed/corrected address and phone number on facesheet? Yes   Assessment information obtained from? Other  (Spoke with pt's daughter Ivy Levy [phone])   Expected Length of Stay (days) 5   Communicated expected length of stay with patient/caregiver yes   Prior to hospitilization cognitive status: Alert/Oriented   Prior to hospitalization functional status: Independent   Current cognitive status: Alert/Oriented   Current Functional Status: Needs Assistance   Facility Arrived From: NA   Lives With child(brian), adult  (Lives with her daughter)   Able to Return to Prior Arrangements yes   Is patient able to care for self after discharge? Unable to determine at this time (comments)   Who are your caregiver(s) and their phone number(s)? Daughters:Ivy Levy [phone] and Irma Kaplan [phone]   Equipment Currently Used at Home shower chair   Do you have any problems affording any of your prescribed medications? TBD   Is the patient taking medications as prescribed? yes   Does the patient have transportation home? Yes   Transportation Anticipated family or friend will provide  (Pt's daughter Ivy will provide)   Dialysis Name and Scheduled days NA   Does the patient receive services at the Coumadin Clinic? No   Discharge Plan A Home with family   Discharge Plan B Home with family;Home Health   DME Needed Upon Discharge  other (see comments)  (TBD)   Patient/Family in Agreement with Plan yes

## 2020-11-05 NOTE — CONSULTS
[facility] - ICU 16 WT  Palliative Medicine  Consult Note    Patient Name: Chely Becerra  MRN: [id number]  Admission Date: 11/4/2020  Hospital Length of Stay: 1 days  Code Status: Full Code   Attending Provider: Todd Montoya*  Consulting Provider: Jose Mathew MD  Primary Care Physician: ZAHIDA Stack MD  Principal Problem:Acute hypoxemic respiratory failure due to COVID-19    Patient information was obtained from patient, relative(s) and past medical records.      Inpatient consult to Palliative Care  Consult performed by: Jose Mathew MD  Consult ordered by: Cori Moseley NP        Assessment/Plan:     Palliative care encounter  66 yo female with DMII and recent left lung transplant for IPF admitted for acute hypoxic respiratory failure with sats in the 40s dx'd with a COVID19 positivity currently on HFNC at 50L and 100%    1) Symptoms:  Mild dyspnea and fatigue; recommend maximizing supportive care, non pharm measures such as having bedside fan, cooler room temps, and for treatment refractory dyspnea: low dose morphine 1 mg q 1 hour prn     2) Code status:  FC    3) Psychosocial:  Lives with family in home setting; dtr Ivy is HCPOA; other daughter  Irma tested positive for COVID on 11.4     4) Medicolegal:  HCPOA and LW scanned in to chart    5) Spiritual:  Alevism;  offered    6) Goals of care/discussion:  Discussion with the pt and daughter at the bedside to introduce the role of palliative medicine and supportive care in the ICU in the setting of a serious diagnosis of COVID-19. The family was able to demonstrate an excellent understanding of the diagnosis, current care plan, hope for improvement and concern for worsening of the pt's condition.     Experience with critical illness: recently received a lung transplant in August of 2020 and recently returned home.  Had been doing quite well at home setting     Understanding of illness: Good understanding of  chronic lung condition, recent transplant course, and struggling with dianosis of COIVD 19.  They just also discovered th ept's other daughter, who works as a pharmacy tech, tested positive     Present for discussion: Pt and daughter Ivy Levy     Goals of care:  Would be accepting of all potential therapies that would give the pt the best opportunity to recover, including invasive life prolonging modalities.     Reviewed the pt's pre-existing HCPOA and LW documents.  Pt in agreement these documents are reflective of her wishes.  Allows for family to stop non-beneficial therapies if it was felt she developed a terminal illness    Plan:   -will continue to follow over the expected prolonged course of her illness  -continue in additional goals of care discussions and aiding in medical decision making as needed  -ongoing coordination of care    Discussed with ICU team and pulm transplant     Thank you for the opportunity to care for this patient and family.      Please call with questions.        Jose Mathew MD  Palliative Medicine            Thank you for your consult. I will follow-up with patient. Please contact us if you have any additional questions.    Subjective:     HPI:   Ms. Chely Becerra is a 65 y.o. year old female with a PMHx of diabetes and idiopathic pulmonary fibrosis s/p unilateral (left) lung transplant in August 2020 who presents to the ED complaining of a shortness of breath that started last Friday and progressively got worse. She had a fever of 101 on Sunday, started coughing for the last 3 days nonproductive, denies any chest pain. She was seen in the Lung Transplant Clinic for routine testing earlier this morning was found to be hypoxic and she was referred to the emergency department. On arrival her COVID-19 test is positive.     Patient lives with her 2 daughters, 1 of the daughters has been having some respiratory symptoms recently however she thought was secondary to asthma. he is  on prednisone taper currently 20 mg daily, she has been compliant with her antirejection medication Prograf and CellCept. She was started on BiPAP in the ED with improvement in her oxygen saturations to the mid 90's.      Hospital/ICU Course:  Admitted to Critical Care Medicine for acute hypoxic respiratory failure 2/2 to COVID pneumonia. Alternating between BiPAP and comfort flow. Started on dexamethasone. Consented for Remdsivir and Convalescent Plasma by lung transplant.     In this setting, palliative medicine was consulted to help with medical decision making and aid in the formation of goals of care.       Hospital Course:  No notes on file    Interval History: discussed with ICU team, nursing, and pt and daughter Ivy at the bedside    Past Medical History:   Diagnosis Date    A-fib around 1996    Chronic hypoxemic respiratory failure 4/11/2019    Common bile duct dilatation 1/15/2019    Controlled type 2 diabetes mellitus without complication, without long-term current use of insulin 1/17/2019    Essential hypertension 1/16/2019    GERD (gastroesophageal reflux disease)     Hepatitis B core antibody positive 1/15/2019    IPF (idiopathic pulmonary fibrosis)     Obesity (BMI 30-39.9) 1/16/2019    RLS (restless legs syndrome)        Past Surgical History:   Procedure Laterality Date    APPLICATION OF WOUND VACUUM-ASSISTED CLOSURE DEVICE Left 8/10/2020    Procedure: APPLICATION, WOUND VAC, 40 x 5cm;  Surgeon: Benedict Clemons MD;  Location: [facility] OR [address] [place] [address];  Service: Cardiovascular;  Laterality: Left;    BRONCHOSCOPY N/A 9/23/2020    Procedure: flexible bronchoscopy with tissue biopsy CPT 80140;  Surgeon: [facility] Diagnostic Provider;  Location: [facility] OR [address] [place] [address];  Service: Anesthesiology;  Laterality: N/A;    CATHETERIZATION OF BOTH LEFT AND RIGHT HEART N/A 1/17/2019    Procedure: CATHETERIZATION, HEART, BOTH LEFT AND RIGHT;  Surgeon: Trey Evans MD;  Location: [facility] CATH LAB;  Service:  "Cardiology;  Laterality: N/A;    CHOLECYSTECTOMY      COLONOSCOPY      ESOPHAGOGASTRODUODENOSCOPY      HERNIA REPAIR      LEFT HEART CATHETERIZATION Left 2/18/2020    Procedure: Left heart cath;  Surgeon: Trey Evans MD;  Location: [facility] CATH LAB;  Service: Cardiology;  Laterality: Left;    LUNG TRANSPLANT Left 8/10/2020    Procedure: TRANSPLANT, LUNG - 4:30AM start;  Surgeon: Benedict Clemons MD;  Location: [facility] OR [address] [place] [address];  Service: Cardiovascular;  Laterality: Left;    SMALL INTESTINE SURGERY      mass removed (benign)       Review of patient's allergies indicates:   Allergen Reactions    Boniva [ibandronate] Other (See Comments)     "chest cramps"       Medications:  Continuous Infusions:  Scheduled Meds:   albuterol-ipratropium  3 mL Nebulization Q6H    azithromycin  500 mg Intravenous Q24H    [START ON 11/9/2020] azithromycin  250 mg Oral Every Mon, Wed, Fri    cefTRIAXone  2 g Intravenous Q24H    dexamethasone  6 mg Intravenous Daily    enoxaparin  1 mg/kg Subcutaneous Q12H    ganciclovir (CYTOVENE) IVPB  5 mg/kg (Order-Specific) Intravenous Q24H    lamiVUDine  150 mg Oral Daily    EUA remdesivir infusion  100 mg Intravenous Q24H    tacrolimus  0.5 mg Oral Daily AM    voriconazole  300 mg Oral BID     PRN Meds:sodium chloride, acetaminophen, dextrose 50%, glucagon (human recombinant), insulin aspart U-100, promethazine-codeine 6.25-10 mg/5 ml, sodium chloride 0.9%    Family History     None        Tobacco Use    Smoking status: Former Smoker     Types: Cigarettes     Quit date: 12/13/2016     Years since quitting: 3.8    Smokeless tobacco: Never Used   Substance and Sexual Activity    Alcohol use: No     Frequency: Never    Drug use: No    Sexual activity: Not on file       Review of Systems   Constitutional: Positive for activity change, fatigue and fever. Negative for unexpected weight change.   HENT: Positive for congestion.    Eyes: Negative.    Respiratory: Positive for cough " and shortness of breath.    Endocrine: Negative.    Genitourinary: Negative.    Musculoskeletal: Negative.    Skin: Negative.    Neurological: Negative.    Hematological: Negative.    Psychiatric/Behavioral: Negative.    All other systems reviewed and are negative.    Objective:     Vital Signs (Most Recent):  Temp: 98.7 °F (37.1 °C) (11/05/20 0302)  Pulse: 94 (11/05/20 0849)  Resp: (!) 31 (11/05/20 0849)  BP: 128/87 (11/05/20 0830)  SpO2: (!) 94 % (11/05/20 0849) Vital Signs (24h Range):  Temp:  [98.7 °F (37.1 °C)-101 °F (38.3 °C)] 98.7 °F (37.1 °C)  Pulse:  [] 94  Resp:  [20-46] 31  SpO2:  [84 %-98 %] 94 %  BP: (101-136)/(68-87) 128/87     Weight: 77 kg (169 lb 12.1 oz)  Body mass index is 26.59 kg/m².    Physical Exam  Vitals signs and nursing note reviewed.   Constitutional:       Appearance: Normal appearance.   HENT:      Head: Normocephalic and atraumatic.      Right Ear: External ear normal.      Left Ear: External ear normal.      Nose: Congestion present.      Mouth/Throat:      Mouth: Mucous membranes are moist.   Eyes:      Extraocular Movements: Extraocular movements intact.      Pupils: Pupils are equal, round, and reactive to light.   Cardiovascular:      Rate and Rhythm: Normal rate and regular rhythm.   Pulmonary:      Breath sounds: Rales present.      Comments: Slight increase in respirations  Abdominal:      General: Abdomen is flat. Bowel sounds are normal.      Palpations: Abdomen is soft.   Musculoskeletal: Normal range of motion.      Right lower leg: No edema.      Left lower leg: No edema.   Skin:     General: Skin is warm and dry.   Neurological:      General: No focal deficit present.      Mental Status: She is alert and oriented to person, place, and time. Mental status is at baseline.   Psychiatric:         Mood and Affect: Mood normal.         Behavior: Behavior normal.         Thought Content: Thought content normal.         Judgment: Judgment normal.         Review of  Symptoms    Symptom Assessment (ESAS 0-10 Scale)  Pain:  0  Dyspnea:  0  Anxiety:  0  Nausea:  0  Depression:  0  Anorexia:  0  Fatigue:  0  Insomnia:  0  Restlessness:  0  Agitation:  0     CAM / Delirium:  Negative  Constipation:  Negative  Diarrhea:  Negative          OME in 24 hours:  0    Performance Status:  100 (fully recovered post transplant)    Living Arrangements:  Lives with family    Psychosocial/Cultural: 3 months out from left lung transplant    Care support team at home consists of two daughters and several adolescent grandchildren    Spiritual:  F - Yesy and Belief:  Yarsani   I - Importance:  Very  C - Community:  Has remained distanced for obv reasons  A - Address in Care:   visits offered      Advance Care Planning   Advance Directives:   Living Will: Yes        Copy on chart: Yes    LaPOST: No    Do Not Resuscitate Status: No    Medical Power of : Yes    Agent's Name:  Ivy Levy Daughter     [phone]     Decision Making:  Patient answered questions         Significant Labs:   CBC:   Recent Labs   Lab 11/04/20  0932 11/04/20  1053 11/04/20  1055 11/05/20  0359   WBC 4.44 3.82*  --  2.65*   HGB 11.8* 12.0  --  11.3*   HCT 37.8 37.4 38 34.9*    230  --  208     CMP:   Recent Labs   Lab 11/04/20  0932 11/04/20  1242 11/05/20  0359   * 137 138   K 4.3 4.4 3.7   CL 97 99 102   CO2 19* 18* 21*   * 243* 293*   BUN 20 22 26*   CREATININE 0.9 1.1 1.0   CALCIUM 9.3 9.3 8.7   PROT 7.2 7.4 6.7   ALBUMIN 3.2* 3.2* 2.9*   BILITOT 0.4 0.4 0.3   ALKPHOS 157* 148* 148*   AST 48* 54* 40   ALT 30 29 24   ANIONGAP 18* 20* 15   EGFRNONAA >60.0 53.6* >60.0     LFT:   Recent Labs   Lab 11/05/20  0359   AST 40   ALKPHOS 148*   BILITOT 0.3     CBC:   Recent Labs   Lab 11/05/20  0359   WBC 2.65*   HGB 11.3*   HCT 34.9*   *        BMP:  Recent Labs   Lab 11/05/20  0359   *      K 3.7      CO2 21*   BUN 26*   CREATININE 1.0   CALCIUM 8.7   MG  1.8     LFT:  Lab Results   Component Value Date    AST 40 11/05/2020     (H) 09/28/2020    ALKPHOS 148 (H) 11/05/2020    BILITOT 0.3 11/05/2020     Albumin:   Albumin   Date Value Ref Range Status   11/05/2020 2.9 (L) 3.5 - 5.2 g/dL Final     Protein:   Total Protein   Date Value Ref Range Status   11/05/2020 6.7 6.0 - 8.4 g/dL Final     Lactic acid:   Lab Results   Component Value Date    LACTATE 2.2 11/05/2020    LACTATE 3.3 (H) 11/04/2020       Significant Imaging: I have reviewed all pertinent imaging results/findings within the past 24 hours.     CXR 11.4  FINDINGS:  Additional history: Left lung transplant 08/10/2020 for idiopathic pulmonary fibrosis.  Recently the patient has had shortness of breath for 2 days with progression of symptoms.  Patient had a fever of 1014 days ago.  Has nonproductive cough.  COVID-19 antigen test is positive today.     X-ray beam attenuation and scatter occur in generous overlying soft tissues.     Sternal wires are intact and aligned following single left lung transplant on 08/10/2020.     Right hemidiaphragm is elevated as seen on the preoperative study of 08/09/2020.  Patchy opacities in the right lung or more conspicuous than on recent comparison studies including 09/28/2020 raising the question of aspiration or pneumonia.     Volume of the transplanted left lung is preserved as seen on 09/28/2020 and 08/24/2020.     However there appears to be ground-glass attenuation in the left lung similar to the earlier study of 11/04/2020 but that is new since 10/21/2020.  In this patient with a history of COVID antigen positive test today, recent symptoms of shortness of breath, cough and fever, and immunosuppression for left lung transplant, differential diagnosis is broad.  Considerations include COVID-19 lung infection as well as other forms of lung infection, noninfectious inflammation, and aspiration.     I doubt pulmonary edema.  I detect no pleural fluid, pneumothorax,  pneumomediastinum, pneumoperitoneum or significant osseous abnormality.     Impression:     Abnormal chest radiograph similar to the earlier study of 08:56 hours today.      > 50% of 85 min visit spent in chart review, face to face discussion of goals of care,  symptom assessment, coordination of care and emotional support.    Jose Mathew MD  Palliative Medicine  [facility] - ICU 16 WT

## 2020-11-05 NOTE — PLAN OF CARE
Patient chart checked by Lung Transplant team. Underwent LSLT on 8/10/2020 for IPF. COVID positive. Alternating between BiPAP and comfort flow. Tacrolimus level added to morning labs, will adjust level per trough. Holding mycophenolate. Will continue to follow along for immunosuppression and OIP. COVID management per MICU team. Please call [phone] with questions.

## 2020-11-05 NOTE — ASSESSMENT & PLAN NOTE
COVID + 11/4. CXR with bilateral patchy opacities. Symptoms started 10/30 and steadily progressed. Daughter found to be COVID +    -- Continue alternating BiPAP with comfort flow as tolerated  -- Remdesivir X 5 days  -- Convalescent Plasma  -- Dexamethasone X 10 days  -- Wean FiO2 for SpO2 >88%  -- duonebs  -- CAP coverage; f/u blood and sputum cultures   -- hypercoagulable on full dose lovenox   -- TTE with EF 70%; left ventricular concentric remodeling

## 2020-11-05 NOTE — ASSESSMENT & PLAN NOTE
COVID + 11/4. CXR with bilateral patchy opacities. Symptoms started 10/30 and steadily progressed. Daughter found to be COVID +    -- Continue alternating BiPAP with comfort flow as tolerated  -- Remdesivir X 5 days  -- Convalescent Plasma  -- Dexamethasone X 10 days  -- Wean FiO2 for SpO2 >88%  -- duonebs  -- CAP coverage; f/u blood and sputum cultures   -- hypercoagulable on full dose lovenox   -- f/u TTE

## 2020-11-05 NOTE — HPI
Ms. Chely Becerra is a 65 y.o. year old female with a PMHx of diabetes and idiopathic pulmonary fibrosis s/p unilateral (left) lung transplant in August 2020 who presents to the ED complaining of a shortness of breath that started last Friday and progressively got worse. She had a fever of 101 on Sunday, started coughing for the last 3 days nonproductive, denies any chest pain. She was seen in the Lung Transplant Clinic for routine testing earlier this morning was found to be hypoxic and she was referred to the emergency department. On arrival her COVID-19 test is positive.     Patient lives with her 2 daughters, 1 of the daughters has been having some respiratory symptoms recently however she thought was secondary to asthma. he is on prednisone taper currently 20 mg daily, she has been compliant with her antirejection medication Prograf and CellCept. She was started on BiPAP in the ED with improvement in her oxygen saturations to the mid 90's.      Hospital/ICU Course:  Admitted to Critical Care Medicine for acute hypoxic respiratory failure 2/2 to COVID pneumonia. Alternating between BiPAP and comfort flow. Started on dexamethasone. Consented for Remdsivir and Convalescent Plasma by lung transplant.     In this setting, palliative medicine was consulted to help with medical decision making and aid in the formation of goals of care.

## 2020-11-05 NOTE — SUBJECTIVE & OBJECTIVE
Interval History/Significant Events: On BiPAP overnight, comfortable on comfort flow this morning. Received convalescent plasma this am.     Review of Systems   Constitutional: Positive for appetite change and fever.   HENT: Negative for sore throat and trouble swallowing.    Eyes: Negative for visual disturbance.   Respiratory: Positive for cough and shortness of breath.    Cardiovascular: Positive for leg swelling (mild, chronic with steroid use). Negative for chest pain.   Gastrointestinal: Positive for diarrhea (chronic). Negative for abdominal pain, nausea and vomiting.   Genitourinary: Negative for difficulty urinating and dysuria.   Skin: Negative for wound.   Neurological: Negative for weakness and headaches.   Psychiatric/Behavioral: Negative for agitation. The patient is not nervous/anxious.      Objective:     Vital Signs (Most Recent):  Temp: 98.7 °F (37.1 °C) (11/05/20 0302)  Pulse: 94 (11/05/20 0849)  Resp: (!) 31 (11/05/20 0849)  BP: 128/87 (11/05/20 0830)  SpO2: (!) 94 % (11/05/20 0849) Vital Signs (24h Range):  Temp:  [98.7 °F (37.1 °C)-101 °F (38.3 °C)] 98.7 °F (37.1 °C)  Pulse:  [] 94  Resp:  [20-46] 31  SpO2:  [84 %-98 %] 94 %  BP: (101-136)/(68-87) 128/87   Weight: 77 kg (169 lb 12.1 oz)  Body mass index is 26.59 kg/m².      Intake/Output Summary (Last 24 hours) at 11/5/2020 0958  Last data filed at 11/4/2020 1845  Gross per 24 hour   Intake 670 ml   Output --   Net 670 ml       Physical Exam  Vitals signs reviewed.   Constitutional:       General: She is not in acute distress.     Appearance: She is ill-appearing. She is not diaphoretic.      Interventions: Nasal cannula in place.   HENT:      Head: Normocephalic and atraumatic.      Mouth/Throat:      Mouth: Mucous membranes are dry.   Eyes:      General: No scleral icterus.     Pupils: Pupils are equal, round, and reactive to light.   Neck:      Musculoskeletal: No neck rigidity.   Cardiovascular:      Rate and Rhythm: Regular rhythm.  Tachycardia present.      Pulses: Normal pulses.      Heart sounds: Normal heart sounds. No murmur.   Pulmonary:      Effort: Tachypnea present. No accessory muscle usage.      Breath sounds: Rhonchi present. No decreased breath sounds, wheezing or rales.   Chest:      Comments: Midline scar from recent transplant   Abdominal:      Palpations: Abdomen is soft.      Tenderness: There is no guarding.   Musculoskeletal: Normal range of motion.         General: Swelling present.      Right lower le+ Edema present.      Left lower le+ Edema present.   Skin:     General: Skin is warm and dry.      Capillary Refill: Capillary refill takes less than 2 seconds.   Neurological:      General: No focal deficit present.      Mental Status: She is alert and oriented to person, place, and time.      Comments: AAO, follows all commands    Psychiatric:         Mood and Affect: Mood normal.         Behavior: Behavior normal.         Vents:  Oxygen Concentration (%): 100 (20 0849)  Lines/Drains/Airways     Peripheral Intravenous Line                 Peripheral IV - Single Lumen 20 1201 20 G Left Antecubital less than 1 day         Peripheral IV - Single Lumen 20 1241 20 G Right Antecubital less than 1 day              Significant Labs:    CBC/Anemia Profile:  Recent Labs   Lab 20  0932 20  1053 20  1055 20  0359   WBC 4.44 3.82*  --  2.65*   HGB 11.8* 12.0  --  11.3*   HCT 37.8 37.4 38 34.9*    230  --  208   * 103*  --  100*   RDW 16.4* 16.4*  --  16.2*        Chemistries:  Recent Labs   Lab 20  0932 20  1242 20  0359   * 137 138   K 4.3 4.4 3.7   CL 97 99 102   CO2 19* 18* 21*   BUN 20 22 26*   CREATININE 0.9 1.1 1.0   CALCIUM 9.3 9.3 8.7   ALBUMIN 3.2* 3.2* 2.9*   PROT 7.2 7.4 6.7   BILITOT 0.4 0.4 0.3   ALKPHOS 157* 148* 148*   ALT 30 29 24   AST 48* 54* 40   MG 1.6 1.7 1.8   PHOS  --   --  3.6       All pertinent labs within the past 24 hours  have been reviewed.    Significant Imaging:  I have reviewed all pertinent imaging results/findings within the past 24 hours.

## 2020-11-05 NOTE — ASSESSMENT & PLAN NOTE
64 yo female with DMII and recent left lung transplant for IPF admitted for acute hypoxic respiratory failure with sats in the 40s dx'd with a COVID19 positivity currently on HFNC at 50L and 100%    1) Symptoms:  Mild dyspnea and fatigue; recommend maximizing supportive care, non pharm measures such as having bedside fan, cooler room temps, and for treatment refractory dyspnea: low dose morphine 1 mg q 1 hour prn     2) Code status:  FC    3) Psychosocial:  Lives with family in home setting; dtr Ivy is HCPOA; other daughter  Irma tested positive for COVID on 11.4     4) Medicolegal:  HCPOA and LW scanned in to chart    5) Spiritual:  Alevism;  offered    6) Goals of care/discussion:  Discussion with the pt and daughter at the bedside to introduce the role of palliative medicine and supportive care in the ICU in the setting of a serious diagnosis of COVID-19. The family was able to demonstrate an excellent understanding of the diagnosis, current care plan, hope for improvement and concern for worsening of the pt's condition.     Experience with critical illness: recently received a lung transplant in August of 2020 and recently returned home.  Had been doing quite well at home setting     Understanding of illness: Good understanding of chronic lung condition, recent transplant course, and struggling with dianosis of COIVD 19.  They just also discovered th ept's other daughter, who works as a pharmacy tech, tested positive     Present for discussion: Pt and daughter Ivy Levy     Goals of care:  Would be accepting of all potential therapies that would give the pt the best opportunity to recover, including invasive life prolonging modalities.     Reviewed the pt's pre-existing HCPOA and LW documents.  Pt in agreement these documents are reflective of her wishes.  Allows for family to stop non-beneficial therapies if it was felt she developed a terminal illness    Plan:   -will continue to follow  over the expected prolonged course of her illness  -continue in additional goals of care discussions and aiding in medical decision making as needed  -ongoing coordination of care    Discussed with ICU team and pulm transplant     Thank you for the opportunity to care for this patient and family.      Please call with questions.        Jose Mathew MD  Palliative Medicine

## 2020-11-05 NOTE — SUBJECTIVE & OBJECTIVE
"Interval History: discussed with ICU team, nursing, and pt and daughter Ivy at the bedside    Past Medical History:   Diagnosis Date    A-fib around 1996    Chronic hypoxemic respiratory failure 4/11/2019    Common bile duct dilatation 1/15/2019    Controlled type 2 diabetes mellitus without complication, without long-term current use of insulin 1/17/2019    Essential hypertension 1/16/2019    GERD (gastroesophageal reflux disease)     Hepatitis B core antibody positive 1/15/2019    IPF (idiopathic pulmonary fibrosis)     Obesity (BMI 30-39.9) 1/16/2019    RLS (restless legs syndrome)        Past Surgical History:   Procedure Laterality Date    APPLICATION OF WOUND VACUUM-ASSISTED CLOSURE DEVICE Left 8/10/2020    Procedure: APPLICATION, WOUND VAC, 40 x 5cm;  Surgeon: Benedict Clemons MD;  Location: [facility] OR [address] [place] [address];  Service: Cardiovascular;  Laterality: Left;    BRONCHOSCOPY N/A 9/23/2020    Procedure: flexible bronchoscopy with tissue biopsy CPT 88927;  Surgeon: [facility] Diagnostic Provider;  Location: [address];  Service: Anesthesiology;  Laterality: N/A;    CATHETERIZATION OF BOTH LEFT AND RIGHT HEART N/A 1/17/2019    Procedure: CATHETERIZATION, HEART, BOTH LEFT AND RIGHT;  Surgeon: Trey Evans MD;  Location: [facility] CATH LAB;  Service: Cardiology;  Laterality: N/A;    CHOLECYSTECTOMY      COLONOSCOPY      ESOPHAGOGASTRODUODENOSCOPY      HERNIA REPAIR      LEFT HEART CATHETERIZATION Left 2/18/2020    Procedure: Left heart cath;  Surgeon: Trey Evans MD;  Location: [facility] CATH LAB;  Service: Cardiology;  Laterality: Left;    LUNG TRANSPLANT Left 8/10/2020    Procedure: TRANSPLANT, LUNG - 4:30AM start;  Surgeon: Benedict Clemons MD;  Location: [address];  Service: Cardiovascular;  Laterality: Left;    SMALL INTESTINE SURGERY      mass removed (benign)       Review of patient's allergies indicates:   Allergen Reactions    Boniva [ibandronate] Other (See Comments)     "chest " "cramps"       Medications:  Continuous Infusions:  Scheduled Meds:   albuterol-ipratropium  3 mL Nebulization Q6H    azithromycin  500 mg Intravenous Q24H    [START ON 11/9/2020] azithromycin  250 mg Oral Every Mon, Wed, Fri    cefTRIAXone  2 g Intravenous Q24H    dexamethasone  6 mg Intravenous Daily    enoxaparin  1 mg/kg Subcutaneous Q12H    ganciclovir (CYTOVENE) IVPB  5 mg/kg (Order-Specific) Intravenous Q24H    lamiVUDine  150 mg Oral Daily    EUA remdesivir infusion  100 mg Intravenous Q24H    tacrolimus  0.5 mg Oral Daily AM    voriconazole  300 mg Oral BID     PRN Meds:sodium chloride, acetaminophen, dextrose 50%, glucagon (human recombinant), insulin aspart U-100, promethazine-codeine 6.25-10 mg/5 ml, sodium chloride 0.9%    Family History     None        Tobacco Use    Smoking status: Former Smoker     Types: Cigarettes     Quit date: 12/13/2016     Years since quitting: 3.8    Smokeless tobacco: Never Used   Substance and Sexual Activity    Alcohol use: No     Frequency: Never    Drug use: No    Sexual activity: Not on file       Review of Systems   Constitutional: Positive for activity change, fatigue and fever. Negative for unexpected weight change.   HENT: Positive for congestion.    Eyes: Negative.    Respiratory: Positive for cough and shortness of breath.    Endocrine: Negative.    Genitourinary: Negative.    Musculoskeletal: Negative.    Skin: Negative.    Neurological: Negative.    Hematological: Negative.    Psychiatric/Behavioral: Negative.    All other systems reviewed and are negative.    Objective:     Vital Signs (Most Recent):  Temp: 98.7 °F (37.1 °C) (11/05/20 0302)  Pulse: 94 (11/05/20 0849)  Resp: (!) 31 (11/05/20 0849)  BP: 128/87 (11/05/20 0830)  SpO2: (!) 94 % (11/05/20 0849) Vital Signs (24h Range):  Temp:  [98.7 °F (37.1 °C)-101 °F (38.3 °C)] 98.7 °F (37.1 °C)  Pulse:  [] 94  Resp:  [20-46] 31  SpO2:  [84 %-98 %] 94 %  BP: (101-136)/(68-87) 128/87     Weight: " 77 kg (169 lb 12.1 oz)  Body mass index is 26.59 kg/m².    Physical Exam  Vitals signs and nursing note reviewed.   Constitutional:       Appearance: Normal appearance.   HENT:      Head: Normocephalic and atraumatic.      Right Ear: External ear normal.      Left Ear: External ear normal.      Nose: Congestion present.      Mouth/Throat:      Mouth: Mucous membranes are moist.   Eyes:      Extraocular Movements: Extraocular movements intact.      Pupils: Pupils are equal, round, and reactive to light.   Cardiovascular:      Rate and Rhythm: Normal rate and regular rhythm.   Pulmonary:      Breath sounds: Rales present.      Comments: Slight increase in respirations  Abdominal:      General: Abdomen is flat. Bowel sounds are normal.      Palpations: Abdomen is soft.   Musculoskeletal: Normal range of motion.      Right lower leg: No edema.      Left lower leg: No edema.   Skin:     General: Skin is warm and dry.   Neurological:      General: No focal deficit present.      Mental Status: She is alert and oriented to person, place, and time. Mental status is at baseline.   Psychiatric:         Mood and Affect: Mood normal.         Behavior: Behavior normal.         Thought Content: Thought content normal.         Judgment: Judgment normal.         Review of Symptoms    Symptom Assessment (ESAS 0-10 Scale)  Pain:  0  Dyspnea:  0  Anxiety:  0  Nausea:  0  Depression:  0  Anorexia:  0  Fatigue:  0  Insomnia:  0  Restlessness:  0  Agitation:  0     CAM / Delirium:  Negative  Constipation:  Negative  Diarrhea:  Negative          OME in 24 hours:  0    Performance Status:  100 (fully recovered post transplant)    Living Arrangements:  Lives with family    Psychosocial/Cultural: 3 months out from left lung transplant    Care support team at home consists of two daughters and several adolescent grandchildren    Spiritual:  F - Yesy and Belief:  Hindu   I - Importance:  Very  C - Community:  Has remained distanced for obv  reasons  A - Address in Care:   visits offered      Advance Care Planning   Advance Directives:   Living Will: Yes        Copy on chart: Yes    LaPOST: No    Do Not Resuscitate Status: No    Medical Power of : Yes    Agent's Name:  Ivy Levy Daughter     [phone]     Decision Making:  Patient answered questions         Significant Labs:   CBC:   Recent Labs   Lab 11/04/20  0932 11/04/20  1053 11/04/20  1055 11/05/20  0359   WBC 4.44 3.82*  --  2.65*   HGB 11.8* 12.0  --  11.3*   HCT 37.8 37.4 38 34.9*    230  --  208     CMP:   Recent Labs   Lab 11/04/20  0932 11/04/20  1242 11/05/20  0359   * 137 138   K 4.3 4.4 3.7   CL 97 99 102   CO2 19* 18* 21*   * 243* 293*   BUN 20 22 26*   CREATININE 0.9 1.1 1.0   CALCIUM 9.3 9.3 8.7   PROT 7.2 7.4 6.7   ALBUMIN 3.2* 3.2* 2.9*   BILITOT 0.4 0.4 0.3   ALKPHOS 157* 148* 148*   AST 48* 54* 40   ALT 30 29 24   ANIONGAP 18* 20* 15   EGFRNONAA >60.0 53.6* >60.0     LFT:   Recent Labs   Lab 11/05/20  0359   AST 40   ALKPHOS 148*   BILITOT 0.3     CBC:   Recent Labs   Lab 11/05/20  0359   WBC 2.65*   HGB 11.3*   HCT 34.9*   *        BMP:  Recent Labs   Lab 11/05/20  0359   *      K 3.7      CO2 21*   BUN 26*   CREATININE 1.0   CALCIUM 8.7   MG 1.8     LFT:  Lab Results   Component Value Date    AST 40 11/05/2020     (H) 09/28/2020    ALKPHOS 148 (H) 11/05/2020    BILITOT 0.3 11/05/2020     Albumin:   Albumin   Date Value Ref Range Status   11/05/2020 2.9 (L) 3.5 - 5.2 g/dL Final     Protein:   Total Protein   Date Value Ref Range Status   11/05/2020 6.7 6.0 - 8.4 g/dL Final     Lactic acid:   Lab Results   Component Value Date    LACTATE 2.2 11/05/2020    LACTATE 3.3 (H) 11/04/2020       Significant Imaging: I have reviewed all pertinent imaging results/findings within the past 24 hours.     CXR 11.4  FINDINGS:  Additional history: Left lung transplant 08/10/2020 for idiopathic pulmonary fibrosis.   Recently the patient has had shortness of breath for 2 days with progression of symptoms.  Patient had a fever of 1014 days ago.  Has nonproductive cough.  COVID-19 antigen test is positive today.     X-ray beam attenuation and scatter occur in generous overlying soft tissues.     Sternal wires are intact and aligned following single left lung transplant on 08/10/2020.     Right hemidiaphragm is elevated as seen on the preoperative study of 08/09/2020.  Patchy opacities in the right lung or more conspicuous than on recent comparison studies including 09/28/2020 raising the question of aspiration or pneumonia.     Volume of the transplanted left lung is preserved as seen on 09/28/2020 and 08/24/2020.     However there appears to be ground-glass attenuation in the left lung similar to the earlier study of 11/04/2020 but that is new since 10/21/2020.  In this patient with a history of COVID antigen positive test today, recent symptoms of shortness of breath, cough and fever, and immunosuppression for left lung transplant, differential diagnosis is broad.  Considerations include COVID-19 lung infection as well as other forms of lung infection, noninfectious inflammation, and aspiration.     I doubt pulmonary edema.  I detect no pleural fluid, pneumothorax, pneumomediastinum, pneumoperitoneum or significant osseous abnormality.     Impression:     Abnormal chest radiograph similar to the earlier study of 08:56 hours today.

## 2020-11-05 NOTE — PROGRESS NOTES
Admit Note    SW spoke via telephone with daughter, Ivy Levy (daughter)   [phone] to assess patients needs, due to pt being in isolation. Pt tested positive for COVID-19 on 11/4/2020. Patient is a 65 y.o.  female, admitted for Pneumonia due to COVID-19 virus [U07.1, J12.89]     Pt is s/p lung txp on 8/10/2020.    Patient admitted from home on 11/4/2020 .  At this time, patient presents as in isolation on COVID unit.  At this time, patients caregiver presents via telephone as alert and oriented x 4, pleasant, good eye contact, well groomed, concentration/judgement good, calm, communicative and asking and answering questions appropriately. SW provided emotional support to pt's daughter via discussion, active listening, and normalization of emotions.      Household/Family Systems (as reported by patients caregiver)     Patient resides with patient's daughter and grandchildren, at [address] [place] [address].  Support system includes daughters, grandchildren, and son.  Patient does have dependents that are in need of being cared for. Patient's grandchildren are being cared for by their father, Zen Kaplan.     Patients primary caregiver is Ivy Levy, patients daughter, phone number [phone].  Confirmed patients contact information is [phone] (home);   Telephone Information:   Mobile [phone]     During admission, patient's caregiver plans to stay at local hotel until Saturday. Confirmed patient and patients caregivers do have access to reliable transportation.    Cognitive Status/Learning     Patients caregiver reports patients reading ability as college and states patient does have difficulty with memory.  Patients caregiver reports patient learns best by written material/verbal explanation/demonstration/hands on practice.   Needed: No.   Highest education level: Attended College/Technical School    Vocation/Disability (as reported by patients  caregiver)    Working for Income: No  If no, reason not working: Disability  Patient is disabled due to IPF since .  Prior to disability, patient  was employed as  at [employer].    Adherence    Patients caregiver reports patient has a high level of adherence to patients health care regimen.  Adherence counseling and education provided.  Patient's caregiver verbalizes understanding.    Substance Use    Patients caregiver reports patients substance usage as the following:    Tobacco: former smoker; quit in 2016.  Alcohol: none, patient denies any use.  Illicit Drugs/Non-prescribed Medications: none, patient denies any use.  Patients caregiver states clear understanding of the potential impact of substance use.  Substance abstinence/cessation counseling, education and resources provided and reviewed.     Services Utilizing/ADLS (as reported by patients caregiver)    Infusion Service: Prior to admission, patient utilizing? no  Home Health: Prior to admission, patient utilizing? yes  [facility] ([phone]) for wound care/OT/PT  DME: Prior to admission, yes; 02 supplies through [employer], portable and home concentrator  Pulmonary/Cardiac Rehab: Prior to admission, yes [facility] (Ph: [phone]  Fax: [phone])  Dialysis:  Prior to admission, no  Transplant Specialty Pharmacy:  Prior to admission, no.    Prior to admission, patients caregiver reports patient was not independent with ADLS and was not driving.  Patients caregiver reports patient is not able to care for self at this time due to compromised medical condition (as documented in medical record) and physical weakness.  Patients caregiver reports patient indicates a willingness to care for self once medically cleared to do so.    Insurance/Medications    Insured by   Payor/Plan Subscr  Sex Relation Sub. Ins. ID Effective Group Num   1. [place] - [place]* TORRES ADAMS 1955 Female   [id number] 12/1/15                                    [address]   2. [place] TORRES ADAMS 1955 Female  [id number]* 12/1/15                                    [address] [address]     Primary Insurance (for UNOS reporting): Public Insurance - Medicare FFS (Fee For Service)  Secondary Insurance (for UNOS reporting): Public Insurance - Medicaid    Patients caregiver reports patient is able to obtain and afford medications at this time and at time of discharge.    Living Will/Healthcare Power of     Patients caregiver reports patient has a LW and/or HCPA. Pt's HCPA is Ivy Levy ([phone])   provided education regarding LW and HCPA and the completion of forms.    Coping/Mental Health (as reported by patients caregiver)    Patient is coping adequately with the aid of  family members.  Patients caregiver is coping adequately with the aid of  family members and friends.      Discharge Planning (as reported by patients caregiver)    At time of discharge, patient plans to return to home under the care of daughter. Patients daughter will transport patient.  Per rounds today, expected discharge date has not been medically determined at this time. Patients caretaker verbalizes understanding and is involved in treatment planning and discharge process.    Additional Concerns    Patient's caretaker denies additional needs and/or concerns at this time. Patient is being followed for needs, education, resources, information, emotional support, supportive counseling, and for supportive and skilled discharge plan of care. Patient's caregiver verbalizes understanding and agreement with information reviewed,  availability and how to access available resources as needed.

## 2020-11-05 NOTE — PLAN OF CARE
[facility] Care Plan    POC reviewed with Chely Becerra and family at 0300. Pt verbalized understanding. Questions and concerns addressed. No acute events overnight. Pt progressing toward goals. Will continue to monitor. See below and flowsheets for full assessment and VS info.       Neuro:  [place] Coma Scale  Best Eye Response: 4-->(E4) spontaneous  Best Motor Response: 6-->(M6) obeys commands  Best Verbal Response: 5-->(V5) oriented  [place] Coma Scale Score: 15  Assessment Qualifiers: patient not sedated/intubated  Pupil PERRLA: yes     24hr Temp:  [98.7 °F (37.1 °C)-101 °F (38.3 °C)]     CV:   Rhythm: sinus tachycardia  BP goals:     MAP > 65    Resp:   O2 Device (Oxygen Therapy): BiPAP  Oxygen Concentration (%): 60    Plan:  wean O2 needs from BiPAP     GI/:     Diet/Nutrition Received: clear liquid  Last Bowel Movement: 11/03/20  Voiding Characteristics: voids spontaneously without difficulty    Intake/Output Summary (Last 24 hours) at 11/5/2020 0538  Last data filed at 11/4/2020 1845  Gross per 24 hour   Intake 670 ml   Output --   Net 670 ml     Unmeasured Output  Urine Occurrence: 1  Stool Occurrence: 1    Labs/Accuchecks:  Recent Labs   Lab 11/05/20  0359   WBC 2.65*   RBC 3.48*   HGB 11.3*   HCT 34.9*         Recent Labs   Lab 11/05/20  0359      K 3.7   CO2 21*      BUN 26*   CREATININE 1.0   ALKPHOS 148*   ALT 24   AST 40   BILITOT 0.3    No results for input(s): PROTIME, INR, APTT, HEPANTIXA in the last 168 hours.   Recent Labs   Lab 11/04/20  1053   TROPONINI 0.034*       Electrolytes: No replacement orders  Accuchecks: Q6H    Gtts:       LDA/Wounds:  Lines/Drains/Airways       Peripheral Intravenous Line                   Peripheral IV - Single Lumen 11/04/20 1201 20 G Left Antecubital less than 1 day         Peripheral IV - Single Lumen 11/04/20 1241 20 G Right Antecubital less than 1 day                  Wounds: No  Wound care consulted: No

## 2020-11-06 NOTE — PROGRESS NOTES
[facility] - ICU 16   Critical Care Medicine  Progress Note    Patient Name: Chely Becerra  MRN: [id number]  Admission Date: 11/4/2020  Hospital Length of Stay: 2 days  Code Status: Full Code  Attending Provider: Todd Montoya*  Primary Care Provider: ZAHIDA Stack MD   Principal Problem: Acute hypoxemic respiratory failure due to COVID-19    Subjective:     HPI:  Ms. Chely Becerra is a 65 y.o. year old female with a PMHx of diabetes and idiopathic pulmonary fibrosis s/p unilateral (left) lung transplant in August 2020 who presents to the ED complaining of a shortness of breath that started last Friday and progressively got worse. She had a fever of 101 on Sunday, started coughing for the last 3 days nonproductive, denies any chest pain. She was seen in the [facility] Transplant [facility] for routine testing earlier this morning was found to be hypoxic and she was referred to the emergency department. On arrival her COVID-19 test is positive.    Patient lives with her 2 daughters, 1 of the daughters has been having some respiratory symptoms recently however she thought was secondary to asthma. he is on prednisone taper currently 20 mg daily, she has been compliant with her antirejection medication Prograf and CellCept. She was started on BiPAP in the ED with improvement in her oxygen saturations to the mid 90's.     Hospital/ICU Course:  Admitted to Critical Care Medicine for acute hypoxic respiratory failure 2/2 to COVID pneumonia. Alternating between BiPAP and comfort flow. Started on dexamethasone. Consented for Remdsivir and Convalescent Plasma by lung transplant. Patient received her first unit of convalescent plasma 11/5 and tolerated well per patient.     Interval History/Significant Events: BiPAP (12/6, 60%) overnight and comfort flow (30L / 100%) SpO2 around 90%. She speaks in full sentences with some breathlessness. Denies cough, chest pain, bleeding and other body pain. Using bed  pan to urinate. Using incentive spirometry frequently.     Review of Systems   Constitutional: Negative for appetite change and fever.   HENT: Negative for sore throat and trouble swallowing.    Eyes: Negative for visual disturbance.   Respiratory: Positive for cough and shortness of breath.    Cardiovascular: Negative for chest pain and leg swelling (mild, chronic with steroid use).   Gastrointestinal: Positive for diarrhea (chronic). Negative for abdominal pain, nausea and vomiting.   Genitourinary: Negative for difficulty urinating and dysuria.   Skin: Negative for wound.   Neurological: Negative for weakness and headaches.   Psychiatric/Behavioral: Negative for agitation. The patient is not nervous/anxious.      Objective:     Vital Signs (Most Recent):  Temp: 98.6 °F (37 °C) (11/06/20 1102)  Pulse: 102 (11/06/20 1400)  Resp: (!) 24 (11/06/20 1400)  BP: 126/85 (11/06/20 1400)  SpO2: (!) 94 % (11/06/20 1400) Vital Signs (24h Range):  Temp:  [98.5 °F (36.9 °C)-98.9 °F (37.2 °C)] 98.6 °F (37 °C)  Pulse:  [] 102  Resp:  [19-41] 24  SpO2:  [84 %-97 %] 94 %  BP: (113-140)/(63-87) 126/85   Weight: 76.7 kg (169 lb)  Body mass index is 26.47 kg/m².      Intake/Output Summary (Last 24 hours) at 11/6/2020 1421  Last data filed at 11/6/2020 1202  Gross per 24 hour   Intake 923.33 ml   Output --   Net 923.33 ml       Physical Exam  Vitals signs reviewed.   Constitutional:       General: She is not in acute distress.     Appearance: She is ill-appearing. She is not diaphoretic.      Interventions: Nasal cannula in place.   HENT:      Head: Normocephalic and atraumatic.      Mouth/Throat:      Mouth: Mucous membranes are dry.   Eyes:      General: No scleral icterus.     Pupils: Pupils are equal, round, and reactive to light.   Neck:      Musculoskeletal: No neck rigidity.   Cardiovascular:      Rate and Rhythm: Regular rhythm. Tachycardia present.      Pulses: Normal pulses.      Heart sounds: Normal heart sounds. No  murmur.   Pulmonary:      Effort: Tachypnea present. No accessory muscle usage.      Breath sounds: Rhonchi present. No decreased breath sounds, wheezing or rales.   Chest:      Comments: Midline scar from recent transplant   Abdominal:      Palpations: Abdomen is soft.      Tenderness: There is no guarding.   Musculoskeletal: Normal range of motion.         General: Swelling present.      Right lower le+ Edema present.      Left lower le+ Edema present.   Skin:     General: Skin is warm and dry.      Capillary Refill: Capillary refill takes less than 2 seconds.   Neurological:      General: No focal deficit present.      Mental Status: She is alert and oriented to person, place, and time.      Comments: AAO, follows all commands    Psychiatric:         Mood and Affect: Mood normal.         Behavior: Behavior normal.         Vents:  Oxygen Concentration (%): 90 (20 1202)  Lines/Drains/Airways     Peripheral Intravenous Line                 Peripheral IV - Single Lumen 20 1201 20 G Left Antecubital 2 days         Peripheral IV - Single Lumen 20 1241 20 G Right Antecubital 2 days              Significant Labs:    CBC/Anemia Profile:  Recent Labs   Lab 20  0359 20  0514   WBC 2.65* 3.74*   HGB 11.3* 10.5*   HCT 34.9* 32.7*    219   * 101*   RDW 16.2* 16.4*        Chemistries:  Recent Labs   Lab 20  0359 20  0514    142   K 3.7 3.7    106   CO2 21* 22*   BUN 26* 23   CREATININE 1.0 0.8   CALCIUM 8.7 9.0   ALBUMIN 2.9* 2.9*   PROT 6.7 6.4   BILITOT 0.3 0.3   ALKPHOS 148* 138*   ALT 24 23   AST 40 34   MG 1.8 1.9   PHOS 3.6 3.1       Lactic Acid:   Recent Labs   Lab 20  035   LACTATE 2.2       Significant Imaging:  CXR: I have reviewed all pertinent results/findings within the past 24 hours and my personal findings are:  right lung with diffuse infiltrates      ABG  Recent Labs   Lab 20  1055   PH 7.407   PO2 20*   PCO2 36.8   HCO3  23.2*   BE -1     Assessment/Plan:     Pulmonary  S/P lung transplant  S/p left lung transplant in Aug 2020. 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 CXR with bilateral patchy opacities. Symptoms started 10/30 and steadily progressed. Daughter found to be COVID +  -- Continue alternating BiPAP with comfort flow as tolerated  -- Remdesivir X 5 days  -- Convalescent Plasma; Dose #1 11/5  -- Dexamethasone X 10 days  -- Wean FiO2 for SpO2 >88%  -- Duonebs  -- Incentive Spirometry  -- CAP coverage; f/u blood and sputum cultures 11/4; NGTD 11/5  -- hypercoagulable on full dose lovenox   -- TTE with EF 70%; left ventricular concentric remodeling        Critical secondary to Patient has a condition that poses threat to life and bodily function: Severe Respiratory Distress      Critical care was time spent personally by me on the following activities: development of treatment plan with patient or surrogate and bedside caregivers, discussions with consultants, evaluation of patient's response to treatment, examination of patient, ordering and performing treatments and interventions, ordering and review of laboratory studies, ordering and review of radiographic studies, pulse oximetry, re-evaluation of patient's condition. This critical care time did not overlap with that of any other provider or involve time for any procedures.     Mindi Jimenez MD  Critical Care  Medicine  [facility] - ICU 16 [facility]

## 2020-11-06 NOTE — ASSESSMENT & PLAN NOTE
S/p left lung transplant in Aug 2020. 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

## 2020-11-06 NOTE — SUBJECTIVE & OBJECTIVE
Interval History/Significant Events: BiPAP (12/6, 60%) overnight and comfort flow (30L / 100%) SpO2 around 90%. She speaks in full sentences with some breathlessness. Denies cough, chest pain, bleeding and other body pain. Using bed pan to urinate. Using incentive spirometry frequently.     Review of Systems   Constitutional: Negative for appetite change and fever.   HENT: Negative for sore throat and trouble swallowing.    Eyes: Negative for visual disturbance.   Respiratory: Positive for cough and shortness of breath.    Cardiovascular: Negative for chest pain and leg swelling (mild, chronic with steroid use).   Gastrointestinal: Positive for diarrhea (chronic). Negative for abdominal pain, nausea and vomiting.   Genitourinary: Negative for difficulty urinating and dysuria.   Skin: Negative for wound.   Neurological: Negative for weakness and headaches.   Psychiatric/Behavioral: Negative for agitation. The patient is not nervous/anxious.      Objective:     Vital Signs (Most Recent):  Temp: 98.6 °F (37 °C) (11/06/20 1102)  Pulse: 102 (11/06/20 1400)  Resp: (!) 24 (11/06/20 1400)  BP: 126/85 (11/06/20 1400)  SpO2: (!) 94 % (11/06/20 1400) Vital Signs (24h Range):  Temp:  [98.5 °F (36.9 °C)-98.9 °F (37.2 °C)] 98.6 °F (37 °C)  Pulse:  [] 102  Resp:  [19-41] 24  SpO2:  [84 %-97 %] 94 %  BP: (113-140)/(63-87) 126/85   Weight: 76.7 kg (169 lb)  Body mass index is 26.47 kg/m².      Intake/Output Summary (Last 24 hours) at 11/6/2020 1421  Last data filed at 11/6/2020 1202  Gross per 24 hour   Intake 923.33 ml   Output --   Net 923.33 ml       Physical Exam  Vitals signs reviewed.   Constitutional:       General: She is not in acute distress.     Appearance: She is ill-appearing. She is not diaphoretic.      Interventions: Nasal cannula in place.   HENT:      Head: Normocephalic and atraumatic.      Mouth/Throat:      Mouth: Mucous membranes are dry.   Eyes:      General: No scleral icterus.     Pupils: Pupils are  equal, round, and reactive to light.   Neck:      Musculoskeletal: No neck rigidity.   Cardiovascular:      Rate and Rhythm: Regular rhythm. Tachycardia present.      Pulses: Normal pulses.      Heart sounds: Normal heart sounds. No murmur.   Pulmonary:      Effort: Tachypnea present. No accessory muscle usage.      Breath sounds: Rhonchi present. No decreased breath sounds, wheezing or rales.   Chest:      Comments: Midline scar from recent transplant   Abdominal:      Palpations: Abdomen is soft.      Tenderness: There is no guarding.   Musculoskeletal: Normal range of motion.         General: Swelling present.      Right lower le+ Edema present.      Left lower le+ Edema present.   Skin:     General: Skin is warm and dry.      Capillary Refill: Capillary refill takes less than 2 seconds.   Neurological:      General: No focal deficit present.      Mental Status: She is alert and oriented to person, place, and time.      Comments: AAO, follows all commands    Psychiatric:         Mood and Affect: Mood normal.         Behavior: Behavior normal.         Vents:  Oxygen Concentration (%): 90 (20 1202)  Lines/Drains/Airways     Peripheral Intravenous Line                 Peripheral IV - Single Lumen 20 1201 20 G Left Antecubital 2 days         Peripheral IV - Single Lumen 20 1241 20 G Right Antecubital 2 days              Significant Labs:    CBC/Anemia Profile:  Recent Labs   Lab 20   WBC 2.65* 3.74*   HGB 11.3* 10.5*   HCT 34.9* 32.7*    219   * 101*   RDW 16.2* 16.4*        Chemistries:  Recent Labs   Lab 20    142   K 3.7 3.7    106   CO2 21* 22*   BUN 26* 23   CREATININE 1.0 0.8   CALCIUM 8.7 9.0   ALBUMIN 2.9* 2.9*   PROT 6.7 6.4   BILITOT 0.3 0.3   ALKPHOS 148* 138*   ALT 24 23   AST 40 34   MG 1.8 1.9   PHOS 3.6 3.1       Lactic Acid:   Recent Labs   Lab 20   LACTATE 2.2       Significant  Imaging:  CXR: I have reviewed all pertinent results/findings within the past 24 hours and my personal findings are:  right lung with diffuse infiltrates

## 2020-11-06 NOTE — ASSESSMENT & PLAN NOTE
COVID + 11/4. CXR with bilateral patchy opacities. Symptoms started 10/30 and steadily progressed. Daughter found to be COVID +  -- Continue alternating BiPAP with comfort flow as tolerated  -- Remdesivir X 5 days  -- Convalescent Plasma; Dose #1 11/5  -- Dexamethasone X 10 days  -- Wean FiO2 for SpO2 >88%  -- Duonebs  -- Incentive Spirometry  -- CAP coverage; f/u blood and sputum cultures 11/4; NGTD 11/5  -- hypercoagulable on full dose lovenox   -- TTE with EF 70%; left ventricular concentric remodeling

## 2020-11-06 NOTE — NURSING
Patient appears having dyspnea and restless. Bipap 12/8 60%. Respiratory rate in 30's. Spoke with HUNTER Drummond. New orders noted for Morphine IV.

## 2020-11-06 NOTE — PLAN OF CARE
Patient chart checked by Lung Transplant team. Underwent LSLT on 8/10/2020 for IPF. COVID positive. Alternating between BiPAP and comfort flow. Tacrolimus level 14.3 today.  Will hold tacrolimus for now and monitor levels daily.  Holding mycophenolate. Will continue to follow along for immunosuppression and OIP. COVID management per MICU team. Please call [phone] with questions.

## 2020-11-06 NOTE — PLAN OF CARE
[facility] Care Plan    POC reviewed with Chely Becerra and family at 0300. Pt verbalized understanding. Questions and concerns addressed. No acute events overnight. Pt progressing toward goals. Will continue to monitor. See below and flowsheets for full assessment and VS info.       Neuro:  [place] Coma Scale  Best Eye Response: 4-->(E4) spontaneous  Best Motor Response: 6-->(M6) obeys commands  Best Verbal Response: 5-->(V5) oriented  [place] Coma Scale Score: 15  Assessment Qualifiers: patient not sedated/intubated  Pupil PERRLA: yes     24hr Temp:  [98.5 °F (36.9 °C)-99.6 °F (37.6 °C)]     CV:   Rhythm: sinus tachycardia  BP goals:     MAP > 65    Resp:   O2 Device (Oxygen Therapy): BiPAP  Oxygen Concentration (%): 60    Plan:  wean from BiPap     GI/:     Diet/Nutrition Received: clear liquid  Last Bowel Movement: 11/04/20  Voiding Characteristics: voids spontaneously without difficulty    Intake/Output Summary (Last 24 hours) at 11/6/2020 0718  Last data filed at 11/5/2020 1721  Gross per 24 hour   Intake 1133.33 ml   Output --   Net 1133.33 ml     Unmeasured Output  Urine Occurrence: 1  Stool Occurrence: 1  Pad Count: 2    Labs/Accuchecks:  Recent Labs   Lab 11/06/20  0514   WBC 3.74*   RBC 3.24*   HGB 10.5*   HCT 32.7*         Recent Labs   Lab 11/06/20  0514      K 3.7   CO2 22*      BUN 23   CREATININE 0.8   ALKPHOS 138*   ALT 23   AST 34   BILITOT 0.3    No results for input(s): PROTIME, INR, APTT, HEPANTIXA in the last 168 hours.   Recent Labs   Lab 11/04/20  1053   TROPONINI 0.034*       Electrolytes: No replacement orders  Accuchecks: Q4H    Gtts:       LDA/Wounds:  Lines/Drains/Airways       Peripheral Intravenous Line                   Peripheral IV - Single Lumen 11/04/20 1201 20 G Left Antecubital 1 day         Peripheral IV - Single Lumen 11/04/20 1241 20 G Right Antecubital 1 day                  Wounds: No  Wound care consulted: No     resilient/elastic

## 2020-11-07 NOTE — ASSESSMENT & PLAN NOTE
S/p left lung transplant in Aug 2020. 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

## 2020-11-07 NOTE — SUBJECTIVE & OBJECTIVE
Interval History/Significant Events: Patient wore bipap last night (12/6, 60%) and placed comfort flow on this AM (45L, 100%) and tolerating respiratory support well. She was speaking better with comfort flow today. She is using her incentive spirometry but has some discomfort with deep breaths. She denies dizziness, chest pain, body aches, dysuria, and swelling. She is eating most of her meals per patient. She is using the bed pan independently with no complaints at this time.     Review of Systems   Constitutional: Negative for appetite change and fever.   HENT: Negative for sore throat and trouble swallowing.    Eyes: Negative for visual disturbance.   Respiratory: Positive for cough and shortness of breath.    Cardiovascular: Negative for chest pain and leg swelling (mild, chronic with steroid use).   Gastrointestinal: Negative for abdominal pain, diarrhea (chronic), nausea and vomiting.   Genitourinary: Negative for decreased urine volume, difficulty urinating and dysuria.   Skin: Negative for wound.   Allergic/Immunologic: Positive for immunocompromised state.   Neurological: Negative for weakness and headaches.   Psychiatric/Behavioral: Negative for agitation. The patient is not nervous/anxious.      Objective:     Vital Signs (Most Recent):  Temp: 98.6 °F (37 °C) (11/07/20 1200)  Pulse: (!) 114 (11/07/20 1311)  Resp: (!) 31 (11/07/20 1311)  BP: 134/86 (11/07/20 1200)  SpO2: (!) 90 % (11/07/20 1311) Vital Signs (24h Range):  Temp:  [98.2 °F (36.8 °C)-98.9 °F (37.2 °C)] 98.6 °F (37 °C)  Pulse:  [] 114  Resp:  [24-46] 31  SpO2:  [80 %-95 %] 90 %  BP: (114-142)/(67-87) 134/86   Weight: 76.7 kg (169 lb)  Body mass index is 26.47 kg/m².      Intake/Output Summary (Last 24 hours) at 11/7/2020 1319  Last data filed at 11/7/2020 1200  Gross per 24 hour   Intake 1320 ml   Output --   Net 1320 ml       Physical Exam  Vitals signs reviewed.   Constitutional:       General: She is not in acute distress.      Appearance: She is ill-appearing. She is not diaphoretic.      Interventions: Nasal cannula in place.   HENT:      Head: Normocephalic and atraumatic.      Nose: Nose normal.      Mouth/Throat:      Mouth: Mucous membranes are dry.   Eyes:      General: No scleral icterus.     Pupils: Pupils are equal, round, and reactive to light.   Neck:      Musculoskeletal: No neck rigidity.   Cardiovascular:      Rate and Rhythm: Regular rhythm. Tachycardia present.      Pulses: Normal pulses.      Heart sounds: Normal heart sounds. No murmur.   Pulmonary:      Effort: Tachypnea present. No accessory muscle usage.      Breath sounds: Rhonchi present. No decreased breath sounds, wheezing or rales.      Comments: Fine crackles lower right lung base  Chest:      Comments: Midline scar from recent transplant   Abdominal:      Palpations: Abdomen is soft.      Tenderness: There is no guarding.   Musculoskeletal: Normal range of motion.         General: No swelling.      Right lower leg: No edema.      Left lower leg: No edema.   Skin:     General: Skin is warm and dry.      Capillary Refill: Capillary refill takes less than 2 seconds.   Neurological:      General: No focal deficit present.      Mental Status: She is alert and oriented to person, place, and time.      Comments: AAO, follows all commands    Psychiatric:         Mood and Affect: Mood normal.         Behavior: Behavior normal.         Vents:  Oxygen Concentration (%): 100 (11/07/20 1200)  Lines/Drains/Airways     Peripheral Intravenous Line                 Peripheral IV - Single Lumen 11/04/20 1201 20 G Left Antecubital 3 days         Peripheral IV - Single Lumen 11/04/20 1241 20 G Right Antecubital 3 days              Significant Labs:    CBC/Anemia Profile:  Recent Labs   Lab 11/06/20  0514 11/07/20  0535   WBC 3.74* 4.21   HGB 10.5* 10.6*   HCT 32.7* 33.7*    253   * 102*   RDW 16.4* 15.9*        Chemistries:  Recent Labs   Lab 11/06/20  0514  11/07/20  0535    142   K 3.7 3.6    106   CO2 22* 23   BUN 23 23   CREATININE 0.8 0.8   CALCIUM 9.0 8.9   ALBUMIN 2.9* 2.7*   PROT 6.4 6.2   BILITOT 0.3 0.3   ALKPHOS 138* 141*   ALT 23 18   AST 34 25   MG 1.9 1.7   PHOS 3.1 3.7       Blood Culture: No results for input(s): LABBLOO in the last 48 hours.    Significant Imaging:  I have reviewed all pertinent imaging results/findings within the past 24 hours.

## 2020-11-07 NOTE — CARE UPDATE
Updated patient's daughters on VS, clinical status, labs, and medications. All questions answered and family appreciative.

## 2020-11-07 NOTE — PROGRESS NOTES
[facility] - ICU 16   Critical Care Medicine  Progress Note    Patient Name: Chely Becerra  MRN: [id number]  Admission Date: 11/4/2020  Hospital Length of Stay: 3 days  Code Status: Full Code  Attending Provider: Todd Montoya*  Primary Care Provider: ZAHIDA Stack MD   Principal Problem: Acute hypoxemic respiratory failure due to COVID-19    Subjective:     HPI:  Ms. Chely Becerra is a 65 y.o. year old female with a PMHx of diabetes and idiopathic pulmonary fibrosis s/p unilateral (left) lung transplant in August 2020 who presents to the ED complaining of a shortness of breath that started last Friday and progressively got worse. She had a fever of 101 on Sunday, started coughing for the last 3 days nonproductive, denies any chest pain. She was seen in the [facility] for routine testing earlier this morning was found to be hypoxic and she was referred to the emergency department. On arrival her COVID-19 test is positive.    Patient lives with her 2 daughters, 1 of the daughters has been having some respiratory symptoms recently however she thought was secondary to asthma. he is on prednisone taper currently 20 mg daily, she has been compliant with her antirejection medication Prograf and CellCept. She was started on BiPAP in the ED with improvement in her oxygen saturations to the mid 90's.     Hospital/ICU Course:  Admitted to Critical Care Medicine for acute hypoxic respiratory failure 2/2 to COVID pneumonia. Alternating between BiPAP and comfort flow. Started on dexamethasone. Consented for Remdsivir and Convalescent Plasma by lung transplant. Patient received her first unit of convalescent plasma 11/5 and tolerated well per patient.     Interval History/Significant Events: Patient wore bipap last night (12/6, 60%) and placed comfort flow on this AM (45L, 100%) and tolerating respiratory support well. She was speaking better with comfort flow today. She is using her  incentive spirometry but has some discomfort with deep breaths. She denies dizziness, chest pain, body aches, dysuria, and swelling. She is eating most of her meals per patient. She is using the bed pan independently with no complaints at this time.     Review of Systems   Constitutional: Negative for appetite change and fever.   HENT: Negative for sore throat and trouble swallowing.    Eyes: Negative for visual disturbance.   Respiratory: Positive for cough and shortness of breath.    Cardiovascular: Negative for chest pain and leg swelling (mild, chronic with steroid use).   Gastrointestinal: Negative for abdominal pain, diarrhea (chronic), nausea and vomiting.   Genitourinary: Negative for decreased urine volume, difficulty urinating and dysuria.   Skin: Negative for wound.   Allergic/Immunologic: Positive for immunocompromised state.   Neurological: Negative for weakness and headaches.   Psychiatric/Behavioral: Negative for agitation. The patient is not nervous/anxious.      Objective:     Vital Signs (Most Recent):  Temp: 98.6 °F (37 °C) (11/07/20 1200)  Pulse: (!) 114 (11/07/20 1311)  Resp: (!) 31 (11/07/20 1311)  BP: 134/86 (11/07/20 1200)  SpO2: (!) 90 % (11/07/20 1311) Vital Signs (24h Range):  Temp:  [98.2 °F (36.8 °C)-98.9 °F (37.2 °C)] 98.6 °F (37 °C)  Pulse:  [] 114  Resp:  [24-46] 31  SpO2:  [80 %-95 %] 90 %  BP: (114-142)/(67-87) 134/86   Weight: 76.7 kg (169 lb)  Body mass index is 26.47 kg/m².      Intake/Output Summary (Last 24 hours) at 11/7/2020 1319  Last data filed at 11/7/2020 1200  Gross per 24 hour   Intake 1320 ml   Output --   Net 1320 ml       Physical Exam  Vitals signs reviewed.   Constitutional:       General: She is not in acute distress.     Appearance: She is ill-appearing. She is not diaphoretic.      Interventions: Nasal cannula in place.   HENT:      Head: Normocephalic and atraumatic.      Nose: Nose normal.      Mouth/Throat:      Mouth: Mucous membranes are dry.   Eyes:       General: No scleral icterus.     Pupils: Pupils are equal, round, and reactive to light.   Neck:      Musculoskeletal: No neck rigidity.   Cardiovascular:      Rate and Rhythm: Regular rhythm. Tachycardia present.      Pulses: Normal pulses.      Heart sounds: Normal heart sounds. No murmur.   Pulmonary:      Effort: Tachypnea present. No accessory muscle usage.      Breath sounds: Rhonchi present. No decreased breath sounds, wheezing or rales.      Comments: Fine crackles lower right lung base  Chest:      Comments: Midline scar from recent transplant   Abdominal:      Palpations: Abdomen is soft.      Tenderness: There is no guarding.   Musculoskeletal: Normal range of motion.         General: No swelling.      Right lower leg: No edema.      Left lower leg: No edema.   Skin:     General: Skin is warm and dry.      Capillary Refill: Capillary refill takes less than 2 seconds.   Neurological:      General: No focal deficit present.      Mental Status: She is alert and oriented to person, place, and time.      Comments: AAO, follows all commands    Psychiatric:         Mood and Affect: Mood normal.         Behavior: Behavior normal.         Vents:  Oxygen Concentration (%): 100 (11/07/20 1200)  Lines/Drains/Airways     Peripheral Intravenous Line                 Peripheral IV - Single Lumen 11/04/20 1201 20 G Left Antecubital 3 days         Peripheral IV - Single Lumen 11/04/20 1241 20 G Right Antecubital 3 days              Significant Labs:    CBC/Anemia Profile:  Recent Labs   Lab 11/06/20  0514 11/07/20  0535   WBC 3.74* 4.21   HGB 10.5* 10.6*   HCT 32.7* 33.7*    253   * 102*   RDW 16.4* 15.9*        Chemistries:  Recent Labs   Lab 11/06/20  0514 11/07/20  0535    142   K 3.7 3.6    106   CO2 22* 23   BUN 23 23   CREATININE 0.8 0.8   CALCIUM 9.0 8.9   ALBUMIN 2.9* 2.7*   PROT 6.4 6.2   BILITOT 0.3 0.3   ALKPHOS 138* 141*   ALT 23 18   AST 34 25   MG 1.9 1.7   PHOS 3.1 3.7        Blood Culture: No results for input(s): LABBLOO in the last 48 hours.    Significant Imaging:  I have reviewed all pertinent imaging results/findings within the past 24 hours.      ABG  Recent Labs   Lab 11/04/20  1055   PH 7.407   PO2 20*   PCO2 36.8   HCO3 23.2*   BE -1     Assessment/Plan:     Pulmonary  S/P lung transplant  S/p left lung transplant in Aug 2020. 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 CXR with bilateral patchy opacities. Symptoms started 10/30 and steadily progressed. Daughter found to be COVID +  -- Continue alternating BiPAP with comfort flow as tolerated  -- Remdesivir X 5 days  -- Convalescent Plasma; Dose #1 11/5  -- Dexamethasone X 10 days (end 11/14)  -- Wean FiO2 for SpO2 >88%  -- Duonebs  -- Incentive Spirometry  -- CAP coverage; f/u blood and sputum cultures 11/4; NGTD 11/5  -- hypercoagulable on full dose lovenox   -- TTE with EF 70%; left ventricular concentric remodeling          Critical secondary to Patient has a condition that poses threat to life and bodily function: Severe Respiratory Distress      Critical care was time spent personally by me on the following activities: development of treatment plan with patient or surrogate and bedside caregivers, discussions with consultants, evaluation of patient's response to treatment, examination of patient, ordering and performing treatments and interventions,  ordering and review of laboratory studies, ordering and review of radiographic studies, pulse oximetry, re-evaluation of patient's condition. This critical care time did not overlap with that of any other provider or involve time for any procedures.     Mindi Jimenez MD  Critical Care Medicine  [facility] - ICU 16 WT

## 2020-11-07 NOTE — ASSESSMENT & PLAN NOTE
COVID + 11/4. CXR with bilateral patchy opacities. Symptoms started 10/30 and steadily progressed. Daughter found to be COVID +  -- Continue alternating BiPAP with comfort flow as tolerated  -- Remdesivir X 5 days  -- Convalescent Plasma; Dose #1 11/5  -- Dexamethasone X 10 days (end 11/14)  -- Wean FiO2 for SpO2 >88%  -- Duonebs  -- Incentive Spirometry  -- CAP coverage; f/u blood and sputum cultures 11/4; NGTD 11/5  -- hypercoagulable on full dose lovenox   -- TTE with EF 70%; left ventricular concentric remodeling

## 2020-11-07 NOTE — PLAN OF CARE
[facility] Care Plan    POC reviewed with Chely Becerra and family at 0300. Pt verbalized understanding. Questions and concerns addressed. No acute events overnight. Pt progressing toward goals. Will continue to monitor. See below and flowsheets for full assessment and VS info.       Neuro:  [place] Coma Scale  Best Eye Response: 4-->(E4) spontaneous  Best Motor Response: 6-->(M6) obeys commands  Best Verbal Response: 5-->(V5) oriented  [place] Coma Scale Score: 15  Assessment Qualifiers: patient not sedated/intubated  Pupil PERRLA: yes     24hr Temp:  [98.2 °F (36.8 °C)-98.9 °F (37.2 °C)]     CV:   Rhythm: sinus tachycardia  BP goals:     MAP > 65    Resp:   O2 Device (Oxygen Therapy): BiPAP  Oxygen Concentration (%): 60    Plan:  decrease O2 dependency     GI/:     Diet/Nutrition Received: clear liquid  Last Bowel Movement: 11/06/20  Voiding Characteristics: voids spontaneously without difficulty    Intake/Output Summary (Last 24 hours) at 11/7/2020 0650  Last data filed at 11/7/2020 0600  Gross per 24 hour   Intake 740 ml   Output --   Net 740 ml     Unmeasured Output  Urine Occurrence: 1  Stool Occurrence: 1  Pad Count: 2    Labs/Accuchecks:  Recent Labs   Lab 11/07/20  0535   WBC 4.21   RBC 3.31*   HGB 10.6*   HCT 33.7*         Recent Labs   Lab 11/07/20  0535      K 3.6   CO2 23      BUN 23   CREATININE 0.8   ALKPHOS 141*   ALT 18   AST 25   BILITOT 0.3    No results for input(s): PROTIME, INR, APTT, HEPANTIXA in the last 168 hours.   Recent Labs   Lab 11/04/20  1053   TROPONINI 0.034*       Electrolytes: No replacement orders  Accuchecks: Q6H    Gtts:       LDA/Wounds:  Lines/Drains/Airways       Peripheral Intravenous Line                   Peripheral IV - Single Lumen 11/04/20 1201 20 G Left Antecubital 2 days         Peripheral IV - Single Lumen 11/04/20 1241 20 G Right Antecubital 2 days                  Wounds: No  Wound care consulted: No

## 2020-11-07 NOTE — PLAN OF CARE
Vital signs and labs reviewed.  Appears to continue to alternate between comfort flow and BiPAP.  Getting remdesivir, plasma, dexamethasone, and rocephin/azithro; continued COVID care per MICU.  Tac level remains adequate at 11 today; tac on hold due to increasing levels; will continue to follow and resume when appropriate.  Change dexamethasone back to prednisone when treatment course complete.  MMF on hold due to ongoing infection.  OIP with ganciclovir, bactrim, and vori.  Will continue to adjust IS and OIP as needed.

## 2020-11-07 NOTE — CARE UPDATE
Spoke with daughters, Sushma and Ivy, and updated on VS, patient complaints, medications reviewed. Daughter scanned blue card (med list) into Media tabs today. All questions answered, family appreciative.

## 2020-11-08 NOTE — SUBJECTIVE & OBJECTIVE
Interval History/Significant Events: . Patient eating 50% meals. She has no complaints and wanted to get out of bed to chair this morning but desat to 85% on comfort flow while on bed pan. She was then placed on BiPAP around 11am today at 12/8 60% with 500-600ml Vt with 25-30 RR. She appeared more fatigued and somnolent in afternoon despite sleeping all night. ABG on BiPAP 12/8 60% showed pH 7.46 / pCO2  34.5 / pO2  54 on bipap 60%. CXR with worsening diffuse opacity. Lung transplant following for IS and OIP.     Review of Systems   Constitutional: Negative for appetite change and fever.   HENT: Negative for sore throat and trouble swallowing.    Eyes: Negative for visual disturbance.   Respiratory: Positive for cough and shortness of breath.    Cardiovascular: Negative for chest pain and leg swelling (mild, chronic with steroid use).   Gastrointestinal: Negative for abdominal pain, diarrhea (chronic), nausea and vomiting.   Genitourinary: Negative for decreased urine volume, difficulty urinating and dysuria.   Skin: Negative for wound.   Allergic/Immunologic: Positive for immunocompromised state.   Neurological: Negative for weakness and headaches.   Psychiatric/Behavioral: Negative for agitation. The patient is not nervous/anxious.      Objective:     Vital Signs (Most Recent):  Temp: 98.7 °F (37.1 °C) (11/08/20 1200)  Pulse: 99 (11/08/20 1430)  Resp: (!) 26 (11/08/20 1430)  BP: (!) 145/95 (11/08/20 1430)  SpO2: (!) 93 % (11/08/20 1430) Vital Signs (24h Range):  Temp:  [97 °F (36.1 °C)-98.7 °F (37.1 °C)] 98.7 °F (37.1 °C)  Pulse:  [] 99  Resp:  [18-46] 26  SpO2:  [85 %-96 %] 93 %  BP: (118-145)/(75-95) 145/95   Weight: 76.7 kg (169 lb)  Body mass index is 26.47 kg/m².      Intake/Output Summary (Last 24 hours) at 11/8/2020 1513  Last data filed at 11/8/2020 1201  Gross per 24 hour   Intake 390 ml   Output --   Net 390 ml       Physical Exam  Vitals signs reviewed.   Constitutional:       General: She is not  in acute distress.     Appearance: She is ill-appearing. She is not diaphoretic.      Interventions: Nasal cannula in place.   HENT:      Head: Normocephalic and atraumatic.      Nose: Nose normal.      Mouth/Throat:      Mouth: Mucous membranes are dry.   Eyes:      General: No scleral icterus.     Pupils: Pupils are equal, round, and reactive to light.   Neck:      Musculoskeletal: No neck rigidity.   Cardiovascular:      Rate and Rhythm: Regular rhythm. Tachycardia present.      Pulses: Normal pulses.      Heart sounds: Normal heart sounds. No murmur.   Pulmonary:      Effort: Tachypnea present. No accessory muscle usage.      Breath sounds: Rhonchi present. No decreased breath sounds, wheezing or rales.      Comments: Fine crackles lower right lung base  Chest:      Comments: Midline scar from recent transplant   Abdominal:      Palpations: Abdomen is soft.      Tenderness: There is no guarding.   Musculoskeletal: Normal range of motion.         General: No swelling.      Right lower leg: No edema.      Left lower leg: No edema.   Skin:     General: Skin is warm and dry.      Capillary Refill: Capillary refill takes less than 2 seconds.   Neurological:      General: No focal deficit present.      Mental Status: She is alert and oriented to person, place, and time.      Comments: AAO, follows all commands    Psychiatric:         Mood and Affect: Mood normal.         Behavior: Behavior normal.         Vents:  Oxygen Concentration (%): 70 (11/08/20 1040)  Lines/Drains/Airways     Peripheral Intravenous Line                 Peripheral IV - Single Lumen 11/04/20 1201 20 G Left Antecubital 4 days         Peripheral IV - Single Lumen 11/04/20 1241 20 G Right Antecubital 4 days              Significant Labs:    CBC/Anemia Profile:  Recent Labs   Lab 11/07/20  0535 11/08/20  0331   WBC 4.21 4.93   HGB 10.6* 10.6*   HCT 33.7* 33.0*    252   * 100*   RDW 15.9* 15.4*        Chemistries:  Recent Labs   Lab  11/07/20  0535 11/08/20  0331    140   K 3.6 3.5    105   CO2 23 22*   BUN 23 20   CREATININE 0.8 0.7   CALCIUM 8.9 8.8   ALBUMIN 2.7* 2.8*   PROT 6.2 6.0   BILITOT 0.3 0.3   ALKPHOS 141* 157*   ALT 18 16   AST 25 19   MG 1.7 1.6   PHOS 3.7 3.3       ABGs: No results for input(s): PH, PCO2, HCO3, POCSATURATED, BE in the last 48 hours.    Significant Imaging:  CXR: I have reviewed all pertinent results/findings within the past 24 hours and my personal findings are:  CXR 11/4 with patchy left lung opacity

## 2020-11-08 NOTE — PLAN OF CARE
Vital signs and labs reviewed.  Appears to continue to alternate between comfort flow and BiPAP.  Getting remdesivir, plasma, dexamethasone, and rocephin/azithro; continued COVID care per MICU.  Tac level remains adequate at 8 today; tac resumed at 0.5mg daily.  Change dexamethasone back to prednisone when treatment course complete.  MMF on hold due to ongoing infection.  OIP with ganciclovir, bactrim, and vori.  Will continue to adjust IS and OIP as needed.

## 2020-11-08 NOTE — ASSESSMENT & PLAN NOTE
COVID + 11/4. CXR with bilateral patchy opacities. Symptoms started 10/30 and steadily progressed. Daughter found to be COVID +  -- Continue alternating BiPAP with comfort flow as tolerated  -- Remdesivir X 5 days  -- Convalescent Plasma; Dose #1 11/5  -- Dexamethasone X 10 days (end 11/14)  -- Wean FiO2 for SpO2 >88%  -- Duonebs  -- Incentive Spirometry  -- CAP coverage; f/u blood and sputum cultures 11/4; NGTD 11/5  -- hypercoagulable on full dose lovenox   -- TTE with EF 70%; left ventricular concentric remodeling  -- Nifedipine 30mg XL added 11/8 for SBP holding 140s (home med)  -- Lasix 40mg IV x 1 11/8 for possible pulmonary edema

## 2020-11-08 NOTE — PLAN OF CARE
Problem: Adult Inpatient Plan of Care  Goal: Plan of Care Review  Outcome: Ongoing, Progressing: Updated daily per staff     Problem: Adult Inpatient Plan of Care  Goal: Readiness for Transition of Care  Outcome: Ongoing, Progressing: Possibly on Monday     Problem: Coping Ineffective  Goal: Effective Coping  Outcome: Ongoing, Progressing: non productive     Problem: Fall Injury Risk  Goal: Absence of Fall and Fall-Related Injury  Outcome: Ongoing, Progressing: Patient will remain free from injury during hospital stay

## 2020-11-08 NOTE — ASSESSMENT & PLAN NOTE
S/p left lung transplant in Aug 2020. 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

## 2020-11-08 NOTE — PLAN OF CARE
POC reviewed with pt. Pt verbalized understanding. Questions and concerns addressed. No acute events overnight. Pt on Bipap during night.  Pt progressing toward goals. Will continue to monitor. See flowsheets for full assessment and VS info

## 2020-11-08 NOTE — ASSESSMENT & PLAN NOTE
Steroid induced  -- BG goal 140-180  -- mod dose SSI   -- detemir 7 units sq started 11/8 evening for -230s with 15-20 units given on SSI past two days

## 2020-11-09 NOTE — PLAN OF CARE
Problem: Adult Inpatient Plan of Care  Goal: Plan of Care Review  Outcome: Ongoing, Progressing  Goal: Absence of Hospital-Acquired Illness or Injury  Outcome: Ongoing, Progressing  Goal: Optimal Comfort and Wellbeing  Outcome: Ongoing, Progressing  Goal: Readiness for Transition of Care  Outcome: Ongoing, Progressing  Goal: Rounds/Family Conference  Outcome: Ongoing, Progressing     Problem: Coping Ineffective  Goal: Effective Coping  Outcome: Ongoing, Progressing     Problem: Skin Injury Risk Increased  Goal: Skin Health and Integrity  Outcome: Ongoing, Progressing     Problem: Fall Injury Risk  Goal: Absence of Fall and Fall-Related Injury  Outcome: Ongoing, Progressing

## 2020-11-09 NOTE — PROGRESS NOTES
[facility] - ICU 16   Critical Care Medicine  Progress Note    Patient Name: Chely Becerra  MRN: [id number]  Admission Date: 11/4/2020  Hospital Length of Stay: 5 days  Code Status: Full Code  Attending Provider: Christina Matos MD  Primary Care Provider: ZAHIDA Stack MD   Principal Problem: Acute hypoxemic respiratory failure due to COVID-19    Subjective:     HPI:  Ms. Chely Becerra is a 65 y.o. year old female with a PMHx of diabetes and idiopathic pulmonary fibrosis s/p unilateral (left) lung transplant in August 2020 who presents to the ED complaining of a shortness of breath that started last Friday and progressively got worse. She had a fever of 101 on Sunday, started coughing for the last 3 days nonproductive, denies any chest pain. She was seen in the [facility] for routine testing earlier this morning was found to be hypoxic and she was referred to the emergency department. On arrival her COVID-19 test is positive.    Patient lives with her 2 daughters, 1 of the daughters has been having some respiratory symptoms recently however she thought was secondary to asthma. he is on prednisone taper currently 20 mg daily, she has been compliant with her antirejection medication Prograf and CellCept. She was started on BiPAP in the ED with improvement in her oxygen saturations to the mid 90's.     Hospital/ICU Course:  Admitted to Critical Care Medicine for acute hypoxic respiratory failure 2/2 to COVID pneumonia. Alternating between BiPAP and comfort flow. Started on dexamethasone. Consented for Remdsivir and Convalescent Plasma by lung transplant. Patient received her first unit of convalescent plasma 11/5 and tolerated well per patient. Patient required more time on Bipap on 11/8 than on previous days and looked more fatigued.     Interval History/Significant Events: . NAEO. Patient remains afebrile and HDS. Patient continues on HFNC and BiPAP. Patient received 1 dose of lasix  overnight. Patient would like to be up in her chair and out of her bed today. Lung transplant following for IS and OIP.     Review of Systems   Constitutional: Negative for appetite change and fever.   HENT: Negative for sore throat and trouble swallowing.    Eyes: Negative for visual disturbance.   Respiratory: Positive for cough and shortness of breath.    Cardiovascular: Negative for chest pain and leg swelling (mild, chronic with steroid use).   Gastrointestinal: Negative for abdominal pain, diarrhea (chronic), nausea and vomiting.   Genitourinary: Negative for decreased urine volume, difficulty urinating and dysuria.   Skin: Negative for rash and wound.   Allergic/Immunologic: Positive for immunocompromised state.   Neurological: Negative for weakness and headaches.   Psychiatric/Behavioral: Negative for agitation. The patient is not nervous/anxious.      Objective:     Vital Signs (Most Recent):  Temp: 97.8 °F (36.6 °C) (11/09/20 0300)  Pulse: 104 (11/09/20 0740)  Resp: (!) 30 (11/09/20 0740)  BP: 122/87 (11/09/20 0600)  SpO2: 95 % (11/09/20 0740) Vital Signs (24h Range):  Temp:  [97.8 °F (36.6 °C)-98.7 °F (37.1 °C)] 97.8 °F (36.6 °C)  Pulse:  [] 104  Resp:  [18-51] 30  SpO2:  [85 %-95 %] 95 %  BP: (115-156)/() 122/87   Weight: 76.7 kg (169 lb)  Body mass index is 26.47 kg/m².      Intake/Output Summary (Last 24 hours) at 11/9/2020 0917  Last data filed at 11/9/2020 0600  Gross per 24 hour   Intake 420 ml   Output 0 ml   Net 420 ml       Physical Exam  Vitals signs reviewed.   Constitutional:       General: She is not in acute distress.     Appearance: She is ill-appearing. She is not diaphoretic.      Interventions: Nasal cannula in place.   HENT:      Head: Normocephalic and atraumatic.      Nose: Nose normal.      Mouth/Throat:      Mouth: Mucous membranes are dry.   Eyes:      General: No scleral icterus.     Pupils: Pupils are equal, round, and reactive to light.   Neck:      Musculoskeletal:  No neck rigidity.   Cardiovascular:      Rate and Rhythm: Regular rhythm. Tachycardia present.      Pulses: Normal pulses.      Heart sounds: Normal heart sounds. No murmur.   Pulmonary:      Effort: Tachypnea present. No accessory muscle usage.      Breath sounds: Rhonchi present. No decreased breath sounds, wheezing or rales.      Comments: Fine crackles lower right lung base  Chest:      Comments: Midline scar from recent transplant   Abdominal:      Palpations: Abdomen is soft.      Tenderness: There is no guarding.   Musculoskeletal: Normal range of motion.         General: No swelling.      Right lower leg: No edema.      Left lower leg: No edema.   Skin:     General: Skin is warm and dry.      Capillary Refill: Capillary refill takes less than 2 seconds.   Neurological:      General: No focal deficit present.      Mental Status: She is alert and oriented to person, place, and time.      Comments: AAO, follows all commands    Psychiatric:         Mood and Affect: Mood normal.         Behavior: Behavior normal.         Vents:  Oxygen Concentration (%): 100 (11/09/20 0740)  Lines/Drains/Airways     Peripheral Intravenous Line                 Peripheral IV - Single Lumen 11/04/20 1201 20 G Left Antecubital 4 days         Peripheral IV - Single Lumen 11/04/20 1241 20 G Right Antecubital 4 days              Significant Labs:    CBC/Anemia Profile:  Recent Labs   Lab 11/08/20  0331 11/09/20  0429   WBC 4.93 4.07   HGB 10.6* 8.3*   HCT 33.0* 26.2*    SEE COMMENT   * 102*   RDW 15.4* 15.9*        Chemistries:  Recent Labs   Lab 11/08/20  0331      K 3.5      CO2 22*   BUN 20   CREATININE 0.7   CALCIUM 8.8   ALBUMIN 2.8*   PROT 6.0   BILITOT 0.3   ALKPHOS 157*   ALT 16   AST 19   MG 1.6   PHOS 3.3       ABGs:   Recent Labs   Lab 11/08/20  1812   PH 7.464*   PCO2 34.5*   HCO3 24.8   POCSATURATED 90*   BE 1       Significant Imaging:  I have reviewed all pertinent imaging results/findings within  the past 24 hours.      ABG  Recent Labs   Lab 11/08/20  1812   PH 7.464*   PO2 54*   PCO2 34.5*   HCO3 24.8   BE 1     Assessment/Plan:     Pulmonary  S/P lung transplant  S/p left lung transplant in Aug 2020. Has not required home O2 since transplant   -- Lung transplant following peripherally; appreciate assistance  -- continue ppx azithro, bactrim, valgan, vori, lamivudine (possible HBV donor)  -- restart Augmentin per med red after ceftriaxone for CAP CTX day 5 on 11/8, continuing for worsening respiratory status (Augmentin for 3 months for actinomyces in donor)  -- continue tacro (troughs in AM); lung transplant adjusting  -- hold cellcept in setting of active infection  -- restart steroid taper after 10 day dexamethasone course     Endocrine  Prediabetes  Steroid induced  -- BG goal 140-180  -- mod dose SSI   -- detemir 7 units sq started 11/8    Other  * Acute hypoxemic respiratory failure due to COVID-19  COVID + 11/4. CXR with bilateral patchy opacities. Symptoms started 10/30 and steadily progressed. Daughter found to be COVID +  -- Continue alternating BiPAP with comfort flow as tolerated  -- Remdesivir X 5 days; completed 11/09/20  -- Convalescent Plasma; Dose #1 11/5  -- Dexamethasone X 10 days (end 11/14)  -- Wean FiO2 for SpO2 >88%  -- Duonebs  -- Incentive Spirometry  -- CAP coverage; f/u blood and sputum cultures 11/4; NGTD 11/5  -- hypercoagulable on full dose lovenox   -- TTE with EF 70%; left ventricular concentric remodeling  -- Nifedipine 30mg XL added 11/8 for SBP holding 140s (home med)  -- Lasix 40mg IV x 1 11/8 for possible pulmonary edema       Critical Care Daily Checklist:    A: Awake: RASS Goal/Actual Goal: RASS Goal: 0-->alert and calm  Actual: [place] Agitation Sedation Scale (RASS): Alert and calm   B: Spontaneous Breathing Trial Performed?     C: SAT & SBT Coordinated?  n/a                      D: Delirium: CAM-ICU Overall CAM-ICU: Negative   E: Early Mobility Performed? Yes   F:  Feeding Goal:    Status:     Current Diet Order   Procedures    Diet full liquid      AS: Analgesia/Sedation n/a   T: Thromboembolic Prophylaxis Lovenox   H: HOB > 300 Yes   U: Stress Ulcer Prophylaxis (if needed) N/A   G: Glucose Control Yes   B: Bowel Function Stool Occurrence: 0   I: Indwelling Catheter (Lines & Rios) Necessity No   D: De-escalation of Antimicrobials/Pharmacotherapies No    Plan for the day/ETD Diuresis; Supportive Care    Code Status:  Family/Goals of Care: Full Code        Critical care was time spent personally by me on the following activities: development of treatment plan with patient or surrogate and bedside caregivers, discussions with consultants, evaluation of patient's response to treatment, examination of patient, ordering and performing treatments and interventions, ordering and review of laboratory studies, ordering and review of radiographic studies, pulse oximetry, re-evaluation of patient's condition. This critical care time did not overlap with that of any other provider or involve time for any procedures.     Geoff Soliman MD  Critical Care Medicine  [facility] - ICU [facility]

## 2020-11-09 NOTE — PLAN OF CARE
Vital signs and labs reviewed.  Continues to alternate between comfort flow and BiPAP. Remdesivir (completed 11/9), plasma, dexamethasone (through 11/14), and rocephin/azithro; continued COVID care per MICU.  Tacrolimus level remains adequate at 0.5mg daily.  Change dexamethasone back to prednisone when treatment course complete.  MMF on hold due to ongoing infection.  OIP with ganciclovir, lamivudine, bactrim, and voriconazole.  Will continue to adjust IS and OIP as needed. Please call [phone] with questions.

## 2020-11-09 NOTE — PROGRESS NOTES
[facility] - ICU 16   Critical Care Medicine  Progress Note    Patient Name: Chely Becerra  MRN: [id number]  Admission Date: 11/4/2020  Hospital Length of Stay: 4 days  Code Status: Full Code  Attending Provider: Todd Montoya*  Primary Care Provider: ZAHIDA Stack MD   Principal Problem: Acute hypoxemic respiratory failure due to COVID-19    Subjective:     HPI:  Ms. Chely Becerra is a 65 y.o. year old female with a PMHx of diabetes and idiopathic pulmonary fibrosis s/p unilateral (left) lung transplant in August 2020 who presents to the ED complaining of a shortness of breath that started last Friday and progressively got worse. She had a fever of 101 on Sunday, started coughing for the last 3 days nonproductive, denies any chest pain. She was seen in the Lung Transplant Clinic for routine testing earlier this morning was found to be hypoxic and she was referred to the emergency department. On arrival her COVID-19 test is positive.    Patient lives with her 2 daughters, 1 of the daughters has been having some respiratory symptoms recently however she thought was secondary to asthma. he is on prednisone taper currently 20 mg daily, she has been compliant with her antirejection medication Prograf and CellCept. She was started on BiPAP in the ED with improvement in her oxygen saturations to the mid 90's.     Hospital/ICU Course:  Admitted to Critical Care Medicine for acute hypoxic respiratory failure 2/2 to COVID pneumonia. Alternating between BiPAP and comfort flow. Started on dexamethasone. Consented for Remdsivir and Convalescent Plasma by lung transplant. Patient received her first unit of convalescent plasma 11/5 and tolerated well per patient. Patient required more time on Bipap on 11/8 than on previous days and looked more fatigued.     Interval History/Significant Events: . Patient eating 50% meals. She has no complaints and wanted to get out of bed to chair this morning but  desat to 85% on comfort flow while on bed pan. She was then placed on BiPAP around 11am today at 12/8 60% with 500-600ml Vt with 25-30 RR. She appeared more fatigued and somnolent in afternoon despite sleeping all night. ABG on BiPAP 12/8 60% showed pH 7.46 / pCO2  34.5 / pO2  54 on bipap 60%. CXR with worsening diffuse opacity. Lung transplant following for IS and OIP.     Review of Systems   Constitutional: Negative for appetite change and fever.   HENT: Negative for sore throat and trouble swallowing.    Eyes: Negative for visual disturbance.   Respiratory: Positive for cough and shortness of breath.    Cardiovascular: Negative for chest pain and leg swelling (mild, chronic with steroid use).   Gastrointestinal: Negative for abdominal pain, diarrhea (chronic), nausea and vomiting.   Genitourinary: Negative for decreased urine volume, difficulty urinating and dysuria.   Skin: Negative for wound.   Allergic/Immunologic: Positive for immunocompromised state.   Neurological: Negative for weakness and headaches.   Psychiatric/Behavioral: Negative for agitation. The patient is not nervous/anxious.      Objective:     Vital Signs (Most Recent):  Temp: 98.7 °F (37.1 °C) (11/08/20 1200)  Pulse: 99 (11/08/20 1430)  Resp: (!) 26 (11/08/20 1430)  BP: (!) 145/95 (11/08/20 1430)  SpO2: (!) 93 % (11/08/20 1430) Vital Signs (24h Range):  Temp:  [97 °F (36.1 °C)-98.7 °F (37.1 °C)] 98.7 °F (37.1 °C)  Pulse:  [] 99  Resp:  [18-46] 26  SpO2:  [85 %-96 %] 93 %  BP: (118-145)/(75-95) 145/95   Weight: 76.7 kg (169 lb)  Body mass index is 26.47 kg/m².      Intake/Output Summary (Last 24 hours) at 11/8/2020 1513  Last data filed at 11/8/2020 1201  Gross per 24 hour   Intake 390 ml   Output --   Net 390 ml       Physical Exam  Vitals signs reviewed.   Constitutional:       General: She is not in acute distress.     Appearance: She is ill-appearing. She is not diaphoretic.      Interventions: Nasal cannula in place.   HENT:       Head: Normocephalic and atraumatic.      Nose: Nose normal.      Mouth/Throat:      Mouth: Mucous membranes are dry.   Eyes:      General: No scleral icterus.     Pupils: Pupils are equal, round, and reactive to light.   Neck:      Musculoskeletal: No neck rigidity.   Cardiovascular:      Rate and Rhythm: Regular rhythm. Tachycardia present.      Pulses: Normal pulses.      Heart sounds: Normal heart sounds. No murmur.   Pulmonary:      Effort: Tachypnea present. No accessory muscle usage.      Breath sounds: Rhonchi present. No decreased breath sounds, wheezing or rales.      Comments: Fine crackles lower right lung base  Chest:      Comments: Midline scar from recent transplant   Abdominal:      Palpations: Abdomen is soft.      Tenderness: There is no guarding.   Musculoskeletal: Normal range of motion.         General: No swelling.      Right lower leg: No edema.      Left lower leg: No edema.   Skin:     General: Skin is warm and dry.      Capillary Refill: Capillary refill takes less than 2 seconds.   Neurological:      General: No focal deficit present.      Mental Status: She is alert and oriented to person, place, and time.      Comments: AAO, follows all commands    Psychiatric:         Mood and Affect: Mood normal.         Behavior: Behavior normal.         Vents:  Oxygen Concentration (%): 70 (11/08/20 1040)  Lines/Drains/Airways     Peripheral Intravenous Line                 Peripheral IV - Single Lumen 11/04/20 1201 20 G Left Antecubital 4 days         Peripheral IV - Single Lumen 11/04/20 1241 20 G Right Antecubital 4 days              Significant Labs:    CBC/Anemia Profile:  Recent Labs   Lab 11/07/20  0535 11/08/20  0331   WBC 4.21 4.93   HGB 10.6* 10.6*   HCT 33.7* 33.0*    252   * 100*   RDW 15.9* 15.4*        Chemistries:  Recent Labs   Lab 11/07/20  0535 11/08/20  0331    140   K 3.6 3.5    105   CO2 23 22*   BUN 23 20   CREATININE 0.8 0.7   CALCIUM 8.9 8.8   ALBUMIN  2.7* 2.8*   PROT 6.2 6.0   BILITOT 0.3 0.3   ALKPHOS 141* 157*   ALT 18 16   AST 25 19   MG 1.7 1.6   PHOS 3.7 3.3       ABGs: No results for input(s): PH, PCO2, HCO3, POCSATURATED, BE in the last 48 hours.    Significant Imaging:  CXR: I have reviewed all pertinent results/findings within the past 24 hours and my personal findings are:  CXR 11/4 with patchy left lung opacity      ABG  Recent Labs   Lab 11/08/20  1812   PH 7.464*   PO2 54*   PCO2 34.5*   HCO3 24.8   BE 1     Assessment/Plan:     Pulmonary  S/P lung transplant  S/p left lung transplant in Aug 2020. 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 CXR with bilateral patchy opacities. Symptoms started 10/30 and steadily progressed. Daughter found to be COVID +  -- Continue alternating BiPAP with comfort flow as tolerated  -- Remdesivir X 5 days  -- Convalescent Plasma; Dose #1 11/5  -- Dexamethasone X 10 days (end 11/14)  -- Wean FiO2 for SpO2 >88%  -- Duonebs  -- Incentive Spirometry  -- CAP coverage; f/u blood and sputum cultures 11/4; NGTD 11/5  -- hypercoagulable on full dose lovenox   -- TTE with EF 70%; left ventricular concentric remodeling  -- Nifedipine 30mg XL added 11/8 for SBP holding 140s (home med)  --  Lasix 40mg IV x 1 11/8 for possible pulmonary edema        Critical secondary to Patient has a condition that poses threat to life and bodily function: Severe Respiratory Distress      Critical care was time spent personally by me on the following activities: development of treatment plan with patient or surrogate and bedside caregivers, discussions with consultants, evaluation of patient's response to treatment, examination of patient, ordering and performing treatments and interventions, ordering and review of laboratory studies, ordering and review of radiographic studies, pulse oximetry, re-evaluation of patient's condition. This critical care time did not overlap with that of any other provider or involve time for any procedures.     Mindi Jimenez MD  Critical Care Medicine  [facility] - ICU [facility]

## 2020-11-09 NOTE — NURSING
Patient short of breath with increased work of breathing. Respiratory rate in 40's. Patient placed on BIPAP 12/8. 60% FiO2. Dr. Jimenez notified. Will continue to monitor.

## 2020-11-09 NOTE — PLAN OF CARE
Safety maintained throughout shift. Patient able to tolerate small breaks from BIPAP using comfort flow 40 L 100%. Patient complained of anxiety this morning. Slightly tachypneic during this anxiety attack. MD team notified. Patient given 50 mg Atarax PO and patient's anxiety resolved and she was able to sleep after this medication. Linen changed due to episode of urinary incontinence. Purewick placed with consent from patient. Patient able to tolerate 25-50% of meals. No BM today. Afebrile .

## 2020-11-09 NOTE — ASSESSMENT & PLAN NOTE
COVID + 11/4. CXR with bilateral patchy opacities. Symptoms started 10/30 and steadily progressed. Daughter found to be COVID +  -- Continue alternating BiPAP with comfort flow as tolerated  -- Remdesivir X 5 days; completed 11/09/20  -- Convalescent Plasma; Dose #1 11/5  -- Dexamethasone X 10 days (end 11/14)  -- Wean FiO2 for SpO2 >88%  -- Duonebs  -- Incentive Spirometry  -- CAP coverage; f/u blood and sputum cultures 11/4; NGTD 11/5  -- hypercoagulable on full dose lovenox   -- TTE with EF 70%; left ventricular concentric remodeling  -- Nifedipine 30mg XL added 11/8 for SBP holding 140s (home med)  -- Lasix 40mg IV x 1 11/8 for possible pulmonary edema

## 2020-11-09 NOTE — CARE UPDATE
Called the daughters, Sushma and Ivy, and updated on patient status and need for more Bipap today, CXR, and lasix dose.   Daughters state mom is not a complainer and expresses need for providers to stay attentive to her overall picture.   All questions and concerns addressed. Family appreciative.

## 2020-11-09 NOTE — SUBJECTIVE & OBJECTIVE
Interval History/Significant Events: . NAEO. Patient remains afebrile and HDS. Patient continues on HFNC and BiPAP. Patient received 1 dose of lasix overnight. Patient would like to be up in her chair and out of her bed today. Lung transplant following for IS and OIP.     Review of Systems   Constitutional: Negative for appetite change and fever.   HENT: Negative for sore throat and trouble swallowing.    Eyes: Negative for visual disturbance.   Respiratory: Positive for cough and shortness of breath.    Cardiovascular: Negative for chest pain and leg swelling (mild, chronic with steroid use).   Gastrointestinal: Negative for abdominal pain, diarrhea (chronic), nausea and vomiting.   Genitourinary: Negative for decreased urine volume, difficulty urinating and dysuria.   Skin: Negative for rash and wound.   Allergic/Immunologic: Positive for immunocompromised state.   Neurological: Negative for weakness and headaches.   Psychiatric/Behavioral: Negative for agitation. The patient is not nervous/anxious.      Objective:     Vital Signs (Most Recent):  Temp: 97.8 °F (36.6 °C) (11/09/20 0300)  Pulse: 104 (11/09/20 0740)  Resp: (!) 30 (11/09/20 0740)  BP: 122/87 (11/09/20 0600)  SpO2: 95 % (11/09/20 0740) Vital Signs (24h Range):  Temp:  [97.8 °F (36.6 °C)-98.7 °F (37.1 °C)] 97.8 °F (36.6 °C)  Pulse:  [] 104  Resp:  [18-51] 30  SpO2:  [85 %-95 %] 95 %  BP: (115-156)/() 122/87   Weight: 76.7 kg (169 lb)  Body mass index is 26.47 kg/m².      Intake/Output Summary (Last 24 hours) at 11/9/2020 0917  Last data filed at 11/9/2020 0600  Gross per 24 hour   Intake 420 ml   Output 0 ml   Net 420 ml       Physical Exam  Vitals signs reviewed.   Constitutional:       General: She is not in acute distress.     Appearance: She is ill-appearing. She is not diaphoretic.      Interventions: Nasal cannula in place.   HENT:      Head: Normocephalic and atraumatic.      Nose: Nose normal.      Mouth/Throat:      Mouth: Mucous  membranes are dry.   Eyes:      General: No scleral icterus.     Pupils: Pupils are equal, round, and reactive to light.   Neck:      Musculoskeletal: No neck rigidity.   Cardiovascular:      Rate and Rhythm: Regular rhythm. Tachycardia present.      Pulses: Normal pulses.      Heart sounds: Normal heart sounds. No murmur.   Pulmonary:      Effort: Tachypnea present. No accessory muscle usage.      Breath sounds: Rhonchi present. No decreased breath sounds, wheezing or rales.      Comments: Fine crackles lower right lung base  Chest:      Comments: Midline scar from recent transplant   Abdominal:      Palpations: Abdomen is soft.      Tenderness: There is no guarding.   Musculoskeletal: Normal range of motion.         General: No swelling.      Right lower leg: No edema.      Left lower leg: No edema.   Skin:     General: Skin is warm and dry.      Capillary Refill: Capillary refill takes less than 2 seconds.   Neurological:      General: No focal deficit present.      Mental Status: She is alert and oriented to person, place, and time.      Comments: AAO, follows all commands    Psychiatric:         Mood and Affect: Mood normal.         Behavior: Behavior normal.         Vents:  Oxygen Concentration (%): 100 (11/09/20 0740)  Lines/Drains/Airways     Peripheral Intravenous Line                 Peripheral IV - Single Lumen 11/04/20 1201 20 G Left Antecubital 4 days         Peripheral IV - Single Lumen 11/04/20 1241 20 G Right Antecubital 4 days              Significant Labs:    CBC/Anemia Profile:  Recent Labs   Lab 11/08/20  0331 11/09/20  0429   WBC 4.93 4.07   HGB 10.6* 8.3*   HCT 33.0* 26.2*    SEE COMMENT   * 102*   RDW 15.4* 15.9*        Chemistries:  Recent Labs   Lab 11/08/20  0331      K 3.5      CO2 22*   BUN 20   CREATININE 0.7   CALCIUM 8.8   ALBUMIN 2.8*   PROT 6.0   BILITOT 0.3   ALKPHOS 157*   ALT 16   AST 19   MG 1.6   PHOS 3.3       ABGs:   Recent Labs   Lab 11/08/20  1812    PH 7.464*   PCO2 34.5*   HCO3 24.8   POCSATURATED 90*   BE 1       Significant Imaging:  I have reviewed all pertinent imaging results/findings within the past 24 hours.

## 2020-11-10 PROBLEM — R74.8 ELEVATED ALKALINE PHOSPHATASE LEVEL: Status: ACTIVE | Noted: 2020-01-01

## 2020-11-10 NOTE — PLAN OF CARE
Safety maintained throughout shift. Patient on Bipap at beginning of shift. Transitioned to comfort flow this AM around 8:00 AM. 35 L 100%. Patient has remained on comfort flow throughout shift except for a 30 min window when she had to use the bathroom and became short-winded during turning to get on bedpan. Midline IV placed today, peripheral IVs removed per policy. Patient had one BM today. Urinated X 2. Patient complained of abdominal discomfort, gas, flank pain today. Dr. Olvera notified. Medication adjustments were made. Patient no longer having these complaints. Patient able to get out of bed and stand at bedside with nurse assist. However, she stated this was all she could do because she felt weak. PT yana ordered to assist patient. Afebrile today. Patient's daughter called and was given update over the phone.

## 2020-11-10 NOTE — PLAN OF CARE
Called Chely Becerra 's family member listed in chart to discuss patient's current clinical status. Briefly, patient is currently admitted for hypoxemic respiratory failure 2/2 COVID-19 infection.    Addressed family's questions and concerns in addition to updating the current clinical status of the patient. Patient/Family member verbalized understanding of situation and plan.     Geoff Soliman MD/MS  [facility]   Internal Medicine, PGY-3

## 2020-11-10 NOTE — SUBJECTIVE & OBJECTIVE
Interval History/Significant Events: . NIV overnight and comfort flow during the day. Complaining of epigastric/left chest pain. Started this am. Sharp, nonradiating, pulsating.    Review of Systems   Constitutional: Negative for appetite change and fever.   HENT: Negative for sore throat and trouble swallowing.    Eyes: Negative for visual disturbance.   Respiratory: Positive for cough and shortness of breath.    Cardiovascular: Positive for chest pain. Negative for leg swelling (mild, chronic with steroid use).   Gastrointestinal: Negative for abdominal pain, diarrhea (chronic), nausea and vomiting.   Genitourinary: Negative for decreased urine volume, difficulty urinating and dysuria.   Skin: Negative for rash and wound.   Allergic/Immunologic: Positive for immunocompromised state.   Neurological: Negative for weakness and headaches.   Psychiatric/Behavioral: Negative for agitation. The patient is not nervous/anxious.      Objective:     Vital Signs (Most Recent):  Temp: 97.7 °F (36.5 °C) (11/10/20 1130)  Pulse: 108 (11/10/20 1400)  Resp: (!) 25 (11/10/20 1400)  BP: 127/83 (11/10/20 1400)  SpO2: 96 % (11/10/20 1400) Vital Signs (24h Range):  Temp:  [97.7 °F (36.5 °C)-98.5 °F (36.9 °C)] 97.7 °F (36.5 °C)  Pulse:  [] 108  Resp:  [18-52] 25  SpO2:  [90 %-100 %] 96 %  BP: (100-145)/(64-87) 127/83   Weight: 76.7 kg (169 lb)  Body mass index is 26.47 kg/m².      Intake/Output Summary (Last 24 hours) at 11/10/2020 1505  Last data filed at 11/10/2020 0855  Gross per 24 hour   Intake 300 ml   Output --   Net 300 ml       Physical Exam  Vitals signs reviewed.   Constitutional:       General: She is not in acute distress.     Appearance: She is ill-appearing. She is not diaphoretic.      Interventions: Nasal cannula in place.   HENT:      Head: Normocephalic and atraumatic.      Nose: Nose normal.      Mouth/Throat:      Mouth: Mucous membranes are dry.   Eyes:      General: No scleral icterus.     Pupils: Pupils are  equal, round, and reactive to light.   Neck:      Musculoskeletal: No neck rigidity.   Cardiovascular:      Rate and Rhythm: Regular rhythm. Tachycardia present.      Pulses: Normal pulses.      Heart sounds: Normal heart sounds. No murmur.   Pulmonary:      Effort: Tachypnea present. No accessory muscle usage.      Breath sounds: Rhonchi present. No decreased breath sounds, wheezing or rales.      Comments: Fine crackles lower right lung base  Chest:      Comments: Midline scar from recent transplant   Abdominal:      Palpations: Abdomen is soft.      Tenderness: There is no guarding.   Musculoskeletal: Normal range of motion.         General: No swelling.      Right lower leg: No edema.      Left lower leg: No edema.   Skin:     General: Skin is warm and dry.      Capillary Refill: Capillary refill takes less than 2 seconds.   Neurological:      General: No focal deficit present.      Mental Status: She is alert and oriented to person, place, and time.      Comments: AAO, follows all commands    Psychiatric:         Mood and Affect: Mood normal.         Behavior: Behavior normal.         Vents:  Oxygen Concentration (%): 65 (11/10/20 0800)  Lines/Drains/Airways     Peripheral Intravenous Line                 Peripheral IV - Single Lumen 11/04/20 1201 20 G Left Antecubital 6 days         Peripheral IV - Single Lumen 11/04/20 1241 20 G Right Antecubital 6 days              Significant Labs:    CBC/Anemia Profile:  Recent Labs   Lab 11/09/20  0429 11/09/20  1015 11/10/20  0502   WBC 4.07 6.84 7.26   HGB 8.3* 12.3 12.4   HCT 26.2* 37.1 38.7   PLT SEE COMMENT 309 296   * 100* 100*   RDW 15.9* 15.5* 15.6*        Chemistries:  Recent Labs   Lab 11/09/20  1015 11/10/20  0502    141   K 3.7 3.7    104   CO2 22* 22*   BUN 22 27*   CREATININE 0.7 0.7   CALCIUM 8.9 9.2   ALBUMIN 3.0* 3.0*   PROT 6.6 6.6   BILITOT 0.5 0.5   ALKPHOS 200* 205*   ALT 17 18   AST 25 28   MG  --  2.0   PHOS  --  3.9       ABGs:    Recent Labs   Lab 11/08/20  1812   PH 7.464*   PCO2 34.5*   HCO3 24.8   POCSATURATED 90*   BE 1       Significant Imaging:  I have reviewed all pertinent imaging results/findings within the past 24 hours.

## 2020-11-10 NOTE — PROGRESS NOTES
[facility] - ICU 16   Critical Care Medicine  Progress Note    Patient Name: Chely Becerra  MRN: [id number]  Admission Date: 11/4/2020  Hospital Length of Stay: 6 days  Code Status: Full Code  Attending Provider: Nhan Olvera MD  Primary Care Provider: ZAHIDA Stack MD   Principal Problem: Acute hypoxemic respiratory failure due to COVID-19    Subjective:       Interval History/Significant Events: . NIV overnight and comfort flow during the day. Complaining of epigastric/left chest pain. Started this am. Sharp, nonradiating, pulsating.    Review of Systems   Constitutional: Negative for appetite change and fever.   HENT: Negative for sore throat and trouble swallowing.    Eyes: Negative for visual disturbance.   Respiratory: Positive for cough and shortness of breath.    Cardiovascular: Positive for chest pain. Negative for leg swelling (mild, chronic with steroid use).   Gastrointestinal: Negative for abdominal pain, diarrhea (chronic), nausea and vomiting.   Genitourinary: Negative for decreased urine volume, difficulty urinating and dysuria.   Skin: Negative for rash and wound.   Allergic/Immunologic: Positive for immunocompromised state.   Neurological: Negative for weakness and headaches.   Psychiatric/Behavioral: Negative for agitation. The patient is not nervous/anxious.      Objective:     Vital Signs (Most Recent):  Temp: 97.7 °F (36.5 °C) (11/10/20 1130)  Pulse: 108 (11/10/20 1400)  Resp: (!) 25 (11/10/20 1400)  BP: 127/83 (11/10/20 1400)  SpO2: 96 % (11/10/20 1400) Vital Signs (24h Range):  Temp:  [97.7 °F (36.5 °C)-98.5 °F (36.9 °C)] 97.7 °F (36.5 °C)  Pulse:  [] 108  Resp:  [18-52] 25  SpO2:  [90 %-100 %] 96 %  BP: (100-145)/(64-87) 127/83   Weight: 76.7 kg (169 lb)  Body mass index is 26.47 kg/m².      Intake/Output Summary (Last 24 hours) at 11/10/2020 1505  Last data filed at 11/10/2020 0855  Gross per 24 hour   Intake 300 ml   Output --   Net 300 ml       Physical  Exam  Vitals signs reviewed.   Constitutional:       General: She is not in acute distress.     Appearance: She is ill-appearing. She is not diaphoretic.      Interventions: Nasal cannula in place.   HENT:      Head: Normocephalic and atraumatic.      Nose: Nose normal.      Mouth/Throat:      Mouth: Mucous membranes are dry.   Eyes:      General: No scleral icterus.     Pupils: Pupils are equal, round, and reactive to light.   Neck:      Musculoskeletal: No neck rigidity.   Cardiovascular:      Rate and Rhythm: Regular rhythm. Tachycardia present.      Pulses: Normal pulses.      Heart sounds: Normal heart sounds. No murmur.   Pulmonary:      Effort: Tachypnea present. No accessory muscle usage.      Breath sounds: Rhonchi present. No decreased breath sounds, wheezing or rales.      Comments: Fine crackles lower right lung base  Chest:      Comments: Midline scar from recent transplant   Abdominal:      Palpations: Abdomen is soft.      Tenderness: There is no guarding.   Musculoskeletal: Normal range of motion.         General: No swelling.      Right lower leg: No edema.      Left lower leg: No edema.   Skin:     General: Skin is warm and dry.      Capillary Refill: Capillary refill takes less than 2 seconds.   Neurological:      General: No focal deficit present.      Mental Status: She is alert and oriented to person, place, and time.      Comments: AAO, follows all commands    Psychiatric:         Mood and Affect: Mood normal.         Behavior: Behavior normal.         Vents:  Oxygen Concentration (%): 65 (11/10/20 0800)  Lines/Drains/Airways     Peripheral Intravenous Line                 Peripheral IV - Single Lumen 11/04/20 1201 20 G Left Antecubital 6 days         Peripheral IV - Single Lumen 11/04/20 1241 20 G Right Antecubital 6 days              Significant Labs:    CBC/Anemia Profile:  Recent Labs   Lab 11/09/20  0429 11/09/20  1015 11/10/20  0502   WBC 4.07 6.84 7.26   HGB 8.3* 12.3 12.4   HCT 26.2*  37.1 38.7   PLT SEE COMMENT 309 296   * 100* 100*   RDW 15.9* 15.5* 15.6*        Chemistries:  Recent Labs   Lab 11/09/20  1015 11/10/20  0502    141   K 3.7 3.7    104   CO2 22* 22*   BUN 22 27*   CREATININE 0.7 0.7   CALCIUM 8.9 9.2   ALBUMIN 3.0* 3.0*   PROT 6.6 6.6   BILITOT 0.5 0.5   ALKPHOS 200* 205*   ALT 17 18   AST 25 28   MG  --  2.0   PHOS  --  3.9       ABGs:   Recent Labs   Lab 11/08/20  1812   PH 7.464*   PCO2 34.5*   HCO3 24.8   POCSATURATED 90*   BE 1       Significant Imaging:  I have reviewed all pertinent imaging results/findings within the past 24 hours.      ABG  Recent Labs   Lab 11/08/20  1812   PH 7.464*   PO2 54*   PCO2 34.5*   HCO3 24.8   BE 1     Assessment/Plan:     Pulmonary  S/P lung transplant  S/p left lung transplant in Aug 2020 for IPF. 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 CXR with bilateral patchy opacities. Symptoms started 10/30 and steadily progressed. Daughter found to be COVID +  -- Continue alternating BiPAP with comfort flow as tolerated  -- Remdesivir X 5 days; completed 11/09/20  -- Convalescent Plasma; Dose #1 11/5  -- Dexamethasone X 10 days (end 11/14)  -- Wean FiO2 for SpO2 >88%  -- Duonebs  -- Incentive Spirometry  -- S/p CAP coverage. Resumed Augmentin for actinomyces  -- hypercoagulable on full dose lovenox   -- TTE with EF 70%; left ventricular concentric remodeling  -- Nifedipine 30mg XL  added 11/8 for SBP holding 140s (home med)  -- Lasix 40mg IV x 1 11/8 which improved her oxygenation. Holding today for increased BUN to 27. Consider another dose tomorrow    Elevated alkaline phosphatase level  CTM      Updated patient's daughter, Ivy Levy, over the phone. All questions answered.     Nhan Olvera MD  Critical Care Medicine  [facility] - ICU 16 [facility]

## 2020-11-10 NOTE — ASSESSMENT & PLAN NOTE
S/p left lung transplant in Aug 2020 for IPF. 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

## 2020-11-10 NOTE — PLAN OF CARE
Vital signs and labs reviewed.  Continues to alternate between comfort flow and BiPAP. Remdesivir (completed 11/9), plasma, dexamethasone (through 11/14), and rocephin/azithro; continued COVID care per MICU.  Tacrolimus level remains adequate at 0.5mg daily.  Change dexamethasone back to prednisone when treatment course complete.  MMF on hold due to ongoing infection.  OIP with ganciclovir, lamivudine, bactrim, and voriconazole.  Will continue to adjust IS and OIP as needed. Please call [phone] with questions.

## 2020-11-10 NOTE — ASSESSMENT & PLAN NOTE
COVID + 11/4. CXR with bilateral patchy opacities. Symptoms started 10/30 and steadily progressed. Daughter found to be COVID +  -- Continue alternating BiPAP with comfort flow as tolerated  -- Remdesivir X 5 days; completed 11/09/20  -- Convalescent Plasma; Dose #1 11/5  -- Dexamethasone X 10 days (end 11/14)  -- Wean FiO2 for SpO2 >88%  -- Duonebs  -- Incentive Spirometry  -- S/p CAP coverage. Resumed Augmentin for actinomyces  -- hypercoagulable on full dose lovenox   -- TTE with EF 70%; left ventricular concentric remodeling  -- Nifedipine 30mg XL added 11/8 for SBP holding 140s (home med)  -- Lasix 40mg IV x 1 11/8 which improved her oxygenation. Holding today for increased BUN to 27. Consider another dose tomorrow   Nursing Note  Harry C Cushing presents today to [facility] for:   Chief Complaint   Patient presents with     Infusion     Injectafer     During today's [facility] appointment, orders from Dr. Larios were completed.  Frequency: today is dose 1 of 2 total    Progress note:  Patient identification verified by name and date of birth.  Assessment completed.  Vitals recorded in Doc Flowsheets.  Patient was provided with education regarding medication/procedure and possible side effects.  Patient verbalized understanding.     present during visit today: Not Applicable.    Treatment Conditions: Patient denies fever, chills, signs of infection, recent illness, antibiotics use, productive cough or elevated temperature.    Premedications: were not ordered.    Drug Waste Record: No    Infusion length and rate:  infusion given over approximately 15 minutes + 30 minute observation.    Labs: were not ordered for this appointment.    Vascular access: peripheral IV placed today.    Post Infusion Assessment:  Patient tolerated infusion without incident.     Discharge Plan:   Follow up plan of care with: ongoing infusions at [facility].  Discharge instructions were reviewed with patient.  Patient/representative verbalized understanding of discharge instructions and all questions answered.  Patient discharged from [facility] in stable condition.    Ruth Smith RN       Administrations This Visit     ferric carboxymaltose (INJECTAFER) 750 mg in sodium chloride 0.9 % 100 mL intermittent infusion     Admin Date  12/20/2019 Action  New Bag Dose  750 mg Rate  500 mL/hr Route  Intravenous Administered By  Ruth Smith, MARIO                /69 (BP Location: Left arm)   Pulse 82   Temp 97.9  F (36.6  C) (Oral)   Resp 16   SpO2 96%

## 2020-11-10 NOTE — CONSULTS
Placed 18g, 10 cm Midline in right basilic vein using u/s guidance.  Max dwell date 12/9/2020.  Lot # [id number].    MIDLINE inserted for long term PVA

## 2020-11-11 NOTE — ASSESSMENT & PLAN NOTE
S/p left lung transplant in Aug 2020 for IPF. Has not required home O2 since transplant   -- Lung transplant following; appreciate assistance  -- continue ppx azithro, bactrim, valgan, vori, lamivudine (possible HBV donor)  -- S/p ceftriaxone now back on Augmentin (Augmentin for 3 months for actinomyces in donor)  -- continue tacro (troughs in AM); lung transplant adjusting  -- restart steroid taper after 10 day dexamethasone course

## 2020-11-11 NOTE — PLAN OF CARE
Pt appears stable on chart check alternating between comfort flow and BiPAP.  Continue with diuresis per MICU team.  OIP appropriate.  Tac level at goal.  Holding MMF for the time being given COVID infection.  Will continue to follow.

## 2020-11-11 NOTE — ASSESSMENT & PLAN NOTE
COVID + 11/4. CXR with bilateral patchy opacities. Symptoms started 10/30 and steadily progressed. Daughter found to be COVID +  -- Continue alternating BiPAP with comfort flow as tolerated  -- Remdesivir X 5 days; completed 11/09/20  -- Convalescent Plasma; Dose #1 11/5  -- Dexamethasone X 10 days (end 11/14)  -- Wean FiO2 for SpO2 >88%  -- Duonebs  -- Incentive Spirometry  -- S/p CAP coverage. Resumed Augmentin for actinomyces  -- hypercoagulable on full dose lovenox   -- TTE with EF 70%; left ventricular concentric remodeling  -- Nifedipine 30mg XL added 11/8 for SBP holding 140s (home med)  -- Cont prn Lasix 40mg IV

## 2020-11-11 NOTE — SUBJECTIVE & OBJECTIVE
Interval History/Significant Events: . On NIV overnight. Reports she did not sleep and feels very tired today. Chest pain has resolved. Remained on NIV this am for fatigue. Would like to eat solid food.    Review of Systems   Constitutional: Negative for appetite change and fever.   HENT: Negative for sore throat and trouble swallowing.    Eyes: Negative for visual disturbance.   Respiratory: Positive for cough and shortness of breath.    Cardiovascular: Negative for chest pain and leg swelling (mild, chronic with steroid use).   Gastrointestinal: Negative for abdominal pain, diarrhea (chronic), nausea and vomiting.   Genitourinary: Negative for decreased urine volume, difficulty urinating and dysuria.   Skin: Negative for rash and wound.   Allergic/Immunologic: Positive for immunocompromised state.   Neurological: Negative for weakness and headaches.   Psychiatric/Behavioral: Negative for agitation. The patient is not nervous/anxious.      Objective:     Vital Signs (Most Recent):  Temp: 98.4 °F (36.9 °C) (11/11/20 1105)  Pulse: (!) 122 (11/11/20 1415)  Resp: (!) 38 (11/11/20 1415)  BP: 108/70 (11/11/20 1405)  SpO2: (!) 85 % (11/11/20 1415) Vital Signs (24h Range):  Temp:  [97.8 °F (36.6 °C)-98.8 °F (37.1 °C)] 98.4 °F (36.9 °C)  Pulse:  [] 122  Resp:  [23-38] 38  SpO2:  [85 %-100 %] 85 %  BP: (108-149)/(69-94) 108/70   Weight: 76.7 kg (169 lb)  Body mass index is 26.47 kg/m².      Intake/Output Summary (Last 24 hours) at 11/11/2020 1439  Last data filed at 11/11/2020 1205  Gross per 24 hour   Intake 1060 ml   Output 400 ml   Net 660 ml       Physical Exam  Vitals signs reviewed.   Constitutional:       General: She is not in acute distress.     Appearance: She is ill-appearing. She is not diaphoretic.      Interventions: Nasal cannula in place.   HENT:      Head: Normocephalic and atraumatic.      Nose: Nose normal.      Mouth/Throat:      Mouth: Mucous membranes are dry.   Eyes:      General: No scleral  icterus.     Pupils: Pupils are equal, round, and reactive to light.   Neck:      Musculoskeletal: No neck rigidity.   Cardiovascular:      Rate and Rhythm: Regular rhythm. Tachycardia present.      Pulses: Normal pulses.      Heart sounds: Normal heart sounds. No murmur.   Pulmonary:      Effort: Tachypnea present. No accessory muscle usage.      Breath sounds: Rhonchi present. No decreased breath sounds, wheezing or rales.      Comments: Fine crackles lower right lung base  Chest:      Comments: Midline scar from recent transplant   Abdominal:      Palpations: Abdomen is soft.      Tenderness: There is no guarding.   Musculoskeletal: Normal range of motion.         General: No swelling.      Right lower leg: No edema.      Left lower leg: No edema.   Skin:     General: Skin is warm and dry.      Capillary Refill: Capillary refill takes less than 2 seconds.   Neurological:      General: No focal deficit present.      Mental Status: She is alert and oriented to person, place, and time.      Comments: AAO, follows all commands    Psychiatric:         Mood and Affect: Mood normal.         Behavior: Behavior normal.         Vents:  Oxygen Concentration (%): 100(turned and placed on bed pan) (11/11/20 1415)  Lines/Drains/Airways     Drain            Female External Urinary Catheter 11/11/20 0900 less than 1 day          Peripheral Intravenous Line                 Midline Catheter Insertion/Assessment  - Single Lumen 11/10/20 1603 Right basilic vein (medial side of arm) 18g x 10cm less than 1 day              Significant Labs:    CBC/Anemia Profile:  Recent Labs   Lab 11/10/20  0502 11/11/20  0614   WBC 7.26 8.10   HGB 12.4 11.6*   HCT 38.7 36.0*    247   * 101*   RDW 15.6* 15.5*        Chemistries:  Recent Labs   Lab 11/10/20  0502 11/11/20  0614    141   K 3.7 3.7    104   CO2 22* 23   BUN 27* 24*   CREATININE 0.7 0.7   CALCIUM 9.2 9.1   ALBUMIN 3.0* 2.7*   PROT 6.6 6.1   BILITOT 0.5 0.9    ALKPHOS 205* 197*   ALT 18 23   AST 28 45*   MG 2.0 1.7   PHOS 3.9 4.0       ABGs:   No results for input(s): PH, PCO2, HCO3, POCSATURATED, BE in the last 48 hours.    Significant Imaging:  I have reviewed all pertinent imaging results/findings within the past 24 hours.

## 2020-11-11 NOTE — PLAN OF CARE
Problem: Adult Inpatient Plan of Care  Goal: Plan of Care Review  Outcome: Ongoing, Progressing  Pt had an uneventful shift. Pt was to BIPAP at HS from comfort flow. VS WNL throughout the shift.

## 2020-11-11 NOTE — PLAN OF CARE
11/11/20 1041   Post-Acute Status   Post-Acute Authorization Other   Other Status See Comments     SW following patient dc planning needs, Post acute needs to be determined. SW will continue to follow up.      Ivy Mullins LMSW  [facility]   [phone]

## 2020-11-11 NOTE — PLAN OF CARE
RICU DAILY GOALS       A: Awake    RASS: Goal - RASS Goal: 0-->alert and calm  Actual - RASS ([place] Agitation-Sedation Scale): 0-->alert and calm   Restraint necessity:    B: Breath   SBT: Not intubated   C: Coordinate A & B, analgesics/sedatives   Pain: managed    SAT: Not intubated  D: Delirium   CAM-ICU: Overall CAM-ICU: Negative  E: Early(intubated/ Progressive (non-intubated) Mobility   MOVE Screen: Pass   Activity: Activity Management: arm raise - L1, rolling - L1  FAS: Feeding/Nutrition   Diet order: Diet/Nutrition Received: full liquid, Specialty Diet/Nutrition Received: fiber, restricted Fluid restriction:    T: Thrombus   DVT prophylaxis: VTE Required Core Measure: Pharmacological prophylaxis initiated/maintained  H: HOB Elevation   Head of Bed (HOB): HOB at 30-45 degrees  U: Ulcer Prophylaxis   GI: yes  G: Glucose control   managed Glycemic Management: blood glucose monitoring  S: Skin   Bundle compliance: yes   Bathing/Skin Care: incontinence care   B: Bowel Function   Liquid stool  I: Indwelling Catheters   Rios necessity: purewick in place  D: De-escalation Antibx   No  Plan for the day   Encourage nutrition and wean oxygen as tolerated.   Family/Goals of care/Code Status   Code Status: Full Code     No acute events throughout day, VS and assessment per flow sheet, patient progressing towards goals as tolerated, plan of care reviewed with Chely Becerra and family, all concerns addressed, will continue to monitor.

## 2020-11-11 NOTE — PROGRESS NOTES
Update:    Pt remains in isolation on COVID-19 MICU.  Pt continues to alternate b/w comfort flow and BiPap.  Pt continues to have good support from family. SW in communication w/ pt's daughter Ivy Levy (ph: [phone]).  No discharge plans per team at this time.  SW will continue to provide psychosocial support, education, resources and assistance with all discharge planning needs when appropriate.  Pt and pt's daughters aware of how to contact SW. SW remains available.

## 2020-11-11 NOTE — PLAN OF CARE
Vital signs and labs reviewed.  No changes today. Continues to alternate between comfort flow and BiPAP. Remdesivir (completed 11/9), plasma, dexamethasone (through 11/14), and rocephin/azithro; continued COVID care per MICU.  Tacrolimus level remains adequate at 0.5mg daily.  Change dexamethasone back to prednisone when treatment course complete.  MMF on hold due to ongoing infection.  OIP with ganciclovir, lamivudine, bactrim, and voriconazole.  Will continue to adjust IS and OIP as needed. Please call [phone] with questions.

## 2020-11-11 NOTE — PROGRESS NOTES
[facility] - ICU 16   Critical Care Medicine  Progress Note    Patient Name: Chely Becerra  MRN: [id number]  Admission Date: 11/4/2020  Hospital Length of Stay: 7 days  Code Status: Full Code  Attending Provider: Nhan Olvera MD  Primary Care Provider: ZAHIDA Stack MD   Principal Problem: Acute hypoxemic respiratory failure due to COVID-19    Subjective:       Interval History/Significant Events: . On NIV overnight. Reports she did not sleep and feels very tired today. Chest pain has resolved. Remained on NIV this am for fatigue. Would like to eat solid food.    Review of Systems   Constitutional: Negative for appetite change and fever.   HENT: Negative for sore throat and trouble swallowing.    Eyes: Negative for visual disturbance.   Respiratory: Positive for cough and shortness of breath.    Cardiovascular: Negative for chest pain and leg swelling (mild, chronic with steroid use).   Gastrointestinal: Negative for abdominal pain, diarrhea (chronic), nausea and vomiting.   Genitourinary: Negative for decreased urine volume, difficulty urinating and dysuria.   Skin: Negative for rash and wound.   Allergic/Immunologic: Positive for immunocompromised state.   Neurological: Negative for weakness and headaches.   Psychiatric/Behavioral: Negative for agitation. The patient is not nervous/anxious.      Objective:     Vital Signs (Most Recent):  Temp: 98.4 °F (36.9 °C) (11/11/20 1105)  Pulse: (!) 122 (11/11/20 1415)  Resp: (!) 38 (11/11/20 1415)  BP: 108/70 (11/11/20 1405)  SpO2: (!) 85 % (11/11/20 1415) Vital Signs (24h Range):  Temp:  [97.8 °F (36.6 °C)-98.8 °F (37.1 °C)] 98.4 °F (36.9 °C)  Pulse:  [] 122  Resp:  [23-38] 38  SpO2:  [85 %-100 %] 85 %  BP: (108-149)/(69-94) 108/70   Weight: 76.7 kg (169 lb)  Body mass index is 26.47 kg/m².      Intake/Output Summary (Last 24 hours) at 11/11/2020 1439  Last data filed at 11/11/2020 1205  Gross per 24 hour   Intake 1060 ml   Output 400 ml    Net 660 ml       Physical Exam  Vitals signs reviewed.   Constitutional:       General: She is not in acute distress.     Appearance: She is ill-appearing. She is not diaphoretic.      Interventions: Nasal cannula in place.   HENT:      Head: Normocephalic and atraumatic.      Nose: Nose normal.      Mouth/Throat:      Mouth: Mucous membranes are dry.   Eyes:      General: No scleral icterus.     Pupils: Pupils are equal, round, and reactive to light.   Neck:      Musculoskeletal: No neck rigidity.   Cardiovascular:      Rate and Rhythm: Regular rhythm. Tachycardia present.      Pulses: Normal pulses.      Heart sounds: Normal heart sounds. No murmur.   Pulmonary:      Effort: Tachypnea present. No accessory muscle usage.      Breath sounds: Rhonchi present. No decreased breath sounds, wheezing or rales.      Comments: Fine crackles lower right lung base  Chest:      Comments: Midline scar from recent transplant   Abdominal:      Palpations: Abdomen is soft.      Tenderness: There is no guarding.   Musculoskeletal: Normal range of motion.         General: No swelling.      Right lower leg: No edema.      Left lower leg: No edema.   Skin:     General: Skin is warm and dry.      Capillary Refill: Capillary refill takes less than 2 seconds.   Neurological:      General: No focal deficit present.      Mental Status: She is alert and oriented to person, place, and time.      Comments: AAO, follows all commands    Psychiatric:         Mood and Affect: Mood normal.         Behavior: Behavior normal.         Vents:  Oxygen Concentration (%): 100(turned and placed on bed pan) (11/11/20 1415)  Lines/Drains/Airways     Drain            Female External Urinary Catheter 11/11/20 0900 less than 1 day          Peripheral Intravenous Line                 Midline Catheter Insertion/Assessment  - Single Lumen 11/10/20 1603 Right basilic vein (medial side of arm) 18g x 10cm less than 1 day              Significant  Labs:    CBC/Anemia Profile:  Recent Labs   Lab 11/10/20  0502 11/11/20  0614   WBC 7.26 8.10   HGB 12.4 11.6*   HCT 38.7 36.0*    247   * 101*   RDW 15.6* 15.5*        Chemistries:  Recent Labs   Lab 11/10/20  0502 11/11/20  0614    141   K 3.7 3.7    104   CO2 22* 23   BUN 27* 24*   CREATININE 0.7 0.7   CALCIUM 9.2 9.1   ALBUMIN 3.0* 2.7*   PROT 6.6 6.1   BILITOT 0.5 0.9   ALKPHOS 205* 197*   ALT 18 23   AST 28 45*   MG 2.0 1.7   PHOS 3.9 4.0       ABGs:   No results for input(s): PH, PCO2, HCO3, POCSATURATED, BE in the last 48 hours.    Significant Imaging:  I have reviewed all pertinent imaging results/findings within the past 24 hours.      ABG  Recent Labs   Lab 11/08/20  1812   PH 7.464*   PO2 54*   PCO2 34.5*   HCO3 24.8   BE 1     Assessment/Plan:     Pulmonary  S/P lung transplant  S/p left lung transplant in Aug 2020 for IPF. 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 CXR with bilateral patchy opacities. Symptoms started 10/30 and steadily progressed. Daughter found to be COVID +  -- Continue alternating BiPAP with comfort flow as tolerated  -- Remdesivir X 5 days; completed 11/09/20  -- Convalescent Plasma; Dose #1 11/5  -- Dexamethasone X 10 days (end 11/14)  -- Wean FiO2 for SpO2 >88%  -- Duonebs  -- Incentive Spirometry  -- S/p CAP coverage. Resumed Augmentin for actinomyces  -- hypercoagulable on full dose lovenox   -- TTE with EF 70%; left  ventricular concentric remodeling  -- Nifedipine 30mg XL added 11/8 for SBP holding 140s (home med)  -- Cont prn Lasix 40mg IV    Elevated alkaline phosphatase level  CTM    Start Seroquel 25mg qhs for poor sleep. Qtc 451 on 11/11/20     Nhan Olvera MD  Critical Care Medicine  [facility] - [facility]

## 2020-11-12 PROBLEM — A41.9 SEPSIS: Status: ACTIVE | Noted: 2020-01-01

## 2020-11-12 NOTE — CARE UPDATE
Intubated this am for increased respiratory distress and hypoxia. Went into two pressor shock afterwards. CT C/A/P showed a LLL PNA in addition to cardiogenic/noncardiogenic bilateral pulmonary edema. There is also a lesion in her colon that will require follow up.      #. COVID PNA c/b ARDS and superimposed bacterial PNA seen on CT  - Cultures pending  - Vanc, cefepime  - 1x dose of tobra  - D/c flagyl that was added this am for empiric intra-abdominal infection  - Oxygen requirements too high for bronch at this time  - Prophy meds switched to OGT formulation  - Tacro on hold per lung transplant  - Hold on further IVF    #. Septic shock- 2/2 above. ScvO2 78% c/w distributive shock. POCUS with hyperdynamic LV  - On levophed and vaso  - Cont decadron for covid    #. Acute hypoxemic/hypercap respiratory failure- 2/2 above  - On mechanical ventilation  - LPV  - Compliance is preserved  - Consider pronation if compliance worsens or oxygenation worsens    #. DWAYNE- 2/2 septic shock  - Unable to place pelletier 2/2 anatomy  - Urology consulted, greatly appreciate assistance  - Avoid nephrotoxics  - Strict I/Os  - Renally dose medications    #. NSTEMI- 2/2 above  - Trend trop, next draw at 1700    #. Colonic lesion- Noted on CT C/A/P 11/12. Needs outpatient follow up      Critical Care Time: 75 minutes  Critical care was time spent personally by me on the following activities: evaluating this patient's organ dysfunction, development of treatment plan, discussing treatment plan with patient or surrogate and bedside caregivers, discussions with consultants, evaluation of patient's response to treatment, examination of patient, ordering and performing treatments and interventions, ordering and review of laboratory studies, ordering and review of radiographic studies, re-evaluation of patient's condition. This critical care time did not overlap with that of any other provider or involve time for any procedures.        Addendum- ABG  7.13/55/83    - Will increase RR to 32. ETT was a little low on the CT. Will retract 1cm with a CXR in the am.  - Hold on proning in case of further decompensation. Plateau is now 29  - Start bicarb infusion at 75cc/hr  - Follow up ABG with lactate in 2 hours    Daughter has been updated throughout the day with all the changes in patient's clinical status.

## 2020-11-12 NOTE — PROGRESS NOTES
Urology consulted for difficult Rios placement. Rios initially placed for I&O, now needed because patient is intubated. On exam, meatus is in orthotopic position. I placed a 16 Fr Rios with return of clear yellow urine, without issue or complication. Balloon insufflated with 10 cc sterile water.     Rios management per primary.

## 2020-11-12 NOTE — SUBJECTIVE & OBJECTIVE
"Past Medical History:   Diagnosis Date    A-fib around 1996    Chronic hypoxemic respiratory failure 4/11/2019    Common bile duct dilatation 1/15/2019    Controlled type 2 diabetes mellitus without complication, without long-term current use of insulin 1/17/2019    Essential hypertension 1/16/2019    GERD (gastroesophageal reflux disease)     Hepatitis B core antibody positive 1/15/2019    IPF (idiopathic pulmonary fibrosis)     Obesity (BMI 30-39.9) 1/16/2019    RLS (restless legs syndrome)        Past Surgical History:   Procedure Laterality Date    APPLICATION OF WOUND VACUUM-ASSISTED CLOSURE DEVICE Left 8/10/2020    Procedure: APPLICATION, WOUND VAC, 40 x 5cm;  Surgeon: Benedict Clemons MD;  Location: [facility] OR [address] [place] [address];  Service: Cardiovascular;  Laterality: Left;    BRONCHOSCOPY N/A 9/23/2020    Procedure: flexible bronchoscopy with tissue biopsy CPT 67570;  [facility];  Location: [facility] OR [address] [place] [address];  Service: Anesthesiology;  Laterality: N/A;    CATHETERIZATION OF BOTH LEFT AND RIGHT HEART N/A 1/17/2019    Procedure: CATHETERIZATION, HEART, BOTH LEFT AND RIGHT;  Surgeon: Trey Evans MD;  Location: [facility] CATH LAB;  Service: Cardiology;  Laterality: N/A;    CHOLECYSTECTOMY      COLONOSCOPY      ESOPHAGOGASTRODUODENOSCOPY      HERNIA REPAIR      LEFT HEART CATHETERIZATION Left 2/18/2020    Procedure: Left heart cath;  Surgeon: Trey Evans MD;  Location: [facility] CATH LAB;  Service: Cardiology;  Laterality: Left;    LUNG TRANSPLANT Left 8/10/2020    Procedure: TRANSPLANT, LUNG - 4:30AM start;  Surgeon: Benedict Clemons MD;  Location: [address];  Service: Cardiovascular;  Laterality: Left;    SMALL INTESTINE SURGERY      mass removed (benign)       Review of patient's allergies indicates:   Allergen Reactions    Boniva [ibandronate] Other (See Comments)     "chest cramps"       Medications:  Medications Prior to Admission   Medication Sig    " amoxicillin-clavulanate 875-125mg (AUGMENTIN) 875-125 mg per tablet Take 1 tablet by mouth every 12 (twelve) hours.    aspirin (ECOTRIN) 81 MG EC tablet Take 1 tablet (81 mg total) by mouth once daily.    atorvastatin (LIPITOR) 20 MG tablet Take 1 tablet (20 mg total) by mouth once daily.    azithromycin (Z-JAYSHREE) 250 MG tablet Take 1 tablet (250 mg total) by mouth every Mon, Wed, Fri.    blood sugar diagnostic Strp Use as directed to test blood glucose 3-4 times daily.    blood-glucose meter kit Use as instructed    docusate sodium (COLACE) 100 MG capsule Take 1 capsule (100 mg total) by mouth 2 (two) times daily as needed for Constipation.    flu vacc [id number] 6mos up,PF, (FLUARIX QUAD [id number], PF,) 60 mcg (15 mcg x 4)/0.5 mL Syrg Inject into the skin for one dose.    gabapentin (NEURONTIN) 100 MG capsule Take 1 capsule (100 mg total) by mouth 3 (three) times daily. (Patient taking differently: Take 100 mg by mouth 2 (two) times daily. )    insulin aspart U-100 (NOVOLOG FLEXPEN U-100 INSULIN) 100 unit/mL (3 mL) InPn pen Inject 4 Units into the skin 3 (three) times daily with meals. Plus correction scale; max TDD 27 units (Patient taking differently: Inject 10 Units into the skin 3 (three) times daily with meals. Plus correction scale; max TDD 27 units)    lamiVUDine (EPIVIR) 150 MG Tab Take 1 tablet (150 mg total) by mouth once daily.    lancets Misc Use as directed to test blood glucose 3-4 times daily.    lansoprazole (PREVACID) 15 MG capsule Take 1 capsule (15 mg total) by mouth once daily.    magnesium oxide (MAG-OX) 400 mg (241.3 mg magnesium) tablet Take 1 tablet (400 mg total) by mouth 3 (three) times daily.    metoprolol tartrate (LOPRESSOR) 25 MG tablet Take 1 tablet (25 mg total) by mouth 2 (two) times daily.    mycophenolate (CELLCEPT) 250 mg Cap Take 4 capsules (1,000 mg total) by mouth 2 (two) times daily.    NIFEdipine (PROCARDIA-XL) 30 MG (OSM) 24 hr tablet Take 1 tablet (30 mg  "total) by mouth once daily.    ondansetron (ZOFRAN-ODT) 8 MG TbDL DISSOLVE 1 tablet (8 mg total) by mouth every 8 (eight) hours as needed.    opw transplant care kit Welcome to [facility].  This is your transplant care kit.    pen needle, diabetic 31 gauge x 5/16" Ndle Use as directed with insulin pen 3-5 times daily.    polyethylene glycol (GLYCOLAX) 17 gram/dose powder Take 17 g by mouth 2 (two) times daily as needed.    predniSONE (DELTASONE) 5 MG tablet From 8/20-9/9 Take 25mg by mouth daily; From 9/10-11/8 take 20mg daily; From 11/9-12/8 take 15mg daily; 12/9-2/7/21 take 10mg daily then 5mg daily thereafter    rosuvastatin (CRESTOR) 10 MG tablet Take 10 mg by mouth once daily.    senna (SENOKOT) 8.6 mg tablet Take 2 tablets by mouth once daily.    sulfamethoxazole-trimethoprim 800-160mg (BACTRIM DS) 800-160 mg Tab Take 1 tablet by mouth every Mon, Wed, Fri.    tacrolimus (PROGRAF) 0.5 MG Cap Take 1 capsule (0.5 mg total) by mouth once daily.    valGANciclovir (VALCYTE) 450 mg Tab Take 1 tablet (450 mg total) by mouth 2 (two) times daily.    voriconazole (VFEND) 200 MG Tab Take by mouth 300 mg (1 and 1/2 tablets) by mouth twice daily     Antibiotics (From admission, onward)    Start     Stop Route Frequency Ordered    11/13/20 0900  azithromycin tablet 250 mg      -- OG Every Mon, Wed, Fri 11/12/20 1334    11/13/20 0900  sulfamethoxazole-trimethoprim 800-160mg per tablet 1 tablet      -- OG Every Mon, Wed, Fri 11/12/20 1334    11/12/20 1530  tobramycin (NEBCIN) 430 mg in dextrose 5 % IVPB      11/13 1529 IV Every 24 hours (non-standard times) 11/12/20 1432    11/12/20 1445  vancomycin in dextrose 5 % 1 gram/250 mL IVPB 1,000 mg      -- IV Every 12 hours (non-standard times) 11/12/20 0135    11/12/20 0230  ceFEPIme injection 2 g      -- IV Every 8 hours (non-standard times) 11/12/20 0128        Antifungals (From admission, onward)    Start     Stop Route Frequency Ordered    11/12/20 " 2100  voriconazole tablet 300 mg      -- OG 2 times daily 11/12/20 1334        Antivirals (From admission, onward)        Stop Route Frequency     Epivir      -- OG Daily     ganciclovir (CYTOVENE) injection      -- IV Every 24 hours (non-standard times)           Immunization History   Administered Date(s) Administered    Hepatitis A, Adult 01/16/2019    Pneumococcal Polysaccharide - 23 Valent 01/16/2019    Tdap 01/16/2019       Family History     None        Social History     Socioeconomic History    Marital status: Single     Spouse name: Not on file    Number of children: Not on file    Years of education: Not on file    Highest education level: Not on file   Occupational History    Not on file   Social Needs    Financial resource strain: Not on file    Food insecurity     Worry: Not on file     Inability: Not on file    Transportation needs     Medical: Not on file     Non-medical: Not on file   Tobacco Use    Smoking status: Former Smoker     Types: Cigarettes     Quit date: 12/13/2016     Years since quitting: 3.9    Smokeless tobacco: Never Used   Substance and Sexual Activity    Alcohol use: No     Frequency: Never    Drug use: No    Sexual activity: Not on file   Lifestyle    Physical activity     Days per week: Not on file     Minutes per session: Not on file    Stress: Not on file   Relationships    Social connections     Talks on phone: Not on file     Gets together: Not on file     Attends [employer]ism service: Not on file     Active member of club or organization: Not on file     Attends meetings of clubs or organizations: Not on file     Relationship status: Not on file   Other Topics Concern    Not on file   Social History Narrative    Not on file     Review of Systems   Unable to perform ROS: Acuity of condition     Objective:     Vital Signs (Most Recent):  Temp: 98.8 °F (37.1 °C) (11/12/20 1505)  Pulse: 109 (11/12/20 1610)  Resp: (!) 31 (11/12/20 1610)  BP: 98/60 (11/12/20  1212)  SpO2: (!) 93 % (11/12/20 1610) Vital Signs (24h Range):  Temp:  [98.4 °F (36.9 °C)-101.6 °F (38.7 °C)] 98.8 °F (37.1 °C)  Pulse:  [101-155] 109  Resp:  [26-59] 31  SpO2:  [90 %-100 %] 93 %  BP: ()/(51-95) 98/60  Arterial Line BP: ()/(49-65) 97/65     Weight: 76.7 kg (169 lb)  Body mass index is 26.47 kg/m².    Estimated Creatinine Clearance: 55.9 mL/min (based on SCr of 1.1 mg/dL).    Physical Exam  Vitals signs reviewed.   Constitutional:       General: She is not in acute distress.     Appearance: She is well-developed. She is not diaphoretic.   HENT:      Head: Atraumatic.      Right Ear: External ear normal.      Left Ear: External ear normal.      Nose: Nose normal.      Mouth/Throat:      Mouth: Mucous membranes are moist.      Pharynx: Oropharynx is clear. No oropharyngeal exudate or posterior oropharyngeal erythema.   Eyes:      General: No scleral icterus.        Right eye: No discharge.         Left eye: No discharge.      Extraocular Movements: Extraocular movements intact.      Conjunctiva/sclera: Conjunctivae normal.   Neck:      Musculoskeletal: Normal range of motion and neck supple.   Cardiovascular:      Rate and Rhythm: Normal rate and regular rhythm.      Pulses: Normal pulses.      Heart sounds: Normal heart sounds. No murmur.   Pulmonary:      Effort: No respiratory distress.      Breath sounds: No wheezing.      Comments: Coarse breath sounds b/l, intubated on 70% FIO2  Abdominal:      General: Bowel sounds are normal. There is no distension.      Palpations: Abdomen is soft.      Tenderness: There is no abdominal tenderness.   Musculoskeletal:         General: No swelling or deformity.   Skin:     General: Skin is dry.      Findings: No erythema or rash.      Comments: Cool to touch   Neurological:      Mental Status: She is alert.      Comments: Intubated and sedated         Significant Labs:   CBC:   Recent Labs   Lab 11/11/20  0614 11/12/20  0413 11/12/20  1152   WBC 8.10  13.90* 15.98*   HGB 11.6* 11.8* 10.9*   HCT 36.0* 37.1 35.2*    231 191     CMP:   Recent Labs   Lab 11/11/20  0614 11/11/20  1651 11/12/20  0413 11/12/20  1152    138 136 133*   K 3.7 5.2* 5.7* 4.8    103 103 101   CO2 23 23 21* 17*   * 332* 134* 192*   BUN 24* 21 23 25*   CREATININE 0.7 0.7 1.0 1.1   CALCIUM 9.1 8.9 8.8 8.2*   PROT 6.1  --  6.6 5.7*   ALBUMIN 2.7*  --  2.4* 2.1*   BILITOT 0.9  --  1.9* 1.4*   ALKPHOS 197*  --  199* 184*   AST 45*  --  62* 76*   ALT 23  --  32 31   ANIONGAP 14 12 12 15   EGFRNONAA >60.0 >60.0 >60.0 53.6*     Microbiology Results (last 7 days)     Procedure Component Value Units Date/Time    Culture, Respiratory with Gram Stain [[id number]] Collected: 11/12/20 1545    Order Status: Sent Specimen: Respiratory from Tracheal Aspirate Updated: 11/12/20 1620    Blood culture [[id number]] Collected: 11/12/20 0219    Order Status: Completed Specimen: Blood from Peripheral, Antecubital, Left Updated: 11/12/20 1145     Blood Culture, Routine No Growth to date    Narrative:      Blood cultures from 2 different sites. 4 bottles total.  Please draw before starting antibiotics.    Blood culture [[id number]] Collected: 11/12/20 0219    Order Status: Completed Specimen: Blood from Peripheral, Antecubital, Left Updated: 11/12/20 1145     Blood Culture, Routine No Growth to date    Narrative:      Blood cultures x 2 different sites. 4 bottles total. Please  draw cultures before administering antibiotics.    Culture, Respiratory with Gram Stain [[id number]]     Order Status: Canceled Specimen: Respiratory from Sputum     Blood culture #2 **CANNOT BE ORDERED STAT** [[id number]] Collected: 11/04/20 1055    Order Status: Completed Specimen: Blood from Peripheral, Antecubital, Left Updated: 11/09/20 1212     Blood Culture, Routine No growth after 5 days.    Blood culture #1 **CANNOT BE ORDERED STAT** [[id number]] Collected: 11/04/20 1055    Order Status: Completed Specimen: Blood from  Peripheral, Antecubital, Left Updated: 11/09/20 1212     Blood Culture, Routine No growth after 5 days.          Significant Imaging: I have reviewed all pertinent imaging results/findings within the past 24 hours.

## 2020-11-12 NOTE — PROGRESS NOTES
[facility] - ICU 16   Critical Care Medicine  Progress Note    Patient Name: Chley Becerra  MRN: [id number]  Admission Date: 11/4/2020  Hospital Length of Stay: 8 days  Code Status: Full Code  Attending Provider: Nhan Olvera MD  Primary Care Provider: ZAHIDA Stack MD   Principal Problem: Acute hypoxemic respiratory failure due to COVID-19    Subjective:       Interval History/Significant Events: . Febrile, hypotensive and tachycardic overnight. Cultures sent. Vanc/Cefepime added and she was given 1L of LR. Respiratory status markedly declined this am.     Review of Systems   Constitutional: Negative for appetite change and fever.   HENT: Negative for sore throat and trouble swallowing.    Eyes: Negative for visual disturbance.   Respiratory: Positive for cough and shortness of breath.    Cardiovascular: Negative for chest pain and leg swelling (mild, chronic with steroid use).   Gastrointestinal: Negative for abdominal pain, diarrhea (chronic), nausea and vomiting.   Genitourinary: Negative for decreased urine volume, difficulty urinating and dysuria.   Skin: Negative for rash and wound.   Allergic/Immunologic: Positive for immunocompromised state.   Neurological: Negative for weakness and headaches.   Psychiatric/Behavioral: Negative for agitation. The patient is not nervous/anxious.      Objective:     Vital Signs (Most Recent):  Temp: 99.3 °F (37.4 °C) (11/12/20 0705)  Pulse: (!) 126 (11/12/20 0805)  Resp: (!) 40 (11/12/20 0805)  BP: (!) 95/56 (11/12/20 0805)  SpO2: (!) 94 % (11/12/20 0805) Vital Signs (24h Range):  Temp:  [98.4 °F (36.9 °C)-101.6 °F (38.7 °C)] 99.3 °F (37.4 °C)  Pulse:  [] 126  Resp:  [23-59] 40  SpO2:  [85 %-100 %] 94 %  BP: ()/(51-95) 95/56   Weight: 76.7 kg (169 lb)  Body mass index is 26.47 kg/m².      Intake/Output Summary (Last 24 hours) at 11/12/2020 0854  Last data filed at 11/12/2020 0705  Gross per 24 hour   Intake 1220 ml   Output 825 ml    Net 395 ml       Physical Exam  Vitals signs reviewed.   Constitutional:       General: She is in acute distress.      Appearance: She is ill-appearing. She is not diaphoretic.      Interventions: Nasal cannula in place.   HENT:      Head: Normocephalic and atraumatic.   Eyes:      General: No scleral icterus.  Neck:      Musculoskeletal: No neck rigidity.   Cardiovascular:      Rate and Rhythm: Regular rhythm. Tachycardia present.      Pulses: Normal pulses.      Heart sounds: Normal heart sounds. No murmur.   Pulmonary:      Effort: Tachypnea and respiratory distress present. No accessory muscle usage.      Breath sounds: Rhonchi and rales present. No decreased breath sounds or wheezing.      Comments: Fine crackles lower right lung base  Chest:      Comments: Midline scar from recent transplant   Abdominal:      Palpations: Abdomen is soft.      Tenderness: There is no guarding.   Musculoskeletal:      Right lower leg: No edema.      Left lower leg: No edema.   Skin:     General: Skin is warm and dry.      Capillary Refill: Capillary refill takes 2 to 3 seconds.   Neurological:      General: No focal deficit present.      Mental Status: She is alert and oriented to person, place, and time.      Comments: AAO, follows all commands          Vents:  Oxygen Concentration (%): 80 (11/12/20 0805)  Lines/Drains/Airways     Drain            Female External Urinary Catheter 11/11/20 0900 less than 1 day          Peripheral Intravenous Line                 Midline Catheter Insertion/Assessment  - Single Lumen 11/10/20 1603 Right basilic vein (medial side of arm) 18g x 10cm 1 day         Peripheral IV - Single Lumen 11/12/20 0545 20 G Anterior;Left Hand less than 1 day              Significant Labs:    CBC/Anemia Profile:  Recent Labs   Lab 11/11/20 0614 11/12/20  0413   WBC 8.10 13.90*   HGB 11.6* 11.8*   HCT 36.0* 37.1    231   * 101*   RDW 15.5* 15.9*        Chemistries:  Recent Labs   Lab 11/11/20 0614  11/11/20  1651 11/12/20  0413    138 136   K 3.7 5.2* 5.7*    103 103   CO2 23 23 21*   BUN 24* 21 23   CREATININE 0.7 0.7 1.0   CALCIUM 9.1 8.9 8.8   ALBUMIN 2.7*  --  2.4*   PROT 6.1  --  6.6   BILITOT 0.9  --  1.9*   ALKPHOS 197*  --  199*   ALT 23  --  32   AST 45*  --  62*   MG 1.7  --  1.9   PHOS 4.0  --  2.1*       ABGs:   No results for input(s): PH, PCO2, HCO3, POCSATURATED, BE in the last 48 hours.    Significant Imaging:  I have reviewed all pertinent imaging results/findings within the past 24 hours.      ABG  Recent Labs   Lab 11/08/20  1812   PH 7.464*   PO2 54*   PCO2 34.5*   HCO3 24.8   BE 1     Assessment/Plan:     Pulmonary  S/P lung transplant  S/p left lung transplant in Aug 2020 for IPF. 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 Initially 2/2 COVID PNA, now with possible bacterial infection. CXR not markedly changed, UA neg.     - Cont Vanc, cefepime  - Add flagyl for possible intraabdominal source  - CT C/A/P if patient intubated or respiratory status significantly improves  - Cultures sent  - ID consulted  - Midline placed 11/10- not likely the source    Endocrine  Prediabetes  Steroid induced  -- BG goal 140-180  -- mod dose SSI   -- detemir 7 units sq started 11/8    Other  * Acute hypoxemic respiratory failure due to COVID-19  COVID + 11/4. CXR with bilateral patchy opacities. Symptoms started 10/30 and steadily progressed. Daughter found to be COVID +. Overnight on 11/12, patient developed a new septic event. Febrile, hypotensive and tachycardic.  Vanc/Cefepime added to her regimen. CXR not markedly changed. UA neg. Given 1L LR. Hypoxic respiratory failure worsened.    - BP has improved, so will trial a dose of lasix. If she does not respond, she will require intubation. This was discussed with the patient  - Plan on CT C/A/P if she is intubated  - Remdesivir X 5 days; completed 11/09/20  - Convalescent Plasma; Dose #1 11/5  - Dexamethasone X 10 days (end 11/14)  - Wean FiO2 for SpO2 >88%  - Duonebs  - Incentive Spirometry  - hypercoagulable on full dose lovenox   - TTE with EF 70%; left ventricular concentric remodeling      Elevated alkaline phosphatase level  CTM      Critical Care Time: 40 minutes  Critical care was time spent personally by me on the following activities: evaluating this patient's organ dysfunction, development of treatment plan, discussing treatment plan with patient or surrogate and bedside caregivers, discussions with consultants, evaluation of patient's response to treatment, examination of patient, ordering and performing treatments and interventions, ordering and review of laboratory studies, ordering and review of radiographic studies, re-evaluation of patient's condition. This critical care time did not overlap with that of any other provider or involve time for any procedures.         Nhan Olvera MD  Critical Care Medicine  [facility] - ICU 16 WT

## 2020-11-12 NOTE — ASSESSMENT & PLAN NOTE
65F PMH IPF s/p L SLTx 8/2020, admitted on 11/4 w/ acute hypoxic respiratory failure, COVID positive. Now w/ declining condition, intubated, on vasopressors, broad spectrum abx. S/p 5 days of remdesevir, 1 dose of convalescent plasma, remains on dexamethasone    Recommendations:  - continue cefepime, flagyl and vancomycin  - broaden mold coverage to posaconazole  - discussed w/ COVID team - do not feel an additional dose of plasma would be of benefit at this time  - follow up all pending cultures  - check aspergillus antigen    Will follow

## 2020-11-12 NOTE — PROCEDURES
"Chely Becerra is a 65 y.o. female patient.    Temp: 98.4 °F (36.9 °C) (20 1000)  Pulse: (!) 122 (20 1000)  Resp: (!) 27 (20 1000)  BP: 91/60 (20 1000)  SpO2: 98 % (20 1000)  Weight: 76.7 kg (169 lb) (20 1430)  Height: 5' 7" (170.2 cm) (20)       Intubation    Date/Time: 2020 11:14 AM  Location procedure was performed: [facility] CRITICAL CARE MEDICINE  Performed by: Nhan Olvera MD  Authorized by: Nhan Olvera MD   Consent Done: Yes  Consent: Verbal consent obtained.  Consent given by: patient  Patient identity confirmed: , MRN, name and verbally with patient  Indications: respiratory distress,  respiratory failure and  hypoxemia  Intubation method: video-assisted  Patient status: paralyzed (RSI)  Preoxygenation: BVM  Sedatives: etomidate  Paralytic: rocuronium  Laryngoscope size: Glide 4  Tube size: 7.5 mm  Tube type: cuffed  Number of attempts: 1  Cords visualized: yes  Post-procedure assessment: chest rise and ETCO2 monitor  Breath sounds: clear  Cuff inflated: yes  ETT to lip: 22 cm  Tube secured with: ETT villanueva  Chest x-ray interpreted by me.  Chest x-ray findings: endotracheal tube in appropriate position          Nhan Olvera  2020  "

## 2020-11-12 NOTE — HPI
65F PMH DM, IPF s/p L SLTx 8/10/2020 who presented on 11/4 w/ increasing shortness of breath, cough and fevers x 3 days, and noted to have hypoxia on evaluation in lung transplant clinic. She was found to be COIVD positive on hospital admission. Daughter who lives with patient had also recently been sick, and tested positive for COVID as well. Home meds included pred taper (currently at 20mg), prograf and MMF. On admission, she was started on bipap. She has received 5 days of remdesivir, convalescent plasma, and remains on decadron. ID consulted today as patient's clinical status has deteriorated. She developed progressive hypoxic respiratory failure requiring intubation this morning. She is hypotensive on levo and vaso. Antibiotics were changed to vanc, cefepime and flagyl. CT chest with diffuse GGO bilaterally w/ dense infiltrates in L lung, bilateral perinephric stranding, ? Colon mass.

## 2020-11-12 NOTE — EICU
09:20  Called into room for intubation.  Pt awake w/ increased work of breathing.  Physician verification done for Nhan James w/ Pulm disease/CC.  Time-out completed using proper pt identifiers.  09:26  Etomidate 20mg IV given followed by Rocuronium 75mg IV as ordered.    09:28  Intubated w/ 7.5 ETT per Dr. Olvera using u/s guidance.  Color change noted to ETCO2 detector.  Bedside confirms bilateral breath sounds.  ETT secured at 23 at the lip.   09:30  Jamir 100mg IV given as ordered for SBP 60/s.  PCXR ordered.  09:34  B/P 91/62 , Sat 100%.  09:40  Unsuccessful attempt at # 14 Fr OGT per bedside RN.  09:45  B/P 101/67, , Sat 99%.  10:05  Setting up for right IJ central line placement per Nhan James w/ Pulm disease.  Physician verification done for central line & A-line insertions.  Time-out completed.  10:30  Right IJ TLC inserted per Dr. Olvera using u/s guidance.  Good blood return to all ports noted.  Secured & sutured.  Pt tolerated procedures well.  10:33  PCXR done.  10:53  Right radial A-line inserted per Dr. Olvera using u/s guidance.  Secured and sutured.  Pt tolerated well.

## 2020-11-12 NOTE — SUBJECTIVE & OBJECTIVE
Interval History/Significant Events: . Febrile, hypotensive and tachycardic overnight. Cultures sent. Vanc/Cefepime added and she was given 1L of LR. Respiratory status markedly declined this am.     Review of Systems   Constitutional: Negative for appetite change and fever.   HENT: Negative for sore throat and trouble swallowing.    Eyes: Negative for visual disturbance.   Respiratory: Positive for cough and shortness of breath.    Cardiovascular: Negative for chest pain and leg swelling (mild, chronic with steroid use).   Gastrointestinal: Negative for abdominal pain, diarrhea (chronic), nausea and vomiting.   Genitourinary: Negative for decreased urine volume, difficulty urinating and dysuria.   Skin: Negative for rash and wound.   Allergic/Immunologic: Positive for immunocompromised state.   Neurological: Negative for weakness and headaches.   Psychiatric/Behavioral: Negative for agitation. The patient is not nervous/anxious.      Objective:     Vital Signs (Most Recent):  Temp: 99.3 °F (37.4 °C) (11/12/20 0705)  Pulse: (!) 126 (11/12/20 0805)  Resp: (!) 40 (11/12/20 0805)  BP: (!) 95/56 (11/12/20 0805)  SpO2: (!) 94 % (11/12/20 0805) Vital Signs (24h Range):  Temp:  [98.4 °F (36.9 °C)-101.6 °F (38.7 °C)] 99.3 °F (37.4 °C)  Pulse:  [] 126  Resp:  [23-59] 40  SpO2:  [85 %-100 %] 94 %  BP: ()/(51-95) 95/56   Weight: 76.7 kg (169 lb)  Body mass index is 26.47 kg/m².      Intake/Output Summary (Last 24 hours) at 11/12/2020 0854  Last data filed at 11/12/2020 0705  Gross per 24 hour   Intake 1220 ml   Output 825 ml   Net 395 ml       Physical Exam  Vitals signs reviewed.   Constitutional:       General: She is in acute distress.      Appearance: She is ill-appearing. She is not diaphoretic.      Interventions: Nasal cannula in place.   HENT:      Head: Normocephalic and atraumatic.   Eyes:      General: No scleral icterus.  Neck:      Musculoskeletal: No neck rigidity.   Cardiovascular:      Rate and  Rhythm: Regular rhythm. Tachycardia present.      Pulses: Normal pulses.      Heart sounds: Normal heart sounds. No murmur.   Pulmonary:      Effort: Tachypnea and respiratory distress present. No accessory muscle usage.      Breath sounds: Rhonchi and rales present. No decreased breath sounds or wheezing.      Comments: Fine crackles lower right lung base  Chest:      Comments: Midline scar from recent transplant   Abdominal:      Palpations: Abdomen is soft.      Tenderness: There is no guarding.   Musculoskeletal:      Right lower leg: No edema.      Left lower leg: No edema.   Skin:     General: Skin is warm and dry.      Capillary Refill: Capillary refill takes 2 to 3 seconds.   Neurological:      General: No focal deficit present.      Mental Status: She is alert and oriented to person, place, and time.      Comments: AAO, follows all commands          Vents:  Oxygen Concentration (%): 80 (11/12/20 0805)  Lines/Drains/Airways     Drain            Female External Urinary Catheter 11/11/20 0900 less than 1 day          Peripheral Intravenous Line                 Midline Catheter Insertion/Assessment  - Single Lumen 11/10/20 1603 Right basilic vein (medial side of arm) 18g x 10cm 1 day         Peripheral IV - Single Lumen 11/12/20 0545 20 G Anterior;Left Hand less than 1 day              Significant Labs:    CBC/Anemia Profile:  Recent Labs   Lab 11/11/20  0614 11/12/20  0413   WBC 8.10 13.90*   HGB 11.6* 11.8*   HCT 36.0* 37.1    231   * 101*   RDW 15.5* 15.9*        Chemistries:  Recent Labs   Lab 11/11/20  0614 11/11/20  1651 11/12/20  0413    138 136   K 3.7 5.2* 5.7*    103 103   CO2 23 23 21*   BUN 24* 21 23   CREATININE 0.7 0.7 1.0   CALCIUM 9.1 8.9 8.8   ALBUMIN 2.7*  --  2.4*   PROT 6.1  --  6.6   BILITOT 0.9  --  1.9*   ALKPHOS 197*  --  199*   ALT 23  --  32   AST 45*  --  62*   MG 1.7  --  1.9   PHOS 4.0  --  2.1*       ABGs:   No results for input(s): PH, PCO2, HCO3,  POCSATURATED, BE in the last 48 hours.    Significant Imaging:  I have reviewed all pertinent imaging results/findings within the past 24 hours.

## 2020-11-12 NOTE — RESPIRATORY THERAPY
Patient orally intubated with 7.5 ETT at the 23 cm gómez at the lip. MD, nurses and 2 RTs at bedside. Patient tolerated procedure well, placed on mechanical ventilator on documented settings. I will continue to monitor patient closely.

## 2020-11-12 NOTE — ASSESSMENT & PLAN NOTE
Hypotensive but not currently requiring vasopressors. Initially 2/2 COVID PNA, now with possible bacterial infection. CXR not markedly changed, UA neg.     - Cont Vanc, cefepime  - Add flagyl for possible intraabdominal source  - CT C/A/P if patient intubated or respiratory status significantly improves  - Cultures sent  - ID consulted  - Midline placed 11/10- not likely the source

## 2020-11-12 NOTE — PROGRESS NOTES
"Pharmacokinetic Initial Assessment: IV Vancomycin    Assessment/Plan:    Initiate intravenous vancomycin with loading dose of 1500 mg once followed by a maintenance dose of vancomycin 1000mg IV every 12 hours.  Desired empiric serum trough concentration is 15 to 20 mcg/mL.  Draw vancomycin trough level 30 min prior to fourth dose on 11/13/2020 at approximately 1415.  Pharmacy will continue to follow and monitor vancomycin.      Please contact pharmacy at extension [phone] with any questions regarding this assessment.     Thank you for the consult,   Rosalio See       Patient brief summary:  Chely Becerra is a 65 y.o. female initiated on antimicrobial therapy with IV Vancomycin for treatment of suspected bacteremia.    Drug Allergies:   Review of patient's allergies indicates:   Allergen Reactions    Boniva [ibandronate] Other (See Comments)     "chest cramps"       Actual Body Weight:   76.7 kg    Renal Function:   Estimated Creatinine Clearance: 87.8 mL/min (based on SCr of 0.7 mg/dL).,     Dialysis Method (if applicable):  N/A    CBC (last 72 hours):  Recent Labs   Lab Result Units 11/09/20  0429 11/09/20  1015 11/10/20  0502 11/11/20  0614   WBC K/uL 4.07 6.84 7.26 8.10   Hemoglobin g/dL 8.3* 12.3 12.4 11.6*   Hematocrit % 26.2* 37.1 38.7 36.0*   Platelets K/uL SEE COMMENT 309 296 247   Gran % % 82.6* 78.0* 87.0* 78.6*   Lymph % % 10.7* 19.0 11.0* 13.1*   Mono % % 3.3* 2.0* 2.0* 3.5*   Eosinophil % % 0.7 0.0 0.0 0.2   Basophil % % 0.0 0.0 0.0 0.2   Differential Method  Manual Manual Manual Automated       Metabolic Panel (last 72 hours):  Recent Labs   Lab Result Units 11/09/20  1015 11/10/20  0502 11/11/20  0614 11/11/20  1651   Sodium mmol/L 138 141 141 138   Potassium mmol/L 3.7 3.7 3.7 5.2*   Chloride mmol/L 103 104 104 103   CO2 mmol/L 22* 22* 23 23   Glucose mg/dL 266* 149* 169* 332*   BUN mg/dL 22 27* 24* 21   Creatinine mg/dL 0.7 0.7 0.7 0.7   Albumin g/dL 3.0* 3.0* 2.7*  --    Total Bilirubin " mg/dL 0.5 0.5 0.9  --    Alkaline Phosphatase U/L 200* 205* 197*  --    AST U/L 25 28 45*  --    ALT U/L 17 18 23  --    Magnesium mg/dL  --  2.0 1.7  --    Phosphorus mg/dL  --  3.9 4.0  --        Drug levels (last 3 results):  No results for input(s): VANCOMYCINRA, VANCOMYCINPE, VANCOMYCINTR in the last 72 hours.    Microbiologic Results:  Microbiology Results (last 7 days)     Procedure Component Value Units Date/Time    Blood culture [[id number]]     Order Status: No result Specimen: Blood     Blood culture [[id number]]     Order Status: No result Specimen: Blood     Culture, Respiratory with Gram Stain [[id number]]     Order Status: No result Specimen: Respiratory from Sputum     Blood culture #2 **CANNOT BE ORDERED STAT** [[id number]] Collected: 11/04/20 1055    Order Status: Completed Specimen: Blood from Peripheral, Antecubital, Left Updated: 11/09/20 1212     Blood Culture, Routine No growth after 5 days.    Blood culture #1 **CANNOT BE ORDERED STAT** [[id number]] Collected: 11/04/20 1055    Order Status: Completed Specimen: Blood from Peripheral, Antecubital, Left Updated: 11/09/20 1212     Blood Culture, Routine No growth after 5 days.    Culture, Respiratory with Gram Stain [[id number]]     Order Status: No result Specimen: Respiratory from Endotracheal Aspirate

## 2020-11-12 NOTE — ASSESSMENT & PLAN NOTE
COVID + 11/4. CXR with bilateral patchy opacities. Symptoms started 10/30 and steadily progressed. Daughter found to be COVID +. Overnight on 11/12, patient developed a new septic event. Febrile, hypotensive and tachycardic. Vanc/Cefepime added to her regimen. CXR not markedly changed. UA neg. Given 1L LR. Hypoxic respiratory failure worsened.    - BP has improved, so will trial a dose of lasix. If she does not respond, she will require intubation. This was discussed with the patient  - Plan on CT C/A/P if she is intubated  - Remdesivir X 5 days; completed 11/09/20  - Convalescent Plasma; Dose #1 11/5  - Dexamethasone X 10 days (end 11/14)  - Wean FiO2 for SpO2 >88%  - Duonebs  - Incentive Spirometry  - hypercoagulable on full dose lovenox   - TTE with EF 70%; left ventricular concentric remodeling

## 2020-11-12 NOTE — PLAN OF CARE
Vital signs and labs reviewed - febrile, hypotensive, tachycardic. Intubated for increased WOB and progressive hypoxemia. Antibiotics broadened to Vancomycin/Cefepime. Received 1L LR. Remdesivir (completed 11/9), plasma, dexamethasone (through 11/14); continued COVID care per MICU. Change dexamethasone back to prednisone when treatment course complete. Tacrolimus discontinued today for supratherapeutic level. MMF on hold due to ongoing infection. OIP with ganciclovir, lamivudine, bactrim, and voriconazole.  Will continue to adjust IS and OIP as needed. Please call [phone] with questions.

## 2020-11-12 NOTE — PT/OT/SLP PROGRESS
Physical Therapy  Consult    Patient Name:  Chely Becerra   MRN:  [id number]  Admitting Diagnosis:  Acute hypoxemic respiratory failure due to COVID-19   Recent Surgery: * No surgery found *    Admit Date: 11/4/2020  Length of Stay: 8 days    Physical Therapy orders received and acknowledged. Patient not appropriate for PT evaluation on this date as upon PT attempt team was preparing to intubate patient. Pt is medically unstable and not appropriate for mobility at this time. Therapy team will continue to follow pt's progress and assess appropriateness for early mobility.     Olga Lidia Michele PT, DPT  11/12/2020   Pager: [phone]

## 2020-11-12 NOTE — PT/OT/SLP PROGRESS
Occupational Therapy      Patient Name:  Chely Becerra   MRN:  [id number]    OT orders received and acknowledged. Patient not seen today secondary to medical team preparing to intubate pt. Will follow-up 11/16/2020    Thao Ott OTR/L  Pager: [phone]  11/12/2020

## 2020-11-12 NOTE — PROCEDURES
"Chely Becerra is a 65 y.o. female patient.    Temp: 98.4 °F (36.9 °C) (11/12/20 1000)  Pulse: (!) 122 (11/12/20 1000)  Resp: (!) 27 (11/12/20 1000)  BP: 91/60 (11/12/20 1000)  SpO2: 98 % (11/12/20 1000)  Weight: 76.7 kg (169 lb) (11/05/20 1430)  Height: 5' 7" (170.2 cm) (11/11/20 2058)       Central Line    Date/Time: 11/12/2020 11:12 AM  Performed by: Nhan Olvera MD  Authorized by: Nhan Olvera MD     Location procedure was performed:  [facility] CRITICAL CARE MEDICINE  Consent Done ?:  Yes  Time out complete?: Verified correct patient, procedure, equipment, staff, and site/side    Indications:  Med administration and vascular access  Anesthesia:  Local infiltration  Local anesthetic:  Lidocaine 1% without epinephrine  Preparation:  Skin prepped with ChloraPrep  Location:  Right internal jugular  Catheter type:  Triple lumen  Ultrasound guidance: Yes    Vessel Caliber:  Medium   patent  Comprressibility:  Normal  Manometry: No    Number of attempts:  1  Securement:  Line sutured, chlorhexidine patch, sterile dressing applied and blood return through all ports  Complications: No    Specimens: No    Implants: No    XRay:  Placement verified by x-ray  Adverse Events:  None      Central line placed for shock    Nhan Olvera  11/12/2020  "

## 2020-11-12 NOTE — PROCEDURES
"Chely Becerra is a 65 y.o. female patient.    Temp: 98.4 °F (36.9 °C) (11/12/20 1000)  Pulse: (!) 122 (11/12/20 1000)  Resp: (!) 27 (11/12/20 1000)  BP: 91/60 (11/12/20 1000)  SpO2: 98 % (11/12/20 1000)  Weight: 76.7 kg (169 lb) (11/05/20 1430)  Height: 5' 7" (170.2 cm) (11/11/20 2058)       Arterial Line    Date/Time: 11/12/2020 11:11 AM  Location procedure was performed: [facility] CRITICAL CARE MEDICINE  Performed by: Nhan Olvera MD  Authorized by: Nhan Olvera MD   Indications: multiple ABGs, respiratory failure and hemodynamic monitoring  Location: right radial  Patient sedated: yes  Johnnie's test normal: yes  Needle gauge: 20  Seldinger technique: Seldinger technique used  Number of attempts: 3  Post-procedure: line sutured and dressing applied  Post-procedure CMS: normal        Line placed for shock    Nhan Olvera  11/12/2020  "

## 2020-11-12 NOTE — CONSULTS
[facility] - ICU 16 WT  Infectious Disease  Consult Note    Patient Name: Chely Becerra  MRN: [id number]  Admission Date: 11/4/2020  Hospital Length of Stay: 8 days  Attending Physician: Nhan Olvera MD  Primary Care Provider: ZAHIDA Stack MD     Isolation Status: Airborne and Contact and Droplet, Airborne and Contact and Droplet    Patient information was obtained from patient, past medical records and ER records.      Inpatient consult to Infectious Diseases  Consult performed by: Rhianna Molina DO  Consult ordered by: Nhan Olvera MD        Assessment/Plan:     * Acute hypoxemic respiratory failure due to COVID-19  65F PMH IPF s/p L SLTx 8/2020, admitted on 11/4 w/ acute hypoxic respiratory failure, COVID positive. Now w/ declining condition, intubated, on vasopressors, broad spectrum abx. S/p 5 days of remdesevir, 1 dose of convalescent plasma, remains on dexamethasone    Recommendations:  - continue cefepime, flagyl and vancomycin  - broaden mold coverage to posaconazole  - discussed w/ COVID team - do not feel an additional dose of plasma would be of benefit at this time  - follow up all pending cultures  - check aspergillus antigen    Will follow      Thank you for your consult. I will follow-up with patient. Please contact us if you have any additional questions.    Juany Molina DO  Transplant Infectious Disease      Subjective:     Principal Problem: Acute hypoxemic respiratory failure due to COVID-19    HPI: 65F PMH DM, IPF s/p L SLTx 8/10/2020 who presented on 11/4 w/ increasing shortness of breath, cough and fevers x 3 days, and noted to have hypoxia on evaluation in lung transplant clinic. She was found to be COIVD positive on hospital admission. Daughter who lives with patient had also recently been sick, and tested positive for COVID as well. Home meds included pred taper (currently at 20mg), prograf and MMF. On admission, she was started on bipap. She has  received 5 days of remdesivir, convalescent plasma, and remains on decadron. ID consulted today as patient's clinical status has deteriorated. She developed progressive hypoxic respiratory failure requiring intubation this morning. She is hypotensive on levo and vaso. Antibiotics were changed to vanc, cefepime and flagyl. CT chest with diffuse GGO bilaterally w/ dense infiltrates in L lung, bilateral perinephric stranding, ? Colon mass.          Past Medical History:   Diagnosis Date    A-fib around 1996    Chronic hypoxemic respiratory failure 4/11/2019    Common bile duct dilatation 1/15/2019    Controlled type 2 diabetes mellitus without complication, without long-term current use of insulin 1/17/2019    Essential hypertension 1/16/2019    GERD (gastroesophageal reflux disease)     Hepatitis B core antibody positive 1/15/2019    IPF (idiopathic pulmonary fibrosis)     Obesity (BMI 30-39.9) 1/16/2019    RLS (restless legs syndrome)        Past Surgical History:   Procedure Laterality Date    APPLICATION OF WOUND VACUUM-ASSISTED CLOSURE DEVICE Left 8/10/2020    Procedure: APPLICATION, WOUND VAC, 40 x 5cm;  Surgeon: Benedict Clemons MD;  Location: [facility] OR [address] [place] [address];  Service: Cardiovascular;  Laterality: Left;    BRONCHOSCOPY N/A 9/23/2020    Procedure: flexible bronchoscopy with tissue biopsy CPT 06368;  Surgeon: [facility] Diagnostic Provider;  Location: [facility] OR [address] [place] [address];  Service: Anesthesiology;  Laterality: N/A;    CATHETERIZATION OF BOTH LEFT AND RIGHT HEART N/A 1/17/2019    Procedure: CATHETERIZATION, HEART, BOTH LEFT AND RIGHT;  Surgeon: Trey Evans MD;  Location: [facility] CATH LAB;  Service: Cardiology;  Laterality: N/A;    CHOLECYSTECTOMY      COLONOSCOPY      ESOPHAGOGASTRODUODENOSCOPY      HERNIA REPAIR      LEFT HEART CATHETERIZATION Left 2/18/2020    Procedure: Left heart cath;  Surgeon: Trey Evans MD;  Location: [facility] CATH LAB;  Service: Cardiology;  Laterality: Left;    LUNG  "TRANSPLANT Left 8/10/2020    Procedure: TRANSPLANT, LUNG - 4:30AM start;  Surgeon: Benedict Clemons MD;  Location: [facility] OR [address] [place] [address];  Service: Cardiovascular;  Laterality: Left;    SMALL INTESTINE SURGERY      mass removed (benign)       Review of patient's allergies indicates:   Allergen Reactions    Boniva [ibandronate] Other (See Comments)     "chest cramps"       Medications:  Medications Prior to Admission   Medication Sig    amoxicillin-clavulanate 875-125mg (AUGMENTIN) 875-125 mg per tablet Take 1 tablet by mouth every 12 (twelve) hours.    aspirin (ECOTRIN) 81 MG EC tablet Take 1 tablet (81 mg total) by mouth once daily.    atorvastatin (LIPITOR) 20 MG tablet Take 1 tablet (20 mg total) by mouth once daily.    azithromycin (Z-JAYSHREE) 250 MG tablet Take 1 tablet (250 mg total) by mouth every Mon, Wed, Fri.    blood sugar diagnostic Strp Use as directed to test blood glucose 3-4 times daily.    blood-glucose meter kit Use as instructed    docusate sodium (COLACE) 100 MG capsule Take 1 capsule (100 mg total) by mouth 2 (two) times daily as needed for Constipation.    flu vacc [id number] 6mos up,PF, (FLUARIX QUAD [id number], PF,) 60 mcg (15 mcg x 4)/0.5 mL Syrg Inject into the skin for one dose.    gabapentin (NEURONTIN) 100 MG capsule Take 1 capsule (100 mg total) by mouth 3 (three) times daily. (Patient taking differently: Take 100 mg by mouth 2 (two) times daily. )    insulin aspart U-100 (NOVOLOG FLEXPEN U-100 INSULIN) 100 unit/mL (3 mL) InPn pen Inject 4 Units into the skin 3 (three) times daily with meals. Plus correction scale; max TDD 27 units (Patient taking differently: Inject 10 Units into the skin 3 (three) times daily with meals. Plus correction scale; max TDD 27 units)    lamiVUDine (EPIVIR) 150 MG Tab Take 1 tablet (150 mg total) by mouth once daily.    lancets Misc Use as directed to test blood glucose 3-4 times daily.    lansoprazole (PREVACID) 15 MG capsule Take 1 capsule (15 mg total) " "by mouth once daily.    magnesium oxide (MAG-OX) 400 mg (241.3 mg magnesium) tablet Take 1 tablet (400 mg total) by mouth 3 (three) times daily.    metoprolol tartrate (LOPRESSOR) 25 MG tablet Take 1 tablet (25 mg total) by mouth 2 (two) times daily.    mycophenolate (CELLCEPT) 250 mg Cap Take 4 capsules (1,000 mg total) by mouth 2 (two) times daily.    NIFEdipine (PROCARDIA-XL) 30 MG (OSM) 24 hr tablet Take 1 tablet (30 mg total) by mouth once daily.    ondansetron (ZOFRAN-ODT) 8 MG TbDL DISSOLVE 1 tablet (8 mg total) by mouth every 8 (eight) hours as needed.    opw transplant care kit Welcome to [facility].  This is your transplant care kit.    pen needle, diabetic 31 gauge x 5/16" Ndle Use as directed with insulin pen 3-5 times daily.    polyethylene glycol (GLYCOLAX) 17 gram/dose powder Take 17 g by mouth 2 (two) times daily as needed.    predniSONE (DELTASONE) 5 MG tablet From 8/20-9/9 Take 25mg by mouth daily; From 9/10-11/8 take 20mg daily; From 11/9-12/8 take 15mg daily; 12/9-2/7/21 take 10mg daily then 5mg daily thereafter    rosuvastatin (CRESTOR) 10 MG tablet Take 10 mg by mouth once daily.    senna (SENOKOT) 8.6 mg tablet Take 2 tablets by mouth once daily.    sulfamethoxazole-trimethoprim 800-160mg (BACTRIM DS) 800-160 mg Tab Take 1 tablet by mouth every Mon, Wed, Fri.    tacrolimus (PROGRAF) 0.5 MG Cap Take 1 capsule (0.5 mg total) by mouth once daily.    valGANciclovir (VALCYTE) 450 mg Tab Take 1 tablet (450 mg total) by mouth 2 (two) times daily.    voriconazole (VFEND) 200 MG Tab Take by mouth 300 mg (1 and 1/2 tablets) by mouth twice daily     Antibiotics (From admission, onward)    Start     Stop Route Frequency Ordered    11/13/20 0900  azithromycin tablet 250 mg      -- OG Every Mon, Wed, Fri 11/12/20 1334    11/13/20 0900  sulfamethoxazole-trimethoprim 800-160mg per tablet 1 tablet      -- OG Every Mon, Wed, Fri 11/12/20 1334    11/12/20 1530  tobramycin (NEBCIN) " 430 mg in dextrose 5 % IVPB      11/13 1529 IV Every 24 hours (non-standard times) 11/12/20 1432    11/12/20 1445  vancomycin in dextrose 5 % 1 gram/250 mL IVPB 1,000 mg      -- IV Every 12 hours (non-standard times) 11/12/20 0135    11/12/20 0230  ceFEPIme injection 2 g      -- IV Every 8 hours (non-standard times) 11/12/20 0128        Antifungals (From admission, onward)    Start     Stop Route Frequency Ordered    11/12/20 2100  voriconazole tablet 300 mg      -- OG 2 times daily 11/12/20 1334        Antivirals (From admission, onward)        Stop Route Frequency     Epivir      -- OG Daily     ganciclovir (CYTOVENE) injection      -- IV Every 24 hours (non-standard times)           Immunization History   Administered Date(s) Administered    Hepatitis A, Adult 01/16/2019    Pneumococcal Polysaccharide - 23 Valent 01/16/2019    Tdap 01/16/2019       Family History     None        Social History     Socioeconomic History    Marital status: Single     Spouse name: Not on file    Number of children: Not on file    Years of education: Not on file    Highest education level: Not on file   Occupational History    Not on file   Social Needs    Financial resource strain: Not on file    Food insecurity     Worry: Not on file     Inability: Not on file    Transportation needs     Medical: Not on file     Non-medical: Not on file   Tobacco Use    Smoking status: Former Smoker     Types: Cigarettes     Quit date: 12/13/2016     Years since quitting: 3.9    Smokeless tobacco: Never Used   Substance and Sexual Activity    Alcohol use: No     Frequency: Never    Drug use: No    Sexual activity: Not on file   Lifestyle    Physical activity     Days per week: Not on file     Minutes per session: Not on file    Stress: Not on file   Relationships    Social connections     Talks on phone: Not on file     Gets together: Not on file     Attends Tenriism service: Not on file     Active member of club or  organization: Not on file     Attends meetings of clubs or organizations: Not on file     Relationship status: Not on file   Other Topics Concern    Not on file   Social History Narrative    Not on file     Review of Systems   Unable to perform ROS: Acuity of condition     Objective:     Vital Signs (Most Recent):  Temp: 98.8 °F (37.1 °C) (11/12/20 1505)  Pulse: 109 (11/12/20 1610)  Resp: (!) 31 (11/12/20 1610)  BP: 98/60 (11/12/20 1212)  SpO2: (!) 93 % (11/12/20 1610) Vital Signs (24h Range):  Temp:  [98.4 °F (36.9 °C)-101.6 °F (38.7 °C)] 98.8 °F (37.1 °C)  Pulse:  [101-155] 109  Resp:  [26-59] 31  SpO2:  [90 %-100 %] 93 %  BP: ()/(51-95) 98/60  Arterial Line BP: ()/(49-65) 97/65     Weight: 76.7 kg (169 lb)  Body mass index is 26.47 kg/m².    Estimated Creatinine Clearance: 55.9 mL/min (based on SCr of 1.1 mg/dL).    Physical Exam  Vitals signs reviewed.   Constitutional:       General: She is not in acute distress.     Appearance: She is well-developed. She is not diaphoretic.   HENT:      Head: Atraumatic.      Right Ear: External ear normal.      Left Ear: External ear normal.      Nose: Nose normal.      Mouth/Throat:      Mouth: Mucous membranes are moist.      Pharynx: Oropharynx is clear. No oropharyngeal exudate or posterior oropharyngeal erythema.   Eyes:      General: No scleral icterus.        Right eye: No discharge.         Left eye: No discharge.      Extraocular Movements: Extraocular movements intact.      Conjunctiva/sclera: Conjunctivae normal.   Neck:      Musculoskeletal: Normal range of motion and neck supple.   Cardiovascular:      Rate and Rhythm: Normal rate and regular rhythm.      Pulses: Normal pulses.      Heart sounds: Normal heart sounds. No murmur.   Pulmonary:      Effort: No respiratory distress.      Breath sounds: No wheezing.      Comments: Coarse breath sounds b/l, intubated on 70% FIO2  Abdominal:      General: Bowel sounds are normal. There is no distension.       Palpations: Abdomen is soft.      Tenderness: There is no abdominal tenderness.   Musculoskeletal:         General: No swelling or deformity.   Skin:     General: Skin is dry.      Findings: No erythema or rash.      Comments: Cool to touch   Neurological:      Mental Status: She is alert.      Comments: Intubated and sedated         Significant Labs:   CBC:   Recent Labs   Lab 11/11/20  0614 11/12/20  0413 11/12/20  1152   WBC 8.10 13.90* 15.98*   HGB 11.6* 11.8* 10.9*   HCT 36.0* 37.1 35.2*    231 191     CMP:   Recent Labs   Lab 11/11/20  0614 11/11/20  1651 11/12/20  0413 11/12/20  1152    138 136 133*   K 3.7 5.2* 5.7* 4.8    103 103 101   CO2 23 23 21* 17*   * 332* 134* 192*   BUN 24* 21 23 25*   CREATININE 0.7 0.7 1.0 1.1   CALCIUM 9.1 8.9 8.8 8.2*   PROT 6.1  --  6.6 5.7*   ALBUMIN 2.7*  --  2.4* 2.1*   BILITOT 0.9  --  1.9* 1.4*   ALKPHOS 197*  --  199* 184*   AST 45*  --  62* 76*   ALT 23  --  32 31   ANIONGAP 14 12 12 15   EGFRNONAA >60.0 >60.0 >60.0 53.6*     Microbiology Results (last 7 days)     Procedure Component Value Units Date/Time    Culture, Respiratory with Gram Stain [[id number]] Collected: 11/12/20 1545    Order Status: Sent Specimen: Respiratory from Tracheal Aspirate Updated: 11/12/20 1620    Blood culture [[id number]] Collected: 11/12/20 0219    Order Status: Completed Specimen: Blood from Peripheral, Antecubital, Left Updated: 11/12/20 1145     Blood Culture, Routine No Growth to date    Narrative:      Blood cultures from 2 different sites. 4 bottles total.  Please draw before starting antibiotics.    Blood culture [[id number]] Collected: 11/12/20 0219    Order Status: Completed Specimen: Blood from Peripheral, Antecubital, Left Updated: 11/12/20 1145     Blood Culture, Routine No Growth to date    Narrative:      Blood cultures x 2 different sites. 4 bottles total. Please  draw cultures before administering antibiotics.    Culture, Respiratory with Gram Stain  [[id number]]     Order Status: Canceled Specimen: Respiratory from Sputum     Blood culture #2 **CANNOT BE ORDERED STAT** [[id number]] Collected: 11/04/20 1055    Order Status: Completed Specimen: Blood from Peripheral, Antecubital, Left Updated: 11/09/20 1212     Blood Culture, Routine No growth after 5 days.    Blood culture #1 **CANNOT BE ORDERED STAT** [[id number]] Collected: 11/04/20 1055    Order Status: Completed Specimen: Blood from Peripheral, Antecubital, Left Updated: 11/09/20 1212     Blood Culture, Routine No growth after 5 days.          Significant Imaging: I have reviewed all pertinent imaging results/findings within the past 24 hours.

## 2020-11-12 NOTE — PROGRESS NOTES
"Addendum  Tobramycin consult discontinued by provider.  Pharmacy will sign off, please re-consult as needed.    Catalina Jefferson, PharmD  Ext [phone]      Pharmacokinetic Initial Assessment: Tobramycin    Assessment:  Weight utilized for dose calculation: Ideal Body Weight  Dosing method utilized: extended interval dosing    Plan: Extended interval dosing regimen: Tobramycin 430 mg IV once, followed by a random level to be drawn on 11/13 at 0000, 8-12 hours after the first dose.    Pharmacy will continue to monitor.    Please contact pharmacy at extension [phone] with any questions regarding this assessment.    Thank you for the consult,    Catalina Jefferson       Patient brief summary:  Chely Becerra is a 65 y.o. female initiated on aminoglycoside therapy for treatment of suspected bacteremia    Drug Allergies:   Review of patient's allergies indicates:   Allergen Reactions    Boniva [ibandronate] Other (See Comments)     "chest cramps"       Actual Body Weight:   76.7 kg     Adjust Body Weight:   67.6 kg    Ideal Body Weight:  61.6 kg    Renal Function:   Estimated Creatinine Clearance: 55.9 mL/min (based on SCr of 1.1 mg/dL).     Dialysis Method (if applicable):  N/A    CBC (last 72 hours):  Recent Labs   Lab Result Units 11/10/20  0502 11/11/20  0614 11/12/20  0413 11/12/20  1152   WBC K/uL 7.26 8.10 13.90* 15.98*   Hemoglobin g/dL 12.4 11.6* 11.8* 10.9*   Hematocrit % 38.7 36.0* 37.1 35.2*   Platelets K/uL 296 247 231 191   Gran % % 87.0* 78.6* 80.5*  --    Lymph % % 11.0* 13.1* 3.0*  --    Mono % % 2.0* 3.5* 1.0*  --    Eosinophil % % 0.0 0.2 0.0  --    Basophil % % 0.0 0.2 0.0  --    Differential Method  Manual Automated Manual  --        Metabolic Panel (last 72 hours):  Recent Labs   Lab Result Units 11/10/20  0502 11/11/20  0614 11/11/20  1651 11/12/20  0232 11/12/20  0413 11/12/20  1152   Sodium mmol/L 141 141 138  --  136 133*   Potassium mmol/L 3.7 3.7 5.2*  --  5.7* 4.8   Chloride mmol/L 104 104 103  --  103 101   CO2 " mmol/L 22* 23 23  --  21* 17*   Glucose mg/dL 149* 169* 332*  --  134* 192*   Glucose, UA   --   --   --  3+*  --   --    BUN mg/dL 27* 24* 21  --  23 25*   Creatinine mg/dL 0.7 0.7 0.7  --  1.0 1.1   Albumin g/dL 3.0* 2.7*  --   --  2.4* 2.1*   Total Bilirubin mg/dL 0.5 0.9  --   --  1.9* 1.4*   Alkaline Phosphatase U/L 205* 197*  --   --  199* 184*   AST U/L 28 45*  --   --  62* 76*   ALT U/L 18 23  --   --  32 31   Magnesium mg/dL 2.0 1.7  --   --  1.9  --    Phosphorus mg/dL 3.9 4.0  --   --  2.1*  --        Microbiologic Results:  Microbiology Results (last 7 days)     Procedure Component Value Units Date/Time    Culture, Respiratory with Gram Stain [[id number]]     Order Status: No result Specimen: Respiratory     Blood culture [[id number]] Collected: 11/12/20 0219    Order Status: Completed Specimen: Blood from Peripheral, Antecubital, Left Updated: 11/12/20 1145     Blood Culture, Routine No Growth to date    Narrative:      Blood cultures from 2 different sites. 4 bottles total.  Please draw before starting antibiotics.    Blood culture [[id number]] Collected: 11/12/20 0219    Order Status: Completed Specimen: Blood from Peripheral, Antecubital, Left Updated: 11/12/20 1145     Blood Culture, Routine No Growth to date    Narrative:      Blood cultures x 2 different sites. 4 bottles total. Please  draw cultures before administering antibiotics.    Culture, Respiratory with Gram Stain [[id number]]     Order Status: No result Specimen: Respiratory from Sputum     Blood culture #2 **CANNOT BE ORDERED STAT** [[id number]] Collected: 11/04/20 1055    Order Status: Completed Specimen: Blood from Peripheral, Antecubital, Left Updated: 11/09/20 1212     Blood Culture, Routine No growth after 5 days.    Blood culture #1 **CANNOT BE ORDERED STAT** [[id number]] Collected: 11/04/20 1055    Order Status: Completed Specimen: Blood from Peripheral, Antecubital, Left Updated: 11/09/20 1212     Blood Culture, Routine No growth after  5 days.

## 2020-11-12 NOTE — ASSESSMENT & PLAN NOTE
S/p left lung transplant in Aug 2020 for IPF. Has not required home O2 since transplant   -- Lung transplant following; appreciate assistance  -- continue ppx azithro, bactrim, valgan, vori, lamivudine (possible HBV donor)  -- Vanc/Cefepime added 11/12, will hold Augmentin (Augmentin for 3 months for actinomyces in donor)  -- Lung transplant adjusting tacro, currently on hold given sepsis  -- restart steroid taper after 10 day dexamethasone course

## 2020-11-13 PROBLEM — E87.20 ACIDOSIS: Status: ACTIVE | Noted: 2020-01-01

## 2020-11-13 NOTE — ASSESSMENT & PLAN NOTE
2/2 GNR PNA on top of COVID PNA. S/p 1x dose of tobra. Broadened to antolin overnight    - Cont Vanc and antolin pending speciation  - D/c vanc if GNR is confirmed  - ID consulted. Voriconazole broadened to posaconazole  - Cont levo and vaso, wean levo as tolerated

## 2020-11-13 NOTE — PROGRESS NOTES
[facility] - ICU 16 WT  Infectious Disease  Progress Note    Patient Name: Chely Becerra  MRN: [id number]  Admission Date: 11/4/2020  Length of Stay: 9 days  Attending Physician: Nhan Olvera MD  Primary Care Provider: ZAHIDA Stack MD    Isolation Status: Airborne and Contact and Droplet, Airborne and Contact and Droplet  Assessment/Plan:      * Acute hypoxemic respiratory failure due to COVID-19  65F PMH IPF s/p L SLTx 8/2020, admitted on 11/4 w/ acute hypoxic respiratory failure, COVID positive. Now w/ declining condition, intubated, on vasopressors, broad spectrum abx. S/p 5 days of remdesevir, 1 dose of convalescent plasma, remains on dexamethasone. Blood cultures 11/12 + GNR ( on ceftriaxone)    Recommendations:  - continue Meropenem and vanc  - broadened to posa on 11/13  - follow up all pending cultures  - check aspergillus antigen    Will follow      Anticipated Disposition: TBD    Thank you for your consult. I will follow-up with patient. Please contact us if you have any additional questions.    Juany Molina DO  Transplant Infectious Disease      Subjective:     Principal Problem:Acute hypoxemic respiratory failure due to COVID-19    HPI: 65F PMH DM, IPF s/p L SLTx 8/10/2020 who presented on 11/4 w/ increasing shortness of breath, cough and fevers x 3 days, and noted to have hypoxia on evaluation in lung transplant clinic. She was found to be COIVD positive on hospital admission. Daughter who lives with patient had also recently been sick, and tested positive for COVID as well. Home meds included pred taper (currently at 20mg), prograf and MMF. On admission, she was started on bipap. She has received 5 days of remdesivir, convalescent plasma, and remains on decadron. ID consulted today as patient's clinical status has deteriorated. She developed progressive hypoxic respiratory failure requiring intubation this morning. She is hypotensive on levo and vaso. Antibiotics were changed  to vanc, cefepime and flagyl. CT chest with diffuse GGO bilaterally w/ dense infiltrates in L lung, bilateral perinephric stranding, ? Colon mass.        Interval History: patient remains intubated in ICU, on pressors, blood cultures + GNR, broadened to meropenem    Review of Systems   Unable to perform ROS: Intubated     Objective:     Vital Signs (Most Recent):  Temp: 99 °F (37.2 °C) (11/13/20 1600)  Pulse: 82 (11/13/20 1600)  Resp: (!) 35 (11/13/20 1600)  BP: 98/60 (11/12/20 1212)  SpO2: 96 % (11/13/20 1600) Vital Signs (24h Range):  Temp:  [99 °F (37.2 °C)-102 °F (38.9 °C)] 99 °F (37.2 °C)  Pulse:  [] 82  Resp:  [32-47] 35  SpO2:  [90 %-97 %] 96 %  Arterial Line BP: ()/(57-76) 108/64     Weight: 76.7 kg (169 lb)  Body mass index is 26.47 kg/m².    Estimated Creatinine Clearance: 68.3 mL/min (based on SCr of 0.9 mg/dL).    Physical Exam  Vitals signs reviewed.   Constitutional:       General: She is not in acute distress.     Appearance: She is well-developed. She is not diaphoretic.   HENT:      Head: Atraumatic.      Right Ear: External ear normal.      Left Ear: External ear normal.      Nose: Nose normal.      Mouth/Throat:      Mouth: Mucous membranes are moist.      Pharynx: Oropharynx is clear. No oropharyngeal exudate or posterior oropharyngeal erythema.   Eyes:      General: No scleral icterus.        Right eye: No discharge.         Left eye: No discharge.      Extraocular Movements: Extraocular movements intact.      Conjunctiva/sclera: Conjunctivae normal.   Neck:      Musculoskeletal: Normal range of motion and neck supple.   Cardiovascular:      Rate and Rhythm: Normal rate and regular rhythm.      Pulses: Normal pulses.      Heart sounds: Normal heart sounds. No murmur.   Pulmonary:      Effort: No respiratory distress.      Breath sounds: No wheezing.      Comments: Coarse breath sounds b/l, diminished, intubated on 70% FIO2  Abdominal:      General: Bowel sounds are normal. There is  no distension.      Palpations: Abdomen is soft.      Tenderness: There is no abdominal tenderness.   Musculoskeletal:         General: No swelling or deformity.   Skin:     General: Skin is dry.      Findings: No erythema or rash.      Comments: Cool to touch   Neurological:      Mental Status: She is alert.      Comments: Intubated and sedated         Significant Labs:   CBC:   Recent Labs   Lab 11/12/20  1152 11/13/20  0010 11/13/20  0344   WBC 15.98* 19.64* 21.81*   HGB 10.9* 10.4* 10.1*   HCT 35.2* 33.3* 31.0*    151 157     CMP:   Recent Labs   Lab 11/12/20  1152  11/13/20  0010 11/13/20  0344 11/13/20  1337   *   < > 125* 129* 127*   K 4.8   < > 5.2*  5.2* 4.5 4.1      < > 93* 94* 89*   CO2 17*   < > 16* 20* 30*   *   < > 407* 237* 72   BUN 25*   < > 26* 26* 26*   CREATININE 1.1   < > 1.1 0.9 0.9   CALCIUM 8.2*   < > 7.2* 7.2* 7.1*   PROT 5.7*  --  5.3* 5.1*  --    ALBUMIN 2.1*  --  1.8* 1.7*  --    BILITOT 1.4*  --  1.2* 1.2*  --    ALKPHOS 184*  --  162* 155*  --    AST 76*  --  98* 111*  --    ALT 31  --  46* 48*  --    ANIONGAP 15   < > 16 15 8   EGFRNONAA 53.6*   < > 53.6* >60.0 >60.0    < > = values in this interval not displayed.     Microbiology Results (last 7 days)     Procedure Component Value Units Date/Time    Blood culture [[id number]] Collected: 11/12/20 0219    Order Status: Completed Specimen: Blood from Peripheral, Antecubital, Left Updated: 11/13/20 1504     Blood Culture, Routine Gram stain aer bottle: Gram negative rods      Results called to and read back by: Mayuri Palmer RN  11/13/2020        02:28      Gram stain maye bottle: Gram positive cocci in clusters resembling Staph       Results called to and read back by:Danielle Hoskins RN 11/13/2020  15:03    Narrative:      Blood cultures from 2 different sites. 4 bottles total.  Please draw before starting antibiotics.    Blood culture [[id number]] Collected: 11/13/20 0334    Order Status: Completed Specimen: Blood  from Peripheral, Hand, Right Updated: 11/13/20 1345     Blood Culture, Routine No Growth to date    Blood culture [[id number]] Collected: 11/13/20 0342    Order Status: Completed Specimen: Blood from Peripheral, Hand, Left Updated: 11/13/20 1345     Blood Culture, Routine No Growth to date    Culture, Respiratory with Gram Stain [[id number]] Collected: 11/12/20 1545    Order Status: Completed Specimen: Respiratory from Tracheal Aspirate Updated: 11/13/20 0756     Respiratory Culture No Growth     Gram Stain (Respiratory) <10 epithelial cells per low power field.     Gram Stain (Respiratory) No WBC's     Gram Stain (Respiratory) No organisms seen    Blood culture [[id number]] Collected: 11/12/20 0219    Order Status: Completed Specimen: Blood from Peripheral, Antecubital, Left Updated: 11/13/20 0228     Blood Culture, Routine Gram stain aer bottle: Gram negative rods      Results called to and read back by: Mayuri Palmer RN  11/13/2020        02:28    Narrative:      Blood cultures x 2 different sites. 4 bottles total. Please  draw cultures before administering antibiotics.    Culture, Respiratory with Gram Stain [[id number]]     Order Status: Canceled Specimen: Respiratory from Sputum     Blood culture #2 **CANNOT BE ORDERED STAT** [[id number]] Collected: 11/04/20 1055    Order Status: Completed Specimen: Blood from Peripheral, Antecubital, Left Updated: 11/09/20 1212     Blood Culture, Routine No growth after 5 days.    Blood culture #1 **CANNOT BE ORDERED STAT** [[id number]] Collected: 11/04/20 1055    Order Status: Completed Specimen: Blood from Peripheral, Antecubital, Left Updated: 11/09/20 1212     Blood Culture, Routine No growth after 5 days.          Significant Imaging: I have reviewed all pertinent imaging results/findings within the past 24 hours.

## 2020-11-13 NOTE — SUBJECTIVE & OBJECTIVE
Interval History: patient remains intubated in ICU, on pressors, blood cultures + GNR, broadened to meropenem    Review of Systems   Unable to perform ROS: Intubated     Objective:     Vital Signs (Most Recent):  Temp: 99 °F (37.2 °C) (11/13/20 1600)  Pulse: 82 (11/13/20 1600)  Resp: (!) 35 (11/13/20 1600)  BP: 98/60 (11/12/20 1212)  SpO2: 96 % (11/13/20 1600) Vital Signs (24h Range):  Temp:  [99 °F (37.2 °C)-102 °F (38.9 °C)] 99 °F (37.2 °C)  Pulse:  [] 82  Resp:  [32-47] 35  SpO2:  [90 %-97 %] 96 %  Arterial Line BP: ()/(57-76) 108/64     Weight: 76.7 kg (169 lb)  Body mass index is 26.47 kg/m².    Estimated Creatinine Clearance: 68.3 mL/min (based on SCr of 0.9 mg/dL).    Physical Exam  Vitals signs reviewed.   Constitutional:       General: She is not in acute distress.     Appearance: She is well-developed. She is not diaphoretic.   HENT:      Head: Atraumatic.      Right Ear: External ear normal.      Left Ear: External ear normal.      Nose: Nose normal.      Mouth/Throat:      Mouth: Mucous membranes are moist.      Pharynx: Oropharynx is clear. No oropharyngeal exudate or posterior oropharyngeal erythema.   Eyes:      General: No scleral icterus.        Right eye: No discharge.         Left eye: No discharge.      Extraocular Movements: Extraocular movements intact.      Conjunctiva/sclera: Conjunctivae normal.   Neck:      Musculoskeletal: Normal range of motion and neck supple.   Cardiovascular:      Rate and Rhythm: Normal rate and regular rhythm.      Pulses: Normal pulses.      Heart sounds: Normal heart sounds. No murmur.   Pulmonary:      Effort: No respiratory distress.      Breath sounds: No wheezing.      Comments: Coarse breath sounds b/l, diminished, intubated on 70% FIO2  Abdominal:      General: Bowel sounds are normal. There is no distension.      Palpations: Abdomen is soft.      Tenderness: There is no abdominal tenderness.   Musculoskeletal:         General: No swelling or  deformity.   Skin:     General: Skin is dry.      Findings: No erythema or rash.      Comments: Cool to touch   Neurological:      Mental Status: She is alert.      Comments: Intubated and sedated         Significant Labs:   CBC:   Recent Labs   Lab 11/12/20  1152 11/13/20  0010 11/13/20  0344   WBC 15.98* 19.64* 21.81*   HGB 10.9* 10.4* 10.1*   HCT 35.2* 33.3* 31.0*    151 157     CMP:   Recent Labs   Lab 11/12/20  1152  11/13/20  0010 11/13/20  0344 11/13/20  1337   *   < > 125* 129* 127*   K 4.8   < > 5.2*  5.2* 4.5 4.1      < > 93* 94* 89*   CO2 17*   < > 16* 20* 30*   *   < > 407* 237* 72   BUN 25*   < > 26* 26* 26*   CREATININE 1.1   < > 1.1 0.9 0.9   CALCIUM 8.2*   < > 7.2* 7.2* 7.1*   PROT 5.7*  --  5.3* 5.1*  --    ALBUMIN 2.1*  --  1.8* 1.7*  --    BILITOT 1.4*  --  1.2* 1.2*  --    ALKPHOS 184*  --  162* 155*  --    AST 76*  --  98* 111*  --    ALT 31  --  46* 48*  --    ANIONGAP 15   < > 16 15 8   EGFRNONAA 53.6*   < > 53.6* >60.0 >60.0    < > = values in this interval not displayed.     Microbiology Results (last 7 days)     Procedure Component Value Units Date/Time    Blood culture [[id number]] Collected: 11/12/20 0219    Order Status: Completed Specimen: Blood from Peripheral, Antecubital, Left Updated: 11/13/20 1504     Blood Culture, Routine Gram stain aer bottle: Gram negative rods      Results called to and read back by: Mayuri Palmer RN  11/13/2020        02:28      Gram stain maye bottle: Gram positive cocci in clusters resembling Staph       Results called to and read back by:Danielle Hoskins RN 11/13/2020  15:03    Narrative:      Blood cultures from 2 different sites. 4 bottles total.  Please draw before starting antibiotics.    Blood culture [[id number]] Collected: 11/13/20 0334    Order Status: Completed Specimen: Blood from Peripheral, Hand, Right Updated: 11/13/20 1345     Blood Culture, Routine No Growth to date    Blood culture [[id number]] Collected: 11/13/20 0832     Order Status: Completed Specimen: Blood from Peripheral, Hand, Left Updated: 11/13/20 1345     Blood Culture, Routine No Growth to date    Culture, Respiratory with Gram Stain [[id number]] Collected: 11/12/20 1545    Order Status: Completed Specimen: Respiratory from Tracheal Aspirate Updated: 11/13/20 0756     Respiratory Culture No Growth     Gram Stain (Respiratory) <10 epithelial cells per low power field.     Gram Stain (Respiratory) No WBC's     Gram Stain (Respiratory) No organisms seen    Blood culture [[id number]] Collected: 11/12/20 0219    Order Status: Completed Specimen: Blood from Peripheral, Antecubital, Left Updated: 11/13/20 0228     Blood Culture, Routine Gram stain aer bottle: Gram negative rods      Results called to and read back by: Mayuri Palmer RN  11/13/2020        02:28    Narrative:      Blood cultures x 2 different sites. 4 bottles total. Please  draw cultures before administering antibiotics.    Culture, Respiratory with Gram Stain [[id number]]     Order Status: Canceled Specimen: Respiratory from Sputum     Blood culture #2 **CANNOT BE ORDERED STAT** [[id number]] Collected: 11/04/20 1055    Order Status: Completed Specimen: Blood from Peripheral, Antecubital, Left Updated: 11/09/20 1212     Blood Culture, Routine No growth after 5 days.    Blood culture #1 **CANNOT BE ORDERED STAT** [[id number]] Collected: 11/04/20 1055    Order Status: Completed Specimen: Blood from Peripheral, Antecubital, Left Updated: 11/09/20 1212     Blood Culture, Routine No growth after 5 days.          Significant Imaging: I have reviewed all pertinent imaging results/findings within the past 24 hours.

## 2020-11-13 NOTE — ASSESSMENT & PLAN NOTE
S/p left lung transplant in Aug 2020 for IPF. 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

## 2020-11-13 NOTE — ASSESSMENT & PLAN NOTE
Mixed. AGMA 2/2 LA, NAGMA 2/2 renal failure and respiratory acidosis     - On bicarb gtt with improvement in acidemia  - Wean down gtt  - Will plan to wean respiratory rate throughout the day  - Good UOP with resolution of DWAYNE  - Lactate improving as well

## 2020-11-13 NOTE — PROGRESS NOTES
"Pharmacokinetic Assessment Follow Up: IV Vancomycin    Vancomycin serum concentration assessment(s):    Vancomycin trough level resulted at 18.3 mcg/mL drawn appropriately. Goal level is 15 to 20 mcg/mL.     Drug levels (last 3 results):  Recent Labs   Lab Result Units 11/13/20  1337   Vancomycin-Trough ug/mL 18.3     Vancomycin Regimen Plan:    Continue vancomycin 1000 mg IV every 12 hours with next serum trough concentration measured at 11/15 1430    Pharmacy will continue to follow and monitor vancomycin.    Please contact pharmacy at extension [phone] for questions regarding this assessment.    Thank you for the consult,   Heather Livingston, PharmD, BCCCP             Patient brief summary:  Chely Becerra is a 65 y.o. female initiated on antimicrobial therapy with IV vancomycin for treatment of bacteremia    Drug Allergies:   Review of patient's allergies indicates:   Allergen Reactions    Boniva [ibandronate] Other (See Comments)     "chest cramps"       Actual Body Weight:   76.7 kg     Renal Function:   Estimated Creatinine Clearance: 68.3 mL/min (based on SCr of 0.9 mg/dL).    Dialysis Method (if applicable):  N/A    CBC (last 72 hours):  Recent Labs   Lab Result Units 11/11/20  0614 11/12/20  0413 11/12/20  1152 11/13/20  0010 11/13/20  0344   WBC K/uL 8.10 13.90* 15.98* 19.64* 21.81*   Hemoglobin g/dL 11.6* 11.8* 10.9* 10.4* 10.1*   Hematocrit % 36.0* 37.1 35.2* 33.3* 31.0*   Platelets K/uL 247 231 191 151 157   Gran % % 78.6* 80.5* 69.0 58.0 48.0   Lymph % % 13.1* 3.0* 6.0* 7.0* 8.0*   Mono % % 3.5* 1.0* 6.0 7.0 3.0*   Eosinophil % % 0.2 0.0 1.0 0.0 0.0   Basophil % % 0.2 0.0 0.0 0.0 0.0   Differential Method  Automated Manual Manual Manual Manual       Metabolic Panel (last 72 hours):  Recent Labs   Lab Result Units 11/11/20  0614 11/11/20  1651 11/12/20  0232 11/12/20  0413 11/12/20  1152 11/12/20  2048 11/13/20  0010 11/13/20  0344 11/13/20  1337   Sodium mmol/L 141 138  --  136 133* 125* 125* 129* " 127*   Potassium mmol/L 3.7 5.2*  --  5.7* 4.8 5.7* 5.2*  5.2* 4.5 4.1   Chloride mmol/L 104 103  --  103 101 95 93* 94* 89*   CO2 mmol/L 23 23  --  21* 17* 12* 16* 20* 30*   Glucose mg/dL 169* 332*  --  134* 192* 410* 407* 237* 72   Glucose, UA   --   --  3+*  --   --   --   --   --   --    BUN mg/dL 24* 21  --  23 25* 26* 26* 26* 26*   Creatinine mg/dL 0.7 0.7  --  1.0 1.1 1.2 1.1 0.9 0.9   Albumin g/dL 2.7*  --   --  2.4* 2.1*  --  1.8* 1.7*  --    Total Bilirubin mg/dL 0.9  --   --  1.9* 1.4*  --  1.2* 1.2*  --    Alkaline Phosphatase U/L 197*  --   --  199* 184*  --  162* 155*  --    AST U/L 45*  --   --  62* 76*  --  98* 111*  --    ALT U/L 23  --   --  32 31  --  46* 48*  --    Magnesium mg/dL 1.7  --   --  1.9  --   --   --  1.4* 2.2   Phosphorus mg/dL 4.0  --   --  2.1*  --   --   --  3.4  --        Vancomycin Administrations:  vancomycin given in the last 96 hours                   vancomycin in dextrose 5 % 1 gram/250 mL IVPB 1,000 mg (mg) 1,000 mg New Bag 11/13/20 1500     1,000 mg New Bag  0203     1,000 mg New Bag 11/12/20 1506    vancomycin 1.5 g in dextrose 5 % 250 mL IVPB (ready to mix) (mg) 1,500 mg New Bag 11/12/20 0607                Microbiologic Results:  Microbiology Results (last 7 days)     Procedure Component Value Units Date/Time    Blood culture [[id number]] Collected: 11/12/20 0219    Order Status: Completed Specimen: Blood from Peripheral, Antecubital, Left Updated: 11/13/20 1504     Blood Culture, Routine Gram stain aer bottle: Gram negative rods      Results called to and read back by: Mayuri Palmer RN  11/13/2020        02:28      Gram stain maye bottle: Gram positive cocci in clusters resembling Staph       Results called to and read back by:Danielle Hoskins RN 11/13/2020  15:03    Narrative:      Blood cultures from 2 different sites. 4 bottles total.  Please draw before starting antibiotics.    Blood culture [[id number]] Collected: 11/13/20 0334    Order Status: Completed Specimen:  Blood from Peripheral, Hand, Right Updated: 11/13/20 1345     Blood Culture, Routine No Growth to date    Blood culture [[id number]] Collected: 11/13/20 0342    Order Status: Completed Specimen: Blood from Peripheral, Hand, Left Updated: 11/13/20 1345     Blood Culture, Routine No Growth to date    Culture, Respiratory with Gram Stain [[id number]] Collected: 11/12/20 1545    Order Status: Completed Specimen: Respiratory from Tracheal Aspirate Updated: 11/13/20 0756     Respiratory Culture No Growth     Gram Stain (Respiratory) <10 epithelial cells per low power field.     Gram Stain (Respiratory) No WBC's     Gram Stain (Respiratory) No organisms seen    Blood culture [[id number]] Collected: 11/12/20 0219    Order Status: Completed Specimen: Blood from Peripheral, Antecubital, Left Updated: 11/13/20 0228     Blood Culture, Routine Gram stain aer bottle: Gram negative rods      Results called to and read back by: Mayuri Palmer RN  11/13/2020        02:28    Narrative:      Blood cultures x 2 different sites. 4 bottles total. Please  draw cultures before administering antibiotics.    Culture, Respiratory with Gram Stain [[id number]]     Order Status: Canceled Specimen: Respiratory from Sputum     Blood culture #2 **CANNOT BE ORDERED STAT** [[id number]] Collected: 11/04/20 1055    Order Status: Completed Specimen: Blood from Peripheral, Antecubital, Left Updated: 11/09/20 1212     Blood Culture, Routine No growth after 5 days.    Blood culture #1 **CANNOT BE ORDERED STAT** [[id number]] Collected: 11/04/20 1055    Order Status: Completed Specimen: Blood from Peripheral, Antecubital, Left Updated: 11/09/20 1212     Blood Culture, Routine No growth after 5 days.

## 2020-11-13 NOTE — SUBJECTIVE & OBJECTIVE
Interval History/Significant Events: . Intubated, sedated this am. Vasopressor requirements have improved overnight. UOP remains adequate. Febrile. GNR in blood cultures from 11/12.    Review of Systems   Unable to perform ROS: Intubated     Objective:     Vital Signs (Most Recent):  Temp: (!) 100.9 °F (38.3 °C) (11/13/20 0645)  Pulse: 92 (11/13/20 0645)  Resp: (!) 35 (11/13/20 0645)  BP: 98/60 (11/12/20 1212)  SpO2: 97 % (11/13/20 0645) Vital Signs (24h Range):  Temp:  [98.4 °F (36.9 °C)-102 °F (38.9 °C)] 100.9 °F (38.3 °C)  Pulse:  [] 92  Resp:  [26-47] 35  SpO2:  [80 %-99 %] 97 %  BP: ()/(51-74) 98/60  Arterial Line BP: ()/(49-76) 100/69   Weight: 76.7 kg (169 lb)  Body mass index is 26.47 kg/m².      Intake/Output Summary (Last 24 hours) at 11/13/2020 0835  Last data filed at 11/13/2020 0525  Gross per 24 hour   Intake 2240.71 ml   Output 1320 ml   Net 920.71 ml       Physical Exam  Vitals signs and nursing note reviewed.   Constitutional:       General: She is not in acute distress.     Appearance: She is ill-appearing. She is not diaphoretic.      Interventions: Nasal cannula in place.      Comments: Intubated, sedated   HENT:      Head: Normocephalic and atraumatic.   Eyes:      General: No scleral icterus.  Neck:      Musculoskeletal: No neck rigidity.   Cardiovascular:      Rate and Rhythm: Normal rate and regular rhythm.      Pulses: Normal pulses.      Heart sounds: Normal heart sounds. No murmur.   Pulmonary:      Effort: No respiratory distress.      Breath sounds: Rhonchi and rales present. No decreased breath sounds or wheezing.   Chest:      Comments: Midline scar from recent transplant   Abdominal:      Palpations: Abdomen is soft.      Tenderness: There is no guarding.   Musculoskeletal:      Right lower leg: No edema.      Left lower leg: No edema.   Skin:     General: Skin is dry.      Capillary Refill: Capillary refill takes 2 to 3 seconds.      Comments: cool   Neurological:       Comments: Intubated/sedated         Vents:  Vent Mode: A/C (11/13/20 0609)  Ventilator Initiated: Yes (11/12/20 0921)  Set Rate: 35 BPM (11/13/20 0609)  Vt Set: 400 mL (11/13/20 0609)  Pressure Support: 0 cmH20 (11/13/20 0609)  PEEP/CPAP: 10 cmH20 (11/13/20 0609)  Oxygen Concentration (%): 70 (11/13/20 0645)  Peak Airway Pressure: 34 cmH2O (11/13/20 0609)  Plateau Pressure: 30 cmH20 (11/13/20 0609)  Total Ve: 14.7 mL (11/13/20 0609)  F/VT Ratio<105 (RSBI): (!) 83.14 (11/13/20 0609)  Lines/Drains/Airways     Central Venous Catheter Line            Percutaneous Central Line Insertion/Assessment - Triple Lumen  11/12/20 1030 right internal jugular less than 1 day          Drain                 NG/OG Tube 11/13/20 0200 [place] sump 14 Fr. Center mouth less than 1 day         Urethral Catheter 11/12/20 1445 16 Fr. less than 1 day          Airway                 Airway - Non-Surgical 11/12/20 0928 Endotracheal Tube less than 1 day          Arterial Line            Arterial Line 11/12/20 1053 Right Radial less than 1 day          Peripheral Intravenous Line                 Midline Catheter Insertion/Assessment  - Single Lumen 11/10/20 1603 Right basilic vein (medial side of arm) 18g x 10cm 2 days         Peripheral IV - Single Lumen 11/12/20 0545 20 G Anterior;Left Hand 1 day              Significant Labs:    CBC/Anemia Profile:  Recent Labs   Lab 11/12/20  1152 11/13/20  0010 11/13/20  0344   WBC 15.98* 19.64* 21.81*   HGB 10.9* 10.4* 10.1*   HCT 35.2* 33.3* 31.0*    151 157   * 105* 103*   RDW 16.0* 15.4* 15.2*        Chemistries:  Recent Labs   Lab 11/12/20  0413 11/12/20  1152 11/12/20 2048 11/13/20  0010 11/13/20  0344    133* 125* 125* 129*   K 5.7* 4.8 5.7* 5.2*  5.2* 4.5    101 95 93* 94*   CO2 21* 17* 12* 16* 20*   BUN 23 25* 26* 26* 26*   CREATININE 1.0 1.1 1.2 1.1 0.9   CALCIUM 8.8 8.2* 7.5* 7.2* 7.2*   ALBUMIN 2.4* 2.1*  --  1.8* 1.7*   PROT 6.6 5.7*  --  5.3* 5.1*   BILITOT 1.9*  1.4*  --  1.2* 1.2*   ALKPHOS 199* 184*  --  162* 155*   ALT 32 31  --  46* 48*   AST 62* 76*  --  98* 111*   MG 1.9  --   --   --  1.4*   PHOS 2.1*  --   --   --  3.4       ABGs:   Recent Labs   Lab 11/13/20  0609   PH 7.381   PCO2 40.4   HCO3 24.0   POCSATURATED 92*   BE -1       Significant Imaging:  I have reviewed all pertinent imaging results/findings within the past 24 hours.

## 2020-11-13 NOTE — PLAN OF CARE
Problem: Oral Intake Inadequate  Goal: Improved Oral Intake  Outcome: Ongoing, Not Progressing       Recommendations:   1. If TF warranted, recommend Impact Peptide advanced to goal rate 50 mL/hr to provide 1832 kcal (with current propofol 1.2 mL/hr), 113 g protein, and 924 mL fluid.   --Meets 90% of estimated energy needs, 100% of estimated protein needs   --Additional fluid per MD. Hold for residuals >500 mL.  --If propofol increased, RD to adjust goal rate.      2. If diabetic formula desired, recommend Glucerna 1.5 advanced to goal rate 50 mL/hr (A1C 8.0).     3. RD following.  Goals: Pt to meet 50-75% estimated kcal and protein needs by RD f/u.  Nutrition Goal Status: new  Communication of RD Recs: other (comment)(POC)

## 2020-11-13 NOTE — PROGRESS NOTES
[facility] - ICU 16 WT  Adult Nutrition  Progress Note    SUMMARY       Recommendations    Recommendations:   1. If TF warranted, recommend Impact Peptide advanced to goal rate 50 mL/hr to provide 1832 kcal (with current propofol 1.2 mL/hr), 113 g protein, and 924 mL fluid.   --Meets 90% of estimated energy needs, 100% of estimated protein needs   --Additional fluid per MD. Hold for residuals >500 mL.  --If propofol increased, RD to adjust goal rate.      2. If diabetic formula desired, recommend Glucerna 1.5 advanced to goal rate 50 mL/hr (A1C 8.0).     3. RD following.  Goals: Pt to meet 50-75% estimated kcal and protein needs by RD f/u.  Nutrition Goal Status: new  Communication of RD Recs: other (comment)(POC)    Reason for Assessment    Reason For Assessment: length of stay  Diagnosis: (COVID-19)  Relevant Medical History: DMII, HTN, pulmonary fibrosis s/p left lung tx  Interdisciplinary Rounds: did not attend  General Information Comments: RD assessment d/t length of stay. Pt intubated 11/12 AM. Previously on full liquid diet and eating anywhere from 0-25% with a few 50% intake per flowsheets. Pt with intentional weight loss in the past per previous RD notes. Due to recent hospital wide restrictions to limit the transfer of (COVID-19), we are not performing any physical exams at this time on patients with +COVID-19. All S/S will be observational; NFPE to be performed at a future date. Unable to obtain nutrition hx.   Nutrition Discharge Planning: Unclear at this time.    Nutrition Risk Screen    Nutrition Risk Screen: no indicators present    Nutrition/Diet History    Patient Reported Diet/Restrictions/Preferences: other (see comments)(Unable to obtain d/t COVID precautions. Heart healthy in past.)  Typical Food/Fluid Intake: -  Food Preferences: -  Spiritual, Cultural Beliefs, [employer] Practices, Values that Affect Care: no  Food Allergies: NKFA  Factors Affecting Nutritional Intake: NPO, on  "mechanical ventilation    Anthropometrics    Temp: 100.2 °F (37.9 °C)  Height: 5' 7"  Height (inches): 67 in  Weight Method: Bed Scale  Weight: 76.7 kg (169 lb)  Weight (lb): 169 lb  Ideal Body Weight (IBW), Female: 135 lb  % Ideal Body Weight, Female (lb): 125.19 %  BMI (Calculated): 26.5  Weight Loss: intentional(per chart review)       Lab/Procedures/Meds    Pertinent Labs Reviewed: reviewed  Pertinent Labs Comments: Na 129, BUN 26, Glu 237, Ca 7.2, Mg 1.4  Pertinent Medications Reviewed: reviewed  Pertinent Medications Comments: Mg sulfate, pantoprazole, precedex, insulin, norepinphrine, sodium bicarbonate, vasopressin    Estimated/Assessed Needs    Weight Used For Calorie Calculations: 76.7 kg (169 lb 1.5 oz)  Energy Calorie Requirements (kcal): 2034 kcal/day  Energy Need Method: [facility]  Protein Requirements:  gm/day(1.5-2 g/kg)  Weight Used For Protein Calculations: 76.7 kg (169 lb 1.5 oz)  Fluid Requirements (mL): 1 mL/kcal or per MD  Estimated Fluid Requirement Method: RDA Method  RDA Method (mL): 2034  CHO Requirement: 254 gm/day    Nutrition Prescription Ordered    Current Diet Order: NPO  Nutrition Order Comments: -  Oral Nutrition Supplement: -    Evaluation of Received Nutrient/Fluid Intake    Other Calories (kcal): 32(propofol)  % Kcal Needs: 2  % Protein Needs: 0  I/O: +1.2L  Energy Calories Required: not meeting needs  Protein Required: not meeting needs  Fluid Required: other (see comments)(per MD)  Comments: LBM 11/11  Tolerance: other (see comments)(NPO)  % Intake of Estimated Energy Needs: 0 - 25 %  % Meal Intake: NPO    Nutrition Risk    Level of Risk/Frequency of Follow-up: high     Assessment and Plan    Nutrition Problem  Inadeqaute energy intake     Related to (etiology):   Decrease ability to consume adequate PO    Signs and Symptoms (as evidenced by):   NPO, Intubated     Interventions(treatment strategy):  Collaboration with other providers     Nutrition Diagnosis Status: "   New    Monitor and Evaluation    Food and Nutrient Intake: enteral nutrition intake  Food and Nutrient Adminstration: diet order, enteral and parenteral nutrition administration  Knowledge/Beliefs/Attitudes: other (specify)  Anthropometric Measurements: weight, weight change, body mass index  Biochemical Data, Medical Tests and Procedures: electrolyte and renal panel, gastrointestinal profile, glucose/endocrine profile, inflammatory profile, lipid profile  Nutrition-Focused Physical Findings: overall appearance      Nutrition Follow-Up    RD Follow-up?: Yes

## 2020-11-13 NOTE — ASSESSMENT & PLAN NOTE
Alk Phos steady. Now with hyperbilirubinemia and elevated transaminases likely 2/2 ischemic hepatitis in the setting of septic shock. CTM

## 2020-11-13 NOTE — ASSESSMENT & PLAN NOTE
Bedside and Verbal shift change report given to Felix Banuelos (oncoming nurse) by Tony and Parisa Mcqueen (offgoing nurse). Report included the following information SBAR, Kardex, Procedure Summary and Cardiac Rhythm Normal Sinus Rhythm and sometimes sinus tachy. COVID + 11/4. CXR with bilateral patchy opacities. Symptoms started 10/30 and steadily progressed. Daughter found to be COVID +. Overnight on 11/12, patient developed septic shock 2/ 2 GNR LLL PNA c/b GNR bacteremia.     - LPV  - Wean respiratory rate today  - Hold on proning this am, but will re-evaluate this in the afternoon  - Remdesivir X 5 days; completed 11/09/20  - Convalescent Plasma; Dose #1 11/5  - Dexamethasone X 10 days (end 11/14)  - Wean FiO2 for SpO2 >88%  - Duonebs  - hypercoagulable on full dose lovenox   - TTE with EF 70%; left ventricular concentric remodeling

## 2020-11-13 NOTE — ASSESSMENT & PLAN NOTE
Steroid induced. Now uncontrolled in the setting of septic shock requiring an insulin gtt.    - Plan to start trickle feeds today now that pressor requirements are improved  - Cont insulin gtt

## 2020-11-13 NOTE — PROGRESS NOTES
[facility] - ICU 16   Critical Care Medicine  Progress Note    Patient Name: Chely Becerra  MRN: [id number]  Admission Date: 11/4/2020  Hospital Length of Stay: 9 days  Code Status: Full Code  Attending Provider: Nhan Olvera MD  Primary Care Provider: ZAHIDA Stack MD   Principal Problem: Acute hypoxemic respiratory failure due to COVID-19    Subjective:       Interval History/Significant Events: . Intubated, sedated this am. Vasopressor requirements have improved overnight. UOP remains adequate. Febrile. GNR in blood cultures from 11/12.    Review of Systems   Unable to perform ROS: Intubated     Objective:     Vital Signs (Most Recent):  Temp: (!) 100.9 °F (38.3 °C) (11/13/20 0645)  Pulse: 92 (11/13/20 0645)  Resp: (!) 35 (11/13/20 0645)  BP: 98/60 (11/12/20 1212)  SpO2: 97 % (11/13/20 0645) Vital Signs (24h Range):  Temp:  [98.4 °F (36.9 °C)-102 °F (38.9 °C)] 100.9 °F (38.3 °C)  Pulse:  [] 92  Resp:  [26-47] 35  SpO2:  [80 %-99 %] 97 %  BP: ()/(51-74) 98/60  Arterial Line BP: ()/(49-76) 100/69   Weight: 76.7 kg (169 lb)  Body mass index is 26.47 kg/m².      Intake/Output Summary (Last 24 hours) at 11/13/2020 0835  Last data filed at 11/13/2020 0525  Gross per 24 hour   Intake 2240.71 ml   Output 1320 ml   Net 920.71 ml       Physical Exam  Vitals signs and nursing note reviewed.   Constitutional:       General: She is not in acute distress.     Appearance: She is ill-appearing. She is not diaphoretic.      Interventions: Nasal cannula in place.      Comments: Intubated, sedated   HENT:      Head: Normocephalic and atraumatic.   Eyes:      General: No scleral icterus.  Neck:      Musculoskeletal: No neck rigidity.   Cardiovascular:      Rate and Rhythm: Normal rate and regular rhythm.      Pulses: Normal pulses.      Heart sounds: Normal heart sounds. No murmur.   Pulmonary:      Effort: No respiratory distress.      Breath sounds: Rhonchi and rales present. No  decreased breath sounds or wheezing.   Chest:      Comments: Midline scar from recent transplant   Abdominal:      Palpations: Abdomen is soft.      Tenderness: There is no guarding.   Musculoskeletal:      Right lower leg: No edema.      Left lower leg: No edema.   Skin:     General: Skin is dry.      Capillary Refill: Capillary refill takes 2 to 3 seconds.      Comments: cool   Neurological:      Comments: Intubated/sedated         Vents:  Vent Mode: A/C (11/13/20 0609)  Ventilator Initiated: Yes (11/12/20 0921)  Set Rate: 35 BPM (11/13/20 0609)  Vt Set: 400 mL (11/13/20 0609)  Pressure Support: 0 cmH20 (11/13/20 0609)  PEEP/CPAP: 10 cmH20 (11/13/20 0609)  Oxygen Concentration (%): 70 (11/13/20 0645)  Peak Airway Pressure: 34 cmH2O (11/13/20 0609)  Plateau Pressure: 30 cmH20 (11/13/20 0609)  Total Ve: 14.7 mL (11/13/20 0609)  F/VT Ratio<105 (RSBI): (!) 83.14 (11/13/20 0609)  Lines/Drains/Airways     Central Venous Catheter Line            Percutaneous Central Line Insertion/Assessment - Triple Lumen  11/12/20 1030 right internal jugular less than 1 day          Drain                 NG/OG Tube 11/13/20 0200 Holcomb sump 14 Fr. Center mouth less than 1 day         Urethral Catheter 11/12/20 1445 16 Fr. less than 1 day          Airway                 Airway - Non-Surgical 11/12/20 0928 Endotracheal Tube less than 1 day          Arterial Line            Arterial Line 11/12/20 1053 Right Radial less than 1 day          Peripheral Intravenous Line                 Midline Catheter Insertion/Assessment  - Single Lumen 11/10/20 1603 Right basilic vein (medial side of arm) 18g x 10cm 2 days         Peripheral IV - Single Lumen 11/12/20 0545 20 G Anterior;Left Hand 1 day              Significant Labs:    CBC/Anemia Profile:  Recent Labs   Lab 11/12/20  1152 11/13/20  0010 11/13/20  0344   WBC 15.98* 19.64* 21.81*   HGB 10.9* 10.4* 10.1*   HCT 35.2* 33.3* 31.0*    151 157   * 105* 103*   RDW 16.0* 15.4* 15.2*         Chemistries:  Recent Labs   Lab 11/12/20  0413 11/12/20  1152 11/12/20  2048 11/13/20  0010 11/13/20  0344    133* 125* 125* 129*   K 5.7* 4.8 5.7* 5.2*  5.2* 4.5    101 95 93* 94*   CO2 21* 17* 12* 16* 20*   BUN 23 25* 26* 26* 26*   CREATININE 1.0 1.1 1.2 1.1 0.9   CALCIUM 8.8 8.2* 7.5* 7.2* 7.2*   ALBUMIN 2.4* 2.1*  --  1.8* 1.7*   PROT 6.6 5.7*  --  5.3* 5.1*   BILITOT 1.9* 1.4*  --  1.2* 1.2*   ALKPHOS 199* 184*  --  162* 155*   ALT 32 31  --  46* 48*   AST 62* 76*  --  98* 111*   MG 1.9  --   --   --  1.4*   PHOS 2.1*  --   --   --  3.4       ABGs:   Recent Labs   Lab 11/13/20  0609   PH 7.381   PCO2 40.4   HCO3 24.0   POCSATURATED 92*   BE -1       Significant Imaging:  I have reviewed all pertinent imaging results/findings within the past 24 hours.      ABG  Recent Labs   Lab 11/13/20  0609   PH 7.381   PO2 64*   PCO2 40.4   HCO3 24.0   BE -1     Assessment/Plan:     Pulmonary  S/P lung transplant  S/p left lung transplant in Aug 2020 for IPF. 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 AGMA 2/2 LA, NAGMA 2/2 renal failure and respiratory acidosis     - On bicarb gtt with improvement in acidemia  - Wean down gtt  - Will plan to wean respiratory rate throughout the day  - Good UOP with resolution of DWAYNE  - Lactate improving as well    ID  Septic shock  2/2 GNR PNA on top of COVID PNA. S/p 1x dose of tobra. Broadened to antolin overnight    - Cont Vanc and antolin pending speciation  - D/c vanc if GNR is confirmed  - ID consulted. Voriconazole  broadened to posaconazole  - Cont levo and vaso, wean levo as tolerated      Sepsis  Hypotensive but not currently requiring vasopressors. Initially 2/2 COVID PNA, now with possible bacterial infection. CXR not markedly changed, UA neg.     - Cont Vanc, cefepime  - Add flagyl for possible intraabdominal source  - CT C/A/P if patient intubated or respiratory status significantly improves  - Cultures sent  - ID consulted  - Midline placed 11/10- not likely the source    Endocrine  Prediabetes  Steroid induced. Now uncontrolled in the setting of septic shock requiring an insulin gtt.    - Plan to start trickle feeds today now that pressor requirements are improved  - Cont insulin gtt      Other  * Acute hypoxemic respiratory failure due to COVID-19  COVID + 11/4. CXR with bilateral patchy opacities. Symptoms started 10/30 and steadily progressed. Daughter found to be COVID +. Overnight on 11/12, patient developed septic shock 2/ 2 GNR LLL PNA c/b GNR bacteremia.     - LPV  - Wean respiratory rate today  - Hold on proning this am, but will re-evaluate this in the afternoon  - Remdesivir X 5 days; completed 11/09/20  - Convalescent Plasma; Dose #1 11/5  - Dexamethasone X 10 days (end 11/14)  - Wean FiO2 for SpO2 >88%  - Duonebs  - hypercoagulable on full dose lovenox   - TTE with EF 70%; left ventricular concentric remodeling      Pneumonia due to COVID-19 virus  Plan per respiratory failure    Elevated alkaline phosphatase level  Alk Phos steady. Now with hyperbilirubinemia and elevated transaminases likely 2/2 ischemic hepatitis in the setting of septic shock. CTM    Palliative care encounter  Ivy updated regularly. All questions answered.    Critical Care Time: 80 minutes  Critical care was time spent personally by me on the following activities: evaluating this patient's organ dysfunction, development of treatment plan, discussing treatment plan with patient or surrogate and bedside caregivers, discussions with  consultants, evaluation of patient's response to treatment, examination of patient, ordering and performing treatments and interventions, ordering and review of laboratory studies, ordering and review of radiographic studies, re-evaluation of patient's condition. This critical care time did not overlap with that of any other provider or involve time for any procedures.         Nhan Olvera MD  Critical Care Medicine  [facility] - ICU 16 WT

## 2020-11-13 NOTE — PLAN OF CARE
RICU DAILY GOALS       A: Awake   Actual - RASS ([place] Agitation-Sedation Scale): -4-->deep sedation     B: Breath   SBT: Not attempted   C: Coordinate A & B, analgesics/sedatives   Pain: managed    SAT: Not attempted  D: Delirium   CAM-ICU: Overall CAM-ICU: Positive  E: Early(intubated/ Progressive (non-intubated) Mobility   MOVE Screen: Fail   Activity: Activity Management: patient unable to perform activities  FAS: Feeding/Nutrition   Diet order: Diet/Nutrition Received: NPO, Specialty Diet/Nutrition Received: fiber, restricted Fluid restriction:    T: Thrombus   DVT prophylaxis: VTE Required Core Measure: (SCDs) Sequential compression device initiated/maintained, Pharmacological prophylaxis initiated/maintained  H: HOB Elevation   Head of Bed (HOB): HOB at 30-45 degrees  U: Ulcer Prophylaxis   GI: yes  G: Glucose control   managed Glycemic Management: blood glucose monitoring  S: Skin   Bundle compliance: yes   Bathing/Skin Care: linen changed   B: Bowel Function   no issues   I: Indwelling Catheters   Rios necessity:      Urethral Catheter 11/12/20 1445 16 Fr.-Reason for Continuing Urinary Catheterization: Critically ill in ICU and requiring hourly monitoring of intake/output   CVC necessity: Yes    D: De-escalation Antibx   No  Plan for the day   Maintain oxygenation  Family/Goals of care/Code Status   Code Status: Full Code     Pt intubated, R IJ line placed, art line placed. IV drips per eMAR. VS and assessment per flow sheet, patient progressing towards goals as tolerated, plan of care reviewed with Chely Becerra and daughter, all concerns addressed, will continue to monitor.

## 2020-11-13 NOTE — PLAN OF CARE
Problem: Skin and Tissue Injury (Mechanical Ventilation, Invasive)  Goal: Absence of Device-Related Skin and Tissue Injury  Outcome: Ongoing, Progressing  Intervention: Maintain Skin and Tissue Health  Flowsheets (Taken 11/13/2020 0535)  Device Skin Pressure Protection:   absorbent pad utilized/changed   pressure points protected   positioning supports utilized   skin-to-device areas padded     Problem: Ventilator-Induced Lung Injury (Mechanical Ventilation, Invasive)  Goal: Absence of Ventilator-Induced Lung Injury  Outcome: Ongoing, Progressing  Intervention: Facilitate Lung-Protection Measures  Flowsheets (Taken 11/13/2020 0535)  Lung Protection Measures:   fluid excess minimized   low inspiratory pressure provided   low tidal volume provided   lung compliance monitored   optimal PEEP applied   plateau/inspiratory pressure monitored   ventilator waveforms monitored   ventilator synchrony promoted  Intervention: Prevent Ventilator-Associated Pneumonia  Flowsheets (Taken 11/13/2020 0535)  Head of Bed (HOB): HOB at 30-45 degrees  Oral Care: swabbed with sterile water

## 2020-11-13 NOTE — ASSESSMENT & PLAN NOTE
65F PMH IPF s/p L SLTx 8/2020, admitted on 11/4 w/ acute hypoxic respiratory failure, COVID positive. Now w/ declining condition, intubated, on vasopressors, broad spectrum abx. S/p 5 days of remdesevir, 1 dose of convalescent plasma, remains on dexamethasone. Blood cultures 11/12 + GNR ( on ceftriaxone)    Recommendations:  - continue Meropenem and vanc  - broadened to posa on 11/13  - follow up all pending cultures  - check aspergillus antigen    Will follow

## 2020-11-13 NOTE — PLAN OF CARE
Vital signs and labs reviewed - febrile overnight. Pressor requirements improving this morning. Intubated for increased WOB and progressive hypoxemia 11/12. Antibiotics broadened to Vancomycin/Merrem. Tobramycin x 1 11/12. Now with GNR bacteremia. Remdesivir (completed 11/9), plasma, dexamethasone (through 11/14); continued COVID care per MICU. Change dexamethasone back to prednisone when treatment course complete. Tacrolimus and MMF on hold due to ongoing infection. OIP with ganciclovir, lamivudine, bactrim, and posaconazole. Will continue to adjust IS and OIP as needed. Please call [phone] with questions.

## 2020-11-14 NOTE — SUBJECTIVE & OBJECTIVE
Interval History: remains critically ill, blood cx + GNR, resp cx now presumptive pseudomonas, vent settings slightly improved, remains on pressors    Review of Systems   Unable to perform ROS: Intubated     Objective:     Vital Signs (Most Recent):  Temp: 99.5 °F (37.5 °C) (11/14/20 1230)  Pulse: 69 (11/14/20 1230)  Resp: (!) 35 (11/14/20 1230)  BP: (!) 89/58 (11/14/20 1200)  SpO2: (!) 94 % (11/14/20 1230) Vital Signs (24h Range):  Temp:  [98.8 °F (37.1 °C)-101.8 °F (38.8 °C)] 99.5 °F (37.5 °C)  Pulse:  [66-87] 69  Resp:  [4-50] 35  SpO2:  [90 %-100 %] 94 %  BP: ()/(58-67) 89/58  Arterial Line BP: ()/(56-68) 91/59     Weight: 76.7 kg (169 lb)  Body mass index is 26.47 kg/m².    Estimated Creatinine Clearance: 76.8 mL/min (based on SCr of 0.8 mg/dL).    Physical Exam  Vitals signs reviewed.   Constitutional:       General: She is not in acute distress.     Appearance: She is well-developed. She is not diaphoretic.   HENT:      Head: Atraumatic.      Right Ear: External ear normal.      Left Ear: External ear normal.      Nose: Nose normal.      Mouth/Throat:      Mouth: Mucous membranes are moist.   Eyes:      General: No scleral icterus.        Right eye: No discharge.         Left eye: No discharge.      Extraocular Movements: Extraocular movements intact.      Conjunctiva/sclera: Conjunctivae normal.   Neck:      Musculoskeletal: Normal range of motion and neck supple.   Cardiovascular:      Rate and Rhythm: Normal rate and regular rhythm.      Pulses: Normal pulses.      Heart sounds: Normal heart sounds. No murmur.   Pulmonary:      Effort: No respiratory distress.      Breath sounds: No wheezing.      Comments: Coarse breath sounds b/l, diminished, intubated on 60% FIO2  Abdominal:      General: Bowel sounds are normal. There is no distension.      Palpations: Abdomen is soft.      Tenderness: There is no abdominal tenderness.   Musculoskeletal:         General: No swelling or deformity.   Skin:      General: Skin is dry.      Findings: No erythema or rash.      Comments: Cool to touch   Neurological:      Comments: Intubated and sedated         Significant Labs:   CBC:   Recent Labs   Lab 11/13/20  0010 11/13/20  0344 11/14/20  0446   WBC 19.64* 21.81* 18.26*   HGB 10.4* 10.1* 9.1*   HCT 33.3* 31.0* 27.5*    157 108*     CMP:   Recent Labs   Lab 11/13/20  0010 11/13/20  0344 11/13/20  1337 11/14/20  0446   * 129* 127* 128*   K 5.2*  5.2* 4.5 4.1 4.7   CL 93* 94* 89* 89*   CO2 16* 20* 30* 27   * 237* 72 189*   BUN 26* 26* 26* 33*   CREATININE 1.1 0.9 0.9 0.8   CALCIUM 7.2* 7.2* 7.1* 7.4*   PROT 5.3* 5.1*  --  4.7*   ALBUMIN 1.8* 1.7*  --  1.4*   BILITOT 1.2* 1.2*  --  1.2*   ALKPHOS 162* 155*  --  173*   AST 98* 111*  --  59*   ALT 46* 48*  --  39   ANIONGAP 16 15 8 12   EGFRNONAA 53.6* >60.0 >60.0 >60.0     Microbiology Results (last 7 days)     Procedure Component Value Units Date/Time    Culture, Respiratory with Gram Stain [[id number]]  (Abnormal) Collected: 11/12/20 1545    Order Status: Completed Specimen: Respiratory from Tracheal Aspirate Updated: 11/14/20 1119     Respiratory Culture PRESUMPTIVE PSEUDOMONAS SPECIES  Few  Identification and susceptibility pending       Gram Stain (Respiratory) <10 epithelial cells per low power field.     Gram Stain (Respiratory) No WBC's     Gram Stain (Respiratory) No organisms seen    Blood culture [[id number]]  (Abnormal) Collected: 11/12/20 0219    Order Status: Completed Specimen: Blood from Peripheral, Antecubital, Left Updated: 11/14/20 0950     Blood Culture, Routine Gram stain aer bottle: Gram negative rods      Results called to and read back by: Mayuri Palmer RN  11/13/2020        02:28      GRAM NEGATIVE [place]  Identification pending  For susceptibility see order #[id number]      Narrative:      Blood cultures x 2 different sites. 4 bottles total. Please  draw cultures before administering antibiotics.    Blood culture [[id number]]   (Abnormal) Collected: 11/12/20 0219    Order Status: Completed Specimen: Blood from Peripheral, Antecubital, Left Updated: 11/14/20 0948     Blood Culture, Routine Gram stain aer bottle: Gram negative rods      Results called to and read back by: Mayuri Palmer RN  11/13/2020        02:28      Gram stain maye bottle: Gram positive cocci in clusters resembling Staph       Results called to and read back by:Danielle Hoskins RN 11/13/2020  15:03      GRAM NEGATIVE [place]  Identification and susceptibility pending        COAGULASE-NEGATIVE STAPHYLOCOCCUS SPECIES  Organism is a probable contaminant      Narrative:      Blood cultures from 2 different sites. 4 bottles total.  Please draw before starting antibiotics.    Blood culture [[id number]] Collected: 11/13/20 0334    Order Status: Completed Specimen: Blood from Peripheral, Hand, Right Updated: 11/14/20 0812     Blood Culture, Routine No Growth to date      No Growth to date    Blood culture [[id number]] Collected: 11/13/20 0342    Order Status: Completed Specimen: Blood from Peripheral, Hand, Left Updated: 11/14/20 0812     Blood Culture, Routine No Growth to date      No Growth to date    Culture, Respiratory with Gram Stain [[id number]]     Order Status: Canceled Specimen: Respiratory from Sputum     Blood culture #2 **CANNOT BE ORDERED STAT** [[id number]] Collected: 11/04/20 1055    Order Status: Completed Specimen: Blood from Peripheral, Antecubital, Left Updated: 11/09/20 1212     Blood Culture, Routine No growth after 5 days.    Blood culture #1 **CANNOT BE ORDERED STAT** [[id number]] Collected: 11/04/20 1055    Order Status: Completed Specimen: Blood from Peripheral, Antecubital, Left Updated: 11/09/20 1212     Blood Culture, Routine No growth after 5 days.          Significant Imaging: I have reviewed all pertinent imaging results/findings within the past 24 hours.

## 2020-11-14 NOTE — ASSESSMENT & PLAN NOTE
2/2 GNR PNA on top of COVID PNA. S/p 1x dose of tobra. Broadened to antolin    - Cont Vanc and antolin pending speciation  - D/c vanc if GNR is confirmed  - ID consulted. Voriconazole broadened to posaconazole  - Cont levo, wean as tolerated

## 2020-11-14 NOTE — PROGRESS NOTES
[facility] - ICU 16   Critical Care Medicine  Progress Note    Patient Name: Chely Becerra  MRN: [id number]  Admission Date: 11/4/2020  Hospital Length of Stay: 10 days  Code Status: Full Code  Attending Provider: Inna Castillo MD  Primary Care Provider: ZAHIDA Stack MD   Principal Problem: Acute hypoxemic respiratory failure due to COVID-19    Subjective:     HPI:  Ms. Chely Becerra is a 65 y.o. year old female with a PMHx of diabetes and idiopathic pulmonary fibrosis s/p unilateral (left) lung transplant in August 2020 who presents to the ED complaining of a shortness of breath that started last Friday and progressively got worse. She had a fever of 101 on Sunday, started coughing for the last 3 days nonproductive, denies any chest pain. She was seen in the [facility] for routine testing earlier this morning was found to be hypoxic and she was referred to the emergency department. On arrival her COVID-19 test is positive.     Patient lives with her 2 daughters, 1 of the daughters has been having some respiratory symptoms recently however she thought was secondary to asthma. he is on prednisone taper currently 20 mg daily, she has been compliant with her antirejection medication Prograf and CellCept. She was started on BiPAP in the ED with improvement in her oxygen saturations to the mid 90's.     Hospital/ICU Course:  Admitted to Critical Care Medicine for acute hypoxic respiratory failure 2/2 to COVID pneumonia. Alternating between BiPAP and comfort flow. Started on dexamethasone. Consented for Remdsivir and Convalescent Plasma by lung transplant. Patient received her first unit of convalescent plasma 11/5 and tolerated well per patient. Echo performed on 11/5 showed LVEF 70% with normal diastolic function, though LV concentric remodeling & normal RV size and function. Patient continued on alternating bipap with comfort flow 11/8 - 11/10. Patient clinically declined on  11/13. Abx were broadened to vanc and cefepime (in addition to x1 dose tobra given for double gram negative coverage). Patient was intubated and central/arterial lines were placed. CT chest/abd/pelvis showed LLL pneumonia. Blood cultures resulted with GNR bacteremia. As patient was still spiking fevers with uptrending lactic, abx broadened to meropenem.     Interval History/Significant Events: On levo. Continues to spike fevers. Blood cx's with GNR's. Sputum cx with Pseudomonas. Net positive 900 cc last 24 hours. Checking ABG at 2 pm and then will consider proning.     Review of Systems   Unable to perform ROS: Intubated     Objective:     Vital Signs (Most Recent):  Temp: 99.7 °F (37.6 °C) (11/14/20 1140)  Pulse: 70 (11/14/20 1140)  Resp: (!) 35 (11/14/20 1140)  BP: (!) 88/63 (11/14/20 0600)  SpO2: (!) 94 % (11/14/20 1140) Vital Signs (24h Range):  Temp:  [98.8 °F (37.1 °C)-101.8 °F (38.8 °C)] 99.7 °F (37.6 °C)  Pulse:  [70-87] 70  Resp:  [4-50] 35  SpO2:  [90 %-100 %] 94 %  BP: ()/(63-67) 88/63  Arterial Line BP: ()/(56-68) 99/65   Weight: 76.7 kg (169 lb)  Body mass index is 26.47 kg/m².      Intake/Output Summary (Last 24 hours) at 11/14/2020 1155  Last data filed at 11/14/2020 0800  Gross per 24 hour   Intake 2206.9 ml   Output 1630 ml   Net 576.9 ml       Physical Exam  Vitals signs and nursing note reviewed.   Constitutional:       General: She is not in acute distress.     Appearance: She is ill-appearing. She is not diaphoretic.      Interventions: Nasal cannula in place.      Comments: Intubated, sedated   HENT:      Head: Normocephalic and atraumatic.   Eyes:      General: No scleral icterus.  Neck:      Musculoskeletal: No neck rigidity.   Cardiovascular:      Rate and Rhythm: Normal rate and regular rhythm.      Pulses: Normal pulses.      Heart sounds: Normal heart sounds. No murmur.   Pulmonary:      Effort: No respiratory distress.      Breath sounds: Rhonchi and rales present. No  decreased breath sounds or wheezing.   Chest:      Comments: Midline scar from recent transplant   Abdominal:      Palpations: Abdomen is soft.      Tenderness: There is no guarding.   Musculoskeletal:      Right lower leg: No edema.      Left lower leg: No edema.   Skin:     General: Skin is dry.      Capillary Refill: Capillary refill takes 2 to 3 seconds.      Comments: cool   Neurological:      Comments: Intubated/sedated         Vents:  Vent Mode: A/C (11/14/20 1140)  Ventilator Initiated: Yes (11/12/20 0921)  Set Rate: 35 BPM (11/14/20 1140)  Vt Set: 360 mL (11/14/20 1140)  Pressure Support: 0 cmH20 (11/14/20 1140)  PEEP/CPAP: 10 cmH20 (11/14/20 1140)  Oxygen Concentration (%): 60 (11/14/20 1140)  Peak Airway Pressure: 32 cmH2O (11/14/20 1140)  Plateau Pressure: 30 cmH20 (11/14/20 1140)  Total Ve: 13.2 mL (11/14/20 1140)  F/VT Ratio<105 (RSBI): (!) 92.84 (11/14/20 1140)  Lines/Drains/Airways     Central Venous Catheter Line            Percutaneous Central Line Insertion/Assessment - Triple Lumen  11/12/20 1030 right internal jugular 2 days          Drain                 NG/OG Tube 11/13/20 0200 [place] sump 14 Fr. Center mouth 1 day         Urethral Catheter 11/12/20 1445 16 Fr. 1 day          Airway                 Airway - Non-Surgical 11/12/20 0928 Endotracheal Tube 2 days          Arterial Line            Arterial Line 11/12/20 1053 Right Radial 2 days          Peripheral Intravenous Line                 Midline Catheter Insertion/Assessment  - Single Lumen 11/10/20 1603 Right basilic vein (medial side of arm) 18g x 10cm 3 days         Peripheral IV - Single Lumen 11/12/20 0545 20 G Anterior;Left Hand 2 days              Significant Labs:    CBC/Anemia Profile:  Recent Labs   Lab 11/13/20  0010 11/13/20  0344 11/14/20  0446   WBC 19.64* 21.81* 18.26*   HGB 10.4* 10.1* 9.1*   HCT 33.3* 31.0* 27.5*    157 108*   * 103* 99*   RDW 15.4* 15.2* 15.3*   FERRITIN  --   --  4,375*         Chemistries:  Recent Labs   Lab 11/13/20  0010 11/13/20  0344 11/13/20  1337 11/14/20  0446   * 129* 127* 128*   K 5.2*  5.2* 4.5 4.1 4.7   CL 93* 94* 89* 89*   CO2 16* 20* 30* 27   BUN 26* 26* 26* 33*   CREATININE 1.1 0.9 0.9 0.8   CALCIUM 7.2* 7.2* 7.1* 7.4*   ALBUMIN 1.8* 1.7*  --  1.4*   PROT 5.3* 5.1*  --  4.7*   BILITOT 1.2* 1.2*  --  1.2*   ALKPHOS 162* 155*  --  173*   ALT 46* 48*  --  39   AST 98* 111*  --  59*   MG  --  1.4* 2.2 2.1   PHOS  --  3.4  --  3.5       All pertinent labs within the past 24 hours have been reviewed.    Significant Imaging:  I have reviewed all pertinent imaging results/findings within the past 24 hours.      ABG  Recent Labs   Lab 11/14/20  0904   PH 7.488*   PO2 62*   PCO2 38.2   HCO3 29.0*   BE 6     Assessment/Plan:     Pulmonary  S/P lung transplant  S/p left lung transplant in Aug 2020 for IPF. 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 AGMA 2/2 LA, NAGMA 2/2 renal failure and respiratory acidosis     - On bicarb gtt with improvement in acidemia  - Wean down gtt  - Will plan to wean respiratory rate throughout the day  - Good UOP with resolution of DWAYNE  - Lactate improving as well    ID  Septic shock  2/2 GNR PNA on top of COVID PNA. S/p 1x dose of tobra. Broadened to antolin    - Cont Vanc and antolin pending speciation  - D/c vanc if GNR is confirmed  - ID consulted. Voriconazole broadened to posaconazole  - Cont levo, wean as tolerated      Sepsis  Hypotensive but not currently requiring vasopressors. Initially 2/2  COVID PNA, now with possible bacterial infection. CXR not markedly changed, UA neg.     - Cont Vanc, cefepime  - Add flagyl for possible intraabdominal source  - CT C/A/P if patient intubated or respiratory status significantly improves  - Cultures sent  - ID consulted  - Midline placed 11/10- not likely the source    Endocrine  Prediabetes  Steroid induced. Now uncontrolled in the setting of septic shock requiring an insulin gtt.    - Plan to start trickle feeds today now that pressor requirements are improved  - Cont insulin gtt      Other  * Acute hypoxemic respiratory failure due to COVID-19  COVID + 11/4. CXR with bilateral patchy opacities. Symptoms started 10/30 and steadily progressed. Daughter found to be COVID +. Overnight on 11/12, patient developed septic shock 2/ 2 GNR LLL PNA c/b GNR bacteremia.     - LPV  - Wean respiratory rate today  - Hold on proning this am, but will re-evaluate this in the afternoon  - Remdesivir X 5 days; completed 11/09/20  - Convalescent Plasma; Dose #1 11/5  - Dexamethasone X 10 days (end 11/14)  - Wean FiO2 for SpO2 >88%  - Duonebs  - hypercoagulable on full dose lovenox   - TTE with EF 70%; left ventricular concentric remodeling      Pneumonia due to COVID-19 virus  Plan per respiratory failure    Elevated alkaline phosphatase level  Alk Phos steady. Now with hyperbilirubinemia and elevated transaminases likely 2/2 ischemic hepatitis in the setting of septic shock. CTM    Palliative care encounter  Ivy updated regularly. All questions answered.     Critical Care Daily Checklist:    A: Awake: RASS Goal/Actual Goal: RASS Goal: -2-->light sedation  Actual: [place] Agitation Sedation Scale (RASS): Light sedation   B: Spontaneous Breathing Trial Performed?     C: SAT & SBT Coordinated?  yes                     D: Delirium: CAM-ICU Overall CAM-ICU: Negative   E: Early Mobility Performed? Yes   F: Feeding Goal: Goals: Pt to meet 50-75% estimated kcal and protein needs by RD  f/u.  Status: Nutrition Goal Status: new   Current Diet Order   Procedures    Diet NPO      AS: Analgesia/Sedation yes   T: Thromboembolic Prophylaxis Therapeutic lovenox    H: HOB > 300 Yes   U: Stress Ulcer Prophylaxis (if needed) PPI   G: Glucose Control yes   B: Bowel Function Stool Occurrence: 1   I: Indwelling Catheter (Lines & Pelletier) Necessity TLC RIJ, midline, peripheral IV, a-line, pelletier    D: De-escalation of Antimicrobials/Pharmacotherapies n/a    Plan for the day/ETD Wean vent    Code Status:  Family/Goals of Care: Full Code  Ongoing        Critical secondary to Patient has a condition that poses threat to life and bodily function: Severe Respiratory Distress      Critical care was time spent personally by me on the following activities: development of treatment plan with patient or surrogate and bedside caregivers, discussions with consultants, evaluation of patient's response to treatment, examination of patient, ordering and performing treatments and interventions, ordering and review of laboratory studies, ordering and review of radiographic studies, pulse oximetry, re-evaluation of patient's condition. This critical care time did not overlap with that of any other provider or involve time for any procedures.     Fabian Hilliard MD  Critical Care Medicine  [facility] - ICU [facility]

## 2020-11-14 NOTE — CARE UPDATE
Called and updated Ivy Levy (daughter). All questions and concerns were addressed.     Fabian Hilliard MD

## 2020-11-14 NOTE — PROGRESS NOTES
[facility] - ICU 16 WT  Infectious Disease  Progress Note    Patient Name: Chely Becerra  MRN: [id number]  Admission Date: 11/4/2020  Length of Stay: 10 days  Attending Physician: Inna Castillo MD  Primary Care Provider: ZAHIDA Stack MD    Isolation Status: Airborne and Contact and Droplet, Airborne and Contact and Droplet  Assessment/Plan:      * Acute hypoxemic respiratory failure due to COVID-19  65F PMH IPF s/p L SLTx 8/2020, admitted on 11/4 w/ acute hypoxic respiratory failure, COVID positive. Now w/ declining condition, intubated, on vasopressors, broad spectrum abx. S/p 5 days of remdesevir, 1 dose of convalescent plasma, remains on dexamethasone. Blood cultures 11/12 + GNR and CONS, resp culture 11/12 presumptive pseudomonas, repeat blood cx 11/13 NGTD    Recommendations:  - continue Meropenem and vanc  - continue posaconazole  - follow up all pending cultures  - aspergillus antigen pending    Will follow        Anticipated Disposition: TBD    Thank you for your consult. I will follow-up with patient. Please contact us if you have any additional questions.    Juany Molina DO  Transplant Infectious Disease      Subjective:     Principal Problem:Acute hypoxemic respiratory failure due to COVID-19    HPI: 65F PMH DM, IPF s/p L SLTx 8/10/2020 who presented on 11/4 w/ increasing shortness of breath, cough and fevers x 3 days, and noted to have hypoxia on evaluation in lung transplant clinic. She was found to be COIVD positive on hospital admission. Daughter who lives with patient had also recently been sick, and tested positive for COVID as well. Home meds included pred taper (currently at 20mg), prograf and MMF. On admission, she was started on bipap. She has received 5 days of remdesivir, convalescent plasma, and remains on decadron. ID consulted today as patient's clinical status has deteriorated. She developed progressive hypoxic respiratory failure requiring intubation this morning.  She is hypotensive on levo and vaso. Antibiotics were changed to vanc, cefepime and flagyl. CT chest with diffuse GGO bilaterally w/ dense infiltrates in L lung, bilateral perinephric stranding, ? Colon mass.        Interval History: remains critically ill, blood cx + GNR, resp cx now presumptive pseudomonas, vent settings slightly improved, remains on pressors    Review of Systems   Unable to perform ROS: Intubated     Objective:     Vital Signs (Most Recent):  Temp: 99.5 °F (37.5 °C) (11/14/20 1230)  Pulse: 69 (11/14/20 1230)  Resp: (!) 35 (11/14/20 1230)  BP: (!) 89/58 (11/14/20 1200)  SpO2: (!) 94 % (11/14/20 1230) Vital Signs (24h Range):  Temp:  [98.8 °F (37.1 °C)-101.8 °F (38.8 °C)] 99.5 °F (37.5 °C)  Pulse:  [66-87] 69  Resp:  [4-50] 35  SpO2:  [90 %-100 %] 94 %  BP: ()/(58-67) 89/58  Arterial Line BP: ()/(56-68) 91/59     Weight: 76.7 kg (169 lb)  Body mass index is 26.47 kg/m².    Estimated Creatinine Clearance: 76.8 mL/min (based on SCr of 0.8 mg/dL).    Physical Exam  Vitals signs reviewed.   Constitutional:       General: She is not in acute distress.     Appearance: She is well-developed. She is not diaphoretic.   HENT:      Head: Atraumatic.      Right Ear: External ear normal.      Left Ear: External ear normal.      Nose: Nose normal.      Mouth/Throat:      Mouth: Mucous membranes are moist.   Eyes:      General: No scleral icterus.        Right eye: No discharge.         Left eye: No discharge.      Extraocular Movements: Extraocular movements intact.      Conjunctiva/sclera: Conjunctivae normal.   Neck:      Musculoskeletal: Normal range of motion and neck supple.   Cardiovascular:      Rate and Rhythm: Normal rate and regular rhythm.      Pulses: Normal pulses.      Heart sounds: Normal heart sounds. No murmur.   Pulmonary:      Effort: No respiratory distress.      Breath sounds: No wheezing.      Comments: Coarse breath sounds b/l, diminished, intubated on 60% FIO2  Abdominal:       General: Bowel sounds are normal. There is no distension.      Palpations: Abdomen is soft.      Tenderness: There is no abdominal tenderness.   Musculoskeletal:         General: No swelling or deformity.   Skin:     General: Skin is dry.      Findings: No erythema or rash.      Comments: Cool to touch   Neurological:      Comments: Intubated and sedated         Significant Labs:   CBC:   Recent Labs   Lab 11/13/20  0010 11/13/20  0344 11/14/20  0446   WBC 19.64* 21.81* 18.26*   HGB 10.4* 10.1* 9.1*   HCT 33.3* 31.0* 27.5*    157 108*     CMP:   Recent Labs   Lab 11/13/20  0010 11/13/20  0344 11/13/20  1337 11/14/20  0446   * 129* 127* 128*   K 5.2*  5.2* 4.5 4.1 4.7   CL 93* 94* 89* 89*   CO2 16* 20* 30* 27   * 237* 72 189*   BUN 26* 26* 26* 33*   CREATININE 1.1 0.9 0.9 0.8   CALCIUM 7.2* 7.2* 7.1* 7.4*   PROT 5.3* 5.1*  --  4.7*   ALBUMIN 1.8* 1.7*  --  1.4*   BILITOT 1.2* 1.2*  --  1.2*   ALKPHOS 162* 155*  --  173*   AST 98* 111*  --  59*   ALT 46* 48*  --  39   ANIONGAP 16 15 8 12   EGFRNONAA 53.6* >60.0 >60.0 >60.0     Microbiology Results (last 7 days)     Procedure Component Value Units Date/Time    Culture, Respiratory with Gram Stain [[id number]]  (Abnormal) Collected: 11/12/20 1545    Order Status: Completed Specimen: Respiratory from Tracheal Aspirate Updated: 11/14/20 1119     Respiratory Culture PRESUMPTIVE PSEUDOMONAS SPECIES  Few  Identification and susceptibility pending       Gram Stain (Respiratory) <10 epithelial cells per low power field.     Gram Stain (Respiratory) No WBC's     Gram Stain (Respiratory) No organisms seen    Blood culture [[id number]]  (Abnormal) Collected: 11/12/20 0219    Order Status: Completed Specimen: Blood from Peripheral, Antecubital, Left Updated: 11/14/20 0950     Blood Culture, Routine Gram stain aer bottle: Gram negative rods      Results called to and read back by: Mayuri Palmer RN  11/13/2020        02:28      GRAM NEGATIVE [place]  Identification  pending  For susceptibility see order #[id number]      Narrative:      Blood cultures x 2 different sites. 4 bottles total. Please  draw cultures before administering antibiotics.    Blood culture [[id number]]  (Abnormal) Collected: 11/12/20 0219    Order Status: Completed Specimen: Blood from Peripheral, Antecubital, Left Updated: 11/14/20 0948     Blood Culture, Routine Gram stain aer bottle: Gram negative rods      Results called to and read back by: Mayuri Palmer RN  11/13/2020        02:28      Gram stain maye bottle: Gram positive cocci in clusters resembling Staph       Results called to and read back by:Danielle Hoskins RN 11/13/2020  15:03      GRAM NEGATIVE [place]  Identification and susceptibility pending        COAGULASE-NEGATIVE STAPHYLOCOCCUS SPECIES  Organism is a probable contaminant      Narrative:      Blood cultures from 2 different sites. 4 bottles total.  Please draw before starting antibiotics.    Blood culture [[id number]] Collected: 11/13/20 0334    Order Status: Completed Specimen: Blood from Peripheral, Hand, Right Updated: 11/14/20 0812     Blood Culture, Routine No Growth to date      No Growth to date    Blood culture [[id number]] Collected: 11/13/20 0342    Order Status: Completed Specimen: Blood from Peripheral, Hand, Left Updated: 11/14/20 0812     Blood Culture, Routine No Growth to date      No Growth to date    Culture, Respiratory with Gram Stain [[id number]]     Order Status: Canceled Specimen: Respiratory from Sputum     Blood culture #2 **CANNOT BE ORDERED STAT** [[id number]] Collected: 11/04/20 1055    Order Status: Completed Specimen: Blood from Peripheral, Antecubital, Left Updated: 11/09/20 1212     Blood Culture, Routine No growth after 5 days.    Blood culture #1 **CANNOT BE ORDERED STAT** [[id number]] Collected: 11/04/20 1055    Order Status: Completed Specimen: Blood from Peripheral, Antecubital, Left Updated: 11/09/20 1212     Blood Culture, Routine No growth after 5 days.           Significant Imaging: I have reviewed all pertinent imaging results/findings within the past 24 hours.

## 2020-11-14 NOTE — ASSESSMENT & PLAN NOTE
65F PMH IPF s/p L SLTx 8/2020, admitted on 11/4 w/ acute hypoxic respiratory failure, COVID positive. Now w/ declining condition, intubated, on vasopressors, broad spectrum abx. S/p 5 days of remdesevir, 1 dose of convalescent plasma, remains on dexamethasone. Blood cultures 11/12 + GNR and CONS, resp culture 11/12 presumptive pseudomonas, repeat blood cx 11/13 NGTD    Recommendations:  - continue Meropenem and vanc  - continue posaconazole  - follow up all pending cultures  - aspergillus antigen pending    Will follow

## 2020-11-14 NOTE — PLAN OF CARE
Patient remains critically ill.  Intubated, sedated, on pressors.  Renal function stable.  Holding MMF and tac.  Will continue to follow tac levels and add back when appropriate.  Repeat ABG at 2p and possible proning per the ICU team.  Will continue to follow.  Please call with any questions.

## 2020-11-14 NOTE — SUBJECTIVE & OBJECTIVE
Interval History/Significant Events: On levo. Continues to spike fevers. Blood cx's with GNR's. Sputum cx with Pseudomonas. Net positive 900 cc last 24 hours.     Review of Systems   Unable to perform ROS: Intubated     Objective:     Vital Signs (Most Recent):  Temp: 99.7 °F (37.6 °C) (11/14/20 1140)  Pulse: 70 (11/14/20 1140)  Resp: (!) 35 (11/14/20 1140)  BP: (!) 88/63 (11/14/20 0600)  SpO2: (!) 94 % (11/14/20 1140) Vital Signs (24h Range):  Temp:  [98.8 °F (37.1 °C)-101.8 °F (38.8 °C)] 99.7 °F (37.6 °C)  Pulse:  [70-87] 70  Resp:  [4-50] 35  SpO2:  [90 %-100 %] 94 %  BP: ()/(63-67) 88/63  Arterial Line BP: ()/(56-68) 99/65   Weight: 76.7 kg (169 lb)  Body mass index is 26.47 kg/m².      Intake/Output Summary (Last 24 hours) at 11/14/2020 1155  Last data filed at 11/14/2020 0800  Gross per 24 hour   Intake 2206.9 ml   Output 1630 ml   Net 576.9 ml       Physical Exam  Vitals signs and nursing note reviewed.   Constitutional:       General: She is not in acute distress.     Appearance: She is ill-appearing. She is not diaphoretic.      Interventions: Nasal cannula in place.      Comments: Intubated, sedated   HENT:      Head: Normocephalic and atraumatic.   Eyes:      General: No scleral icterus.  Neck:      Musculoskeletal: No neck rigidity.   Cardiovascular:      Rate and Rhythm: Normal rate and regular rhythm.      Pulses: Normal pulses.      Heart sounds: Normal heart sounds. No murmur.   Pulmonary:      Effort: No respiratory distress.      Breath sounds: Rhonchi and rales present. No decreased breath sounds or wheezing.   Chest:      Comments: Midline scar from recent transplant   Abdominal:      Palpations: Abdomen is soft.      Tenderness: There is no guarding.   Musculoskeletal:      Right lower leg: No edema.      Left lower leg: No edema.   Skin:     General: Skin is dry.      Capillary Refill: Capillary refill takes 2 to 3 seconds.      Comments: cool   Neurological:      Comments:  Intubated/sedated         Vents:  Vent Mode: A/C (11/14/20 1140)  Ventilator Initiated: Yes (11/12/20 0921)  Set Rate: 35 BPM (11/14/20 1140)  Vt Set: 360 mL (11/14/20 1140)  Pressure Support: 0 cmH20 (11/14/20 1140)  PEEP/CPAP: 10 cmH20 (11/14/20 1140)  Oxygen Concentration (%): 60 (11/14/20 1140)  Peak Airway Pressure: 32 cmH2O (11/14/20 1140)  Plateau Pressure: 30 cmH20 (11/14/20 1140)  Total Ve: 13.2 mL (11/14/20 1140)  F/VT Ratio<105 (RSBI): (!) 92.84 (11/14/20 1140)  Lines/Drains/Airways     Central Venous Catheter Line            Percutaneous Central Line Insertion/Assessment - Triple Lumen  11/12/20 1030 right internal jugular 2 days          Drain                 NG/OG Tube 11/13/20 0200 Dickens sump 14 Fr. Center mouth 1 day         Urethral Catheter 11/12/20 1445 16 Fr. 1 day          Airway                 Airway - Non-Surgical 11/12/20 0928 Endotracheal Tube 2 days          Arterial Line            Arterial Line 11/12/20 1053 Right Radial 2 days          Peripheral Intravenous Line                 Midline Catheter Insertion/Assessment  - Single Lumen 11/10/20 1603 Right basilic vein (medial side of arm) 18g x 10cm 3 days         Peripheral IV - Single Lumen 11/12/20 0545 20 G Anterior;Left Hand 2 days              Significant Labs:    CBC/Anemia Profile:  Recent Labs   Lab 11/13/20  0010 11/13/20  0344 11/14/20  0446   WBC 19.64* 21.81* 18.26*   HGB 10.4* 10.1* 9.1*   HCT 33.3* 31.0* 27.5*    157 108*   * 103* 99*   RDW 15.4* 15.2* 15.3*   FERRITIN  --   --  4,375*        Chemistries:  Recent Labs   Lab 11/13/20  0010 11/13/20  0344 11/13/20  1337 11/14/20  0446   * 129* 127* 128*   K 5.2*  5.2* 4.5 4.1 4.7   CL 93* 94* 89* 89*   CO2 16* 20* 30* 27   BUN 26* 26* 26* 33*   CREATININE 1.1 0.9 0.9 0.8   CALCIUM 7.2* 7.2* 7.1* 7.4*   ALBUMIN 1.8* 1.7*  --  1.4*   PROT 5.3* 5.1*  --  4.7*   BILITOT 1.2* 1.2*  --  1.2*   ALKPHOS 162* 155*  --  173*   ALT 46* 48*  --  39   AST 98* 111*  --   59*   MG  --  1.4* 2.2 2.1   PHOS  --  3.4  --  3.5       All pertinent labs within the past 24 hours have been reviewed.    Significant Imaging:  I have reviewed all pertinent imaging results/findings within the past 24 hours.

## 2020-11-15 NOTE — SUBJECTIVE & OBJECTIVE
Interval History/Significant Events: Patient proned yesterday evening. Supinated this AM. Blood and sputum cx's with Pseudomonas.     Review of Systems   Unable to perform ROS: Intubated     Objective:     Vital Signs (Most Recent):  Temp: 99.1 °F (37.3 °C) (11/15/20 0900)  Pulse: 70 (11/15/20 0900)  Resp: (!) 30 (11/15/20 0900)  BP: (!) 89/61 (11/15/20 0715)  SpO2: 98 % (11/15/20 0900) Vital Signs (24h Range):  Temp:  [77.4 °F (25.2 °C)-99.7 °F (37.6 °C)] 99.1 °F (37.3 °C)  Pulse:  [62-76] 70  Resp:  [30-35] 30  SpO2:  [82 %-100 %] 98 %  BP: ()/(58-61) 89/61  Arterial Line BP: ()/(53-73) 94/61   Weight: 76.7 kg (169 lb)  Body mass index is 26.47 kg/m².      Intake/Output Summary (Last 24 hours) at 11/15/2020 1111  Last data filed at 11/15/2020 0930  Gross per 24 hour   Intake 2082.66 ml   Output 1170 ml   Net 912.66 ml       Physical Exam  Vitals signs and nursing note reviewed.   Constitutional:       General: She is not in acute distress.     Appearance: She is ill-appearing. She is not diaphoretic.      Interventions: Nasal cannula in place.      Comments: Intubated, sedated   HENT:      Head: Normocephalic and atraumatic.   Eyes:      General: No scleral icterus.  Neck:      Musculoskeletal: No neck rigidity.   Cardiovascular:      Rate and Rhythm: Normal rate and regular rhythm.      Pulses: Normal pulses.      Heart sounds: Normal heart sounds. No murmur.   Pulmonary:      Effort: No respiratory distress.      Breath sounds: Rhonchi and rales present. No decreased breath sounds or wheezing.   Chest:      Comments: Midline scar from recent transplant   Abdominal:      Palpations: Abdomen is soft.      Tenderness: There is no guarding.   Musculoskeletal:      Right lower leg: No edema.      Left lower leg: No edema.   Skin:     General: Skin is dry.      Capillary Refill: Capillary refill takes 2 to 3 seconds.      Comments: cool   Neurological:      Comments: Intubated/sedated         Vents:  Vent  Mode: A/C (11/15/20 0856)  Ventilator Initiated: Yes (11/12/20 0921)  Set Rate: 30 BPM (11/15/20 0856)  Vt Set: 360 mL (11/15/20 0856)  Pressure Support: 0 cmH20 (11/15/20 0856)  PEEP/CPAP: 10 cmH20 (11/15/20 0856)  Oxygen Concentration (%): 60 (11/15/20 0900)  Peak Airway Pressure: 33 cmH2O (11/15/20 0856)  Plateau Pressure: 30 cmH20 (11/15/20 0856)  Total Ve: 11.4 mL (11/15/20 0856)  F/VT Ratio<105 (RSBI): (!) 79.16 (11/15/20 0856)  Lines/Drains/Airways     Central Venous Catheter Line            Percutaneous Central Line Insertion/Assessment - Triple Lumen  11/12/20 1030 right internal jugular 3 days          Drain                 NG/OG Tube 11/13/20 0200 Haskell sump 14 Fr. Center mouth 2 days         Urethral Catheter 11/12/20 1445 16 Fr. 2 days          Airway                 Airway - Non-Surgical 11/12/20 0928 Endotracheal Tube 3 days          Arterial Line            Arterial Line 11/12/20 1053 Right Radial 3 days          Peripheral Intravenous Line                 Midline Catheter Insertion/Assessment  - Single Lumen 11/10/20 1603 Right basilic vein (medial side of arm) 18g x 10cm 4 days         Peripheral IV - Single Lumen 11/12/20 0545 20 G Anterior;Left Hand 3 days              Significant Labs:    CBC/Anemia Profile:  Recent Labs   Lab 11/14/20  0446 11/15/20  0252   WBC 18.26* 20.68*   HGB 9.1* 8.5*   HCT 27.5* 25.5*   * 87*   MCV 99* 97   RDW 15.3* 15.6*   FERRITIN 4,375*  --         Chemistries:  Recent Labs   Lab 11/13/20  1337 11/14/20  0446 11/15/20  0252   * 128* 126*   K 4.1 4.7 4.3   CL 89* 89* 89*   CO2 30* 27 25   BUN 26* 33* 44*   CREATININE 0.9 0.8 0.8   CALCIUM 7.1* 7.4* 7.6*   ALBUMIN  --  1.4* 1.3*   PROT  --  4.7* 5.0*   BILITOT  --  1.2* 1.0   ALKPHOS  --  173* 153*   ALT  --  39 49*   AST  --  59* 73*   MG 2.2 2.1 1.9   PHOS  --  3.5 3.8       All pertinent labs within the past 24 hours have been reviewed.    Significant Imaging:  I have reviewed all pertinent imaging  results/findings within the past 24 hours.

## 2020-11-15 NOTE — PROGRESS NOTES
Therapy with vancomycin complete and/or consult discontinued by provider.  Pharmacy will sign off, please re-consult as needed.      Catalina Jefferson, PharmD  Clinical Pharmacist  Ext [phone]

## 2020-11-15 NOTE — NURSING
Prior to beginning the procedure, all appropriate equipment and personnel were gathered in the room. Two RNs were placed on each side of the patient and one RRT stood at the head of the bed and was dedicated to the management of the head of the patient, the endotracheal tube, and the ventilator lines. The RRT at the head of the bed  coordinated the steps of the proning procedure with team at the direction of Critical Care team members at the bedside. The patient was first log-rolled 90 degrees onto their side towards the direction of their central venous catheter. Cardiac electrodes were then moved from the patient's anterior to the posterior. The patients knees, forehead, chest, and iliac crests were protected using adhesive pads and/or foam pads to reduce the risk of skin breakdown. Once properly positioned in the bed, another 90 degree log roll was performed placing the patient into the prone position. The patient's arms were placed alongside the body. The patient's head should be turned alternately to the right or left every 2 hours. Tolerated the procedure well.  Will continue to monitor.

## 2020-11-15 NOTE — CARE UPDATE
Procedure Note  Prone Positioning  Critical Care Medicine       Chief Complaint: Acute hypoxemic respiratory failure due to COVID-19  MRN: [id number]  LOS: 11  Sex: female  Age: 65 y.o.      Last ABG:   Recent Labs   Lab 11/15/20  1405   PH 7.453*   PO2 64*   PCO2 41.4   HCO3 28.9*   BE 5       Vital Signs (Most Recent):   Temp: 96.1 °F (35.6 °C) (11/15/20 1511)  Pulse: 71 (11/15/20 1511)  Resp: (!) 39 (11/15/20 1511)  BP: (!) 140/69 (11/15/20 1200)  SpO2: (!) 92 % (11/15/20 1511)    Ventilator Settings:  Vent Mode: A/C  Oxygen Concentration (%):  [50-60] 60  Resp Rate Total:  [30 br/min-35 br/min] 30 br/min  Vt Set:  [360 mL] 360 mL  PEEP/CPAP:  [10 cmH20] 10 cmH20  Pressure Support:  [0 cmH20] 0 cmH20  Mean Airway Pressure:  [16 meG20-63 cmH20] 17 cmH20    Indication: Severe, refractory ARDS. High risk for deterioration during proning procedure in need of direct monitoring by Critical Care personnel.     Prior to beginning the procedure, all appropriate equipment and personnel were gathered in the room. Two individuals were placed on each side of the patient and one person stood at the head of the bed and was dedicated to the management of the head of the patient, the endotracheal tube, and the ventilator lines. The person at the head of the bed  coordinated the steps of the proning procedure with team team at the direction of Critical Care team members at the bedside. The patient was first log-rolled 90 degrees onto their side towards the direction of their central venous catheter. Cardiac electrodes were then moved from the patient's anterior to the posterior. The patient?s knees, forehead, chest, and iliac crests were protected using adhesive pads and/or foam pads to reduce the risk of skin breakdown. Once properly positioned in the bed, another 90 degree log roll was performed placing the patient into the prone position. The patient's arms were placed alongside the body. The patient's head should be turned  alternately to the right or left every 2 hours. The patient tolerated the procedure well.     Total Critical Care time (uninterrupted not including procedures): 10 minutes

## 2020-11-15 NOTE — PLAN OF CARE
"    SICU PLAN OF CARE NOTE    Dx: Acute hypoxemic respiratory failure due to COVID-19    Shift Events:  Proned at 2000.  Tolerated well.  Head turned per order, also tolerated well.  Hypothermic c temp of 95.9- larry hugger applied and removed when normothermia reached.  Tmax 99.5.  NSR 60-70s.  MAP > 65.  Sats > 90% on AC/VC, 60%, 10 PEEP.  BIS and TOF monitored per order.  No BM.    Goals of Care:  Wean O2, pressor, and sedation requirements.  Comfort.  MAP > 65.      Neuro: Unresponsive- paralyzed    Vital Signs: BP (!) 114/58   Pulse 72   Temp 99.5 °F (37.5 °C)   Resp (!) 30   Ht 5' 7" (1.702 m)   Wt 76.7 kg (169 lb)   LMP  (LMP Unknown)   SpO2 98%   Breastfeeding No   BMI 26.47 kg/m²     Respiratory: Ventilator- 60%, 10 PEEP, AC/VC    Diet: NPO    Gtts: Propfol, Fentanyl, Insulin, Norepinephrine, Vasopressin, and Cisatricurium    Urine Output: Urinary Catheter 35 cc/shift    Drains: NG/OG Tube 300 cc /  shift     Labs/Accuchecks: Monitored and replaced per order.  Concerns expressed about electrolyte values.    Skin:  No skin breakdown noted.  Pads used.  Bed plugged in.       "

## 2020-11-15 NOTE — PLAN OF CARE
Remains critically ill.  Intubated/sedated/paralyzed.  Proned overnight.  Continue ICU care.  Continue to hold MMF and tac.  OIP as ordered.  Please call with any questions.

## 2020-11-15 NOTE — SIGNIFICANT EVENT
Procedure Note  Prone Positioning  Critical Care Medicine       Chief Complaint: Acute hypoxemic respiratory failure due to COVID-19  MRN: [id number]  LOS: 10  Sex: female  Age: 65 y.o.      Last ABG:   Recent Labs   Lab 11/14/20  1425   PH 7.528*   PO2 61*   PCO2 35.5   HCO3 29.6*   BE 7       Vital Signs (Most Recent):   Temp: 98.6 °F (37 °C) (11/14/20 1800)  Pulse: 66 (11/14/20 1915)  Resp: (!) 35 (11/14/20 1915)  BP: (!) 89/58 (11/14/20 1200)  SpO2: (!) 94 % (11/14/20 1915)    Ventilator Settings:  Vent Mode: A/C  Oxygen Concentration (%):  [60-70] 60  Resp Rate Total:  [35 br/min-36 br/min] 35 br/min  Vt Set:  [360 mL] 360 mL  PEEP/CPAP:  [10 cmH20] 10 cmH20  Pressure Support:  [0 cmH20] 0 cmH20  Mean Airway Pressure:  [18 cmH20] 18 cmH20    Indication: Severe, refractory ARDS. High risk for deterioration during proning procedure in need of direct monitoring by Critical Care personnel.     Prior to beginning the procedure, all appropriate equipment and personnel were gathered in the room. Two individuals were placed on each side of the patient and one person stood at the head of the bed and was dedicated to the management of the head of the patient, the endotracheal tube, and the ventilator lines. The person at the head of the bed  coordinated the steps of the proning procedure with team team at the direction of Critical Care team members at the bedside. The patient was first log-rolled 90 degrees onto their side towards the direction of their central venous catheter. Cardiac electrodes were then moved from the patient's anterior to the posterior. The patient?s knees, forehead, chest, and iliac crests were protected using adhesive pads and/or foam pads to reduce the risk of skin breakdown. Once properly positioned in the bed, another 90 degree log roll was performed placing the patient into the prone position. The patient's arms were placed alongside the body. The patient's head should be turned alternately to  the right or left every 2 hours. The patient tolerated the procedure well.     Total Critical Care time (uninterrupted not including procedures): 10 mins    Marycarmen Palomares NP  Critical Care Medicine

## 2020-11-15 NOTE — NURSING
Team ([id number]) notified of electrolyte replacements, lactic acid result, and decreased uop x2h.  Stated would put in orders.  Will continue to monitor.

## 2020-11-15 NOTE — PLAN OF CARE
Chart reviewed. Patient remains critically ill in ICU. Paralyzed and prone, remains on pressors. Sputum and blood cultures 11/12 + pseudomonas. Continue meropenem while susceptibilities are pending.     ID will follow. Please call with any questions.    Juany Molina DO  Transplant Infectious Disease

## 2020-11-15 NOTE — PLAN OF CARE
Procedure Note  Supine Positioning  Critical Care Medicine       Chief Complaint: Acute hypoxemic respiratory failure due to COVID-19  MRN: [id number]  LOS: 11  Sex: female  Age: 65 y.o.  Time: 10am      Last ABG:   Recent Labs   Lab 11/15/20  0549   PH 7.483*   PO2 77*   PCO2 37.2   HCO3 27.9   BE 4       Vital Signs (Most Recent):   Temp: 99.1 °F (37.3 °C) (11/15/20 0900)  Pulse: 70 (11/15/20 0900)  Resp: (!) 30 (11/15/20 0900)  BP: (!) 89/61 (11/15/20 0715)  SpO2: 98 % (11/15/20 0900)    Ventilator Settings:  Vent Mode: A/C  Oxygen Concentration (%):  [50-60] 60  Resp Rate Total:  [30 br/min-35 br/min] 32 br/min  Vt Set:  [360 mL] 360 mL  PEEP/CPAP:  [10 cmH20] 10 cmH20  Pressure Support:  [0 cmH20] 0 cmH20  Mean Airway Pressure:  [17 juC88-01 cmH20] 17 cmH20    Indication: Severe, refractory ARDS. High risk for deterioration during proning procedure in need of direct monitoring by Critical Care personnel.     Prior to beginning the procedure, all appropriate equipment and personnel were gathered in the room. Two individuals were placed on each side of the patient and one person stood at the head of the bed and was dedicated to the management of the head of the patient, the endotracheal tube, and the ventilator lines. The person at the head of the bed  coordinated the steps of the supining procedure with team team at the direction of Critical Care team members at the bedside. The patient was first log-rolled 90 degrees onto their side towards the direction of their central venous catheter. Cardiac electrodes were then moved from the patient's anterior to the posterior. The patients knees, forehead, chest, and iliac crests were protected using adhesive pads and/or foam pads to reduce the risk of skin breakdown. Once properly positioned in the bed, another 90 degree log roll was performed placing the patient into the supine position. The patient's arms were placed alongside the body. The patient's head should be  turned alternately to the right or left every 2 hours. The patient tolerated the procedure well.     Total Critical Care time (uninterrupted not including procedures): 20    Avinash Riddle MD  Internal Medicine PGY1  Critical Care Service

## 2020-11-15 NOTE — NURSING
Team ([id number]) notified of uop decreasing.  No new orders.  See flowsheets for more details.  Will continue to monitor.

## 2020-11-15 NOTE — ASSESSMENT & PLAN NOTE
2/2 GNR PNA on top of COVID PNA. S/p 1x dose of tobra. Broadened to antolin    - Cont Vanc and antolin pending speciation  - ID consulted. Voriconazole broadened to posaconazole  - Cont levo, wean as tolerated  - Vanc discontinued on 11/15  - Blood and sputum cx's with Pseudomonas  - Repeat blood cx's NGTD

## 2020-11-15 NOTE — PROGRESS NOTES
[facility] - ICU 16   Critical Care Medicine  Progress Note    Patient Name: Chely Becerra  MRN: [id number]  Admission Date: 11/4/2020  Hospital Length of Stay: 11 days  Code Status: Full Code  Attending Provider: Inna Castillo MD  Primary Care Provider: ZAHIDA Stack MD   Principal Problem: Acute hypoxemic respiratory failure due to COVID-19    Subjective:     HPI:  Ms. Chely Becerra is a 65 y.o. year old female with a PMHx of diabetes and idiopathic pulmonary fibrosis s/p unilateral (left) lung transplant in August 2020 who presents to the ED complaining of a shortness of breath that started last Friday and progressively got worse. She had a fever of 101 on Sunday, started coughing for the last 3 days nonproductive, denies any chest pain. She was seen in the [facility] for routine testing earlier this morning was found to be hypoxic and she was referred to the emergency department. On arrival her COVID-19 test is positive.     Patient lives with her 2 daughters, 1 of the daughters has been having some respiratory symptoms recently however she thought was secondary to asthma. he is on prednisone taper currently 20 mg daily, she has been compliant with her antirejection medication Prograf and CellCept. She was started on BiPAP in the ED with improvement in her oxygen saturations to the mid 90's.     Hospital/ICU Course:  Admitted to Critical Care Medicine for acute hypoxic respiratory failure 2/2 to COVID pneumonia. Alternating between BiPAP and comfort flow. Started on dexamethasone. Consented for Remdsivir and Convalescent Plasma by lung transplant. Patient received her first unit of convalescent plasma 11/5 and tolerated well per patient. Echo performed on 11/5 showed LVEF 70% with normal diastolic function, though LV concentric remodeling & normal RV size and function. Patient continued on alternating bipap with comfort flow 11/8 - 11/10. Patient clinically declined on  11/13. Abx were broadened to vanc and cefepime (in addition to x1 dose tobra given for double gram negative coverage). Patient was intubated and central/arterial lines were placed. CT chest/abd/pelvis showed LLL pneumonia. Blood cultures resulted with GNR bacteremia. As patient was still spiking fevers with uptrending lactic, abx broadened to meropenem. Blood and sputum cultures with Pseudomonas.    Interval History/Significant Events: Patient proned yesterday evening. Supinated this AM. Blood and sputum cx's with Pseudomonas.     Review of Systems   Unable to perform ROS: Intubated     Objective:     Vital Signs (Most Recent):  Temp: 99.1 °F (37.3 °C) (11/15/20 0900)  Pulse: 70 (11/15/20 0900)  Resp: (!) 30 (11/15/20 0900)  BP: (!) 89/61 (11/15/20 0715)  SpO2: 98 % (11/15/20 0900) Vital Signs (24h Range):  Temp:  [77.4 °F (25.2 °C)-99.7 °F (37.6 °C)] 99.1 °F (37.3 °C)  Pulse:  [62-76] 70  Resp:  [30-35] 30  SpO2:  [82 %-100 %] 98 %  BP: ()/(58-61) 89/61  Arterial Line BP: ()/(53-73) 94/61   Weight: 76.7 kg (169 lb)  Body mass index is 26.47 kg/m².      Intake/Output Summary (Last 24 hours) at 11/15/2020 1111  Last data filed at 11/15/2020 0930  Gross per 24 hour   Intake 2082.66 ml   Output 1170 ml   Net 912.66 ml       Physical Exam  Vitals signs and nursing note reviewed.   Constitutional:       General: She is not in acute distress.     Appearance: She is ill-appearing. She is not diaphoretic.      Interventions: Nasal cannula in place.      Comments: Intubated, sedated   HENT:      Head: Normocephalic and atraumatic.   Eyes:      General: No scleral icterus.  Neck:      Musculoskeletal: No neck rigidity.   Cardiovascular:      Rate and Rhythm: Normal rate and regular rhythm.      Pulses: Normal pulses.      Heart sounds: Normal heart sounds. No murmur.   Pulmonary:      Effort: No respiratory distress.      Breath sounds: Rhonchi and rales present. No decreased breath sounds or wheezing.   Chest:       Comments: Midline scar from recent transplant   Abdominal:      Palpations: Abdomen is soft.      Tenderness: There is no guarding.   Musculoskeletal:      Right lower leg: No edema.      Left lower leg: No edema.   Skin:     General: Skin is dry.      Capillary Refill: Capillary refill takes 2 to 3 seconds.      Comments: cool   Neurological:      Comments: Intubated/sedated         Vents:  Vent Mode: A/C (11/15/20 0856)  Ventilator Initiated: Yes (11/12/20 0921)  Set Rate: 30 BPM (11/15/20 0856)  Vt Set: 360 mL (11/15/20 0856)  Pressure Support: 0 cmH20 (11/15/20 0856)  PEEP/CPAP: 10 cmH20 (11/15/20 0856)  Oxygen Concentration (%): 60 (11/15/20 0900)  Peak Airway Pressure: 33 cmH2O (11/15/20 0856)  Plateau Pressure: 30 cmH20 (11/15/20 0856)  Total Ve: 11.4 mL (11/15/20 0856)  F/VT Ratio<105 (RSBI): (!) 79.16 (11/15/20 0856)  Lines/Drains/Airways     Central Venous Catheter Line            Percutaneous Central Line Insertion/Assessment - Triple Lumen  11/12/20 1030 right internal jugular 3 days          Drain                 NG/OG Tube 11/13/20 0200 [place] sump 14 Fr. Center mouth 2 days         Urethral Catheter 11/12/20 1445 16 Fr. 2 days          Airway                 Airway - Non-Surgical 11/12/20 0928 Endotracheal Tube 3 days          Arterial Line            Arterial Line 11/12/20 1053 Right Radial 3 days          Peripheral Intravenous Line                 Midline Catheter Insertion/Assessment  - Single Lumen 11/10/20 1603 Right basilic vein (medial side of arm) 18g x 10cm 4 days         Peripheral IV - Single Lumen 11/12/20 0545 20 G Anterior;Left Hand 3 days              Significant Labs:    CBC/Anemia Profile:  Recent Labs   Lab 11/14/20  0446 11/15/20  0252   WBC 18.26* 20.68*   HGB 9.1* 8.5*   HCT 27.5* 25.5*   * 87*   MCV 99* 97   RDW 15.3* 15.6*   FERRITIN 4,375*  --         Chemistries:  Recent Labs   Lab 11/13/20  1337 11/14/20  0446 11/15/20  0252   * 128* 126*   K 4.1 4.7 4.3   CL  89* 89* 89*   CO2 30* 27 25   BUN 26* 33* 44*   CREATININE 0.9 0.8 0.8   CALCIUM 7.1* 7.4* 7.6*   ALBUMIN  --  1.4* 1.3*   PROT  --  4.7* 5.0*   BILITOT  --  1.2* 1.0   ALKPHOS  --  173* 153*   ALT  --  39 49*   AST  --  59* 73*   MG 2.2 2.1 1.9   PHOS  --  3.5 3.8       All pertinent labs within the past 24 hours have been reviewed.    Significant Imaging:  I have reviewed all pertinent imaging results/findings within the past 24 hours.      ABG  Recent Labs   Lab 11/15/20  0549   PH 7.483*   PO2 77*   PCO2 37.2   HCO3 27.9   BE 4     Assessment/Plan:     Pulmonary  S/P lung transplant  S/p left lung transplant in Aug 2020 for IPF. 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 AGMA 2/2 LA, NAGMA 2/2 renal failure and respiratory acidosis     - On bicarb gtt with improvement in acidemia  - Wean down gtt  - Will plan to wean respiratory rate throughout the day  - Good UOP with resolution of DWAYNE  - Lactate improving as well    ID  Septic shock  2/2 GNR PNA on top of COVID PNA. S/p 1x dose of tobra. Broadened to antolin    - Cont Vanc and antolin pending speciation  - ID consulted. Voriconazole broadened to posaconazole  - Cont levo, wean as tolerated  - Vanc discontinued on 11/15  - Blood and sputum cx's with Pseudomonas  - Repeat blood cx's NGTD       Sepsis  Hypotensive but not currently requiring vasopressors. Initially 2/2 COVID PNA, now with possible bacterial infection. CXR not markedly changed, UA neg.     - Cont Vanc, cefepime  - Add flagyl for possible  intraabdominal source  - CT C/A/P if patient intubated or respiratory status significantly improves  - Cultures sent  - ID consulted  - Midline placed 11/10- not likely the source    Endocrine  Prediabetes  Steroid induced. Now uncontrolled in the setting of septic shock requiring an insulin gtt.    - Plan to start trickle feeds today now that pressor requirements are improved  - Cont insulin gtt      Other  * Acute hypoxemic respiratory failure due to COVID-19  COVID + 11/4. CXR with bilateral patchy opacities. Symptoms started 10/30 and steadily progressed. Daughter found to be COVID +. Overnight on 11/12, patient developed septic shock 2/ 2 GNR LLL PNA c/b GNR bacteremia.     - LPV  - Wean respiratory rate today  - Hold on proning this am, but will re-evaluate this in the afternoon  - Remdesivir X 5 days; completed 11/09/20  - Convalescent Plasma; Dose #1 11/5  - Dexamethasone X 10 days (end 11/14)  - Wean FiO2 for SpO2 >88%  - Duonebs  - hypercoagulable on full dose lovenox   - TTE with EF 70%; left ventricular concentric remodeling      Pneumonia due to COVID-19 virus  Plan per respiratory failure    Elevated alkaline phosphatase level  Alk Phos steady. Now with hyperbilirubinemia and elevated transaminases likely 2/2 ischemic hepatitis in the setting of septic shock. CTM    Palliative care encounter  Ivy updated regularly. All questions answered.     Critical Care Daily Checklist:    A: Awake: RASS Goal/Actual Goal: RASS Goal: -5-->unarousable  Actual: [place] Agitation Sedation Scale (RASS): Unarousable   B: Spontaneous Breathing Trial Performed?     C: SAT & SBT Coordinated?  yes                    D: Delirium: CAM-ICU Overall CAM-ICU: Positive   E: Early Mobility Performed? Yes   F: Feeding Goal: Goals: Pt to meet 50-75% estimated kcal and protein needs by RD f/u.  Status: Nutrition Goal Status: new   Current Diet Order   Procedures    Diet NPO      AS: Analgesia/Sedation yes   T: Thromboembolic  Prophylaxis Therapeutic lovenox   H: HOB > 300 Yes   U: Stress Ulcer Prophylaxis (if needed) PPI   G: Glucose Control yes   B: Bowel Function Stool Occurrence: 1   I: Indwelling Catheter (Lines & Pelletier) Necessity TLC RIJ, midline, peripheral IV, a-line, pelletier    D: De-escalation of Antimicrobials/Pharmacotherapies n/a    Plan for the day/ETD Wean vent    Code Status:  Family/Goals of Care: Full Code         Critical secondary to Patient has a condition that poses threat to life and bodily function: Severe Respiratory Distress      Critical care was time spent personally by me on the following activities: development of treatment plan with patient or surrogate and bedside caregivers, discussions with consultants, evaluation of patient's response to treatment, examination of patient, ordering and performing treatments and interventions, ordering and review of laboratory studies, ordering and review of radiographic studies, pulse oximetry, re-evaluation of patient's condition. This critical care time did not overlap with that of any other provider or involve time for any procedures.     Fabian Hilliard MD  Critical Care Medicine  [facility] - ICU 16 WT

## 2020-11-16 NOTE — PLAN OF CARE
"      SICU PLAN OF CARE NOTE    Dx: Acute hypoxemic respiratory failure due to COVID-19    Shift Events: No acute events    Goals of Care: Wean vent as tolerated    Neuro: medically paralyzed, BIS monitor on and sedation/analgesia maintained to read 40-60.    Vital Signs: BP (!) 152/88   Pulse 83   Temp 98.8 °F (37.1 °C)   Resp (!) 30   Ht 5' 7" (1.702 m)   Wt 76.7 kg (169 lb)   LMP  (LMP Unknown)   SpO2 (!) 89%   Breastfeeding No   BMI 26.47 kg/m²     Respiratory: Ventilator 50% FiO2 with 8 PEEP, RR 30    Diet: Tube Feeds    Gtts: Propfol, Fentanyl, Insulin, Norepinephrine, and Cisatricurium    Urine Output: Urinary Catheter 30-50 cc/hour     Labs/Accuchecks: q1 Accuchecks.    Skin: Pt proned, head turned per orders, bony prominences protected with foams      "

## 2020-11-16 NOTE — PLAN OF CARE
Recommendations:   1. As propofol has increased, suggest Peptamen Intense VHP to better meet protein needs at goal rate    --Increase to goal 45 mL/hr as medically appropriate to provide 1566 kcal (with current propofol), 99 g protein, and 907 mL fluid      2. Additional fluid per MD. Hold for residuals >500 mL.     3. Obtain new weight.       4. RD following.  Goals: Pt to meet 50-75% estimated kcal and protein needs by RD f/u.  Nutrition Goal Status: progressing towards goal, ongoing   Communication of RD Recs: other (comment)(POC)

## 2020-11-16 NOTE — PROGRESS NOTES
[facility] - ICU 16   Critical Care Medicine  Progress Note    Patient Name: Chely Becerra  MRN: [id number]  Admission Date: 11/4/2020  Hospital Length of Stay: 12 days  Code Status: Full Code  Attending Provider: Inna Castillo MD  Primary Care Provider: ZAHIDA Stack MD   Principal Problem: Acute hypoxemic respiratory failure due to COVID-19    Subjective:     HPI:  Ms. Chely Becerra is a 65 y.o. year old female with a PMHx of diabetes and idiopathic pulmonary fibrosis s/p unilateral (left) lung transplant in August 2020 who presents to the ED complaining of a shortness of breath that started last Friday and progressively got worse. She had a fever of 101 on Sunday, started coughing for the last 3 days nonproductive, denies any chest pain. She was seen in the [facility] for routine testing earlier this morning was found to be hypoxic and she was referred to the emergency department. On arrival her COVID-19 test is positive.     Patient lives with her 2 daughters, 1 of the daughters has been having some respiratory symptoms recently however she thought was secondary to asthma. he is on prednisone taper currently 20 mg daily, she has been compliant with her antirejection medication Prograf and CellCept. She was started on BiPAP in the ED with improvement in her oxygen saturations to the mid 90's.     Hospital/ICU Course:  Admitted to Critical Care Medicine for acute hypoxic respiratory failure 2/2 to COVID pneumonia. Alternating between BiPAP and comfort flow. Started on dexamethasone. Consented for Remdsivir and Convalescent Plasma by lung transplant. Patient received her first unit of convalescent plasma 11/5 and tolerated well per patient. Echo performed on 11/5 showed LVEF 70% with normal diastolic function, though LV concentric remodeling & normal RV size and function. Patient continued on alternating bipap with comfort flow 11/8 - 11/10. Patient clinically declined on  11/13. Abx were broadened to vanc and cefepime (in addition to x1 dose tobra given for double gram negative coverage). Patient was intubated and central/arterial lines were placed. CT chest/abd/pelvis showed LLL pneumonia. Blood cultures resulted with GNR bacteremia. As patient was still spiking fevers with uptrending lactic, abx broadened to meropenem. Blood and sputum cultures with Pseudomonas.    Interval History/Significant Events: Supinated these morning. I/O net +559.62. Will watch UOP closely.     Review of Systems   Unable to perform ROS: Intubated     Objective:     Vital Signs (Most Recent):  Temp: 98.1 °F (36.7 °C) (11/16/20 0900)  Pulse: 73 (11/16/20 1011)  Resp: (!) 30 (11/16/20 1011)  BP: (!) 141/75 (11/16/20 0800)  SpO2: 100 % (11/16/20 1011) Vital Signs (24h Range):  Temp:  [95 °F (35 °C)-99 °F (37.2 °C)] 98.1 °F (36.7 °C)  Pulse:  [] 73  Resp:  [11-55] 30  SpO2:  [89 %-100 %] 100 %  BP: (117-157)/(63-88) 141/75  Arterial Line BP: ()/(49-68) 121/68   Weight: 76.7 kg (169 lb)  Body mass index is 26.47 kg/m².      Intake/Output Summary (Last 24 hours) at 11/16/2020 1038  Last data filed at 11/16/2020 1000  Gross per 24 hour   Intake 2059.62 ml   Output 1500 ml   Net 559.62 ml       Physical Exam  Vitals signs and nursing note reviewed.   Constitutional:       General: She is not in acute distress.     Appearance: She is ill-appearing. She is not diaphoretic.      Interventions: Nasal cannula in place.      Comments: Intubated, sedated   HENT:      Head: Normocephalic and atraumatic.   Eyes:      General: No scleral icterus.  Neck:      Musculoskeletal: No neck rigidity.   Cardiovascular:      Rate and Rhythm: Normal rate and regular rhythm.      Pulses: Normal pulses.      Heart sounds: Normal heart sounds. No murmur.   Pulmonary:      Effort: No respiratory distress.      Breath sounds: Rhonchi and rales present. No decreased breath sounds or wheezing.   Chest:      Comments: Midline scar  from recent transplant   Abdominal:      Palpations: Abdomen is soft.      Tenderness: There is no guarding.   Musculoskeletal:      Right lower leg: No edema.      Left lower leg: No edema.   Skin:     General: Skin is dry.      Capillary Refill: Capillary refill takes 2 to 3 seconds.      Comments: cool   Neurological:      Comments: Intubated/sedated         Vents:  Vent Mode: A/C (11/16/20 1011)  Ventilator Initiated: Yes (11/12/20 0921)  Set Rate: 30 BPM (11/16/20 1011)  Vt Set: 360 mL (11/16/20 1011)  Pressure Support: 0 cmH20 (11/15/20 2115)  PEEP/CPAP: 8 cmH20 (11/16/20 1011)  Oxygen Concentration (%): 100 (11/16/20 1011)  Peak Airway Pressure: 28 cmH2O (11/16/20 1011)  Plateau Pressure: 35 cmH20 (11/16/20 1011)  Total Ve: 11.1 mL (11/16/20 1011)  F/VT Ratio<105 (RSBI): (!) 80.21 (11/16/20 1011)  Lines/Drains/Airways     Central Venous Catheter Line            Percutaneous Central Line Insertion/Assessment - Triple Lumen  11/12/20 1030 right internal jugular 4 days          Drain                 NG/OG Tube 11/13/20 0200 [place] sump 14 Fr. Center mouth 3 days         Urethral Catheter 11/12/20 1445 16 Fr. 3 days          Airway                 Airway - Non-Surgical 11/12/20 0928 Endotracheal Tube 4 days          Arterial Line            Arterial Line 11/12/20 1053 Right Radial 3 days          Peripheral Intravenous Line                 Midline Catheter Insertion/Assessment  - Single Lumen 11/10/20 1603 Right basilic vein (medial side of arm) 18g x 10cm 5 days         Peripheral IV - Single Lumen 11/12/20 0545 20 G Anterior;Left Hand 4 days              Significant Labs:    CBC/Anemia Profile:  Recent Labs   Lab 11/15/20  0252 11/16/20  0314   WBC 20.68* 17.32*   HGB 8.5* 9.1*   HCT 25.5* 26.9*   PLT 87* 94*   MCV 97 96   RDW 15.6* 15.7*        Chemistries:  Recent Labs   Lab 11/15/20  0252 11/16/20  0314   * 130*   K 4.3 3.8   CL 89* 93*   CO2 25 24   BUN 44* 45*   CREATININE 0.8 0.8   CALCIUM 7.6* 7.9*    ALBUMIN 1.3* 1.4*   PROT 5.0* 5.3*   BILITOT 1.0 1.2*   ALKPHOS 153* 199*   ALT 49* 43   AST 73* 47*   MG 1.9 1.8   PHOS 3.8 2.9       All pertinent labs within the past 24 hours have been reviewed.    Significant Imaging:  I have reviewed all pertinent imaging results/findings within the past 24 hours.      ABG  Recent Labs   Lab 11/16/20  0500   PH 7.518*   PO2 66*   PCO2 35.7   HCO3 29.0*   BE 6     Assessment/Plan:     Pulmonary  S/P lung transplant  S/p left lung transplant in Aug 2020 for IPF. 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 AGMA 2/2 LA, NAGMA 2/2 renal failure and respiratory acidosis     - On bicarb gtt with improvement in acidemia  - Wean down gtt  - Will plan to wean respiratory rate throughout the day  - Good UOP with resolution of DWAYNE  - Lactate improving as well    ID  Septic shock  2/2 GNR PNA on top of COVID PNA. S/p 1x dose of tobra. Broadened to antolin    - Cont Vanc and antolin pending speciation  - ID consulted. Voriconazole broadened to posaconazole  - Cont levo, wean as tolerated  - Vanc discontinued on 11/15  - Blood and sputum cx's with Pseudomonas  - Repeat blood cx's NGTD       Sepsis  Hypotensive but not currently requiring vasopressors. Initially 2/2 COVID PNA, now with possible bacterial infection. CXR not markedly changed, UA neg.     - Cont Vanc, cefepime  - Add flagyl for possible intraabdominal source  - CT C/A/P if patient intubated or respiratory status significantly improves  - Cultures sent  - ID consulted  - Midline  placed 11/10- not likely the source    Endocrine  Prediabetes  Steroid induced. Now uncontrolled in the setting of septic shock requiring an insulin gtt.    - Trickle feeds.   - Cont insulin gtt      Other  * Acute hypoxemic respiratory failure due to COVID-19  COVID + 11/4. CXR with bilateral patchy opacities. Symptoms started 10/30 and steadily progressed. Daughter found to be COVID +. Overnight on 11/12, patient developed septic shock 2/ 2 GNR LLL PNA c/b GNR bacteremia.     - LPV  - Wean respiratory rate today  - Prone positioning. Follow up ABG this afternoon.   - Remdesivir X 5 days; completed 11/09/20  - Convalescent Plasma; Dose #1 11/5  - Dexamethasone X 10 days (end 11/14)  - Wean FiO2 for SpO2 >88%  - Duonebs  - hypercoagulable on full dose lovenox   - TTE with EF 70%; left ventricular concentric remodeling      Pneumonia due to COVID-19 virus  Plan per respiratory failure    Elevated alkaline phosphatase level  Alk Phos steady. Now with hyperbilirubinemia and elevated transaminases likely 2/2 ischemic hepatitis in the setting of septic shock. CTM    Palliative care encounter  Ivy updated regularly. All questions answered.       Critical Care Daily Checklist:    A: Awake: RASS Goal/Actual Goal: RASS Goal: -5-->unarousable  Actual: [place] Agitation Sedation Scale (RASS): Unarousable   B: Spontaneous Breathing Trial Performed?     C: SAT & SBT Coordinated?  Proning for now                      D: Delirium: CAM-ICU Overall CAM-ICU: Positive   E: Early Mobility Performed? No   F: Feeding Goal: Goals: Pt to meet 50-75% estimated kcal and protein needs by RD f/u.  Status: Nutrition Goal Status: progressing towards goal   Current Diet Order   Procedures    Diet NPO      AS: Analgesia/Sedation Sedated   T: Thromboembolic Prophylaxis Lovenox   H: HOB > 300 Yes   U: Stress Ulcer Prophylaxis (if needed) protonix   G: Glucose Control Insulin gtt   B: Bowel Function Stool Occurrence: 1   I: Indwelling Catheter  (Lines & Rios) Necessity Per nursing notes   D: De-escalation of Antimicrobials/Pharmacotherapies Per ID    Plan for the day/ETD Wean ventilator    Code Status:  Family/Goals of Care: Full Code       Critical Care Time: 80 minutes  Critical secondary to Patient has a condition that poses threat to life and bodily function: Severe Respiratory Distress      Critical care was time spent personally by me on the following activities: development of treatment plan with patient or surrogate and bedside caregivers, discussions with consultants, evaluation of patient's response to treatment, examination of patient, ordering and performing treatments and interventions, ordering and review of laboratory studies, ordering and review of radiographic studies, pulse oximetry, re-evaluation of patient's condition. This critical care time did not overlap with that of any other provider or involve time for any procedures.     Inna Castillo MD  Critical Care Medicine  [facility] - ICU 16 [facility]

## 2020-11-16 NOTE — PLAN OF CARE
Vital signs and labs reviewed - tolerated proning. On meropenem for pseudomonas PNA and bacteremia. Remains critically ill/intubated. Pressor requirements improved, now on 0.06 of levo. Tobramycin x 1 11/12. Continued COVID care per MICU. Tacrolimus and MMF on hold due to ongoing infection. OIP with ganciclovir, lamivudine, bactrim, and posaconazole. Will continue to adjust IS and OIP as needed. Please call [phone] with questions.

## 2020-11-16 NOTE — SUBJECTIVE & OBJECTIVE
Interval History/Significant Events: Supinated these morning. I/O net +559.62. Will watch UOP closely.     Review of Systems   Unable to perform ROS: Intubated     Objective:     Vital Signs (Most Recent):  Temp: 98.1 °F (36.7 °C) (11/16/20 0900)  Pulse: 73 (11/16/20 1011)  Resp: (!) 30 (11/16/20 1011)  BP: (!) 141/75 (11/16/20 0800)  SpO2: 100 % (11/16/20 1011) Vital Signs (24h Range):  Temp:  [95 °F (35 °C)-99 °F (37.2 °C)] 98.1 °F (36.7 °C)  Pulse:  [] 73  Resp:  [11-55] 30  SpO2:  [89 %-100 %] 100 %  BP: (117-157)/(63-88) 141/75  Arterial Line BP: ()/(49-68) 121/68   Weight: 76.7 kg (169 lb)  Body mass index is 26.47 kg/m².      Intake/Output Summary (Last 24 hours) at 11/16/2020 1038  Last data filed at 11/16/2020 1000  Gross per 24 hour   Intake 2059.62 ml   Output 1500 ml   Net 559.62 ml       Physical Exam  Vitals signs and nursing note reviewed.   Constitutional:       General: She is not in acute distress.     Appearance: She is ill-appearing. She is not diaphoretic.      Interventions: Nasal cannula in place.      Comments: Intubated, sedated   HENT:      Head: Normocephalic and atraumatic.   Eyes:      General: No scleral icterus.  Neck:      Musculoskeletal: No neck rigidity.   Cardiovascular:      Rate and Rhythm: Normal rate and regular rhythm.      Pulses: Normal pulses.      Heart sounds: Normal heart sounds. No murmur.   Pulmonary:      Effort: No respiratory distress.      Breath sounds: Rhonchi and rales present. No decreased breath sounds or wheezing.   Chest:      Comments: Midline scar from recent transplant   Abdominal:      Palpations: Abdomen is soft.      Tenderness: There is no guarding.   Musculoskeletal:      Right lower leg: No edema.      Left lower leg: No edema.   Skin:     General: Skin is dry.      Capillary Refill: Capillary refill takes 2 to 3 seconds.      Comments: cool   Neurological:      Comments: Intubated/sedated         Vents:  Vent Mode: A/C (11/16/20  1011)  Ventilator Initiated: Yes (11/12/20 0921)  Set Rate: 30 BPM (11/16/20 1011)  Vt Set: 360 mL (11/16/20 1011)  Pressure Support: 0 cmH20 (11/15/20 2115)  PEEP/CPAP: 8 cmH20 (11/16/20 1011)  Oxygen Concentration (%): 100 (11/16/20 1011)  Peak Airway Pressure: 28 cmH2O (11/16/20 1011)  Plateau Pressure: 35 cmH20 (11/16/20 1011)  Total Ve: 11.1 mL (11/16/20 1011)  F/VT Ratio<105 (RSBI): (!) 80.21 (11/16/20 1011)  Lines/Drains/Airways     Central Venous Catheter Line            Percutaneous Central Line Insertion/Assessment - Triple Lumen  11/12/20 1030 right internal jugular 4 days          Drain                 NG/OG Tube 11/13/20 0200 [place] sump 14 Fr. Center mouth 3 days         Urethral Catheter 11/12/20 1445 16 Fr. 3 days          Airway                 Airway - Non-Surgical 11/12/20 0928 Endotracheal Tube 4 days          Arterial Line            Arterial Line 11/12/20 1053 Right Radial 3 days          Peripheral Intravenous Line                 Midline Catheter Insertion/Assessment  - Single Lumen 11/10/20 1603 Right basilic vein (medial side of arm) 18g x 10cm 5 days         Peripheral IV - Single Lumen 11/12/20 0545 20 G Anterior;Left Hand 4 days              Significant Labs:    CBC/Anemia Profile:  Recent Labs   Lab 11/15/20  0252 11/16/20  0314   WBC 20.68* 17.32*   HGB 8.5* 9.1*   HCT 25.5* 26.9*   PLT 87* 94*   MCV 97 96   RDW 15.6* 15.7*        Chemistries:  Recent Labs   Lab 11/15/20  0252 11/16/20  0314   * 130*   K 4.3 3.8   CL 89* 93*   CO2 25 24   BUN 44* 45*   CREATININE 0.8 0.8   CALCIUM 7.6* 7.9*   ALBUMIN 1.3* 1.4*   PROT 5.0* 5.3*   BILITOT 1.0 1.2*   ALKPHOS 153* 199*   ALT 49* 43   AST 73* 47*   MG 1.9 1.8   PHOS 3.8 2.9       All pertinent labs within the past 24 hours have been reviewed.    Significant Imaging:  I have reviewed all pertinent imaging results/findings within the past 24 hours.

## 2020-11-16 NOTE — PROGRESS NOTES
[facility] - ICU 16 WT  Adult Nutrition  Progress Note    SUMMARY       Recommendations    Recommendations:   1. As propofol has increased, suggest Peptamen Intense VHP to better meet protein needs at goal rate    --Increase to goal 45 mL/hr as medically appropriate to provide 1566 kcal (with current propofol), 99 g protein, and 907 mL fluid     2. Additional fluid per MD. Hold for residuals >500 mL.    3. Obtain new weight.      4. RD following.  Goals: Pt to meet 50-75% estimated kcal and protein needs by RD f/u.  Nutrition Goal Status: progressing towards goal, ongoing   Communication of RD Recs: other (comment)(POC)    Reason for Assessment    Reason For Assessment: RD follow-up  Diagnosis: (COVID-19)  Relevant Medical History: DMII, HTN, pulmonary fibrosis s/p left lung tx  Interdisciplinary Rounds: did not attend  General Information Comments: RD follow up. Attempted to call RN via phone; unable to reach. Pt remains intubated and proned per chart. Started TF of Glucerna 1.5 and running at 10 mL/hr. No s/s discomfort per flowsheets. Weight is stable per chart, no recent weight measurement since 11/5. Propofol running at 18.4 mL/hr which is increased since previous RD assessment. Due to recent hospital wide restrictions to limit the transfer of (COVID-19), we are not performing any physical exams at this time on patients with +COVID-19. All S/S will be observational; NFPE to be performed at a future date.  Nutrition Discharge Planning: Unclear at this time.    Nutrition Risk Screen    Nutrition Risk Screen: tube feeding or parenteral nutrition    Nutrition/Diet History    Patient Reported Diet/Restrictions/Preferences: other (see comments)(Unable to obtain d/t COVID precautions. Heart healthy in past.)  Typical Food/Fluid Intake: -  Food Preferences: -  Spiritual, Cultural Beliefs, Gnosticist Practices, Values that Affect Care: no  Food Allergies: NKFA  Factors Affecting Nutritional Intake: NPO, on  "mechanical ventilation    Anthropometrics    Temp: 98.1 °F (36.7 °C)  Height: 5' 7"  Height (inches): 67 in  Weight Method: Bed Scale  Weight: 76.7 kg (169 lb)  Weight (lb): 169 lb  Ideal Body Weight (IBW), Female: 135 lb  % Ideal Body Weight, Female (lb): 125.19 %  BMI (Calculated): 26.5  Weight Loss: intentional(per chart review)       Lab/Procedures/Meds    Pertinent Labs Reviewed: reviewed  Pertinent Labs Comments: Na 130, BUN 45, Glu 140, Ca 7.9  Pertinent Medications Reviewed: reviewed  Pertinent Medications Comments: pantoprazole, nimbex, precedex, insulin, propofol, vasopressin    Estimated/Assessed Needs    Weight Used For Calorie Calculations: 76.7 kg (169 lb 1.5 oz)  Energy Calorie Requirements (kcal): 1633 kcal/day  Energy Need Method: [employer]  Protein Requirements:  gm/day(1.2-1.5 g/kg)  Weight Used For Protein Calculations: 76.7 kg (169 lb 1.5 oz)  Fluid Requirements (mL): 1 mL/kcal or per MD  Estimated Fluid Requirement Method: RDA Method  RDA Method (mL): 1633  CHO Requirement: 204 gm/day     Nutrition Prescription Ordered    Current Diet Order: NPO  Nutrition Order Comments: -  Oral Nutrition Supplement: -    Evaluation of Received Nutrient/Fluid Intake    Enteral Calories (kcal): 360  Enteral Protein (gm): 20  Enteral (Free Water) Fluid (mL): 182  Other Calories (kcal): 486(propofol)  % Kcal Needs: 52  % Protein Needs: 22  I/O: +9.2L since admit  Energy Calories Required: not meeting needs  Protein Required: not meeting needs  Fluid Required: other (see comments)(Per MD)  Comments: LBM 11/11  Tolerance: tolerating  % Intake of Estimated Energy Needs: 50 - 75 %  % Meal Intake: NPO    Nutrition Risk    Level of Risk/Frequency of Follow-up: high     Assessment and Plan    Nutrition Problem  Inadeqaute energy intake      Related to (etiology):   Decrease ability to consume adequate PO     Signs and Symptoms (as evidenced by):   NPO, Intubated      Interventions(treatment " strategy):  Collaboration with other providers      Nutrition Diagnosis Status:   Continues     Monitor and Evaluation    Food and Nutrient Intake: enteral nutrition intake  Food and Nutrient Adminstration: enteral and parenteral nutrition administration  Knowledge/Beliefs/Attitudes: other (specify)  Anthropometric Measurements: weight, weight change, body mass index  Biochemical Data, Medical Tests and Procedures: electrolyte and renal panel, gastrointestinal profile, glucose/endocrine profile, inflammatory profile, lipid profile  Nutrition-Focused Physical Findings: overall appearance     Nutrition Follow-Up    RD Follow-up?: Yes

## 2020-11-16 NOTE — CARE UPDATE
Pt supinated with RTx2 @HOB, RN x5, RT and MD Castillo. Pt tolerated well with no adverse reactions ETT 23cm @ lips. FiO2 decreased to 60% post supination. Will continue to monitor.

## 2020-11-16 NOTE — CONSULTS
Wound care consult received from RN for assessment of L buttock. Patient is a 65 year old female with a past medical history of diabetes and idiopathic pulmonary fibrosis s/p unilateral (left) lung transplant in August 2020 who presents with complaints of shortness of breath, fever of 101 and non- productive cough. She tested positive for Covid-19 on arrival.     Assessment and picture listed below. Recommend triad ointment BID/PRN to provide a hydrophilic environment to promote healing of exposed tissue and prevent breakdown of intact skin. Nursing to continue with pressure injury prevention interventions. LALO surface in use. Nursing and MD team notified with recommendations. Wound care to continue to follow pt PRN [phone]    L/R buttocks- 5 x 6 x 0.1 cm  -Maroon/purple discoloration to L and R buttock that is evolving into stage 3 shallow pressure injury. Unroofed blister to R buttock with purple/maroon discoloration to wound bed. No odor. Small amount of serosanguineous drainage.

## 2020-11-16 NOTE — PLAN OF CARE
Son, Aneesh, at [facility] this shift. Patient supinated this am approx 1050 am and proned approx 1700 today. Fent, prop, levo, nimbex, insulin, Abx continued. BIS 40s this shift. UOP increased once vasopressin switched to levophed and 500 ml blous given. BP at goal with pressors. NSR with PVCs this shift. [place], TLC, midline continued. Free from falls today. Skin breakdown noted to Left medial glute. Wound care consult placed and LDA added. Ventilator 60%/10 PEEP. All questions and POC updated with family at the bedside this shift. WCTM.

## 2020-11-16 NOTE — ASSESSMENT & PLAN NOTE
COVID + 11/4. CXR with bilateral patchy opacities. Symptoms started 10/30 and steadily progressed. Daughter found to be COVID +. Overnight on 11/12, patient developed septic shock 2/ 2 GNR LLL PNA c/b GNR bacteremia.     - LPV  - Wean respiratory rate today  - Prone positioning. Follow up ABG this afternoon.   - Remdesivir X 5 days; completed 11/09/20  - Convalescent Plasma; Dose #1 11/5  - Dexamethasone X 10 days (end 11/14)  - Wean FiO2 for SpO2 >88%  - Duonebs  - hypercoagulable on full dose lovenox   - TTE with EF 70%; left ventricular concentric remodeling

## 2020-11-16 NOTE — ASSESSMENT & PLAN NOTE
Steroid induced. Now uncontrolled in the setting of septic shock requiring an insulin gtt.    - Trickle feeds.   - Cont insulin gtt

## 2020-11-16 NOTE — CARE UPDATE
Procedure Note  Supine Positioning  Critical Care Medicine       Chief Complaint: Acute hypoxemic respiratory failure due to COVID-19  MRN: [id number]  LOS: 12  Sex: female  Age: 65 y.o.      Last ABG:   Recent Labs   Lab 11/16/20  0500   PH 7.518*   PO2 66*   PCO2 35.7   HCO3 29.0*   BE 6       Vital Signs (Most Recent):   Temp: 98.1 °F (36.7 °C) (11/16/20 0900)  Pulse: 73 (11/16/20 1011)  Resp: (!) 30 (11/16/20 1011)  BP: (!) 141/75 (11/16/20 0800)  SpO2: 100 % (11/16/20 1011)    Ventilator Settings:  Vent Mode: A/C  Oxygen Concentration (%):  [] 100  Resp Rate Total:  [0 br/min-31 br/min] 30 br/min  Vt Set:  [360 mL] 360 mL  PEEP/CPAP:  [8 cmH20-10 cmH20] 8 cmH20  Pressure Support:  [0 cmH20] 0 cmH20  Mean Airway Pressure:  [16 siX54-87 cmH20] 16 cmH20    Indication: Severe, refractory ARDS. High risk for deterioration during proning procedure in need of direct monitoring by Critical Care personnel.     Prior to beginning the procedure, all appropriate equipment and personnel were gathered in the room. Two individuals were placed on each side of the patient and one person stood at the head of the bed and was dedicated to the management of the head of the patient, the endotracheal tube, and the ventilator lines. The person at the head of the bed  coordinated the steps of the supine procedure with team team at the direction of Critical Care team members at the bedside. The patient was first log-rolled 90 degrees onto their side towards the direction of their central venous catheter. Cardiac electrodes were then moved from the patient's posterior to anterior. The patient?s knees, forehead, chest, and iliac crests were protected using adhesive pads and/or foam pads to reduce the risk of skin breakdown. Once properly positioned in the bed, another 90 degree log roll was performed placing the patient into the supine position. The patient's arms were placed alongside the body. The patient's head should be turned  alternately to the right or left every 2 hours. The patient tolerated the procedure well.     Total Critical Care time (uninterrupted not including procedures):   20 minutes

## 2020-11-17 NOTE — SUBJECTIVE & OBJECTIVE
Interval History/Significant Events: Not prone yesterday due to bradycardia. Nimbex stopped with improvement in bradycardia.     Review of Systems   Unable to perform ROS: Intubated     Objective:     Vital Signs (Most Recent):  Temp: 97.7 °F (36.5 °C) (11/17/20 1200)  Pulse: 71 (11/17/20 1200)  Resp: (!) 22 (11/17/20 1200)  BP: (!) 174/84 (11/17/20 1200)  SpO2: (!) 91 % (11/17/20 1200) Vital Signs (24h Range):  Temp:  [96.6 °F (35.9 °C)-99.1 °F (37.3 °C)] 97.7 °F (36.5 °C)  Pulse:  [57-72] 71  Resp:  [22-26] 22  SpO2:  [88 %-95 %] 91 %  BP: (141-174)/(75-84) 174/84  Arterial Line BP: ()/(52-66) 120/59   Weight: 76.7 kg (169 lb)  Body mass index is 26.47 kg/m².      Intake/Output Summary (Last 24 hours) at 11/17/2020 1339  Last data filed at 11/17/2020 1200  Gross per 24 hour   Intake 1549.4 ml   Output 700 ml   Net 849.4 ml       Physical Exam  Vitals signs and nursing note reviewed.   Constitutional:       General: She is not in acute distress.     Appearance: She is ill-appearing. She is not diaphoretic.      Interventions: Nasal cannula in place.      Comments: Intubated, sedated   HENT:      Head: Normocephalic and atraumatic.   Eyes:      General: No scleral icterus.  Neck:      Musculoskeletal: No neck rigidity.   Cardiovascular:      Rate and Rhythm: Normal rate and regular rhythm.      Pulses: Normal pulses.      Heart sounds: Normal heart sounds. No murmur.   Pulmonary:      Effort: No respiratory distress.      Breath sounds: Rhonchi and rales present. No decreased breath sounds or wheezing.   Chest:      Comments: Midline scar from recent transplant   Abdominal:      Palpations: Abdomen is soft.      Tenderness: There is no guarding.   Musculoskeletal:      Right lower leg: No edema.      Left lower leg: No edema.   Skin:     General: Skin is dry.      Capillary Refill: Capillary refill takes 2 to 3 seconds.      Comments: cool   Neurological:      Comments: Intubated/sedated         Vents:  Vent  Mode: A/C (11/17/20 1115)  Ventilator Initiated: Yes (11/12/20 0921)  Set Rate: 22 BPM (11/17/20 1115)  Vt Set: 360 mL (11/17/20 1115)  Pressure Support: 0 cmH20 (11/15/20 2115)  PEEP/CPAP: 8 cmH20 (11/17/20 1115)  Oxygen Concentration (%): 40 (11/17/20 1200)  Peak Airway Pressure: 36 cmH2O (11/17/20 1115)  Plateau Pressure: 31 cmH20 (11/17/20 1115)  Total Ve: 6.89 mL (11/17/20 1115)  F/VT Ratio<105 (RSBI): (!) 70.29 (11/17/20 1115)  Lines/Drains/Airways     Central Venous Catheter Line            Percutaneous Central Line Insertion/Assessment - Triple Lumen  11/12/20 1030 right internal jugular 5 days          Drain                 NG/OG Tube 11/13/20 0200 [place] sump 14 Fr. Center mouth 4 days         Urethral Catheter 11/12/20 1445 16 Fr. 4 days          Airway                 Airway - Non-Surgical 11/12/20 0928 Endotracheal Tube 5 days          Arterial Line            Arterial Line 11/12/20 1053 Right Radial 5 days          Peripheral Intravenous Line                 Midline Catheter Insertion/Assessment  - Single Lumen 11/10/20 1603 Right basilic vein (medial side of arm) 18g x 10cm 6 days         Peripheral IV - Single Lumen 11/12/20 0545 20 G Anterior;Left Hand 5 days              Significant Labs:    CBC/Anemia Profile:  Recent Labs   Lab 11/16/20 0314 11/17/20  0352   WBC 17.32* 18.11*   HGB 9.1* 8.8*   HCT 26.9* 26.4*   PLT 94* 122*   MCV 96 98   RDW 15.7* 16.9*        Chemistries:  Recent Labs   Lab 11/16/20 0314 11/16/20  1557 11/17/20  0352   * 133* 134*   K 3.8 3.5 3.9   CL 93* 95 93*   CO2 24 27 28   BUN 45* 44* 48*   CREATININE 0.8 0.8 0.9   CALCIUM 7.9* 8.3* 8.1*   ALBUMIN 1.4*  --  1.5*   PROT 5.3*  --  5.6*   BILITOT 1.2*  --  2.1*   ALKPHOS 199*  --  287*   ALT 43  --  48*   AST 47*  --  66*   MG 1.8 1.9 2.0   PHOS 2.9 2.6* 2.7       All pertinent labs within the past 24 hours have been reviewed.    Significant Imaging:  I have reviewed all pertinent imaging results/findings within the  past 24 hours.

## 2020-11-17 NOTE — HPI
Reason for Consult: Management of pre-dm, Hyperglycemia      Surgical Procedure and Date: Left lung transplant n 8/10/20     Diabetes diagnosis year: pre-dm - diet controlled prior to transplant   Lab Results   Component Value Date    HGBA1C 8.0 (H) 11/05/2020             Home Diabetes Medications:novolog 10 units TID with meals plus correction scale.     How often checking glucose at home? NAVEEN   BG readings on regimen: NAVEEN  Hypoglycemia on the regimen? NAVEEN   Missed doses on regimen?  NAVEEN     Diabetes Complications include:     none  Complicating diabetes co morbidities:   Glucocorticoid use  s/p lung transplant  covid infection        HPI:   Patient is a 65 y.o. female with a diagnosis of diabetes and idiopathic pulmonary fibrosis s/p unilateral (left) lung transplant. Patient presents to the ED complaining of a shortness of breath that progressively got worse. On arrival her COVID-19 test is positive. Patient clinically declined on 11/13 and was intubated. Endocrinology consulted for BG/ Dm management.

## 2020-11-17 NOTE — CONSULTS
[facility] - ICU [facility]  Endocrinology  Diabetes Consult Note    Consult Requested by: Inna Castillo MD   Reason for admit: Pneumonia due to COVID-19 virus    HISTORY OF PRESENT ILLNESS:  Reason for Consult: Management of pre-dm, Hyperglycemia      Surgical Procedure and Date: Left lung transplant n 8/10/20     Diabetes diagnosis year: pre-dm - diet controlled prior to transplant   Lab Results   Component Value Date    HGBA1C 8.0 (H) 11/05/2020             Home Diabetes Medications:novolog 10 units TID with meals plus correction scale.     How often checking glucose at home? NAVEEN   BG readings on regimen: NAVEEN  Hypoglycemia on the regimen? NAVEEN   Missed doses on regimen?  NAVEEN     Diabetes Complications include:     none  Complicating diabetes co morbidities:   Glucocorticoid use  s/p lung transplant  covid infection        HPI:   Patient is a 65 y.o. female with a diagnosis of diabetes and idiopathic pulmonary fibrosis s/p unilateral (left) lung transplant. Patient presents to the ED complaining of a shortness of breath that progressively got worse. On arrival her COVID-19 test is positive. Patient clinically declined on 11/13 and was intubated. Endocrinology consulted for BG/ Dm management.           Interval HPI:   Overnight events: Remains in covid ICU. Intubated and sedated. BG reasonably well controlled on IV insulin infusion rates 1-3.6 u/hr. Hydrocortisone 100 mg every 8 hours.   Eating:   NPO  Nausea: No  Hypoglycemia and intervention: No  Fever: No  TPN and/or TF: No    PMH, PSH, FH, SH reviewed       Review of Systems  Unable to obtain due to: Sedation,Intubation,Altered mental status,Critical illness,Reviewed ROS from note dated 11/4/20 per Cori Moseley NP     Current Medications and/or Treatments Impacting Glycemic Control  Immunotherapy:    Immunosuppressants     None        Steroids:   Hormones (From admission, onward)    Start     Stop Route Frequency Ordered    11/14/20 0900   fludrocortisone tablet 100 mcg      -- OG Daily 11/13/20 1717 11/14/20 0600  hydrocortisone sodium succinate injection 100 mg      -- IV Every 8 hours 11/13/20 1717    11/12/20 1100  vasopressin (PITRESSIN) 0.2 Units/mL in dextrose 5 % 100 mL infusion      -- IV Continuous 11/12/20 1113        Pressors:    Autonomic Drugs (From admission, onward)    Start     Stop Route Frequency Ordered    11/12/20 1530  NORepinephrine bitartrate 8 mg in dextrose 5% 250 mL infusion     Question Answer Comment   Titrate by: (in mcg/kg/min) 0.02    Titrate interval: (in minutes) 5    Titrate to maintain: (MAP or SBP) MAP    Greater than: (in mmHg) 65    Maximum dose: (in mcg/kg/min) 3        -- IV Continuous 11/12/20 1422    11/12/20 1243  norepinephrine 1 mg/mL injection     Note to Pharmacy: Created by cabinet override    11/13 0059 11/12/20 1243        Hyperglycemia/Diabetes Medications:   Antihyperglycemics (From admission, onward)    Start     Stop Route Frequency Ordered    11/12/20 2330  insulin regular 1 Units/mL in sodium chloride 0.9% 100 mL infusion     Question:  Enter initial dose from Infusion Protocol Chart (Units/hr):  Answer:  3    -- IV Continuous 11/12/20 2220             PHYSICAL EXAMINATION:  Vitals:    11/17/20 1700   BP:    Pulse: 61   Resp: (!) 22   Temp: 97.3 °F (36.3 °C)     Body mass index is 26.47 kg/m².    Physical Exam   Physical exam deferred due to COVID-19 restrictions.            Labs Reviewed and Include   Recent Labs   Lab 11/17/20  0352   *   CALCIUM 8.1*   ALBUMIN 1.5*   PROT 5.6*   *   K 3.9   CO2 28   CL 93*   BUN 48*   CREATININE 0.9   ALKPHOS 287*   ALT 48*   AST 66*   BILITOT 2.1*     Lab Results   Component Value Date    WBC 18.11 (H) 11/17/2020    HGB 8.8 (L) 11/17/2020    HCT 26.4 (L) 11/17/2020    MCV 98 11/17/2020     (L) 11/17/2020     No results for input(s): TSH, FREET4 in the last 168 hours.  Lab Results   Component Value Date    HGBA1C 8.0 (H) 11/05/2020        Nutritional status:   Body mass index is 26.47 kg/m².  Lab Results   Component Value Date    ALBUMIN 1.5 (L) 11/17/2020    ALBUMIN 1.4 (L) 11/16/2020    ALBUMIN 1.3 (L) 11/15/2020     No results found for: PREALBUMIN    Estimated Creatinine Clearance: 68.3 mL/min (based on SCr of 0.9 mg/dL).    Accu-Checks  Recent Labs     11/17/20  0541 11/17/20  0632 11/17/20  0746 11/17/20  0833 11/17/20  1024 11/17/20  1130 11/17/20  1252 11/17/20  1429 11/17/20  1641 11/17/20  1718   POCTGLUCOSE 160* 140* 156* 128* 165* 172* 185* 193* 212* 207*        ASSESSMENT and PLAN    * Pneumonia due to COVID-19 virus  Managed per primary.   Infection may increase insulin resistance.         Prediabetes  BG goal 140-180.     Change to transition insulin infusion at 2 u/hr.  BG monitoring every 4 hours and moderate dose correction scale.       Immunosuppression  May increase insulin resistance.         Adrenal cortical steroids causing adverse effect in therapeutic use  Glucocorticoids markedly increase  glucoses. Expect the steroid taper will help glucose control.             Plan discussed with RN at bedside.     Nidia Frazier NP  Endocrinology  [facility] - ICU [facility]

## 2020-11-17 NOTE — PROGRESS NOTES
[facility] - ICU 16   Critical Care Medicine  Progress Note    Patient Name: Chely Becerra  MRN: [id number]  Admission Date: 11/4/2020  Hospital Length of Stay: 13 days  Code Status: Full Code  Attending Provider: Inna Castillo MD  Primary Care Provider: ZAHIDA Stack MD   Principal Problem: Acute hypoxemic respiratory failure due to COVID-19    Subjective:     HPI:  Ms. Chely Becerra is a 65 y.o. year old female with a PMHx of diabetes and idiopathic pulmonary fibrosis s/p unilateral (left) lung transplant in August 2020 who presents to the ED complaining of a shortness of breath that started last Friday and progressively got worse. She had a fever of 101 on Sunday, started coughing for the last 3 days nonproductive, denies any chest pain. She was seen in the [facility] Transplant [facility] for routine testing earlier this morning was found to be hypoxic and she was referred to the emergency department. On arrival her COVID-19 test is positive.     Patient lives with her 2 daughters, 1 of the daughters has been having some respiratory symptoms recently however she thought was secondary to asthma. he is on prednisone taper currently 20 mg daily, she has been compliant with her antirejection medication Prograf and CellCept. She was started on BiPAP in the ED with improvement in her oxygen saturations to the mid 90's.     Hospital/ICU Course:  Admitted to Critical Care Medicine for acute hypoxic respiratory failure 2/2 to COVID pneumonia. Alternating between BiPAP and comfort flow. Started on dexamethasone. Consented for Remdsivir and Convalescent Plasma by lung transplant. Patient received her first unit of convalescent plasma 11/5 and tolerated well per patient. Echo performed on 11/5 showed LVEF 70% with normal diastolic function, though LV concentric remodeling & normal RV size and function. Patient continued on alternating bipap with comfort flow 11/8 - 11/10. Patient clinically declined on  11/13. Abx were broadened to vanc and cefepime (in addition to x1 dose tobra given for double gram negative coverage). Patient was intubated and central/arterial lines were placed. CT chest/abd/pelvis showed LLL pneumonia. Blood cultures resulted with GNR bacteremia. As patient was still spiking fevers with uptrending lactic, abx broadened to meropenem. Blood and sputum cultures with Pseudomonas.    Interval History/Significant Events: Not prone yesterday due to bradycardia. Nimbex stopped with improvement in bradycardia.     Review of Systems   Unable to perform ROS: Intubated     Objective:     Vital Signs (Most Recent):  Temp: 97.7 °F (36.5 °C) (11/17/20 1200)  Pulse: 71 (11/17/20 1200)  Resp: (!) 22 (11/17/20 1200)  BP: (!) 174/84 (11/17/20 1200)  SpO2: (!) 91 % (11/17/20 1200) Vital Signs (24h Range):  Temp:  [96.6 °F (35.9 °C)-99.1 °F (37.3 °C)] 97.7 °F (36.5 °C)  Pulse:  [57-72] 71  Resp:  [22-26] 22  SpO2:  [88 %-95 %] 91 %  BP: (141-174)/(75-84) 174/84  Arterial Line BP: ()/(52-66) 120/59   Weight: 76.7 kg (169 lb)  Body mass index is 26.47 kg/m².      Intake/Output Summary (Last 24 hours) at 11/17/2020 1339  Last data filed at 11/17/2020 1200  Gross per 24 hour   Intake 1549.4 ml   Output 700 ml   Net 849.4 ml       Physical Exam  Vitals signs and nursing note reviewed.   Constitutional:       General: She is not in acute distress.     Appearance: She is ill-appearing. She is not diaphoretic.      Interventions: Nasal cannula in place.      Comments: Intubated, sedated   HENT:      Head: Normocephalic and atraumatic.   Eyes:      General: No scleral icterus.  Neck:      Musculoskeletal: No neck rigidity.   Cardiovascular:      Rate and Rhythm: Normal rate and regular rhythm.      Pulses: Normal pulses.      Heart sounds: Normal heart sounds. No murmur.   Pulmonary:      Effort: No respiratory distress.      Breath sounds: Rhonchi and rales present. No decreased breath sounds or wheezing.   Chest:       Comments: Midline scar from recent transplant   Abdominal:      Palpations: Abdomen is soft.      Tenderness: There is no guarding.   Musculoskeletal:      Right lower leg: No edema.      Left lower leg: No edema.   Skin:     General: Skin is dry.      Capillary Refill: Capillary refill takes 2 to 3 seconds.      Comments: cool   Neurological:      Comments: Intubated/sedated         Vents:  Vent Mode: A/C (11/17/20 1115)  Ventilator Initiated: Yes (11/12/20 0921)  Set Rate: 22 BPM (11/17/20 1115)  Vt Set: 360 mL (11/17/20 1115)  Pressure Support: 0 cmH20 (11/15/20 2115)  PEEP/CPAP: 8 cmH20 (11/17/20 1115)  Oxygen Concentration (%): 40 (11/17/20 1200)  Peak Airway Pressure: 36 cmH2O (11/17/20 1115)  Plateau Pressure: 31 cmH20 (11/17/20 1115)  Total Ve: 6.89 mL (11/17/20 1115)  F/VT Ratio<105 (RSBI): (!) 70.29 (11/17/20 1115)  Lines/Drains/Airways     Central Venous Catheter Line            Percutaneous Central Line Insertion/Assessment - Triple Lumen  11/12/20 1030 right internal jugular 5 days          Drain                 NG/OG Tube 11/13/20 0200 [place] sump 14 Fr. Center mouth 4 days         Urethral Catheter 11/12/20 1445 16 Fr. 4 days          Airway                 Airway - Non-Surgical 11/12/20 0928 Endotracheal Tube 5 days          Arterial Line            Arterial Line 11/12/20 1053 Right Radial 5 days          Peripheral Intravenous Line                 Midline Catheter Insertion/Assessment  - Single Lumen 11/10/20 1603 Right basilic vein (medial side of arm) 18g x 10cm 6 days         Peripheral IV - Single Lumen 11/12/20 0545 20 G Anterior;Left Hand 5 days              Significant Labs:    CBC/Anemia Profile:  Recent Labs   Lab 11/16/20 0314 11/17/20  0352   WBC 17.32* 18.11*   HGB 9.1* 8.8*   HCT 26.9* 26.4*   PLT 94* 122*   MCV 96 98   RDW 15.7* 16.9*        Chemistries:  Recent Labs   Lab 11/16/20  0314 11/16/20  1557 11/17/20  0352   * 133* 134*   K 3.8 3.5 3.9   CL 93* 95 93*   CO2 24 27 28    BUN 45* 44* 48*   CREATININE 0.8 0.8 0.9   CALCIUM 7.9* 8.3* 8.1*   ALBUMIN 1.4*  --  1.5*   PROT 5.3*  --  5.6*   BILITOT 1.2*  --  2.1*   ALKPHOS 199*  --  287*   ALT 43  --  48*   AST 47*  --  66*   MG 1.8 1.9 2.0   PHOS 2.9 2.6* 2.7       All pertinent labs within the past 24 hours have been reviewed.    Significant Imaging:  I have reviewed all pertinent imaging results/findings within the past 24 hours.      ABG  Recent Labs   Lab 11/17/20  0320   PH 7.449   PO2 58*   PCO2 40.9   HCO3 28.3*   BE 4     Assessment/Plan:     Pulmonary  S/P lung transplant  S/p left lung transplant in Aug 2020 for IPF. 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 AGMA 2/2 LA, NAGMA 2/2 renal failure and respiratory acidosis     - On bicarb gtt with improvement in acidemia  - Wean down gtt  - Will plan to wean respiratory rate throughout the day  - Good UOP with resolution of DWAYNE  - Lactate improving as well    ID  Septic shock  Pseudomonas bacteremia and in sputum.     - Cont Meropenem  - ID following.   - Blood and sputum cx's with Pseudomonas  - Repeat blood cx's NGTD       Sepsis  Hypotensive but not currently requiring vasopressors. Initially 2/2 COVID PNA, now with possible bacterial infection. CXR not markedly changed, UA neg.     - Cont Vanc, cefepime  - Add flagyl for possible intraabdominal source  - CT C/A/P if patient intubated or respiratory status significantly improves  - Cultures sent  - ID consulted  - Midline placed 11/10- not likely the  source    Endocrine  Prediabetes  Steroid induced. Now uncontrolled in the setting of septic shock requiring an insulin gtt.    - Trickle feeds.   - Cont insulin gtt  - Endocrine consult      Other  * Acute hypoxemic respiratory failure due to COVID-19  COVID + 11/4. CXR with bilateral patchy opacities. Symptoms started 10/30 and steadily progressed. Daughter found to be COVID +. Overnight on 11/12, patient developed septic shock 2/ 2 GNR LLL PNA c/b GNR bacteremia.     - LPV  - Wean respiratory rate today  - Unable to prone due to bradycardia. Off [employer].   - Remdesivir X 5 days; completed 11/09/20  - Convalescent Plasma; Dose #1 11/5  - Dexamethasone X 10 days (end 11/14)  - Wean FiO2 for SpO2 >88%  - Duonebs  - hypercoagulable on full dose lovenox   - TTE with EF 70%; left ventricular concentric remodeling      Pneumonia due to COVID-19 virus  Plan per respiratory failure    Elevated alkaline phosphatase level  Alk Phos increased with T. Bili. Will order RUQ Ultrasound    Palliative care encounter  Ivy updated regularly. All questions answered.       Critical Care Daily Checklist:    A: Awake: RASS Goal/Actual Goal: RASS Goal: -5-->unarousable  Actual: [place] Agitation Sedation Scale (RASS): Unarousable   B: Spontaneous Breathing Trial Performed?     C: SAT & SBT Coordinated?  Tomorrow                    D: Delirium: CAM-ICU Overall CAM-ICU: Negative   E: Early Mobility Performed? No   F: Feeding Goal: Goals: Pt to meet 50-75% estimated kcal and protein needs by RD f/u.  Status: Nutrition Goal Status: progressing towards goal   Current Diet Order   Procedures    Diet NPO      AS: Analgesia/Sedation Per chart   T: Thromboembolic Prophylaxis Lovenox   H: HOB > 300 Yes   U: Stress Ulcer Prophylaxis (if needed) Protonix   G: Glucose Control BG goal 140-180   B: Bowel Function Stool Occurrence: 1   I: Indwelling Catheter (Lines & Rios) Necessity Per nursing   D: De-escalation of  Antimicrobials/Pharmacotherapies D/C vanc    Plan for the day/ETD Wean ventilator, RUQ ultrasound    Code Status:  Family/Goals of Care: Full Code       Critical Care Time: 90 minutes  Critical secondary to Patient has a condition that poses threat to life and bodily function: Severe Respiratory Distress      Critical care was time spent personally by me on the following activities: development of treatment plan with patient or surrogate and bedside caregivers, discussions with consultants, evaluation of patient's response to treatment, examination of patient, ordering and performing treatments and interventions, ordering and review of laboratory studies, ordering and review of radiographic studies, pulse oximetry, re-evaluation of patient's condition. This critical care time did not overlap with that of any other provider or involve time for any procedures.     Inna Castillo MD  Critical Care Medicine  [facility] - ICU [facility]

## 2020-11-17 NOTE — PROGRESS NOTES
[facility] - ICU 16 WT  Infectious Disease  Progress Note    Patient Name: Chely Becerra  MRN: [id number]  Admission Date: 11/4/2020  Length of Stay: 12 days  Attending Physician: Inna Castillo MD  Primary Care Provider: ZAHIDA Stack MD    Isolation Status: Airborne and Contact and Droplet, Airborne and Contact and Droplet  Assessment/Plan:      * Acute hypoxemic respiratory failure due to COVID-19  65F PMH IPF s/p L Single LTx 8/2020, admitted on 11/4 w/ acute hypoxic respiratory failure, COVID positive. Now w/ declining condition, intubated, on vasopressors, broad spectrum abx. S/p 5 days of remdesevir, 1 dose of convalescent plasma,  dexamethasone. Blood cultures 11/12 + Pseudomonas and CONS, resp culture 11/12 pseudomonas, repeat blood cx 11/13 NGTD    Recommendations:  - continue Meropenem and vanc  - continue posaconazole  - follow up all pending cultures      Will follow    addendum:  -   Recommend 2 weeks of meropenem from 11/13 (EOT 11/27/20)  - vori was broadened to posaconazole during decompensation - can continue posaconazole x 6 weeks and reassess need and consider going back to voriconazole prophylaxis per protocol at that time  - can stop vancomycin given no MRSA growth  - continue other prophylaxis per protocol.    - will sign off, call back if questions      Anticipated Disposition: pending    Thank you for your consult. I will follow-up with patient. Please contact us if you have any additional questions.    Naveen Owen MD  Infectious Disease  [facility] - ICU 16 WT    Subjective:     Principal Problem:Acute hypoxemic respiratory failure due to COVID-19    HPI: 65F PMH DM, IPF s/p L SLTx 8/10/2020 who presented on 11/4 w/ increasing shortness of breath, cough and fevers x 3 days, and noted to have hypoxia on evaluation in lung transplant clinic. She was found to be COIVD positive on hospital admission. Daughter who lives with patient had also recently  been sick, and tested positive for COVID as well. Home meds included pred taper (currently at 20mg), prograf and MMF. On admission, she was started on bipap. She has received 5 days of remdesivir, convalescent plasma, and remains on decadron. ID consulted today as patient's clinical status has deteriorated. She developed progressive hypoxic respiratory failure requiring intubation this morning. She is hypotensive on levo and vaso. Antibiotics were changed to vanc, cefepime and flagyl. CT chest with diffuse GGO bilaterally w/ dense infiltrates in L lung, bilateral perinephric stranding, ? Colon mass.        Interval History: remains critically ill, vent settings improved, remains on pressors    Review of Systems   Unable to perform ROS: Intubated     Objective:     Vital Signs (Most Recent):  Temp: 97.2 °F (36.2 °C) (11/16/20 1800)  Pulse: 64 (11/16/20 1800)  Resp: (!) 22 (11/16/20 1800)  BP: (!) 142/75 (11/16/20 1400)  SpO2: (!) 92 % (11/16/20 1800) Vital Signs (24h Range):  Temp:  [96.4 °F (35.8 °C)-99 °F (37.2 °C)] 97.2 °F (36.2 °C)  Pulse:  [53-93] 64  Resp:  [22-30] 22  SpO2:  [88 %-100 %] 92 %  BP: (136-157)/(67-88) 142/75  Arterial Line BP: ()/(50-68) 99/61     Weight: 76.7 kg (169 lb)  Body mass index is 26.47 kg/m².    Estimated Creatinine Clearance: 76.8 mL/min (based on SCr of 0.8 mg/dL).    Physical Exam  Vitals signs reviewed.   Constitutional:       General: She is not in acute distress.     Appearance: She is well-developed. She is not diaphoretic.   HENT:      Head: Atraumatic.      Right Ear: External ear normal.      Left Ear: External ear normal.      Nose: Nose normal.      Mouth/Throat:      Mouth: Mucous membranes are moist.   Eyes:      General: No scleral icterus.        Right eye: No discharge.         Left eye: No discharge.      Extraocular Movements: Extraocular movements intact.      Conjunctiva/sclera: Conjunctivae normal.   Neck:      Musculoskeletal: Normal range of motion and  neck supple.   Cardiovascular:      Rate and Rhythm: Normal rate and regular rhythm.      Pulses: Normal pulses.      Heart sounds: Normal heart sounds. No murmur.   Pulmonary:      Effort: No respiratory distress.      Breath sounds: No wheezing.      Comments: Coarse breath sounds b/l, diminished, intubated on 60% FIO2  Abdominal:      General: Bowel sounds are normal. There is no distension.      Palpations: Abdomen is soft.      Tenderness: There is no abdominal tenderness.   Musculoskeletal:         General: No swelling or deformity.   Skin:     General: Skin is dry.      Findings: No erythema or rash.      Comments: Cool to touch   Neurological:      Comments: Intubated and sedated         Significant Labs:   CBC:   Recent Labs   Lab 11/15/20  0252 11/16/20  0314   WBC 20.68* 17.32*   HGB 8.5* 9.1*   HCT 25.5* 26.9*   PLT 87* 94*     CMP:   Recent Labs   Lab 11/15/20  0252 11/16/20  0314 11/16/20  1557   * 130* 133*   K 4.3 3.8 3.5   CL 89* 93* 95   CO2 25 24 27   * 140* 187*   BUN 44* 45* 44*   CREATININE 0.8 0.8 0.8   CALCIUM 7.6* 7.9* 8.3*   PROT 5.0* 5.3*  --    ALBUMIN 1.3* 1.4*  --    BILITOT 1.0 1.2*  --    ALKPHOS 153* 199*  --    AST 73* 47*  --    ALT 49* 43  --    ANIONGAP 12 13 11   EGFRNONAA >60.0 >60.0 >60.0     Microbiology Results (last 7 days)     Procedure Component Value Units Date/Time    Blood culture [[id number]] Collected: 11/13/20 0334    Order Status: Completed Specimen: Blood from Peripheral, Hand, Right Updated: 11/16/20 0812     Blood Culture, Routine No Growth to date      No Growth to date      No Growth to date      No Growth to date    Blood culture [[id number]] Collected: 11/13/20 0342    Order Status: Completed Specimen: Blood from Peripheral, Hand, Left Updated: 11/16/20 0812     Blood Culture, Routine No Growth to date      No Growth to date      No Growth to date      No Growth to date    Culture, Respiratory with Gram Stain [[id number]]  (Abnormal)   (Susceptibility) Collected: 11/12/20 1545    Order Status: Completed Specimen: Respiratory from Tracheal Aspirate Updated: 11/16/20 0802     Respiratory Culture No S aureus isolated.      PSEUDOMONAS AERUGINOSA  Few       Gram Stain (Respiratory) <10 epithelial cells per low power field.     Gram Stain (Respiratory) No WBC's     Gram Stain (Respiratory) No organisms seen    Blood culture [[id number]]  (Abnormal) Collected: 11/12/20 0219    Order Status: Completed Specimen: Blood from Peripheral, Antecubital, Left Updated: 11/15/20 1027     Blood Culture, Routine Gram stain aer bottle: Gram negative rods      Results called to and read back by: Mayuri Palmer RN  11/13/2020        02:28      PSEUDOMONAS AERUGINOSA  For susceptibility see order #[id number]      Narrative:      Blood cultures x 2 different sites. 4 bottles total. Please  draw cultures before administering antibiotics.    Blood culture [[id number]]  (Abnormal)  (Susceptibility) Collected: 11/12/20 0219    Order Status: Completed Specimen: Blood from Peripheral, Antecubital, Left Updated: 11/15/20 1026     Blood Culture, Routine Gram stain aer bottle: Gram negative rods      Results called to and read back by: Mayuri Palmer RN  11/13/2020        02:28      Gram stain maye bottle: Gram positive cocci in clusters resembling Staph       Results called to and read back by:Danielle Hoskins RN 11/13/2020  15:03      PSEUDOMONAS AERUGINOSA      COAGULASE-NEGATIVE STAPHYLOCOCCUS SPECIES  Organism is a probable contaminant      Narrative:      Blood cultures from 2 different sites. 4 bottles total.  Please draw before starting antibiotics.    Culture, Respiratory with Gram Stain [[id number]]     Order Status: Canceled Specimen: Respiratory from Sputum           Significant Imaging: I have reviewed all pertinent imaging results/findings within the past 24 hours.

## 2020-11-17 NOTE — SUBJECTIVE & OBJECTIVE
Interval HPI:   Overnight events: Remains in covid ICU. Intubated and sedated. BG reasonably well controlled on IV insulin infusion rates 1-3.6 u/hr. Hydrocortisone 100 mg every 8 hours.   Eating:   NPO  Nausea: No  Hypoglycemia and intervention: No  Fever: No  TPN and/or TF: No    PMH, PSH, FH, SH reviewed       Review of Systems  Unable to obtain due to: Sedation,Intubation,Altered mental status,Critical illness,Reviewed ROS from note dated 11/4/20 per Cori Moseley NP     Current Medications and/or Treatments Impacting Glycemic Control  Immunotherapy:    Immunosuppressants     None        Steroids:   Hormones (From admission, onward)    Start     Stop Route Frequency Ordered    11/14/20 0900  fludrocortisone tablet 100 mcg      -- OG Daily 11/13/20 1717 11/14/20 0600  hydrocortisone sodium succinate injection 100 mg      -- IV Every 8 hours 11/13/20 1717 11/12/20 1100  vasopressin (PITRESSIN) 0.2 Units/mL in dextrose 5 % 100 mL infusion      -- IV Continuous 11/12/20 1113        Pressors:    Autonomic Drugs (From admission, onward)    Start     Stop Route Frequency Ordered    11/12/20 1530  NORepinephrine bitartrate 8 mg in dextrose 5% 250 mL infusion     Question Answer Comment   Titrate by: (in mcg/kg/min) 0.02    Titrate interval: (in minutes) 5    Titrate to maintain: (MAP or SBP) MAP    Greater than: (in mmHg) 65    Maximum dose: (in mcg/kg/min) 3        -- IV Continuous 11/12/20 1422    11/12/20 1243  norepinephrine 1 mg/mL injection     Note to Pharmacy: Created by cabinet override    11/13 0059   11/12/20 1243        Hyperglycemia/Diabetes Medications:   Antihyperglycemics (From admission, onward)    Start     Stop Route Frequency Ordered    11/12/20 2330  insulin regular 1 Units/mL in sodium chloride 0.9% 100 mL infusion     Question:  Enter initial dose from Infusion Protocol Chart (Units/hr):  Answer:  3    -- IV Continuous 11/12/20 2220             PHYSICAL EXAMINATION:  Vitals:    11/17/20 1700    BP:    Pulse: 61   Resp: (!) 22   Temp: 97.3 °F (36.3 °C)     Body mass index is 26.47 kg/m².    Physical Exam   Physical exam deferred due to COVID-19 restrictions.

## 2020-11-17 NOTE — ASSESSMENT & PLAN NOTE
Steroid induced. Now uncontrolled in the setting of septic shock requiring an insulin gtt.    - Trickle feeds.   - Cont insulin gtt  - Endocrine consult

## 2020-11-17 NOTE — ASSESSMENT & PLAN NOTE
COVID + 11/4. CXR with bilateral patchy opacities. Symptoms started 10/30 and steadily progressed. Daughter found to be COVID +. Overnight on 11/12, patient developed septic shock 2/ 2 GNR LLL PNA c/b GNR bacteremia.     - LPV  - Wean respiratory rate today  - Unable to prone due to bradycardia. Off Paral[employer].   - Remdesivir X 5 days; completed 11/09/20  - Convalescent Plasma; Dose #1 11/5  - Dexamethasone X 10 days (end 11/14)  - Wean FiO2 for SpO2 >88%  - Duonebs  - hypercoagulable on full dose lovenox   - TTE with EF 70%; left ventricular concentric remodeling

## 2020-11-17 NOTE — ASSESSMENT & PLAN NOTE
Pseudomonas bacteremia and in sputum.     - Cont Meropenem  - ID following.   - Blood and sputum cx's with Pseudomonas  - Repeat blood cx's NGTD

## 2020-11-17 NOTE — ASSESSMENT & PLAN NOTE
65F PMH IPF s/p L Single LTx 8/2020, admitted on 11/4 w/ acute hypoxic respiratory failure, COVID positive. Now w/ declining condition, intubated, on vasopressors, broad spectrum abx. S/p 5 days of remdesevir, 1 dose of convalescent plasma,  dexamethasone. Blood cultures 11/12 + Pseudomonas and CONS, resp culture 11/12 pseudomonas, repeat blood cx 11/13 NGTD    Recommendations:  - continue Meropenem and vanc  - continue posaconazole  - follow up all pending cultures      Will follow

## 2020-11-17 NOTE — PLAN OF CARE
Vital signs and labs reviewed - tolerated proning. On meropenem for pseudomonas PNA and bacteremia. Remains critically ill/intubated. Remains on levophed for hypotension. Tobramycin x 1 11/12. Continued COVID care per MICU. Tacrolimus and MMF on hold due to ongoing infection. OIP with ganciclovir, lamivudine, bactrim, and posaconazole. Will continue to adjust immunosuppression and OIP as needed. Please call [phone] with questions

## 2020-11-17 NOTE — SUBJECTIVE & OBJECTIVE
Interval History: remains critically ill, vent settings improved, remains on pressors    Review of Systems   Unable to perform ROS: Intubated     Objective:     Vital Signs (Most Recent):  Temp: 97.2 °F (36.2 °C) (11/16/20 1800)  Pulse: 64 (11/16/20 1800)  Resp: (!) 22 (11/16/20 1800)  BP: (!) 142/75 (11/16/20 1400)  SpO2: (!) 92 % (11/16/20 1800) Vital Signs (24h Range):  Temp:  [96.4 °F (35.8 °C)-99 °F (37.2 °C)] 97.2 °F (36.2 °C)  Pulse:  [53-93] 64  Resp:  [22-30] 22  SpO2:  [88 %-100 %] 92 %  BP: (136-157)/(67-88) 142/75  Arterial Line BP: ()/(50-68) 99/61     Weight: 76.7 kg (169 lb)  Body mass index is 26.47 kg/m².    Estimated Creatinine Clearance: 76.8 mL/min (based on SCr of 0.8 mg/dL).    Physical Exam  Vitals signs reviewed.   Constitutional:       General: She is not in acute distress.     Appearance: She is well-developed. She is not diaphoretic.   HENT:      Head: Atraumatic.      Right Ear: External ear normal.      Left Ear: External ear normal.      Nose: Nose normal.      Mouth/Throat:      Mouth: Mucous membranes are moist.   Eyes:      General: No scleral icterus.        Right eye: No discharge.         Left eye: No discharge.      Extraocular Movements: Extraocular movements intact.      Conjunctiva/sclera: Conjunctivae normal.   Neck:      Musculoskeletal: Normal range of motion and neck supple.   Cardiovascular:      Rate and Rhythm: Normal rate and regular rhythm.      Pulses: Normal pulses.      Heart sounds: Normal heart sounds. No murmur.   Pulmonary:      Effort: No respiratory distress.      Breath sounds: No wheezing.      Comments: Coarse breath sounds b/l, diminished, intubated on 60% FIO2  Abdominal:      General: Bowel sounds are normal. There is no distension.      Palpations: Abdomen is soft.      Tenderness: There is no abdominal tenderness.   Musculoskeletal:         General: No swelling or deformity.   Skin:     General: Skin is dry.      Findings: No erythema or rash.       Comments: Cool to touch   Neurological:      Comments: Intubated and sedated         Significant Labs:   CBC:   Recent Labs   Lab 11/15/20  0252 11/16/20  0314   WBC 20.68* 17.32*   HGB 8.5* 9.1*   HCT 25.5* 26.9*   PLT 87* 94*     CMP:   Recent Labs   Lab 11/15/20  0252 11/16/20  0314 11/16/20  1557   * 130* 133*   K 4.3 3.8 3.5   CL 89* 93* 95   CO2 25 24 27   * 140* 187*   BUN 44* 45* 44*   CREATININE 0.8 0.8 0.8   CALCIUM 7.6* 7.9* 8.3*   PROT 5.0* 5.3*  --    ALBUMIN 1.3* 1.4*  --    BILITOT 1.0 1.2*  --    ALKPHOS 153* 199*  --    AST 73* 47*  --    ALT 49* 43  --    ANIONGAP 12 13 11   EGFRNONAA >60.0 >60.0 >60.0     Microbiology Results (last 7 days)     Procedure Component Value Units Date/Time    Blood culture [[id number]] Collected: 11/13/20 0334    Order Status: Completed Specimen: Blood from Peripheral, Hand, Right Updated: 11/16/20 0812     Blood Culture, Routine No Growth to date      No Growth to date      No Growth to date      No Growth to date    Blood culture [[id number]] Collected: 11/13/20 0342    Order Status: Completed Specimen: Blood from Peripheral, Hand, Left Updated: 11/16/20 0812     Blood Culture, Routine No Growth to date      No Growth to date      No Growth to date      No Growth to date    Culture, Respiratory with Gram Stain [[id number]]  (Abnormal)  (Susceptibility) Collected: 11/12/20 1545    Order Status: Completed Specimen: Respiratory from Tracheal Aspirate Updated: 11/16/20 0802     Respiratory Culture No S aureus isolated.      PSEUDOMONAS AERUGINOSA  Few       Gram Stain (Respiratory) <10 epithelial cells per low power field.     Gram Stain (Respiratory) No WBC's     Gram Stain (Respiratory) No organisms seen    Blood culture [[id number]]  (Abnormal) Collected: 11/12/20 0219    Order Status: Completed Specimen: Blood from Peripheral, Antecubital, Left Updated: 11/15/20 1027     Blood Culture, Routine Gram stain aer bottle: Gram negative rods      Results  called to and read back by: Mayuri Palmer RN  11/13/2020        02:28      PSEUDOMONAS AERUGINOSA  For susceptibility see order #[id number]      Narrative:      Blood cultures x 2 different sites. 4 bottles total. Please  draw cultures before administering antibiotics.    Blood culture [[id number]]  (Abnormal)  (Susceptibility) Collected: 11/12/20 0219    Order Status: Completed Specimen: Blood from Peripheral, Antecubital, Left Updated: 11/15/20 1026     Blood Culture, Routine Gram stain aer bottle: Gram negative rods      Results called to and read back by: Mayuri Palmer RN  11/13/2020        02:28      Gram stain maye bottle: Gram positive cocci in clusters resembling Staph       Results called to and read back by:Danielle Hoskins RN 11/13/2020  15:03      PSEUDOMONAS AERUGINOSA      COAGULASE-NEGATIVE STAPHYLOCOCCUS SPECIES  Organism is a probable contaminant      Narrative:      Blood cultures from 2 different sites. 4 bottles total.  Please draw before starting antibiotics.    Culture, Respiratory with Gram Stain [[id number]]     Order Status: Canceled Specimen: Respiratory from Sputum           Significant Imaging: I have reviewed all pertinent imaging results/findings within the past 24 hours.

## 2020-11-18 NOTE — ASSESSMENT & PLAN NOTE
S/p left lung transplant in Aug 2020 for IPF. 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

## 2020-11-18 NOTE — ASSESSMENT & PLAN NOTE
BG goal 140-180.     Continue  transition insulin infusion at 2 u/hr.  BG monitoring every 4 hours and moderate dose correction scale.

## 2020-11-18 NOTE — PROGRESS NOTES
"[facility] - ICU 16 WT  Endocrinology  Progress Note    Admit Date: 11/4/2020     Reason for Consult: Management of pre-dm, Hyperglycemia      Surgical Procedure and Date: Left lung transplant n 8/10/20     Diabetes diagnosis year: pre-dm - diet controlled prior to transplant   Lab Results   Component Value Date    HGBA1C 8.0 (H) 11/05/2020             Home Diabetes Medications:novolog 10 units TID with meals plus correction scale.     How often checking glucose at home? NAVEEN   BG readings on regimen: NAVEEN  Hypoglycemia on the regimen? NAVEEN   Missed doses on regimen?  NAVEEN     Diabetes Complications include:     none  Complicating diabetes co morbidities:   Glucocorticoid use  s/p lung transplant  covid infection        HPI:   Patient is a 65 y.o. female with a diagnosis of diabetes and idiopathic pulmonary fibrosis s/p unilateral (left) lung transplant. Patient presents to the ED complaining of a shortness of breath that progressively got worse. On arrival her COVID-19 test is positive. Patient clinically declined on 11/13 and was intubated. Endocrinology consulted for BG/ Dm management.           Interval HPI:   Overnight events: Remains in RICU, intubated and sedated. BG at or slightly above goal on IV insulin infusion at 2 u/hr with prn correction scale. Hydrocortisone 100 mg every 8 hours.  Eating:   NPO  Nausea: No  Hypoglycemia and intervention: No  Fever: 99.3  TPN and/or TF: glucerna at 10 cc/hr     BP (!) 164/71   Pulse (!) 56   Temp 99.3 °F (37.4 °C)   Resp (!) 22   Ht 5' 7" (1.702 m)   Wt 76.7 kg (169 lb)   LMP  (LMP Unknown)   SpO2 (!) 93%   Breastfeeding No   BMI 26.47 kg/m²     Labs Reviewed and Include    Recent Labs   Lab 11/18/20  0430   *   CALCIUM 8.3*   ALBUMIN 1.5*   PROT 5.3*      K 3.8   CO2 29   CL 97   BUN 48*   CREATININE 0.8   ALKPHOS 336*   ALT 49*   AST 53*   BILITOT 1.7*     Lab Results   Component Value Date    WBC 26.25 (H) 11/18/2020    HGB 8.3 (L) " 11/18/2020    HCT 25.8 (L) 11/18/2020     (H) 11/18/2020     11/18/2020     No results for input(s): TSH, FREET4 in the last 168 hours.  Lab Results   Component Value Date    HGBA1C 8.0 (H) 11/05/2020       Nutritional status:   Body mass index is 26.47 kg/m².  Lab Results   Component Value Date    ALBUMIN 1.5 (L) 11/18/2020    ALBUMIN 1.5 (L) 11/17/2020    ALBUMIN 1.4 (L) 11/16/2020     No results found for: PREALBUMIN    Estimated Creatinine Clearance: 76.8 mL/min (based on SCr of 0.8 mg/dL).    Accu-Checks  Recent Labs     11/17/20  0833 11/17/20  1024 11/17/20  1130 11/17/20  1252 11/17/20  1429 11/17/20  1641 11/17/20  1718 11/17/20  2034 11/18/20  0011 11/18/20  0426   POCTGLUCOSE 128* 165* 172* 185* 193* 212* 207* 216* 229* 198*       Current Medications and/or Treatments Impacting Glycemic Control  Immunotherapy:    Immunosuppressants     None        Steroids:   Hormones (From admission, onward)    Start     Stop Route Frequency Ordered    11/14/20 0900  fludrocortisone tablet 100 mcg      -- OG Daily 11/13/20 1717 11/14/20 0600  hydrocortisone sodium succinate injection 100 mg      -- IV Every 8 hours 11/13/20 1717    11/12/20 1100  vasopressin (PITRESSIN) 0.2 Units/mL in dextrose 5 % 100 mL infusion      -- IV Continuous 11/12/20 1113        Pressors:    Autonomic Drugs (From admission, onward)    Start     Stop Route Frequency Ordered    11/12/20 1530  NORepinephrine bitartrate 8 mg in dextrose 5% 250 mL infusion     Question Answer Comment   Titrate by: (in mcg/kg/min) 0.02    Titrate interval: (in minutes) 5    Titrate to maintain: (MAP or SBP) MAP    Greater than: (in mmHg) 65    Maximum dose: (in mcg/kg/min) 3        -- IV Continuous 11/12/20 1422    11/12/20 1243  norepinephrine 1 mg/mL injection     Note to Pharmacy: Created by cabinet override    11/13 0059   11/12/20 1243        Hyperglycemia/Diabetes Medications:   Antihyperglycemics (From admission, onward)    Start     Stop  Route Frequency Ordered    11/17/20 1845  insulin regular in 0.9 % NaCl 100 unit/100 mL (1 unit/mL) infusion     Question:  Enter initial dose (Units/hr):  Answer:  2    -- IV Continuous 11/17/20 1736 11/17/20 1836  insulin aspart U-100 pen 0-10 Units      -- SubQ As needed (PRN) 11/17/20 1736          ASSESSMENT and PLAN    * Pneumonia due to COVID-19 virus  Managed per primary.   Infection may increase insulin resistance.         Prediabetes  BG goal 140-180.     Continue  transition insulin infusion at 2 u/hr.  BG monitoring every 4 hours and moderate dose correction scale.       Immunosuppression  May increase insulin resistance.         Adrenal cortical steroids causing adverse effect in therapeutic use  Glucocorticoids markedly increase  glucoses. Expect the steroid taper will help glucose control.             Nidia Frazier NP  Endocrinology  [facility] - ICU 16 [facility]

## 2020-11-18 NOTE — SUBJECTIVE & OBJECTIVE
"Interval HPI:   Overnight events: Remains in RICU, intubated and sedated. BG at or slightly above goal on IV insulin infusion at 2 u/hr with prn correction scale. Hydrocortisone 100 mg every 8 hours.  Eating:   NPO  Nausea: No  Hypoglycemia and intervention: No  Fever: 99.3  TPN and/or TF: glucerna at 10 cc/hr     BP (!) 164/71   Pulse (!) 56   Temp 99.3 °F (37.4 °C)   Resp (!) 22   Ht 5' 7" (1.702 m)   Wt 76.7 kg (169 lb)   LMP  (LMP Unknown)   SpO2 (!) 93%   Breastfeeding No   BMI 26.47 kg/m²     Labs Reviewed and Include    Recent Labs   Lab 11/18/20  0430   *   CALCIUM 8.3*   ALBUMIN 1.5*   PROT 5.3*      K 3.8   CO2 29   CL 97   BUN 48*   CREATININE 0.8   ALKPHOS 336*   ALT 49*   AST 53*   BILITOT 1.7*     Lab Results   Component Value Date    WBC 26.25 (H) 11/18/2020    HGB 8.3 (L) 11/18/2020    HCT 25.8 (L) 11/18/2020     (H) 11/18/2020     11/18/2020     No results for input(s): TSH, FREET4 in the last 168 hours.  Lab Results   Component Value Date    HGBA1C 8.0 (H) 11/05/2020       Nutritional status:   Body mass index is 26.47 kg/m².  Lab Results   Component Value Date    ALBUMIN 1.5 (L) 11/18/2020    ALBUMIN 1.5 (L) 11/17/2020    ALBUMIN 1.4 (L) 11/16/2020     No results found for: PREALBUMIN    Estimated Creatinine Clearance: 76.8 mL/min (based on SCr of 0.8 mg/dL).    Accu-Checks  Recent Labs     11/17/20  0833 11/17/20  1024 11/17/20  1130 11/17/20  1252 11/17/20  1429 11/17/20  1641 11/17/20  1718 11/17/20  2034 11/18/20  0011 11/18/20  0426   POCTGLUCOSE 128* 165* 172* 185* 193* 212* 207* 216* 229* 198*       Current Medications and/or Treatments Impacting Glycemic Control  Immunotherapy:    Immunosuppressants     None        Steroids:   Hormones (From admission, onward)    Start     Stop Route Frequency Ordered    11/14/20 0900  fludrocortisone tablet 100 mcg      -- OG Daily 11/13/20 1717    11/14/20 0600  hydrocortisone sodium succinate injection 100 mg      -- " IV Every 8 hours 11/13/20 1717    11/12/20 1100  vasopressin (PITRESSIN) 0.2 Units/mL in dextrose 5 % 100 mL infusion      -- IV Continuous 11/12/20 1113        Pressors:    Autonomic Drugs (From admission, onward)    Start     Stop Route Frequency Ordered    11/12/20 1530  NORepinephrine bitartrate 8 mg in dextrose 5% 250 mL infusion     Question Answer Comment   Titrate by: (in mcg/kg/min) 0.02    Titrate interval: (in minutes) 5    Titrate to maintain: (MAP or SBP) MAP    Greater than: (in mmHg) 65    Maximum dose: (in mcg/kg/min) 3        -- IV Continuous 11/12/20 1422    11/12/20 1243  norepinephrine 1 mg/mL injection     Note to Pharmacy: Created by cabinet override    11/13 0059 11/12/20 1243        Hyperglycemia/Diabetes Medications:   Antihyperglycemics (From admission, onward)    Start     Stop Route Frequency Ordered    11/17/20 1845  insulin regular in 0.9 % NaCl 100 unit/100 mL (1 unit/mL) infusion     Question:  Enter initial dose (Units/hr):  Answer:  2    -- IV Continuous 11/17/20 1736    11/17/20 1836  insulin aspart U-100 pen 0-10 Units      -- SubQ As needed (PRN) 11/17/20 1736

## 2020-11-18 NOTE — ASSESSMENT & PLAN NOTE
Steroid induced. Now uncontrolled in the setting of septic shock requiring an insulin gtt.    - Trickle feeds.   - Cont insulin gtt  - Endocrine following

## 2020-11-18 NOTE — PLAN OF CARE
Vital signs and labs reviewed. On meropenem for pseudomonas PNA and bacteremia (repeat cultures NGTD) through 11/27. Remains critically ill/intubated. Pressor requirements improving. Tobramycin x 1 11/12. Continued COVID care per MICU. Tacrolimus and MMF on hold due to ongoing infection. OIP with ganciclovir, lamivudine, bactrim, and posaconazole. Will continue to adjust IS and OIP as needed. Please call [phone] with questions.

## 2020-11-18 NOTE — PROGRESS NOTES
Update:    Pt remains intubated and critically ill in isolation on COVID-19 unit. Pt's care per MICU team. LUT team following.    Pt continues to have good support from family. SW spoke to pt's primary caregiver, Ivy Levy (daughter, ph: [phone]) via telephone to check in, provide emotional support, and answer any questions. Daughter reports she and siblings currently at home in [place], [place]. Daughter reports strong family support and explained entire family staying in same house right now. SW discussed w/ daughter importance of self care during this time. SW and daughter reviewed ways daughter is practicing self care. Daughter states she and siblings plan to visit pt this weekend.    Daughter confirmed receives updates from MICU MD daily. SW reminded daughter LUT team following pt as well. SW inquired if pt or siblings have any questions or concerns at this time. SW put on speaker phone. Pt's children verbalized having no questions for SW.     SW provided psychosocial support through active listening, discussion, and normalization of feelings. SW encouraged daughter to contact SW with any future questions/concerns.  No discharge plans per team at this time. SW will continue to provide psychosocial support, education, resources and assistance with all discharge planning needs when appropriate. Pt's daughter aware of how to contact SW. SW remains available.

## 2020-11-18 NOTE — SUBJECTIVE & OBJECTIVE
Interval History/Significant Events: NAEON.   RUQ Ultrasound unrevealing.   ABG 7.48/42.2/70 on FiO2 50% and PEEP 8.    Review of Systems   Unable to perform ROS: Intubated     Objective:     Vital Signs (Most Recent):  Temp: 98.8 °F (37.1 °C) (11/18/20 1000)  Pulse: 61 (11/18/20 1000)  Resp: (!) 22 (11/18/20 1000)  BP: 115/62 (11/18/20 1000)  SpO2: (!) 92 % (11/18/20 1000) Vital Signs (24h Range):  Temp:  [96.8 °F (36 °C)-100.6 °F (38.1 °C)] 98.8 °F (37.1 °C)  Pulse:  [53-71] 61  Resp:  [22] 22  SpO2:  [91 %-97 %] 92 %  BP: (115-178)/(62-84) 115/62  Arterial Line BP: ()/(49-66) 91/66   Weight: 76.7 kg (169 lb)  Body mass index is 26.47 kg/m².      Intake/Output Summary (Last 24 hours) at 11/18/2020 1035  Last data filed at 11/18/2020 1000  Gross per 24 hour   Intake 1839 ml   Output 1345 ml   Net 494 ml       Physical Exam  Vitals signs and nursing note reviewed.   Constitutional:       General: She is not in acute distress.     Appearance: She is ill-appearing. She is not diaphoretic.      Interventions: Nasal cannula in place.      Comments: Intubated, sedated   HENT:      Head: Normocephalic and atraumatic.   Eyes:      General: No scleral icterus.  Neck:      Musculoskeletal: No neck rigidity.   Cardiovascular:      Rate and Rhythm: Normal rate and regular rhythm.      Pulses: Normal pulses.      Heart sounds: Normal heart sounds. No murmur.   Pulmonary:      Effort: No respiratory distress.      Breath sounds: Rhonchi and rales present. No decreased breath sounds or wheezing.   Chest:      Comments: Midline scar from recent transplant   Abdominal:      Palpations: Abdomen is soft.      Tenderness: There is no guarding.   Musculoskeletal:      Right lower leg: No edema.      Left lower leg: No edema.   Skin:     General: Skin is dry.      Capillary Refill: Capillary refill takes 2 to 3 seconds.      Comments: cool   Neurological:      Comments: Intubated/sedated         Vents:  Vent Mode: A/C (11/18/20  0817)  Ventilator Initiated: Yes(chart correction) (11/12/20 0921)  Set Rate: 22 BPM (11/18/20 0817)  Vt Set: 360 mL (11/18/20 0817)  Pressure Support: 0 cmH20 (11/15/20 2115)  PEEP/CPAP: 8 cmH20 (11/18/20 0817)  Oxygen Concentration (%): 50 (11/18/20 1000)  Peak Airway Pressure: 38 cmH2O (11/18/20 0817)  Plateau Pressure: 31 cmH20 (11/18/20 0817)  Total Ve: 7.67 mL (11/18/20 0817)  F/VT Ratio<105 (RSBI): (!) 63.04 (11/18/20 0817)  Lines/Drains/Airways     Central Venous Catheter Line            Percutaneous Central Line Insertion/Assessment - Triple Lumen  11/12/20 1030 right internal jugular 6 days          Drain                 NG/OG Tube 11/13/20 0200 Otho sump 14 Fr. Center mouth 5 days         Urethral Catheter 11/12/20 1445 16 Fr. 5 days          Airway                 Airway - Non-Surgical 11/12/20 0928 Endotracheal Tube 6 days          Arterial Line            Arterial Line 11/12/20 1053 Right Radial 5 days          Peripheral Intravenous Line                 Midline Catheter Insertion/Assessment  - Single Lumen 11/10/20 1603 Right basilic vein (medial side of arm) 18g x 10cm 7 days         Peripheral IV - Single Lumen 11/12/20 0545 20 G Anterior;Left Hand 6 days              Significant Labs:    CBC/Anemia Profile:  Recent Labs   Lab 11/17/20 0352 11/18/20  0430   WBC 18.11* 26.25*   HGB 8.8* 8.3*   HCT 26.4* 25.8*   * 154   MCV 98 100*   RDW 16.9* 17.5*        Chemistries:  Recent Labs   Lab 11/16/20  1557 11/17/20  0352 11/18/20  0430   * 134* 142   K 3.5 3.9 3.8   CL 95 93* 97   CO2 27 28 29   BUN 44* 48* 48*   CREATININE 0.8 0.9 0.8   CALCIUM 8.3* 8.1* 8.3*   ALBUMIN  --  1.5* 1.5*   PROT  --  5.6* 5.3*   BILITOT  --  2.1* 1.7*   ALKPHOS  --  287* 336*   ALT  --  48* 49*   AST  --  66* 53*   MG 1.9 2.0 2.0   PHOS 2.6* 2.7 3.0       All pertinent labs within the past 24 hours have been reviewed.    Significant Imaging:  I have reviewed all pertinent imaging results/findings within the  past 24 hours.

## 2020-11-18 NOTE — PLAN OF CARE
Problem: Adult Inpatient Plan of Care  Goal: Plan of Care Review  Outcome: Ongoing, Progressing  Goal: Patient-Specific Goal (Individualization)  Outcome: Ongoing, Progressing  Goal: Absence of Hospital-Acquired Illness or Injury  Outcome: Ongoing, Progressing  Goal: Optimal Comfort and Wellbeing  Outcome: Ongoing, Progressing     Pt currently remains intubated to ventilator, AC/VC, respirations 22, tidal volume 360, fio2 55%, peep 8. Pt remains on levophed drip at this time for hemodynamic support. Pt remains on fentanyl and propofol drip for sedation. Pt has been intermittently bradycardic to low 50s throughout the day, critical care tem made aware, plan to do repeat echocardiogram. Pt remains on insulin drip. Pt remains on trickle feeds. No other acute events. Will continue to monitor pt.

## 2020-11-18 NOTE — PROGRESS NOTES
[facility] - ICU 16   Critical Care Medicine  Progress Note    Patient Name: Chely Becerra  MRN: [id number]  Admission Date: 11/4/2020  Hospital Length of Stay: 14 days  Code Status: Full Code  Attending Provider: Inna Castillo MD  Primary Care Provider: ZAHIDA Stack MD   Principal Problem: Pneumonia due to COVID-19 virus    Subjective:     HPI:  Ms. Chely Becerra is a 65 y.o. year old female with a PMHx of diabetes and idiopathic pulmonary fibrosis s/p unilateral (left) lung transplant in August 2020 who presents to the ED complaining of a shortness of breath that started last Friday and progressively got worse. She had a fever of 101 on Sunday, started coughing for the last 3 days nonproductive, denies any chest pain. She was seen in the [facility] for routine testing earlier this morning was found to be hypoxic and she was referred to the emergency department. On arrival her COVID-19 test is positive.     Patient lives with her 2 daughters, 1 of the daughters has been having some respiratory symptoms recently however she thought was secondary to asthma. he is on prednisone taper currently 20 mg daily, she has been compliant with her antirejection medication Prograf and CellCept. She was started on BiPAP in the ED with improvement in her oxygen saturations to the mid 90's.     Hospital/ICU Course:  Admitted to Critical Care Medicine for acute hypoxic respiratory failure 2/2 to COVID pneumonia. Alternating between BiPAP and comfort flow. Started on dexamethasone. Consented for Remdsivir and Convalescent Plasma by lung transplant. Patient received her first unit of convalescent plasma 11/5 and tolerated well per patient. Echo performed on 11/5 showed LVEF 70% with normal diastolic function, though LV concentric remodeling & normal RV size and function. Patient continued on alternating bipap with comfort flow 11/8 - 11/10. Patient clinically declined on 11/13. Abx were  broadened to vanc and cefepime (in addition to x1 dose tobra given for double gram negative coverage). Patient was intubated and central/arterial lines were placed. CT chest/abd/pelvis showed LLL pneumonia. Blood cultures resulted with GNR bacteremia. As patient was still spiking fevers with uptrending lactic, abx broadened to meropenem. Blood and sputum cultures with Pseudomonas.    No new subjective & objective note has been filed under this hospital service since the last note was generated.      ABG  Recent Labs   Lab 11/18/20  0405   PH 7.484*   PO2 70*   PCO2 42.2   HCO3 31.8*   BE 8     Assessment/Plan:     Pulmonary  S/P lung transplant  S/p left lung transplant in Aug 2020 for IPF. 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     Renal/  Acidosis  Mixed. AGMA 2/2 [place], NAGMA 2/2 renal failure and respiratory acidosis     - On bicarb gtt with improvement in acidemia  - Wean down gtt  - Will plan to wean respiratory rate throughout the day  - Good UOP with resolution of DWAYNE  - Lactate improving as well    ID  Septic shock  Pseudomonas bacteremia and in sputum.     - Cont Meropenem  - ID following.   - Blood and sputum cx's with Pseudomonas  - Repeat blood cx's NGTD       Sepsis  Hypotensive but not currently requiring vasopressors. Initially 2/2 COVID PNA, now with possible bacterial infection. CXR not markedly changed, UA neg.     - Cont Vanc, cefepime  - Add flagyl for possible intraabdominal source  - CT C/A/P if patient intubated or respiratory status significantly improves  - Cultures sent  - ID  consulted  - Midline placed 11/10- not likely the source    Endocrine  Prediabetes  Steroid induced. Now uncontrolled in the setting of septic shock requiring an insulin gtt.    - Trickle feeds.   - Cont insulin gtt  - Endocrine following      Other  * Pneumonia due to COVID-19 virus  Plan per respiratory failure    Elevated alkaline phosphatase level  Alk Phos increased. CTM.     Palliative care encounter  Ivy updated regularly. All questions answered.    Acute hypoxemic respiratory failure due to COVID-19  COVID + 11/4. CXR with bilateral patchy opacities. Symptoms started 10/30 and steadily progressed. Daughter found to be COVID +. Overnight on 11/12, patient developed septic shock 2/ 2 GNR LLL PNA c/b GNR bacteremia.     - LPV  - Wean respiratory rate today  - Unable to prone due to bradycardia. Off Paral[employer].   - Remdesivir X 5 days; completed 11/09/20  - Convalescent Plasma; Dose #1 11/5  - Dexamethasone X 10 days (end 11/14)  - Wean FiO2 for SpO2 >88%  - Duonebs  - hypercoagulable on full dose lovenox   - TTE with EF 70%; left ventricular concentric remodeling         Critical Care Daily Checklist:    A: Awake: RASS Goal/Actual Goal: RASS Goal: -3-->moderate sedation  Actual: Grullon Agitation Sedation Scale (RASS): Moderate sedation   B: Spontaneous Breathing Trial Performed?     C: SAT & SBT Coordinated?  Not attempted today                      D: Delirium: CAM-ICU Overall CAM-ICU: Negative   E: Early Mobility Performed? No   F: Feeding Goal: Goals: Pt to meet 50-75% estimated kcal and protein needs by RD f/u.  Status: Nutrition Goal Status: progressing towards goal   Current Diet Order   Procedures    Diet NPO      AS: Analgesia/Sedation Per charting   T: Thromboembolic Prophylaxis Lovenox   H: HOB > 300 Yes   U: Stress Ulcer Prophylaxis (if needed) PPI   G: Glucose Control BG goal 140-180   B: Bowel Function Stool Occurrence: 1   I: Indwelling Catheter (Lines & Rios) Necessity Per charting   D:  De-escalation of Antimicrobials/Pharmacotherapies Per ID    Plan for the day/ETD Wean ventilator    Code Status:  Family/Goals of Care: Full Code       Critical Care Time: 80 minutes  Critical secondary to Patient has a condition that poses threat to life and bodily function: Severe Respiratory Distress      Critical care was time spent personally by me on the following activities: development of treatment plan with patient or surrogate and bedside caregivers, discussions with consultants, evaluation of patient's response to treatment, examination of patient, ordering and performing treatments and interventions, ordering and review of laboratory studies, ordering and review of radiographic studies, pulse oximetry, re-evaluation of patient's condition. This critical care time did not overlap with that of any other provider or involve time for any procedures.     Inna Castillo MD  Critical Care Medicine  [facility] - ICU 16 [facility]

## 2020-11-19 NOTE — SUBJECTIVE & OBJECTIVE
Interval History/Significant Events: Improvement in bradycardia with decrease in sedation.     Review of Systems   Unable to perform ROS: Intubated     Objective:     Vital Signs (Most Recent):  Temp: 99.3 °F (37.4 °C) (11/19/20 1530)  Pulse: 72 (11/19/20 1530)  Resp: (!) 22 (11/19/20 1530)  BP: 114/78 (11/19/20 1400)  SpO2: (!) 92 % (11/19/20 1530) Vital Signs (24h Range):  Temp:  [96.8 °F (36 °C)-99.3 °F (37.4 °C)] 99.3 °F (37.4 °C)  Pulse:  [40-92] 72  Resp:  [22-34] 22  SpO2:  [84 %-99 %] 92 %  BP: ()/(53-78) 114/78  Arterial Line BP: ()/(46-70) 115/69   Weight: 76.7 kg (169 lb)  Body mass index is 26.47 kg/m².      Intake/Output Summary (Last 24 hours) at 11/19/2020 1540  Last data filed at 11/19/2020 1500  Gross per 24 hour   Intake 1649.27 ml   Output 960 ml   Net 689.27 ml       Physical Exam  Vitals signs and nursing note reviewed.   Constitutional:       General: She is not in acute distress.     Appearance: She is ill-appearing. She is not diaphoretic.      Interventions: Nasal cannula in place.      Comments: Intubated, sedated   HENT:      Head: Normocephalic and atraumatic.   Eyes:      General: No scleral icterus.  Neck:      Musculoskeletal: No neck rigidity.   Cardiovascular:      Rate and Rhythm: Normal rate and regular rhythm.      Pulses: Normal pulses.      Heart sounds: Normal heart sounds. No murmur.   Pulmonary:      Effort: No respiratory distress.      Breath sounds: Rhonchi and rales present. No decreased breath sounds or wheezing.   Chest:      Comments: Midline scar from recent transplant   Abdominal:      Palpations: Abdomen is soft.      Tenderness: There is no guarding.   Musculoskeletal:      Right lower leg: No edema.      Left lower leg: No edema.   Skin:     General: Skin is dry.      Capillary Refill: Capillary refill takes 2 to 3 seconds.      Comments: cool   Neurological:      Comments: Intubated/sedated         Vents:  Vent Mode: A/C (11/19/20 9275)  Ventilator  Initiated: Yes(chart correction) (11/12/20 0921)  Set Rate: 22 BPM (11/19/20 1330)  Vt Set: 360 mL (11/19/20 1330)  Pressure Support: 0 cmH20 (11/15/20 2115)  PEEP/CPAP: 8 cmH20 (11/19/20 1330)  Oxygen Concentration (%): 45 (11/19/20 1530)  Peak Airway Pressure: 37 cmH2O (11/19/20 1330)  Plateau Pressure: 28 cmH20 (11/19/20 1330)  Total Ve: 7.8 mL (11/19/20 1330)  F/VT Ratio<105 (RSBI): (!) 66.67 (11/19/20 1330)  Lines/Drains/Airways     Central Venous Catheter Line            Percutaneous Central Line Insertion/Assessment - Triple Lumen  11/12/20 1030 right internal jugular 7 days          Drain                 Urethral Catheter 11/12/20 1445 16 Fr. 7 days         NG/OG Tube 11/13/20 0200 [place] sump 14 Fr. Center mouth 6 days          Airway                 Airway - Non-Surgical 11/12/20 0928 Endotracheal Tube 7 days          Arterial Line            Arterial Line 11/12/20 1053 Right Radial 7 days          Peripheral Intravenous Line                 Midline Catheter Insertion/Assessment  - Single Lumen 11/10/20 1603 Right basilic vein (medial side of arm) 18g x 10cm 8 days              Significant Labs:    CBC/Anemia Profile:  Recent Labs   Lab 11/18/20  0430 11/19/20  0258   WBC 26.25* 27.54*   HGB 8.3* 8.2*   HCT 25.8* 24.9*    166   * 100*   RDW 17.5* 17.8*        Chemistries:  Recent Labs   Lab 11/18/20  0430 11/19/20  0258    143   K 3.8 3.4*   CL 97 101   CO2 29 30*   BUN 48* 45*   CREATININE 0.8 0.8   CALCIUM 8.3* 8.2*   ALBUMIN 1.5* 1.6*   PROT 5.3* 5.0*   BILITOT 1.7* 1.2*   ALKPHOS 336* 335*   ALT 49* 38   AST 53* 40   MG 2.0 2.0   PHOS 3.0 3.2       All pertinent labs within the past 24 hours have been reviewed.    Significant Imaging:  I have reviewed all pertinent imaging results/findings within the past 24 hours.

## 2020-11-19 NOTE — CARE UPDATE
BG goal 140- 180   Diet NPO     - Continue  transition IV insulin infusion with stepdown parameters. Initial rate starting at 2 units/hr.   - Continue Moderate Dose SQ Insulin Correction Scale.  - BG monitoring every 4 hours while NPO.     ** Please call Endocrine for any BG related issues **  ** Please notify Endocrine for any change and/or advance in diet**    Discharge Planning:   TBD. Please notify endocrinology prior to discharge.     Lab Results   Component Value Date    HGBA1C 8.0 (H) 11/05/2020

## 2020-11-19 NOTE — PHYSICIAN QUERY
PT Name: Chely Becerra  MR #: [id number]     ACUITY OF CONDITION CLARIFICATION      CDS/: Anastasia Walker               Contact information:[email or web address]  This form is a permanent document in the medical record.     Query Date: November 19, 2020    By submitting this query, we are merely seeking further clarification of documentation to reflect the severity of illness of your patient. Please utilize your independent clinical judgment when addressing the question(s) below.    The Medical record reflects the following:     Indicators   Supporting Clinical Findings Location in Medical Record   x Documentation of condition Intubated this am for increased respiratory distress and hypoxia. Went into two pressor shock afterwards. CT C/A/P showed a LLL PNA in addition to cardiogenic/noncardiogenic bilateral pulmonary edema   [facility] Care update 11/12 1510   x Lab Value(s) BNP = 122, 356   Labs 11/4, 11/12   x Radiology Findings I doubt pulmonary edema.  I detect no pleural fluid, pneumothorax, pneumomediastinum, pneumoperitoneum or significant osseous abnormality.    Pulmonary edema pneumonia aspiration or sepsis    1.Diffuse ground-glass attenuation throughout the pulmonary parenchyma concerning for diffuse edema with differential including infection or ARDS.There is dense consolidation with air bronchograms involving the left lower lobe concerning for   infectious consolidation.Correlation is advised in this patient status post left lung transplantation.  2.Mild bilateral perinephric fat stranding, left greater than right.Correlation with urinalysis recommended to exclude infectious process.  3.Questionable rounded high attenuating focus within the transverse colon, possibly reflecting that of ingested content however colonoscopy is recommended on a nonemergent basis to exclude underlying mass in this patient with prior right hemicolectomy.  4.Somewhat low positioning of the endotracheal tube noting the  tip is oriented toward the orifice of the right mainstem bronchus.Retraction as warranted.  5.Several additional findings above.     CXR 11/4 1127        CXR 11/12    CT Chest/Abd/Pelvis 11/12   x Treatment/Medication Lasix 40mg IV x 1 11/8 for possible pulmonary edema    Furosemide 40mg IV x 1   CCM PN 11/8    MAR 11/11, 11/12, 11/17    Other        Provider, please specify the acuity/chronicity of _Pulmonary edema__:    [   ] Acute   [   ] Acute on chronic   [ x  ]  Clinically Undetermined     Please document in your progress notes daily for the duration of treatment until resolved, and include in your discharge summary.

## 2020-11-19 NOTE — PROGRESS NOTES
[facility] - ICU 16 WT  Adult Nutrition  Progress Note    SUMMARY       Recommendations    Recommendations:   1. While propofol running, would suggest Peptamen Intense VHP to better meet protein needs (Note: current regimen is meeting 42% of estimated kcal needs and 22% of estimated protein needs).   --Increase to goal as able 45 mL/hr to provide 1384 kcal (with current propofol), 99 g protein, and 907 mL fluid     2. Additional fluid per MD. Hold for residuals >500 mL.     3. RD following.  Goals: Pt to meet 50-75% estimated kcal and protein needs by RD f/u.  Nutrition Goal Status: goal not met, other (comment)(ongoing)  Communication of RD Recs: other (comment)(POC)    Reason for Assessment    Reason For Assessment: RD follow-up  Diagnosis: (COVID-19)  Relevant Medical History: DMII, HTN, pulmonary fibrosis s/p left lung tx  Interdisciplinary Rounds: did not attend  General Information Comments: RD follw up. Attempted to call RN via phone, no answer. Pt remains intubated and TF running at 10 mL/hr with no s/s discomfort per flowsheets. Trickle feeding rate for 6 days (11/13). Propofol has since decreased to 11.5 mL/hr. No new weights per chart. Due to recent hospital wide restrictions to limit the transfer of (COVID-19), we are not performing any physical exams at this time on patients with +COVID-19. All S/S will be observational; NFPE to be performed at a future date.   Nutrition Discharge Planning: Unclear at this time. RD to determine closer to discharge.    Nutrition Risk Screen    Nutrition Risk Screen: tube feeding or parenteral nutrition    Nutrition/Diet History    Patient Reported Diet/Restrictions/Preferences: other (see comments)(Unable to obtain d/t COVID precautions. Heart healthy in past.)  Typical Food/Fluid Intake: -  Food Preferences: -  Spiritual, Cultural Beliefs, Protestant Practices, Values that Affect Care: no  Food Allergies: NKFA  Factors Affecting Nutritional Intake: NPO, on  "mechanical ventilation    Anthropometrics    Temp: 96.8 °F (36 °C)  Height: 5' 7"  Height (inches): 67 in  Weight Method: Bed Scale  Weight: 76.7 kg (169 lb)  Weight (lb): 169 lb  Ideal Body Weight (IBW), Female: 135 lb  % Ideal Body Weight, Female (lb): 125.19 %  BMI (Calculated): 26.5  Weight Loss: intentional(per chart review)     Lab/Procedures/Meds    Pertinent Labs Reviewed: reviewed  Pertinent Labs Comments: K 3.4, BUN 45, Glu 172, Ca 8.2, A1C 8.0  Pertinent Medications Reviewed: reviewed  Pertinent Medications Comments: pantoprazole, KCl, insulin, norepinephrine, propofol, vasopressin    Estimated/Assessed Needs    Weight Used For Calorie Calculations: 76.7 kg (169 lb 1.5 oz)  Energy Calorie Requirements (kcal): 1568 kcal/day  Energy Need Method: [facility]  Protein Requirements:  gm/day(1.2-1.5 g/kg)  Weight Used For Protein Calculations: 76.7 kg (169 lb 1.5 oz)  Fluid Requirements (mL): 1 mL/kcal or per MD  Estimated Fluid Requirement Method: RDA Method  RDA Method (mL): 1568  CHO Requirement: 196 gm/day    Nutrition Prescription Ordered    Current Diet Order: NPO  Nutrition Order Comments: -  Current Nutrition Support Formula Ordered: Glucerna 1.5  Current Nutrition Support Rate Ordered: 10 (ml)  Oral Nutrition Supplement: -    Evaluation of Received Nutrient/Fluid Intake    Enteral Calories (kcal): 360  Enteral Protein (gm): 20  Enteral (Free Water) Fluid (mL): 182  Other Calories (kcal): 304(propofol)  % Kcal Needs: 42  % Protein Needs: 22  I/O: +472.7 mL  Energy Calories Required: not meeting needs  Protein Required: not meeting needs  Fluid Required: other (see comments)(per MD)  Comments: LBM 11/11  Tolerance: tolerating  % Intake of Estimated Energy Needs: 25 - 50 %  % Meal Intake: NPO    Nutrition Risk    Level of Risk/Frequency of Follow-up: moderate - high (1x/week)     Assessment and Plan    Nutrition Problem  Inadeqaute energy intake      Related to (etiology):   Decrease ability to " consume adequate PO     Signs and Symptoms (as evidenced by):   NPO, Intubated      Interventions(treatment strategy):  Collaboration with other providers      Nutrition Diagnosis Status:   Continues     Monitor and Evaluation    Food and Nutrient Intake: enteral nutrition intake  Food and Nutrient Adminstration: enteral and parenteral nutrition administration  Knowledge/Beliefs/Attitudes: other (specify)  Anthropometric Measurements: weight, weight change, body mass index  Biochemical Data, Medical Tests and Procedures: electrolyte and renal panel, glucose/endocrine profile, gastrointestinal profile, inflammatory profile, lipid profile  Nutrition-Focused Physical Findings: overall appearance     Nutrition Follow-Up    RD Follow-up?: Yes

## 2020-11-19 NOTE — PLAN OF CARE
Recommendations:   1. While propofol running, would suggest Peptamen Intense VHP to better meet protein needs (Note: current regimen is meeting 42% of estimated kcal needs and 22% of estimated protein needs).   --Increase to goal as able 45 mL/hr to provide 1384 kcal (with current propofol), 99 g protein, and 907 mL fluid     2. Additional fluid per MD. Hold for residuals >500 mL.     3. RD following.  Goals: Pt to meet 50-75% estimated kcal and protein needs by RD f/u.  Nutrition Goal Status: goal not met, other (comment)(ongoing)  Communication of RD Recs: other (comment)(POC)

## 2020-11-19 NOTE — PLAN OF CARE
Problem: Adult Inpatient Plan of Care  Goal: Plan of Care Review  Outcome: Ongoing, Progressing  Goal: Patient-Specific Goal (Individualization)  Outcome: Ongoing, Progressing  Goal: Absence of Hospital-Acquired Illness or Injury  Outcome: Ongoing, Progressing  Goal: Optimal Comfort and Wellbeing  Outcome: Ongoing, Progressing  Goal: Readiness for Transition of Care  Outcome: Ongoing, Progressing     Pt currently remains intubated to ventilator, AC/VC, respirations 22, tidal volume 360, fio2 45%, peep 8. Pt remains on propofol and fentanyl drip for sedation. Propofol titrated down to as low as 10 mcg/kg/min today, pt was drowsy but opening eyes spontaneously and responding to commands. Pt remains on levophed drip for hemodynamic support. Pt remains on insulin drip. Tube feed rate increased today, rate goal 50 ml/hr. Pt started on Asia-lax, last documented BM 11/11. No other acute events. Will continue to monitor pt.

## 2020-11-19 NOTE — PHYSICIAN QUERY
PT Name: Chely Becerra  MR #: [id number]     INTEGUMENTARY CLARIFICATION     CDS/: Anastasia Walker               Contact information:[email or web address]  This form is a permanent document in the medical record.     Query Date: November 19, 2020    By submitting this query, we are merely seeking further clarification of documentation.  Please utilize your independent clinical judgment when addressing the question(s) below.    The Medical Record contains the following:   Indicators   Supporting Clinical Findings Location in Medical Record    Non-blanchable erythema/redness      Ulcer/Injury/Skin Breakdown      Deep Tissue Injury     x Wound care consult L/R buttocks- 5 x 6 x 0.1 cm  -Maroon/purple discoloration to L and R buttock that is evolving into stage 3 shallow pressure injury. Unroofed blister to R buttock with purple/maroon discoloration to wound bed. No odor. Small amount of serosanguineous drainage.      Wound Care CN 11/16     x Acute/Chronic Illness PMHx of diabetes and idiopathic pulmonary fibrosis s/p unilateral (left) lung transplant in August 2020     Acute hypoxic respiratory failure due to Covid 19      Septic shock - Pseudomonas bacteremia and in sputum   H&P 11/4              CCM PN 11/18   x Medication/Treatment Recommend triad ointment BID/PRN to provide a hydrophilic environment to promote healing of exposed tissue and prevent breakdown of intact skin. Nursing to continue with pressure injury prevention interventions. LALO surface in use. Nursing and MD team notified with recommendations. Wound care to continue to follow pt PRN [id number]   Wound Care CN 11/16   x Other Skin: Negative for wound    Skin breakdown noted to Left medial glute. Wound care consult placed and LDA added   H&P 11/4    RN POC Note 11/15 5196     The clinical guidelines noted are only a system guideline. It does not replace the providers clinical judgment.    Per the National Pressure Injury Advisory Panel:   A  pressure injury is localized damage to the skin and underlying soft tissue usually over a bony prominence or related to a medical or other device. The injury can present as intact skin or an open ulcer and may be painful. The injury occurs as a result of intense and/or prolonged pressure or pressure in combination with shear. The tolerance of soft tissue for pressure and shear may also be affected by microclimate, nutrition, perfusion, co-morbidities and condition of the soft tissue.       Stage 3 Pressure Injury:  Full-thickness loss of skin, in which adipose (fat) is visible in the ulcer and granulation tissue and epibole (rolled wound edges) are often present. Slough and/or eschar may be visible. Undermining and tunneling may occur.    Deep Tissue Pressure Injury:  Intact or non-intact skin with localized area of persistent non-blanchable deep red, maroon, purple discoloration or epidermal separation revealing a dark wound bed or blood filled blister. This injury results from intense and/or prolonged pressure and shear forces at the bone-muscle interface. The wound may evolve rapidly to reveal the actual extent of tissue injury, or may resolve without tissue loss. If necrotic tissue, subcutaneous tissue, granulation tissue, fascia, muscle or other underlying structures are visible, this indicates a full thickness pressure injury (Unstageable, Stage 3 or Stage 4). Do not use DTPI to describe vascular, traumatic, neuropathic, or dermatologic conditions.       Provider, please provide the integumentary diagnosis related to the documentation of  L and R buttock :     [  x] Pressure Injury/Decubitus Ulcer, Stage 3   [   ] Deep Tissue Pressure Injury that evolved into a Pressure Injury/Decubitus Ulcer (please specify stage evolved to): _____   [   ] Other Integumentary Diagnosis (please specify):______________   [  ] Clinically Undetermined     Please document in your progress notes daily for the duration of treatment  until resolved and include in your discharge summary.    Reference:    STACIA Robbins., ROSALINDA Phipps., Goldberg, M., JD Spring., STACIA De La Cruz, & DANNY Wright. (2016). Revised National Pressure Ulcer Advisory Panel Pressure Injury Staging System: Revised Pressure Injury Staging System. J Wound Ostomy Continence Nurs, 43(6), 585-597. doi:10.1097/won.[id number]    Form No.21163  (11/05/2020)

## 2020-11-19 NOTE — PROGRESS NOTES
[facility] - ICU 16   Critical Care Medicine  Progress Note    Patient Name: Chely Becerra  MRN: [id number]  Admission Date: 11/4/2020  Hospital Length of Stay: 15 days  Code Status: Full Code  Attending Provider: Inna Castillo MD  Primary Care Provider: ZAHIDA Stack MD   Principal Problem: Pneumonia due to COVID-19 virus    Subjective:     HPI:  Ms. Chely Becerra is a 65 y.o. year old female with a PMHx of diabetes and idiopathic pulmonary fibrosis s/p unilateral (left) lung transplant in August 2020 who presents to the ED complaining of a shortness of breath that started last Friday and progressively got worse. She had a fever of 101 on Sunday, started coughing for the last 3 days nonproductive, denies any chest pain. She was seen in the [facility] Transplant [facility] for routine testing earlier this morning was found to be hypoxic and she was referred to the emergency department. On arrival her COVID-19 test is positive.     Patient lives with her 2 daughters, 1 of the daughters has been having some respiratory symptoms recently however she thought was secondary to asthma. he is on prednisone taper currently 20 mg daily, she has been compliant with her antirejection medication Prograf and CellCept. She was started on BiPAP in the ED with improvement in her oxygen saturations to the mid 90's.     Hospital/ICU Course:  Admitted to Critical Care Medicine for acute hypoxic respiratory failure 2/2 to COVID pneumonia. Alternating between BiPAP and comfort flow. Started on dexamethasone. Consented for Remdsivir and Convalescent Plasma by lung transplant. Patient received her first unit of convalescent plasma 11/5 and tolerated well per patient. Echo performed on 11/5 showed LVEF 70% with normal diastolic function, though LV concentric remodeling & normal RV size and function. Patient continued on alternating bipap with comfort flow 11/8 - 11/10. Patient clinically declined on 11/13. Abx were  broadened to vanc and cefepime (in addition to x1 dose tobra given for double gram negative coverage). Patient was intubated and central/arterial lines were placed. CT chest/abd/pelvis showed LLL pneumonia. Blood cultures resulted with GNR bacteremia. As patient was still spiking fevers with uptrending lactic, abx broadened to meropenem. Blood and sputum cultures with Pseudomonas.    Interval History/Significant Events: Improvement in bradycardia with decrease in sedation.     Review of Systems   Unable to perform ROS: Intubated     Objective:     Vital Signs (Most Recent):  Temp: 99.3 °F (37.4 °C) (11/19/20 1530)  Pulse: 72 (11/19/20 1530)  Resp: (!) 22 (11/19/20 1530)  BP: 114/78 (11/19/20 1400)  SpO2: (!) 92 % (11/19/20 1530) Vital Signs (24h Range):  Temp:  [96.8 °F (36 °C)-99.3 °F (37.4 °C)] 99.3 °F (37.4 °C)  Pulse:  [40-92] 72  Resp:  [22-34] 22  SpO2:  [84 %-99 %] 92 %  BP: ()/(53-78) 114/78  Arterial Line BP: ()/(46-70) 115/69   Weight: 76.7 kg (169 lb)  Body mass index is 26.47 kg/m².      Intake/Output Summary (Last 24 hours) at 11/19/2020 1540  Last data filed at 11/19/2020 1500  Gross per 24 hour   Intake 1649.27 ml   Output 960 ml   Net 689.27 ml       Physical Exam  Vitals signs and nursing note reviewed.   Constitutional:       General: She is not in acute distress.     Appearance: She is ill-appearing. She is not diaphoretic.      Interventions: Nasal cannula in place.      Comments: Intubated, sedated   HENT:      Head: Normocephalic and atraumatic.   Eyes:      General: No scleral icterus.  Neck:      Musculoskeletal: No neck rigidity.   Cardiovascular:      Rate and Rhythm: Normal rate and regular rhythm.      Pulses: Normal pulses.      Heart sounds: Normal heart sounds. No murmur.   Pulmonary:      Effort: No respiratory distress.      Breath sounds: Rhonchi and rales present. No decreased breath sounds or wheezing.   Chest:      Comments: Midline scar from recent transplant    Abdominal:      Palpations: Abdomen is soft.      Tenderness: There is no guarding.   Musculoskeletal:      Right lower leg: No edema.      Left lower leg: No edema.   Skin:     General: Skin is dry.      Capillary Refill: Capillary refill takes 2 to 3 seconds.      Comments: cool   Neurological:      Comments: Intubated/sedated         Vents:  Vent Mode: A/C (11/19/20 1330)  Ventilator Initiated: Yes(chart correction) (11/12/20 0921)  Set Rate: 22 BPM (11/19/20 1330)  Vt Set: 360 mL (11/19/20 1330)  Pressure Support: 0 cmH20 (11/15/20 2115)  PEEP/CPAP: 8 cmH20 (11/19/20 1330)  Oxygen Concentration (%): 45 (11/19/20 1530)  Peak Airway Pressure: 37 cmH2O (11/19/20 1330)  Plateau Pressure: 28 cmH20 (11/19/20 1330)  Total Ve: 7.8 mL (11/19/20 1330)  F/VT Ratio<105 (RSBI): (!) 66.67 (11/19/20 1330)  Lines/Drains/Airways     Central Venous Catheter Line            Percutaneous Central Line Insertion/Assessment - Triple Lumen  11/12/20 1030 right internal jugular 7 days          Drain                 Urethral Catheter 11/12/20 1445 16 Fr. 7 days         NG/OG Tube 11/13/20 0200 Ney sump 14 Fr. Center mouth 6 days          Airway                 Airway - Non-Surgical 11/12/20 0928 Endotracheal Tube 7 days          Arterial Line            Arterial Line 11/12/20 1053 Right Radial 7 days          Peripheral Intravenous Line                 Midline Catheter Insertion/Assessment  - Single Lumen 11/10/20 1603 Right basilic vein (medial side of arm) 18g x 10cm 8 days              Significant Labs:    CBC/Anemia Profile:  Recent Labs   Lab 11/18/20  0430 11/19/20  0258   WBC 26.25* 27.54*   HGB 8.3* 8.2*   HCT 25.8* 24.9*    166   * 100*   RDW 17.5* 17.8*        Chemistries:  Recent Labs   Lab 11/18/20  0430 11/19/20  0258    143   K 3.8 3.4*   CL 97 101   CO2 29 30*   BUN 48* 45*   CREATININE 0.8 0.8   CALCIUM 8.3* 8.2*   ALBUMIN 1.5* 1.6*   PROT 5.3* 5.0*   BILITOT 1.7* 1.2*   ALKPHOS 336* 335*   ALT 49*  38   AST 53* 40   MG 2.0 2.0   PHOS 3.0 3.2       All pertinent labs within the past 24 hours have been reviewed.    Significant Imaging:  I have reviewed all pertinent imaging results/findings within the past 24 hours.      ABG  Recent Labs   Lab 11/19/20  0403   PH 7.547*   PO2 72*   PCO2 38.2   HCO3 33.3*   BE 11     Assessment/Plan:     Pulmonary  S/P lung transplant  S/p left lung transplant in Aug 2020 for IPF. 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     Renal/  Acidosis  Mixed. AGMA 2/2 LA, NAGMA 2/2 renal failure and respiratory acidosis     - On bicarb gtt with improvement in acidemia  - Wean down gtt  - Will plan to wean respiratory rate throughout the day  - Good UOP with resolution of DWAYNE  - Lactate improving as well    ID  Septic shock  Pseudomonas bacteremia and in sputum.     - Cont Meropenem  - ID following.   - Blood and sputum cx's with Pseudomonas  - Repeat blood cx's NGTD       Sepsis  Hypotensive but not currently requiring vasopressors. Initially 2/2 COVID PNA, now with possible bacterial infection. CXR not markedly changed, UA neg.     - Cont Vanc, cefepime  - Add flagyl for possible intraabdominal source  - CT C/A/P if patient intubated or respiratory status significantly improves  - Cultures sent  - ID consulted  - Midline placed 11/10- not likely the source    Endocrine  Prediabetes  Steroid induced. Now uncontrolled in the setting of septic shock requiring an insulin gtt.    - Trickle feeds.   - Cont insulin gtt  - Endocrine following      Other  * Pneumonia due to COVID-19 virus  Plan  per respiratory failure    Elevated alkaline phosphatase level  Alk Phos increased. CTM.     Palliative care encounter  Ivy updated regularly. All questions answered.    Acute hypoxemic respiratory failure due to COVID-19  COVID + 11/4. CXR with bilateral patchy opacities. Symptoms started 10/30 and steadily progressed. Daughter found to be COVID +. Overnight on 11/12, patient developed septic shock 2/ 2 GNR LLL PNA c/b GNR bacteremia.     - LPV  - Wean respiratory rate today  - Unable to prone due to bradycardia. Off Paralytics.   - Remdesivir X 5 days; completed 11/09/20  - Convalescent Plasma; Dose #1 11/5  - Dexamethasone X 10 days (end 11/14)  - Wean FiO2 for SpO2 >88%  - Duonebs  - hypercoagulable on full dose lovenox   - TTE with EF 70%; left ventricular concentric remodeling         Critical Care Daily Checklist:    A: Awake: RASS Goal/Actual Goal: RASS Goal: -2-->light sedation  Actual: Grullon Agitation Sedation Scale (RASS): Moderate sedation   B: Spontaneous Breathing Trial Performed?     C: SAT & SBT Coordinated?  Weaning sedation                      D: Delirium: CAM-ICU Overall CAM-ICU: Negative   E: Early Mobility Performed? No   F: Feeding Goal: Goals: Pt to meet 50-75% estimated kcal and protein needs by RD f/u.  Status: Nutrition Goal Status: goal not met, other (comment)(ongoing)   Current Diet Order   Procedures    Diet NPO      AS: Analgesia/Sedation Per chart   T: Thromboembolic Prophylaxis Lovenox   H: HOB > 300 Yes   U: Stress Ulcer Prophylaxis (if needed) PPI   G: Glucose Control BG goal 140-180   B: Bowel Function Stool Occurrence: 0   I: Indwelling Catheter (Lines & Rios) Necessity Per chart   D: De-escalation of Antimicrobials/Pharmacotherapies Meropenem    Plan for the day/ETD Wean ventilator    Code Status:  Family/Goals of Care: Full Code       Critical Care Time: 75 minutes  Critical secondary to Patient has a condition that poses threat to life and bodily function: Severe  Respiratory Distress      Critical care was time spent personally by me on the following activities: development of treatment plan with patient or surrogate and bedside caregivers, discussions with consultants, evaluation of patient's response to treatment, examination of patient, ordering and performing treatments and interventions, ordering and review of laboratory studies, ordering and review of radiographic studies, pulse oximetry, re-evaluation of patient's condition. This critical care time did not overlap with that of any other provider or involve time for any procedures.     Inna Castillo MD  Critical Care Medicine  [facility] - ICU 16 WT

## 2020-11-19 NOTE — PHYSICIAN QUERY
PT Name: Chely Becerra  MR #: [id number]     ACUITY OF CONDITION CLARIFICATION      CDS/: Anastasia Walker               Contact information:[email or web address]  This form is a permanent document in the medical record.     Query Date: November 19, 2020    By submitting this query, we are merely seeking further clarification of documentation to reflect the severity of illness of your patient. Please utilize your independent clinical judgment when addressing the question(s) below.    The Medical record reflects the following:     Indicators   Supporting Clinical Findings Location in Medical Record   x Documentation of condition Intubated this am for increased respiratory distress and hypoxia. Went into two pressor shock afterwards. CT C/A/P showed a LLL PNA in addition to cardiogenic/noncardiogenic bilateral pulmonary edema   [facility] Care update 11/12 1510   x Lab Value(s) BNP = 122, 356   Labs 11/4, 11/12   x Radiology Findings I doubt pulmonary edema.  I detect no pleural fluid, pneumothorax, pneumomediastinum, pneumoperitoneum or significant osseous abnormality.    Pulmonary edema pneumonia aspiration or sepsis    1.Diffuse ground-glass attenuation throughout the pulmonary parenchyma concerning for diffuse edema with differential including infection or ARDS.There is dense consolidation with air bronchograms involving the left lower lobe concerning for   infectious consolidation.Correlation is advised in this patient status post left lung transplantation.  2.Mild bilateral perinephric fat stranding, left greater than right.Correlation with urinalysis recommended to exclude infectious process.  3.Questionable rounded high attenuating focus within the transverse colon, possibly reflecting that of ingested content however colonoscopy is recommended on a nonemergent basis to exclude underlying mass in this patient with prior right hemicolectomy.  4.Somewhat low positioning of the endotracheal tube noting the  tip is oriented toward the orifice of the right mainstem bronchus.Retraction as warranted.  5.Several additional findings above.     CXR 11/4 1127        CXR 11/12    CT Chest/Abd/Pelvis 11/12   x Treatment/Medication Lasix 40mg IV x 1 11/8 for possible pulmonary edema    Furosemide 40mg IV x 1   CCM PN 11/8    MAR 11/11, 11/12, 11/17    Other        Provider, please specify the acuity/chronicity of _Pulmonary edema__:    [   ] Acute   [   ] Acute on chronic   [   ]  Clinically Undetermined     Please document in your progress notes daily for the duration of treatment until resolved, and include in your discharge summary.

## 2020-11-19 NOTE — PLAN OF CARE
Vital signs and labs reviewed. On meropenem for pseudomonas PNA and bacteremia (repeat cultures NGTD) through 11/27. Remains critically ill/intubated. Stable pressor requirements. Weaning sedation today.  Continued COVID care per MICU. Tacrolimus and MMF on hold due to ongoing infection. OIP with ganciclovir, lamivudine, bactrim, and posaconazole. Will continue to adjust IS and OIP as needed.

## 2020-11-20 NOTE — PLAN OF CARE
Vital signs and labs reviewed. On meropenem for pseudomonas PNA and bacteremia (repeat cultures NGTD) through 11/27. Remains critically ill/intubated. Off pressors. Weaning sedation as tolerated.  Continued COVID care per MICU. Tacrolimus and MMF on hold due to ongoing infection. OIP with ganciclovir, lamivudine, bactrim, and posaconazole. Will continue to adjust IS and OIP as needed

## 2020-11-20 NOTE — ASSESSMENT & PLAN NOTE
BG goal 140-180.     increase  transition insulin infusion at 2.4 u/hr.  BG monitoring every 4 hours and moderate dose correction scale.   Will consider start scheduled novolog for TF

## 2020-11-20 NOTE — PROGRESS NOTES
"[facility] - ICU [facility]  Endocrinology  Progress Note    Admit Date: 11/4/2020     Reason for Consult: Management of pre-dm, Hyperglycemia      Surgical Procedure and Date: Left lung transplant n 8/10/20     Diabetes diagnosis year: pre-dm - diet controlled prior to transplant   Lab Results   Component Value Date    HGBA1C 8.0 (H) 11/05/2020             Home Diabetes Medications:novolog 10 units TID with meals plus correction scale.     How often checking glucose at home? NAVEEN   BG readings on regimen: NAVEEN  Hypoglycemia on the regimen? NAVEEN   Missed doses on regimen?  NAVEEN     Diabetes Complications include:     none  Complicating diabetes co morbidities:   Glucocorticoid use  s/p lung transplant  covid infection        HPI:   Patient is a 65 y.o. female with a diagnosis of diabetes and idiopathic pulmonary fibrosis s/p unilateral (left) lung transplant. Patient presents to the ED complaining of a shortness of breath that progressively got worse. On arrival her COVID-19 test is positive. Patient clinically declined on 11/13 and was intubated. Endocrinology consulted for BG/ Dm management.           Interval HPI:   Overnight events: remains in RICU, intubated.. Pressor support. TF increasing. BG above goal on IV insulin infusion at 2 u/hr with prn correction scale. Hydrocortisone 50 mg every 8 hours.   Eating:   NPO  Nausea: No  Hypoglycemia and intervention: No  Fever: No  TPN and/or TF: glucerna at 50 cc/hr     BP (!) 180/85   Pulse (!) 53   Temp 97.8 °F (36.6 °C) (Oral)   Resp (!) 22   Ht 5' 7" (1.702 m)   Wt 76.7 kg (169 lb)   LMP  (LMP Unknown)   SpO2 (!) 92%   Breastfeeding No   BMI 26.47 kg/m²     Labs Reviewed and Include    Recent Labs   Lab 11/20/20  0424   *   CALCIUM 8.3*   ALBUMIN 1.7*   PROT 5.4*      K 3.6   CO2 30*      BUN 43*   CREATININE 0.7   ALKPHOS 353*   ALT 33   AST 34   BILITOT 1.1*     Lab Results   Component Value Date    WBC 22.72 (H) 11/20/2020    HGB " 9.1 (L) 11/20/2020    HCT 27.6 (L) 11/20/2020     (H) 11/20/2020     11/20/2020     No results for input(s): TSH, FREET4 in the last 168 hours.  Lab Results   Component Value Date    HGBA1C 8.0 (H) 11/05/2020       Nutritional status:   Body mass index is 26.47 kg/m².  Lab Results   Component Value Date    ALBUMIN 1.7 (L) 11/20/2020    ALBUMIN 1.6 (L) 11/19/2020    ALBUMIN 1.5 (L) 11/18/2020     No results found for: PREALBUMIN    Estimated Creatinine Clearance: 87.8 mL/min (based on SCr of 0.7 mg/dL).    Accu-Checks  Recent Labs     11/18/20  1928 11/18/20  2354 11/19/20  0421 11/19/20  0756 11/19/20  1133 11/19/20  1514 11/19/20  1954 11/19/20  2346 11/20/20  0423 11/20/20  0923   POCTGLUCOSE 196* 215* 194* 192* 196* 204* 220* 221* 306* 217*       Current Medications and/or Treatments Impacting Glycemic Control  Immunotherapy:    Immunosuppressants     None        Steroids:   Hormones (From admission, onward)    Start     Stop Route Frequency Ordered    11/19/20 2200  hydrocortisone sodium succinate injection 50 mg      -- IV Every 8 hours 11/19/20 1542    11/12/20 1100  vasopressin (PITRESSIN) 0.2 Units/mL in dextrose 5 % 100 mL infusion      -- IV Continuous 11/12/20 1113        Pressors:    Autonomic Drugs (From admission, onward)    Start     Stop Route Frequency Ordered    11/12/20 1530  NORepinephrine bitartrate 8 mg in dextrose 5% 250 mL infusion     Question Answer Comment   Titrate by: (in mcg/kg/min) 0.02    Titrate interval: (in minutes) 5    Titrate to maintain: (MAP or SBP) MAP    Greater than: (in mmHg) 65    Maximum dose: (in mcg/kg/min) 3        -- IV Continuous 11/12/20 1422    11/12/20 1243  norepinephrine 1 mg/mL injection     Note to Pharmacy: Created by cabinet override    11/13 0059   11/12/20 1243        Hyperglycemia/Diabetes Medications:   Antihyperglycemics (From admission, onward)    Start     Stop Route Frequency Ordered    11/17/20 1236  insulin regular in 0.9 % NaCl 100  unit/100 mL (1 unit/mL) infusion     Question:  Enter initial dose (Units/hr):  Answer:  2    -- IV Continuous 11/17/20 1736 11/17/20 1836  insulin aspart U-100 pen 0-10 Units      -- SubQ As needed (PRN) 11/17/20 1736          ASSESSMENT and PLAN    * Pneumonia due to COVID-19 virus  Managed per primary.   Infection may increase insulin resistance.         Prediabetes  BG goal 140-180.     increase  transition insulin infusion at 2.4 u/hr.  BG monitoring every 4 hours and moderate dose correction scale.   Will consider start scheduled novolog for TF       Immunosuppression  May increase insulin resistance.         Adrenal cortical steroids causing adverse effect in therapeutic use  Glucocorticoids markedly increase  glucoses. Expect the steroid taper will help glucose control.             Nidia Frazier NP  Endocrinology  [facility] - ICU [facility]

## 2020-11-20 NOTE — SUBJECTIVE & OBJECTIVE
Interval History/Significant Events: Follows commands but difficulty on the ventilator.     Review of Systems   Unable to perform ROS: Intubated     Objective:     Vital Signs (Most Recent):  Temp: 97.8 °F (36.6 °C) (11/20/20 1130)  Pulse: 76 (11/20/20 1230)  Resp: (!) 22 (11/20/20 1230)  BP: (!) 156/86 (11/20/20 1200)  SpO2: (!) 90 % (11/20/20 1230) Vital Signs (24h Range):  Temp:  [97.8 °F (36.6 °C)-99.3 °F (37.4 °C)] 97.8 °F (36.6 °C)  Pulse:  [51-97] 76  Resp:  [18-26] 22  SpO2:  [84 %-95 %] 90 %  BP: ()/(53-86) 156/86  Arterial Line BP: ()/(50-79) 117/56   Weight: 76.7 kg (169 lb)  Body mass index is 26.47 kg/m².      Intake/Output Summary (Last 24 hours) at 11/20/2020 1259  Last data filed at 11/20/2020 1200  Gross per 24 hour   Intake 1767.72 ml   Output 810 ml   Net 957.72 ml       Physical Exam  Vitals signs and nursing note reviewed.   Constitutional:       General: She is not in acute distress.     Appearance: She is ill-appearing. She is not diaphoretic.      Interventions: Nasal cannula in place.      Comments: Intubated, sedated   HENT:      Head: Normocephalic and atraumatic.   Eyes:      General: No scleral icterus.  Neck:      Musculoskeletal: No neck rigidity.   Cardiovascular:      Rate and Rhythm: Normal rate and regular rhythm.      Pulses: Normal pulses.      Heart sounds: Normal heart sounds. No murmur.   Pulmonary:      Effort: No respiratory distress.      Breath sounds: Rhonchi and rales present. No decreased breath sounds or wheezing.   Chest:      Comments: Midline scar from recent transplant   Abdominal:      Palpations: Abdomen is soft.      Tenderness: There is no guarding.   Musculoskeletal:      Right lower leg: No edema.      Left lower leg: No edema.   Skin:     General: Skin is dry.      Capillary Refill: Capillary refill takes 2 to 3 seconds.      Comments: cool   Neurological:      Comments: Intubated/sedated         Vents:  Vent Mode: A/C (11/20/20 2005)  Ventilator  Initiated: Yes(chart correction) (11/12/20 0921)  Set Rate: 22 BPM (11/20/20 1142)  Vt Set: 360 mL (11/20/20 1142)  Pressure Support: 0 cmH20 (11/15/20 2115)  PEEP/CPAP: 8 cmH20 (11/20/20 1142)  Oxygen Concentration (%): 55 (11/20/20 1230)  Peak Airway Pressure: 32 cmH2O (11/20/20 1142)  Plateau Pressure: 38 cmH20 (11/20/20 1142)  Total Ve: 7.72 mL (11/20/20 1142)  F/VT Ratio<105 (RSBI): (!) 62.68 (11/20/20 1142)  Lines/Drains/Airways     Central Venous Catheter Line            Percutaneous Central Line Insertion/Assessment - Triple Lumen  11/12/20 1030 right internal jugular 8 days          Drain                 NG/OG Tube 11/13/20 0200 [place] sump 14 Fr. Center mouth 7 days         Urethral Catheter 11/12/20 1445 16 Fr. 7 days          Airway                 Airway - Non-Surgical 11/12/20 0928 Endotracheal Tube 8 days          Arterial Line            Arterial Line 11/12/20 1053 Right Radial 8 days          Peripheral Intravenous Line                 Midline Catheter Insertion/Assessment  - Single Lumen 11/10/20 1603 Right basilic vein (medial side of arm) 18g x 10cm 9 days              Significant Labs:    CBC/Anemia Profile:  Recent Labs   Lab 11/19/20  0258 11/20/20  0424   WBC 27.54* 22.72*   HGB 8.2* 9.1*   HCT 24.9* 27.6*    177   * 103*   RDW 17.8* 18.2*        Chemistries:  Recent Labs   Lab 11/19/20  0258 11/20/20  0424    145   K 3.4* 3.6    105   CO2 30* 30*   BUN 45* 43*   CREATININE 0.8 0.7   CALCIUM 8.2* 8.3*   ALBUMIN 1.6* 1.7*   PROT 5.0* 5.4*   BILITOT 1.2* 1.1*   ALKPHOS 335* 353*   ALT 38 33   AST 40 34   MG 2.0 2.0   PHOS 3.2 2.5*       All pertinent labs within the past 24 hours have been reviewed.    Significant Imaging:  I have reviewed all pertinent imaging results/findings within the past 24 hours.

## 2020-11-20 NOTE — SUBJECTIVE & OBJECTIVE
Interval History/Significant Events:     Review of Systems   Unable to perform ROS: Intubated     Objective:     Vital Signs (Most Recent):  Temp: 97.8 °F (36.6 °C) (11/20/20 1130)  Pulse: 76 (11/20/20 1230)  Resp: (!) 22 (11/20/20 1230)  BP: (!) 156/86 (11/20/20 1200)  SpO2: (!) 90 % (11/20/20 1230) Vital Signs (24h Range):  Temp:  [97.8 °F (36.6 °C)-99.3 °F (37.4 °C)] 97.8 °F (36.6 °C)  Pulse:  [51-97] 76  Resp:  [18-26] 22  SpO2:  [84 %-95 %] 90 %  BP: ()/(53-86) 156/86  Arterial Line BP: ()/(50-79) 117/56   Weight: 76.7 kg (169 lb)  Body mass index is 26.47 kg/m².      Intake/Output Summary (Last 24 hours) at 11/20/2020 1254  Last data filed at 11/20/2020 1200  Gross per 24 hour   Intake 1767.72 ml   Output 810 ml   Net 957.72 ml       Physical Exam  Vitals signs and nursing note reviewed.   Constitutional:       General: She is not in acute distress.     Appearance: She is ill-appearing. She is not diaphoretic.      Interventions: Nasal cannula in place.      Comments: Intubated, sedated   HENT:      Head: Normocephalic and atraumatic.   Eyes:      General: No scleral icterus.  Neck:      Musculoskeletal: No neck rigidity.   Cardiovascular:      Rate and Rhythm: Normal rate and regular rhythm.      Pulses: Normal pulses.      Heart sounds: Normal heart sounds. No murmur.   Pulmonary:      Effort: No respiratory distress.      Breath sounds: Rhonchi and rales present. No decreased breath sounds or wheezing.   Chest:      Comments: Midline scar from recent transplant   Abdominal:      Palpations: Abdomen is soft.      Tenderness: There is no guarding.   Musculoskeletal:      Right lower leg: No edema.      Left lower leg: No edema.   Skin:     General: Skin is dry.      Capillary Refill: Capillary refill takes 2 to 3 seconds.      Comments: cool   Neurological:      Comments: Intubated/sedated         Vents:  Vent Mode: A/C (11/20/20 1142)  Ventilator Initiated: Yes(chart correction) (11/12/20  0921)  Set Rate: 22 BPM (11/20/20 1142)  Vt Set: 360 mL (11/20/20 1142)  Pressure Support: 0 cmH20 (11/15/20 2115)  PEEP/CPAP: 8 cmH20 (11/20/20 1142)  Oxygen Concentration (%): 55 (11/20/20 1230)  Peak Airway Pressure: 32 cmH2O (11/20/20 1142)  Plateau Pressure: 38 cmH20 (11/20/20 1142)  Total Ve: 7.72 mL (11/20/20 1142)  F/VT Ratio<105 (RSBI): (!) 62.68 (11/20/20 1142)  Lines/Drains/Airways     Central Venous Catheter Line            Percutaneous Central Line Insertion/Assessment - Triple Lumen  11/12/20 1030 right internal jugular 8 days          Drain                 NG/OG Tube 11/13/20 0200 [place] sump 14 Fr. Center mouth 7 days         Urethral Catheter 11/12/20 1445 16 Fr. 7 days          Airway                 Airway - Non-Surgical 11/12/20 0928 Endotracheal Tube 8 days          Arterial Line            Arterial Line 11/12/20 1053 Right Radial 8 days          Peripheral Intravenous Line                 Midline Catheter Insertion/Assessment  - Single Lumen 11/10/20 1603 Right basilic vein (medial side of arm) 18g x 10cm 9 days              Significant Labs:    CBC/Anemia Profile:  Recent Labs   Lab 11/19/20  0258 11/20/20  0424   WBC 27.54* 22.72*   HGB 8.2* 9.1*   HCT 24.9* 27.6*    177   * 103*   RDW 17.8* 18.2*        Chemistries:  Recent Labs   Lab 11/19/20  0258 11/20/20  0424    145   K 3.4* 3.6    105   CO2 30* 30*   BUN 45* 43*   CREATININE 0.8 0.7   CALCIUM 8.2* 8.3*   ALBUMIN 1.6* 1.7*   PROT 5.0* 5.4*   BILITOT 1.2* 1.1*   ALKPHOS 335* 353*   ALT 38 33   AST 40 34   MG 2.0 2.0   PHOS 3.2 2.5*       All pertinent labs within the past 24 hours have been reviewed.    Significant Imaging:  I have reviewed all pertinent imaging results/findings within the past 24 hours.

## 2020-11-20 NOTE — TELEPHONE ENCOUNTER
Returned call to patient's daughter Sushma, who asked for assistance in completing FMLA paperwork for her employer. Provided our department email and fax number to which she can send the paperwork.

## 2020-11-20 NOTE — SUBJECTIVE & OBJECTIVE
"Interval HPI:   Overnight events: remains in RICU, intubated.. Pressor support. TF increasing. BG above goal on IV insulin infusion at 2 u/hr with prn correction scale. Hydrocortisone 50 mg every 8 hours.   Eating:   NPO  Nausea: No  Hypoglycemia and intervention: No  Fever: No  TPN and/or TF: glucerna at 50 cc/hr     BP (!) 180/85   Pulse (!) 53   Temp 97.8 °F (36.6 °C) (Oral)   Resp (!) 22   Ht 5' 7" (1.702 m)   Wt 76.7 kg (169 lb)   LMP  (LMP Unknown)   SpO2 (!) 92%   Breastfeeding No   BMI 26.47 kg/m²     Labs Reviewed and Include    Recent Labs   Lab 11/20/20 0424   *   CALCIUM 8.3*   ALBUMIN 1.7*   PROT 5.4*      K 3.6   CO2 30*      BUN 43*   CREATININE 0.7   ALKPHOS 353*   ALT 33   AST 34   BILITOT 1.1*     Lab Results   Component Value Date    WBC 22.72 (H) 11/20/2020    HGB 9.1 (L) 11/20/2020    HCT 27.6 (L) 11/20/2020     (H) 11/20/2020     11/20/2020     No results for input(s): TSH, FREET4 in the last 168 hours.  Lab Results   Component Value Date    HGBA1C 8.0 (H) 11/05/2020       Nutritional status:   Body mass index is 26.47 kg/m².  Lab Results   Component Value Date    ALBUMIN 1.7 (L) 11/20/2020    ALBUMIN 1.6 (L) 11/19/2020    ALBUMIN 1.5 (L) 11/18/2020     No results found for: PREALBUMIN    Estimated Creatinine Clearance: 87.8 mL/min (based on SCr of 0.7 mg/dL).    Accu-Checks  Recent Labs     11/18/20  1928 11/18/20  2354 11/19/20  0421 11/19/20  0756 11/19/20  1133 11/19/20  1514 11/19/20  1954 11/19/20  2346 11/20/20  0423 11/20/20  0923   POCTGLUCOSE 196* 215* 194* 192* 196* 204* 220* 221* 306* 217*       Current Medications and/or Treatments Impacting Glycemic Control  Immunotherapy:    Immunosuppressants     None        Steroids:   Hormones (From admission, onward)    Start     Stop Route Frequency Ordered    11/19/20 2200  hydrocortisone sodium succinate injection 50 mg      -- IV Every 8 hours 11/19/20 1542    11/12/20 1100  vasopressin " (PITRESSIN) 0.2 Units/mL in dextrose 5 % 100 mL infusion      -- IV Continuous 11/12/20 1113        Pressors:    Autonomic Drugs (From admission, onward)    Start     Stop Route Frequency Ordered    11/12/20 1530  NORepinephrine bitartrate 8 mg in dextrose 5% 250 mL infusion     Question Answer Comment   Titrate by: (in mcg/kg/min) 0.02    Titrate interval: (in minutes) 5    Titrate to maintain: (MAP or SBP) MAP    Greater than: (in mmHg) 65    Maximum dose: (in mcg/kg/min) 3        -- IV Continuous 11/12/20 1422    11/12/20 1243  norepinephrine 1 mg/mL injection     Note to Pharmacy: Created by cabinet override    11/13 0059   11/12/20 1243        Hyperglycemia/Diabetes Medications:   Antihyperglycemics (From admission, onward)    Start     Stop Route Frequency Ordered    11/17/20 1845  insulin regular in 0.9 % NaCl 100 unit/100 mL (1 unit/mL) infusion     Question:  Enter initial dose (Units/hr):  Answer:  2    -- IV Continuous 11/17/20 1736    11/17/20 1836  insulin aspart U-100 pen 0-10 Units      -- SubQ As needed (PRN) 11/17/20 1736

## 2020-11-21 NOTE — SUBJECTIVE & OBJECTIVE
Interval History/Significant Events: Follows commands off sedation but becomes dyssynchronous from the ventilator.     Review of Systems   Unable to perform ROS: Intubated     Objective:     Vital Signs (Most Recent):  Temp: 97.2 °F (36.2 °C) (11/21/20 0715)  Pulse: 77 (11/21/20 0927)  Resp: (!) 22 (11/21/20 0927)  BP: 130/84 (11/21/20 0800)  SpO2: 97 % (11/21/20 0927) Vital Signs (24h Range):  Temp:  [96.9 °F (36.1 °C)-98 °F (36.7 °C)] 97.2 °F (36.2 °C)  Pulse:  [] 77  Resp:  [22-25] 22  SpO2:  [88 %-100 %] 97 %  BP: ()/(55-86) 130/84  Arterial Line BP: ()/(50-79) 100/56   Weight: 76.7 kg (169 lb)  Body mass index is 26.47 kg/m².      Intake/Output Summary (Last 24 hours) at 11/21/2020 1017  Last data filed at 11/21/2020 0900  Gross per 24 hour   Intake 1552.74 ml   Output 1030 ml   Net 522.74 ml       Physical Exam  Vitals signs and nursing note reviewed.   Constitutional:       General: She is not in acute distress.     Appearance: She is ill-appearing. She is not diaphoretic.      Interventions: Nasal cannula in place.      Comments: Intubated, sedated   HENT:      Head: Normocephalic and atraumatic.   Eyes:      General: No scleral icterus.  Neck:      Musculoskeletal: No neck rigidity.   Cardiovascular:      Rate and Rhythm: Normal rate and regular rhythm.      Pulses: Normal pulses.      Heart sounds: Normal heart sounds. No murmur.   Pulmonary:      Effort: No respiratory distress.      Breath sounds: Rhonchi and rales present. No decreased breath sounds or wheezing.   Chest:      Comments: Midline scar from recent transplant   Abdominal:      Palpations: Abdomen is soft.      Tenderness: There is no guarding.   Musculoskeletal:      Right lower leg: No edema.      Left lower leg: No edema.   Skin:     General: Skin is dry.      Capillary Refill: Capillary refill takes 2 to 3 seconds.      Comments: cool   Neurological:      Comments: Intubated/sedated         Vents:  Vent Mode: A/C  (11/21/20 0927)  Ventilator Initiated: Yes(chart correction) (11/12/20 0921)  Set Rate: 22 BPM (11/21/20 0927)  Vt Set: 360 mL (11/21/20 0927)  Pressure Support: 0 cmH20 (11/15/20 2115)  PEEP/CPAP: (S) 8 cmH20(Decreased from 10) (11/21/20 0927)  Oxygen Concentration (%): (S) 45(Titrated from 50) (11/21/20 0927)  Peak Airway Pressure: 34 cmH2O (11/21/20 0927)  Plateau Pressure: 33 cmH20 (11/21/20 0927)  Total Ve: 7.69 mL (11/21/20 0927)  F/VT Ratio<105 (RSBI): (!) 62.15 (11/21/20 0927)  Lines/Drains/Airways     Central Venous Catheter Line            Percutaneous Central Line Insertion/Assessment - Triple Lumen  11/12/20 1030 right internal jugular 8 days          Drain                 NG/OG Tube 11/13/20 0200 [place] sump 14 Fr. Center mouth 8 days         Urethral Catheter 11/12/20 1445 16 Fr. 8 days         Rectal Tube 11/20/20 1430 fecal management system less than 1 day          Airway                 Airway - Non-Surgical 11/12/20 0928 Endotracheal Tube 9 days          Arterial Line            Arterial Line 11/12/20 1053 Right Radial 8 days          Peripheral Intravenous Line                 Midline Catheter Insertion/Assessment  - Single Lumen 11/10/20 1603 Right basilic vein (medial side of arm) 18g x 10cm 10 days              Significant Labs:    CBC/Anemia Profile:  Recent Labs   Lab 11/20/20 0424 11/21/20  0353   WBC 22.72* 19.82*   HGB 9.1* 7.6*   HCT 27.6* 25.3*    177   * 109*   RDW 18.2* 18.7*        Chemistries:  Recent Labs   Lab 11/20/20 0424 11/21/20  0353    146*   K 3.6 3.9    107   CO2 30* 30*   BUN 43* 47*   CREATININE 0.7 0.7   CALCIUM 8.3* 8.4*   ALBUMIN 1.7* 1.8*   PROT 5.4* 5.1*   BILITOT 1.1* 1.0   ALKPHOS 353* 317*   ALT 33 31   AST 34 35   MG 2.0 2.2   PHOS 2.5* 2.3*       All pertinent labs within the past 24 hours have been reviewed.    Significant Imaging:  I have reviewed all pertinent imaging results/findings within the past 24 hours.

## 2020-11-21 NOTE — ASSESSMENT & PLAN NOTE
BG goal 140-180.     increase  transition insulin infusion at 2.8 u/hr.  BG monitoring every 4 hours and moderate dose correction scale.

## 2020-11-21 NOTE — PLAN OF CARE
No acute events. VSS with phenylephrine. Patient opens her eyes, does not follow commands. Tolerating weaning of vent settings.  TF remain at 30ml/hr. Turned q2, modesty maintained throughout care.  Daughter at the bedside most of the day.  POC d/w Ivy.  Will continue to monitor.

## 2020-11-21 NOTE — PLAN OF CARE
Problem: Adult Inpatient Plan of Care  Goal: Plan of Care Review  Outcome: Ongoing, Progressing  Goal: Patient-Specific Goal (Individualization)  Outcome: Ongoing, Progressing  Goal: Absence of Hospital-Acquired Illness or Injury  Outcome: Ongoing, Progressing  Goal: Optimal Comfort and Wellbeing  Outcome: Ongoing, Progressing  Goal: Readiness for Transition of Care  Outcome: Ongoing, Progressing     Pt currently remains intubated to ventilator, AC/VC, respirations 30, tidal volume 340, fio2 50%, peep 12. Pt paralyzed at 1600, proned at 1715, pt started on nimbex drip. Pt remains on propofol, fentanyl, and precedex drip for sedation. Levophed drip on standby for hemodynamic instability. Right IJ central line placed today. Pt started on zosyn and vancomycin today. Daughter Karen at bedside today, updated regarding plan of care, Karen expresses wishes to nursing staff to be contacted whenever there is a change in patient status. No other events, will continue to monitor.

## 2020-11-21 NOTE — PLAN OF CARE
Problem: Adult Inpatient Plan of Care  Goal: Plan of Care Review  11/20/2020 1836 by Kathy Walls RN  Outcome: Ongoing, Progressing     Problem: Adult Inpatient Plan of Care  Goal: Patient-Specific Goal (Individualization)  11/20/2020 1836 by Kathy Walls RN  Outcome: Ongoing, Progressing     Problem: Adult Inpatient Plan of Care  Goal: Absence of Hospital-Acquired Illness or Injury  11/20/2020 1836 by Kathy Walls RN  Outcome: Ongoing, Progressing     Problem: Adult Inpatient Plan of Care  Goal: Optimal Comfort and Wellbeing  11/20/2020 1836 by Kathy Walls RN  Outcome: Ongoing, Progressing     Problem: Adult Inpatient Plan of Care  Goal: Readiness for Transition of Care  11/20/2020 1836 by Kathy Walls RN  Outcome: Ongoing, Progressing     Pt currently remains intubated to ventilator, AC/VC, respirations 22, tidal volume 360, fio2 60%, peep 10. Pt having loose stools today, flexi-seal placed for fecal management. Pt desaturating around 1430 and required changing of ventilator settings. Pt currently remains on propofol and fentanyl for sedation. Pt weaned to as low as 15 mcg/kg/min with propofol, pt was following commands but became hypertensive, sedation turned back up. Pt remains on insulin drip. Levophed drip on standby for hemodynamic instability. No other events, will continue to monitor pt.

## 2020-11-21 NOTE — PROGRESS NOTES
"[facility] - ICU [facility]  Endocrinology  Progress Note    Admit Date: 11/4/2020     Reason for Consult: Management of pre-dm, Hyperglycemia      Surgical Procedure and Date: Left lung transplant n 8/10/20     Diabetes diagnosis year: pre-dm - diet controlled prior to transplant   Lab Results   Component Value Date    HGBA1C 8.0 (H) 11/05/2020             Home Diabetes Medications:novolog 10 units TID with meals plus correction scale.     How often checking glucose at home? NAVEEN   BG readings on regimen: NAVEEN  Hypoglycemia on the regimen? NAVEEN   Missed doses on regimen?  NAVEEN     Diabetes Complications include:     none  Complicating diabetes co morbidities:   Glucocorticoid use  s/p lung transplant  covid infection        HPI:   Patient is a 65 y.o. female with a diagnosis of diabetes and idiopathic pulmonary fibrosis s/p unilateral (left) lung transplant. Patient presents to the ED complaining of a shortness of breath that progressively got worse. On arrival her COVID-19 test is positive. Patient clinically declined on 11/13 and was intubated. Endocrinology consulted for BG/ Dm management.           Interval HPI:   Overnight events: Remains in RICU, intubated and sedated. BG at or slightly above goal on IV insulin infusion at 2.4 u/hr. Hydrocortisone 50 mg every 8 hours; steroids per primary.   Eating:   NPO  Nausea: No  Hypoglycemia and intervention: No  Fever: No  TPN and/or TF: glucerna at 50 cc/hr   /84 (BP Location: Left arm, Patient Position: Lying)   Pulse 68   Temp 97.2 °F (36.2 °C) (Axillary)   Resp (!) 22   Ht 5' 7" (1.702 m)   Wt 76.7 kg (169 lb)   LMP  (LMP Unknown)   SpO2 (!) 93%   Breastfeeding No   BMI 26.47 kg/m²     Labs Reviewed and Include    Recent Labs   Lab 11/21/20  0353   *   CALCIUM 8.4*   ALBUMIN 1.8*   PROT 5.1*   *   K 3.9   CO2 30*      BUN 47*   CREATININE 0.7   ALKPHOS 317*   ALT 31   AST 35   BILITOT 1.0     Lab Results   Component Value Date "    WBC 19.82 (H) 11/21/2020    HGB 7.6 (L) 11/21/2020    HCT 25.3 (L) 11/21/2020     (H) 11/21/2020     11/21/2020     No results for input(s): TSH, FREET4 in the last 168 hours.  Lab Results   Component Value Date    HGBA1C 8.0 (H) 11/05/2020       Nutritional status:   Body mass index is 26.47 kg/m².  Lab Results   Component Value Date    ALBUMIN 1.8 (L) 11/21/2020    ALBUMIN 1.7 (L) 11/20/2020    ALBUMIN 1.6 (L) 11/19/2020     No results found for: PREALBUMIN    Estimated Creatinine Clearance: 87.8 mL/min (based on SCr of 0.7 mg/dL).    Accu-Checks  Recent Labs     11/19/20  1954 11/19/20  2346 11/20/20  0423 11/20/20  0923 11/20/20  1106 11/20/20  1630 11/20/20  2047 11/21/20  0019 11/21/20  0418 11/21/20  0814   POCTGLUCOSE 220* 221* 306* 217* 247* 228* 250* 217* 224* 188*       Current Medications and/or Treatments Impacting Glycemic Control  Immunotherapy:    Immunosuppressants         Stop Route Frequency     tacrolimus (PROGRAF) 1 mg/mL oral syringe      -- PER G TUBE Every morning        Steroids:   Hormones (From admission, onward)    Start     Stop Route Frequency Ordered    11/19/20 2200  hydrocortisone sodium succinate injection 50 mg      -- IV Every 8 hours 11/19/20 1542    11/12/20 1100  vasopressin (PITRESSIN) 0.2 Units/mL in dextrose 5 % 100 mL infusion      -- IV Continuous 11/12/20 1113        Pressors:    Autonomic Drugs (From admission, onward)    Start     Stop Route Frequency Ordered    11/12/20 1530  NORepinephrine bitartrate 8 mg in dextrose 5% 250 mL infusion     Question Answer Comment   Titrate by: (in mcg/kg/min) 0.02    Titrate interval: (in minutes) 5    Titrate to maintain: (MAP or SBP) MAP    Greater than: (in mmHg) 65    Maximum dose: (in mcg/kg/min) 3        -- IV Continuous 11/12/20 1422    11/12/20 1243  norepinephrine 1 mg/mL injection     Note to Pharmacy: Created by cabinet override    11/13 0059   11/12/20 1243        Hyperglycemia/Diabetes Medications:    Antihyperglycemics (From admission, onward)    Start     Stop Route Frequency Ordered    11/17/20 1845  insulin regular in 0.9 % NaCl 100 unit/100 mL (1 unit/mL) infusion     Question:  Enter initial dose (Units/hr):  Answer:  2    -- IV Continuous 11/17/20 1736 11/17/20 1836  insulin aspart U-100 pen 0-10 Units      -- SubQ As needed (PRN) 11/17/20 1736          ASSESSMENT and PLAN    * Pneumonia due to COVID-19 virus  Managed per primary.   Infection may increase insulin resistance.         Prediabetes  BG goal 140-180.     increase  transition insulin infusion at 2.8 u/hr.  BG monitoring every 4 hours and moderate dose correction scale.       Immunosuppression  May increase insulin resistance.         Adrenal cortical steroids causing adverse effect in therapeutic use  Glucocorticoids markedly increase  glucoses. Expect the steroid taper will help glucose control.         Reviewed with RN due to covid restrictions.     Nidia Frazier NP  Endocrinology  [facility] - ICU [facility]

## 2020-11-21 NOTE — NURSING
· Notified Dr. Castillo of patient's hypotension and increased frequency in bigeminy.  Switch from  Levophed to Jamir-Synephrine

## 2020-11-21 NOTE — RESPIRATORY THERAPY
11/21/20 1140   Wound Care   $ Wound Care Tech Time 15 min   Area of Concern Left;Right;Cheek;Upper lip;Lower lip;Corner lip   Skin Color/Characteristics redness blanchable   Skin Temperature warm   Barrier used? Other (see comments)  (Liquicell)   Was wound care notified? Yes

## 2020-11-21 NOTE — ASSESSMENT & PLAN NOTE
S/p left lung transplant in Aug 2020 for IPF. 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 Weaning now off pressors

## 2020-11-21 NOTE — ASSESSMENT & PLAN NOTE
Pseudomonas bacteremia and in sputum. Resolved.     - Cont Meropenem  - ID following.   - Blood and sputum cx's with Pseudomonas  - Repeat blood cx's NGTD

## 2020-11-21 NOTE — SUBJECTIVE & OBJECTIVE
"Interval HPI:   Overnight events: Remains in RICU, intubated and sedated. BG at or slightly above goal on IV insulin infusion at 2.4 u/hr. Hydrocortisone 50 mg every 8 hours; steroids per primary.   Eating:   NPO  Nausea: No  Hypoglycemia and intervention: No  Fever: No  TPN and/or TF: glucerna at 50 cc/hr   /84 (BP Location: Left arm, Patient Position: Lying)   Pulse 68   Temp 97.2 °F (36.2 °C) (Axillary)   Resp (!) 22   Ht 5' 7" (1.702 m)   Wt 76.7 kg (169 lb)   LMP  (LMP Unknown)   SpO2 (!) 93%   Breastfeeding No   BMI 26.47 kg/m²     Labs Reviewed and Include    Recent Labs   Lab 11/21/20  0353   *   CALCIUM 8.4*   ALBUMIN 1.8*   PROT 5.1*   *   K 3.9   CO2 30*      BUN 47*   CREATININE 0.7   ALKPHOS 317*   ALT 31   AST 35   BILITOT 1.0     Lab Results   Component Value Date    WBC 19.82 (H) 11/21/2020    HGB 7.6 (L) 11/21/2020    HCT 25.3 (L) 11/21/2020     (H) 11/21/2020     11/21/2020     No results for input(s): TSH, FREET4 in the last 168 hours.  Lab Results   Component Value Date    HGBA1C 8.0 (H) 11/05/2020       Nutritional status:   Body mass index is 26.47 kg/m².  Lab Results   Component Value Date    ALBUMIN 1.8 (L) 11/21/2020    ALBUMIN 1.7 (L) 11/20/2020    ALBUMIN 1.6 (L) 11/19/2020     No results found for: PREALBUMIN    Estimated Creatinine Clearance: 87.8 mL/min (based on SCr of 0.7 mg/dL).    Accu-Checks  Recent Labs     11/19/20  1954 11/19/20  2346 11/20/20  0423 11/20/20  0923 11/20/20  1106 11/20/20  1630 11/20/20  2047 11/21/20  0019 11/21/20  0418 11/21/20  0814   POCTGLUCOSE 220* 221* 306* 217* 247* 228* 250* 217* 224* 188*       Current Medications and/or Treatments Impacting Glycemic Control  Immunotherapy:    Immunosuppressants         Stop Route Frequency     tacrolimus (PROGRAF) 1 mg/mL oral syringe      -- PER G TUBE Every morning        Steroids:   Hormones (From admission, onward)    Start     Stop Route Frequency Ordered    " 11/19/20 2200  hydrocortisone sodium succinate injection 50 mg      -- IV Every 8 hours 11/19/20 1542    11/12/20 1100  vasopressin (PITRESSIN) 0.2 Units/mL in dextrose 5 % 100 mL infusion      -- IV Continuous 11/12/20 1113        Pressors:    Autonomic Drugs (From admission, onward)    Start     Stop Route Frequency Ordered    11/12/20 1530  NORepinephrine bitartrate 8 mg in dextrose 5% 250 mL infusion     Question Answer Comment   Titrate by: (in mcg/kg/min) 0.02    Titrate interval: (in minutes) 5    Titrate to maintain: (MAP or SBP) MAP    Greater than: (in mmHg) 65    Maximum dose: (in mcg/kg/min) 3        -- IV Continuous 11/12/20 1422    11/12/20 1243  norepinephrine 1 mg/mL injection     Note to Pharmacy: Created by cabinet override    11/13 0059   11/12/20 1243        Hyperglycemia/Diabetes Medications:   Antihyperglycemics (From admission, onward)    Start     Stop Route Frequency Ordered    11/17/20 1845  insulin regular in 0.9 % NaCl 100 unit/100 mL (1 unit/mL) infusion     Question:  Enter initial dose (Units/hr):  Answer:  2    -- IV Continuous 11/17/20 1736    11/17/20 1836  insulin aspart U-100 pen 0-10 Units      -- SubQ As needed (PRN) 11/17/20 1736

## 2020-11-21 NOTE — PROGRESS NOTES
[facility] - ICU 16   Critical Care Medicine  Progress Note    Patient Name: Chely Becerra  MRN: [id number]  Admission Date: 11/4/2020  Hospital Length of Stay: 17 days  Code Status: Full Code  Attending Provider: Inna Castillo MD  Primary Care Provider: ZAHIDA Stack MD   Principal Problem: Pneumonia due to COVID-19 virus    Subjective:     HPI:  Ms. Chely Becerra is a 65 y.o. year old female with a PMHx of diabetes and idiopathic pulmonary fibrosis s/p unilateral (left) lung transplant in August 2020 who presents to the ED complaining of a shortness of breath that started last Friday and progressively got worse. She had a fever of 101 on Sunday, started coughing for the last 3 days nonproductive, denies any chest pain. She was seen in the [facility] Transplant [facility] for routine testing earlier this morning was found to be hypoxic and she was referred to the emergency department. On arrival her COVID-19 test is positive.     Patient lives with her 2 daughters, 1 of the daughters has been having some respiratory symptoms recently however she thought was secondary to asthma. he is on prednisone taper currently 20 mg daily, she has been compliant with her antirejection medication Prograf and CellCept. She was started on BiPAP in the ED with improvement in her oxygen saturations to the mid 90's.     Hospital/ICU Course:  Admitted to Critical Care Medicine for acute hypoxic respiratory failure 2/2 to COVID pneumonia. Alternating between BiPAP and comfort flow. Started on dexamethasone. Consented for Remdsivir and Convalescent Plasma by lung transplant. Patient received her first unit of convalescent plasma 11/5 and tolerated well per patient. Echo performed on 11/5 showed LVEF 70% with normal diastolic function, though LV concentric remodeling & normal RV size and function. Patient continued on alternating bipap with comfort flow 11/8 - 11/10. Patient clinically declined on 11/13. Abx were  broadened to vanc and cefepime (in addition to x1 dose tobra given for double gram negative coverage). Patient was intubated and central/arterial lines were placed. CT chest/abd/pelvis showed LLL pneumonia. Blood cultures resulted with GNR bacteremia. As patient was still spiking fevers with uptrending lactic, abx broadened to meropenem. Blood and sputum cultures with Pseudomonas.    Interval History/Significant Events: Follows commands off sedation but becomes dyssynchronous from the ventilator.     Review of Systems   Unable to perform ROS: Intubated     Objective:     Vital Signs (Most Recent):  Temp: 97.2 °F (36.2 °C) (11/21/20 0715)  Pulse: 77 (11/21/20 0927)  Resp: (!) 22 (11/21/20 0927)  BP: 130/84 (11/21/20 0800)  SpO2: 97 % (11/21/20 0927) Vital Signs (24h Range):  Temp:  [96.9 °F (36.1 °C)-98 °F (36.7 °C)] 97.2 °F (36.2 °C)  Pulse:  [] 77  Resp:  [22-25] 22  SpO2:  [88 %-100 %] 97 %  BP: ()/(55-86) 130/84  Arterial Line BP: ()/(50-79) 100/56   Weight: 76.7 kg (169 lb)  Body mass index is 26.47 kg/m².      Intake/Output Summary (Last 24 hours) at 11/21/2020 1017  Last data filed at 11/21/2020 0900  Gross per 24 hour   Intake 1552.74 ml   Output 1030 ml   Net 522.74 ml       Physical Exam  Vitals signs and nursing note reviewed.   Constitutional:       General: She is not in acute distress.     Appearance: She is ill-appearing. She is not diaphoretic.      Interventions: Nasal cannula in place.      Comments: Intubated, sedated   HENT:      Head: Normocephalic and atraumatic.   Eyes:      General: No scleral icterus.  Neck:      Musculoskeletal: No neck rigidity.   Cardiovascular:      Rate and Rhythm: Normal rate and regular rhythm.      Pulses: Normal pulses.      Heart sounds: Normal heart sounds. No murmur.   Pulmonary:      Effort: No respiratory distress.      Breath sounds: Rhonchi and rales present. No decreased breath sounds or wheezing.   Chest:      Comments: Midline scar from  recent transplant   Abdominal:      Palpations: Abdomen is soft.      Tenderness: There is no guarding.   Musculoskeletal:      Right lower leg: No edema.      Left lower leg: No edema.   Skin:     General: Skin is dry.      Capillary Refill: Capillary refill takes 2 to 3 seconds.      Comments: cool   Neurological:      Comments: Intubated/sedated         Vents:  Vent Mode: A/C (11/21/20 0927)  Ventilator Initiated: Yes(chart correction) (11/12/20 0921)  Set Rate: 22 BPM (11/21/20 0927)  Vt Set: 360 mL (11/21/20 0927)  Pressure Support: 0 cmH20 (11/15/20 2115)  PEEP/CPAP: (S) 8 cmH20(Decreased from 10) (11/21/20 0927)  Oxygen Concentration (%): (S) 45(Titrated from 50) (11/21/20 0927)  Peak Airway Pressure: 34 cmH2O (11/21/20 0927)  Plateau Pressure: 33 cmH20 (11/21/20 0927)  Total Ve: 7.69 mL (11/21/20 0927)  F/VT Ratio<105 (RSBI): (!) 62.15 (11/21/20 0927)  Lines/Drains/Airways     Central Venous Catheter Line            Percutaneous Central Line Insertion/Assessment - Triple Lumen  11/12/20 1030 right internal jugular 8 days          Drain                 NG/OG Tube 11/13/20 0200 [place] sump 14 Fr. Center mouth 8 days         Urethral Catheter 11/12/20 1445 16 Fr. 8 days         Rectal Tube 11/20/20 1430 fecal management system less than 1 day          Airway                 Airway - Non-Surgical 11/12/20 0928 Endotracheal Tube 9 days          Arterial Line            Arterial Line 11/12/20 1053 Right Radial 8 days          Peripheral Intravenous Line                 Midline Catheter Insertion/Assessment  - Single Lumen 11/10/20 1603 Right basilic vein (medial side of arm) 18g x 10cm 10 days              Significant Labs:    CBC/Anemia Profile:  Recent Labs   Lab 11/20/20 0424 11/21/20  0353   WBC 22.72* 19.82*   HGB 9.1* 7.6*   HCT 27.6* 25.3*    177   * 109*   RDW 18.2* 18.7*        Chemistries:  Recent Labs   Lab 11/20/20 0424 11/21/20  0353    146*   K 3.6 3.9    107   CO2 30* 30*    BUN 43* 47*   CREATININE 0.7 0.7   CALCIUM 8.3* 8.4*   ALBUMIN 1.7* 1.8*   PROT 5.4* 5.1*   BILITOT 1.1* 1.0   ALKPHOS 353* 317*   ALT 33 31   AST 34 35   MG 2.0 2.2   PHOS 2.5* 2.3*       All pertinent labs within the past 24 hours have been reviewed.    Significant Imaging:  I have reviewed all pertinent imaging results/findings within the past 24 hours.      ABG  Recent Labs   Lab 11/21/20  0404   PH 7.486*   PO2 71*   PCO2 42.8   HCO3 32.3*   BE 9     Assessment/Plan:     Pulmonary  S/P lung transplant  S/p left lung transplant in Aug 2020 for IPF. 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 Weaning now off pressors    Renal/  Acidosis  Mixed. AGMA 2/2 LA, NAGMA 2/2 renal failure and respiratory acidosis     - On bicarb gtt with improvement in acidemia  - Wean down gtt  - Will plan to wean respiratory rate throughout the day  - Good UOP with resolution of DWAYNE  - Lactate improving as well    ID  Septic shock  Pseudomonas bacteremia and in sputum. Resolved.     - Cont Meropenem  - ID following.   - Blood and sputum cx's with Pseudomonas  - Repeat blood cx's NGTD         Sepsis  Hypotensive but not currently requiring vasopressors. Initially 2/2 COVID PNA, now with possible bacterial infection. CXR not markedly changed, UA neg.     - Cont Vanc, cefepime  - Add flagyl for possible intraabdominal source  - CT C/A/P if patient intubated or respiratory status significantly improves  - Cultures sent  - ID consulted  - Midline placed 11/10- not likely the source    Endocrine  Prediabetes  Steroid induced. Now  uncontrolled in the setting of septic shock requiring an insulin gtt.    - Trickle feeds.   - Cont insulin gtt  - Endocrine following      Other  * Pneumonia due to COVID-19 virus  Plan per respiratory failure    Elevated alkaline phosphatase level  Alk Phos increased. CTM.     Palliative care encounter  Ivy updated regularly. All questions answered.    Acute hypoxemic respiratory failure due to COVID-19  COVID + 11/4. CXR with bilateral patchy opacities. Symptoms started 10/30 and steadily progressed. Daughter found to be COVID +. Overnight on 11/12, patient developed septic shock 2/ 2 GNR LLL PNA c/b GNR bacteremia.     - LPV  - Wean respiratory rate today  - Unable to prone due to bradycardia. Off Paral[employer].   - Remdesivir X 5 days; completed 11/09/20  - Convalescent Plasma; Dose #1 11/5  - Dexamethasone X 10 days (end 11/14)  - Wean FiO2 for SpO2 >88%  - Duonebs  - hypercoagulable on full dose lovenox   - TTE with EF 70%; left ventricular concentric remodeling         Critical Care Daily Checklist:    A: Awake: RASS Goal/Actual Goal: RASS Goal: -2-->light sedation  Actual: [place] Agitation Sedation Scale (RASS): Light sedation   B: Spontaneous Breathing Trial Performed? Spon. Breathing Trial Initiated?: Not initiated (11/21/20 0848)   C: SAT & SBT Coordinated?  Stable                      D: Delirium: CAM-ICU Overall CAM-ICU: Negative   E: Early Mobility Performed? No   F: Feeding Goal: Goals: Pt to meet 50-75% estimated kcal and protein needs by RD f/u.  Status: Nutrition Goal Status: goal not met, other (comment)(ongoing)   Current Diet Order   Procedures    Diet NPO      AS: Analgesia/Sedation Per chart   T: Thromboembolic Prophylaxis lovenox   H: HOB > 300 Yes   U: Stress Ulcer Prophylaxis (if needed) PPI   G: Glucose Control Bg goal 140-180. Endocrine following   B: Bowel Function Stool Occurrence: 1   I: Indwelling Catheter (Lines & Rios) Necessity Per chart   D: De-escalation of  Antimicrobials/Pharmacotherapies Per ID    Plan for the day/ETD Wean ventilator    Code Status:  Family/Goals of Care: Full Code       Critical Care Time: 55 minutes  Critical secondary to Patient has a condition that poses threat to life and bodily function: Severe Respiratory Distress      Critical care was time spent personally by me on the following activities: development of treatment plan with patient or surrogate and bedside caregivers, discussions with consultants, evaluation of patient's response to treatment, examination of patient, ordering and performing treatments and interventions, ordering and review of laboratory studies, ordering and review of radiographic studies, pulse oximetry, re-evaluation of patient's condition. This critical care time did not overlap with that of any other provider or involve time for any procedures.     Inna Castillo MD  Critical Care Medicine  [facility] - ICU [facility]

## 2020-11-22 NOTE — PROGRESS NOTES
"[facility] - ICU [facility]  Endocrinology  Progress Note    Admit Date: 11/4/2020     Reason for Consult: Management of pre-dm, Hyperglycemia      Surgical Procedure and Date: Left lung transplant n 8/10/20     Diabetes diagnosis year: pre-dm - diet controlled prior to transplant   Lab Results   Component Value Date    HGBA1C 8.0 (H) 11/05/2020             Home Diabetes Medications:novolog 10 units TID with meals plus correction scale.     How often checking glucose at home? NAVEEN   BG readings on regimen: NAVEEN  Hypoglycemia on the regimen? NAVEEN   Missed doses on regimen?  NAVEEN     Diabetes Complications include:     none  Complicating diabetes co morbidities:   Glucocorticoid use  s/p lung transplant  covid infection        HPI:   Patient is a 65 y.o. female with a diagnosis of diabetes and idiopathic pulmonary fibrosis s/p unilateral (left) lung transplant. Patient presents to the ED complaining of a shortness of breath that progressively got worse. On arrival her COVID-19 test is positive. Patient clinically declined on 11/13 and was intubated. Endocrinology consulted for BG/ Dm management.           Interval HPI:   Overnight events: Remains in RICU, intubated and sedated. BG reasonably well controlled on IV insulin infusion at 2 u/hr with prn correction scale. Hydrocortisone 50 mg every 8 hours.   Eating:   NPO  Nausea: No  Hypoglycemia and intervention: No  Fever: No  TPN and/or TF: glucerna at 30 cc/hr    BP (!) 142/70 (BP Location: Left arm, Patient Position: Lying)   Pulse 79   Temp 97.9 °F (36.6 °C) (Axillary)   Resp (!) 22   Ht 5' 7" (1.702 m)   Wt 76.7 kg (169 lb)   LMP  (LMP Unknown)   SpO2 (!) 93%   Breastfeeding No   BMI 26.47 kg/m²     Labs Reviewed and Include    Recent Labs   Lab 11/22/20  0333   *   CALCIUM 8.5*   ALBUMIN 1.6*   PROT 4.9*   *   K 3.6   CO2 33*      BUN 41*   CREATININE 0.7   ALKPHOS 276*   ALT 32   AST 34   BILITOT 0.9     Lab Results   Component " Value Date    WBC 17.47 (H) 11/22/2020    HGB 7.6 (L) 11/22/2020    HCT 24.8 (L) 11/22/2020     (H) 11/22/2020     11/22/2020     No results for input(s): TSH, FREET4 in the last 168 hours.  Lab Results   Component Value Date    HGBA1C 8.0 (H) 11/05/2020       Nutritional status:   Body mass index is 26.47 kg/m².  Lab Results   Component Value Date    ALBUMIN 1.6 (L) 11/22/2020    ALBUMIN 1.8 (L) 11/21/2020    ALBUMIN 1.7 (L) 11/20/2020     No results found for: PREALBUMIN    Estimated Creatinine Clearance: 87.8 mL/min (based on SCr of 0.7 mg/dL).    Accu-Checks  Recent Labs     11/20/20  2047 11/21/20  0019 11/21/20  0418 11/21/20  0814 11/21/20  1202 11/21/20  1619 11/21/20  2047 11/22/20  0033 11/22/20  0427 11/22/20  0824   POCTGLUCOSE 250* 217* 224* 188* 190* 154* 115* 171* 179* 201*       Current Medications and/or Treatments Impacting Glycemic Control  Immunotherapy:    Immunosuppressants         Stop Route Frequency     tacrolimus (PROGRAF) 1 mg/mL oral syringe      -- PER G TUBE Every morning        Steroids:   Hormones (From admission, onward)    Start     Stop Route Frequency Ordered    11/21/20 2100  hydrocortisone sodium succinate injection 50 mg      -- IV 2 times daily 11/21/20 1016    11/12/20 1100  vasopressin (PITRESSIN) 0.2 Units/mL in dextrose 5 % 100 mL infusion      -- IV Continuous 11/12/20 1113        Pressors:    Autonomic Drugs (From admission, onward)    Start     Stop Route Frequency Ordered    11/12/20 1530  NORepinephrine bitartrate 8 mg in dextrose 5% 250 mL infusion     Question Answer Comment   Titrate by: (in mcg/kg/min) 0.02    Titrate interval: (in minutes) 5    Titrate to maintain: (MAP or SBP) MAP    Greater than: (in mmHg) 65    Maximum dose: (in mcg/kg/min) 3        -- IV Continuous 11/12/20 1422    11/12/20 1243  norepinephrine 1 mg/mL injection     Note to Pharmacy: Created by cabinet override    11/13 0059   11/12/20 1243        Hyperglycemia/Diabetes  Medications:   Antihyperglycemics (From admission, onward)    Start     Stop Route Frequency Ordered    11/17/20 1845  insulin regular in 0.9 % NaCl 100 unit/100 mL (1 unit/mL) infusion     Question:  Enter initial dose (Units/hr):  Answer:  2    -- IV Continuous 11/17/20 1736    11/17/20 1836  insulin aspart U-100 pen 0-10 Units      -- SubQ As needed (PRN) 11/17/20 1736          ASSESSMENT and PLAN    * Pneumonia due to COVID-19 virus  Managed per primary.   Infection may increase insulin resistance.         Prediabetes  BG goal 140-180.     continue transition insulin infusion at 2 u/hr.  BG monitoring every 4 hours and moderate dose correction scale.       Immunosuppression  May increase insulin resistance.         Adrenal cortical steroids causing adverse effect in therapeutic use  Glucocorticoids markedly increase  glucoses. Expect the steroid taper will help glucose control.         Plan of care reviewed with RN due to covid restrictions and patient status.     Nidia Frazier NP  Endocrinology  [facility] - ICU [facility]

## 2020-11-22 NOTE — PLAN OF CARE
Vital signs and labs reviewed. On meropenem for pseudomonas PNA and bacteremia through 11/27. Remains critically ill/intubated. 45%/8peep. Off pressors. Cr is normal.  Continued COVID care per MICU. Tacrolimus and MMF on hold due to ongoing infection. OIP with ganciclovir, lamivudine, bactrim, and posaconazole. Will continue to adjust IS and OIP as needed. Please call with questions.     Davin Coelho MD  Lung Transplant  [facility]

## 2020-11-22 NOTE — SUBJECTIVE & OBJECTIVE
"Interval HPI:   Overnight events: Remains in RICU, intubated and sedated. BG reasonably well controlled on IV insulin infusion at 2 u/hr with prn correction scale. Hydrocortisone 50 mg every 8 hours.   Eating:   NPO  Nausea: No  Hypoglycemia and intervention: No  Fever: No  TPN and/or TF: glucerna at 30 cc/hr    BP (!) 142/70 (BP Location: Left arm, Patient Position: Lying)   Pulse 79   Temp 97.9 °F (36.6 °C) (Axillary)   Resp (!) 22   Ht 5' 7" (1.702 m)   Wt 76.7 kg (169 lb)   LMP  (LMP Unknown)   SpO2 (!) 93%   Breastfeeding No   BMI 26.47 kg/m²     Labs Reviewed and Include    Recent Labs   Lab 11/22/20  0333   *   CALCIUM 8.5*   ALBUMIN 1.6*   PROT 4.9*   *   K 3.6   CO2 33*      BUN 41*   CREATININE 0.7   ALKPHOS 276*   ALT 32   AST 34   BILITOT 0.9     Lab Results   Component Value Date    WBC 17.47 (H) 11/22/2020    HGB 7.6 (L) 11/22/2020    HCT 24.8 (L) 11/22/2020     (H) 11/22/2020     11/22/2020     No results for input(s): TSH, FREET4 in the last 168 hours.  Lab Results   Component Value Date    HGBA1C 8.0 (H) 11/05/2020       Nutritional status:   Body mass index is 26.47 kg/m².  Lab Results   Component Value Date    ALBUMIN 1.6 (L) 11/22/2020    ALBUMIN 1.8 (L) 11/21/2020    ALBUMIN 1.7 (L) 11/20/2020     No results found for: PREALBUMIN    Estimated Creatinine Clearance: 87.8 mL/min (based on SCr of 0.7 mg/dL).    Accu-Checks  Recent Labs     11/20/20 2047 11/21/20  0019 11/21/20  0418 11/21/20  0814 11/21/20  1202 11/21/20  1619 11/21/20  2047 11/22/20  0033 11/22/20  0427 11/22/20  0824   POCTGLUCOSE 250* 217* 224* 188* 190* 154* 115* 171* 179* 201*       Current Medications and/or Treatments Impacting Glycemic Control  Immunotherapy:    Immunosuppressants         Stop Route Frequency     tacrolimus (PROGRAF) 1 mg/mL oral syringe      -- PER G TUBE Every morning        Steroids:   Hormones (From admission, onward)    Start     Stop Route Frequency Ordered    " 11/21/20 2100  hydrocortisone sodium succinate injection 50 mg      -- IV 2 times daily 11/21/20 1016    11/12/20 1100  vasopressin (PITRESSIN) 0.2 Units/mL in dextrose 5 % 100 mL infusion      -- IV Continuous 11/12/20 1113        Pressors:    Autonomic Drugs (From admission, onward)    Start     Stop Route Frequency Ordered    11/12/20 1530  NORepinephrine bitartrate 8 mg in dextrose 5% 250 mL infusion     Question Answer Comment   Titrate by: (in mcg/kg/min) 0.02    Titrate interval: (in minutes) 5    Titrate to maintain: (MAP or SBP) MAP    Greater than: (in mmHg) 65    Maximum dose: (in mcg/kg/min) 3        -- IV Continuous 11/12/20 1422    11/12/20 1243  norepinephrine 1 mg/mL injection     Note to Pharmacy: Created by cabinet override    11/13 0059   11/12/20 1243        Hyperglycemia/Diabetes Medications:   Antihyperglycemics (From admission, onward)    Start     Stop Route Frequency Ordered    11/17/20 1845  insulin regular in 0.9 % NaCl 100 unit/100 mL (1 unit/mL) infusion     Question:  Enter initial dose (Units/hr):  Answer:  2    -- IV Continuous 11/17/20 1736    11/17/20 1836  insulin aspart U-100 pen 0-10 Units      -- SubQ As needed (PRN) 11/17/20 1736

## 2020-11-23 PROBLEM — L89.310 PRESSURE INJURY OF RIGHT BUTTOCK, UNSTAGEABLE: Status: ACTIVE | Noted: 2020-01-01

## 2020-11-23 PROBLEM — L89.812: Status: ACTIVE | Noted: 2020-01-01

## 2020-11-23 NOTE — SUBJECTIVE & OBJECTIVE
Interval History/Significant Events: Follows commands off sedation but becomes dyssynchronous from the ventilator.     Review of Systems   Unable to perform ROS: Intubated     Objective:     Vital Signs (Most Recent):  Temp: 97.9 °F (36.6 °C) (11/22/20 1600)  Pulse: 84 (11/22/20 2012)  Resp: (!) 22 (11/22/20 2012)  BP: (!) 177/105 (11/22/20 1800)  SpO2: (!) 94 % (11/22/20 2012) Vital Signs (24h Range):  Temp:  [97.1 °F (36.2 °C)-99.6 °F (37.6 °C)] 97.9 °F (36.6 °C)  Pulse:  [] 84  Resp:  [21-24] 22  SpO2:  [86 %-95 %] 94 %  BP: (119-177)/() 177/105  Arterial Line BP: (100-201)/(44-84) 138/72   Weight: 76.7 kg (169 lb)  Body mass index is 26.47 kg/m².      Intake/Output Summary (Last 24 hours) at 11/22/2020 2027  Last data filed at 11/22/2020 1800  Gross per 24 hour   Intake 1393.26 ml   Output 1055 ml   Net 338.26 ml       Physical Exam  Vitals signs and nursing note reviewed.   Constitutional:       General: She is not in acute distress.     Appearance: She is ill-appearing. She is not diaphoretic.      Interventions: Nasal cannula in place.      Comments: Intubated, sedated   HENT:      Head: Normocephalic and atraumatic.   Eyes:      General: No scleral icterus.  Neck:      Musculoskeletal: No neck rigidity.   Cardiovascular:      Rate and Rhythm: Normal rate and regular rhythm.      Pulses: Normal pulses.      Heart sounds: Normal heart sounds. No murmur.   Pulmonary:      Effort: No respiratory distress.      Breath sounds: Rhonchi and rales present. No decreased breath sounds or wheezing.   Chest:      Comments: Midline scar from recent transplant   Abdominal:      Palpations: Abdomen is soft.      Tenderness: There is no guarding.   Musculoskeletal:      Right lower leg: No edema.      Left lower leg: No edema.   Skin:     General: Skin is dry.      Capillary Refill: Capillary refill takes 2 to 3 seconds.      Comments: cool   Neurological:      Comments: Intubated/sedated         Vents:  Vent  Mode: A/C (11/22/20 2012)  Ventilator Initiated: Yes(chart correction) (11/12/20 0921)  Set Rate: 22 BPM (11/22/20 2012)  Vt Set: 360 mL (11/22/20 2012)  Pressure Support: 0 cmH20 (11/15/20 2115)  PEEP/CPAP: 8 cmH20 (11/22/20 2012)  Oxygen Concentration (%): 50 (11/22/20 2012)  Peak Airway Pressure: 41 cmH2O (11/22/20 2012)  Plateau Pressure: 31 cmH20 (11/22/20 2012)  Total Ve: 7.73 mL (11/22/20 2012)  F/VT Ratio<105 (RSBI): (!) 61.97 (11/22/20 2012)  Lines/Drains/Airways     Central Venous Catheter Line            Percutaneous Central Line Insertion/Assessment - Triple Lumen  11/12/20 1030 right internal jugular 10 days          Drain                 Urethral Catheter 11/12/20 1445 16 Fr. 10 days         NG/OG Tube 11/13/20 0200 [place] sump 14 Fr. Center mouth 9 days          Airway                 Airway - Non-Surgical 11/12/20 0928 Endotracheal Tube 10 days          Arterial Line            Arterial Line 11/12/20 1053 Right Radial 10 days          Peripheral Intravenous Line                 Midline Catheter Insertion/Assessment  - Single Lumen 11/10/20 1603 Right basilic vein (medial side of arm) 18g x 10cm 12 days              Significant Labs:    CBC/Anemia Profile:  Recent Labs   Lab 11/21/20  0353 11/21/20  1636 11/22/20  0333   WBC 19.82* 19.85* 17.47*   HGB 7.6* 8.0* 7.6*   HCT 25.3* 25.9* 24.8*    194 181   * 107* 108*   RDW 18.7* 18.7* 19.0*        Chemistries:  Recent Labs   Lab 11/21/20  0353 11/22/20  0333   * 147*   K 3.9 3.6    107   CO2 30* 33*   BUN 47* 41*   CREATININE 0.7 0.7   CALCIUM 8.4* 8.5*   ALBUMIN 1.8* 1.6*   PROT 5.1* 4.9*   BILITOT 1.0 0.9   ALKPHOS 317* 276*   ALT 31 32   AST 35 34   MG 2.2 1.9   PHOS 2.3* 2.4*       All pertinent labs within the past 24 hours have been reviewed.    Significant Imaging:  I have reviewed all pertinent imaging results/findings within the past 24 hours.

## 2020-11-23 NOTE — ASSESSMENT & PLAN NOTE
Mixed. AGMA 2/2 LA, NAGMA 2/2 renal failure and respiratory acidosis     - On bicarb gtt with improvement in acidemia  - Wean down gtt  - Will plan to wean respiratory rate throughout the day  - Good UOP with resolution of DWAYNE  - Lactate improving as well  - Resolved.

## 2020-11-23 NOTE — PROGRESS NOTES
"[facility] - ICU [facility]  Endocrinology  Progress Note    Admit Date: 11/4/2020     Reason for Consult: Management of pre-dm, Hyperglycemia      Surgical Procedure and Date: Left lung transplant n 8/10/20     Diabetes diagnosis year: pre-dm - diet controlled prior to transplant   Lab Results   Component Value Date    HGBA1C 8.0 (H) 11/05/2020             Home Diabetes Medications:novolog 10 units TID with meals plus correction scale.     How often checking glucose at home? NAVEEN   BG readings on regimen: NAVEEN  Hypoglycemia on the regimen? NAVEEN   Missed doses on regimen?  NAVEEN     Diabetes Complications include:     none  Complicating diabetes co morbidities:   Glucocorticoid use  s/p lung transplant  covid infection        HPI:   Patient is a 65 y.o. female with a diagnosis of diabetes and idiopathic pulmonary fibrosis s/p unilateral (left) lung transplant. Patient presents to the ED complaining of a shortness of breath that progressively got worse. On arrival her COVID-19 test is positive. Patient clinically declined on 11/13 and was intubated. Endocrinology consulted for BG/ Dm management.           Interval HPI:   Overnight events: Remains in RICU, intubated and sedated. BG above goal on IV insulin infusion at 2 u/hr with prn correction scale. NAEON. Hydrocortisone 50 mg every 8 hours.   Eating:   NPO  Nausea: No  Hypoglycemia and intervention: No  Fever: No  TPN and/or TF: glucerna at 50 cc/hr    /80   Pulse 81   Temp 98.4 °F (36.9 °C) (Axillary)   Resp (!) 22   Ht 5' 7" (1.702 m)   Wt 76.7 kg (169 lb)   LMP  (LMP Unknown)   SpO2 95%   Breastfeeding No   BMI 26.47 kg/m²     Labs Reviewed and Include    Recent Labs   Lab 11/23/20  0350   *   CALCIUM 8.1*   ALBUMIN 1.7*   PROT 4.9*   *   K 4.0   CO2 31*      BUN 37*   CREATININE 0.6   ALKPHOS 239*   ALT 28   AST 28   BILITOT 0.7     Lab Results   Component Value Date    WBC 13.61 (H) 11/23/2020    HGB 7.2 (L) 11/23/2020    " HCT 23.1 (L) 11/23/2020     (H) 11/23/2020     11/23/2020     No results for input(s): TSH, FREET4 in the last 168 hours.  Lab Results   Component Value Date    HGBA1C 8.0 (H) 11/05/2020       Nutritional status:   Body mass index is 26.47 kg/m².  Lab Results   Component Value Date    ALBUMIN 1.7 (L) 11/23/2020    ALBUMIN 1.6 (L) 11/22/2020    ALBUMIN 1.8 (L) 11/21/2020     No results found for: PREALBUMIN    Estimated Creatinine Clearance: 102.4 mL/min (based on SCr of 0.6 mg/dL).    Accu-Checks  Recent Labs     11/22/20  0033 11/22/20  0427 11/22/20  0824 11/22/20  1315 11/22/20  1716 11/22/20  2116 11/23/20  0118 11/23/20  0505 11/23/20  0827 11/23/20  1253   POCTGLUCOSE 171* 179* 201* 218* 216* 186* 194* 253* 185* 195*       Current Medications and/or Treatments Impacting Glycemic Control  Immunotherapy:    Immunosuppressants         Stop Route Frequency     tacrolimus (PROGRAF) 1 mg/mL oral syringe      -- PER G TUBE Every morning        Steroids:   Hormones (From admission, onward)    Start     Stop Route Frequency Ordered    11/21/20 2100  hydrocortisone sodium succinate injection 50 mg      -- IV 2 times daily 11/21/20 1016        Pressors:    Autonomic Drugs (From admission, onward)    Start     Stop Route Frequency Ordered    11/12/20 1243  norepinephrine 1 mg/mL injection     Note to Pharmacy: Created by cabinet override    11/13 0059   11/12/20 1243        Hyperglycemia/Diabetes Medications:   Antihyperglycemics (From admission, onward)    Start     Stop Route Frequency Ordered    11/17/20 1845  insulin regular in 0.9 % NaCl 100 unit/100 mL (1 unit/mL) infusion     Question:  Enter initial dose (Units/hr):  Answer:  2    -- IV Continuous 11/17/20 1736    11/17/20 1836  insulin aspart U-100 pen 0-10 Units      -- SubQ As needed (PRN) 11/17/20 1736          ASSESSMENT and PLAN    * Pneumonia due to COVID-19 virus  Managed per primary.   Infection may increase insulin resistance.          Prediabetes  BG goal 140-180.     increase transition insulin infusion at 2.4 u/hr.  BG monitoring every 4 hours and moderate dose correction scale.       Immunosuppression  May increase insulin resistance.         Adrenal cortical steroids causing adverse effect in therapeutic use  Glucocorticoids markedly increase  glucoses. Expect the steroid taper will help glucose control.             Nidia Frazier NP  Endocrinology  [facility] - ICU [facility]

## 2020-11-23 NOTE — ASSESSMENT & PLAN NOTE
COVID + 11/4. CXR with bilateral patchy opacities. Symptoms started 10/30 and steadily progressed. Daughter found to be COVID +. Overnight on 11/12, patient developed septic shock 2/ 2 GNR LLL PNA c/b GNR bacteremia.     - LPV  - Wean respiratory rate today  - Unable to prone due to bradycardia. Off Paral[employer].   - Remdesivir X 5 days; completed 11/09/20  - Convalescent Plasma; Dose #1 11/5  - Dexamethasone X 10 days (end 11/14)  - Wean FiO2 for SpO2 >88%  - Duonebs  - hypercoagulable on full dose lovenox   - TTE with EF 70%; left ventricular concentric remodeling  - Diamox x1 on 11/23 for metabolic alkalosis

## 2020-11-23 NOTE — PROGRESS NOTES
[facility] - ICU 16   Critical Care Medicine  Progress Note    Patient Name: Chely Becerra  MRN: [id number]  Admission Date: 11/4/2020  Hospital Length of Stay: 18 days  Code Status: Full Code  Attending Provider: Inna Castillo MD  Primary Care Provider: ZAHIDA Stack MD   Principal Problem: Pneumonia due to COVID-19 virus    Subjective:     HPI:  Ms. Chely Becerra is a 65 y.o. year old female with a PMHx of diabetes and idiopathic pulmonary fibrosis s/p unilateral (left) lung transplant in August 2020 who presents to the ED complaining of a shortness of breath that started last Friday and progressively got worse. She had a fever of 101 on Sunday, started coughing for the last 3 days nonproductive, denies any chest pain. She was seen in the [facility] for routine testing earlier this morning was found to be hypoxic and she was referred to the emergency department. On arrival her COVID-19 test is positive.     Patient lives with her 2 daughters, 1 of the daughters has been having some respiratory symptoms recently however she thought was secondary to asthma. he is on prednisone taper currently 20 mg daily, she has been compliant with her antirejection medication Prograf and CellCept. She was started on BiPAP in the ED with improvement in her oxygen saturations to the mid 90's.     Hospital/ICU Course:  Admitted to Critical Care Medicine for acute hypoxic respiratory failure 2/2 to COVID pneumonia. Alternating between BiPAP and comfort flow. Started on dexamethasone. Consented for Remdsivir and Convalescent Plasma by lung transplant. Patient received her first unit of convalescent plasma 11/5 and tolerated well per patient. Echo performed on 11/5 showed LVEF 70% with normal diastolic function, though LV concentric remodeling & normal RV size and function. Patient continued on alternating bipap with comfort flow 11/8 - 11/10. Patient clinically declined on 11/13. Abx were  broadened to vanc and cefepime (in addition to x1 dose tobra given for double gram negative coverage). Patient was intubated and central/arterial lines were placed. CT chest/abd/pelvis showed LLL pneumonia. Blood cultures resulted with GNR bacteremia. As patient was still spiking fevers with uptrending lactic, abx broadened to meropenem. Blood and sputum cultures with Pseudomonas.    Interval History/Significant Events: Follows commands off sedation but becomes dyssynchronous from the ventilator.     Review of Systems   Unable to perform ROS: Intubated     Objective:     Vital Signs (Most Recent):  Temp: 97.9 °F (36.6 °C) (11/22/20 1600)  Pulse: 84 (11/22/20 2012)  Resp: (!) 22 (11/22/20 2012)  BP: (!) 177/105 (11/22/20 1800)  SpO2: (!) 94 % (11/22/20 2012) Vital Signs (24h Range):  Temp:  [97.1 °F (36.2 °C)-99.6 °F (37.6 °C)] 97.9 °F (36.6 °C)  Pulse:  [] 84  Resp:  [21-24] 22  SpO2:  [86 %-95 %] 94 %  BP: (119-177)/() 177/105  Arterial Line BP: (100-201)/(44-84) 138/72   Weight: 76.7 kg (169 lb)  Body mass index is 26.47 kg/m².      Intake/Output Summary (Last 24 hours) at 11/22/2020 2027  Last data filed at 11/22/2020 1800  Gross per 24 hour   Intake 1393.26 ml   Output 1055 ml   Net 338.26 ml       Physical Exam  Vitals signs and nursing note reviewed.   Constitutional:       General: She is not in acute distress.     Appearance: She is ill-appearing. She is not diaphoretic.      Interventions: Nasal cannula in place.      Comments: Intubated, sedated   HENT:      Head: Normocephalic and atraumatic.   Eyes:      General: No scleral icterus.  Neck:      Musculoskeletal: No neck rigidity.   Cardiovascular:      Rate and Rhythm: Normal rate and regular rhythm.      Pulses: Normal pulses.      Heart sounds: Normal heart sounds. No murmur.   Pulmonary:      Effort: No respiratory distress.      Breath sounds: Rhonchi and rales present. No decreased breath sounds or wheezing.   Chest:      Comments:  Midline scar from recent transplant   Abdominal:      Palpations: Abdomen is soft.      Tenderness: There is no guarding.   Musculoskeletal:      Right lower leg: No edema.      Left lower leg: No edema.   Skin:     General: Skin is dry.      Capillary Refill: Capillary refill takes 2 to 3 seconds.      Comments: cool   Neurological:      Comments: Intubated/sedated         Vents:  Vent Mode: A/C (11/22/20 2012)  Ventilator Initiated: Yes(chart correction) (11/12/20 0921)  Set Rate: 22 BPM (11/22/20 2012)  Vt Set: 360 mL (11/22/20 2012)  Pressure Support: 0 cmH20 (11/15/20 2115)  PEEP/CPAP: 8 cmH20 (11/22/20 2012)  Oxygen Concentration (%): 50 (11/22/20 2012)  Peak Airway Pressure: 41 cmH2O (11/22/20 2012)  Plateau Pressure: 31 cmH20 (11/22/20 2012)  Total Ve: 7.73 mL (11/22/20 2012)  F/VT Ratio<105 (RSBI): (!) 61.97 (11/22/20 2012)  Lines/Drains/Airways     Central Venous Catheter Line            Percutaneous Central Line Insertion/Assessment - Triple Lumen  11/12/20 1030 right internal jugular 10 days          Drain                 Urethral Catheter 11/12/20 1445 16 Fr. 10 days         NG/OG Tube 11/13/20 0200 [place] sump 14 Fr. Center mouth 9 days          Airway                 Airway - Non-Surgical 11/12/20 0928 Endotracheal Tube 10 days          Arterial Line            Arterial Line 11/12/20 1053 Right Radial 10 days          Peripheral Intravenous Line                 Midline Catheter Insertion/Assessment  - Single Lumen 11/10/20 1603 Right basilic vein (medial side of arm) 18g x 10cm 12 days              Significant Labs:    CBC/Anemia Profile:  Recent Labs   Lab 11/21/20  0353 11/21/20  1636 11/22/20  0333   WBC 19.82* 19.85* 17.47*   HGB 7.6* 8.0* 7.6*   HCT 25.3* 25.9* 24.8*    194 181   * 107* 108*   RDW 18.7* 18.7* 19.0*        Chemistries:  Recent Labs   Lab 11/21/20  0353 11/22/20  0333   * 147*   K 3.9 3.6    107   CO2 30* 33*   BUN 47* 41*   CREATININE 0.7 0.7   CALCIUM  8.4* 8.5*   ALBUMIN 1.8* 1.6*   PROT 5.1* 4.9*   BILITOT 1.0 0.9   ALKPHOS 317* 276*   ALT 31 32   AST 35 34   MG 2.2 1.9   PHOS 2.3* 2.4*       All pertinent labs within the past 24 hours have been reviewed.    Significant Imaging:  I have reviewed all pertinent imaging results/findings within the past 24 hours.      ABG  Recent Labs   Lab 11/22/20  0332   PH 7.530*   PO2 90   PCO2 41.3   HCO3 34.5*   BE 12     Assessment/Plan:     Pulmonary  S/P lung transplant  S/p left lung transplant in Aug 2020 for IPF. 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 Weaning now off pressors    Renal/  Acidosis  Mixed. AGMA 2/2 LA, NAGMA 2/2 renal failure and respiratory acidosis     - On bicarb gtt with improvement in acidemia  - Wean down gtt  - Will plan to wean respiratory rate throughout the day  - Good UOP with resolution of DWAYNE  - Lactate improving as well    ID  Septic shock  Pseudomonas bacteremia and in sputum. Resolved.     - Cont Meropenem  - ID following.   - Blood and sputum cx's with Pseudomonas  - Repeat blood cx's NGTD         Sepsis  Hypotensive but not currently requiring vasopressors. Initially 2/2 COVID PNA, now with possible bacterial infection. CXR not markedly changed, UA neg.     - Cont Vanc, cefepime  - Add flagyl for possible intraabdominal source  - CT C/A/P if patient intubated or respiratory status significantly improves  - Cultures sent  - ID consulted  - Midline placed 11/10- not likely the source    Endocrine  Prediabetes  Steroid induced. Now uncontrolled in the setting of septic shock requiring  an insulin gtt.    - Trickle feeds.   - Cont insulin gtt  - Endocrine following      Other  * Pneumonia due to COVID-19 virus  Plan per respiratory failure    Elevated alkaline phosphatase level  Alk Phos increased. CTM.     Palliative care encounter  Ivy updated regularly. All questions answered.    Acute hypoxemic respiratory failure due to COVID-19  COVID + 11/4. CXR with bilateral patchy opacities. Symptoms started 10/30 and steadily progressed. Daughter found to be COVID +. Overnight on 11/12, patient developed septic shock 2/ 2 GNR LLL PNA c/b GNR bacteremia.     - LPV  - Wean respiratory rate today  - Unable to prone due to bradycardia. Off Paralytics.   - Remdesivir X 5 days; completed 11/09/20  - Convalescent Plasma; Dose #1 11/5  - Dexamethasone X 10 days (end 11/14)  - Wean FiO2 for SpO2 >88%  - Duonebs  - hypercoagulable on full dose lovenox   - TTE with EF 70%; left ventricular concentric remodeling         Critical Care Daily Checklist:    A: Awake: RASS Goal/Actual Goal: RASS Goal: -2-->light sedation  Actual: Grullon Agitation Sedation Scale (RASS): Light sedation   B: Spontaneous Breathing Trial Performed? Spon. Breathing Trial Initiated?: Not initiated (11/21/20 0848)   C: SAT & SBT Coordinated?  Did not tolerate                      D: Delirium: CAM-ICU Overall CAM-ICU: Negative   E: Early Mobility Performed? No   F: Feeding Goal: Goals: Pt to meet 50-75% estimated kcal and protein needs by RD f/u.  Status: Nutrition Goal Status: goal not met, other (comment)(ongoing)   Current Diet Order   Procedures    Diet NPO      AS: Analgesia/Sedation Per chart   T: Thromboembolic Prophylaxis lovenox   H: HOB > 300 Yes   U: Stress Ulcer Prophylaxis (if needed) PPi   G: Glucose Control BG goal 140-180   B: Bowel Function Stool Occurrence: 1   I: Indwelling Catheter (Lines & Rios) Necessity Per chart   D: De-escalation of Antimicrobials/Pharmacotherapies Meropenem. ID following    Plan for the day/ETD  Wean vent    Code Status:  Family/Goals of Care: Full Code       Critical Care Time: 35 minutes  Critical secondary to Patient has a condition that poses threat to life and bodily function: Severe Respiratory Distress       Critical care was time spent personally by me on the following activities: development of treatment plan with patient or surrogate and bedside caregivers, discussions with consultants, evaluation of patient's response to treatment, examination of patient, ordering and performing treatments and interventions, ordering and review of laboratory studies, ordering and review of radiographic studies, pulse oximetry, re-evaluation of patient's condition. This critical care time did not overlap with that of any other provider or involve time for any procedures.     Inna Castillo MD  Critical Care Medicine  [facility] - ICU 16 [facility]

## 2020-11-23 NOTE — ASSESSMENT & PLAN NOTE
BG goal 140-180.     increase transition insulin infusion at 2.4 u/hr.  BG monitoring every 4 hours and moderate dose correction scale.

## 2020-11-23 NOTE — PROGRESS NOTES
[facility] - ICU 16   Critical Care Medicine  Progress Note    Patient Name: Chely Becerra  MRN: [id number]  Admission Date: 11/4/2020  Hospital Length of Stay: 19 days  Code Status: Full Code  Attending Provider: Sussy Khan MD  Primary Care Provider: ZAHIDA Stack MD   Principal Problem: Pneumonia due to COVID-19 virus    Subjective:     HPI:  Ms. Chely Becerra is a 65 y.o. year old female with a PMHx of diabetes and idiopathic pulmonary fibrosis s/p unilateral (left) lung transplant in August 2020 who presents to the ED complaining of a shortness of breath that started last Friday and progressively got worse. She had a fever of 101 on Sunday, started coughing for the last 3 days nonproductive, denies any chest pain. She was seen in the Lung Transplant Clinic for routine testing earlier this morning was found to be hypoxic and she was referred to the emergency department. On arrival her COVID-19 test is positive.     Patient lives with her 2 daughters, 1 of the daughters has been having some respiratory symptoms recently however she thought was secondary to asthma. he is on prednisone taper currently 20 mg daily, she has been compliant with her antirejection medication Prograf and CellCept. She was started on BiPAP in the ED with improvement in her oxygen saturations to the mid 90's.     Hospital/ICU Course:  Admitted to Critical Care Medicine for acute hypoxic respiratory failure 2/2 to COVID pneumonia. Alternating between BiPAP and comfort flow. Started on dexamethasone. Consented for Remdsivir and Convalescent Plasma by lung transplant. Patient received her first unit of convalescent plasma 11/5 and tolerated well per patient. Echo performed on 11/5 showed LVEF 70% with normal diastolic function, though LV concentric remodeling & normal RV size and function. Patient continued on alternating bipap with comfort flow 11/8 - 11/10. Patient clinically declined on 11/13. Abx were broadened  to vanc and cefepime (in addition to x1 dose tobra given for double gram negative coverage). Patient was intubated and central/arterial lines were placed. CT chest/abd/pelvis showed LLL pneumonia. Blood cultures resulted with GNR bacteremia. As patient was still spiking fevers with uptrending lactic, abx broadened to meropenem. Blood and sputum cultures with Pseudomonas. On Meropenem, end date 11/27. Alkolosis worsening, gave diamox 11/23.       Interval History/Significant Events: NAEON. Vitally stable. Sedated. Blood and sputum cultures with Pseudomonas. On Meropenem, end date 11/27. Alkolosis worsening, gave diamox 11/23.    Review of Systems   Unable to perform ROS: Intubated     Objective:     Vital Signs (Most Recent):  Temp: 98.4 °F (36.9 °C) (11/23/20 0800)  Pulse: 84 (11/23/20 1200)  Resp: (!) 22 (11/23/20 1200)  BP: 136/80 (11/23/20 1200)  SpO2: 98 % (11/23/20 1200) Vital Signs (24h Range):  Temp:  [97.9 °F (36.6 °C)-98.5 °F (36.9 °C)] 98.4 °F (36.9 °C)  Pulse:  [] 84  Resp:  [21-53] 22  SpO2:  [86 %-98 %] 98 %  BP: ()/() 136/80  Arterial Line BP: ()/(45-84) 145/71   Weight: 76.7 kg (169 lb)  Body mass index is 26.47 kg/m².      Intake/Output Summary (Last 24 hours) at 11/23/2020 1314  Last data filed at 11/23/2020 1200  Gross per 24 hour   Intake 2106.07 ml   Output 650 ml   Net 1456.07 ml       Physical Exam  Vitals signs and nursing note reviewed.   Constitutional:       General: She is not in acute distress.     Appearance: She is ill-appearing. She is not diaphoretic.      Interventions: Nasal cannula in place.      Comments: Intubated, sedated   HENT:      Head: Normocephalic and atraumatic.   Eyes:      General: No scleral icterus.  Neck:      Musculoskeletal: No neck rigidity.   Cardiovascular:      Rate and Rhythm: Normal rate and regular rhythm.      Pulses: Normal pulses.      Heart sounds: Normal heart sounds. No murmur.   Pulmonary:      Effort: No respiratory distress.       Breath sounds: Rhonchi and rales present. No decreased breath sounds or wheezing.   Chest:      Comments: Midline scar from recent transplant   Abdominal:      Palpations: Abdomen is soft.      Tenderness: There is no guarding.   Musculoskeletal:      Right lower leg: No edema.      Left lower leg: No edema.   Skin:     General: Skin is dry.      Capillary Refill: Capillary refill takes 2 to 3 seconds.      Comments: cool   Neurological:      Comments: Intubated/sedated         Vents:  Vent Mode: A/C (11/23/20 1157)  Ventilator Initiated: Yes(chart correction) (11/12/20 0921)  Set Rate: 22 BPM (11/23/20 1157)  Vt Set: 360 mL (11/23/20 1157)  Pressure Support: 0 cmH20 (11/15/20 2115)  PEEP/CPAP: 8 cmH20 (11/23/20 1157)  Oxygen Concentration (%): 50 (11/23/20 1200)  Peak Airway Pressure: 37 cmH2O (11/23/20 1157)  Plateau Pressure: 31 cmH20 (11/23/20 1157)  Total Ve: 7.73 mL (11/23/20 1157)  F/VT Ratio<105 (RSBI): (!) 62.32 (11/23/20 1157)  Lines/Drains/Airways     Central Venous Catheter Line            Percutaneous Central Line Insertion/Assessment - Triple Lumen  11/12/20 1030 right internal jugular 11 days          Drain                 NG/OG Tube 11/13/20 0200 [place] sump 14 Fr. Center mouth 10 days         Urethral Catheter 11/12/20 1445 16 Fr. 10 days          Airway                 Airway - Non-Surgical 11/12/20 0928 Endotracheal Tube 11 days          Arterial Line            Arterial Line 11/12/20 1053 Right Radial 11 days          Peripheral Intravenous Line                 Midline Catheter Insertion/Assessment  - Single Lumen 11/10/20 1603 Right basilic vein (medial side of arm) 18g x 10cm 12 days              Significant Labs:    CBC/Anemia Profile:  Recent Labs   Lab 11/21/20  1636 11/22/20  0333 11/23/20  0459   WBC 19.85* 17.47* 13.61*   HGB 8.0* 7.6* 7.2*   HCT 25.9* 24.8* 23.1*    181 164   * 108* 111*   RDW 18.7* 19.0* 19.8*        Chemistries:  Recent Labs   Lab 11/22/20  0331  11/23/20  0350   * 146*   K 3.6 4.0    106   CO2 33* 31*   BUN 41* 37*   CREATININE 0.7 0.6   CALCIUM 8.5* 8.1*   ALBUMIN 1.6* 1.7*   PROT 4.9* 4.9*   BILITOT 0.9 0.7   ALKPHOS 276* 239*   ALT 32 28   AST 34 28   MG 1.9 2.1   PHOS 2.4* 2.8       All pertinent labs within the past 24 hours have been reviewed.    Significant Imaging:  I have reviewed all pertinent imaging results/findings within the past 24 hours.      ABG  Recent Labs   Lab 11/23/20  0344   PH 7.528*   PO2 94   PCO2 42.2   HCO3 35.1*   BE 12     Assessment/Plan:     Pulmonary  S/P lung transplant  S/p left lung transplant in Aug 2020 for IPF. 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 Weaning now off pressors    Renal/  Acidosis  Mixed. AGMA 2/2 LA, NAGMA 2/2 renal failure and respiratory acidosis     - On bicarb gtt with improvement in acidemia  - Wean down gtt  - Will plan to wean respiratory rate throughout the day  - Good UOP with resolution of DWAYNE  - Lactate improving as well  - Resolved.    ID  Septic shock  Pseudomonas bacteremia and in sputum. Resolved.     - Cont Meropenem, end date 11/27  - ID following.   - Blood and sputum cx's with Pseudomonas  - Repeat blood cx's NGTD         Sepsis  Hypotensive but not currently requiring vasopressors. Initially 2/2 COVID PNA, now with possible bacterial infection. CXR not markedly changed, UA neg.     - Found to have bacteremia with Pseudomonas  - ID consulted, On Marianela, end date 11/27  - Midline placed 11/10- not likely the source    Endocrine  Prediabetes  Steroid induced. Now  uncontrolled in the setting of septic shock requiring an insulin gtt.    - Trickle feeds.   - Cont insulin gtt and Novolog 12 U TIDWM per Endo  - Endocrine following      Other  * Pneumonia due to COVID-19 virus  Plan per respiratory failure    Elevated alkaline phosphatase level  Alk Phos increased. CTM.     Palliative care encounter  Ivy updated regularly. All questions answered.    Acute hypoxemic respiratory failure due to COVID-19  COVID + 11/4. CXR with bilateral patchy opacities. Symptoms started 10/30 and steadily progressed. Daughter found to be COVID +. Overnight on 11/12, patient developed septic shock 2/ 2 GNR LLL PNA c/b GNR bacteremia.     - LPV  - Wean respiratory rate today  - Unable to prone due to bradycardia. Off Paralytics.   - Remdesivir X 5 days; completed 11/09/20  - Convalescent Plasma; Dose #1 11/5  - Dexamethasone X 10 days (end 11/14)  - Wean FiO2 for SpO2 >88%  - Duonebs  - hypercoagulable on full dose lovenox   - TTE with EF 70%; left ventricular concentric remodeling  - Diamox x1 on 11/23 for metabolic alkalosis        Critical Care Daily Checklist:      A: Awake: RASS Goal/Actual Goal: RASS Goal: -2-->light sedation  Actual: Grullon Agitation Sedation Scale (RASS): Light sedation   B: Spontaneous Breathing Trial Performed? Spon. Breathing Trial Initiated?: Not initiated (11/21/20 0848)   C: SAT & SBT Coordinated?  Did not tolerate                      D: Delirium: CAM-ICU Overall CAM-ICU: Negative   E: Early Mobility Performed? No   F: Feeding Goal: Goals: Pt to meet 50-75% estimated kcal and protein needs by RD f/u.  Status: Nutrition Goal Status: goal not met, other (comment)(ongoing)       Current Diet Order   Procedures    Diet NPO       AS: Analgesia/Sedation Per chart   T: Thromboembolic Prophylaxis lovenox   H: HOB > 300 Yes   U: Stress Ulcer Prophylaxis (if needed) PPi   G: Glucose Control BG goal 140-180   B: Bowel Function Stool Occurrence: 1   I: Indwelling Catheter  (Lines & Rios) Necessity Per chart   D: De-escalation of Antimicrobials/Pharmacotherapies Meropenem. ID following     Plan for the day/ETD Wean vent     Code Status:  Family/Goals of Care: Full Code             Critical secondary to Patient has a condition that poses threat to life and bodily function: Severe Respiratory Distress      Critical care was time spent personally by me on the following activities: development of treatment plan with patient or surrogate and bedside caregivers, discussions with consultants, evaluation of patient's response to treatment, examination of patient, ordering and performing treatments and interventions, ordering and review of laboratory studies, ordering and review of radiographic studies, pulse oximetry, re-evaluation of patient's condition. This critical care time did not overlap with that of any other provider or involve time for any procedures.     Joselyn Shields MD  Critical Care Medicine  [facility] - ICU [facility]

## 2020-11-23 NOTE — ASSESSMENT & PLAN NOTE
Pseudomonas bacteremia and in sputum. Resolved.     - Cont Meropenem, end date 11/27  - ID following.   - Blood and sputum cx's with Pseudomonas  - Repeat blood cx's NGTD

## 2020-11-23 NOTE — CARE UPDATE
Called daughter, Ms. Levy, Ivy and updated her on Ms. Becerra's health today. Questions answered and she agrees with the plan.      Joselyn Shields MD  IM, PGY3  CCM1

## 2020-11-23 NOTE — PLAN OF CARE
Throughout day pt VSS and tolerated mechanical ventilation 45-50% FiO2 and PEEP=8. Pt becomes tachycardic, hypertensive, and decrease O2 sats when attempting to wean sedation. Pt remains on fentanyl, propofol, and insulin gtts. Adequate urine output today. Multiple BM's today, but rectal tube removed due to BM's too thick. Pt tolerating tube feedings. Daughter at bedside today and updated on pt status. Will continue to monitor.

## 2020-11-23 NOTE — ASSESSMENT & PLAN NOTE
Steroid induced. Now uncontrolled in the setting of septic shock requiring an insulin gtt.    - Trickle feeds.   - Cont insulin gtt and Novolog 12 U TIDWM per Endo  - Endocrine following

## 2020-11-23 NOTE — ASSESSMENT & PLAN NOTE
Hypotensive but not currently requiring vasopressors. Initially 2/2 COVID PNA, now with possible bacterial infection. CXR not markedly changed, UA neg.     - Found to have bacteremia with Pseudomonas  - ID consulted, On Marianela, end date 11/27  - Midline placed 11/10- not likely the source

## 2020-11-23 NOTE — CONSULTS
Wound care follow up for R/L buttocks pressure injury and new consult for cheek wound.    Recommendations:  - Continue with pressure injury prevention interventions and re-order  LALO surface as currently pt is on Arely frame with gel/foam surface.  Notified [employer] rep.  - Continue Triad ointment BID to R/L buttock  - Comfeel thin hydrocolloid to R cheek wound 2x/wk and prn changing of ETT villanueva    ASSESSMENT-  L/R buttocks- Evolving purple/maroon discolored that was previously (11/16) revealing itself as a full thickness Stage 3, has continued to evolve further into an unstageable with soft eschar developing. This injury is hospital acquired since last assessment noted on 11/16/20.  Larger measurements-  [id number]-  Small amount of serosanguineous drainage- No odor        New injury- R cheek- partial thickness Stage 2 medical device-related pressure injury from ETT villanueva.   1x1x0.1cm Scant clear drainage- no odor        R ear- healed epithelium- unknown etiology

## 2020-11-23 NOTE — PLAN OF CARE
Vital signs and labs reviewed. On meropenem for pseudomonas PNA and bacteremia (repeat cultures NGTD) through 11/27. Remains critically ill/intubated. Off pressors. Weaning sedation, but limited due to vent dyssynchrony. Continued COVID care per MICU. Tacrolimus resumed and will monitor levels. OIP with ganciclovir, lamivudine, bactrim, and posaconazole. Will continue to adjust IS and OIP as needed. Gentle diuresis as tolerated.

## 2020-11-23 NOTE — SUBJECTIVE & OBJECTIVE
Interval History/Significant Events: NAEON. Vitally stable. Sedated. Blood and sputum cultures with Pseudomonas. On Meropenem, end date 11/27. Alkolosis worsening, gave diamox 11/23.    Review of Systems   Unable to perform ROS: Intubated     Objective:     Vital Signs (Most Recent):  Temp: 98.4 °F (36.9 °C) (11/23/20 0800)  Pulse: 84 (11/23/20 1200)  Resp: (!) 22 (11/23/20 1200)  BP: 136/80 (11/23/20 1200)  SpO2: 98 % (11/23/20 1200) Vital Signs (24h Range):  Temp:  [97.9 °F (36.6 °C)-98.5 °F (36.9 °C)] 98.4 °F (36.9 °C)  Pulse:  [] 84  Resp:  [21-53] 22  SpO2:  [86 %-98 %] 98 %  BP: ()/() 136/80  Arterial Line BP: ()/(45-84) 145/71   Weight: 76.7 kg (169 lb)  Body mass index is 26.47 kg/m².      Intake/Output Summary (Last 24 hours) at 11/23/2020 1314  Last data filed at 11/23/2020 1200  Gross per 24 hour   Intake 2106.07 ml   Output 650 ml   Net 1456.07 ml       Physical Exam  Vitals signs and nursing note reviewed.   Constitutional:       General: She is not in acute distress.     Appearance: She is ill-appearing. She is not diaphoretic.      Interventions: Nasal cannula in place.      Comments: Intubated, sedated   HENT:      Head: Normocephalic and atraumatic.   Eyes:      General: No scleral icterus.  Neck:      Musculoskeletal: No neck rigidity.   Cardiovascular:      Rate and Rhythm: Normal rate and regular rhythm.      Pulses: Normal pulses.      Heart sounds: Normal heart sounds. No murmur.   Pulmonary:      Effort: No respiratory distress.      Breath sounds: Rhonchi and rales present. No decreased breath sounds or wheezing.   Chest:      Comments: Midline scar from recent transplant   Abdominal:      Palpations: Abdomen is soft.      Tenderness: There is no guarding.   Musculoskeletal:      Right lower leg: No edema.      Left lower leg: No edema.   Skin:     General: Skin is dry.      Capillary Refill: Capillary refill takes 2 to 3 seconds.      Comments: cool   Neurological:       Comments: Intubated/sedated         Vents:  Vent Mode: A/C (11/23/20 1157)  Ventilator Initiated: Yes(chart correction) (11/12/20 0921)  Set Rate: 22 BPM (11/23/20 1157)  Vt Set: 360 mL (11/23/20 1157)  Pressure Support: 0 cmH20 (11/15/20 2115)  PEEP/CPAP: 8 cmH20 (11/23/20 1157)  Oxygen Concentration (%): 50 (11/23/20 1200)  Peak Airway Pressure: 37 cmH2O (11/23/20 1157)  Plateau Pressure: 31 cmH20 (11/23/20 1157)  Total Ve: 7.73 mL (11/23/20 1157)  F/VT Ratio<105 (RSBI): (!) 62.32 (11/23/20 1157)  Lines/Drains/Airways     Central Venous Catheter Line            Percutaneous Central Line Insertion/Assessment - Triple Lumen  11/12/20 1030 right internal jugular 11 days          Drain                 NG/OG Tube 11/13/20 0200 [place] sump 14 Fr. Center mouth 10 days         Urethral Catheter 11/12/20 1445 16 Fr. 10 days          Airway                 Airway - Non-Surgical 11/12/20 0928 Endotracheal Tube 11 days          Arterial Line            Arterial Line 11/12/20 1053 Right Radial 11 days          Peripheral Intravenous Line                 Midline Catheter Insertion/Assessment  - Single Lumen 11/10/20 1603 Right basilic vein (medial side of arm) 18g x 10cm 12 days              Significant Labs:    CBC/Anemia Profile:  Recent Labs   Lab 11/21/20  1636 11/22/20  0333 11/23/20  0459   WBC 19.85* 17.47* 13.61*   HGB 8.0* 7.6* 7.2*   HCT 25.9* 24.8* 23.1*    181 164   * 108* 111*   RDW 18.7* 19.0* 19.8*        Chemistries:  Recent Labs   Lab 11/22/20  0333 11/23/20  0350   * 146*   K 3.6 4.0    106   CO2 33* 31*   BUN 41* 37*   CREATININE 0.7 0.6   CALCIUM 8.5* 8.1*   ALBUMIN 1.6* 1.7*   PROT 4.9* 4.9*   BILITOT 0.9 0.7   ALKPHOS 276* 239*   ALT 32 28   AST 34 28   MG 1.9 2.1   PHOS 2.4* 2.8       All pertinent labs within the past 24 hours have been reviewed.    Significant Imaging:  I have reviewed all pertinent imaging results/findings within the past 24 hours.

## 2020-11-23 NOTE — SUBJECTIVE & OBJECTIVE
"Interval HPI:   Overnight events: Remains in RICU, intubated and sedated. BG above goal on IV insulin infusion at 2 u/hr with prn correction scale. NAEON. Hydrocortisone 50 mg every 8 hours.   Eating:   NPO  Nausea: No  Hypoglycemia and intervention: No  Fever: No  TPN and/or TF: glucerna at 50 cc/hr    /80   Pulse 81   Temp 98.4 °F (36.9 °C) (Axillary)   Resp (!) 22   Ht 5' 7" (1.702 m)   Wt 76.7 kg (169 lb)   LMP  (LMP Unknown)   SpO2 95%   Breastfeeding No   BMI 26.47 kg/m²     Labs Reviewed and Include    Recent Labs   Lab 11/23/20  0350   *   CALCIUM 8.1*   ALBUMIN 1.7*   PROT 4.9*   *   K 4.0   CO2 31*      BUN 37*   CREATININE 0.6   ALKPHOS 239*   ALT 28   AST 28   BILITOT 0.7     Lab Results   Component Value Date    WBC 13.61 (H) 11/23/2020    HGB 7.2 (L) 11/23/2020    HCT 23.1 (L) 11/23/2020     (H) 11/23/2020     11/23/2020     No results for input(s): TSH, FREET4 in the last 168 hours.  Lab Results   Component Value Date    HGBA1C 8.0 (H) 11/05/2020       Nutritional status:   Body mass index is 26.47 kg/m².  Lab Results   Component Value Date    ALBUMIN 1.7 (L) 11/23/2020    ALBUMIN 1.6 (L) 11/22/2020    ALBUMIN 1.8 (L) 11/21/2020     No results found for: PREALBUMIN    Estimated Creatinine Clearance: 102.4 mL/min (based on SCr of 0.6 mg/dL).    Accu-Checks  Recent Labs     11/22/20  0033 11/22/20  0427 11/22/20  0824 11/22/20  1315 11/22/20  1716 11/22/20  2116 11/23/20  0118 11/23/20  0505 11/23/20  0827 11/23/20  1253   POCTGLUCOSE 171* 179* 201* 218* 216* 186* 194* 253* 185* 195*       Current Medications and/or Treatments Impacting Glycemic Control  Immunotherapy:    Immunosuppressants         Stop Route Frequency     tacrolimus (PROGRAF) 1 mg/mL oral syringe      -- PER G TUBE Every morning        Steroids:   Hormones (From admission, onward)    Start     Stop Route Frequency Ordered    11/21/20 2100  hydrocortisone sodium succinate injection 50 mg   "    -- IV 2 times daily 11/21/20 1016        Pressors:    Autonomic Drugs (From admission, onward)    Start     Stop Route Frequency Ordered    11/12/20 1243  norepinephrine 1 mg/mL injection     Note to Pharmacy: Created by cabinet override    11/13 0059   11/12/20 1243        Hyperglycemia/Diabetes Medications:   Antihyperglycemics (From admission, onward)    Start     Stop Route Frequency Ordered    11/17/20 1845  insulin regular in 0.9 % NaCl 100 unit/100 mL (1 unit/mL) infusion     Question:  Enter initial dose (Units/hr):  Answer:  2    -- IV Continuous 11/17/20 1736 11/17/20 1836  insulin aspart U-100 pen 0-10 Units      -- SubQ As needed (PRN) 11/17/20 1736

## 2020-11-24 PROBLEM — R78.81 BACTEREMIA DUE TO PSEUDOMONAS: Status: ACTIVE | Noted: 2020-01-01

## 2020-11-24 PROBLEM — B96.5 BACTEREMIA DUE TO PSEUDOMONAS: Status: ACTIVE | Noted: 2020-01-01

## 2020-11-24 NOTE — NURSING
"0650: IV medications were not correct on MAR upon arrival for shift. Changed to the correct dosing on the MAR. Blood Pressure is elevated into 230-250s Systolic. HR ranging from 70-80s, and 02 is in low 90s on 50% on ventilator.     0730: Blood pressure uncontrolled due to agitation/anxiety. SBP ranging from 190-245. Increased Fentanyl to help with anxiety.     0820: Medications given. See MAR. Increased Fio2 on ventilator to 60% due to sustained 02 saturation 86% and below. SBP maintaining in low 200s systolic.     0838: ABI Shields MD (resident) at bedside. Discussed inability to maintain BP. Ordering Cardene drip for patient. No other changes at this time.     0940: Restarted Precedex (see MAR) in preparation to begin decreasing Propofol and Fentanyl.     1014: Turned patient and cleaned buttocks per wound care orders.     1018: CAITLIN Shields MD, called and asked me to have RRT perform SBT once patient is off of Propofol. Currently weaning Propofol.     1122: While in another patient's room, patient begin to desat into low 80s with SBP in the 220-230s. Another RN maxed out Precedex, turned Propofol back up to 30mcg/kg/min, and turned ventilator to Fio2 70%. Patient was breathing over the ventilator while this was occurring. Now that I have assessed the patient, patient is breathing labored at 31 times per minute with a set rate of 22, o2 is 92%, and blood pressure is sporadic. Correcting MAR to match the current rates on the infusions. Discussed with BERONICA Arroyo, and he said we will not be able to do the SBT today with patient in this state.     1220: Dr. Khan and team at bedside. Encouraged to attempt to keep Propofol off and if needed, try versed to "take the edge off". Discussed ordering 1 dose of lasix. Increased PEEP to 8.0    1415: ABG obtained per RRT.     1507: Called Dr. Shields regarding patient vitals. Maxed out on precedex and cardene. SBP > 180 and 02 is 83-84%.  Came up to floor and " assessed patient. Increased Fio2 to 70% with verbal order to still maintain 02 90% or greater. Verbal order to increase Fentanyl to 100mcg and if the patient's 02 and blood pressure are not improved within 3-4 minutes, restart Propofol.    1700: Restart propofol for the night per Dr. Khan. She stated to let her rest for the night and retry tomorrow morning during the day.     Care Plan    POC reviewed with Chely Becerra's providers.  Pt progressing toward goals in some aspects. Having difficulty weaning from sedation due to sporadic vitals. See below and flowsheets for full assessment and VS info.       Neuro:  [place] Coma Scale  Best Eye Response: 4-->(E4) spontaneous  Best Motor Response: 1-->(M1) none  Best Verbal Response: 1-->(V1) none  [place] Coma Scale Score: 6  Assessment Qualifiers: patient intubated, patient chemically sedated or paralyzed  Pupil PERRLA: yes  24 hr Temp:  [98 °F (36.7 °C)-98.6 °F (37 °C)]      CV:  Rhythm: normal sinus rhythm  DVT prophylaxis: VTE Required Core Measure: Pharmacological prophylaxis initiated/maintained    Resp:  O2 Device (Oxygen Therapy): ventilator  Vent Mode: A/C  Set Rate: 22 BPM  Oxygen Concentration (%): 70  Vt Set: 360 mL  PEEP/CPAP: 8 cmH20  Pressure Support: 0 cmH20    GI/:  GI prophylaxis: yes  Diet/Nutrition Received: tube feeding  Last Bowel Movement: 11/24/20        Intake/Output Summary (Last 24 hours) at 11/24/2020 1837  Last data filed at 11/24/2020 1830  Gross per 24 hour   Intake 2822.15 ml   Output 3060 ml   Net -237.85 ml       Labs/Accuchecks:  Recent Labs   Lab 11/24/20  0415   WBC 12.06   RBC 2.06*   HGB 7.1*   HCT 23.6*         Recent Labs   Lab 11/24/20  0415      K 3.6   CO2 33*      BUN 35*   CREATININE 0.7   ALKPHOS 227*   ALT 28   AST 31   BILITOT 0.7    No results for input(s): PROTIME, INR, APTT, HEPANTIXA in the last 168 hours. No results for input(s): CPK, CPKMB, TROPONINI, MB in the last 168  hours.    Electrolytes: N/A - electrolytes WDL  Accuchecks: Q4H    Gtts/LDAs:   dexmedetomidine (PRECEDEX) infusion 1.398 mcg/kg/hr (11/24/20 1800)    fentanyl 10 mL/hr at 11/24/20 1800    insulin regular 1 units/mL infusion orderable (TRANSFER) 2 Units/hr (11/24/20 1800)    nicardipine 10 mg/hr (11/24/20 1800)    propofoL 5 mcg/kg/min (11/24/20 1800)       Lines/Drains/Airways     Central Venous Catheter Line            Percutaneous Central Line Insertion/Assessment - Triple Lumen  11/12/20 1030 right internal jugular 12 days          Drain                 Urethral Catheter 11/12/20 1445 16 Fr. 12 days         NG/OG Tube 11/13/20 0200 [place] sump 14 Fr. Center mouth 11 days          Airway                 Airway - Non-Surgical 11/12/20 0928 Endotracheal Tube 12 days          Arterial Line            Arterial Line 11/12/20 1053 Right Radial 12 days          Peripheral Intravenous Line                 Midline Catheter Insertion/Assessment  - Single Lumen 11/10/20 1603 Right basilic vein (medial side of arm) 18g x 10cm 14 days                Skin/Wounds:  Bathing/Skin Care: incontinence care Date: 11.24.20  Wounds: Yes  Wound care consulted: Yes, previously

## 2020-11-24 NOTE — ASSESSMENT & PLAN NOTE
COVID + 11/4. CXR with bilateral patchy opacities. Symptoms started 10/30 and steadily progressed. Daughter found to be COVID +. Overnight on 11/12, patient developed septic shock 2/ 2 GNR LLL PNA c/b GNR bacteremia.     - LPV  - Wean respiratory rate today  - Unable to prone due to bradycardia. Off Paralytics.   - Remdesivir X 5 days; completed 11/09/20  - Convalescent Plasma; Dose #1 11/5  - Dexamethasone X 10 days (end 11/14)  - Wean FiO2 for SpO2 >88%  - Duonebs  - hypercoagulable on full dose lovenox   - TTE with EF 70%; left ventricular concentric remodeling  - Diamox x1 on 11/23 for metabolic alkalosis  - 11/24: Worsening O2 requirement with labile high BP during sedation weaning. Trial of diuresis. Cardene gtt.

## 2020-11-24 NOTE — ASSESSMENT & PLAN NOTE
S/p left lung transplant in Aug 2020 for IPF. 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 Weaning now off pressors

## 2020-11-24 NOTE — PLAN OF CARE
Problem: Oral Intake Inadequate  Goal: Improved Oral Intake  Outcome: Ongoing, Progressing   Recommendations    Recommendation:   1. Suggest modifying TF of Glucerna 1.5 to goal rate of 45 mL/hr to provide pt with 1620 kcal, 89 g protein and 820 mL free water.     - If propofol increased suggest modifying TF to peptamen intense VHP @ 20 mL/hr increase to goal rate of 50 mL/hr to provide pt with 1200 kcal, 110 g protein and 1008 mL free water.    -Additional water per MD. Hold for residuals >500 mL.   2. RD to monitor and follow up    Goals: Pt to meet 50-75% estimated kcal and protein needs by RD f/u.  Nutrition Goal Status: progressing towards goal, goal met  Communication of RD Recs: reviewed with RN

## 2020-11-24 NOTE — PROGRESS NOTES
[facility] - ICU 16 WT  Adult Nutrition  Progress Note    SUMMARY       Recommendations    Recommendation:   1. Suggest modifying TF of Glucerna 1.5 to goal rate of 45 mL/hr to provide pt with 1620 kcal, 89 g protein and 820 mL free water.     - If propofol increased suggest modifying TF to peptamen intense VHP @ 20 mL/hr increase to goal rate of 50 mL/hr to provide pt with 1200 kcal, 110 g protein and 1008 mL free water.    -Additional water per MD. Hold for residuals >500 mL.   2. RD to monitor and follow up    Goals: Pt to meet 50-75% estimated kcal and protein needs by RD f/u.  Nutrition Goal Status: progressing towards goal, goal met  Communication of RD Recs: reviewed with RN    Reason for Assessment    Reason For Assessment: RD follow-up  Diagnosis: (COVID-19)  Relevant Medical History: DMII, HTN, pulmonary fibrosis s/p left lung tx  Interdisciplinary Rounds: did not attend  General Information Comments: Spoke with RN. Pt remains intubated and sedated on propofol, off and on at this time.Attempting to wean. TF continues of Glucerna 1.5 @ 50 mL/hr pt tolerating well, no residuals noted. Wt stable since admit. Noted +1-2 edema per chart. Will monitor. Due to recent hospital wide restrictions to limit the transfer of (COVID-19), we are not performing any physical exams at this time on patients with +COVID-19. All S/S will be observational; NFPE to be performed at a future date.  Nutrition Discharge Planning: Unclear at this time. RD to determine closer to discharge.    Nutrition Risk Screen    Nutrition Risk Screen: tube feeding or parenteral nutrition    Nutrition/Diet History    Patient Reported Diet/Restrictions/Preferences: other (see comments)(Unable to obtain d/t COVID precautions. Heart healthy in past.)  Typical Food/Fluid Intake: -  Food Preferences: -  Spiritual, Cultural Beliefs, Restoration Practices, Values that Affect Care: no  Food Allergies: NKFA  Factors Affecting Nutritional Intake:  "NPO, on mechanical ventilation    Anthropometrics    Temp: 98.5 °F (36.9 °C)  Height: 5' 7" (170.2 cm)  Height (inches): 67 in  Weight Method: Bed Scale  Weight: 76.7 kg (169 lb)  Weight (lb): 169 lb  Ideal Body Weight (IBW), Female: 135 lb  % Ideal Body Weight, Female (lb): 125.19 %  BMI (Calculated): 26.5  Weight Loss: intentional(per chart review)    Lab/Procedures/Meds    Pertinent Labs Reviewed: reviewed  Pertinent Labs Comments: BUN 35; Glucose 169; Ca 8.4; Phos 2.5  Pertinent Medications Reviewed: reviewed  Pertinent Medications Comments: furosemide; polyethylene glycol; tacrolimus     Estimated/Assessed Needs    Weight Used For Calorie Calculations: 76.7 kg (169 lb 1.5 oz)  Energy Calorie Requirements (kcal): 1508 kcal/d  Energy Need Method: [facility]  Protein Requirements:  gm/day(1.2-1.5 g/kg)  Weight Used For Protein Calculations: 76.7 kg (169 lb 1.5 oz)  Fluid Requirements (mL): 1 mL/kcal or per MD  Estimated Fluid Requirement Method: RDA Method  RDA Method (mL): 1508  CHO Requirement: 196 gm/day      Nutrition Prescription Ordered    Current Diet Order: NPO  Current Nutrition Support Formula Ordered: Glucerna 1.5  Current Nutrition Support Rate Ordered: 50 (ml)  Current Nutrition Support Frequency Ordered: mL/hr    Evaluation of Received Nutrient/Fluid Intake    Enteral Calories (kcal): 1800  Enteral Protein (gm): 99  Enteral (Free Water) Fluid (mL): 911  Other Calories (kcal): 60(propofol)  Total Calories (kcal): 1860  % Kcal Needs: 123  % Protein Needs: 100  I/O: +11.2 L since admit  Energy Calories Required: exceeds needs  Protein Required: meeting needs  Fluid Required: meeting needs  Comments: LBM 11/24  Tolerance: tolerating  % Intake of Estimated Energy Needs: 75 - 100 %  % Meal Intake: NPO    Nutrition Risk    Level of Risk/Frequency of Follow-up: low     Assessment and Plan    Nutrition Problem  Inadeqaute energy intake      Related to (etiology):   Decrease ability to consume adequate " PO     Signs and Symptoms (as evidenced by):   NPO, Intubated      Interventions(treatment strategy):  Collaboration with other providers      Nutrition Diagnosis Status:   Resolving     Monitor and Evaluation    Food and Nutrient Intake: energy intake, enteral nutrition intake  Food and Nutrient Adminstration: enteral and parenteral nutrition administration  Knowledge/Beliefs/Attitudes: other (specify)  Anthropometric Measurements: weight, weight change, body mass index  Biochemical Data, Medical Tests and Procedures: electrolyte and renal panel, glucose/endocrine profile, gastrointestinal profile, inflammatory profile, lipid profile  Nutrition-Focused Physical Findings: overall appearance     Nutrition Follow-Up    RD Follow-up?: Yes

## 2020-11-24 NOTE — PROGRESS NOTES
[facility] - ICU 16   Critical Care Medicine  Progress Note    Patient Name: Chely Becerra  MRN: [id number]  Admission Date: 11/4/2020  Hospital Length of Stay: 20 days  Code Status: Full Code  Attending Provider: Sussy Khan MD  Primary Care Provider: ZAHIDA Stack MD   Principal Problem: Pneumonia due to COVID-19 virus    Subjective:     HPI:  Ms. Chely Becerra is a 65 y.o. year old female with a PMHx of diabetes and idiopathic pulmonary fibrosis s/p unilateral (left) lung transplant in August 2020 who presents to the ED complaining of a shortness of breath that started last Friday and progressively got worse. She had a fever of 101 on Sunday, started coughing for the last 3 days nonproductive, denies any chest pain. She was seen in the [facility] for routine testing earlier this morning was found to be hypoxic and she was referred to the emergency department. On arrival her COVID-19 test is positive.     Patient lives with her 2 daughters, 1 of the daughters has been having some respiratory symptoms recently however she thought was secondary to asthma. he is on prednisone taper currently 20 mg daily, she has been compliant with her antirejection medication Prograf and CellCept. She was started on BiPAP in the ED with improvement in her oxygen saturations to the mid 90's.     Hospital/ICU Course:  Admitted to Critical Care Medicine for acute hypoxic respiratory failure 2/2 to COVID pneumonia. Alternating between BiPAP and comfort flow. Started on dexamethasone. Consented for Remdsivir and Convalescent Plasma by lung transplant. Patient received her first unit of convalescent plasma 11/5 and tolerated well per patient. Echo performed on 11/5 showed LVEF 70% with normal diastolic function, though LV concentric remodeling & normal RV size and function. Patient continued on alternating bipap with comfort flow 11/8 - 11/10. Patient clinically declined on 11/13. Abx were broadened  to vanc and cefepime (in addition to x1 dose tobra given for double gram negative coverage). Patient was intubated and central/arterial lines were placed. CT chest/abd/pelvis showed LLL pneumonia. Blood cultures resulted with GNR bacteremia. As patient was still spiking fevers with uptrending lactic, abx broadened to meropenem. Blood and sputum cultures with Pseudomonas. On Meropenem, end date 11/27. Alkolosis worsening, gave diamox 11/23.   11/34: labile BP, up to 200s systolic. Started Narinder gtt, O2 requirement worsened to 60% FiO2. Very sensitive to weaning sedation. PaO2 worsened today to 57. Will give a trial of diuresis and get an ABG.    Interval History/Significant Events: Minimal response. labile BP, up to 200s systolic. Started Narinder gtt, O2 requirement worsened to 60% FiO2. Very sensitive to weaning sedation. PaO2 worsened today to 57. Will give a trial of diuresis and get an ABG.    Review of Systems   Unable to perform ROS: Intubated     Objective:     Vital Signs (Most Recent):  Temp: 98.5 °F (36.9 °C) (11/24/20 1200)  Pulse: 71 (11/24/20 1200)  Resp: (!) 28 (11/24/20 1200)  BP: (!) 88/55 (11/24/20 1200)  SpO2: 95 % (11/24/20 1200) Vital Signs (24h Range):  Temp:  [98 °F (36.7 °C)-98.6 °F (37 °C)] 98.5 °F (36.9 °C)  Pulse:  [] 71  Resp:  [22-36] 28  SpO2:  [88 %-100 %] 95 %  BP: ()/(50-97) 88/55  Arterial Line BP: ()/(48-97) 119/50   Weight: 76.7 kg (169 lb)  Body mass index is 26.47 kg/m².      Intake/Output Summary (Last 24 hours) at 11/24/2020 1342  Last data filed at 11/24/2020 1200  Gross per 24 hour   Intake 2461.14 ml   Output 1136 ml   Net 1325.14 ml       Physical Exam  Vitals signs and nursing note reviewed.   Constitutional:       General: She is not in acute distress.     Appearance: She is ill-appearing. She is not diaphoretic.      Interventions: Nasal cannula in place.      Comments: Intubated, sedated   HENT:      Head: Normocephalic and atraumatic.   Eyes:       General: No scleral icterus.  Neck:      Musculoskeletal: No neck rigidity.   Cardiovascular:      Rate and Rhythm: Normal rate and regular rhythm.      Pulses: Normal pulses.      Heart sounds: Normal heart sounds. No murmur.   Pulmonary:      Effort: No respiratory distress.      Breath sounds: Rhonchi and rales present. No decreased breath sounds or wheezing.   Chest:      Comments: Midline scar from recent transplant   Abdominal:      Palpations: Abdomen is soft.      Tenderness: There is no guarding.   Musculoskeletal:      Right lower leg: No edema.      Left lower leg: No edema.   Skin:     General: Skin is dry.      Capillary Refill: Capillary refill takes 2 to 3 seconds.      Comments: cool   Neurological:      Comments: Intubated/sedated. Minimal response         Vents:  Vent Mode: A/C (11/24/20 1100)  Ventilator Initiated: Yes(chart correction) (11/12/20 0921)  Set Rate: 22 BPM (11/24/20 1100)  Vt Set: 360 mL (11/24/20 1100)  Pressure Support: 0 cmH20 (11/15/20 2115)  PEEP/CPAP: 7 cmH20 (11/24/20 1100)  Oxygen Concentration (%): 70 (11/24/20 1200)  Peak Airway Pressure: 33 cmH2O (11/24/20 1100)  Plateau Pressure: 31 cmH20 (11/24/20 1100)  Total Ve: 9.17 mL (11/24/20 1100)  F/VT Ratio<105 (RSBI): (!) 78.52 (11/24/20 1100)  Lines/Drains/Airways     Central Venous Catheter Line            Percutaneous Central Line Insertion/Assessment - Triple Lumen  11/12/20 1030 right internal jugular 12 days          Drain                 NG/OG Tube 11/13/20 0200 [place] sump 14 Fr. Center mouth 11 days         Urethral Catheter 11/12/20 1445 16 Fr. 11 days          Airway                 Airway - Non-Surgical 11/12/20 0928 Endotracheal Tube 12 days          Arterial Line            Arterial Line 11/12/20 1053 Right Radial 12 days          Peripheral Intravenous Line                 Midline Catheter Insertion/Assessment  - Single Lumen 11/10/20 1603 Right basilic vein (medial side of arm) 18g x 10cm 13 days               Significant Labs:    CBC/Anemia Profile:  Recent Labs   Lab 11/23/20  0459 11/24/20  0415   WBC 13.61* 12.06   HGB 7.2* 7.1*   HCT 23.1* 23.6*    179   * 115*   RDW 19.8* 19.9*        Chemistries:  Recent Labs   Lab 11/23/20  0350 11/24/20  0415   * 145   K 4.0 3.6    106   CO2 31* 33*   BUN 37* 35*   CREATININE 0.6 0.7   CALCIUM 8.1* 8.4*   ALBUMIN 1.7* 1.8*   PROT 4.9* 5.2*   BILITOT 0.7 0.7   ALKPHOS 239* 227*   ALT 28 28   AST 28 31   MG 2.1 2.0   PHOS 2.8 2.5*       All pertinent labs within the past 24 hours have been reviewed.    Significant Imaging:  I have reviewed all pertinent imaging results/findings within the past 24 hours.      ABG  Recent Labs   Lab 11/24/20  0403   PH 7.432   PO2 57*   PCO2 48.9*   HCO3 32.6*   BE 8     Assessment/Plan:     Derm  Pressure ulcer of head, stage 2  Due to ETT villanueva.  Wound care following    Pulmonary  S/P lung transplant  S/p left lung transplant in Aug 2020 for IPF. 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 Weaning now off pressors    Cardiac/Vascular  HTN (hypertension)  Home meds: Nifedipine and Lopressor  Elevated BP in the setting of weaning sedation.   - Started Cardene gtt    Renal/  Acidosis  Mixed. AGMA 2/2 LA, NAGMA 2/2 renal failure and respiratory acidosis     - Initially on bicarb gtt with improvement in acidemia  - Good UOP with resolution of DWAYNE  - Lactate improving as well  - Resolved.    ID  Bacteremia due to Pseudomonas  See sepsis    Septic shock  Pseudomonas bacteremia and in sputum. Resolved.     - Cont  Meropenem, end date 11/27  - ID following.   - Blood and sputum cx's with Pseudomonas  - Repeat blood cx's NGTD         Sepsis  Hypotensive but not currently requiring vasopressors. Initially 2/2 COVID PNA, now with possible bacterial infection. CXR not markedly changed, UA neg.     - Found to have bacteremia with Pseudomonas  - ID consulted, On Marianela, end date 11/27  - Midline placed 11/10- not likely the source    Endocrine  Prediabetes  Steroid induced. Now uncontrolled in the setting of septic shock requiring an insulin gtt.    - Trickle feeds.   - transition insulin infusion at 2.4 u/hr.  - Endocrine following      Other  * Pneumonia due to COVID-19 virus  Plan per respiratory failure    Pressure injury of right buttock, unstageable  Wound care following    Elevated alkaline phosphatase level  Alk Phos increased. CTM.     Palliative care encounter  Ivy updated regularly. All questions answered.    Acute hypoxemic respiratory failure due to COVID-19  COVID + 11/4. CXR with bilateral patchy opacities. Symptoms started 10/30 and steadily progressed. Daughter found to be COVID +. Overnight on 11/12, patient developed septic shock 2/ 2 GNR LLL PNA c/b GNR bacteremia.     - LPV  - Wean respiratory rate today  - Unable to prone due to bradycardia. Off Paralytics.   - Remdesivir X 5 days; completed 11/09/20  - Convalescent Plasma; Dose #1 11/5  - Dexamethasone X 10 days (end 11/14)  - Wean FiO2 for SpO2 >88%  - Duonebs  - hypercoagulable on full dose lovenox   - TTE with EF 70%; left ventricular concentric remodeling  - Diamox x1 on 11/23 for metabolic alkalosis  - 11/24: Worsening O2 requirement with labile high BP during sedation weaning. Trial of diuresis. Cardene gtt.      Critical Care Daily Checklist:       A: Awake: RASS Goal/Actual Goal: RASS Goal: -2-->light sedation  Actual: [place]ullon Agitation Sedation Scale (RASS): Light sedation   B: Spontaneous Breathing Trial Performed? Spon. Breathing Trial  Initiated?: Not initiated (11/21/20 0848)   C: SAT & SBT Coordinated?  Did not tolerate                      D: Delirium: CAM-ICU Overall CAM-ICU: Negative   E: Early Mobility Performed? No   F: Feeding Goal: Goals: Pt to meet 50-75% estimated kcal and protein needs by RD f/u.  Status: Nutrition Goal Status: goal not met, other (comment)(ongoing)         Current Diet Order   Procedures    Diet NPO       AS: Analgesia/Sedation Per chart   T: Thromboembolic Prophylaxis lovenox   H: HOB > 300 Yes   U: Stress Ulcer Prophylaxis (if needed) PPi   G: Glucose Control BG goal 140-180   B: Bowel Function Stool Occurrence: 1   I: Indwelling Catheter (Lines & Rios) Necessity Per chart   D: De-escalation of Antimicrobials/Pharmacotherapies Meropenem     Plan for the day/ETD Wean vent     Code Status:  Family/Goals of Care: Full Code             Critical secondary to Patient has a condition that poses threat to life and bodily function: Severe Respiratory Distress      Critical care was time spent personally by me on the following activities: development of treatment plan with patient or surrogate and bedside caregivers, discussions with consultants, evaluation of patient's response to treatment, examination of patient, ordering and performing treatments and interventions, ordering and review of laboratory studies, ordering and review of radiographic studies, pulse oximetry, re-evaluation of patient's condition. This critical care time did not overlap with that of any other provider or involve time for any procedures.     Joselyn Shields MD  Critical Care Medicine  [facility] - ICU [facility]

## 2020-11-24 NOTE — ASSESSMENT & PLAN NOTE
Mixed. AGMA 2/2 LA, NAGMA 2/2 renal failure and respiratory acidosis     - Initially on bicarb gtt with improvement in acidemia  - Good UOP with resolution of DWAYNE  - Lactate improving as well  - Resolved.

## 2020-11-24 NOTE — ASSESSMENT & PLAN NOTE
Home meds: Nifedipine and Lopressor  Elevated BP in the setting of weaning sedation.   - Started Cardene gtt

## 2020-11-24 NOTE — PROGRESS NOTES
11/23/20 2027   Vital Signs   Pulse 96   BP (!) 198/97       Called provider Marycarmen Palomares regarding pt BP. 10mg labetalol ordered and will be administered.

## 2020-11-24 NOTE — PLAN OF CARE
Vital signs and labs reviewed. On meropenem for pseudomonas PNA and bacteremia (repeat cultures NGTD) through 11/27. Remains critically ill/intubated. Off pressors, and now requires cardene gtt for episodes of hypertension while weaning sedation. Vent dyssynchrony and increased FiO2 requirements. Continued COVID care per MICU. Tacrolimus resumed and will monitor levels. OIP with ganciclovir, lamivudine, bactrim, and posaconazole. Will continue to adjust IS and OIP as needed. Agree with gentle diuresis as tolerated.

## 2020-11-24 NOTE — SUBJECTIVE & OBJECTIVE
"Interval HPI:   Overnight events: Remains in RICU. BG trending down and IV insulin infusion decreased per protocol. Hydrocortisone 50 mg every 8 hours.   Eating:   NPO  Nausea: No  Hypoglycemia and intervention: No  Fever: No  TPN and/or TF: Yes  If yes, type of TF/TPN and rate: Glucerna at 50 cc/hr    BP (!) 144/80 (BP Location: Left arm, Patient Position: Lying)   Pulse 84   Temp 98.5 °F (36.9 °C) (Axillary)   Resp (!) 27   Ht 5' 7" (1.702 m)   Wt 76.7 kg (169 lb)   LMP  (LMP Unknown)   SpO2 (!) 85%   Breastfeeding No   BMI 26.47 kg/m²     Labs Reviewed and Include    Recent Labs   Lab 11/24/20  0415   *   CALCIUM 8.4*   ALBUMIN 1.8*   PROT 5.2*      K 3.6   CO2 33*      BUN 35*   CREATININE 0.7   ALKPHOS 227*   ALT 28   AST 31   BILITOT 0.7     Lab Results   Component Value Date    WBC 12.06 11/24/2020    HGB 7.1 (L) 11/24/2020    HCT 23.6 (L) 11/24/2020     (H) 11/24/2020     11/24/2020     No results for input(s): TSH, FREET4 in the last 168 hours.  Lab Results   Component Value Date    HGBA1C 8.0 (H) 11/05/2020       Nutritional status:   Body mass index is 26.47 kg/m².  Lab Results   Component Value Date    ALBUMIN 1.8 (L) 11/24/2020    ALBUMIN 1.7 (L) 11/23/2020    ALBUMIN 1.6 (L) 11/22/2020     No results found for: PREALBUMIN    Estimated Creatinine Clearance: 87.8 mL/min (based on SCr of 0.7 mg/dL).    Accu-Checks  Recent Labs     11/23/20  0118 11/23/20  0505 11/23/20  0827 11/23/20  1253 11/23/20  1625 11/23/20  2043 11/24/20  0043 11/24/20  0406 11/24/20  0759 11/24/20  1230   POCTGLUCOSE 194* 253* 185* 195* 183* 193* 180* 185* 142* 136*       Current Medications and/or Treatments Impacting Glycemic Control  Immunotherapy:    Immunosuppressants         Stop Route Frequency     tacrolimus (PROGRAF) 1 mg/mL oral syringe      -- PER G TUBE 2 times daily        Steroids:   Hormones (From admission, onward)    Start     Stop Route Frequency Ordered    11/21/20 2100  " hydrocortisone sodium succinate injection 50 mg      -- IV 2 times daily 11/21/20 1016        Pressors:    Autonomic Drugs (From admission, onward)    Start     Stop Route Frequency Ordered    11/12/20 1243  norepinephrine 1 mg/mL injection     Note to Pharmacy: Created by cabinet override    11/13 0059   11/12/20 1243        Hyperglycemia/Diabetes Medications:   Antihyperglycemics (From admission, onward)    Start     Stop Route Frequency Ordered    11/17/20 1845  insulin regular in 0.9 % NaCl 100 unit/100 mL (1 unit/mL) infusion     Question:  Enter initial dose (Units/hr):  Answer:  2    -- IV Continuous 11/17/20 1736    11/17/20 1836  insulin aspart U-100 pen 0-10 Units      -- SubQ As needed (PRN) 11/17/20 1736         Statement Selected

## 2020-11-24 NOTE — ASSESSMENT & PLAN NOTE
Steroid induced. Now uncontrolled in the setting of septic shock requiring an insulin gtt.    - Trickle feeds.   - transition insulin infusion at 2.4 u/hr.  - Endocrine following

## 2020-11-24 NOTE — ASSESSMENT & PLAN NOTE
BG goal 140-180.     Rewrite transition insulin infusion at 2 u/hr with stepdown parameters   BG monitoring every 4 hours and moderate dose correction scale.     ** Please call Endocrine for any BG related issues **

## 2020-11-24 NOTE — PLAN OF CARE
Today pt became hypertensive and sedation had to be increased. Once provider rounded, MD order to turn off sedation in attempt to wake pt, start precedex if necessary, and attempt to treat blood pressure prior restarting sedation if necessary. Currently precedex @ 1.4, 5mg labetalol IVP given x2, and sedation is off. Pt tolerating mechanical ventilation 50%, PEEP=7. Pt having adequate urine output. Pt had multiple BM today. Wound care performed x3 today. Will continue to monitor.

## 2020-11-24 NOTE — PROGRESS NOTES
"[facility] - ICU [facility]  Endocrinology  Progress Note    Admit Date: 11/4/2020     Reason for Consult: Management of pre-dm, Hyperglycemia      Surgical Procedure and Date: Left lung transplant n 8/10/20     Diabetes diagnosis year: pre-dm - diet controlled prior to transplant   Lab Results   Component Value Date    HGBA1C 8.0 (H) 11/05/2020             Home Diabetes Medications:novolog 10 units TID with meals plus correction scale.     How often checking glucose at home? NAVEEN   BG readings on regimen: NAVEEN  Hypoglycemia on the regimen? NAVEEN   Missed doses on regimen?  NAVEEN     Diabetes Complications include:     none  Complicating diabetes co morbidities:   Glucocorticoid use  s/p lung transplant  covid infection        HPI:   Patient is a 65 y.o. female with a diagnosis of diabetes and idiopathic pulmonary fibrosis s/p unilateral (left) lung transplant. Patient presents to the ED complaining of a shortness of breath that progressively got worse. On arrival her COVID-19 test is positive. Patient clinically declined on 11/13 and was intubated. Endocrinology consulted for BG/ Dm management.           Interval HPI:   Overnight events: Remains in RICU. BG trending down and IV insulin infusion decreased per protocol. Hydrocortisone 50 mg every 8 hours.   Eating:   NPO  Nausea: No  Hypoglycemia and intervention: No  Fever: No  TPN and/or TF: Yes  If yes, type of TF/TPN and rate: Glucerna at 50 cc/hr    BP (!) 144/80 (BP Location: Left arm, Patient Position: Lying)   Pulse 84   Temp 98.5 °F (36.9 °C) (Axillary)   Resp (!) 27   Ht 5' 7" (1.702 m)   Wt 76.7 kg (169 lb)   LMP  (LMP Unknown)   SpO2 (!) 85%   Breastfeeding No   BMI 26.47 kg/m²     Labs Reviewed and Include    Recent Labs   Lab 11/24/20  0415   *   CALCIUM 8.4*   ALBUMIN 1.8*   PROT 5.2*      K 3.6   CO2 33*      BUN 35*   CREATININE 0.7   ALKPHOS 227*   ALT 28   AST 31   BILITOT 0.7     Lab Results   Component Value Date    " WBC 12.06 11/24/2020    HGB 7.1 (L) 11/24/2020    HCT 23.6 (L) 11/24/2020     (H) 11/24/2020     11/24/2020     No results for input(s): TSH, FREET4 in the last 168 hours.  Lab Results   Component Value Date    HGBA1C 8.0 (H) 11/05/2020       Nutritional status:   Body mass index is 26.47 kg/m².  Lab Results   Component Value Date    ALBUMIN 1.8 (L) 11/24/2020    ALBUMIN 1.7 (L) 11/23/2020    ALBUMIN 1.6 (L) 11/22/2020     No results found for: PREALBUMIN    Estimated Creatinine Clearance: 87.8 mL/min (based on SCr of 0.7 mg/dL).    Accu-Checks  Recent Labs     11/23/20  0118 11/23/20  0505 11/23/20  0827 11/23/20  1253 11/23/20  1625 11/23/20  2043 11/24/20  0043 11/24/20  0406 11/24/20  0759 11/24/20  1230   POCTGLUCOSE 194* 253* 185* 195* 183* 193* 180* 185* 142* 136*       Current Medications and/or Treatments Impacting Glycemic Control  Immunotherapy:    Immunosuppressants         Stop Route Frequency     tacrolimus (PROGRAF) 1 mg/mL oral syringe      -- PER G TUBE 2 times daily        Steroids:   Hormones (From admission, onward)    Start     Stop Route Frequency Ordered    11/21/20 2100  hydrocortisone sodium succinate injection 50 mg      -- IV 2 times daily 11/21/20 1016        Pressors:    Autonomic Drugs (From admission, onward)    Start     Stop Route Frequency Ordered    11/12/20 1243  norepinephrine 1 mg/mL injection     Note to Pharmacy: Created by cabinet override    11/13 0059   11/12/20 1243        Hyperglycemia/Diabetes Medications:   Antihyperglycemics (From admission, onward)    Start     Stop Route Frequency Ordered    11/17/20 1845  insulin regular in 0.9 % NaCl 100 unit/100 mL (1 unit/mL) infusion     Question:  Enter initial dose (Units/hr):  Answer:  2    -- IV Continuous 11/17/20 1736    11/17/20 1836  insulin aspart U-100 pen 0-10 Units      -- SubQ As needed (PRN) 11/17/20 2521          ASSESSMENT and PLAN    * Pneumonia due to COVID-19 virus  Managed per primary.    Infection may increase insulin resistance.         Prediabetes  BG goal 140-180.     Rewrite transition insulin infusion at 2 u/hr with stepdown parameters   BG monitoring every 4 hours and moderate dose correction scale.     ** Please call Endocrine for any BG related issues **    Adrenal cortical steroids causing adverse effect in therapeutic use  Glucocorticoids markedly increase  glucoses. Expect the steroid taper will help glucose control.         Immunosuppression  May increase insulin resistance.             Joellen Proctor NP  Endocrinology  [facility] - ICU [facility]

## 2020-11-25 PROBLEM — D53.9 MACROCYTIC ANEMIA: Status: ACTIVE | Noted: 2020-01-01

## 2020-11-25 PROBLEM — J15.1 PNEUMONIA DUE TO PSEUDOMONAS AERUGINOSA: Status: ACTIVE | Noted: 2020-01-01

## 2020-11-25 PROBLEM — E87.3 METABOLIC ALKALOSIS: Status: ACTIVE | Noted: 2020-01-01

## 2020-11-25 PROBLEM — Z79.4 TYPE 2 DIABETES MELLITUS, WITH LONG-TERM CURRENT USE OF INSULIN: Status: ACTIVE | Noted: 2019-01-17

## 2020-11-25 PROBLEM — Z79.60 LONG-TERM USE OF IMMUNOSUPPRESSANT MEDICATION: Status: ACTIVE | Noted: 2020-01-01

## 2020-11-25 NOTE — PLAN OF CARE
Care Plan    POC reviewed with Chely Becerra and family. Questions and concerns addressed. No acute events today. Pt progressing toward goals. Will continue to monitor. See below and flowsheets for full assessment and VS info.       Neuro:  Merriman Coma Scale  Best Eye Response: 4-->(E4) spontaneous  Best Motor Response: 4-->(M4) withdraws from pain  Best Verbal Response: 1-->(V1) none  [place] Coma Scale Score: 9  Assessment Qualifiers: patient chemically sedated or paralyzed, patient intubated  Pupil PERRLA: yes  24 hr Temp:  [98.1 °F (36.7 °C)-98.4 °F (36.9 °C)]      CV:  Rhythm: sinus bradycardia  DVT prophylaxis: VTE Required Core Measure: Pharmacological prophylaxis initiated/maintained    Resp:  O2 Device (Oxygen Therapy): ventilator  Vent Mode: A/C  Set Rate: 22 BPM  Oxygen Concentration (%): 55  Vt Set: 360 mL  PEEP/CPAP: 8 cmH20  Pressure Support: 0 cmH20    GI/:  GI prophylaxis: yes  Diet/Nutrition Received: tube feeding  Last Bowel Movement: 11/25/20  Voiding Characteristics: urethral catheter (bladder)   Intake/Output Summary (Last 24 hours) at 11/25/2020 1723  Last data filed at 11/25/2020 1700  Gross per 24 hour   Intake 3952.37 ml   Output 3075 ml   Net 877.37 ml       Labs/Accuchecks:  Recent Labs   Lab 11/25/20  0450   WBC 6.99   RBC 2.14*   HGB 7.1*   HCT 24.4*         Recent Labs   Lab 11/25/20  0450 11/25/20  1559   * 146*   K 3.2* 3.8   CO2 30* 33*    108   BUN 30* 28*   CREATININE 0.6 0.6   ALKPHOS 164*  --    ALT 26  --    AST 24  --    BILITOT 0.6  --     No results for input(s): PROTIME, INR, APTT, HEPANTIXA in the last 168 hours. No results for input(s): CPK, CPKMB, TROPONINI, MB in the last 168 hours.    Electrolytes: Electrolytes replaced  Accuchecks: Q4H    Gtts/LDAs:   cisatracurium (NIMBEX) infusion 3 mcg/kg/min (11/25/20 1700)    dexmedetomidine (PRECEDEX) infusion Stopped (11/25/20 1100)    fentanyl 17.5 mL/hr at 11/25/20 1700    insulin regular 1 units/mL  infusion orderable (TRANSFER) 1.6 Units/hr (11/25/20 1700)    nicardipine Stopped (11/25/20 0800)    norepinephrine bitartrate-D5W      propofoL 20 mcg/kg/min (11/25/20 1700)       Lines/Drains/Airways       Central Venous Catheter Line              Percutaneous Central Line Insertion/Assessment - Triple Lumen  11/12/20 1030 right internal jugular 13 days              Drain                   Urethral Catheter 11/12/20 1445 16 Fr. 13 days         NG/OG Tube 11/13/20 0200 [place] sump 14 Fr. Center mouth 12 days              Airway                   Airway - Non-Surgical 11/12/20 0928 Endotracheal Tube 13 days              Arterial Line              Arterial Line 11/12/20 1053 Right Radial 13 days              Peripheral Intravenous Line                   Midline Catheter Insertion/Assessment  - Single Lumen 11/10/20 1603 Right basilic vein (medial side of arm) 18g x 10cm 15 days         Peripheral IV - Single Lumen 11/25/20 1034 Anterior;Distal;Left Forearm less than 1 day                    Skin/Wounds:  Bathing/Skin Care: linen changed, incontinence care Date:11/24/2020  Wounds: yes  Wound care consulted: yes

## 2020-11-25 NOTE — ASSESSMENT & PLAN NOTE
COVID + 11/4. CXR with bilateral patchy opacities. Symptoms started 10/30 and steadily progressed. Daughter found to be COVID +. Overnight on 11/12, patient developed septic shock 2/ 2 GNR LLL PNA c/b GNR bacteremia.     - LPV  - Wean respiratory rate today  - Unable to prone due to bradycardia. Off Paralytics.   - Remdesivir X 5 days; completed 11/09/20  - Convalescent Plasma; Dose #1 11/5  - Dexamethasone X 10 days (end 11/14)  - Wean FiO2 for SpO2 >88%  - Duonebs  - hypercoagulable on full dose lovenox   - TTE with EF 70%; left ventricular concentric remodeling  - Diamox x1 on 11/23 for metabolic alkalosis  - Worsening O2 requirement with labile high BP during sedation weaning. Intermittent diuresis. Cardene gtt.  - CXR on 11/25: Continued interstitial and alveolar opacities bilaterally

## 2020-11-25 NOTE — ASSESSMENT & PLAN NOTE
BG goal 140-180.   BG slightly above goal ranges this morning.     Increase transition insulin infusion to 1.6  u/hr with stepdown parameters (20% dose increase)   BG monitoring every 4 hours and moderate dose correction scale.     ** Please call Endocrine for any BG related issues **

## 2020-11-25 NOTE — PLAN OF CARE
Vital signs and labs reviewed. On meropenem for pseudomonas PNA and bacteremia (repeat cultures NGTD) through 11/27. Remains critically ill/intubated. Off pressors. Continued COVID care per MICU. Tacrolimus resumed and will monitor levels. OIP with ganciclovir, lamivudine, bactrim, and posaconazole. Will continue to adjust IS and OIP as needed.

## 2020-11-25 NOTE — PROGRESS NOTES
[facility] - ICU 16   Critical Care Medicine  Progress Note    Patient Name: Chely Becerra  MRN: [id number]  Admission Date: 11/4/2020  Hospital Length of Stay: 21 days  Code Status: Full Code  Attending Provider: Sussy Khan MD  Primary Care Provider: ZAHIDA Stack MD   Principal Problem: Pneumonia due to COVID-19 virus    Subjective:     HPI:  Ms. Chely Becerra is a 65 y.o. year old female with a PMHx of diabetes and idiopathic pulmonary fibrosis s/p unilateral (left) lung transplant in August 2020 who presents to the ED complaining of a shortness of breath that started last Friday and progressively got worse. She had a fever of 101 on Sunday, started coughing for the last 3 days nonproductive, denies any chest pain. She was seen in the [facility] for routine testing earlier this morning was found to be hypoxic and she was referred to the emergency department. On arrival her COVID-19 test is positive.     Patient lives with her 2 daughters, 1 of the daughters has been having some respiratory symptoms recently however she thought was secondary to asthma. he is on prednisone taper currently 20 mg daily, she has been compliant with her antirejection medication Prograf and CellCept. She was started on BiPAP in the ED with improvement in her oxygen saturations to the mid 90's.     Hospital/ICU Course:  Admitted to Critical Care Medicine for acute hypoxic respiratory failure 2/2 to COVID pneumonia. Alternating between BiPAP and comfort flow. Started on dexamethasone. Consented for Remdsivir and Convalescent Plasma by lung transplant. Patient received her first unit of convalescent plasma 11/5 and tolerated well per patient. Echo performed on 11/5 showed LVEF 70% with normal diastolic function, though LV concentric remodeling & normal RV size and function. Patient continued on alternating bipap with comfort flow 11/8 - 11/10. Patient clinically declined on 11/13. Abx were broadened  to vanc and cefepime (in addition to x1 dose tobra given for double gram negative coverage). Patient was intubated and central/arterial lines were placed. CT chest/abd/pelvis showed LLL pneumonia. Blood cultures resulted with GNR bacteremia. As patient was still spiking fevers with uptrending lactic, abx broadened to meropenem. Blood and sputum cultures with Pseudomonas. On Meropenem, end date 11/27. Alkolosis worsening, gave diamox 11/23.   11/34: labile BP, up to 200s systolic. Started Narinder gtt, O2 requirement worsened to 60% FiO2. Very sensitive to weaning sedation. Intermittent diuresis as her O2 requirement are increasing.     Interval History/Significant Events: NAEON. Overall O2 requirement increasing to 70% FiO2. Opens eyes for commands but not squeezing hands.    Review of Systems   Unable to perform ROS: Intubated     Objective:     Vital Signs (Most Recent):  Temp: 98.1 °F (36.7 °C) (11/25/20 0349)  Pulse: (!) 57 (11/25/20 1000)  Resp: (!) 22 (11/25/20 1000)  BP: (!) 89/55 (11/25/20 0400)  SpO2: 98 % (11/25/20 1000) Vital Signs (24h Range):  Temp:  [98.1 °F (36.7 °C)-98.5 °F (36.9 °C)] 98.1 °F (36.7 °C)  Pulse:  [57-90] 57  Resp:  [22-34] 22  SpO2:  [85 %-100 %] 98 %  BP: ()/(52-80) 89/55  Arterial Line BP: (108-185)/(47-69) 122/56   Weight: 76.7 kg (169 lb)  Body mass index is 26.47 kg/m².      Intake/Output Summary (Last 24 hours) at 11/25/2020 1028  Last data filed at 11/25/2020 1000  Gross per 24 hour   Intake 3311.41 ml   Output 3440 ml   Net -128.59 ml       Physical Exam  Vitals signs and nursing note reviewed.   Constitutional:       General: She is not in acute distress.     Appearance: She is ill-appearing. She is not diaphoretic.      Interventions: Nasal cannula in place.      Comments: Intubated, sedated   HENT:      Head: Normocephalic and atraumatic.   Eyes:      General: No scleral icterus.  Neck:      Musculoskeletal: No neck rigidity.   Cardiovascular:      Rate and Rhythm:  Normal rate and regular rhythm.      Pulses: Normal pulses.      Heart sounds: Normal heart sounds. No murmur.   Pulmonary:      Effort: No respiratory distress.      Breath sounds: Rhonchi and rales present. No decreased breath sounds or wheezing.   Chest:      Comments: Midline scar from recent transplant   Abdominal:      Palpations: Abdomen is soft.      Tenderness: There is no guarding.   Musculoskeletal:      Right lower leg: No edema.      Left lower leg: No edema.   Skin:     General: Skin is dry.      Capillary Refill: Capillary refill takes 2 to 3 seconds.      Comments: cool   Neurological:      Comments: Intubated/sedated. Minimal response         Vents:  Vent Mode: A/C (11/25/20 0833)  Ventilator Initiated: Yes(chart correction) (11/12/20 0921)  Set Rate: 22 BPM (11/25/20 0833)  Vt Set: 360 mL (11/25/20 0833)  Pressure Support: 0 cmH20 (11/15/20 2115)  PEEP/CPAP: 8 cmH20 (11/25/20 0833)  Oxygen Concentration (%): 70 (11/25/20 1000)  Peak Airway Pressure: 27 cmH2O (11/25/20 0833)  Plateau Pressure: 31 cmH20 (11/25/20 0833)  Total Ve: 8.18 mL (11/25/20 0833)  F/VT Ratio<105 (RSBI): (!) 61.66 (11/25/20 0833)  Lines/Drains/Airways     Central Venous Catheter Line            Percutaneous Central Line Insertion/Assessment - Triple Lumen  11/12/20 1030 right internal jugular 12 days          Drain                 NG/OG Tube 11/13/20 0200 [place] sump 14 Fr. Center mouth 12 days         Urethral Catheter 11/12/20 1445 16 Fr. 12 days          Airway                 Airway - Non-Surgical 11/12/20 0928 Endotracheal Tube 13 days          Arterial Line            Arterial Line 11/12/20 1053 Right Radial 12 days          Peripheral Intravenous Line                 Midline Catheter Insertion/Assessment  - Single Lumen 11/10/20 1603 Right basilic vein (medial side of arm) 18g x 10cm 14 days              Significant Labs:    CBC/Anemia Profile:  Recent Labs   Lab 11/24/20  0415 11/25/20  0450   WBC 12.06 6.99   HGB 7.1*  7.1*   HCT 23.6* 24.4*    150   * 114*   RDW 19.9* 19.9*        Chemistries:  Recent Labs   Lab 11/24/20  0415 11/25/20  0450    146*   K 3.6 3.2*    108   CO2 33* 30*   BUN 35* 30*   CREATININE 0.7 0.6   CALCIUM 8.4* 8.2*   ALBUMIN 1.8* 1.5*   PROT 5.2* 4.9*   BILITOT 0.7 0.6   ALKPHOS 227* 164*   ALT 28 26   AST 31 24   MG 2.0 1.7   PHOS 2.5* 2.5*       All pertinent labs within the past 24 hours have been reviewed.    Significant Imaging:  I have reviewed all pertinent imaging results/findings within the past 24 hours.      ABG  Recent Labs   Lab 11/25/20  0420   PH 7.448   PO2 95   PCO2 47.1*   HCO3 32.6*   BE 9     Assessment/Plan:     Derm  Pressure ulcer of head, stage 2  Due to ETT villanueva.  Wound care following    Pulmonary  S/P lung transplant  S/p left lung transplant in Aug 2020 for IPF. 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 Weaning now off pressors  - Intermittent gentle diuresis    Cardiac/Vascular  HTN (hypertension)  Home meds: Nifedipine and Lopressor  Elevated BP in the setting of weaning sedation.   - Started Cardene gtt    Renal/  Acidosis  Mixed. AGMA 2/2 LA, NAGMA 2/2 renal failure and respiratory acidosis     - Initially on bicarb gtt with improvement in acidemia  - Good UOP with resolution of DWAYNE  - Lactate improving as well  - Resolved.    ID  Bacteremia due to Pseudomonas  See sepsis    Septic shock  Pseudomonas bacteremia and in sputum. Resolved.     - Cont Meropenem, end date 11/27  - ID following.   - Blood and sputum cx's with Pseudomonas  -  Repeat blood cx's NGTD         Sepsis  Hypotensive but not currently requiring vasopressors. Initially 2/2 COVID PNA, now with possible bacterial infection. CXR not markedly changed, UA neg.     - Found to have bacteremia with Pseudomonas  - ID consulted, On Marianela, end date 11/27  - Midline placed 11/10- not likely the source    Endocrine  Prediabetes  Steroid induced. Now uncontrolled in the setting of septic shock requiring an insulin gtt.    - Trickle feeds.   - transition insulin infusion at 2.4 u/hr.  - Endocrine following      Other  * Pneumonia due to COVID-19 virus  Plan per respiratory failure    Pressure injury of right buttock, unstageable  Wound care following    Elevated alkaline phosphatase level  Alk Phos increased. CTM.     Palliative care encounter  Ivy updated regularly. All questions answered.    Acute hypoxemic respiratory failure due to COVID-19  COVID + 11/4. CXR with bilateral patchy opacities. Symptoms started 10/30 and steadily progressed. Daughter found to be COVID +. Overnight on 11/12, patient developed septic shock 2/ 2 GNR LLL PNA c/b GNR bacteremia.     - LPV  - Wean respiratory rate today  - Unable to prone due to bradycardia. Off Paralytics.   - Remdesivir X 5 days; completed 11/09/20  - Convalescent Plasma; Dose #1 11/5  - Dexamethasone X 10 days (end 11/14)  - Wean FiO2 for SpO2 >88%  - Duonebs  - hypercoagulable on full dose lovenox   - TTE with EF 70%; left ventricular concentric remodeling  - Diamox x1 on 11/23 for metabolic alkalosis  - Worsening O2 requirement with labile high BP during sedation weaning. Intermittent diuresis. Cardene gtt.  - CXR on 11/25: Continued interstitial and alveolar opacities bilaterally      Critical Care Daily Checklist:       A: Awake: RASS Goal/Actual Goal: RASS Goal: -2-->light sedation  Actual: [place] Agitation Sedation Scale (RASS): Light sedation   B: Spontaneous Breathing Trial Performed? Spon. Breathing Trial Initiated?: Not initiated  (11/21/20 0848)   C: SAT & SBT Coordinated?  Did not tolerate                      D: Delirium: CAM-ICU Overall CAM-ICU: Negative   E: Early Mobility Performed? No   F: Feeding Goal: Goals: Pt to meet 50-75% estimated kcal and protein needs by RD f/u.  Status: Nutrition Goal Status: goal not met, other (comment)(ongoing)         Current Diet Order   Procedures    Diet NPO       AS: Analgesia/Sedation Per chart   T: Thromboembolic Prophylaxis lovenox   H: HOB > 300 Yes   U: Stress Ulcer Prophylaxis (if needed) PPi   G: Glucose Control BG goal 140-180   B: Bowel Function Stool Occurrence: 1   I: Indwelling Catheter (Lines & Rios) Necessity Per chart   D: De-escalation of Antimicrobials/Pharmacotherapies Meropenem     Plan for the day/ETD Wean vent     Code Status:  Family/Goals of Care: Full Code             Critical secondary to Patient has a condition that poses threat to life and bodily function: Severe Respiratory Distress      Critical care was time spent personally by me on the following activities: development of treatment plan with patient or surrogate and bedside caregivers, discussions with consultants, evaluation of patient's response to treatment, examination of patient, ordering and performing treatments and interventions, ordering and review of laboratory studies, ordering and review of radiographic studies, pulse oximetry, re-evaluation of patient's condition. This critical care time did not overlap with that of any other provider or involve time for any procedures.     Joselyn Shields MD  Critical Care Medicine  [facility] - ICU [facility]

## 2020-11-25 NOTE — PROGRESS NOTES
"[facility] - ICU [facility]  Endocrinology  Progress Note    Admit Date: 11/4/2020     Reason for Consult: Management of pre-dm, Hyperglycemia      Surgical Procedure and Date: Left lung transplant n 8/10/20     Diabetes diagnosis year: pre-dm - diet controlled prior to transplant   Lab Results   Component Value Date    HGBA1C 8.0 (H) 11/05/2020             Home Diabetes Medications:novolog 10 units TID with meals plus correction scale.     How often checking glucose at home? NAVEEN   BG readings on regimen: NAVEEN  Hypoglycemia on the regimen? NAVEEN   Missed doses on regimen?  NAVEEN     Diabetes Complications include:     none  Complicating diabetes co morbidities:   Glucocorticoid use  s/p lung transplant  covid infection        HPI:   Patient is a 65 y.o. female with a diagnosis of diabetes and idiopathic pulmonary fibrosis s/p unilateral (left) lung transplant. Patient presents to the ED complaining of a shortness of breath that progressively got worse. On arrival her COVID-19 test is positive. Patient clinically declined on 11/13 and was intubated. Endocrinology consulted for BG/ Dm management.           Interval HPI:   Overnight events: Remains intubated in RICU. BG slightly above goal ranges this morning on IV insulin infusion at 1.4 u/hr. Receiving hydrocortisone BID per primary.   Eating:   NPO  Nausea: No  Hypoglycemia and intervention: No  Fever: No  TPN and/or TF: Yes  If yes, type of TF/TPN and rate: Glucerna at 50 cc/hr     BP (!) 89/55   Pulse 63   Temp 98.1 °F (36.7 °C)   Resp (!) 23   Ht 5' 7" (1.702 m)   Wt 76.7 kg (169 lb)   LMP  (LMP Unknown)   SpO2 96%   Breastfeeding No   BMI 26.47 kg/m²     Labs Reviewed and Include    Recent Labs   Lab 11/25/20  0450   *   CALCIUM 8.2*   ALBUMIN 1.5*   PROT 4.9*   *   K 3.2*   CO2 30*      BUN 30*   CREATININE 0.6   ALKPHOS 164*   ALT 26   AST 24   BILITOT 0.6     Lab Results   Component Value Date    WBC 6.99 11/25/2020    HGB 7.1 " (L) 11/25/2020    HCT 24.4 (L) 11/25/2020     (H) 11/25/2020     11/25/2020     No results for input(s): TSH, FREET4 in the last 168 hours.  Lab Results   Component Value Date    HGBA1C 8.0 (H) 11/05/2020       Nutritional status:   Body mass index is 26.47 kg/m².  Lab Results   Component Value Date    ALBUMIN 1.5 (L) 11/25/2020    ALBUMIN 1.8 (L) 11/24/2020    ALBUMIN 1.7 (L) 11/23/2020     No results found for: PREALBUMIN    Estimated Creatinine Clearance: 102.4 mL/min (based on SCr of 0.6 mg/dL).    Accu-Checks  Recent Labs     11/23/20  2043 11/24/20  0043 11/24/20  0406 11/24/20  0759 11/24/20  1230 11/24/20  1659 11/24/20  2105 11/25/20  0050 11/25/20  0449 11/25/20  0809   POCTGLUCOSE 193* 180* 185* 142* 136* 166* 112* 224* 220* 201*       Current Medications and/or Treatments Impacting Glycemic Control  Immunotherapy:    Immunosuppressants         Stop Route Frequency     tacrolimus (PROGRAF) 1 mg/mL oral syringe      -- PER G TUBE 2 times daily        Steroids:   Hormones (From admission, onward)    Start     Stop Route Frequency Ordered    11/21/20 2100  hydrocortisone sodium succinate injection 50 mg      -- IV 2 times daily 11/21/20 1016        Pressors:    Autonomic Drugs (From admission, onward)    Start     Stop Route Frequency Ordered    11/12/20 1243  norepinephrine 1 mg/mL injection     Note to Pharmacy: Created by cabinet override    11/13 0059   11/12/20 1243        Hyperglycemia/Diabetes Medications:   Antihyperglycemics (From admission, onward)    Start     Stop Route Frequency Ordered    11/17/20 1845  insulin regular in 0.9 % NaCl 100 unit/100 mL (1 unit/mL) infusion     Question:  Enter initial dose (Units/hr):  Answer:  2    -- IV Continuous 11/17/20 1736    11/17/20 1836  insulin aspart U-100 pen 0-10 Units      -- SubQ As needed (PRN) 11/17/20 1736          ASSESSMENT and PLAN    * Pneumonia due to COVID-19 virus  Managed per primary.   Infection may increase insulin  resistance.         Prediabetes  BG goal 140-180.   BG slightly above goal ranges this morning.     Increase transition insulin infusion to 1.6  u/hr with stepdown parameters (20% dose increase)   BG monitoring every 4 hours and moderate dose correction scale.     ** Please call Endocrine for any BG related issues **    Adrenal cortical steroids causing adverse effect in therapeutic use  Glucocorticoids markedly increase  glucoses. Expect the steroid taper will help glucose control.         Immunosuppression  May increase insulin resistance.             Joellen Proctor NP  Endocrinology  [facility] - ICU [facility]

## 2020-11-25 NOTE — ASSESSMENT & PLAN NOTE
S/p left lung transplant in Aug 2020 for IPF. 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 Weaning now off pressors  - Intermittent gentle diuresis

## 2020-11-25 NOTE — SUBJECTIVE & OBJECTIVE
Interval History/Significant Events: NAEON. Overall O2 requirement increasing to 70% FiO2. Opens eyes for commands but not squeezing hands.    Review of Systems   Unable to perform ROS: Intubated     Objective:     Vital Signs (Most Recent):  Temp: 98.1 °F (36.7 °C) (11/25/20 0349)  Pulse: (!) 57 (11/25/20 1000)  Resp: (!) 22 (11/25/20 1000)  BP: (!) 89/55 (11/25/20 0400)  SpO2: 98 % (11/25/20 1000) Vital Signs (24h Range):  Temp:  [98.1 °F (36.7 °C)-98.5 °F (36.9 °C)] 98.1 °F (36.7 °C)  Pulse:  [57-90] 57  Resp:  [22-34] 22  SpO2:  [85 %-100 %] 98 %  BP: ()/(52-80) 89/55  Arterial Line BP: (108-185)/(47-69) 122/56   Weight: 76.7 kg (169 lb)  Body mass index is 26.47 kg/m².      Intake/Output Summary (Last 24 hours) at 11/25/2020 1028  Last data filed at 11/25/2020 1000  Gross per 24 hour   Intake 3311.41 ml   Output 3440 ml   Net -128.59 ml       Physical Exam  Vitals signs and nursing note reviewed.   Constitutional:       General: She is not in acute distress.     Appearance: She is ill-appearing. She is not diaphoretic.      Interventions: Nasal cannula in place.      Comments: Intubated, sedated   HENT:      Head: Normocephalic and atraumatic.   Eyes:      General: No scleral icterus.  Neck:      Musculoskeletal: No neck rigidity.   Cardiovascular:      Rate and Rhythm: Normal rate and regular rhythm.      Pulses: Normal pulses.      Heart sounds: Normal heart sounds. No murmur.   Pulmonary:      Effort: No respiratory distress.      Breath sounds: Rhonchi and rales present. No decreased breath sounds or wheezing.   Chest:      Comments: Midline scar from recent transplant   Abdominal:      Palpations: Abdomen is soft.      Tenderness: There is no guarding.   Musculoskeletal:      Right lower leg: No edema.      Left lower leg: No edema.   Skin:     General: Skin is dry.      Capillary Refill: Capillary refill takes 2 to 3 seconds.      Comments: cool   Neurological:      Comments: Intubated/sedated.  Minimal response         Vents:  Vent Mode: A/C (11/25/20 0833)  Ventilator Initiated: Yes(chart correction) (11/12/20 0921)  Set Rate: 22 BPM (11/25/20 0833)  Vt Set: 360 mL (11/25/20 0833)  Pressure Support: 0 cmH20 (11/15/20 2115)  PEEP/CPAP: 8 cmH20 (11/25/20 0833)  Oxygen Concentration (%): 70 (11/25/20 1000)  Peak Airway Pressure: 27 cmH2O (11/25/20 0833)  Plateau Pressure: 31 cmH20 (11/25/20 0833)  Total Ve: 8.18 mL (11/25/20 0833)  F/VT Ratio<105 (RSBI): (!) 61.66 (11/25/20 0833)  Lines/Drains/Airways     Central Venous Catheter Line            Percutaneous Central Line Insertion/Assessment - Triple Lumen  11/12/20 1030 right internal jugular 12 days          Drain                 NG/OG Tube 11/13/20 0200 [place] sump 14 Fr. Center mouth 12 days         Urethral Catheter 11/12/20 1445 16 Fr. 12 days          Airway                 Airway - Non-Surgical 11/12/20 0928 Endotracheal Tube 13 days          Arterial Line            Arterial Line 11/12/20 1053 Right Radial 12 days          Peripheral Intravenous Line                 Midline Catheter Insertion/Assessment  - Single Lumen 11/10/20 1603 Right basilic vein (medial side of arm) 18g x 10cm 14 days              Significant Labs:    CBC/Anemia Profile:  Recent Labs   Lab 11/24/20  0415 11/25/20  0450   WBC 12.06 6.99   HGB 7.1* 7.1*   HCT 23.6* 24.4*    150   * 114*   RDW 19.9* 19.9*        Chemistries:  Recent Labs   Lab 11/24/20  0415 11/25/20  0450    146*   K 3.6 3.2*    108   CO2 33* 30*   BUN 35* 30*   CREATININE 0.7 0.6   CALCIUM 8.4* 8.2*   ALBUMIN 1.8* 1.5*   PROT 5.2* 4.9*   BILITOT 0.7 0.6   ALKPHOS 227* 164*   ALT 28 26   AST 31 24   MG 2.0 1.7   PHOS 2.5* 2.5*       All pertinent labs within the past 24 hours have been reviewed.    Significant Imaging:  I have reviewed all pertinent imaging results/findings within the past 24 hours.

## 2020-11-25 NOTE — SUBJECTIVE & OBJECTIVE
"Interval HPI:   Overnight events: Remains intubated in RICU. BG slightly above goal ranges this morning on IV insulin infusion at 1.4 u/hr. Receiving hydrocortisone BID per primary.   Eating:   NPO  Nausea: No  Hypoglycemia and intervention: No  Fever: No  TPN and/or TF: Yes  If yes, type of TF/TPN and rate: Glucerna at 50 cc/hr     BP (!) 89/55   Pulse 63   Temp 98.1 °F (36.7 °C)   Resp (!) 23   Ht 5' 7" (1.702 m)   Wt 76.7 kg (169 lb)   LMP  (LMP Unknown)   SpO2 96%   Breastfeeding No   BMI 26.47 kg/m²     Labs Reviewed and Include    Recent Labs   Lab 11/25/20  0450   *   CALCIUM 8.2*   ALBUMIN 1.5*   PROT 4.9*   *   K 3.2*   CO2 30*      BUN 30*   CREATININE 0.6   ALKPHOS 164*   ALT 26   AST 24   BILITOT 0.6     Lab Results   Component Value Date    WBC 6.99 11/25/2020    HGB 7.1 (L) 11/25/2020    HCT 24.4 (L) 11/25/2020     (H) 11/25/2020     11/25/2020     No results for input(s): TSH, FREET4 in the last 168 hours.  Lab Results   Component Value Date    HGBA1C 8.0 (H) 11/05/2020       Nutritional status:   Body mass index is 26.47 kg/m².  Lab Results   Component Value Date    ALBUMIN 1.5 (L) 11/25/2020    ALBUMIN 1.8 (L) 11/24/2020    ALBUMIN 1.7 (L) 11/23/2020     No results found for: PREALBUMIN    Estimated Creatinine Clearance: 102.4 mL/min (based on SCr of 0.6 mg/dL).    Accu-Checks  Recent Labs     11/23/20  2043 11/24/20  0043 11/24/20  0406 11/24/20  0759 11/24/20  1230 11/24/20  1659 11/24/20  2105 11/25/20  0050 11/25/20  0449 11/25/20  0809   POCTGLUCOSE 193* 180* 185* 142* 136* 166* 112* 224* 220* 201*       Current Medications and/or Treatments Impacting Glycemic Control  Immunotherapy:    Immunosuppressants         Stop Route Frequency     tacrolimus (PROGRAF) 1 mg/mL oral syringe      -- PER G TUBE 2 times daily        Steroids:   Hormones (From admission, onward)    Start     Stop Route Frequency Ordered    11/21/20 2100  hydrocortisone sodium succinate " injection 50 mg      -- IV 2 times daily 11/21/20 1016        Pressors:    Autonomic Drugs (From admission, onward)    Start     Stop Route Frequency Ordered    11/12/20 1243  norepinephrine 1 mg/mL injection     Note to Pharmacy: Created by cabinet override    11/13 0059   11/12/20 1243        Hyperglycemia/Diabetes Medications:   Antihyperglycemics (From admission, onward)    Start     Stop Route Frequency Ordered    11/17/20 1845  insulin regular in 0.9 % NaCl 100 unit/100 mL (1 unit/mL) infusion     Question:  Enter initial dose (Units/hr):  Answer:  2    -- IV Continuous 11/17/20 1736    11/17/20 1836  insulin aspart U-100 pen 0-10 Units      -- SubQ As needed (PRN) 11/17/20 1736

## 2020-11-26 PROBLEM — Z78.9 ON ENTERAL NUTRITION: Status: ACTIVE | Noted: 2020-01-01

## 2020-11-26 PROBLEM — J80 ACUTE RESPIRATORY DISTRESS SYNDROME (ARDS) DUE TO COVID-19 VIRUS: Status: ACTIVE | Noted: 2020-01-01

## 2020-11-26 NOTE — ASSESSMENT & PLAN NOTE
-Noted to develop worsening anemia throughout admission with Hgb downtrending to 7 this morning.   -No signs of blood loss on exam.     Plan:   -PRBC ordered.   -Trending CBC and transfuse for Hgb <7.

## 2020-11-26 NOTE — PROGRESS NOTES
[facility] - ICU 16   Critical Care Medicine  Progress Note    Patient Name: Chely Becerra  MRN: [id number]  Admission Date: 11/4/2020  Hospital Length of Stay: 22 days  Code Status: Full Code  Attending Provider: Sussy Khan MD  Primary Care Provider: ZAHIDA Stack MD   Principal Problem: Acute respiratory distress syndrome (ARDS) due to COVID-19 virus    Subjective:     HPI:  Ms. Chely Becerra is a 65 y.o. year old female with a PMHx of diabetes and idiopathic pulmonary fibrosis s/p unilateral (left) lung transplant in August 2020 who presents to the ED complaining of a shortness of breath that started last Friday and progressively got worse. She had a fever of 101 on Sunday, started coughing for the last 3 days nonproductive, denies any chest pain. She was seen in the [facility] for routine testing earlier this morning was found to be hypoxic and she was referred to the emergency department. On arrival her COVID-19 test is positive.     Patient lives with her 2 daughters, 1 of the daughters has been having some respiratory symptoms recently however she thought was secondary to asthma. he is on prednisone taper currently 20 mg daily, she has been compliant with her antirejection medication Prograf and CellCept. She was started on BiPAP in the ED with improvement in her oxygen saturations to the mid 90's.     Hospital/ICU Course:  Ms. Chely Becerra was admitted to [facility] on 11/04/2020 for management of new acute hypoxemic respiratory failure secondary to COVID-19. Her respiratory status was stabilized on admission alternating between BiPAP and comfort flow. She was initiated on Azithromycin and Ceftriaxone for CAP coverage, in addition to home Voriconazole prophylaxis. She completed a 10-day course of Dexamethasone from 11/04 to 11/13, in addition to a 5 day course of Remdesivir, and one time dose of convalescent plasma. However, by 11/12, her hypoxia progressed,  associated with worsening leukocytosis, new DWAYNE, and persistent fevers, requiring intubation at that time. Repeat infectious work-up was significant for blood and respiratory cultures from 11/12 positive for Pseudmonas. ID was consulted and she was initiatedon Meropenem on 11/12 with subsequent blood cultures remaining NGTD. DWAYNE resolved by 11/14 without need for dialysis. Intubation and subsequent sedation were also associated with onset of shock requiring vasopressor support until 11/20. She was initiated on intermittent proning with improvements in oxygenation, however this was limited by intermittent episodes of bradycardia. Endocrinology was consulted on 11/17 for hyperglycemia management. Following weaning sedation by 11/23, she developed worsening hypertension requiring a Cardene drip by 11/24. By 11/25, she was intolerable of weaning ventilator support and sedation and neuromuscular blockade was re-initiated for possible re-proning. She remains on Meropenem until 11/27. Lung transplant team following for assistance with home immunosuppressive medications.     Interval History/Significant Events: No acute events overnight. This morning patient remained supinated with P/F of 151 on morning ABG. Labs notable for hemoglobin downtrending to 7 since the last few days; 1U PRBC ordered. Will follow-up ABG later this afternoon with anticipated proning later this evening based on result.     Review of Systems   Unable to perform ROS: Intubated     Objective:     Vital Signs (Most Recent):  Temp: 97.6 °F (36.4 °C) (11/26/20 1200)  Pulse: 79 (11/26/20 1309)  Resp: (!) 22 (11/26/20 1309)  BP: 115/61 (11/26/20 0800)  SpO2: (!) 92 % (11/26/20 1309) Vital Signs (24h Range):  Temp:  [96.1 °F (35.6 °C)-98 °F (36.7 °C)] 97.6 °F (36.4 °C)  Pulse:  [52-89] 79  Resp:  [22-26] 22  SpO2:  [91 %-100 %] 92 %  BP: ()/(53-79) 115/61  Arterial Line BP: ()/(42-80) 180/78   Weight: 76.7 kg (169 lb)  Body mass index is 26.47  kg/m².      Intake/Output Summary (Last 24 hours) at 11/26/2020 1401  Last data filed at 11/26/2020 1300  Gross per 24 hour   Intake 3785.67 ml   Output 1725 ml   Net 2060.67 ml       Physical Exam  Constitutional:       Appearance: She is ill-appearing.      Interventions: She is sedated, chemically paralyzed and intubated.   Eyes:      General: No scleral icterus.     Conjunctiva/sclera: Conjunctivae normal.   Cardiovascular:      Rate and Rhythm: Normal rate and regular rhythm.      Pulses: Normal pulses.      Heart sounds: Normal heart sounds.   Pulmonary:      Effort: Pulmonary effort is normal. No respiratory distress. She is intubated.      Breath sounds: Examination of the right-lower field reveals decreased breath sounds. Examination of the left-lower field reveals decreased breath sounds. Decreased breath sounds present.   Abdominal:      General: Bowel sounds are normal. There is no distension.      Palpations: Abdomen is soft.      Tenderness: There is no abdominal tenderness.   Skin:     General: Skin is warm and dry.      Findings: No bruising or rash.       Vents:  Vent Mode: A/C (11/26/20 1309)  Ventilator Initiated: Yes(chart correction) (11/12/20 0921)  Set Rate: 22 BPM (11/26/20 1309)  Vt Set: 360 mL (11/26/20 1309)  Pressure Support: 0 cmH20 (11/15/20 2115)  PEEP/CPAP: 8 cmH20 (11/26/20 1309)  Oxygen Concentration (%): 50 (11/26/20 1309)  Peak Airway Pressure: 39 cmH2O (11/26/20 1309)  Plateau Pressure: 39 cmH20 (11/26/20 1309)  Total Ve: 7.44 mL (11/26/20 1309)  F/VT Ratio<105 (RSBI): (!) 62.86 (11/26/20 1309)  Lines/Drains/Airways     Central Venous Catheter Line            Percutaneous Central Line Insertion/Assessment - Triple Lumen  11/12/20 1030 right internal jugular 14 days          Drain                 NG/OG Tube 11/13/20 0200 [place] sump 14 Fr. Center mouth 13 days         Urethral Catheter 11/12/20 1445 16 Fr. 13 days         Fecal Incontinence  11/25/20 1500 less than 1 day           Airway                 Airway - Non-Surgical 11/12/20 0928 Endotracheal Tube 14 days          Arterial Line            Arterial Line 11/12/20 1053 Right Radial 14 days          Peripheral Intravenous Line                 Midline Catheter Insertion/Assessment  - Single Lumen 11/10/20 1603 Right basilic vein (medial side of arm) 18g x 10cm 15 days         Peripheral IV - Single Lumen 11/25/20 1034 Anterior;Distal;Left Forearm 1 day              Significant Labs:    CBC/Anemia Profile:  Recent Labs   Lab 11/25/20  0450 11/26/20  0225   WBC 6.99 5.65   HGB 7.1* 7.0*   HCT 24.4* 23.9*    168   * 112*   RDW 19.9* 19.9*        Chemistries:  Recent Labs   Lab 11/25/20  0450 11/25/20  1559 11/25/20  1750 11/26/20  0225   * 146*  --  145   K 3.2* 3.8  --  3.7    108  --  106   CO2 30* 33*  --  29   BUN 30* 28*  --  30*   CREATININE 0.6 0.6  --  0.6   CALCIUM 8.2* 8.6*  --  8.4*   ALBUMIN 1.5*  --   --  1.5*   PROT 4.9*  --   --  5.2*   BILITOT 0.6  --   --  0.6   ALKPHOS 164*  --   --  172*   ALT 26  --   --  23   AST 24  --   --  22   MG 1.7  --  2.3 2.3   PHOS 2.5*  --  3.2 3.0       Significant Imaging:  I have reviewed all pertinent imaging results/findings within the past 24 hours.      ABG  Recent Labs   Lab 11/26/20  0425   PH 7.452*   PO2 83   PCO2 45.3*   HCO3 31.7*   BE 8     Assessment/Plan:     Derm  Pressure ulcer of head, stage 2  -Secondary to ETT villanueva.     Plan:   -Wound care consulted and following recommendations.     Pulmonary  Pneumonia due to Pseudomonas aeruginosa  -See assessment for sepsis.     Status post lung transplantation  -Status post left lung transplant in 08/2020 for IPF, but not associated with baseline supplement oxygen requirements since transplant.   -Home regimen: AUGMENTIN, Lamivudine, CELLCEPT, Prednisone, TMP/SMX, Tacrolimus, Valganciclovir, and Voriconazole.     Plan:   -Lung transplant following for assistance with continuing immunosuppressive  regimen.     Cardiac/Vascular  Essential hypertension  -Home regimen: Nifedipine and Lopressor.   -Noted onset of worsening hypertension by 11/24 following weaning of sedation requiring Cardene drip, however was discontinued by 11/25 following re-starting sedation.     Plan:   -Holding home agents with risk of hypotension associated with sedation.     ID  Bacteremia due to Pseudomonas  -See assessment for sepsis.     Sepsis, unspecified organism  -See assessment for shock.     Oncology  Macrocytic anemia  -Noted to develop worsening anemia throughout admission with Hgb downtrending to 7 this morning.   -No signs of blood loss on exam.     Plan:   -PRBC ordered.   -Trending CBC and transfuse for Hgb <7.     Endocrine  Type 2 diabetes mellitus, with long-term current use of insulin  -Most recent A1c 8 in 11/2020.   -Home regimen: Aspart with sliding scale.   -Noted worsening hyperglycemia since admission that was suspected to be secondary to steroids. Status post endocrine consultation on 11/17 for assistance with management.     Plan:   -Endocrine following; status post initiation of insulin drip.   -Goal glucose 140-180.     Other  * Acute respiratory distress syndrome (ARDS) due to COVID-19 virus  This is a 65 year old  female with PMH notable for recent lung transplant in 08/2020 for IPF who was admitted to [facility] on 11/04 for management of new acute hypoxemic respiratory failure associated with COVID-19 that eventually required intubation by 11/12 for progressive respiratory failure. Hospital course associated with development of Pseudmonas pneumonia and bacteremia.     Plan:   -Continue mechanical ventilation with ARDS targeted strategies; will repeat ABG later this afternoon for anticipated proning this evening given P/F with borderline severe ARDS.   -Status post appropriate course of Azithromycin, Ceftriaxone, Dexamethasone, and one time dose of convalescent plasma since admission.    -Therapeutic anticoagulation with hypercoagulable state associated with COVID.   -See assessment for sepsis and Pseudomonas bacteremia.   -Diuretic boluses PRN to optimize ventilator mechanics; additional LASIX IV given today.   -Strict I/O's and daily weights, if possible.   -Goal O2 saturations >88%.   -Daily SAT/SBT as tolerated.     Long-term use of immunosuppressant medication  -See assessment for lung transplant.     Pressure injury of right buttock, unstageable  Plan:   -Wound care consulted and following recommendations.     Shock, unspecified  -Developed worsening hypoxia by 11/12 requiring intubation, associated with worsening leukocytosis, persistent fevers, and transient DWAYNE with repeat infectious work-up at that time significant for blood and respiratory cultures positive for Pseudomonas.  -Status post ID consultation and initiation on Meropenem on 11/12 with subsequent cultures remaining NGTD.   -Intermittent vasopressor support continues that may be related to sedation side effect.     Plan:   -Continue vasopressor support as needed to maintain MAP's >65; wean as tolerated.   -Will attempt to wean stress dose steroids.   -Continue Meropenem until 11/27.   -Continue immunosuppressant antimicrobial prophylaxis.       Elevated alkaline phosphatase level  -Noted issue since admission.   -Liver function WNL.     Plan:   -Trending liver function daily.     Palliative care encounter  Plan:   -Family updates daily.   -Code status: FULL.       Critical secondary to Patient has a condition that poses threat to life and bodily function: Severe Respiratory Distress      Critical care was time spent personally by me on the following activities: development of treatment plan with patient or surrogate and bedside caregivers, discussions with consultants, evaluation of patient's response to treatment, examination of patient, ordering and performing treatments and interventions, ordering and review of laboratory  studies, ordering and review of radiographic studies, pulse oximetry, re-evaluation of patient's condition. This critical care time did not overlap with that of any other provider or involve time for any procedures.     Eze Gonzalez MD  Critical Care Medicine  [facility] - ICU 16 WT

## 2020-11-26 NOTE — SUBJECTIVE & OBJECTIVE
Interval History/Significant Events: No acute events overnight. This morning patient remained supinated with P/F of 151 on morning ABG. Labs notable for hemoglobin downtrending to 7 since the last few days; 1U PRBC ordered. Will follow-up ABG later this afternoon with anticipated proning later this evening based on result.     Review of Systems   Unable to perform ROS: Intubated     Objective:     Vital Signs (Most Recent):  Temp: 97.6 °F (36.4 °C) (11/26/20 1200)  Pulse: 79 (11/26/20 1309)  Resp: (!) 22 (11/26/20 1309)  BP: 115/61 (11/26/20 0800)  SpO2: (!) 92 % (11/26/20 1309) Vital Signs (24h Range):  Temp:  [96.1 °F (35.6 °C)-98 °F (36.7 °C)] 97.6 °F (36.4 °C)  Pulse:  [52-89] 79  Resp:  [22-26] 22  SpO2:  [91 %-100 %] 92 %  BP: ()/(53-79) 115/61  Arterial Line BP: ()/(42-80) 180/78   Weight: 76.7 kg (169 lb)  Body mass index is 26.47 kg/m².      Intake/Output Summary (Last 24 hours) at 11/26/2020 1401  Last data filed at 11/26/2020 1300  Gross per 24 hour   Intake 3785.67 ml   Output 1725 ml   Net 2060.67 ml       Physical Exam  Constitutional:       Appearance: She is ill-appearing.      Interventions: She is sedated, chemically paralyzed and intubated.   Eyes:      General: No scleral icterus.     Conjunctiva/sclera: Conjunctivae normal.   Cardiovascular:      Rate and Rhythm: Normal rate and regular rhythm.      Pulses: Normal pulses.      Heart sounds: Normal heart sounds.   Pulmonary:      Effort: Pulmonary effort is normal. No respiratory distress. She is intubated.      Breath sounds: Examination of the right-lower field reveals decreased breath sounds. Examination of the left-lower field reveals decreased breath sounds. Decreased breath sounds present.   Abdominal:      General: Bowel sounds are normal. There is no distension.      Palpations: Abdomen is soft.      Tenderness: There is no abdominal tenderness.   Skin:     General: Skin is warm and dry.      Findings: No bruising or rash.        Vents:  Vent Mode: A/C (11/26/20 1309)  Ventilator Initiated: Yes(chart correction) (11/12/20 0921)  Set Rate: 22 BPM (11/26/20 1309)  Vt Set: 360 mL (11/26/20 1309)  Pressure Support: 0 cmH20 (11/15/20 2115)  PEEP/CPAP: 8 cmH20 (11/26/20 1309)  Oxygen Concentration (%): 50 (11/26/20 1309)  Peak Airway Pressure: 39 cmH2O (11/26/20 1309)  Plateau Pressure: 39 cmH20 (11/26/20 1309)  Total Ve: 7.44 mL (11/26/20 1309)  F/VT Ratio<105 (RSBI): (!) 62.86 (11/26/20 1309)  Lines/Drains/Airways     Central Venous Catheter Line            Percutaneous Central Line Insertion/Assessment - Triple Lumen  11/12/20 1030 right internal jugular 14 days          Drain                 NG/OG Tube 11/13/20 0200 [place] sump 14 Fr. Center mouth 13 days         Urethral Catheter 11/12/20 1445 16 Fr. 13 days         Fecal Incontinence  11/25/20 1500 less than 1 day          Airway                 Airway - Non-Surgical 11/12/20 0928 Endotracheal Tube 14 days          Arterial Line            Arterial Line 11/12/20 1053 Right Radial 14 days          Peripheral Intravenous Line                 Midline Catheter Insertion/Assessment  - Single Lumen 11/10/20 1603 Right basilic vein (medial side of arm) 18g x 10cm 15 days         Peripheral IV - Single Lumen 11/25/20 1034 Anterior;Distal;Left Forearm 1 day              Significant Labs:    CBC/Anemia Profile:  Recent Labs   Lab 11/25/20  0450 11/26/20 0225   WBC 6.99 5.65   HGB 7.1* 7.0*   HCT 24.4* 23.9*    168   * 112*   RDW 19.9* 19.9*        Chemistries:  Recent Labs   Lab 11/25/20  0450 11/25/20  1559 11/25/20  1750 11/26/20  0225   * 146*  --  145   K 3.2* 3.8  --  3.7    108  --  106   CO2 30* 33*  --  29   BUN 30* 28*  --  30*   CREATININE 0.6 0.6  --  0.6   CALCIUM 8.2* 8.6*  --  8.4*   ALBUMIN 1.5*  --   --  1.5*   PROT 4.9*  --   --  5.2*   BILITOT 0.6  --   --  0.6   ALKPHOS 164*  --   --  172*   ALT 26  --   --  23   AST 24  --   --  22   MG 1.7   --  2.3 2.3   PHOS 2.5*  --  3.2 3.0       Significant Imaging:  I have reviewed all pertinent imaging results/findings within the past 24 hours.

## 2020-11-26 NOTE — ASSESSMENT & PLAN NOTE
BG goal 140-180.   11/26/20  - transition insulin infusion to 1.6  u/hr with stepdown parameters.  - Moderate Dose SQ Insulin Correction Scale.   - BG monitoring every 4 hours and moderate dose correction scale.     11/27/20:  - Discontinue IV insulin infusion orders. BG has fallen below goal.   - Start Low Dose SQ Insulin Correction Scale PRN BG excursions.   - BG monitoring every 4 hours while on enteral nutritional therapy.   ** Please call Endocrine for any BG related issues **  ** Please notify Endocrine for any change and/or advance in diet**    Discharge Planing:   TBD. Please notify endocrinology prior to discharge.

## 2020-11-26 NOTE — PLAN OF CARE
Patient seen with housestaff team on morning rounds and then plan of care was discussed in detail.     65-year-old woman S/P left lung transplant in August 2020 currently admitted to the intensive care unit with acute hypoxemic respiratory failure secondary to covid pneumonia.        · Acute hypoxemic respiratory failure:  2/2 COVID  ? Patient remains intubated on mechanical ventilation  ? Now heavily sedated and on neuromuscular blockade with subsequent improvement in oxygenation.   ? Patient is currently on 55% FiO2 with a P/F ratio just above 150  ? Plan to repeat ABG this afternoon- after diuresis- and will consider proning if P/F remains borderline.  ? Plan to re -sedate and implement neuromuscular blockade today.  Will consider proning depending upon result.  ? Suspect patient would benefit from a tracheostomy to promote vent weaning.  Will consult surgery.   ? Continue diuresis  · Labile blood pressure- in part due to sedation.  Currently on low dose norepi  · Covid pneumonia:   S/P remdesivir  and dexamethasone.  Continue Lovenox for hypercoagulable state.  · Pseudomonal bacteremia:  Continue meropenem  through 11/27/2020  · History of lung transplant:  Immunosuppression and prophylaxis per lung transplant team.    Critical Care Time: 32 minutes  Critical care was time spent personally by me on the following activities: evaluating this patient's organ dysfunction, development of treatment plan, discussing treatment plan with patient or surrogate and bedside caregivers, discussions with consultants, evaluation of patient's response to treatment, examination of patient, ordering and performing treatments and interventions, ordering and review of laboratory studies, ordering and review of radiographic studies, re-evaluation of patient's condition. This critical care time did not overlap with that of any other provider or involve time for any procedures.

## 2020-11-26 NOTE — ASSESSMENT & PLAN NOTE
-Status post left lung transplant in 08/2020 for IPF, but not associated with baseline supplement oxygen requirements since transplant.   -Home regimen: AUGMENTIN, Lamivudine, CELLCEPT, Prednisone, TMP/SMX, Tacrolimus, Valganciclovir, and Voriconazole.     Plan:   -Lung transplant following for assistance with continuing immunosuppressive regimen.

## 2020-11-26 NOTE — ASSESSMENT & PLAN NOTE
This is a 65 year old  female with PMH notable for recent lung transplant in 08/2020 for IPF who was admitted to [facility] on 11/04 for management of new acute hypoxemic respiratory failure associated with COVID-19 that eventually required intubation by 11/12 for progressive respiratory failure. Hospital course associated with development of Pseudmonas pneumonia and bacteremia.     Plan:   -Continue mechanical ventilation with ARDS targeted strategies; will repeat ABG later this afternoon for anticipated proning this evening given P/F with borderline severe ARDS.   -Status post appropriate course of Azithromycin, Ceftriaxone, Dexamethasone, and one time dose of convalescent plasma since admission.   -Therapeutic anticoagulation with hypercoagulable state associated with COVID.   -See assessment for sepsis and Pseudomonas bacteremia.   -Diuretic boluses PRN to optimize ventilator mechanics; additional LASIX IV given today.   -Strict I/O's and daily weights, if possible.   -Goal O2 saturations >88%.   -Daily SAT/SBT as tolerated.

## 2020-11-26 NOTE — ASSESSMENT & PLAN NOTE
-Home regimen: Nifedipine and Lopressor.   -Noted onset of worsening hypertension by 11/24 following weaning of sedation requiring Cardene drip, however was discontinued by 11/25 following re-starting sedation.     Plan:   -Holding home agents with risk of hypotension associated with sedation.

## 2020-11-26 NOTE — SUBJECTIVE & OBJECTIVE
"Interval HPI:   Overnight events:  11/26/20  BG remains above goal this AM. However, BG is trending downwards towards goal. Endo will continue to follow and assess glycemic response.   Diet NPO        11/27/20:   BG has trended downwards below goal. Patient has fallen off of IV insulin infusion overnight. Insulin needs are decreasing even while patient remains on steroid and enteral nutritional therapy.     Eating:   NPO  Nausea: No  Hypoglycemia and intervention: No  Fever: No  TPN and/or TF: Yes  If yes, type of TF/TPN and rate: TF @ 20 ml/hr.     /61   Pulse 77   Temp 96.1 °F (35.6 °C)   Resp (!) 22   Ht 5' 7" (1.702 m)   Wt 76.7 kg (169 lb)   LMP  (LMP Unknown)   SpO2 96%   Breastfeeding No   BMI 26.47 kg/m²     Labs Reviewed and Include    Recent Labs   Lab 11/26/20  0225   *   CALCIUM 8.4*   ALBUMIN 1.5*   PROT 5.2*      K 3.7   CO2 29      BUN 30*   CREATININE 0.6   ALKPHOS 172*   ALT 23   AST 22   BILITOT 0.6     Lab Results   Component Value Date    WBC 5.65 11/26/2020    HGB 7.0 (L) 11/26/2020    HCT 23.9 (L) 11/26/2020     (H) 11/26/2020     11/26/2020     No results for input(s): TSH, FREET4 in the last 168 hours.  Lab Results   Component Value Date    HGBA1C 8.0 (H) 11/05/2020       Nutritional status:   Body mass index is 26.47 kg/m².  Lab Results   Component Value Date    ALBUMIN 1.5 (L) 11/26/2020    ALBUMIN 1.5 (L) 11/25/2020    ALBUMIN 1.8 (L) 11/24/2020     No results found for: PREALBUMIN    Estimated Creatinine Clearance: 102.4 mL/min (based on SCr of 0.6 mg/dL).    Accu-Checks  Recent Labs     11/25/20  0050 11/25/20  0449 11/25/20  0809 11/25/20  1031 11/25/20  1220 11/25/20  1611 11/25/20  2005 11/26/20  0005 11/26/20  0424 11/26/20  0933   POCTGLUCOSE 224* 220* 201* 184* 214* 206* 235* 259* 264* 214*       Current Medications and/or Treatments Impacting Glycemic Control  Immunotherapy:    Immunosuppressants         Stop Route Frequency     " tacrolimus (PROGRAF) 1 mg/mL oral syringe      -- PER G TUBE 2 times daily        Steroids:   Hormones (From admission, onward)    Start     Stop Route Frequency Ordered    11/21/20 2100  hydrocortisone sodium succinate injection 50 mg      -- IV 2 times daily 11/21/20 1016        Pressors:    Autonomic Drugs (From admission, onward)    Start     Stop Route Frequency Ordered    11/25/20 1815  NORepinephrine bitartrate 8 mg in dextrose 5% 250 mL infusion     Question Answer Comment   Titrate by: (in mcg/kg/min) 0.02    Titrate interval: (in minutes) 5    Titrate to maintain: (MAP or SBP) MAP    Greater than: (in mmHg) 65    Maximum dose: (in mcg/kg/min) 3        -- IV Continuous 11/25/20 1717    11/25/20 1030  cisatracurium (NIMBEX) 200 mg in dextrose 5 % 100 mL infusion     Question Answer Comment   Bolus over 1 minute (in mg): 3    Begin at (in mcg/kg/min): 1    Titrate by: (in mcg/kg/min) 0.5    Titrate interval: (in minutes) 15    To maintain a train-of-four of (TOF_/4): 2/4    Maximum dose: (in mcg/kg/min) 10        -- IV Continuous 11/25/20 0924    11/12/20 1243  norepinephrine 1 mg/mL injection     Note to Pharmacy: Created by cabinet override    11/13 0059   11/12/20 1243        Hyperglycemia/Diabetes Medications:   Antihyperglycemics (From admission, onward)    Start     Stop Route Frequency Ordered    11/17/20 1845  insulin regular in 0.9 % NaCl 100 unit/100 mL (1 unit/mL) infusion     Question:  Enter initial dose (Units/hr):  Answer:  2    -- IV Continuous 11/17/20 1736 11/17/20 1836  insulin aspart U-100 pen 0-10 Units      -- SubQ As needed (PRN) 11/17/20 1736

## 2020-11-26 NOTE — ASSESSMENT & PLAN NOTE
-Most recent A1c 8 in 11/2020.   -Home regimen: Aspart with sliding scale.   -Noted worsening hyperglycemia since admission that was suspected to be secondary to steroids. Status post endocrine consultation on 11/17 for assistance with management.     Plan:   -Endocrine following; status post initiation of insulin drip.   -Goal glucose 140-180.

## 2020-11-26 NOTE — CARE UPDATE
Procedure Note  Prone Positioning  Critical Care Medicine       Chief Complaint: Acute respiratory distress syndrome (ARDS) due to COVID-19 virus  MRN: [id number]  LOS: 22  Sex: female  Age: 65 y.o.      Last ABG:   Recent Labs   Lab 11/26/20  1422   PH 7.392   PO2 76*   PCO2 48.0*   HCO3 29.2*   BE 4       Vital Signs (Most Recent):   Temp: 97.6 °F (36.4 °C) (11/26/20 1200)  Pulse: 79 (11/26/20 1309)  Resp: (!) 22 (11/26/20 1309)  BP: 115/61 (11/26/20 0800)  SpO2: (!) 92 % (11/26/20 1309)    Ventilator Settings:  Vent Mode: A/C  Oxygen Concentration (%):  [50-55] 50  Resp Rate Total:  [22 br/min] 22 br/min  Vt Set:  [360 mL] 360 mL  PEEP/CPAP:  [8 cmH20] 8 cmH20  Mean Airway Pressure:  [19 jvP18-10 cmH20] 20 cmH20    Indication: Severe, refractory ARDS. High risk for deterioration during proning procedure in need of direct monitoring by Critical Care personnel.     Prior to beginning the procedure, all appropriate equipment and personnel were gathered in the room. Two individuals were placed on each side of the patient and one person stood at the head of the bed and was dedicated to the management of the head of the patient, the endotracheal tube, and the ventilator lines. The person at the head of the bed  coordinated the steps of the proning procedure with team team at the direction of Critical Care team members at the bedside. The patient was first log-rolled 90 degrees onto their side towards the direction of their central venous catheter. Cardiac electrodes were then moved from the patient's anterior to the posterior. The patient?s knees, forehead, chest, and iliac crests were protected using adhesive pads and/or foam pads to reduce the risk of skin breakdown. Once properly positioned in the bed, another 90 degree log roll was performed placing the patient into the prone position. The patient's arms were placed alongside the body. The patient's head should be turned alternately to the right or left every 2  hours. The patient tolerated the procedure well.     Total Critical Care time (uninterrupted not including procedures): 10 minutes.         Eze Gonzalez MD, PGY-III  Internal Medicine Resident  Critical Care Medicine

## 2020-11-26 NOTE — PLAN OF CARE
Vital signs and labs reviewed.  Merrem for pseudomonas PNA/bacteremia.  Remains critically ill; chemically sedated and on minimal norepi.  Vent currently at 55%/PEEP 8.  Difficulty weaning due to agitation and HTN.  Will likely need trach/PEG for continued recovery.  COVID care per MICU; received dexamethasone, remdesivir, and plasma.  Currently on stress dose hydrocortisone.    Continue with tacrolimus.  Level 8.9 today and will follow closely.  Given her active infections, would aim for a level 6-8.  Holding MMF due to infections.  Please resume prednisone 20mg daily once stress steroids are off.  Continue OIP with ganciclovir, lamivudine, bactrim, and posaconazole.  [facility] for CLAD/DRAKE prophylaxis once stable.      Please call with any questions.

## 2020-11-26 NOTE — RESPIRATORY THERAPY
11/26/20 1634   Proning Patient   $ Proning Assessment Complete Yes;30 min  (3 RTs)   ETT Secure Yes   RN at bedside Yes   Toleration Good   Presentation of severe ARDS? Yes   Calculate P:F ratio 126   Check position of ETT at teeth 22   Position of ETT in xRay In good position   Is OG tube taped to ETT? Yes   Sufficient tubing length? Yes   Procedures to perform at bedside Immediately before proning:;Suction the patient;Pre-oxygenate with 100% FiO2;Performed vent check;Team members at bedside   Patient proned. 3 RTs, 3 RNs, 1 MD at bedside. Tolerate procedure well. Will continue to monitor.

## 2020-11-26 NOTE — ASSESSMENT & PLAN NOTE
-Developed worsening hypoxia by 11/12 requiring intubation, associated with worsening leukocytosis, persistent fevers, and transient DWAYNE with repeat infectious work-up at that time significant for blood and respiratory cultures positive for Pseudomonas.  -Status post ID consultation and initiation on Meropenem on 11/12 with subsequent cultures remaining NGTD.   -Intermittent vasopressor support continues that may be related to sedation side effect.     Plan:   -Continue vasopressor support as needed to maintain MAP's >65; wean as tolerated.   -Will attempt to wean stress dose steroids.   -Continue Meropenem until 11/27.   -Continue immunosuppressant antimicrobial prophylaxis.

## 2020-11-26 NOTE — CARE UPDATE
Called Chely Becerra 's family member listed in chart to discuss patient's current clinical status. Briefly, patient is currently admitted for hypoxemic respiratory failure secondary to COVID-19 infection.    Addressed family's questions and concerns in addition to updating the current clinical status of the patient. Family member verbalized understanding of situation and plan.        Eze Gonzalez MD, PGY-III  Internal Medicine Resident  Critical Care Medicine

## 2020-11-27 PROBLEM — Z78.9 ON ENTERAL NUTRITION: Status: ACTIVE | Noted: 2020-01-01

## 2020-11-27 PROBLEM — E87.0 HYPERNATREMIA: Status: ACTIVE | Noted: 2020-01-01

## 2020-11-27 NOTE — PLAN OF CARE
Patient seen with housestaff team on morning rounds and then plan of care was discussed in detail.     65-year-old woman S/P left lung transplant in August 2020 currently admitted to the intensive care unit with acute hypoxemic respiratory failure secondary to covid pneumonia.        · Acute hypoxemic respiratory failure:  2/2 COVID  ? Patient remains intubated on mechanical ventilation  ? Now heavily sedated and on neuromuscular blockade with subsequent improvement in oxygenation.   ? Patient is currently on 70% FiO2   ? Continue proning protocol  ? Suspect patient would benefit from a tracheostomy to promote vent weaning.  Will consult surgery.   ? Good response to lasix yesterday; Continue diuresis  · Covid pneumonia:   S/P remdesivir  and dexamethasone.  Continue Lovenox for hypercoagulable state.  · Pseudomonal bacteremia:  she completes a course of meropenem today.  · Hypernatremia: starting enteral free water.  · History of lung transplant:  Immunosuppression and prophylaxis per lung transplant team.    Critical Care Time: 32 minutes  Critical care was time spent personally by me on the following activities: evaluating this patient's organ dysfunction, development of treatment plan, discussing treatment plan with patient or surrogate and bedside caregivers, discussions with consultants, evaluation of patient's response to treatment, examination of patient, ordering and performing treatments and interventions, ordering and review of laboratory studies, ordering and review of radiographic studies, re-evaluation of patient's condition. This critical care time did not overlap with that of any other provider or involve time for any procedures.

## 2020-11-27 NOTE — ASSESSMENT & PLAN NOTE
This is a 65 year old  female with PMH notable for recent lung transplant in 08/2020 for IPF who was admitted to [facility] on 11/04 for management of new acute hypoxemic respiratory failure associated with COVID-19 that eventually required intubation by 11/12 for progressive respiratory failure. Hospital course associated with development of Pseudmonas pneumonia and bacteremia.     Plan:   -Continue mechanical ventilation with ARDS targeted strategies; status post re-initiation of proning on 11/26; supinate this morning followed by repeat ABG prior to decision regarding repeat proning.   -Status post appropriate course of Azithromycin, Ceftriaxone, Dexamethasone, and one time dose of convalescent plasma since admission.   -Therapeutic anticoagulation with hypercoagulable state associated with COVID.   -See assessment for sepsis and Pseudomonas bacteremia.   -Diuretic boluses PRN to optimize ventilator mechanics; additional LASIX IV ordered today.   -Strict I/O's and daily weights, if possible.   -Goal O2 saturations >88%.   -Daily SAT/SBT as tolerated.

## 2020-11-27 NOTE — PLAN OF CARE
Vital signs and labs reviewed.  Merrem for pseudomonas PNA/bacteremia.  Remains critically ill; chemically sedated and paralyzed; proned overnight.  Vent currently at 60%/PEEP 8.  Difficulty weaning due to agitation and HTN.  Will likely need trach/PEG for continued recovery.  COVID care per MICU; received dexamethasone, remdesivir, and plasma.  Currently on stress dose hydrocortisone.     Continue with tacrolimus.  Level 7.3 today and will follow closely.  Given her active infections, would aim for a level 6-8.  Holding MMF due to infections.  Please resume prednisone 20mg daily once stress steroids are off.  Continue OIP with ganciclovir, lamivudine, bactrim, and posaconazole.  [facility] once [facility].       Please call with any questions.

## 2020-11-27 NOTE — CARE UPDATE
Procedure Note  Supine Positioning  Critical Care Medicine       Chief Complaint: Acute respiratory distress syndrome (ARDS) due to COVID-19 virus  MRN: [id number]  LOS: 23  Sex: female  Age: 65 y.o.      Last ABG:   Recent Labs   Lab 11/27/20  0349   PH 7.456*   PO2 69*   PCO2 48.5*   HCO3 34.2*   BE 10       Vital Signs (Most Recent):   Temp: 98.2 °F (36.8 °C) (11/27/20 0800)  Pulse: 67 (11/27/20 1000)  Resp: (!) 22 (11/27/20 1000)  BP: 115/61 (11/26/20 0800)  SpO2: 96 % (11/27/20 1000)    Ventilator Settings:  Vent Mode: A/C  Oxygen Concentration (%):  [50-60] 60  Resp Rate Total:  [22 br/min] 22 br/min  Vt Set:  [360 mL] 360 mL  PEEP/CPAP:  [8 cmH20] 8 cmH20  Mean Airway Pressure:  [20 bxR51-31 cmH20] 21 cmH20    Indication: Severe, refractory ARDS. High risk for deterioration during supination procedure in need of direct monitoring by Critical Care personnel.     Prior to beginning the procedure, all appropriate equipment and personnel were gathered in the room. Two individuals were placed on each side of the patient and two respiratory therapists stood at the head of the bed and was dedicated to the management of the head of the patient, the endotracheal tube, and the ventilator lines. The person at the head of the bed coordinated the steps of the supination procedure at the direction of Critical Care team members at the bedside. The patient was first log-rolled 90 degrees onto their side away from the direction of their central venous catheter. Cardiac electrodes were then moved from the patient's posterior back to the anterior. Once properly positioned in the bed, another 90 degree log roll was performed placing the patient into the supine position. The patient's arms were placed alongside the body. Continuous pulse oximetry and blood pressure monitoring was performed without any desaturation or hemodynamic instability. The patient tolerated the procedure well.     Total Critical Care time (uninterrupted not  including procedures): 10 minutes.         Eze Gonzalez MD, PGY-III  Internal Medicine Resident  Critical Care Medicine

## 2020-11-27 NOTE — SUBJECTIVE & OBJECTIVE
Interval History/Significant Events: Yesterday evening, re-initiated proning without decompensation overnight. However, her P/F this morning was decreased to 115 from 126 prior to proning. H/H increased to 8.4 following PRBC yesterday. Meropenem course for Pseudomonas bacteremia and pneumonia to end today.     Review of Systems   Unable to perform ROS: Intubated     Objective:     Vital Signs (Most Recent):  Temp: 97.9 °F (36.6 °C) (11/27/20 0400)  Pulse: 61 (11/27/20 0805)  Resp: (!) 22 (11/27/20 0805)  BP: 115/61 (11/26/20 0800)  SpO2: 96 % (11/27/20 0805) Vital Signs (24h Range):  Temp:  [93.4 °F (34.1 °C)-97.9 °F (36.6 °C)] 97.9 °F (36.6 °C)  Pulse:  [54-85] 61  Resp:  [22-28] 22  SpO2:  [88 %-100 %] 96 %  Arterial Line BP: ()/(45-90) 115/57   Weight: 76.7 kg (169 lb)  Body mass index is 26.47 kg/m².      Intake/Output Summary (Last 24 hours) at 11/27/2020 0842  Last data filed at 11/27/2020 0616  Gross per 24 hour   Intake 3036.9 ml   Output 1875 ml   Net 1161.9 ml       Physical Exam  Constitutional:       Appearance: She is ill-appearing.      Interventions: She is sedated, chemically paralyzed and intubated.   Eyes:      General: No scleral icterus.     Conjunctiva/sclera: Conjunctivae normal.   Cardiovascular:      Rate and Rhythm: Normal rate and regular rhythm.      Pulses: Normal pulses.      Heart sounds: Normal heart sounds.   Pulmonary:      Effort: Pulmonary effort is normal. No respiratory distress. She is intubated.      Breath sounds: Examination of the right-lower field reveals decreased breath sounds. Examination of the left-lower field reveals decreased breath sounds. Decreased breath sounds present.   Abdominal:      General: Bowel sounds are normal. There is no distension.      Palpations: Abdomen is soft.      Tenderness: There is no abdominal tenderness.   Skin:     General: Skin is warm and dry.      Findings: No bruising or rash.       Vents:  Vent Mode: A/C (11/27/20  0731)  Ventilator Initiated: Yes(chart correction) (11/12/20 0921)  Set Rate: 22 BPM (11/27/20 0731)  Vt Set: 360 mL (11/27/20 0731)  Pressure Support: 0 cmH20 (11/15/20 2115)  PEEP/CPAP: 8 cmH20 (11/27/20 0731)  Oxygen Concentration (%): 60 (11/27/20 0731)  Peak Airway Pressure: 41 cmH2O (11/27/20 0731)  Plateau Pressure: 35 cmH20 (11/27/20 0731)  Total Ve: 7.42 mL (11/27/20 0731)  F/VT Ratio<105 (RSBI): (!) 61.97 (11/27/20 0731)  Lines/Drains/Airways     Central Venous Catheter Line            Percutaneous Central Line Insertion/Assessment - Triple Lumen  11/12/20 1030 right internal jugular 14 days          Drain                 NG/OG Tube 11/13/20 0200 Davis sump 14 Fr. Center mouth 14 days         Urethral Catheter 11/12/20 1445 16 Fr. 14 days         Fecal Incontinence  11/25/20 1500 1 day          Airway                 Airway - Non-Surgical 11/12/20 0928 Endotracheal Tube 14 days          Arterial Line            Arterial Line 11/12/20 1053 Right Radial 14 days          Peripheral Intravenous Line                 Midline Catheter Insertion/Assessment  - Single Lumen 11/10/20 1603 Right basilic vein (medial side of arm) 18g x 10cm 16 days         Peripheral IV - Single Lumen 11/25/20 1034 Anterior;Distal;Left Forearm 1 day              Significant Labs:    CBC/Anemia Profile:  Recent Labs   Lab 11/26/20 0225 11/27/20 0357   WBC 5.65 5.98   HGB 7.0* 8.4*   HCT 23.9* 27.6*    153   * 106*   RDW 19.9* 20.3*        Chemistries:  Recent Labs   Lab 11/25/20  1750 11/26/20 0225 11/26/20  1300 11/27/20  0357   NA  --  145 146* 148*   K  --  3.7 3.5 3.6   CL  --  106 105 107   CO2  --  29 33* 32*   BUN  --  30* 30* 28*   CREATININE  --  0.6 0.6 0.6   CALCIUM  --  8.4* 8.3* 8.3*   ALBUMIN  --  1.5*  --  1.5*   PROT  --  5.2*  --  5.0*   BILITOT  --  0.6  --  0.5   ALKPHOS  --  172*  --  142*   ALT  --  23  --  21   AST  --  22  --  19   MG 2.3 2.3  --  1.9   PHOS 3.2 3.0  --  2.5*        Significant Imaging:  I have reviewed all pertinent imaging results/findings within the past 24 hours.

## 2020-11-27 NOTE — CARE UPDATE
Procedure Note  Prone Positioning  Critical Care Medicine       Chief Complaint: Acute respiratory distress syndrome (ARDS) due to COVID-19 virus  MRN: [id number]  LOS: 23  Sex: female  Age: 65 y.o.      Last ABG:   Recent Labs   Lab 11/27/20  1509   PH 7.486*   PO2 79*   PCO2 43.7   HCO3 33.0*   BE 10       Vital Signs (Most Recent):   Temp: 97.8 °F (36.6 °C) (11/27/20 1128)  Pulse: (!) 56 (11/27/20 1621)  Resp: (!) 22 (11/27/20 1621)  BP: 115/61 (11/26/20 0800)  SpO2: (!) 93 % (11/27/20 1621)    Ventilator Settings:  Vent Mode: A/C  Oxygen Concentration (%):  [50-60] 50  Resp Rate Total:  [22 br/min] 22 br/min  Vt Set:  [360 mL] 360 mL  PEEP/CPAP:  [8 cmH20] 8 cmH20  Mean Airway Pressure:  [18 jlP08-04 cmH20] 19 cmH20    Indication: Severe, refractory ARDS. High risk for deterioration during proning procedure in need of direct monitoring by Critical Care personnel.     Prior to beginning the procedure, all appropriate equipment and personnel were gathered in the room. Two individuals were placed on each side of the patient and one person stood at the head of the bed and was dedicated to the management of the head of the patient, the endotracheal tube, and the ventilator lines. The person at the head of the bed  coordinated the steps of the proning procedure with team team at the direction of Critical Care team members at the bedside. The patient was first log-rolled 90 degrees onto their side towards the direction of their central venous catheter. Cardiac electrodes were then moved from the patient's anterior to the posterior. The patient?s knees, forehead, chest, and iliac crests were protected using adhesive pads and/or foam pads to reduce the risk of skin breakdown. Once properly positioned in the bed, another 90 degree log roll was performed placing the patient into the prone position. The patient's arms were placed alongside the body. The patient's head should be turned alternately to the right or left every 2  hours. The patient tolerated the procedure well.     Total Critical Care time (uninterrupted not including procedures): 10 minutes      Sruthi Styles DO  Internal Medicine Resident, PGY 1

## 2020-11-27 NOTE — ASSESSMENT & PLAN NOTE
-Developed worsening hypoxia by 11/12 requiring intubation, associated with worsening leukocytosis, persistent fevers, and transient DWAYNE with repeat infectious work-up at that time significant for blood and respiratory cultures positive for Pseudomonas.  -Status post ID consultation and initiation on Meropenem on 11/12 with subsequent cultures remaining NGTD.   -Intermittent vasopressor support continues that may be related to sedation side effect.     Plan:   -Continue vasopressor support as needed to maintain MAP's >65; wean as tolerated.   -Transitioned stress dose steroid regimen to home Prednisone 20 mg on 11/27.   -Continue Meropenem until 11/27.   -Continue immunosuppressant antimicrobial prophylaxis.

## 2020-11-27 NOTE — ASSESSMENT & PLAN NOTE
-Intermittent issue since 11/21 and suspected secondary to free water deficit.     Plan:  -Free water boluses ordered QID.

## 2020-11-27 NOTE — PROGRESS NOTES
[facility] - ICU 16   Critical Care Medicine  Progress Note    Patient Name: Chely Becerra  MRN: [id number]  Admission Date: 11/4/2020  Hospital Length of Stay: 23 days  Code Status: Full Code  Attending Provider: Susys Khan MD  Primary Care Provider: ZAHIDA Stack MD   Principal Problem: Acute respiratory distress syndrome (ARDS) due to COVID-19 virus    Subjective:     HPI:  Ms. Chely Becerra is a 65 y.o. year old female with a PMHx of diabetes and idiopathic pulmonary fibrosis s/p unilateral (left) lung transplant in August 2020 who presents to the ED complaining of a shortness of breath that started last Friday and progressively got worse. She had a fever of 101 on Sunday, started coughing for the last 3 days nonproductive, denies any chest pain. She was seen in the [facility] for routine testing earlier this morning was found to be hypoxic and she was referred to the emergency department. On arrival her COVID-19 test is positive.     Patient lives with her 2 daughters, 1 of the daughters has been having some respiratory symptoms recently however she thought was secondary to asthma. he is on prednisone taper currently 20 mg daily, she has been compliant with her antirejection medication Prograf and CellCept. She was started on BiPAP in the ED with improvement in her oxygen saturations to the mid 90's.     Hospital/ICU Course:  Ms. Chely Becerra was admitted to [facility] on 11/04/2020 for management of new acute hypoxemic respiratory failure secondary to COVID-19. Her respiratory status was stabilized on admission alternating between BiPAP and comfort flow. She was initiated on Azithromycin and Ceftriaxone for CAP coverage, in addition to home Voriconazole prophylaxis. She completed a 10-day course of Dexamethasone from 11/04 to 11/13, in addition to a 5 day course of Remdesivir, and one time dose of convalescent plasma. However, by 11/12, her hypoxia progressed,  associated with worsening leukocytosis, new DWAYNE, and persistent fevers, requiring intubation at that time. Repeat infectious work-up was significant for blood and respiratory cultures from 11/12 positive for Pseudmonas. ID was consulted and she was initiatedon Meropenem on 11/12 with subsequent blood cultures remaining NGTD. DWAYNE resolved by 11/14 without need for dialysis. Intubation and subsequent sedation were also associated with onset of shock requiring vasopressor support until 11/20. She was initiated on intermittent proning with improvements in oxygenation, however this was limited by intermittent episodes of bradycardia. Endocrinology was consulted on 11/17 for hyperglycemia management. Following weaning sedation by 11/23, she developed worsening hypertension requiring a Cardene drip by 11/24. By 11/25, she was intolerable of weaning ventilator support and sedation and neuromuscular blockade was re-initiated; proning resumed on 11/26 for attempted improvement in oxygenation. On 11/26, Hgb progressively downtrended since admission and PRBC given with adequate response. She remains on Meropenem until 11/27. Lung transplant team following for assistance with home immunosuppressive medications.     Interval History/Significant Events: Yesterday evening, re-initiated proning without decompensation overnight. However, her P/F this morning was decreased to 115 from 126 prior to proning. H/H increased to 8.4 following PRBC yesterday. Meropenem course for Pseudomonas bacteremia and pneumonia to end today.     Review of Systems   Unable to perform ROS: Intubated     Objective:     Vital Signs (Most Recent):  Temp: 97.9 °F (36.6 °C) (11/27/20 0400)  Pulse: 61 (11/27/20 0805)  Resp: (!) 22 (11/27/20 0805)  BP: 115/61 (11/26/20 0800)  SpO2: 96 % (11/27/20 0805) Vital Signs (24h Range):  Temp:  [93.4 °F (34.1 °C)-97.9 °F (36.6 °C)] 97.9 °F (36.6 °C)  Pulse:  [54-85] 61  Resp:  [22-28] 22  SpO2:  [88 %-100 %] 96  %  Arterial Line BP: ()/(45-90) 115/57   Weight: 76.7 kg (169 lb)  Body mass index is 26.47 kg/m².      Intake/Output Summary (Last 24 hours) at 11/27/2020 0842  Last data filed at 11/27/2020 0616  Gross per 24 hour   Intake 3036.9 ml   Output 1875 ml   Net 1161.9 ml       Physical Exam  Constitutional:       Appearance: She is ill-appearing.      Interventions: She is sedated, chemically paralyzed and intubated.   Eyes:      General: No scleral icterus.     Conjunctiva/sclera: Conjunctivae normal.   Cardiovascular:      Rate and Rhythm: Normal rate and regular rhythm.      Pulses: Normal pulses.      Heart sounds: Normal heart sounds.   Pulmonary:      Effort: Pulmonary effort is normal. No respiratory distress. She is intubated.      Breath sounds: Examination of the right-lower field reveals decreased breath sounds. Examination of the left-lower field reveals decreased breath sounds. Decreased breath sounds present.   Abdominal:      General: Bowel sounds are normal. There is no distension.      Palpations: Abdomen is soft.      Tenderness: There is no abdominal tenderness.   Skin:     General: Skin is warm and dry.      Findings: No bruising or rash.       Vents:  Vent Mode: A/C (11/27/20 0731)  Ventilator Initiated: Yes(chart correction) (11/12/20 0921)  Set Rate: 22 BPM (11/27/20 0731)  Vt Set: 360 mL (11/27/20 0731)  Pressure Support: 0 cmH20 (11/15/20 2115)  PEEP/CPAP: 8 cmH20 (11/27/20 0731)  Oxygen Concentration (%): 60 (11/27/20 0731)  Peak Airway Pressure: 41 cmH2O (11/27/20 0731)  Plateau Pressure: 35 cmH20 (11/27/20 0731)  Total Ve: 7.42 mL (11/27/20 0731)  F/VT Ratio<105 (RSBI): (!) 61.97 (11/27/20 0731)  Lines/Drains/Airways     Central Venous Catheter Line            Percutaneous Central Line Insertion/Assessment - Triple Lumen  11/12/20 1030 right internal jugular 14 days          Drain                 NG/OG Tube 11/13/20 0200 [place] sump 14 Fr. Center mouth 14 days         Urethral Catheter  11/12/20 1445 16 Fr. 14 days         Fecal Incontinence  11/25/20 1500 1 day          Airway                 Airway - Non-Surgical 11/12/20 0928 Endotracheal Tube 14 days          Arterial Line            Arterial Line 11/12/20 1053 Right Radial 14 days          Peripheral Intravenous Line                 Midline Catheter Insertion/Assessment  - Single Lumen 11/10/20 1603 Right basilic vein (medial side of arm) 18g x 10cm 16 days         Peripheral IV - Single Lumen 11/25/20 1034 Anterior;Distal;Left Forearm 1 day              Significant Labs:    CBC/Anemia Profile:  Recent Labs   Lab 11/26/20  0225 11/27/20  0357   WBC 5.65 5.98   HGB 7.0* 8.4*   HCT 23.9* 27.6*    153   * 106*   RDW 19.9* 20.3*        Chemistries:  Recent Labs   Lab 11/25/20  1750 11/26/20  0225 11/26/20  1300 11/27/20  0357   NA  --  145 146* 148*   K  --  3.7 3.5 3.6   CL  --  106 105 107   CO2  --  29 33* 32*   BUN  --  30* 30* 28*   CREATININE  --  0.6 0.6 0.6   CALCIUM  --  8.4* 8.3* 8.3*   ALBUMIN  --  1.5*  --  1.5*   PROT  --  5.2*  --  5.0*   BILITOT  --  0.6  --  0.5   ALKPHOS  --  172*  --  142*   ALT  --  23  --  21   AST  --  22  --  19   MG 2.3 2.3  --  1.9   PHOS 3.2 3.0  --  2.5*       Significant Imaging:  I have reviewed all pertinent imaging results/findings within the past 24 hours.      ABG  Recent Labs   Lab 11/27/20  0349   PH 7.456*   PO2 69*   PCO2 48.5*   HCO3 34.2*   BE 10     Assessment/Plan:     Derm  Pressure ulcer of head, stage 2  -Secondary to ETT villanueva.     Plan:   -Wound care consulted and following recommendations.     Pulmonary  Pneumonia due to Pseudomonas aeruginosa  -See assessment for sepsis.     Status post lung transplantation  -Status post left lung transplant in 08/2020 for IPF, but not associated with baseline supplement oxygen requirements since transplant.   -Home regimen: AUGMENTIN, Lamivudine, CELLCEPT, Prednisone, TMP/SMX, Tacrolimus, Valganciclovir, and Voriconazole.      Plan:   -Lung transplant following for assistance with continuing immunosuppressive regimen.     Cardiac/Vascular  Essential hypertension  -Home regimen: Nifedipine and Lopressor.   -Noted onset of worsening hypertension by 11/24 following weaning of sedation requiring Cardene drip, however was discontinued by 11/25 following re-starting sedation.     Plan:   -Holding home agents with risk of hypotension associated with sedation.     Renal/  Hypernatremia  -Intermittent issue since 11/21 and suspected secondary to free water deficit.     Plan:  -Free water boluses ordered QID.     ID  Bacteremia due to Pseudomonas  -See assessment for sepsis.     Sepsis, unspecified organism  -See assessment for shock.     Oncology  Macrocytic anemia  -Noted to develop worsening anemia throughout admission with Hgb downtrending to 7 on 11/26; status post 1U PRBC with good response.   -No signs of blood loss on exam.     Plan:   -Trending CBC and transfuse for Hgb <7.     Endocrine  Type 2 diabetes mellitus, with long-term current use of insulin  -Most recent A1c 8 in 11/2020.   -Home regimen: Aspart with sliding scale.   -Noted worsening hyperglycemia since admission that was suspected to be secondary to steroids. Status post endocrine consultation on 11/17 for assistance with management.     Plan:   -Endocrine following; status post initiation of insulin drip.   -Goal glucose 140-180.     Other  * Acute respiratory distress syndrome (ARDS) due to COVID-19 virus  This is a 65 year old  female with PMH notable for recent lung transplant in 08/2020 for IPF who was admitted to [facility] on 11/04 for management of new acute hypoxemic respiratory failure associated with COVID-19 that eventually required intubation by 11/12 for progressive respiratory failure. Hospital course associated with development of Pseudmonas pneumonia and bacteremia.     Plan:   -Continue mechanical ventilation with ARDS targeted strategies;  status post re-initiation of proning on 11/26; supinate this morning followed by repeat ABG prior to decision regarding repeat proning.   -Status post appropriate course of Azithromycin, Ceftriaxone, Dexamethasone, and one time dose of convalescent plasma since admission.   -Therapeutic anticoagulation with hypercoagulable state associated with COVID.   -See assessment for sepsis and Pseudomonas bacteremia.   -Diuretic boluses PRN to optimize ventilator mechanics; additional LASIX IV ordered today.   -Strict I/O's and daily weights, if possible.   -Goal O2 saturations >88%.   -Daily SAT/SBT as tolerated.     Long-term use of immunosuppressant medication  -See assessment for lung transplant.     Pressure injury of right buttock, unstageable  Plan:   -Wound care consulted and following recommendations.     Shock, unspecified  -Developed worsening hypoxia by 11/12 requiring intubation, associated with worsening leukocytosis, persistent fevers, and transient DWAYNE with repeat infectious work-up at that time significant for blood and respiratory cultures positive for Pseudomonas.  -Status post ID consultation and initiation on Meropenem on 11/12 with subsequent cultures remaining NGTD.   -Intermittent vasopressor support continues that may be related to sedation side effect.     Plan:   -Continue vasopressor support as needed to maintain MAP's >65; wean as tolerated.   -Transitioned stress dose steroid regimen to home Prednisone 20 mg on 11/27.   -Continue Meropenem until 11/27.   -Continue immunosuppressant antimicrobial prophylaxis.     Elevated alkaline phosphatase level  -Noted issue since admission.   -Liver function WNL.     Plan:   -Trending liver function daily.     Palliative care encounter  Plan:   -Family updates daily.   -Code status: FULL.        Critical Care Daily Checklist:    A: Awake: RASS Goal/Actual Goal: RASS Goal: -5-->unarousable  Actual: [place] Agitation Sedation Scale (RASS): Unarousable   B:  Spontaneous Breathing Trial Performed? Spon. Breathing Trial Initiated?: Not initiated (11/26/20 0749)   C: SAT & SBT Coordinated?  Not applicable                      D: Delirium: CAM-ICU Overall CAM-ICU: Positive   E: Early Mobility Performed? No   F: Feeding Goal: Goals: Pt to meet 50-75% estimated kcal and protein needs by RD f/u.  Status: Nutrition Goal Status: progressing towards goal, goal met   Current Diet Order   Procedures    Diet NPO      AS: Analgesia/Sedation Fentanyl and Propofol   T: Thromboembolic Prophylaxis LOVENOX   H: HOB > 300 Yes   U: Stress Ulcer Prophylaxis (if needed) Famotidine   G: Glucose Control Yes; insulin drip.    B: Bowel Function Stool Occurrence: 1   I: Indwelling Catheter (Lines & Rios) Necessity Central line   Rios for monitoring I/O's.    D: De-escalation of Antimicrobials/Pharmacotherapies Discontinue Meropenem.     Plan for the day/ETD Supination followed by repeat ABG with anticipated re-proning.     Code Status:  Family/Goals of Care: Full Code         Critical secondary to Patient has a condition that poses threat to life and bodily function: Severe Respiratory Distress      Critical care was time spent personally by me on the following activities: development of treatment plan with patient or surrogate and bedside caregivers, discussions with consultants, evaluation of patient's response to treatment, examination of patient, ordering and performing treatments and interventions, ordering and review of laboratory studies, ordering and review of radiographic studies, pulse oximetry, re-evaluation of patient's condition. This critical care time did not overlap with that of any other provider or involve time for any procedures.     Eze Gonzalez MD  Critical Care Medicine  [facility] - ICU [facility]

## 2020-11-27 NOTE — PROGRESS NOTES
"[facility] ICU [facility]  Endocrinology  Progress Note    Admit Date: 11/4/2020     Reason for Consult: Management of pre-dm, Hyperglycemia      Surgical Procedure and Date: Left lung transplant n 8/10/20     Diabetes diagnosis year: pre-dm - diet controlled prior to transplant   Lab Results   Component Value Date    HGBA1C 8.0 (H) 11/05/2020             Home Diabetes Medications:novolog 10 units TID with meals plus correction scale.     How often checking glucose at home? NAVEEN   BG readings on regimen: NAVEEN  Hypoglycemia on the regimen? NAVEEN   Missed doses on regimen?  NAVEEN     Diabetes Complications include:     none  Complicating diabetes co morbidities:   Glucocorticoid use  s/p lung transplant  covid infection        HPI:   Patient is a 65 y.o. female with a diagnosis of diabetes and idiopathic pulmonary fibrosis s/p unilateral (left) lung transplant. Patient presents to the ED complaining of a shortness of breath that progressively got worse. On arrival her COVID-19 test is positive. Patient clinically declined on 11/13 and was intubated. Endocrinology consulted for BG/ Dm management.           Interval HPI:   Overnight events:  11/26/20  BG remains above goal this AM. However, BG is trending downwards towards goal. Endo will continue to follow and assess glycemic response.   Diet NPO        11/27/20:   BG has trended downwards below goal. Patient has fallen off of IV insulin infusion overnight. Insulin needs are decreasing even while patient remains on steroid and enteral nutritional therapy.     Eating:   NPO  Nausea: No  Hypoglycemia and intervention: No  Fever: No  TPN and/or TF: Yes  If yes, type of TF/TPN and rate: TF @ 20 ml/hr.     /61   Pulse 77   Temp 96.1 °F (35.6 °C)   Resp (!) 22   Ht 5' 7" (1.702 m)   Wt 76.7 kg (169 lb)   LMP  (LMP Unknown)   SpO2 96%   Breastfeeding No   BMI 26.47 kg/m²     Labs Reviewed and Include    Recent Labs   Lab 11/26/20  0225   *   CALCIUM " 8.4*   ALBUMIN 1.5*   PROT 5.2*      K 3.7   CO2 29      BUN 30*   CREATININE 0.6   ALKPHOS 172*   ALT 23   AST 22   BILITOT 0.6     Lab Results   Component Value Date    WBC 5.65 11/26/2020    HGB 7.0 (L) 11/26/2020    HCT 23.9 (L) 11/26/2020     (H) 11/26/2020     11/26/2020     No results for input(s): TSH, FREET4 in the last 168 hours.  Lab Results   Component Value Date    HGBA1C 8.0 (H) 11/05/2020       Nutritional status:   Body mass index is 26.47 kg/m².  Lab Results   Component Value Date    ALBUMIN 1.5 (L) 11/26/2020    ALBUMIN 1.5 (L) 11/25/2020    ALBUMIN 1.8 (L) 11/24/2020     No results found for: PREALBUMIN    Estimated Creatinine Clearance: 102.4 mL/min (based on SCr of 0.6 mg/dL).    Accu-Checks  Recent Labs     11/25/20  0050 11/25/20  0449 11/25/20  0809 11/25/20  1031 11/25/20  1220 11/25/20  1611 11/25/20  2005 11/26/20  0005 11/26/20  0424 11/26/20  0933   POCTGLUCOSE 224* 220* 201* 184* 214* 206* 235* 259* 264* 214*       Current Medications and/or Treatments Impacting Glycemic Control  Immunotherapy:    Immunosuppressants         Stop Route Frequency     tacrolimus (PROGRAF) 1 mg/mL oral syringe      -- PER G TUBE 2 times daily        Steroids:   Hormones (From admission, onward)    Start     Stop Route Frequency Ordered    11/21/20 2100  hydrocortisone sodium succinate injection 50 mg      -- IV 2 times daily 11/21/20 1016        Pressors:    Autonomic Drugs (From admission, onward)    Start     Stop Route Frequency Ordered    11/25/20 1815  NORepinephrine bitartrate 8 mg in dextrose 5% 250 mL infusion     Question Answer Comment   Titrate by: (in mcg/kg/min) 0.02    Titrate interval: (in minutes) 5    Titrate to maintain: (MAP or SBP) MAP    Greater than: (in mmHg) 65    Maximum dose: (in mcg/kg/min) 3        -- IV Continuous 11/25/20 1717    11/25/20 1030  cisatracurium (NIMBEX) 200 mg in dextrose 5 % 100 mL infusion     Question Answer Comment   Bolus over 1  minute (in mg): 3    Begin at (in mcg/kg/min): 1    Titrate by: (in mcg/kg/min) 0.5    Titrate interval: (in minutes) 15    To maintain a train-of-four of (TOF_/4): 2/4    Maximum dose: (in mcg/kg/min) 10        -- IV Continuous 11/25/20 0924    11/12/20 1243  norepinephrine 1 mg/mL injection     Note to Pharmacy: Created by cabinet override    11/13 0059 11/12/20 1243        Hyperglycemia/Diabetes Medications:   Antihyperglycemics (From admission, onward)    Start     Stop Route Frequency Ordered    11/17/20 1845  insulin regular in 0.9 % NaCl 100 unit/100 mL (1 unit/mL) infusion     Question:  Enter initial dose (Units/hr):  Answer:  2    -- IV Continuous 11/17/20 1736 11/17/20 1836  insulin aspart U-100 pen 0-10 Units      -- SubQ As needed (PRN) 11/17/20 1736          ASSESSMENT and PLAN    * Acute respiratory distress syndrome (ARDS) due to COVID-19 virus  Managed per primary team  Avoid hypoglycemia        Type 2 diabetes mellitus, with long-term current use of insulin  BG goal 140-180.   11/26/20  - transition insulin infusion to 1.6  u/hr with stepdown parameters.  - Moderate Dose SQ Insulin Correction Scale.   - BG monitoring every 4 hours and moderate dose correction scale.     11/27/20:  - Discontinue IV insulin infusion orders. BG has fallen below goal.   - Start Low Dose SQ Insulin Correction Scale PRN BG excursions.   - BG monitoring every 4 hours while on enteral nutritional therapy.     8:08 PM  - Start Novolog 4 units q 4. TF rate increased. BG trending upwards above goal.     ** Please call Endocrine for any BG related issues **  ** Please notify Endocrine for any change and/or advance in diet**    Discharge Planing:   TBD. Please notify endocrinology prior to discharge.       On enteral nutrition  May elevate BG readings  May increase insulin requirements        Status post lung transplantation  Managed per primary team  Avoid hypoglycemia        Adrenal cortical steroids causing adverse  effect in therapeutic use  Glucocorticoids markedly increase  glucoses. Expect the steroid taper will help glucose control.             Bhupendra Rey, NP  Endocrinology  [facility]

## 2020-11-27 NOTE — ASSESSMENT & PLAN NOTE
-Noted to develop worsening anemia throughout admission with Hgb downtrending to 7 on 11/26; status post 1U PRBC with good response.   -No signs of blood loss on exam.     Plan:   -Trending CBC and transfuse for Hgb <7.

## 2020-11-27 NOTE — PROGRESS NOTES
Wound care follow up:     Assessment and pictures listed below. Unable to assess R cheek due to pt in prone position. Nursing to continue with orders in place. Wound care to continue to follow pt PRN [id number]    L/R buttocks- 13 x 8 x 0.1 cm  - Hospital acquired evolving purple/maroon discoloration that continues to be an unstageable pressure injury with soft eschar developing. Small amount of serosanguineous drainage. No odor

## 2020-11-28 NOTE — SUBJECTIVE & OBJECTIVE
Interval History/Significant Events: Proning overnight w/o overnight event. However, her P/F this morning stable at 115. Supination completed this AM pending PM ABG to decide re-proning need. Gen surg consulted for trach/PEG eval.     Review of Systems   Unable to perform ROS: Intubated     Objective:     Vital Signs (Most Recent):  Temp: 97.5 °F (36.4 °C) (11/28/20 1118)  Pulse: 87 (11/28/20 1118)  Resp: 19 (11/28/20 1118)  BP: 112/77 (11/28/20 1118)  SpO2: 97 % (11/28/20 1118) Vital Signs (24h Range):  Temp:  [93.9 °F (34.4 °C)-100.4 °F (38 °C)] 97.5 °F (36.4 °C)  Pulse:  [53-87] 87  Resp:  [19-22] 19  SpO2:  [93 %-100 %] 97 %  BP: (112-124)/(65-77) 112/77  Arterial Line BP: ()/(50-74) 108/57   Weight: 76.7 kg (169 lb)  Body mass index is 26.47 kg/m².      Intake/Output Summary (Last 24 hours) at 11/28/2020 1513  Last data filed at 11/28/2020 0600  Gross per 24 hour   Intake 1617.53 ml   Output 990 ml   Net 627.53 ml       Physical Exam  Constitutional:       Appearance: She is ill-appearing.      Interventions: She is sedated, chemically paralyzed and intubated.   Eyes:      General: No scleral icterus.     Conjunctiva/sclera: Conjunctivae normal.   Cardiovascular:      Rate and Rhythm: Normal rate and regular rhythm.      Pulses: Normal pulses.      Heart sounds: Normal heart sounds.   Pulmonary:      Effort: Pulmonary effort is normal. No respiratory distress. She is intubated.      Breath sounds: Examination of the right-lower field reveals decreased breath sounds. Examination of the left-lower field reveals decreased breath sounds. Decreased breath sounds present.   Abdominal:      General: Bowel sounds are normal. There is no distension.      Palpations: Abdomen is soft.      Tenderness: There is no abdominal tenderness.   Skin:     General: Skin is warm and dry.      Findings: No bruising or rash.       Vents:  Vent Mode: A/C (11/28/20 1118)  Ventilator Initiated: Yes(chart correction) (11/12/20  0921)  Set Rate: 22 BPM (11/28/20 1118)  Vt Set: 360 mL (11/28/20 1118)  Pressure Support: 0 cmH20 (11/15/20 2115)  PEEP/CPAP: 8 cmH20 (11/28/20 1118)  Oxygen Concentration (%): 60 (11/28/20 1118)  Peak Airway Pressure: 33 cmH2O (11/28/20 1118)  Plateau Pressure: 32 cmH20 (11/28/20 1118)  Total Ve: 7.77 mL (11/28/20 1118)  F/VT Ratio<105 (RSBI): (!) 53.37 (11/28/20 1118)  Lines/Drains/Airways     Central Venous Catheter Line            Percutaneous Central Line Insertion/Assessment - Triple Lumen  11/12/20 1030 right internal jugular 16 days          Drain                 Urethral Catheter 11/12/20 1445 16 Fr. 16 days         NG/OG Tube 11/13/20 0200 [place] sump 14 Fr. Center mouth 15 days         Fecal Incontinence  11/25/20 1500 3 days          Airway                 Airway - Non-Surgical 11/12/20 0928 Endotracheal Tube 16 days          Arterial Line            Arterial Line 11/12/20 1053 Right Radial 16 days          Peripheral Intravenous Line                 Midline Catheter Insertion/Assessment  - Single Lumen 11/10/20 1603 Right basilic vein (medial side of arm) 18g x 10cm 17 days         Peripheral IV - Single Lumen 11/25/20 1034 Anterior;Distal;Left Forearm 3 days              Significant Labs:    CBC/Anemia Profile:  Recent Labs   Lab 11/27/20  0357 11/28/20  0319   WBC 5.98 5.95   HGB 8.4* 8.5*   HCT 27.6* 28.1*    160   * 106*   RDW 20.3* 20.0*        Chemistries:  Recent Labs   Lab 11/27/20  0357 11/28/20  0319   * 146*   K 3.6 4.0    105   CO2 32* 31*   BUN 28* 27*   CREATININE 0.6 0.6   CALCIUM 8.3* 8.5*   ALBUMIN 1.5* 1.5*   PROT 5.0* 5.4*   BILITOT 0.5 0.5   ALKPHOS 142* 150*   ALT 21 22   AST 19 27   MG 1.9 1.8   PHOS 2.5* 3.3       Significant Imaging:  I have reviewed all pertinent imaging results/findings within the past 24 hours.

## 2020-11-28 NOTE — PLAN OF CARE
Vital signs and labs reviewed.  Merrem for pseudomonas PNA/bacteremia.  Remains critically ill; chemically sedated and paralyzed; proned overnight.  Vent currently at 60%/PEEP 8.  Difficulty weaning due to agitation and HTN.  Will likely need trach/PEG for continued recovery.  COVID care per MICU; received dexamethasone, remdesivir, and plasma.  Currently on stress dose hydrocortisone.     Continue with tacrolimus.  Level 7.3 today and will follow closely.  Given her active infections, would aim for a level 6-8.  Holding MMF due to infections.  Please resume prednisone 20mg daily once stress steroids are off.  Continue OIP with ganciclovir, lamivudine, bactrim, and posaconazole.  [facility] once stable.       Please call with any questions.

## 2020-11-28 NOTE — PLAN OF CARE
Patient seen with housestaff team on morning rounds and then plan of care was discussed in detail.     65-year-old woman S/P left lung transplant in August 2020 currently admitted to the intensive care unit with acute hypoxemic respiratory failure secondary to covid pneumonia.        · Acute hypoxemic respiratory failure:  2/2 COVID  ? Patient remains intubated on mechanical ventilation  ? Now heavily sedated and on neuromuscular blockade with subsequent improvement in oxygenation. On low dose norepi 0.02, to tolerate sedation.  ? Patient is currently on 50% FiO2 and peep of 8 (supine) with O2 Sat of 96%   ? Continue proning protocol  ? Suspect patient would benefit from a tracheostomy to promote vent weaning.  Will consult surgery.   ? Continue diuresis  · Covid pneumonia:   S/P remdesivir  and dexamethasone.  Continue Lovenox for hypercoagulable state.  · Pseudomonal bacteremia:  she has completed a course of meropenem   · Hypernatremia: continue enteral free water.  · History of lung transplant:  Immunosuppression and prophylaxis per lung transplant team.    Critical Care Time: 30 minutes  Critical care was time spent personally by me on the following activities: evaluating this patient's organ dysfunction, development of treatment plan, discussing treatment plan with patient or surrogate and bedside caregivers, discussions with consultants, evaluation of patient's response to treatment, examination of patient, ordering and performing treatments and interventions, ordering and review of laboratory studies, ordering and review of radiographic studies, re-evaluation of patient's condition. This critical care time did not overlap with that of any other provider or involve time for any procedures.

## 2020-11-28 NOTE — CARE UPDATE
Procedure Note  Supine Positioning  Critical Care Medicine       Chief Complaint: Acute respiratory distress syndrome (ARDS) due to COVID-19 virus  MRN: [id number]  LOS: 24  Sex: female  Age: 65 y.o.      Last ABG:   Recent Labs   Lab 11/28/20  0438   PH 7.473*   PO2 69*   PCO2 44.6   HCO3 32.7*   BE 9       Vital Signs (Most Recent):   Temp: 98.1 °F (36.7 °C) (11/28/20 1000)  Pulse: 68 (11/28/20 1000)  Resp: (!) 22 (11/28/20 1000)  BP: 124/65 (11/28/20 0800)  SpO2: 98 % (11/28/20 1000)    Ventilator Settings:  Vent Mode: A/C  Oxygen Concentration (%):  [50-60] 60  Resp Rate Total:  [22 br/min] 22 br/min  Vt Set:  [360 mL] 360 mL  PEEP/CPAP:  [8 cmH20] 8 cmH20  Mean Airway Pressure:  [18 anO19-67 cmH20] 21 cmH20        Indication: Severe, refractory ARDS. High risk for deterioration during supination procedure in need of direct monitoring by Critical Care personnel.     Prior to beginning the procedure, all appropriate equipment and personnel were gathered in the room. Two individuals were placed on each side of the patient and two respiratory therapists stood at the head of the bed and was dedicated to the management of the head of the patient, the endotracheal tube, and the ventilator lines. The person at the head of the bed coordinated the steps of the supination procedure at the direction of Critical Care team members at the bedside. The patient was first log-rolled 90 degrees onto their side away from the direction of their central venous catheter. Cardiac electrodes were then moved from the patient's posterior back to the anterior. Once properly positioned in the bed, another 90 degree log roll was performed placing the patient into the supine position. The patient's arms were placed alongside the body. Continuous pulse oximetry and blood pressure monitoring was performed without any desaturation or hemodynamic instability. The patient tolerated the procedure well.     Total Critical Care time (uninterrupted not  including procedures): 10 minutes    Sruthi Styles DO  Internal Medicine PGY1

## 2020-11-28 NOTE — ASSESSMENT & PLAN NOTE
This is a 65 year old  female with PMH notable for recent lung transplant in 08/2020 for IPF who was admitted to [facility] on 11/04 for management of new acute hypoxemic respiratory failure associated with COVID-19 that eventually required intubation by 11/12 for progressive respiratory failure. Hospital course associated with development of Pseudmonas pneumonia and bacteremia.     Plan:   -Continue mechanical ventilation with ARDS targeted strategies; status post re-initiation of proning on 11/26; supinate this morning followed by repeat ABG prior to decision regarding repeat proning.   -Status post appropriate course of Azithromycin, Ceftriaxone, Dexamethasone, and one time dose of convalescent plasma since admission.   -Therapeutic anticoagulation with hypercoagulable state associated with COVID.   -See assessment for sepsis and Pseudomonas bacteremia.   -Diuretic boluses PRN to optimize ventilator mechanics; additional LASIX IV ordered today.   -Will likely need trach/PEG for continued recovery; gen surg consulted, appreciate assistance   -Strict I/O's and daily weights, if possible.   -Goal O2 saturations >88%.   -Daily SAT/SBT as tolerated.

## 2020-11-28 NOTE — SUBJECTIVE & OBJECTIVE
"Interval HPI:   Overnight events: Remains in ICU, intubated and sedated. BG at or below goal with scheduled insulin. Prednisone 20 mg daily.   Eating:   NPO  Nausea: No  Hypoglycemia and intervention: No  Fever: No  TPN and/or TF: impact peptide at 20 cc/hr     /77   Pulse 87   Temp 97.5 °F (36.4 °C)   Resp 19   Ht 5' 7" (1.702 m)   Wt 76.7 kg (169 lb)   LMP  (LMP Unknown)   SpO2 97%   Breastfeeding No   BMI 26.47 kg/m²     Labs Reviewed and Include    Recent Labs   Lab 11/28/20  0319   GLU 93   CALCIUM 8.5*   ALBUMIN 1.5*   PROT 5.4*   *   K 4.0   CO2 31*      BUN 27*   CREATININE 0.6   ALKPHOS 150*   ALT 22   AST 27   BILITOT 0.5     Lab Results   Component Value Date    WBC 5.95 11/28/2020    HGB 8.5 (L) 11/28/2020    HCT 28.1 (L) 11/28/2020     (H) 11/28/2020     11/28/2020     No results for input(s): TSH, FREET4 in the last 168 hours.  Lab Results   Component Value Date    HGBA1C 8.0 (H) 11/05/2020       Nutritional status:   Body mass index is 26.47 kg/m².  Lab Results   Component Value Date    ALBUMIN 1.5 (L) 11/28/2020    ALBUMIN 1.5 (L) 11/27/2020    ALBUMIN 1.5 (L) 11/26/2020     No results found for: PREALBUMIN    Estimated Creatinine Clearance: 102.4 mL/min (based on SCr of 0.6 mg/dL).    Accu-Checks  Recent Labs     11/26/20  1649 11/27/20  0053 11/27/20  0856 11/27/20  1214 11/27/20  1601 11/27/20  2021 11/28/20  0017 11/28/20  0355 11/28/20  1009 11/28/20  1116   POCTGLUCOSE 223* 151* 85 143* 191* 184* 123* 105 107 123*       Current Medications and/or Treatments Impacting Glycemic Control  Immunotherapy:    Immunosuppressants         Stop Route Frequency     tacrolimus (PROGRAF) 1 mg/mL oral syringe      -- PER G TUBE 2 times daily        Steroids:   Hormones (From admission, onward)    Start     Stop Route Frequency Ordered    11/27/20 1345  predniSONE tablet 20 mg      -- OG Daily 11/27/20 1239        Pressors:    Autonomic Drugs (From admission, onward)    " Start     Stop Route Frequency Ordered    11/25/20 1815  NORepinephrine bitartrate 8 mg in dextrose 5% 250 mL infusion     Question Answer Comment   Titrate by: (in mcg/kg/min) 0.02    Titrate interval: (in minutes) 5    Titrate to maintain: (MAP or SBP) MAP    Greater than: (in mmHg) 65    Maximum dose: (in mcg/kg/min) 3        -- IV Continuous 11/25/20 1717    11/25/20 1030  cisatracurium (NIMBEX) 200 mg in dextrose 5 % 100 mL infusion     Question Answer Comment   Bolus over 1 minute (in mg): 3    Begin at (in mcg/kg/min): 1    Titrate by: (in mcg/kg/min) 0.5    Titrate interval: (in minutes) 15    To maintain a train-of-four of (TOF_/4): 2/4    Maximum dose: (in mcg/kg/min) 10        -- IV Continuous 11/25/20 0924    11/12/20 1243  norepinephrine 1 mg/mL injection     Note to Pharmacy: Created by cabinet override    11/13 0059 11/12/20 1243        Hyperglycemia/Diabetes Medications:   Antihyperglycemics (From admission, onward)    Start     Stop Route Frequency Ordered    11/29/20 0800  insulin aspart U-100 pen 3 Units      -- SubQ Every 24 hours (non-standard times) 11/28/20 1008    11/29/20 0400  insulin aspart U-100 pen 3 Units      -- SubQ Every 24 hours (non-standard times) 11/28/20 1008    11/29/20 0000  insulin aspart U-100 pen 3 Units      -- SubQ Every 24 hours (non-standard times) 11/28/20 1008    11/28/20 2000  insulin aspart U-100 pen 3 Units      -- SubQ Every 24 hours (non-standard times) 11/28/20 1008    11/28/20 1600  insulin aspart U-100 pen 3 Units      -- SubQ Every 24 hours (non-standard times) 11/28/20 1008    11/28/20 1200  insulin aspart U-100 pen 3 Units      -- SubQ Every 24 hours (non-standard times) 11/28/20 1008    11/27/20 1125  insulin aspart U-100 pen 0-5 Units      -- SubQ Every 4 hours PRN 11/27/20 1025

## 2020-11-28 NOTE — ASSESSMENT & PLAN NOTE
BG goal 140-180.           Low Dose SQ Insulin Correction Scale PRN BG excursions.   - BG monitoring every 4 hours while on enteral nutritional therapy.   Decrease to novolog 3 units every 4 hours while on TF; hold if TF held or BG less than 100.     Discharge Planing:   TBD. Please notify endocrinology prior to discharge.

## 2020-11-28 NOTE — PROGRESS NOTES
"[facility] - ICU [facility] WT  Endocrinology  Progress Note    Admit Date: 11/4/2020     Reason for Consult: Management of pre-dm, Hyperglycemia      Surgical Procedure and Date: Left lung transplant n 8/10/20     Diabetes diagnosis year: pre-dm - diet controlled prior to transplant   Lab Results   Component Value Date    HGBA1C 8.0 (H) 11/05/2020             Home Diabetes Medications:novolog 10 units TID with meals plus correction scale.     How often checking glucose at home? NAVEEN   BG readings on regimen: NAVEEN  Hypoglycemia on the regimen? NAVEEN   Missed doses on regimen?  NAVEEN     Diabetes Complications include:     none  Complicating diabetes co morbidities:   Glucocorticoid use  s/p lung transplant  covid infection        HPI:   Patient is a 65 y.o. female with a diagnosis of diabetes and idiopathic pulmonary fibrosis s/p unilateral (left) lung transplant. Patient presents to the ED complaining of a shortness of breath that progressively got worse. On arrival her COVID-19 test is positive. Patient clinically declined on 11/13 and was intubated. Endocrinology consulted for BG/ Dm management.           Interval HPI:   Overnight events: Remains in ICU, intubated and sedated. BG at or below goal with scheduled insulin. Prednisone 20 mg daily.   Eating:   NPO  Nausea: No  Hypoglycemia and intervention: No  Fever: No  TPN and/or TF: impact peptide at 20 cc/hr     /77   Pulse 87   Temp 97.5 °F (36.4 °C)   Resp 19   Ht 5' 7" (1.702 m)   Wt 76.7 kg (169 lb)   LMP  (LMP Unknown)   SpO2 97%   Breastfeeding No   BMI 26.47 kg/m²     Labs Reviewed and Include    Recent Labs   Lab 11/28/20  0319   GLU 93   CALCIUM 8.5*   ALBUMIN 1.5*   PROT 5.4*   *   K 4.0   CO2 31*      BUN 27*   CREATININE 0.6   ALKPHOS 150*   ALT 22   AST 27   BILITOT 0.5     Lab Results   Component Value Date    WBC 5.95 11/28/2020    HGB 8.5 (L) 11/28/2020    HCT 28.1 (L) 11/28/2020     (H) 11/28/2020     " 11/28/2020     No results for input(s): TSH, FREET4 in the last 168 hours.  Lab Results   Component Value Date    HGBA1C 8.0 (H) 11/05/2020       Nutritional status:   Body mass index is 26.47 kg/m².  Lab Results   Component Value Date    ALBUMIN 1.5 (L) 11/28/2020    ALBUMIN 1.5 (L) 11/27/2020    ALBUMIN 1.5 (L) 11/26/2020     No results found for: PREALBUMIN    Estimated Creatinine Clearance: 102.4 mL/min (based on SCr of 0.6 mg/dL).    Accu-Checks  Recent Labs     11/26/20  1649 11/27/20  0053 11/27/20  0856 11/27/20  1214 11/27/20  1601 11/27/20  2021 11/28/20  0017 11/28/20  0355 11/28/20  1009 11/28/20  1116   POCTGLUCOSE 223* 151* 85 143* 191* 184* 123* 105 107 123*       Current Medications and/or Treatments Impacting Glycemic Control  Immunotherapy:    Immunosuppressants         Stop Route Frequency     tacrolimus (PROGRAF) 1 mg/mL oral syringe      -- PER G TUBE 2 times daily        Steroids:   Hormones (From admission, onward)    Start     Stop Route Frequency Ordered    11/27/20 1345  predniSONE tablet 20 mg      -- OG Daily 11/27/20 1239        Pressors:    Autonomic Drugs (From admission, onward)    Start     Stop Route Frequency Ordered    11/25/20 1815  NORepinephrine bitartrate 8 mg in dextrose 5% 250 mL infusion     Question Answer Comment   Titrate by: (in mcg/kg/min) 0.02    Titrate interval: (in minutes) 5    Titrate to maintain: (MAP or SBP) MAP    Greater than: (in mmHg) 65    Maximum dose: (in mcg/kg/min) 3        -- IV Continuous 11/25/20 1717    11/25/20 1030  cisatracurium (NIMBEX) 200 mg in dextrose 5 % 100 mL infusion     Question Answer Comment   Bolus over 1 minute (in mg): 3    Begin at (in mcg/kg/min): 1    Titrate by: (in mcg/kg/min) 0.5    Titrate interval: (in minutes) 15    To maintain a train-of-four of (TOF_/4): 2/4    Maximum dose: (in mcg/kg/min) 10        -- IV Continuous 11/25/20 0924    11/12/20 1243  norepinephrine 1 mg/mL injection     Note to Pharmacy: Created by  cabinet override    11/13 0059   11/12/20 1243        Hyperglycemia/Diabetes Medications:   Antihyperglycemics (From admission, onward)    Start     Stop Route Frequency Ordered    11/29/20 0800  insulin aspart U-100 pen 3 Units      -- SubQ Every 24 hours (non-standard times) 11/28/20 1008    11/29/20 0400  insulin aspart U-100 pen 3 Units      -- SubQ Every 24 hours (non-standard times) 11/28/20 1008    11/29/20 0000  insulin aspart U-100 pen 3 Units      -- SubQ Every 24 hours (non-standard times) 11/28/20 1008    11/28/20 2000  insulin aspart U-100 pen 3 Units      -- SubQ Every 24 hours (non-standard times) 11/28/20 1008    11/28/20 1600  insulin aspart U-100 pen 3 Units      -- SubQ Every 24 hours (non-standard times) 11/28/20 1008    11/28/20 1200  insulin aspart U-100 pen 3 Units      -- SubQ Every 24 hours (non-standard times) 11/28/20 1008    11/27/20 1125  insulin aspart U-100 pen 0-5 Units      -- SubQ Every 4 hours PRN 11/27/20 1025          ASSESSMENT and PLAN    * Acute respiratory distress syndrome (ARDS) due to COVID-19 virus  Managed per primary team  Avoid hypoglycemia        Type 2 diabetes mellitus, with long-term current use of insulin  BG goal 140-180.           Low Dose SQ Insulin Correction Scale PRN BG excursions.   - BG monitoring every 4 hours while on enteral nutritional therapy.   Decrease to novolog 3 units every 4 hours while on TF; hold if TF held or BG less than 100.     Discharge Planing:   TBD. Please notify endocrinology prior to discharge.       Adrenal cortical steroids causing adverse effect in therapeutic use  Glucocorticoids markedly increase  glucoses. Expect the steroid taper will help glucose control.             Nidia Frazier, NP  Endocrinology  [facility] - ICU [facility]

## 2020-11-28 NOTE — PROGRESS NOTES
[facility] - ICU 16   Critical Care Medicine  Progress Note    Patient Name: Chely Becerra  MRN: [id number]  Admission Date: 11/4/2020  Hospital Length of Stay: 24 days  Code Status: Full Code  Attending Provider: Sussy Khan MD  Primary Care Provider: ZAHIDA Stack MD   Principal Problem: Acute respiratory distress syndrome (ARDS) due to COVID-19 virus    Subjective:     HPI:  Ms. Chely Becerra is a 65 y.o. year old female with a PMHx of diabetes and idiopathic pulmonary fibrosis s/p unilateral (left) lung transplant in August 2020 who presents to the ED complaining of a shortness of breath that started last Friday and progressively got worse. She had a fever of 101 on Sunday, started coughing for the last 3 days nonproductive, denies any chest pain. She was seen in the [facility] for routine testing earlier this morning was found to be hypoxic and she was referred to the emergency department. On arrival her COVID-19 test is positive.     Patient lives with her 2 daughters, 1 of the daughters has been having some respiratory symptoms recently however she thought was secondary to asthma. he is on prednisone taper currently 20 mg daily, she has been compliant with her antirejection medication Prograf and CellCept. She was started on BiPAP in the ED with improvement in her oxygen saturations to the mid 90's.     Hospital/ICU Course:  Ivan Becerra was admitted to [facility] on 11/04/2020 for management of new acute hypoxemic respiratory failure secondary to COVID-19. Her respiratory status was stabilized on admission alternating between BiPAP and comfort flow. She was initiated on Azithromycin and Ceftriaxone for CAP coverage, in addition to home Voriconazole prophylaxis. She completed a 10-day course of Dexamethasone from 11/04 to 11/13, in addition to a 5 day course of Remdesivir, and one time dose of convalescent plasma. However, by 11/12, her hypoxia progressed,  associated with worsening leukocytosis, new DWAYNE, and persistent fevers, requiring intubation at that time. Repeat infectious work-up was significant for blood and respiratory cultures from 11/12 positive for Pseudmonas. ID was consulted and she was initiatedon Meropenem on 11/12 with subsequent blood cultures remaining NGTD. DWAYNE resolved by 11/14 without need for dialysis. Intubation and subsequent sedation were also associated with onset of shock requiring vasopressor support until 11/20. She was initiated on intermittent proning with improvements in oxygenation, however this was limited by intermittent episodes of bradycardia. Endocrinology was consulted on 11/17 for hyperglycemia management. Following weaning sedation by 11/23, she developed worsening hypertension requiring a Cardene drip by 11/24. By 11/25, she was intolerable of weaning ventilator support and sedation and neuromuscular blockade was re-initiated; proning resumed on 11/26 for attempted improvement in oxygenation. On 11/26, Hgb progressively downtrended since admission and PRBC given with adequate response. She remains on Meropenem until 11/27. She remains on noerpi support while sedated. Lung transplant team following for assistance with home immunosuppressive medications.       Interval History/Significant Events: Proning overnight w/o overnight event. However, her P/F this morning stable at 115. Supination completed this AM pending PM ABG to decide re-proning need. Gen surg consulted for trach/PEG eval.     Review of Systems   Unable to perform ROS: Intubated     Objective:     Vital Signs (Most Recent):  Temp: 97.5 °F (36.4 °C) (11/28/20 1118)  Pulse: 87 (11/28/20 1118)  Resp: 19 (11/28/20 1118)  BP: 112/77 (11/28/20 1118)  SpO2: 97 % (11/28/20 1118) Vital Signs (24h Range):  Temp:  [93.9 °F (34.4 °C)-100.4 °F (38 °C)] 97.5 °F (36.4 °C)  Pulse:  [53-87] 87  Resp:  [19-22] 19  SpO2:  [93 %-100 %] 97 %  BP: (112-124)/(65-77) 112/77  Arterial  Line BP: ()/(50-74) 108/57   Weight: 76.7 kg (169 lb)  Body mass index is 26.47 kg/m².      Intake/Output Summary (Last 24 hours) at 11/28/2020 1513  Last data filed at 11/28/2020 0600  Gross per 24 hour   Intake 1617.53 ml   Output 990 ml   Net 627.53 ml       Physical Exam  Constitutional:       Appearance: She is ill-appearing.      Interventions: She is sedated, chemically paralyzed and intubated.   Eyes:      General: No scleral icterus.     Conjunctiva/sclera: Conjunctivae normal.   Cardiovascular:      Rate and Rhythm: Normal rate and regular rhythm.      Pulses: Normal pulses.      Heart sounds: Normal heart sounds.   Pulmonary:      Effort: Pulmonary effort is normal. No respiratory distress. She is intubated.      Breath sounds: Examination of the right-lower field reveals decreased breath sounds. Examination of the left-lower field reveals decreased breath sounds. Decreased breath sounds present.   Abdominal:      General: Bowel sounds are normal. There is no distension.      Palpations: Abdomen is soft.      Tenderness: There is no abdominal tenderness.   Skin:     General: Skin is warm and dry.      Findings: No bruising or rash.       Vents:  Vent Mode: A/C (11/28/20 1118)  Ventilator Initiated: Yes(chart correction) (11/12/20 0921)  Set Rate: 22 BPM (11/28/20 1118)  Vt Set: 360 mL (11/28/20 1118)  Pressure Support: 0 cmH20 (11/15/20 2115)  PEEP/CPAP: 8 cmH20 (11/28/20 1118)  Oxygen Concentration (%): 60 (11/28/20 1118)  Peak Airway Pressure: 33 cmH2O (11/28/20 1118)  Plateau Pressure: 32 cmH20 (11/28/20 1118)  Total Ve: 7.77 mL (11/28/20 1118)  F/VT Ratio<105 (RSBI): (!) 53.37 (11/28/20 1118)  Lines/Drains/Airways     Central Venous Catheter Line            Percutaneous Central Line Insertion/Assessment - Triple Lumen  11/12/20 1030 right internal jugular 16 days          Drain                 Urethral Catheter 11/12/20 1445 16 Fr. 16 days         NG/OG Tube 11/13/20 0200 [place] sump 14 Fr.  Center mouth 15 days         Fecal Incontinence  11/25/20 1500 3 days          Airway                 Airway - Non-Surgical 11/12/20 0928 Endotracheal Tube 16 days          Arterial Line            Arterial Line 11/12/20 1053 Right Radial 16 days          Peripheral Intravenous Line                 Midline Catheter Insertion/Assessment  - Single Lumen 11/10/20 1603 Right basilic vein (medial side of arm) 18g x 10cm 17 days         Peripheral IV - Single Lumen 11/25/20 1034 Anterior;Distal;Left Forearm 3 days              Significant Labs:    CBC/Anemia Profile:  Recent Labs   Lab 11/27/20  0357 11/28/20  0319   WBC 5.98 5.95   HGB 8.4* 8.5*   HCT 27.6* 28.1*    160   * 106*   RDW 20.3* 20.0*        Chemistries:  Recent Labs   Lab 11/27/20 0357 11/28/20  0319   * 146*   K 3.6 4.0    105   CO2 32* 31*   BUN 28* 27*   CREATININE 0.6 0.6   CALCIUM 8.3* 8.5*   ALBUMIN 1.5* 1.5*   PROT 5.0* 5.4*   BILITOT 0.5 0.5   ALKPHOS 142* 150*   ALT 21 22   AST 19 27   MG 1.9 1.8   PHOS 2.5* 3.3       Significant Imaging:  I have reviewed all pertinent imaging results/findings within the past 24 hours.      ABG  Recent Labs   Lab 11/28/20  0438   PH 7.473*   PO2 69*   PCO2 44.6   HCO3 32.7*   BE 9     Assessment/Plan:     Derm  Pressure ulcer of head, stage 2  -Secondary to ETT villanueva.     Plan:   -Wound care consulted and following recommendations.     Pulmonary  Pneumonia due to Pseudomonas aeruginosa  -See assessment for sepsis.     Status post lung transplantation  -Status post left lung transplant in 08/2020 for IPF, but not associated with baseline supplement oxygen requirements since transplant.   -Home regimen: AUGMENTIN, Lamivudine, CELLCEPT, Prednisone, TMP/SMX, Tacrolimus, Valganciclovir, and Voriconazole.     Plan:   -Lung transplant following for assistance with continuing immunosuppressive regimen.     Cardiac/Vascular  Essential hypertension  -Home regimen: Nifedipine and Lopressor.    -Noted onset of worsening hypertension by 11/24 following weaning of sedation requiring Cardene drip, however was discontinued by 11/25 following re-starting sedation.     Plan:   -Holding home agents with risk of hypotension associated with sedation.     Renal/  Hypernatremia  -Intermittent issue since 11/21 and suspected secondary to free water deficit.     Plan:  -Free water boluses ordered QID.     ID  Bacteremia due to Pseudomonas  -See assessment for sepsis.     Sepsis, unspecified organism  -See assessment for shock.     Oncology  Macrocytic anemia  -Noted to develop worsening anemia throughout admission with Hgb downtrending to 7 on 11/26; status post 1U PRBC with good response.   -No signs of blood loss on exam.     Plan:   -Trending CBC and transfuse for Hgb <7.     Endocrine  Type 2 diabetes mellitus, with long-term current use of insulin  -Most recent A1c 8 in 11/2020.   -Home regimen: Aspart with sliding scale.   -Noted worsening hyperglycemia since admission that was suspected to be secondary to steroids. Status post endocrine consultation on 11/17 for assistance with management.     Plan:   -Endocrine following; status post initiation of insulin drip.   -Goal glucose 140-180.     Other  * Acute respiratory distress syndrome (ARDS) due to COVID-19 virus  This is a 65 year old  female with PMH notable for recent lung transplant in 08/2020 for IPF who was admitted to [facility] on 11/04 for management of new acute hypoxemic respiratory failure associated with COVID-19 that eventually required intubation by 11/12 for progressive respiratory failure. Hospital course associated with development of Pseudmonas pneumonia and bacteremia.     Plan:   -Continue mechanical ventilation with ARDS targeted strategies; status post re-initiation of proning on 11/26; supinate this morning followed by repeat ABG prior to decision regarding repeat proning.   -Status post appropriate course of  Azithromycin, Ceftriaxone, Dexamethasone, and one time dose of convalescent plasma since admission.   -Therapeutic anticoagulation with hypercoagulable state associated with COVID.   -See assessment for sepsis and Pseudomonas bacteremia.   -Diuretic boluses PRN to optimize ventilator mechanics; additional LASIX IV ordered today.   -Will likely need trach/PEG for continued recovery; gen surg consulted, appreciate assistance   -Strict I/O's and daily weights, if possible.   -Goal O2 saturations >88%.   -Daily SAT/SBT as tolerated.     Long-term use of immunosuppressant medication  -See assessment for lung transplant.     Pressure injury of right buttock, unstageable  Plan:   -Wound care consulted and following recommendations.     Shock, unspecified  -Developed worsening hypoxia by 11/12 requiring intubation, associated with worsening leukocytosis, persistent fevers, and transient DWAYNE with repeat infectious work-up at that time significant for blood and respiratory cultures positive for Pseudomonas.  -Status post ID consultation and initiation on Meropenem on 11/12 with subsequent cultures remaining NGTD.   -Intermittent vasopressor support continues that may be related to sedation side effect.     Plan:   -Continue vasopressor support as needed to maintain MAP's >65; wean as tolerated.   -Transitioned stress dose steroid regimen to home Prednisone 20 mg on 11/27.   -S/p Meropenem 11/12-11/27.   -Continue immunosuppressant antimicrobial prophylaxis (OIP with ganciclovir, lamivudine, bactrim, and posaconazole. Azithro MWF for CLAD/DRAKE prophylaxis once stable.).    Elevated alkaline phosphatase level  -Noted issue since admission.   -Liver function WNL.     Plan:   -Trending liver function daily.     Palliative care encounter  Plan:   -Family updates daily.   -Code status: FULL.         Critical Care Daily Checklist:     A: Awake: RASS Goal/Actual Goal: RASS Goal: -5-->unarousable  Actual: [place] Agitation Sedation  Scale (RASS): Unarousable   B: Spontaneous Breathing Trial Performed? Spon. Breathing Trial Initiated?: Not initiated (11/26/20 0749)   C: SAT & SBT Coordinated?  Not applicable                      D: Delirium: CAM-ICU Overall CAM-ICU: Positive   E: Early Mobility Performed? No   F: Feeding Goal: Goals: Pt to meet 50-75% estimated kcal and protein needs by RD f/u.  Status: Nutrition Goal Status: progressing towards goal, goal met       Current Diet Order   Procedures    Diet NPO       AS: Analgesia/Sedation Fentanyl and Propofol   T: Thromboembolic Prophylaxis LOVENOX   H: HOB > 300 Yes   U: Stress Ulcer Prophylaxis (if needed) Famotidine   G: Glucose Control Yes; insulin drip.    B: Bowel Function Stool Occurrence: 1   I: Indwelling Catheter (Lines & Rios) Necessity Central line   Rios for monitoring I/O's.    D: De-escalation of Antimicrobials/Pharmacotherapies Discontinue Meropenem.      Plan for the day/ETD Supination followed by repeat ABG with anticipated re-proning.      Code Status:  Family/Goals of Care: Full Code            Critical secondary to Patient has a condition that poses threat to life and bodily function: Severe Respiratory Distress      Critical care was time spent personally by me on the following activities: development of treatment plan with patient or surrogate and bedside caregivers, discussions with consultants, evaluation of patient's response to treatment, examination of patient, ordering and performing treatments and interventions, ordering and review of laboratory studies, ordering and review of radiographic studies, pulse oximetry, re-evaluation of patient's condition. This critical care time did not overlap with that of any other provider or involve time for any procedures.       Brandi Alonzo MD  Critical Care Medicine  [facility] - ICU 16 WT

## 2020-11-29 NOTE — CARE UPDATE
Procedure Note  Prone Positioning  Critical Care Medicine       Chief Complaint: Acute respiratory distress syndrome (ARDS) due to COVID-19 virus  MRN: [id number]  LOS: 24  Sex: female  Age: 65 y.o.      Last ABG:   Recent Labs   Lab 11/28/20  1535   PH 7.463*   PO2 72*   PCO2 43.3   HCO3 31.0*   BE 7       Vital Signs (Most Recent):   Temp: 97.7 °F (36.5 °C) (11/28/20 1700)  Pulse: 66 (11/28/20 1700)  Resp: (!) 22 (11/28/20 1700)  BP: 99/64 (11/28/20 1700)  SpO2: (!) 93 % (11/28/20 1700)    Ventilator Settings:  Vent Mode: A/C  Oxygen Concentration (%):  [50-60] 50  Resp Rate Total:  [22 br/min] 22 br/min  Vt Set:  [360 mL] 360 mL  PEEP/CPAP:  [8 cmH20] 8 cmH20  Mean Airway Pressure:  [18 [id number] cmH20] 20 cmH20    Indication: Severe, refractory ARDS. High risk for deterioration during proning procedure in need of direct monitoring by Critical Care personnel.     Prior to beginning the procedure, all appropriate equipment and personnel were gathered in the room. Two individuals were placed on each side of the patient and one person stood at the head of the bed and was dedicated to the management of the head of the patient, the endotracheal tube, and the ventilator lines. The person at the head of the bed  coordinated the steps of the proning procedure with team team at the direction of Critical Care team members at the bedside. The patient was first log-rolled 90 degrees onto their side towards the direction of their central venous catheter. Cardiac electrodes were then moved from the patient's anterior to the posterior. The patient?s knees, forehead, chest, and iliac crests were protected using adhesive pads and/or foam pads to reduce the risk of skin breakdown. Once properly positioned in the bed, another 90 degree log roll was performed placing the patient into the prone position. The patient's arms were placed alongside the body. The patient's head should be turned alternately to the right or left every 2 hours.  The patient tolerated the procedure well.     Total Critical Care time (uninterrupted not including procedures): 10min

## 2020-11-29 NOTE — CARE UPDATE
Procedure Note  Prone Positioning  Critical Care Medicine       Chief Complaint: Acute respiratory distress syndrome (ARDS) due to COVID-19 virus  MRN: [id number]  LOS: 25  Sex: female  Age: 65 y.o.      Last ABG:   Recent Labs   Lab 11/29/20  1404   PH 7.494*   PO2 60*   PCO2 44.2   HCO3 34.0*   BE 11       Vital Signs (Most Recent):   Temp: (!) 95.5 °F (35.3 °C) (11/29/20 1500)  Pulse: 63 (11/29/20 1500)  Resp: (!) 22 (11/29/20 1500)  BP: 125/70 (11/29/20 1500)  SpO2: 95 % (11/29/20 1500)    Ventilator Settings:  Vent Mode: A/C  Oxygen Concentration (%):  [] 50  Resp Rate Total:  [22 br/min-25 br/min] 22 br/min  Vt Set:  [360 mL] 360 mL  PEEP/CPAP:  [8 cmH20] 8 cmH20  Mean Airway Pressure:  [16 [id number] cmH20] 16 cmH20    Indication: Severe, refractory ARDS. High risk for deterioration during proning procedure in need of direct monitoring by Critical Care personnel.     Prior to beginning the procedure, all appropriate equipment and personnel were gathered in the room. Two individuals were placed on each side of the patient and one person stood at the head of the bed and was dedicated to the management of the head of the patient, the endotracheal tube, and the ventilator lines. The person at the head of the bed  coordinated the steps of the proning procedure with team team at the direction of Critical Care team members at the bedside. The patient was first log-rolled 90 degrees onto their side towards the direction of their central venous catheter. Cardiac electrodes were then moved from the patient's anterior to the posterior. The patient?s knees, forehead, chest, and iliac crests were protected using adhesive pads and/or foam pads to reduce the risk of skin breakdown. Once properly positioned in the bed, another 90 degree log roll was performed placing the patient into the prone position. The patient's arms were placed alongside the body. The patient's head should be turned alternately to the right or left  every 2 hours. The patient tolerated the procedure well.     Total Critical Care time (uninterrupted not including procedures): 10 min

## 2020-11-29 NOTE — SIGNIFICANT EVENT
Procedure Note  Supine Positioning  Critical Care Medicine       Chief Complaint: Acute respiratory distress syndrome (ARDS) due to COVID-19 virus  MRN: [id number]  LOS: 25  Sex: female  Age: 65 y.o.      Last ABG:   Recent Labs   Lab 11/29/20  0449   PH 7.464*   PO2 86   PCO2 47.2*   HCO3 33.9*   BE 10       Vital Signs (Most Recent):   Temp: 99.3 °F (37.4 °C) (11/29/20 0900)  Pulse: 71 (11/29/20 0900)  Resp: (!) 22 (11/29/20 0900)  BP: (!) 88/55 (11/29/20 0800)  SpO2: 96 % (11/29/20 0900)    Ventilator Settings:  Vent Mode: A/C  Oxygen Concentration (%):  [50-60] 50  Resp Rate Total:  [22 br/min-25 br/min] 25 br/min  Vt Set:  [360 mL] 360 mL  PEEP/CPAP:  [8 cmH20] 8 cmH20  Mean Airway Pressure:  [16 [id number] cmH20] 17 cmH20        Indication: Severe, refractory ARDS. High risk for deterioration during supination procedure in need of direct monitoring by Critical Care personnel.     Prior to beginning the procedure, all appropriate equipment and personnel were gathered in the room. Two individuals were placed on each side of the patient and two respiratory therapists stood at the head of the bed and was dedicated to the management of the head of the patient, the endotracheal tube, and the ventilator lines. The person at the head of the bed coordinated the steps of the supination procedure at the direction of Critical Care team members at the bedside. The patient was first log-rolled 90 degrees onto their side away from the direction of their central venous catheter. Cardiac electrodes were then moved from the patient's posterior back to the anterior. Once properly positioned in the bed, another 90 degree log roll was performed placing the patient into the supine position. The patient's arms were placed alongside the body. Continuous pulse oximetry and blood pressure monitoring was performed without any desaturation or hemodynamic instability. The patient tolerated the procedure well.     Total Critical Care time  (uninterrupted not including procedures): 20 min

## 2020-11-29 NOTE — CARE UPDATE
BG goal 140 - 180   Diet NPO     BG remains stable and reasonably controlled on current SQ insulin regimen. Endo will continue to follow and make as needed insulin adjustments while patient remains [facility].     - Continue novolog 3 units TIDWM.   - Low Dose SQ Insulin Correction Scale.  - BG monitoring q 4 hours while NPO and on enteral nutritional therapy.     ** Please call Endocrine for any BG related issues **  ** Please notify Endocrine for any change and/or advance in diet**    Discharge Planning:   TBD. Please notify endocrinology prior to discharge.     Lab Results   Component Value Date    HGBA1C 8.0 (H) 11/05/2020

## 2020-11-29 NOTE — PLAN OF CARE
Patient seen with housestaff team on morning rounds and then plan of care was discussed in detail.     65-year-old woman S/P left lung transplant in August 2020 currently admitted to the intensive care unit with acute hypoxemic respiratory failure secondary to covid pneumonia.        · Acute hypoxemic respiratory failure:  2/2 COVID  ? Patient remains intubated on mechanical ventilation  ? Now heavily sedated and on neuromuscular blockade with subsequent improvement in oxygenation. On low dose norepi 0.01  to tolerate sedation.  ? Patient is currently on 50% FiO2 and peep of 8 (supine) with O2 Sat of 96% P/F of 172 prior to supination  ? Continue proning protocol  ? Suspect patient would benefit from a tracheostomy to promote vent weaning.    ? Surgery consulted for trach/peg  ? Continue diuresis  · Covid pneumonia:   S/P remdesivir  and dexamethasone.  Continue Lovenox for hypercoagulable state.  · Pseudomonal bacteremia:  she has completed a course of meropenem   · Hypernatremia: increase enteral free water; repeat bmp this afternoon.  Difficulty insuring free water can be administered when she is proned.  · History of lung transplant:  Immunosuppression and prophylaxis per lung transplant team.    Critical Care Time: 30 minutes  Critical care was time spent personally by me on the following activities: evaluating this patient's organ dysfunction, development of treatment plan, discussing treatment plan with patient or surrogate and bedside caregivers, discussions with consultants, evaluation of patient's response to treatment, examination of patient, ordering and performing treatments and interventions, ordering and review of laboratory studies, ordering and review of radiographic studies, re-evaluation of patient's condition. This critical care time did not overlap with that of any other provider or involve time for any procedures.

## 2020-11-29 NOTE — PLAN OF CARE
RICU DAILY GOALS       A: Awake    RASS: Goal - RASS Goal: -5-->unarousable  Actual - RASS ([place] Agitation-Sedation Scale): -5-->unarousable   Restraint necessity: Clinical Justification: Removing medical devices  B: Breath   SBT: Not attempted   C: Coordinate A & B, analgesics/sedatives   Pain: managed    SAT: Not attempted  D: Delirium   CAM-ICU: Overall CAM-ICU: Negative  E: Early(intubated/ Progressive (non-intubated) Mobility   MOVE Screen: Fail   Activity: Activity Management: patient unable to perform activities  FAS: Feeding/Nutrition   Diet order: Diet/Nutrition Received: tube feeding, Specialty Diet/Nutrition Received: fiber, restricted Fluid restriction:    T: Thrombus   DVT prophylaxis: VTE Required Core Measure: Pharmacological prophylaxis initiated/maintained  H: HOB Elevation   Head of Bed (HOB): HOB flat  U: Ulcer Prophylaxis   GI: yes  G: Glucose control   managed Glycemic Management: blood glucose monitoring  S: Skin   Bundle compliance: yes   Bathing/Skin Care: incontinence care, bath, partial, dressed/undressed, linen changed Date: 11/29/2020  B: Bowel Function   no issues   I: Indwelling Catheters   Rios necessity:      Urethral Catheter 11/12/20 1445 16 Fr.-Reason for Continuing Urinary Catheterization: Critically ill in ICU and requiring hourly monitoring of intake/output   CVC necessity: Yes  D: De-escalation Antibx   Yes  Family/Goals of care/Code Status   Code Status: Full Code     No acute events throughout day, VS and assessment per flow sheet, patient progressing towards goals as tolerated, plan of care reviewed with Chely Becerra and daughter, all concerns addressed, will continue to monitor.

## 2020-11-30 NOTE — ASSESSMENT & PLAN NOTE
-Developed worsening hypoxia by 11/12 requiring intubation, associated with worsening leukocytosis, persistent fevers, and transient DWAYNE with repeat infectious work-up at that time significant for blood and respiratory cultures positive for Pseudomonas.  -Status post ID consultation and initiation on Meropenem on 11/12 with subsequent cultures remaining NGTD.   -Intermittent vasopressor support continues that may be related to sedation side effect.     Plan:   -Continue vasopressor support as needed to maintain MAP's >65; wean as tolerated.   -Transitioned stress dose steroid regimen to home Prednisone 20 mg on 11/27.   -S/p Meropenem 11/12-11/27.   -Continue immunosuppressant antimicrobial prophylaxis (OIP with ganciclovir, lamivudine, bactrim, and posaconazole. Azithro MW for CLAD/DRAKE prophylaxis once stable.).

## 2020-11-30 NOTE — SUBJECTIVE & OBJECTIVE
Interval History/Significant Events: Proned overnight. P/F this morning at 172. Supination completed this AM pending PM ABG to decide re-proning need.     Review of Systems   Unable to perform ROS: Intubated     Objective:     Vital Signs (Most Recent):  Temp: 96.4 °F (35.8 °C) (11/29/20 1845)  Pulse: (!) 58 (11/29/20 1845)  Resp: 18 (11/29/20 1845)  BP: 110/66 (11/29/20 1800)  SpO2: (!) 93 % (11/29/20 1845) Vital Signs (24h Range):  Temp:  [95.2 °F (35.1 °C)-99.5 °F (37.5 °C)] 96.4 °F (35.8 °C)  Pulse:  [55-73] 58  Resp:  [8-25] 18  SpO2:  [92 %-100 %] 93 %  BP: ()/(55-92) 110/66  Arterial Line BP: ()/(49-81) 94/50   Weight: 76.7 kg (169 lb)  Body mass index is 26.47 kg/m².      Intake/Output Summary (Last 24 hours) at 11/29/2020 1851  Last data filed at 11/29/2020 1800  Gross per 24 hour   Intake 2116.64 ml   Output 3245 ml   Net -1128.36 ml       Physical Exam  Constitutional:       Appearance: She is ill-appearing.      Interventions: She is sedated, chemically paralyzed and intubated.   Eyes:      General: No scleral icterus.     Conjunctiva/sclera: Conjunctivae normal.   Cardiovascular:      Rate and Rhythm: Normal rate and regular rhythm.      Pulses: Normal pulses.      Heart sounds: Normal heart sounds.   Pulmonary:      Effort: Pulmonary effort is normal. No respiratory distress. She is intubated.      Breath sounds: Examination of the right-lower field reveals decreased breath sounds. Examination of the left-lower field reveals decreased breath sounds. Decreased breath sounds present.   Abdominal:      General: Bowel sounds are normal. There is no distension.      Palpations: Abdomen is soft.      Tenderness: There is no abdominal tenderness.   Skin:     General: Skin is warm and dry.      Findings: No bruising or rash.       Vents:  Vent Mode: A/C (11/29/20 1402)  Ventilator Initiated: Yes(chart correction) (11/12/20 6046)  Set Rate: 22 BPM (11/29/20 1402)  Vt Set: 360 mL (11/29/20  1402)  Pressure Support: 0 cmH20 (11/15/20 2115)  PEEP/CPAP: 8 cmH20 (11/29/20 1402)  Oxygen Concentration (%): 40 (11/29/20 1845)  Peak Airway Pressure: 39 cmH2O (11/29/20 1402)  Plateau Pressure: 32 cmH20 (11/29/20 1402)  Total Ve: 7.78 mL (11/29/20 1402)  F/VT Ratio<105 (RSBI): (!) 62.15 (11/29/20 1402)  Lines/Drains/Airways     Central Venous Catheter Line            Percutaneous Central Line Insertion/Assessment - Triple Lumen  11/12/20 1030 right internal jugular 17 days          Drain                 Urethral Catheter 11/12/20 1445 16 Fr. 17 days         NG/OG Tube 11/13/20 0200 [place] sump 14 Fr. Center mouth 16 days         Fecal Incontinence  11/25/20 1500 4 days          Airway                 Airway - Non-Surgical 11/12/20 0928 Endotracheal Tube 17 days          Arterial Line            Arterial Line 11/12/20 1053 Right Radial 17 days          Peripheral Intravenous Line                 Peripheral IV - Single Lumen 11/25/20 1034 Anterior;Distal;Left Forearm 4 days              Significant Labs:    CBC/Anemia Profile:  Recent Labs   Lab 11/28/20  0319 11/29/20  0351   WBC 5.95 4.98   HGB 8.5* 8.1*   HCT 28.1* 26.4*    155   * 106*   RDW 20.0* 19.4*        Chemistries:  Recent Labs   Lab 11/28/20  0319 11/29/20  0351 11/29/20  1117 11/29/20  1638   * 147*  --   --    K 4.0 3.4*  --  4.2    103  --   --    CO2 31* 33*  --   --    BUN 27* 24*  --   --    CREATININE 0.6 0.6  --   --    CALCIUM 8.5* 8.5*  --   --    ALBUMIN 1.5* 1.5*  --   --    PROT 5.4* 5.4*  --   --    BILITOT 0.5 0.5  --   --    ALKPHOS 150* 136*  --   --    ALT 22 21  --   --    AST 27 24  --   --    MG 1.8 1.5* 2.0  --    PHOS 3.3 3.6  --   --        Significant Imaging:  I have reviewed all pertinent imaging results/findings within the past 24 hours.

## 2020-11-30 NOTE — PLAN OF CARE
Procedure Note  Prone Positioning  Critical Care Medicine       Chief Complaint: Acute respiratory distress syndrome (ARDS) due to COVID-19 virus  MRN: [id number]  LOS: 26  Sex: female  Age: 65 y.o.      Last ABG:   Recent Labs   Lab 11/30/20  1413   PH 7.335*   PO2 75*   PCO2 61.7*   HCO3 33.0*   BE 7       Vital Signs (Most Recent):   Temp: 97.5 °F (36.4 °C) (11/30/20 1530)  Pulse: (!) 58 (11/30/20 1530)  Resp: 18 (11/30/20 1530)  BP: (!) 98/59 (11/30/20 1500)  SpO2: 97 % (11/30/20 1530)    Ventilator Settings:  Vent Mode: A/C  Oxygen Concentration (%):  [] 50  Resp Rate Total:  [18 br/min] 18 br/min  Vt Set:  [340 mL-360 mL] 340 mL  PEEP/CPAP:  [8 cmH20-10 cmH20] 10 cmH20  Mean Airway Pressure:  [15 cmH20] 15 cmH20    Indication: Severe, refractory ARDS. High risk for deterioration during proning procedure in need of direct monitoring by Critical Care personnel.     Prior to beginning the procedure, all appropriate equipment and personnel were gathered in the room. Two individuals were placed on each side of the patient and one person stood at the head of the bed and was dedicated to the management of the head of the patient, the endotracheal tube, and the ventilator lines. The person at the head of the bed  coordinated the steps of the proning procedure with team team at the direction of Critical Care team members at the bedside. The patient was first log-rolled 90 degrees onto their side towards the direction of their central venous catheter. Cardiac electrodes were then moved from the patient's anterior to the posterior. The patient?s knees, forehead, chest, and iliac crests were protected using adhesive pads and/or foam pads to reduce the risk of skin breakdown. Once properly positioned in the bed, another 90 degree log roll was performed placing the patient into the prone position. The patient's arms were placed alongside the body. The patient's head should be turned alternately to the right or left  every 2 hours. The patient tolerated the procedure well.     Total Critical Care time (uninterrupted not including procedures): 20 min

## 2020-11-30 NOTE — PROGRESS NOTES
[facility] - ICU 16   Critical Care Medicine  Progress Note    Patient Name: Chely Becerra  MRN: [id number]  Admission Date: 11/4/2020  Hospital Length of Stay: 25 days  Code Status: Full Code  Attending Provider: Sussy Khan MD  Primary Care Provider: ZAHIDA Stack MD   Principal Problem: Acute respiratory distress syndrome (ARDS) due to COVID-19 virus    Subjective:     HPI:  Ms. Chely Becerra is a 65 y.o. year old female with a PMHx of diabetes and idiopathic pulmonary fibrosis s/p unilateral (left) lung transplant in August 2020 who presents to the ED complaining of a shortness of breath that started last Friday and progressively got worse. She had a fever of 101 on Sunday, started coughing for the last 3 days nonproductive, denies any chest pain. She was seen in the [facility] for routine testing earlier this morning was found to be hypoxic and she was referred to the emergency department. On arrival her COVID-19 test is positive.     Patient lives with her 2 daughters, 1 of the daughters has been having some respiratory symptoms recently however she thought was secondary to asthma. he is on prednisone taper currently 20 mg daily, she has been compliant with her antirejection medication Prograf and CellCept. She was started on BiPAP in the ED with improvement in her oxygen saturations to the mid 90's.     Hospital/ICU Course:  Ivan Becerra was admitted to [facility] on 11/04/2020 for management of new acute hypoxemic respiratory failure secondary to COVID-19. Her respiratory status was stabilized on admission alternating between BiPAP and comfort flow. She was initiated on Azithromycin and Ceftriaxone for CAP coverage, in addition to home Voriconazole prophylaxis. She completed a 10-day course of Dexamethasone from 11/04 to 11/13, in addition to a 5 day course of Remdesivir, and one time dose of convalescent plasma. However, by 11/12, her hypoxia progressed,  associated with worsening leukocytosis, new DWAYNE, and persistent fevers, requiring intubation at that time. Repeat infectious work-up was significant for blood and respiratory cultures from 11/12 positive for Pseudmonas. ID was consulted and she was initiatedon Meropenem on 11/12 with subsequent blood cultures remaining NGTD. DWAYNE resolved by 11/14 without need for dialysis. Intubation and subsequent sedation were also associated with onset of shock requiring vasopressor support until 11/20. She was initiated on intermittent proning with improvements in oxygenation, however this was limited by intermittent episodes of bradycardia. Endocrinology was consulted on 11/17 for hyperglycemia management. Following weaning sedation by 11/23, she developed worsening hypertension requiring a Cardene drip by 11/24. By 11/25, she was intolerable of weaning ventilator support and sedation and neuromuscular blockade was re-initiated; proning resumed on 11/26 for attempted improvement in oxygenation. On 11/26, Hgb progressively downtrended since admission and PRBC given with adequate response. She remains on Meropenem until 11/27. She remains on noerpi support while sedated. Lung transplant team following for assistance with home immunosuppressive medications.       Interval History/Significant Events: Proned overnight. P/F this morning at 172. Supination completed this AM pending PM ABG to decide re-proning need.     Review of Systems   Unable to perform ROS: Intubated     Objective:     Vital Signs (Most Recent):  Temp: 96.4 °F (35.8 °C) (11/29/20 1845)  Pulse: (!) 58 (11/29/20 1845)  Resp: 18 (11/29/20 1845)  BP: 110/66 (11/29/20 1800)  SpO2: (!) 93 % (11/29/20 1845) Vital Signs (24h Range):  Temp:  [95.2 °F (35.1 °C)-99.5 °F (37.5 °C)] 96.4 °F (35.8 °C)  Pulse:  [55-73] 58  Resp:  [8-25] 18  SpO2:  [92 %-100 %] 93 %  BP: ()/(55-92) 110/66  Arterial Line BP: ()/(49-81) 94/50   Weight: 76.7 kg (169 lb)  Body mass index  is 26.47 kg/m².      Intake/Output Summary (Last 24 hours) at 11/29/2020 1851  Last data filed at 11/29/2020 1800  Gross per 24 hour   Intake 2116.64 ml   Output 3245 ml   Net -1128.36 ml       Physical Exam  Constitutional:       Appearance: She is ill-appearing.      Interventions: She is sedated, chemically paralyzed and intubated.   Eyes:      General: No scleral icterus.     Conjunctiva/sclera: Conjunctivae normal.   Cardiovascular:      Rate and Rhythm: Normal rate and regular rhythm.      Pulses: Normal pulses.      Heart sounds: Normal heart sounds.   Pulmonary:      Effort: Pulmonary effort is normal. No respiratory distress. She is intubated.      Breath sounds: Examination of the right-lower field reveals decreased breath sounds. Examination of the left-lower field reveals decreased breath sounds. Decreased breath sounds present.   Abdominal:      General: Bowel sounds are normal. There is no distension.      Palpations: Abdomen is soft.      Tenderness: There is no abdominal tenderness.   Skin:     General: Skin is warm and dry.      Findings: No bruising or rash.       Vents:  Vent Mode: A/C (11/29/20 1402)  Ventilator Initiated: Yes(chart correction) (11/12/20 0921)  Set Rate: 22 BPM (11/29/20 1402)  Vt Set: 360 mL (11/29/20 1402)  Pressure Support: 0 cmH20 (11/15/20 2115)  PEEP/CPAP: 8 cmH20 (11/29/20 1402)  Oxygen Concentration (%): 40 (11/29/20 1845)  Peak Airway Pressure: 39 cmH2O (11/29/20 1402)  Plateau Pressure: 32 cmH20 (11/29/20 1402)  Total Ve: 7.78 mL (11/29/20 1402)  F/VT Ratio<105 (RSBI): (!) 62.15 (11/29/20 1402)  Lines/Drains/Airways     Central Venous Catheter Line            Percutaneous Central Line Insertion/Assessment - Triple Lumen  11/12/20 1030 right internal jugular 17 days          Drain                 Urethral Catheter 11/12/20 1445 16 Fr. 17 days         NG/OG Tube 11/13/20 0200 [place] sump 14 Fr. Center mouth 16 days         Fecal Incontinence  11/25/20 1500 4 days           Airway                 Airway - Non-Surgical 11/12/20 0928 Endotracheal Tube 17 days          Arterial Line            Arterial Line 11/12/20 1053 Right Radial 17 days          Peripheral Intravenous Line                 Peripheral IV - Single Lumen 11/25/20 1034 Anterior;Distal;Left Forearm 4 days              Significant Labs:    CBC/Anemia Profile:  Recent Labs   Lab 11/28/20 0319 11/29/20  0351   WBC 5.95 4.98   HGB 8.5* 8.1*   HCT 28.1* 26.4*    155   * 106*   RDW 20.0* 19.4*        Chemistries:  Recent Labs   Lab 11/28/20 0319 11/29/20  0351 11/29/20  1117 11/29/20  1638   * 147*  --   --    K 4.0 3.4*  --  4.2    103  --   --    CO2 31* 33*  --   --    BUN 27* 24*  --   --    CREATININE 0.6 0.6  --   --    CALCIUM 8.5* 8.5*  --   --    ALBUMIN 1.5* 1.5*  --   --    PROT 5.4* 5.4*  --   --    BILITOT 0.5 0.5  --   --    ALKPHOS 150* 136*  --   --    ALT 22 21  --   --    AST 27 24  --   --    MG 1.8 1.5* 2.0  --    PHOS 3.3 3.6  --   --        Significant Imaging:  I have reviewed all pertinent imaging results/findings within the past 24 hours.      ABG  Recent Labs   Lab 11/29/20  1404   PH 7.494*   PO2 60*   PCO2 44.2   HCO3 34.0*   BE 11     Assessment/Plan:     Derm  Pressure ulcer of head, stage 2  -Secondary to ETT villanueva.     Plan:   -Wound care consulted and following recommendations.     Pulmonary  Pneumonia due to Pseudomonas aeruginosa  -See assessment for sepsis.     Status post lung transplantation  -Status post left lung transplant in 08/2020 for IPF, but not associated with baseline supplement oxygen requirements since transplant.   -Home regimen: AUGMENTIN, Lamivudine, CELLCEPT, Prednisone, TMP/SMX, Tacrolimus, Valganciclovir, and Voriconazole.     Plan:   -Lung transplant following for assistance with continuing immunosuppressive regimen.     Cardiac/Vascular  Essential hypertension  -Home regimen: Nifedipine and Lopressor.   -Noted onset of worsening  hypertension by 11/24 following weaning of sedation requiring Cardene drip, however was discontinued by 11/25 following re-starting sedation.     Plan:   -Holding home agents with risk of hypotension associated with sedation.     Renal/  Hypernatremia  -Intermittent issue since 11/21 and suspected secondary to free water deficit.     Plan:  -Free water boluses ordered QID.     ID  Bacteremia due to Pseudomonas  -See assessment for sepsis.     Sepsis, unspecified organism  -See assessment for shock.     Oncology  Macrocytic anemia  -Noted to develop worsening anemia throughout admission with Hgb downtrending to 7 on 11/26; status post 1U PRBC with good response.   -No signs of blood loss on exam.     Plan:   -Trending CBC and transfuse for Hgb <7.     Endocrine  Type 2 diabetes mellitus, with long-term current use of insulin  -Most recent A1c 8 in 11/2020.   -Home regimen: Aspart with sliding scale.   -Noted worsening hyperglycemia since admission that was suspected to be secondary to steroids. Status post endocrine consultation on 11/17 for assistance with management.     Plan:   -Endocrine following; status post initiation of insulin drip.   -Goal glucose 140-180.     Other  * Acute respiratory distress syndrome (ARDS) due to COVID-19 virus  This is a 65 year old  female with PMH notable for recent lung transplant in 08/2020 for IPF who was admitted to [facility] on 11/04 for management of new acute hypoxemic respiratory failure associated with COVID-19 that eventually required intubation by 11/12 for progressive respiratory failure. Hospital course associated with development of Pseudmonas pneumonia and bacteremia.     Plan:   -Continue mechanical ventilation with ARDS targeted strategies; status post re-initiation of proning on 11/26; supinated this morning; follow by repeat ABG prior to decision regarding repeat proning.   -Status post appropriate course of Azithromycin, Ceftriaxone,  Dexamethasone, and one time dose of convalescent plasma since admission.   -Therapeutic anticoagulation with hypercoagulable state associated with COVID.   -See assessment for sepsis and Pseudomonas bacteremia.   -Diuretic boluses PRN to optimize ventilator mechanics; additional LASIX IV ordered today.   -Will likely need trach/PEG for continued recovery; gen surg consulted, appreciate assistance   -Strict I/O's and daily weights, if possible.   -Goal O2 saturations >88%.   -Daily SAT/SBT as tolerated.     Long-term use of immunosuppressant medication  -See assessment for lung transplant.     Pressure injury of right buttock, unstageable  Plan:   -Wound care consulted and following recommendations.     Shock, unspecified  -Developed worsening hypoxia by 11/12 requiring intubation, associated with worsening leukocytosis, persistent fevers, and transient DWAYNE with repeat infectious work-up at that time significant for blood and respiratory cultures positive for Pseudomonas.  -Status post ID consultation and initiation on Meropenem on 11/12 with subsequent cultures remaining NGTD.   -Intermittent vasopressor support continues that may be related to sedation side effect.     Plan:   -Continue vasopressor support as needed to maintain MAP's >65; wean as tolerated.   -Transitioned stress dose steroid regimen to home Prednisone 20 mg on 11/27.   -S/p Meropenem 11/12-11/27.   -Continue immunosuppressant antimicrobial prophylaxis (OIP with ganciclovir, lamivudine, bactrim, and posaconazole. Azithro [facility] for CLAD/DRAKE prophylaxis once stable.).    Elevated alkaline phosphatase level  -Noted issue since admission.   -Liver function WNL.     Plan:   -Trending liver function daily.     Palliative care encounter  Plan:   -Family updates daily.   -Code status: FULL.         Critical Care Daily Checklist:     A: Awake: RASS Goal/Actual Goal: RASS Goal: -5-->unarousable  Actual: [place] Agitation Sedation Scale (RASS): Unarousable    B: Spontaneous Breathing Trial Performed? Spon. Breathing Trial Initiated?: Not initiated (11/26/20 0749)   C: SAT & SBT Coordinated?  Not applicable                      D: Delirium: CAM-ICU Overall CAM-ICU: Positive   E: Early Mobility Performed? No   F: Feeding Goal: Goals: Pt to meet 50-75% estimated kcal and protein needs by RD f/u.  Status: Nutrition Goal Status: progressing towards goal, goal met         Current Diet Order   Procedures    Diet NPO       AS: Analgesia/Sedation Fentanyl and Propofol   T: Thromboembolic Prophylaxis LOVENOX   H: HOB > 300 Yes   U: Stress Ulcer Prophylaxis (if needed) Famotidine   G: Glucose Control Yes; insulin drip.    B: Bowel Function Stool Occurrence: 1   I: Indwelling Catheter (Lines & Rios) Necessity Central line   Rios for monitoring I/O's.    D: De-escalation of Antimicrobials/Pharmacotherapies Discontinue Meropenem.      Plan for the day/ETD Supination followed by repeat ABG with anticipated re-proning.      Code Status:  Family/Goals of Care: Full Code            Critical secondary to Patient has a condition that poses threat to life and bodily function: Severe Respiratory Distress      Critical care was time spent personally by me on the following activities: development of treatment plan with patient or surrogate and bedside caregivers, discussions with consultants, evaluation of patient's response to treatment, examination of patient, ordering and performing treatments and interventions, ordering and review of laboratory studies, ordering and review of radiographic studies, pulse oximetry, re-evaluation of patient's condition. This critical care time did not overlap with that of any other provider or involve time for any procedures.        Brandi Alonzo MD  Critical Care Medicine  [facility] - ICU 16 [facility]

## 2020-11-30 NOTE — CARE UPDATE
BG goal 140 - 180   Diet NPO     BG remains stable on current SQ insulin regimen. Endo will continue to follow.     - Continue novolog 3 units TIDWM.   - Low Dose SQ Insulin Correction Scale.  - BG monitoring q 4 hours while NPO and on enteral nutritional therapy.      ** Please call Endocrine for any BG related issues **  ** Please notify Endocrine for any change and/or advance in diet**     Discharge Planning:   TBD. Please notify endocrinology prior to discharge.

## 2020-11-30 NOTE — CARE UPDATE
Procedure Note  Supine Positioning  Critical Care Medicine       Chief Complaint: Acute respiratory distress syndrome (ARDS) due to COVID-19 virus  MRN: [id number]  LOS: 26  Sex: female  Age: 65 y.o.      Last ABG:   Recent Labs   Lab 11/30/20  0547   PH 7.402   PO2 72*   PCO2 55.3*   HCO3 34.4*   BE 10       Vital Signs (Most Recent):   Temp: 97.5 °F (36.4 °C) (11/30/20 0740)  Pulse: 64 (11/30/20 0740)  Resp: 18 (11/30/20 0740)  BP: 106/63 (11/30/20 0600)  SpO2: (!) 94 % (11/30/20 0740)    Ventilator Settings:  Vent Mode: A/C  Oxygen Concentration (%):  [40-50] 40  Resp Rate Total:  [18 br/min-22 br/min] 18 br/min  Vt Set:  [360 mL] 360 mL  PEEP/CPAP:  [8 cmH20] 8 cmH20  Mean Airway Pressure:  [15 yxB06-39 cmH20] 15 cmH20    Indication: Severe, refractory ARDS. High risk for deterioration during supination procedure in need of direct monitoring by Critical Care personnel.     Prior to beginning the procedure, all appropriate equipment and personnel were gathered in the room. Two individuals were placed on each side of the patient and two respiratory therapists stood at the head of the bed and was dedicated to the management of the head of the patient, the endotracheal tube, and the ventilator lines. The person at the head of the bed coordinated the steps of the supination procedure at the direction of Critical Care team members at the bedside. The patient was first log-rolled 90 degrees onto their side away from the direction of their central venous catheter. Cardiac electrodes were then moved from the patient's posterior back to the anterior. Once properly positioned in the bed, another 90 degree log roll was performed placing the patient into the supine position. The patient's arms were placed alongside the body. Continuous pulse oximetry and blood pressure monitoring was performed without any desaturation or hemodynamic instability. The patient tolerated the procedure well.     Total Critical Care time  (uninterrupted not including procedures): 10 minutes.         Eze Gonzalez MD, PGY-III  Internal Medicine Resident  Critical Care Medicine

## 2020-11-30 NOTE — ASSESSMENT & PLAN NOTE
This is a 65 year old  female with PMH notable for recent lung transplant in 08/2020 for IPF who was admitted to [facility] on 11/04 for management of new acute hypoxemic respiratory failure associated with COVID-19 that eventually required intubation by 11/12 for progressive respiratory failure. Hospital course associated with development of Pseudmonas pneumonia and bacteremia.     Plan:   -Continue mechanical ventilation with ARDS targeted strategies; status post re-initiation of proning on 11/26; supinated this morning; follow by repeat ABG prior to decision regarding repeat proning.   -Status post appropriate course of Azithromycin, Ceftriaxone, Dexamethasone, and one time dose of convalescent plasma since admission.   -Therapeutic anticoagulation with hypercoagulable state associated with COVID.   -See assessment for sepsis and Pseudomonas bacteremia.   -Diuretic boluses PRN to optimize ventilator mechanics; additional LASIX IV ordered today.   -Will likely need trach/PEG for continued recovery; gen surg consulted, appreciate assistance   -Strict I/O's and daily weights, if possible.   -Goal O2 saturations >88%.   -Daily SAT/SBT as tolerated.

## 2020-11-30 NOTE — PLAN OF CARE
Vital signs and labs reviewed. Completed Merrem for pseudomonas PNA/bacteremia.  Remains critically ill. Vent currently at 50%/PEEP 10. Difficulty weaning due to agitation and HTN. Will likely need trach/PEG for continued recovery. COVID care per MICU; received dexamethasone, stress dose hydrocortisone, remdesivir, and plasma.      Continue with tacrolimus. Level 8.7 today and will follow closely. Given her active infections, would aim for a level 6-8. Holding MMF due to infections. Continue prednisone 20mg daily. Continue OIP with ganciclovir, lamivudine, bactrim, and posaconazole. [facility] for CLAD/DRAKE prophylaxis once stable.       Please call with any questions.

## 2020-12-01 NOTE — ASSESSMENT & PLAN NOTE
BG goal 140-180.     Low Dose SQ Insulin Correction Scale PRN BG excursions.   - BG monitoring every 4 hours while on enteral nutritional therapy.   Continue  novolog 3 units every 4 hours while on TF; hold if TF held or BG less than 100.     Discharge Planing:   TBD. Please notify endocrinology prior to discharge.

## 2020-12-01 NOTE — PROGRESS NOTES
"[facility] - ICU 16 WT  Adult Nutrition  Progress Note    SUMMARY       Recommendations    1. Suggest modifying TF of Impact Peptide 1.5 to a rate of 40mL/hr to provide 1440kcal, 90g Protein, and 739mL free water.  -Additional water per MD. Hold for residuals >500 mL.     2. RD to monitor and follow up    Goals: Pt to meet 50-75% estimated kcal and protein needs by RD f/u.  Nutrition Goal Status: progressing towards goal  Communication of RD Recs: other (POC)    Reason for Assessment    Reason For Assessment: RD follow-up  Diagnosis: (COVID-19)  Relevant Medical History: DMII, HTN, pulmonary fibrosis s/p left lung tx  Interdisciplinary Rounds: did not attend  General Information Comments: Pt remains intubated and sedated. Per chart review discussion for Pt to possibly get a trach/PEG. Pt Wt stable since admit. TF running at 20mL/hr. Due to recent hospital wide restrictions to limit the transfer of (COVID-19), we are not performing any physical exams at this time on patients with +COVID-19. All S/S will be observational; NFPE to be performed at a future date.  Nutrition Discharge Planning: Unclear at this time. RD to determine closer to discharge.    Nutrition Risk Screen    Nutrition Risk Screen: tube feeding or parenteral nutrition    Nutrition/Diet History    Patient Reported Diet/Restrictions/Preferences: other (see comments)(Unable to obtain d/t COVID precautions. Heart healthy in past.)  Typical Food/Fluid Intake: -  Food Preferences: -  Spiritual, Cultural Beliefs, Restorationism Practices, Values that Affect Care: no  Food Allergies: NKFA  Factors Affecting Nutritional Intake: NPO, on mechanical ventilation    Anthropometrics    Temp: 99 °F (37.2 °C)  Height: 5' 7" (170.2 cm)  Height (inches): 67 in  Weight Method: Bed Scale  Weight: 76.7 kg (169 lb)  Weight (lb): 169 lb  Ideal Body Weight (IBW), Female: 135 lb  % Ideal Body Weight, Female (lb): 125.19 %  BMI (Calculated): 26.5  Weight Loss: " intentional(per chart review)       Lab/Procedures/Meds    Pertinent Labs Reviewed: reviewed  Pertinent Labs Comments: BUN 35; Glucose 169; Ca 8.4; Phos 2.5  Pertinent Medications Reviewed: reviewed  Pertinent Medications Comments: -      Estimated/Assessed Needs    Weight Used For Calorie Calculations: 76.7 kg (169 lb 1.5 oz)  Energy Calorie Requirements (kcal): 1502 kcal/day  Energy Need Method: [facility]  Protein Requirements:  gm/day(1.2-1.5 g/kg)  Weight Used For Protein Calculations: 76.7 kg (169 lb 1.5 oz)  Fluid Requirements (mL): 1 mL/kcal or per MD  Estimated Fluid Requirement Method: RDA Method  RDA Method (mL): 1502  CHO Requirement: 196 gm/day      Nutrition Prescription Ordered    Current Diet Order: NPO  Nutrition Order Comments: -  Current Nutrition Support Formula Ordered: Impact Peptide 1.5  Current Nutrition Support Rate Ordered: 20 (ml)  Current Nutrition Support Frequency Ordered: mL/hr  Oral Nutrition Supplement: -    Evaluation of Received Nutrient/Fluid Intake    Enteral Calories (kcal): 720  Enteral Protein (gm): 45  Enteral (Free Water) Fluid (mL): 370  Other Calories (kcal): 546  Total Calories (kcal): 1266  % Kcal Needs: 84%  % Protein Needs: 48%  I/O: +9L since admit  Energy Calories Required: not meeting needs  Protein Required: not meeting needs  Fluid Required: meeting needs  Comments: LBM 11/29  Tolerance: tolerating  % Intake of Estimated Energy Needs: 75 - 100 %  % Meal Intake: NPO    Nutrition Risk    Level of Risk/Frequency of Follow-up: (1x/week)     Assessment and Plan    Nutrition Problem  Inadeqaute energy intake      Related to (etiology):   Decrease ability to consume adequate PO     Signs and Symptoms (as evidenced by):   NPO, Intubated      Interventions(treatment strategy):  Collaboration with other providers      Nutrition Diagnosis Status:   Resolving     Monitor and Evaluation    Food and Nutrient Intake: energy intake, enteral nutrition intake  Food and  Nutrient Adminstration: enteral and parenteral nutrition administration  Knowledge/Beliefs/Attitudes: other (specify)  Anthropometric Measurements: weight, weight change, body mass index  Biochemical Data, Medical Tests and Procedures: electrolyte and renal panel, glucose/endocrine profile, gastrointestinal profile, inflammatory profile, lipid profile  Nutrition-Focused Physical Findings: overall appearance               Nutrition Follow-Up    RD Follow-up?: Yes

## 2020-12-01 NOTE — PROGRESS NOTES
[facility] - ICU 16   Critical Care Medicine  Progress Note    Patient Name: Chely Becerra  MRN: [id number]  Admission Date: 11/4/2020  Hospital Length of Stay: 26 days  Code Status: Full Code  Attending Provider: Christina Matos MD  Primary Care Provider: ZAHIDA Stack MD   Principal Problem: Acute respiratory distress syndrome (ARDS) due to COVID-19 virus    Subjective:     HPI:  Ms. Chely Becerra is a 65 y.o. year old female with a PMHx of diabetes and idiopathic pulmonary fibrosis s/p unilateral (left) lung transplant in August 2020 who presents to the ED complaining of a shortness of breath that started last Friday and progressively got worse. She had a fever of 101 on Sunday, started coughing for the last 3 days nonproductive, denies any chest pain. She was seen in the [facility] Transplant [facility] for routine testing earlier this morning was found to be hypoxic and she was referred to the emergency department. On arrival her COVID-19 test is positive.     Patient lives with her 2 daughters, 1 of the daughters has been having some respiratory symptoms recently however she thought was secondary to asthma. he is on prednisone taper currently 20 mg daily, she has been compliant with her antirejection medication Prograf and CellCept. She was started on BiPAP in the ED with improvement in her oxygen saturations to the mid 90's.     Hospital/ICU Course:  Ivan Becerra was admitted to [facility] MICU on 11/04/2020 for management of new acute hypoxemic respiratory failure secondary to COVID-19. Her respiratory status was stabilized on admission alternating between BiPAP and comfort flow. She was initiated on Azithromycin and Ceftriaxone for CAP coverage, in addition to home Voriconazole prophylaxis. She completed a 10-day course of Dexamethasone from 11/04 to 11/13, in addition to a 5 day course of Remdesivir, and one time dose of convalescent plasma. However, by 11/12, her hypoxia progressed,  associated with worsening leukocytosis, new DWAYNE, and persistent fevers, requiring intubation at that time. Repeat infectious work-up was significant for blood and respiratory cultures from 11/12 positive for Pseudmonas. ID was consulted and she was initiatedon Meropenem on 11/12 with subsequent blood cultures remaining NGTD. DWAYNE resolved by 11/14 without need for dialysis. Intubation and subsequent sedation were also associated with onset of shock requiring vasopressor support until 11/20. She was initiated on intermittent proning with improvements in oxygenation, however this was limited by intermittent episodes of bradycardia. Endocrinology was consulted on 11/17 for hyperglycemia management. Following weaning sedation by 11/23, she developed worsening hypertension requiring a Cardene drip by 11/24. By 11/25, she was intolerable of weaning ventilator support and sedation and neuromuscular blockade was re-initiated; proning resumed on 11/26 for attempted improvement in oxygenation. On 11/26, Hgb progressively downtrended since admission and PRBC given with adequate response. She remains on Meropenem until 11/27. She remains on noerpi support while sedated. Lung transplant team following for assistance with home immunosuppressive medications.       Interval History/Significant Events: Proned overnight. P/F this morning at 180 from 172. Supination completed this AM pending PM ABG to decide re-proning need.     Review of Systems   Unable to perform ROS: Intubated     Objective:     Vital Signs (Most Recent):  Temp: 97.5 °F (36.4 °C) (11/30/20 1530)  Pulse: (!) 57 (11/30/20 1657)  Resp: 18 (11/30/20 1530)  BP: (!) 98/59 (11/30/20 1500)  SpO2: 97 % (11/30/20 1657) Vital Signs (24h Range):  Temp:  [93.4 °F (34.1 °C)-100.6 °F (38.1 °C)] 97.5 °F (36.4 °C)  Pulse:  [51-77] 57  Resp:  [18] 18  SpO2:  [93 %-100 %] 97 %  BP: ()/(53-81) 98/59  Arterial Line BP: ()/(50-72) 108/59   Weight: 76.7 kg (169 lb)  Body mass  index is 26.47 kg/m².      Intake/Output Summary (Last 24 hours) at 11/30/2020 1810  Last data filed at 11/30/2020 1500  Gross per 24 hour   Intake 1807.24 ml   Output 890 ml   Net 917.24 ml       Physical Exam  Constitutional:       Appearance: She is ill-appearing.      Interventions: She is sedated, chemically paralyzed and intubated.   Eyes:      General: No scleral icterus.     Conjunctiva/sclera: Conjunctivae normal.   Cardiovascular:      Rate and Rhythm: Normal rate and regular rhythm.      Pulses: Normal pulses.      Heart sounds: Normal heart sounds.   Pulmonary:      Effort: Pulmonary effort is normal. No respiratory distress. She is intubated.      Breath sounds: Examination of the right-lower field reveals decreased breath sounds. Examination of the left-lower field reveals decreased breath sounds. Decreased breath sounds present.   Abdominal:      General: Bowel sounds are normal. There is no distension.      Palpations: Abdomen is soft.      Tenderness: There is no abdominal tenderness.   Skin:     General: Skin is warm and dry.      Findings: No bruising or rash.       Vents:  Vent Mode: A/C (11/30/20 1657)  Ventilator Initiated: Yes(chart correction) (11/12/20 0921)  Set Rate: 18 BPM (11/30/20 1657)  Vt Set: 340 mL (11/30/20 1657)  Pressure Support: 0 cmH20 (11/15/20 2115)  PEEP/CPAP: 10 cmH20 (11/30/20 1657)  Oxygen Concentration (%): 50 (11/30/20 1657)  Peak Airway Pressure: 40 cmH2O (11/30/20 1657)  Plateau Pressure: 34 cmH20 (11/30/20 1657)  Total Ve: 6.09 mL (11/30/20 1657)  F/VT Ratio<105 (RSBI): (!) 53.41 (11/30/20 1413)  Lines/Drains/Airways     Central Venous Catheter Line            Percutaneous Central Line Insertion/Assessment - Triple Lumen  11/12/20 1030 right internal jugular 18 days          Drain                 Urethral Catheter 11/12/20 1445 16 Fr. 18 days         NG/OG Tube 11/13/20 0200 [place] sump 14 Fr. Center mouth 17 days         Fecal Incontinence  11/25/20 1500 5  days          Airway                 Airway - Non-Surgical 11/12/20 0928 Endotracheal Tube 18 days          Arterial Line            Arterial Line 11/12/20 1053 Right Radial 18 days              Significant Labs:    CBC/Anemia Profile:  Recent Labs   Lab 11/29/20  0351 11/30/20  0413   WBC 4.98 4.76   HGB 8.1* 7.9*   HCT 26.4* 26.8*    151   * 110*   RDW 19.4* 19.1*        Chemistries:  Recent Labs   Lab 11/29/20  0351 11/29/20  1117 11/29/20  1638 11/30/20  0413   *  --   --  146*   K 3.4*  --  4.2 3.9     --   --  104   CO2 33*  --   --  33*   BUN 24*  --   --  23   CREATININE 0.6  --   --  0.7   CALCIUM 8.5*  --   --  8.2*   ALBUMIN 1.5*  --   --  1.5*   PROT 5.4*  --   --  5.1*   BILITOT 0.5  --   --  0.4   ALKPHOS 136*  --   --  122   ALT 21  --   --  20   AST 24  --   --  21   MG 1.5* 2.0  --  1.8   PHOS 3.6  --   --  4.3       Significant Imaging:  I have reviewed all pertinent imaging results/findings within the past 24 hours.      ABG  Recent Labs   Lab 11/30/20  1413   PH 7.335*   PO2 75*   PCO2 61.7*   HCO3 33.0*   BE 7     Assessment/Plan:     Derm  Pressure ulcer of head, stage 2  -Secondary to ETT villanueva.     Plan:   -Wound care consulted and following recommendations.     Pulmonary  Pneumonia due to Pseudomonas aeruginosa  -See assessment for sepsis.     Status post lung transplantation  -Status post left lung transplant in 08/2020 for IPF, but not associated with baseline supplement oxygen requirements since transplant.   -Home regimen: AUGMENTIN, Lamivudine, CELLCEPT, Prednisone, TMP/SMX, Tacrolimus, Valganciclovir, and Voriconazole.     Plan:   -Lung transplant following for assistance with continuing immunosuppressive regimen.     Cardiac/Vascular  Essential hypertension  -Home regimen: Nifedipine and Lopressor.   -Noted onset of worsening hypertension by 11/24 following weaning of sedation requiring Cardene drip, however was discontinued by 11/25 following re-starting  sedation.     Plan:   -Holding home agents with risk of hypotension associated with sedation.     Renal/  Hypernatremia  -Intermittent issue since 11/21 and suspected secondary to free water deficit.     Plan:  -Free water boluses ordered QID.     ID  Bacteremia due to Pseudomonas  -See assessment for sepsis.     Sepsis, unspecified organism  -See assessment for shock.     Oncology  Macrocytic anemia  -Noted to develop worsening anemia throughout admission with Hgb downtrending to 7 on 11/26; status post 1U PRBC with good response.   -No signs of blood loss on exam.     Plan:   -Trending CBC and transfuse for Hgb <7.     Endocrine  Type 2 diabetes mellitus, with long-term current use of insulin  -Most recent A1c 8 in 11/2020.   -Home regimen: Aspart with sliding scale.   -Noted worsening hyperglycemia since admission that was suspected to be secondary to steroids. Status post endocrine consultation on 11/17 for assistance with management.     Plan:   -Endocrine following; status post initiation of insulin drip.   -Goal glucose 140-180.     Other  * Acute respiratory distress syndrome (ARDS) due to COVID-19 virus  This is a 65 year old  female with PMH notable for recent lung transplant in 08/2020 for IPF who was admitted to [facility] on 11/04 for management of new acute hypoxemic respiratory failure associated with COVID-19 that eventually required intubation by 11/12 for progressive respiratory failure. Hospital course associated with development of Pseudmonas pneumonia and bacteremia.     Plan:   -Continue mechanical ventilation with ARDS targeted strategies; status post re-initiation of proning on 11/26; supinated this morning; follow by repeat ABG prior to decision regarding repeat proning.   -Status post appropriate course of Azithromycin, Ceftriaxone, Dexamethasone, and one time dose of convalescent plasma since admission.   -Therapeutic anticoagulation with hypercoagulable state associated  with COVID.   -See assessment for sepsis and Pseudomonas bacteremia.   -Diuretic boluses PRN to optimize ventilator mechanics; LASIX IV scheduled.   -Will likely need trach/PEG for continued recovery; gen surg consulted, appreciate assistance   -Strict I/O's and daily weights, if possible.   -Goal O2 saturations >88%.   -Daily SAT/SBT as tolerated.     Long-term use of immunosuppressant medication  -See assessment for lung transplant.     Pressure injury of right buttock, unstageable  Plan:   -Wound care consulted and following recommendations.     Shock, unspecified  -Developed worsening hypoxia by 11/12 requiring intubation, associated with worsening leukocytosis, persistent fevers, and transient DWAYNE with repeat infectious work-up at that time significant for blood and respiratory cultures positive for Pseudomonas.  -Status post ID consultation and initiation on Meropenem on 11/12 with subsequent cultures remaining NGTD.   -Intermittent vasopressor support continues that may be related to sedation side effect.     Plan:   -Continue vasopressor support as needed to maintain MAP's >65; wean as tolerated.   -Transitioned stress dose steroid regimen to home Prednisone 20 mg on 11/27.   -S/p Meropenem 11/12-11/27.   -Continue immunosuppressant antimicrobial prophylaxis (OIP with ganciclovir, lamivudine, bactrim, and posaconazole. Azithro MWF for CLAD/DRAKE prophylaxis once stable.).    Elevated alkaline phosphatase level  -Noted issue since admission.   -Liver function WNL.     Plan:   -Trending liver function daily.     Palliative care encounter  Plan:   -Family updates daily.   -Code status: FULL.         Critical Care Daily Checklist:     A: Awake: RASS Goal/Actual Goal: RASS Goal: -5-->unarousable  Actual: [place] Agitation Sedation Scale (RASS): Unarousable   B: Spontaneous Breathing Trial Performed? Spon. Breathing Trial Initiated?: Not initiated (11/26/20 0749)   C: SAT & SBT Coordinated?  Not  applicable                      D: Delirium: CAM-ICU Overall CAM-ICU: Positive   E: Early Mobility Performed? No   F: Feeding Goal: Goals: Pt to meet 50-75% estimated kcal and protein needs by RD f/u.  Status: Nutrition Goal Status: progressing towards goal, goal met         Current Diet Order   Procedures    Diet NPO       AS: Analgesia/Sedation Fentanyl and Propofol   T: Thromboembolic Prophylaxis LOVENOX   H: HOB > 300 Yes   U: Stress Ulcer Prophylaxis (if needed) Famotidine   G: Glucose Control Yes; insulin drip.    B: Bowel Function Stool Occurrence: 1   I: Indwelling Catheter (Lines & Rios) Necessity Central line   Rios for monitoring I/O's.    D: De-escalation of Antimicrobials/Pharmacotherapies Discontinue Meropenem.      Plan for the day/ETD Supination followed by repeat ABG with anticipated re-proning.      Code Status:  Family/Goals of Care: Full Code            Critical secondary to Patient has a condition that poses threat to life and bodily function: Severe Respiratory Distress      Critical care was time spent personally by me on the following activities: development of treatment plan with patient or surrogate and bedside caregivers, discussions with consultants, evaluation of patient's response to treatment, examination of patient, ordering and performing treatments and interventions, ordering and review of laboratory studies, ordering and review of radiographic studies, pulse oximetry, re-evaluation of patient's condition. This critical care time did not overlap with that of any other provider or involve time for any procedures.     Brandi Alonzo MD  Critical Care Medicine  [facility] - ICU 16 WT

## 2020-12-01 NOTE — PLAN OF CARE
Procedure Note  Supine Positioning  Critical Care Medicine       Chief Complaint: Acute respiratory distress syndrome (ARDS) due to COVID-19 virus  MRN: [id number]  LOS: 27  Sex: female  Age: 65 y.o.      Last ABG:   Recent Labs   Lab 12/01/20 0427   PH 7.377   PO2 122*   PCO2 55.5*   HCO3 32.6*   BE 7       Vital Signs (Most Recent):   Temp: 98.8 °F (37.1 °C) (12/01/20 1000)  Pulse: 65 (12/01/20 1045)  Resp: (!) 22 (12/01/20 1045)  BP: 136/82 (12/01/20 1000)  SpO2: 99 % (12/01/20 1045)    Ventilator Settings:  Vent Mode: A/C  Oxygen Concentration (%):  [50] 50  Resp Rate Total:  [18 br/min-22 br/min] 21 br/min  Vt Set:  [340 mL] 340 mL  PEEP/CPAP:  [10 cmH20] 10 cmH20  Mean Airway Pressure:  [15 tnC05-77 cmH20] 18 cmH20        Indication: Severe, refractory ARDS. High risk for deterioration during supination procedure in need of direct monitoring by Critical Care personnel.     Prior to beginning the procedure, all appropriate equipment and personnel were gathered in the room. Two individuals were placed on each side of the patient and two respiratory therapists stood at the head of the bed and was dedicated to the management of the head of the patient, the endotracheal tube, and the ventilator lines. The person at the head of the bed coordinated the steps of the supination procedure at the direction of Critical Care team members at the bedside. The patient was first log-rolled 90 degrees onto their side away from the direction of their central venous catheter. Cardiac electrodes were then moved from the patient's posterior back to the anterior. Once properly positioned in the bed, another 90 degree log roll was performed placing the patient into the supine position. The patient's arms were placed alongside the body. Continuous pulse oximetry and blood pressure monitoring was performed without any desaturation or hemodynamic instability. The patient tolerated the procedure well.     Total Critical Care time  (uninterrupted not including procedures): 20 min

## 2020-12-01 NOTE — SUBJECTIVE & OBJECTIVE
Interval History/Significant Events: Proned overnight. P/F this morning at 180 from 172. Supination completed this AM pending PM ABG to decide re-proning need.     Review of Systems   Unable to perform ROS: Intubated     Objective:     Vital Signs (Most Recent):  Temp: 97.5 °F (36.4 °C) (11/30/20 1530)  Pulse: (!) 57 (11/30/20 1657)  Resp: 18 (11/30/20 1530)  BP: (!) 98/59 (11/30/20 1500)  SpO2: 97 % (11/30/20 1657) Vital Signs (24h Range):  Temp:  [93.4 °F (34.1 °C)-100.6 °F (38.1 °C)] 97.5 °F (36.4 °C)  Pulse:  [51-77] 57  Resp:  [18] 18  SpO2:  [93 %-100 %] 97 %  BP: ()/(53-81) 98/59  Arterial Line BP: ()/(50-72) 108/59   Weight: 76.7 kg (169 lb)  Body mass index is 26.47 kg/m².      Intake/Output Summary (Last 24 hours) at 11/30/2020 1810  Last data filed at 11/30/2020 1500  Gross per 24 hour   Intake 1807.24 ml   Output 890 ml   Net 917.24 ml       Physical Exam  Constitutional:       Appearance: She is ill-appearing.      Interventions: She is sedated, chemically paralyzed and intubated.   Eyes:      General: No scleral icterus.     Conjunctiva/sclera: Conjunctivae normal.   Cardiovascular:      Rate and Rhythm: Normal rate and regular rhythm.      Pulses: Normal pulses.      Heart sounds: Normal heart sounds.   Pulmonary:      Effort: Pulmonary effort is normal. No respiratory distress. She is intubated.      Breath sounds: Examination of the right-lower field reveals decreased breath sounds. Examination of the left-lower field reveals decreased breath sounds. Decreased breath sounds present.   Abdominal:      General: Bowel sounds are normal. There is no distension.      Palpations: Abdomen is soft.      Tenderness: There is no abdominal tenderness.   Skin:     General: Skin is warm and dry.      Findings: No bruising or rash.       Vents:  Vent Mode: A/C (11/30/20 1657)  Ventilator Initiated: Yes(chart correction) (11/12/20 6653)  Set Rate: 18 BPM (11/30/20 6687)  Vt Set: 340 mL (11/30/20  1657)  Pressure Support: 0 cmH20 (11/15/20 2115)  PEEP/CPAP: 10 cmH20 (11/30/20 1657)  Oxygen Concentration (%): 50 (11/30/20 1657)  Peak Airway Pressure: 40 cmH2O (11/30/20 1657)  Plateau Pressure: 34 cmH20 (11/30/20 1657)  Total Ve: 6.09 mL (11/30/20 1657)  F/VT Ratio<105 (RSBI): (!) 53.41 (11/30/20 1413)  Lines/Drains/Airways     Central Venous Catheter Line            Percutaneous Central Line Insertion/Assessment - Triple Lumen  11/12/20 1030 right internal jugular 18 days          Drain                 Urethral Catheter 11/12/20 1445 16 Fr. 18 days         NG/OG Tube 11/13/20 0200 [place] sump 14 Fr. Center mouth 17 days         Fecal Incontinence  11/25/20 1500 5 days          Airway                 Airway - Non-Surgical 11/12/20 0928 Endotracheal Tube 18 days          Arterial Line            Arterial Line 11/12/20 1053 Right Radial 18 days              Significant Labs:    CBC/Anemia Profile:  Recent Labs   Lab 11/29/20  0351 11/30/20  0413   WBC 4.98 4.76   HGB 8.1* 7.9*   HCT 26.4* 26.8*    151   * 110*   RDW 19.4* 19.1*        Chemistries:  Recent Labs   Lab 11/29/20  0351 11/29/20  1117 11/29/20  1638 11/30/20  0413   *  --   --  146*   K 3.4*  --  4.2 3.9     --   --  104   CO2 33*  --   --  33*   BUN 24*  --   --  23   CREATININE 0.6  --   --  0.7   CALCIUM 8.5*  --   --  8.2*   ALBUMIN 1.5*  --   --  1.5*   PROT 5.4*  --   --  5.1*   BILITOT 0.5  --   --  0.4   ALKPHOS 136*  --   --  122   ALT 21  --   --  20   AST 24  --   --  21   MG 1.5* 2.0  --  1.8   PHOS 3.6  --   --  4.3       Significant Imaging:  I have reviewed all pertinent imaging results/findings within the past 24 hours.

## 2020-12-01 NOTE — PLAN OF CARE
Recommendations     1. Suggest modifying TF of Impact Peptide 1.5 to a rate of 40mL/hr to provide 1440kcal, 90g Protein, and 739mL free water.  -Additional water per MD. Hold for residuals >500 mL.      2. RD to monitor and follow up     Goals: Pt to meet 50-75% estimated kcal and protein needs by RD f/u.  Nutrition Goal Status: progressing towards goal

## 2020-12-01 NOTE — SUBJECTIVE & OBJECTIVE
"Interval HPI:   Overnight events: Remains in RICU, intubated and sedated. BG at or below goal with scheduled novolog. TF rate variable. Prednisone 20 mg daily.   Eating:   NPO  Nausea: No  Hypoglycemia and intervention: No  Fever: No  TPN and/or TF: impact peptide at 10-20 cc/hr    /82   Pulse 60   Temp 98.8 °F (37.1 °C)   Resp (!) 21   Ht 5' 7" (1.702 m)   Wt 76.7 kg (169 lb)   LMP  (LMP Unknown)   SpO2 100%   Breastfeeding No   BMI 26.47 kg/m²     Labs Reviewed and Include    Recent Labs   Lab 12/01/20  0332 12/01/20  1041   GLU 99  --    CALCIUM 8.2*  --    ALBUMIN 1.6*  --    PROT 5.2*  --      --    K 3.7 4.1   CO2 32*  --      --    BUN 22  --    CREATININE 0.7  --    ALKPHOS 117  --    ALT 17  --    AST 18  --    BILITOT 0.4  --      Lab Results   Component Value Date    WBC 4.57 12/01/2020    HGB 7.9 (L) 12/01/2020    HCT 26.5 (L) 12/01/2020     (H) 12/01/2020     12/01/2020     No results for input(s): TSH, FREET4 in the last 168 hours.  Lab Results   Component Value Date    HGBA1C 8.0 (H) 11/05/2020       Nutritional status:   Body mass index is 26.47 kg/m².  Lab Results   Component Value Date    ALBUMIN 1.6 (L) 12/01/2020    ALBUMIN 1.5 (L) 11/30/2020    ALBUMIN 1.5 (L) 11/29/2020     No results found for: PREALBUMIN    Estimated Creatinine Clearance: 87.8 mL/min (based on SCr of 0.7 mg/dL).    Accu-Checks  Recent Labs     11/30/20  0030 11/30/20  0419 11/30/20  0849 11/30/20  1253 11/30/20  1742 11/30/20  2002 12/01/20  0012 12/01/20  0437 12/01/20  0854 12/01/20  1306   POCTGLUCOSE 142* 95 107 136* 188* 175* 129* 90 99 152*       Current Medications and/or Treatments Impacting Glycemic Control  Immunotherapy:    Immunosuppressants         Stop Route Frequency     tacrolimus (PROGRAF) 1 mg/mL oral syringe      -- PER G TUBE 2 times daily        Steroids:   Hormones (From admission, onward)    Start     Stop Route Frequency Ordered    11/27/20 1345  predniSONE " tablet 20 mg      -- OG Daily 11/27/20 1239        Pressors:    Autonomic Drugs (From admission, onward)    Start     Stop Route Frequency Ordered    11/25/20 1815  NORepinephrine bitartrate 8 mg in dextrose 5% 250 mL infusion     Question Answer Comment   Titrate by: (in mcg/kg/min) 0.02    Titrate interval: (in minutes) 5    Titrate to maintain: (MAP or SBP) MAP    Greater than: (in mmHg) 65    Maximum dose: (in mcg/kg/min) 3        -- IV Continuous 11/25/20 1717    11/25/20 1030  cisatracurium (NIMBEX) 200 mg in dextrose 5 % 100 mL infusion     Question Answer Comment   Bolus over 1 minute (in mg): 3    Begin at (in mcg/kg/min): 1    Titrate by: (in mcg/kg/min) 0.5    Titrate interval: (in minutes) 15    To maintain a train-of-four of (TOF_/4): 2/4    Maximum dose: (in mcg/kg/min) 10        -- IV Continuous 11/25/20 0924    11/12/20 1243  norepinephrine 1 mg/mL injection     Note to Pharmacy: Created by cabinet override    11/13 0059   11/12/20 1243        Hyperglycemia/Diabetes Medications:   Antihyperglycemics (From admission, onward)    Start     Stop Route Frequency Ordered    11/29/20 0800  insulin aspart U-100 pen 3 Units      -- SubQ Every 24 hours (non-standard times) 11/28/20 1008    11/29/20 0400  insulin aspart U-100 pen 3 Units      -- SubQ Every 24 hours (non-standard times) 11/28/20 1008    11/29/20 0000  insulin aspart U-100 pen 3 Units      -- SubQ Every 24 hours (non-standard times) 11/28/20 1008    11/28/20 2000  insulin aspart U-100 pen 3 Units      -- SubQ Every 24 hours (non-standard times) 11/28/20 1008    11/28/20 1600  insulin aspart U-100 pen 3 Units      -- SubQ Every 24 hours (non-standard times) 11/28/20 1008    11/28/20 1200  insulin aspart U-100 pen 3 Units      -- SubQ Every 24 hours (non-standard times) 11/28/20 1008    11/27/20 1125  insulin aspart U-100 pen 0-5 Units      -- SubQ Every 4 hours PRN 11/27/20 1023

## 2020-12-01 NOTE — ASSESSMENT & PLAN NOTE
This is a 65 year old  female with PMH notable for recent lung transplant in 08/2020 for IPF who was admitted to [facility] on 11/04 for management of new acute hypoxemic respiratory failure associated with COVID-19 that eventually required intubation by 11/12 for progressive respiratory failure. Hospital course associated with development of Pseudmonas pneumonia and bacteremia.     Plan:   -Continue mechanical ventilation with ARDS targeted strategies; status post re-initiation of proning on 11/26; supinated this morning; follow by repeat ABG prior to decision regarding repeat proning.   -Status post appropriate course of Azithromycin, Ceftriaxone, Dexamethasone, and one time dose of convalescent plasma since admission.   -Therapeutic anticoagulation with hypercoagulable state associated with COVID.   -See assessment for sepsis and Pseudomonas bacteremia.   -Diuretic boluses PRN to optimize ventilator mechanics; LASIX IV scheduled.   -Will likely need trach/PEG for continued recovery; gen surg consulted, appreciate assistance   -Strict I/O's and daily weights, if possible.   -Goal O2 saturations >88%.   -Daily SAT/SBT as tolerated.

## 2020-12-01 NOTE — PROGRESS NOTES
"[facility] - ICU 16 WT  Endocrinology  Progress Note    Admit Date: 11/4/2020     Reason for Consult: Management of pre-dm, Hyperglycemia      Surgical Procedure and Date: Left lung transplant n 8/10/20     Diabetes diagnosis year: pre-dm - diet controlled prior to transplant   Lab Results   Component Value Date    HGBA1C 8.0 (H) 11/05/2020             Home Diabetes Medications:novolog 10 units TID with meals plus correction scale.     How often checking glucose at home? NAVEEN   BG readings on regimen: NAVEEN  Hypoglycemia on the regimen? NAVEEN   Missed doses on regimen?  NAVEEN     Diabetes Complications include:     none  Complicating diabetes co morbidities:   Glucocorticoid use  s/p lung transplant  covid infection        HPI:   Patient is a 65 y.o. female with a diagnosis of diabetes and idiopathic pulmonary fibrosis s/p unilateral (left) lung transplant. Patient presents to the ED complaining of a shortness of breath that progressively got worse. On arrival her COVID-19 test is positive. Patient clinically declined on 11/13 and was intubated. Endocrinology consulted for BG/ Dm management.           Interval HPI:   Overnight events: Remains in RICU, intubated and sedated. BG at or below goal with scheduled novolog. TF rate variable. Prednisone 20 mg daily.   Eating:   NPO  Nausea: No  Hypoglycemia and intervention: No  Fever: No  TPN and/or TF: impact peptide at 10-20 cc/hr    /82   Pulse 60   Temp 98.8 °F (37.1 °C)   Resp (!) 21   Ht 5' 7" (1.702 m)   Wt 76.7 kg (169 lb)   LMP  (LMP Unknown)   SpO2 100%   Breastfeeding No   BMI 26.47 kg/m²     Labs Reviewed and Include    Recent Labs   Lab 12/01/20  0332 12/01/20  1041   GLU 99  --    CALCIUM 8.2*  --    ALBUMIN 1.6*  --    PROT 5.2*  --      --    K 3.7 4.1   CO2 32*  --      --    BUN 22  --    CREATININE 0.7  --    ALKPHOS 117  --    ALT 17  --    AST 18  --    BILITOT 0.4  --      Lab Results   Component Value Date    WBC 4.57 " 12/01/2020    HGB 7.9 (L) 12/01/2020    HCT 26.5 (L) 12/01/2020     (H) 12/01/2020     12/01/2020     No results for input(s): TSH, FREET4 in the last 168 hours.  Lab Results   Component Value Date    HGBA1C 8.0 (H) 11/05/2020       Nutritional status:   Body mass index is 26.47 kg/m².  Lab Results   Component Value Date    ALBUMIN 1.6 (L) 12/01/2020    ALBUMIN 1.5 (L) 11/30/2020    ALBUMIN 1.5 (L) 11/29/2020     No results found for: PREALBUMIN    Estimated Creatinine Clearance: 87.8 mL/min (based on SCr of 0.7 mg/dL).    Accu-Checks  Recent Labs     11/30/20  0030 11/30/20  0419 11/30/20  0849 11/30/20  1253 11/30/20  1742 11/30/20  2002 12/01/20  0012 12/01/20  0437 12/01/20  0854 12/01/20  1306   POCTGLUCOSE 142* 95 107 136* 188* 175* 129* 90 99 152*       Current Medications and/or Treatments Impacting Glycemic Control  Immunotherapy:    Immunosuppressants         Stop Route Frequency     tacrolimus (PROGRAF) 1 mg/mL oral syringe      -- PER G TUBE 2 times daily        Steroids:   Hormones (From admission, onward)    Start     Stop Route Frequency Ordered    11/27/20 1345  predniSONE tablet 20 mg      -- OG Daily 11/27/20 1239        Pressors:    Autonomic Drugs (From admission, onward)    Start     Stop Route Frequency Ordered    11/25/20 1815  NORepinephrine bitartrate 8 mg in dextrose 5% 250 mL infusion     Question Answer Comment   Titrate by: (in mcg/kg/min) 0.02    Titrate interval: (in minutes) 5    Titrate to maintain: (MAP or SBP) MAP    Greater than: (in mmHg) 65    Maximum dose: (in mcg/kg/min) 3        -- IV Continuous 11/25/20 1717    11/25/20 1030  cisatracurium (NIMBEX) 200 mg in dextrose 5 % 100 mL infusion     Question Answer Comment   Bolus over 1 minute (in mg): 3    Begin at (in mcg/kg/min): 1    Titrate by: (in mcg/kg/min) 0.5    Titrate interval: (in minutes) 15    To maintain a train-of-four of (TOF_/4): 2/4    Maximum dose: (in mcg/kg/min) 10        -- IV Continuous  11/25/20 0924    11/12/20 1243  norepinephrine 1 mg/mL injection     Note to Pharmacy: Created by cabinet override    11/13 0059   11/12/20 1243        Hyperglycemia/Diabetes Medications:   Antihyperglycemics (From admission, onward)    Start     Stop Route Frequency Ordered    11/29/20 0800  insulin aspart U-100 pen 3 Units      -- SubQ Every 24 hours (non-standard times) 11/28/20 1008    11/29/20 0400  insulin aspart U-100 pen 3 Units      -- SubQ Every 24 hours (non-standard times) 11/28/20 1008    11/29/20 0000  insulin aspart U-100 pen 3 Units      -- SubQ Every 24 hours (non-standard times) 11/28/20 1008    11/28/20 2000  insulin aspart U-100 pen 3 Units      -- SubQ Every 24 hours (non-standard times) 11/28/20 1008    11/28/20 1600  insulin aspart U-100 pen 3 Units      -- SubQ Every 24 hours (non-standard times) 11/28/20 1008    11/28/20 1200  insulin aspart U-100 pen 3 Units      -- SubQ Every 24 hours (non-standard times) 11/28/20 1008    11/27/20 1125  insulin aspart U-100 pen 0-5 Units      -- SubQ Every 4 hours PRN 11/27/20 1025          ASSESSMENT and PLAN    * Acute respiratory distress syndrome (ARDS) due to COVID-19 virus  Managed per primary team  Avoid hypoglycemia        Type 2 diabetes mellitus, with long-term current use of insulin  BG goal 140-180.     Low Dose SQ Insulin Correction Scale PRN BG excursions.   - BG monitoring every 4 hours while on enteral nutritional therapy.   Continue  novolog 3 units every 4 hours while on TF; hold if TF held or BG less than 100.     Discharge Planing:   TBD. Please notify endocrinology prior to discharge.           Nidia Frazier NP  Endocrinology  [facility] - ICU 16 WT

## 2020-12-01 NOTE — PROGRESS NOTES
"Progress Note  [facility]        SUBJECTIVE:     Chief Complaint:   Dyspnea    History of present illness/Interval history since last note:  The patient is a 65 y.o. female with PMH of DM & IPF s/p single left lung transplant 8/10/2020 admitted 11/4/2020 with shortness of breath. The patient was admitted due to concern for respiratory compromise from COVID 19.      Review of patient's allergies indicates:   Allergen Reactions    Boniva [ibandronate] Other (See Comments)     "chest cramps"       Past Medical History:   Diagnosis Date    A-fib around 1996    Chronic hypoxemic respiratory failure 4/11/2019    Common bile duct dilatation 1/15/2019    Controlled type 2 diabetes mellitus without complication, without long-term current use of insulin 1/17/2019    Essential hypertension 1/16/2019    GERD (gastroesophageal reflux disease)     Hepatitis B core antibody positive 1/15/2019    IPF (idiopathic pulmonary fibrosis)     Obesity (BMI 30-39.9) 1/16/2019    RLS (restless legs syndrome)      Past Surgical History:   Procedure Laterality Date    APPLICATION OF WOUND VACUUM-ASSISTED CLOSURE DEVICE Left 8/10/2020    Procedure: APPLICATION, WOUND VAC, 40 x 5cm;  Surgeon: Benedict Clemons MD;  Location: [facility] OR [address] [place] [address];  Service: Cardiovascular;  Laterality: Left;    BRONCHOSCOPY N/A 9/23/2020    Procedure: flexible bronchoscopy with tissue biopsy CPT 62293;  [facility];  Location: [facility] OR [address] [place] [address];  Service: Anesthesiology;  Laterality: N/A;    CATHETERIZATION OF BOTH LEFT AND RIGHT HEART N/A 1/17/2019    Procedure: CATHETERIZATION, HEART, BOTH LEFT AND RIGHT;  Surgeon: Trey Evans MD;  Location: [facility] CATH LAB;  Service: Cardiology;  Laterality: N/A;    CHOLECYSTECTOMY      COLONOSCOPY      ESOPHAGOGASTRODUODENOSCOPY      HERNIA REPAIR      LEFT HEART CATHETERIZATION Left 2/18/2020    Procedure: Left heart cath;  Surgeon: Trey Evans MD;  Location: [facility] CATH LAB;  " Service: Cardiology;  Laterality: Left;    LUNG TRANSPLANT Left 8/10/2020    Procedure: TRANSPLANT, LUNG - 4:30AM start;  Surgeon: Benedict Clemons MD;  Location: [facility] OR [address] [place] [address];  Service: Cardiovascular;  Laterality: Left;    SMALL INTESTINE SURGERY      mass removed (benign)     Family History   Problem Relation Age of Onset    Cirrhosis Neg Hx      Social History     Socioeconomic History    Marital status: Single     Spouse name: Not on file    Number of children: Not on file    Years of education: Not on file    Highest education level: Not on file   Occupational History    Not on file   Social Needs    Financial resource strain: Not on file    Food insecurity     Worry: Not on file     Inability: Not on file    Transportation needs     Medical: Not on file     Non-medical: Not on file   Tobacco Use    Smoking status: Former Smoker     Types: Cigarettes     Quit date: 12/13/2016     Years since quitting: 3.9    Smokeless tobacco: Never Used   Substance and Sexual Activity    Alcohol use: No     Frequency: Never    Drug use: No    Sexual activity: Not on file   Lifestyle    Physical activity     Days per week: Not on file     Minutes per session: Not on file    Stress: Not on file   Relationships    Social connections     Talks on phone: Not on file     Gets together: Not on file     Attends Mosque service: Not on file     Active member of club or organization: Not on file     Attends meetings of clubs or organizations: Not on file     Relationship status: Not on file   Other Topics Concern    Not on file   Social History Narrative    Not on file       Review of Systems:  Unable to obtain due to patient's status- respiratory compromise.    OBJECTIVE:     Vital Signs  Vitals:    12/01/20 1015 12/01/20 1030 12/01/20 1045 12/01/20 1129   BP:       Pulse: 65 64 65 60   Resp: (!) 22 (!) 22 (!) 22 (!) 21   Temp:       TempSrc:       SpO2: 100% 99% 99% 100%   Weight:       Height:         Body  mass index is 26.47 kg/m².    Physical Exam:  General: supine, in no acute distress  Eyes:  conjunctivae/corneas clear  Nose: no discharge  Neck: trachea midline with no masses appreciated  Lungs:  equal breath sounds bilaterally, no wheezes appreciated  Heart: regular rate and rhythm and no murmur  Abdomen: non-distended, soft  Extremities: no cyanosis, +moderate dependent edema  Skin: No rashes. Good skin turgor  Neurologic: sedated & paralyzed    Laboratory:  Lab Results   Component Value Date    WBC 4.57 12/01/2020    HGB 7.9 (L) 12/01/2020    HCT 26.5 (L) 12/01/2020     (H) 12/01/2020     12/01/2020       Chest Imaging, My Impression:   Most recent CXR: bilateral infiltrates    Diagnostic Results:  Electrocardiogram reviewed    ASSESSMENT:     1) Acute hypoxemic respiratory failure due to ARDS. Intubated 11/12. Pplat not at goal this morning. Decreased tidal volume to 280 & Pplat improved to 28. P:F 240 this morning in the prone position. Follow up afternoon abg to assess need for additional proning. Continue nimbex for now. Continue lasix to maintain an even-to slightly negative fluid balance.  2) Pneumonia suspect due to COVID 19 virus. Comleted remdesivir & decadron.  3) S/p lung txp. Immune suppression per lung txp team (currently prograf & prednisone 20 daily).  4) Hypercoagulable state. Administering therapeutic lovenox.  5) IDDM. Glucose controlled per endocrine team.    PLAN:     Sedation & paralytics: fent at 250/hr, propofol at 45, nimbex at 3  Nutrition: advance tube feeds to 20/hr  Lines:  RIJ CVC, R rad a line, pelletier, og, flexiseal, ETT  DVT prophylaxis: therapeutic lovenox  GI prophylaxis: protonix 40 daily  Antibiotics: bactrim, posaconazole, lamivudine, & ganciclovir  Vasopressor support: levo at 0.01    Critical Care Time: 60 minutes  Critical care was time spent personally by me on the following activities: evaluating this patient's organ dysfunction, development of treatment  plan, discussing treatment plan with patient or surrogate and bedside caregivers, discussions with consultants, evaluation of patient's response to treatment, examination of patient, ordering and performing treatments and interventions, ordering and review of laboratory studies, ordering and review of radiographic studies, re-evaluation of patient's condition. This critical care time did not overlap with that of any other provider or involve time for any procedures.

## 2020-12-01 NOTE — CARE UPDATE
Pt proned with RT x2 @HOB, RN x3 and Tita ANDERSON. Pt preoxygenated with 100% and tolerated well with no adverse reactions. ETT remains 23 cm @ the lips. Will continue to monitor.

## 2020-12-02 NOTE — PLAN OF CARE
Procedure Note  Prone Positioning  Critical Care Medicine       Chief Complaint: Acute respiratory distress syndrome (ARDS) due to COVID-19 virus  MRN: [id number]  LOS: 27  Sex: female  Age: 65 y.o.      Last ABG:   Recent Labs   Lab 12/01/20  1406   PH 7.365   PO2 58*   PCO2 55.5*   HCO3 31.8*   BE 6       Vital Signs (Most Recent):   Temp: (!) 95.4 °F (35.2 °C) (12/01/20 1731)  Pulse: (!) 55 (12/01/20 1731)  Resp: (!) 25 (12/01/20 1731)  BP: (!) 145/76 (12/01/20 1315)  SpO2: 100 % (12/01/20 1731)    Ventilator Settings:  Vent Mode: A/C  Oxygen Concentration (%):  [] 100  Resp Rate Total:  [18 br/min-25 br/min] 25 br/min  Vt Set:  [280 mL-340 mL] 280 mL  PEEP/CPAP:  [10 cmH20] 10 cmH20  Mean Airway Pressure:  [15 zoP25-81 cmH20] 15 cmH20    Indication: Severe, refractory ARDS. High risk for deterioration during proning procedure in need of direct monitoring by Critical Care personnel.     Prior to beginning the procedure, all appropriate equipment and personnel were gathered in the room. Two individuals were placed on each side of the patient and one person stood at the head of the bed and was dedicated to the management of the head of the patient, the endotracheal tube, and the ventilator lines. The person at the head of the bed  coordinated the steps of the proning procedure with team team at the direction of Critical Care team members at the bedside. The patient was first log-rolled 90 degrees onto their side towards the direction of their arterial line. Cardiac electrodes were then moved from the patient's anterior to the posterior. The patient?s knees, forehead, chest, and iliac crests were protected using adhesive pads and/or foam pads to reduce the risk of skin breakdown. Once properly positioned in the bed, another 90 degree log roll was performed placing the patient into the prone position. The patient's arms were placed alongside the body. The patient's head should be turned alternately to the right  or left every 2 hours. The patient tolerated the procedure well.     Total Critical Care time (uninterrupted not including procedures): 15

## 2020-12-02 NOTE — PLAN OF CARE
Problem: Adult Inpatient Plan of Care  Goal: Plan of Care Review  Outcome: Ongoing, Progressing  Goal: Absence of Hospital-Acquired Illness or Injury  Outcome: Ongoing, Progressing  Goal: Optimal Comfort and Wellbeing  Outcome: Ongoing, Progressing  Goal: Readiness for Transition of Care  Outcome: Ongoing, Progressing     Problem: Coping Ineffective  Goal: Effective Coping  Outcome: Ongoing, Progressing     Problem: Fall Injury Risk  Goal: Absence of Fall and Fall-Related Injury  Outcome: Ongoing, Progressing     Problem: Infection  Goal: Infection Symptom Resolution  Outcome: Ongoing, Progressing     Problem: Adjustment to Illness (Sepsis/Septic Shock)  Goal: Optimal Coping  Outcome: Ongoing, Progressing     Problem: Bleeding (Sepsis/Septic Shock)  Goal: Absence of Bleeding  Outcome: Ongoing, Progressing     Problem: Glycemic Control Impaired (Sepsis/Septic Shock)  Goal: Blood Glucose Level Within Desired Range  Outcome: Ongoing, Progressing     Problem: Hemodynamic Instability (Sepsis/Septic Shock)  Goal: Effective Tissue Perfusion  Outcome: Ongoing, Progressing     Problem: Infection (Sepsis/Septic Shock)  Goal: Absence of Infection Signs/Symptoms  Outcome: Ongoing, Progressing     Problem: Nutrition Impaired (Sepsis/Septic Shock)  Goal: Optimal Nutrition Intake  Outcome: Ongoing, Progressing     Problem: Respiratory Compromise (Sepsis/Septic Shock)  Goal: Effective Oxygenation and Ventilation  Outcome: Ongoing, Progressing     Problem: Communication Impairment (Mechanical Ventilation, Invasive)  Goal: Effective Communication  Outcome: Ongoing, Progressing     Problem: Device-Related Complication Risk (Mechanical Ventilation, Invasive)  Goal: Optimal Device Function  Outcome: Ongoing, Progressing     Problem: Inability to Wean (Mechanical Ventilation, Invasive)  Goal: Mechanical Ventilation Liberation  Outcome: Ongoing, Progressing     Problem: Nutrition Impairment (Mechanical Ventilation, Invasive)  Goal:  Optimal Nutrition Delivery  Outcome: Ongoing, Progressing     Problem: Ventilator-Induced Lung Injury (Mechanical Ventilation, Invasive)  Goal: Absence of Ventilator-Induced Lung Injury  Outcome: Ongoing, Progressing

## 2020-12-02 NOTE — PROGRESS NOTES
"Progress Note  [facility] ICU        SUBJECTIVE:     Chief Complaint:   Dyspnea    History of present illness/Interval history since last note:  The patient is a 65 y.o. female with PMH of DM & IPF s/p single left lung transplant 8/10/2020 admitted 11/4/2020 with shortness of breath. The patient was admitted due to concern for respiratory compromise from COVID 19.      Review of patient's allergies indicates:   Allergen Reactions    Boniva [ibandronate] Other (See Comments)     "chest cramps"       Past Medical History:   Diagnosis Date    A-fib around 1996    Chronic hypoxemic respiratory failure 4/11/2019    Common bile duct dilatation 1/15/2019    Controlled type 2 diabetes mellitus without complication, without long-term current use of insulin 1/17/2019    Essential hypertension 1/16/2019    GERD (gastroesophageal reflux disease)     Hepatitis B core antibody positive 1/15/2019    IPF (idiopathic pulmonary fibrosis)     Obesity (BMI 30-39.9) 1/16/2019    RLS (restless legs syndrome)      Past Surgical History:   Procedure Laterality Date    APPLICATION OF WOUND VACUUM-ASSISTED CLOSURE DEVICE Left 8/10/2020    Procedure: APPLICATION, WOUND VAC, 40 x 5cm;  Surgeon: Benedict Clemons MD;  Location: [facility] OR [address] [place] [address];  Service: Cardiovascular;  Laterality: Left;    BRONCHOSCOPY N/A 9/23/2020    Procedure: flexible bronchoscopy with tissue biopsy CPT 85159;  [facility];  Location: [facility] OR [address] [place] [address];  Service: Anesthesiology;  Laterality: N/A;    CATHETERIZATION OF BOTH LEFT AND RIGHT HEART N/A 1/17/2019    Procedure: CATHETERIZATION, HEART, BOTH LEFT AND RIGHT;  Surgeon: Trey Evans MD;  Location: [facility] CATH LAB;  Service: Cardiology;  Laterality: N/A;    CHOLECYSTECTOMY      COLONOSCOPY      ESOPHAGOGASTRODUODENOSCOPY      HERNIA REPAIR      LEFT HEART CATHETERIZATION Left 2/18/2020    Procedure: Left heart cath;  Surgeon: Trey Evans MD;  Location: [facility] CATH LAB;  " Service: Cardiology;  Laterality: Left;    LUNG TRANSPLANT Left 8/10/2020    Procedure: TRANSPLANT, LUNG - 4:30AM start;  Surgeon: Benedict Clemons MD;  Location: [facility] OR [address] [place] [address];  Service: Cardiovascular;  Laterality: Left;    SMALL INTESTINE SURGERY      mass removed (benign)     Family History   Problem Relation Age of Onset    Cirrhosis Neg Hx      Social History     Socioeconomic History    Marital status: Single     Spouse name: Not on file    Number of children: Not on file    Years of education: Not on file    Highest education level: Not on file   Occupational History    Not on file   Social Needs    Financial resource strain: Not on file    Food insecurity     Worry: Not on file     Inability: Not on file    Transportation needs     Medical: Not on file     Non-medical: Not on file   Tobacco Use    Smoking status: Former Smoker     Types: Cigarettes     Quit date: 12/13/2016     Years since quitting: 3.9    Smokeless tobacco: Never Used   Substance and Sexual Activity    Alcohol use: No     Frequency: Never    Drug use: No    Sexual activity: Not on file   Lifestyle    Physical activity     Days per week: Not on file     Minutes per session: Not on file    Stress: Not on file   Relationships    Social connections     Talks on phone: Not on file     Gets together: Not on file     Attends Taoism service: Not on file     Active member of club or organization: Not on file     Attends meetings of clubs or organizations: Not on file     Relationship status: Not on file   Other Topics Concern    Not on file   Social History Narrative    Not on file       Review of Systems:  Unable to obtain due to patient's status- respiratory compromise.    OBJECTIVE:     Vital Signs  Vitals:    12/02/20 0746 12/02/20 0800 12/02/20 0900 12/02/20 1118   BP:       BP Location:       Pulse: (!) 56 62 62 61   Resp: (!) 25 (!) 25 (!) 25 (!) 25   Temp: 97.7 °F (36.5 °C) 97.9 °F (36.6 °C) 98.6 °F (37 °C) 97.5 °F  (36.4 °C)   TempSrc:  Core Esophageal     SpO2: (!) 94% 96% (!) 94% 95%   Weight:       Height:         Body mass index is 26.47 kg/m².    Physical Exam:  General: supine, in no acute distress  Eyes:  conjunctivae/corneas clear  Nose: no discharge  Neck: trachea midline with no masses appreciated  Lungs:  equal breath sounds bilaterally, no wheezes appreciated  Heart: regular rate and rhythm and no murmur  Abdomen: non-distended, soft  Extremities: no cyanosis, +moderate dependent edema  Skin: No rashes. Good skin turgor  Neurologic: sedated & paralyzed     Laboratory:  Lab Results   Component Value Date    WBC 5.13 12/02/2020    HGB 8.1 (L) 12/02/2020    HCT 27.0 (L) 12/02/2020     (H) 12/02/2020     12/02/2020       Chest Imaging, My Impression:   Most recent CXR: bilateral infiltrates    Diagnostic Results:  Electrocardiogram reviewed    ASSESSMENT:     1) Acute hypoxemic respiratory failure due to ARDS. Intubated 11/12. Pplat at goal this morning. P:F 200 this morning in the prone position. Administered prone positioning. Continue nimbex for now. Continue lasix to maintain an even-to slightly negative fluid balance.  2) Pneumonia suspect due to COVID 19 virus. Comleted remdesivir & decadron.  3) S/p lung txp. Immune suppression per lung txp team (currently prograf & prednisone 20 daily).  4) Hypercoagulable state. Administering therapeutic lovenox.  5) IDDM. Glucose controlled per endocrine team.    PLAN:     Sedation & paralytics: fent at 250/hr, propofol at 45, nimbex at 3  Nutrition: advance tube feeds to 20/hr  Lines:  RIJ CVC, R rad a line, pelletier, og, flexiseal, ETT  DVT prophylaxis: therapeutic lovenox  GI prophylaxis: protonix 40 daily  Antibiotics: bactrim, posaconazole, lamivudine, & ganciclovir  Vasopressor support: levo at 0.005     Critical Care Time: 100 minutes  Critical care was time spent personally by me on the following activities: evaluating this patient's organ dysfunction,  development of treatment plan, discussing treatment plan with patient or surrogate and bedside caregivers, discussions with consultants, evaluation of patient's response to treatment, examination of patient, ordering and performing treatments and interventions, ordering and review of laboratory studies, ordering and review of radiographic studies, re-evaluation of patient's condition. This critical care time did not overlap with that of any other provider or involve time for any procedures.

## 2020-12-02 NOTE — PROGRESS NOTES
Patient seen for wound care follow up for sacral unstageable pressure injury and right cheek medical device related pressure injury.    Buttocks unstageable pressure injury continuing to declare itself. Loose moist eschar and slough noted to wound bed covering approximately 98%.   Patient continues to transition from prone to supine position. Sizewise immerse surface being utilized. Flexiseal noted to assist with fecal incontinence. Recommend continuing Triad ointment BID/prn. Triad will continue to assist with moisture management and autolytic debridement of non-viable tissue.    Right cheek medical device related pressure injury appears to be healing well. Recommend continuing comfeel hydrocolloid dressing changes twice a week.    Nursing to continue care. Wound care to follow prn.    Buttocks Unstageable pressure injury:  Full thickness skin loss  Loose eschar/slough covering 98% of wound bed with pink wound edges.  No drainage noted.    Right cheek- moist pink wound bed, epithelial tissue noted 0.3cm x 0.3cm  Abrasion noted adjacent with scant sanguineous drainage, controlled

## 2020-12-02 NOTE — PLAN OF CARE
Vital signs and labs reviewed. Completed Merrem for pseudomonas PNA/bacteremia.  Remains critically ill. Proned today. Vent currently at 50%/PEEP 10. Difficulty weaning due to agitation and HTN. Recommend trach/PEG for continued recovery. COVID care per MICU; received dexamethasone, stress dose hydrocortisone, remdesivir, and plasma.      Continue with tacrolimus. Level 6.3 today and will follow closely. Given her active infections, would aim for a level 6-8. Holding MMF due to infections. Continue prednisone 20mg daily. Continue OIP with ganciclovir, lamivudine, bactrim, and posaconazole. [facility] for CLAD/DRAKE prophylaxis once stable.       Please call with any questions.

## 2020-12-02 NOTE — CARE UPDATE
BG goal 140-180    Remains in RICU, NAEON. Remains intubated and sedated. proned today per notes. BG reasonably well controlled with scheduled insulin. TF rate variable 20-40 cc/hr. Prednisone 20 mg daily.     Plan:  Continue novolog 3 units every 4 hours; hold if TF held or BG less than 100.   BG monitoring ac/hs and low dose correction scale.     Discharge planning:tbd   Endocrine to continue to follow    ** Please call Endocrine for any BG related issues **

## 2020-12-02 NOTE — CONSULTS
TRACH/PEG/PERMACATH CONSULT       65 YOF with PMH Afib, DM and IPF s/p single left lung transplant 8/10/20 admitted 11/4/2020 with shortness of breath. admitted to [facility] on 11/04/2020 for management of new acute hypoxemic respiratory failure secondary to COVID-19. 11/12, her hypoxia progressed, associated with worsening leukocytosis, new DWAYNE, and persistent fevers, requiring intubation at that time. Repeat infectious work-up was significant for blood and respiratory cultures from 11/12 positive for Pseudmonas. ID was consulted and she was initiatedon Meropenem on 11/12 with subsequent blood cultures remaining NGTD. DWAYNE resolved by 11/14 without need for dialysis. Intubation and subsequent sedation were also associated with onset of shock requiring vasopressor support until 11/20. She was initiated on intermittent proning with improvements in oxygenation, however this was limited by intermittent episodes of bradycardia. On 11/25, she was intolerable of weaning ventilator support and sedation and neuromuscular blockade was re-initiated; proning resumed on 11/26 for attempted improvement in oxygenation.     She is currently intubated, sedated and paralyzed. On therapeutic lovenox for hypercoagulable state. Immunosuppression with prograf and prednisone 20mg daily. She was on prone positioning overnight the 11/29/20, pressors until 11/30/20. Her past surgical history includes a cholecystectomy, hernia repair, lung transplant and mass removal in small intestine.       Review of Systems:  Intubated, sedated, and paralyzed on the ventilator      Physical Exam:  General: supine, in no acute distress. Intubated, sedated and paralyzed  Neck: trachea midline with no masses appreciated     No scars from prior neck surgery or radiation changes  Lungs:  equal breath sounds bilaterally, no wheezes appreciated      Intubated on FiO2 40%/PEEP 10  Heart: regular rate and rhythm and no murmur      Hemodynamically stable not on  pressors   Abdomen: non-distended, soft     TF via OG at 20 cc/hr  Extremities: no cyanosis, +moderate dependent edema  Skin: No rashes. Good skin turgor  Neurologic: sedated & paralyzed         MAIN CONDITION (WITH PROGNOSIS):     Is family aware of consult / are they amenable to these procedures? (Has the primary team talked to them?) Unknown     Code status:  Full    Neuro Exam: Does not f    On anticoagulation? If so, type: enoxaparin 80mg daily    Plts / INR / aPTT: Plt 160    Tolerating enteral feeding? 20 cc/hr via OG    On pressors? Not currently, low dose levo charted 12/2/20 (0.005)    COVID+? Yes    On CRRT or HD? No     Current lines/tubes/drains: Right IJ CVC (11/12/20). ETT (11/12/20)      TRACH    Days intubated: 20 days    Extubation attempts? No     Prior neck surgery or radiation? No     Obesity of neck? No     ETT size? 7.5 mm cuffed     Vent settings over the past 24h: 40% FiO2 / PEEP 10      Prone positioning 11/30/20    Most recent ABG or O2 saturation over the past 24h: 7.410/52.7/77/9/33.4    PEG    Prior abdominal surgery / abdominal anatomy? Yes.       Right hemicolectomy. Cholecystectomy.     Any available abdominal imaging? CT C/A/P 11/12/20     Ascites or history of varices? No    PERMACATH    Bacteremia / blood cultures? Blood Cx NGTD, collected 11/13/20    Line holiday / removal of line before placement? Patient with current R IJ CVC    Prior lines or history of central stenosis? No          ASSESSMENT/PLAN:  - Will need to remain off pressors and wean ventilatory requirements  - Continue TF to goal, will place orders to hold midnight prior to procedure   - Will obtain informed consent from family   - Will discuss timing of procedure with staff      -- Maureen Combs M.D  General Surgery  Pager: [phone]

## 2020-12-03 NOTE — CARE UPDATE
BG goal 140-180     Remains in RICU, NAEON. Remains intubated and sedated.  BG reasonably well controlled with scheduled insulin. TF at 20 cc/hr. Prednisone 20 mg daily.      Plan:  Continue novolog 3 units every 4 hours; hold if TF held or BG less than 100.   BG monitoring ac/hs and low dose correction scale.      Discharge planning:tbd   Endocrine to continue to follow     ** Please call Endocrine for any BG related issues **

## 2020-12-03 NOTE — PROGRESS NOTES
"Progress Note  [facility]        SUBJECTIVE:     Chief Complaint:   Dyspnea    History of present illness/Interval history since last note:  The patient is a 65 y.o. female with PMH of DM & IPF s/p single left lung transplant 8/10/2020 admitted 11/4/2020 with shortness of breath. The patient was admitted due to concern for respiratory compromise from COVID 19.      Review of patient's allergies indicates:   Allergen Reactions    Boniva [ibandronate] Other (See Comments)     "chest cramps"       Past Medical History:   Diagnosis Date    A-fib around 1996    Chronic hypoxemic respiratory failure 4/11/2019    Common bile duct dilatation 1/15/2019    Controlled type 2 diabetes mellitus without complication, without long-term current use of insulin 1/17/2019    Essential hypertension 1/16/2019    GERD (gastroesophageal reflux disease)     Hepatitis B core antibody positive 1/15/2019    IPF (idiopathic pulmonary fibrosis)     Obesity (BMI 30-39.9) 1/16/2019    RLS (restless legs syndrome)      Past Surgical History:   Procedure Laterality Date    APPLICATION OF WOUND VACUUM-ASSISTED CLOSURE DEVICE Left 8/10/2020    Procedure: APPLICATION, WOUND VAC, 40 x 5cm;  Surgeon: Benedict Clemons MD;  Location: [facility] OR [address] [place] [address];  Service: Cardiovascular;  Laterality: Left;    BRONCHOSCOPY N/A 9/23/2020    Procedure: flexible bronchoscopy with tissue biopsy CPT 59619;  [facility];  Location: [facility] OR [address] [place] [address];  Service: Anesthesiology;  Laterality: N/A;    CATHETERIZATION OF BOTH LEFT AND RIGHT HEART N/A 1/17/2019    Procedure: CATHETERIZATION, HEART, BOTH LEFT AND RIGHT;  Surgeon: Trey Evans MD;  Location: [facility] CATH LAB;  Service: Cardiology;  Laterality: N/A;    CHOLECYSTECTOMY      COLONOSCOPY      ESOPHAGOGASTRODUODENOSCOPY      HERNIA REPAIR      LEFT HEART CATHETERIZATION Left 2/18/2020    Procedure: Left heart cath;  Surgeon: Trey Evans MD;  Location: [facility] CATH LAB;  " Service: Cardiology;  Laterality: Left;    LUNG TRANSPLANT Left 8/10/2020    Procedure: TRANSPLANT, LUNG - 4:30AM start;  Surgeon: Benedict Clemons MD;  Location: [facility] OR [address] [place] [address];  Service: Cardiovascular;  Laterality: Left;    SMALL INTESTINE SURGERY      mass removed (benign)     Family History   Problem Relation Age of Onset    Cirrhosis Neg Hx      Social History     Socioeconomic History    Marital status: Single     Spouse name: Not on file    Number of children: Not on file    Years of education: Not on file    Highest education level: Not on file   Occupational History    Not on file   Social Needs    Financial resource strain: Not on file    Food insecurity     Worry: Not on file     Inability: Not on file    Transportation needs     Medical: Not on file     Non-medical: Not on file   Tobacco Use    Smoking status: Former Smoker     Types: Cigarettes     Quit date: 12/13/2016     Years since quitting: 3.9    Smokeless tobacco: Never Used   Substance and Sexual Activity    Alcohol use: No     Frequency: Never    Drug use: No    Sexual activity: Not on file   Lifestyle    Physical activity     Days per week: Not on file     Minutes per session: Not on file    Stress: Not on file   Relationships    Social connections     Talks on phone: Not on file     Gets together: Not on file     Attends Holiness service: Not on file     Active member of club or organization: Not on file     Attends meetings of clubs or organizations: Not on file     Relationship status: Not on file   Other Topics Concern    Not on file   Social History Narrative    Not on file       Review of Systems:  Unable to obtain due to patient's status- respiratory compromise.    OBJECTIVE:     Vital Signs  Vitals:    12/03/20 0724 12/03/20 0800 12/03/20 0900 12/03/20 1123   BP:  (!) 91/56 (!) 94/59    BP Location:  Left arm     Patient Position:  Lying     Pulse: (!) 55 (!) 55 65 65   Resp: (!) 25 (!) 25 (!) 25 (!) 25   Temp:  97.7 °F (36.5 °C) 97.5 °F (36.4 °C) 97.2 °F (36.2 °C) 96.8 °F (36 °C)   TempSrc:  Core Esophageal     SpO2: (!) 94% (!) 93% (!) 93% (!) 92%   Weight:       Height:         Body mass index is 26.47 kg/m².    Physical Exam:  General: supine, in no acute distress  Eyes:  conjunctivae/corneas clear  Nose: no discharge  Neck: trachea midline with no masses appreciated  Lungs:  equal breath sounds bilaterally, no wheezes appreciated  Heart: regular rate and rhythm and no murmur  Abdomen: non-distended, soft  Extremities: no cyanosis, +moderate dependent edema  Skin: No rashes. Good skin turgor  Neurologic: sedated & paralyzed     Laboratory:  Lab Results   Component Value Date    WBC 5.10 12/03/2020    HGB 8.0 (L) 12/03/2020    HCT 27.2 (L) 12/03/2020     (H) 12/03/2020     (L) 12/03/2020       Chest Imaging, My Impression:   Most recent CXR: bilateral infiltrates    Diagnostic Results:  Electrocardiogram reviewed    ASSESSMENT:     1) Acute hypoxemic respiratory failure due to ARDS. Intubated 11/12. Pplat at goal this morning. P:F 190 this morning in the supine position. Does not require prone positioning today; this is a modest improvement. Continue nimbex for now; may consider discontinuation tomorrow if sustained improvement. Continue lasix as tolerated to maintain an even-to slightly negative fluid balance. Okay to proceed with tracheostomy & peg from my perspective.  2) Pneumonia suspect due to COVID 19 virus. Comleted remdesivir & decadron.  3) S/p lung txp. Immune suppression per lung txp team (currently prograf & prednisone 20 daily).  4) Hypercoagulable state. Administering therapeutic lovenox.  5) IDDM. Glucose controlled per endocrine team.    PLAN:     Sedation & paralytics: fent, propofol, nimbex  Nutrition: Tolerating tube feeds at 20/hr  Lines:  RIJ CVC, R rad a line, pelletier, og, flexiseal, ETT  DVT prophylaxis: therapeutic lovenox  GI prophylaxis: protonix 40 daily  Antibiotics: bactrim,  posaconazole, lamivudine, & ganciclovir  Vasopressor support: none today    Critical Care Time: 30 minutes  Critical care was time spent personally by me on the following activities: evaluating this patient's organ dysfunction, development of treatment plan, discussing treatment plan with patient or surrogate and bedside caregivers, discussions with consultants, evaluation of patient's response to treatment, examination of patient, ordering and performing treatments and interventions, ordering and review of laboratory studies, ordering and review of radiographic studies, re-evaluation of patient's condition. This critical care time did not overlap with that of any other provider or involve time for any procedures.

## 2020-12-03 NOTE — PROGRESS NOTES
[facility] - ICU 16 WT  ICU Shift Summary  Date: 12/2/2020      COVID Test: (+)  Isolation: Airborne and Contact and Droplet     Prehospitalization: Home  Admit Date / LOS : 11/4/2020/ 28 days    Diagnosis: Acute respiratory distress syndrome (ARDS) due to COVID-19 virus    Consults:        Active: Gen Surg, Palliative and Wound Care       Needed: N/A     Code Status: Full Code   Advanced Directive: <no information>    LDA: Art Line, Flexiseal, Rios, OG, TLC and Vent       Central Lines/Site/Justification:Pressors and Multiple GTTS       Urinary Cath/Order/Justification:Critically Ill in the ICU and requiring intensive monitoring    Vasopressors/Infusions:    cisatracurium (NIMBEX) infusion 6.91 mcg/kg/min (12/02/20 1800)    dextrose 10 % in water (D10W)      fentanyl 25 mL/hr at 12/02/20 1800    nicardipine Stopped (11/25/20 0800)    norepinephrine bitartrate-D5W Stopped (12/02/20 1304)    propofoL 44.98 mcg/kg/min (12/02/20 1800)          GOALS:                      RASS: -5 unarousable, no response to voice or physical stimulation    Pain Management: IV       Pain Controlled: yes     Rhythm: NSR and SB    Respiratory Device: Vent    Vent Mode: A/C  Oxygen Concentration (%):  [40-60] 40  Resp Rate Total:  [25 br/min-32 br/min] 25 br/min  Vt Set:  [280 mL] 280 mL  PEEP/CPAP:  [10 cmH20] 10 cmH20  Mean Airway Pressure:  [15 wrX94-54 cmH20] 15 cmH20             Most Recent SBT/ SAT: N/A           VTE Prophylaxis: Pharm  Mobility: Bedrest  Stress Ulcer Prophylaxis: Yes    Dietary: NPO and TF  Tolerance: yes  /  Advancement: @ goal    I & O (24h):    Intake/Output Summary (Last 24 hours) at 12/2/2020 1831  Last data filed at 12/2/2020 1800  Gross per 24 hour   Intake 2796.17 ml   Output 3755 ml   Net -958.83 ml        Restraints: No    Noteworthy Labs:  K+ 3.1 repleted    CBC/Anemia Labs: Coags:    Recent Labs   Lab 12/01/20  0332 12/02/20  0457   WBC 4.57 5.13   HGB 7.9* 8.1*   HCT 26.5* 27.0*   PLT  166 160   * 108*   RDW 18.6* 18.6*    No results for input(s): PT, INR, APTT in the last 168 hours.     Chemistries:   Recent Labs   Lab 12/01/20  0332 12/01/20  1041 12/02/20  0457     --  142   K 3.7 4.1 3.1*     --  99   CO2 32*  --  33*   BUN 22  --  22   CREATININE 0.7  --  0.7   CALCIUM 8.2*  --  8.4*   PROT 5.2*  --  5.3*   BILITOT 0.4  --  0.5   ALKPHOS 117  --  114   ALT 17  --  16   AST 18  --  14   MG 1.9  --  1.5*   PHOS 4.5  --  3.9        Cardiac Enzymes: Ejection Fractions:    No results for input(s): CPK, CPKMB, MB, TROPONINI in the last 72 hours. No results found for: EF     POCT Glucose: HbA1c:    Recent Labs   Lab 12/02/20  0455 12/02/20  0918 12/02/20  1318   POCTGLUCOSE 125* 128* 169*    Hemoglobin A1C   Date Value Ref Range Status   11/05/2020 8.0 (H) 4.0 - 5.6 % Final     Comment:     ADA Screening Guidelines:  5.7-6.4%  Consistent with prediabetes  >or=6.5%  Consistent with diabetes  High levels of fetal hemoglobin interfere with the HbA1C  assay. Heterozygous hemoglobin variants (HbS, HgC, etc)do  not significantly interfere with this assay.   However, presence of multiple variants may affect accuracy.             ICU LOS 28d 5h  Level of Care: Critical Care

## 2020-12-04 NOTE — TELEPHONE ENCOUNTER
SW contacted pt's daughter Ivy [phone] for continuity of care and to provide support. Pt's daughter reports feeling very supported and speaks with the medical staff daily. Ivy continued to report family is managing well and will continue to be open with team. SW remains available.

## 2020-12-04 NOTE — PLAN OF CARE
Assessment/Interventions:     Neuro: Paralytic stopped at 1100. Sedated on propofol. Rass +4. Opens eyes spontaneously and with stimulation. No eye contact. Pupils 3-4 mm, sluggish. No motor response to painful stimuli or spontaneous movement observed.   CV: SB-SR. BP labile. -160s. Afebrile. Pulses palpable. Extremities warm.   Resp: AC VC 25/280/50%/10. Lungs coarse. Moderate oral and in-line secretions.   GI: OGT. Impact peptide advanced to 30 mL/hr. Residuals <40 mL. Flexseal in place. Irrigated and repositioned.   : Rios. Diuresing. Clear yellow urine.   Skin: Wound to right cheek, healing. Replaced anchor fast and applied skin protectant dressing. Buttock and sacral wound, bleeding. Replaced mepilex and applied wound cleanser.   IV: RIJ and R radial arterial line  Labs: Repleted K/Mg. Sent recheck at 1800.    Plan:   Wean O2/ventilator as tolerated.  Titrate sedation as tolerated.     Updated pt's daughter, Ivy, in am.         CRISTIAN PERSAUD RN.

## 2020-12-04 NOTE — CARE UPDATE
BG goal 140-180     Remains in RICU, NAEON. Remains intubated and sedated.   BG reasonably well controlled with scheduled insulin. TF increased to 30 cc/hr. Prednisone 20 mg daily.      Plan:  Continue novolog 3 units every 4 hours; hold if TF held or BG less than 100.   BG monitoring ac/hs and low dose correction scale.      Discharge planning:tbd   Endocrine to continue to follow     ** Please call Endocrine for any BG related issues **

## 2020-12-04 NOTE — PROGRESS NOTES
"Progress Note  [facility]        SUBJECTIVE:     Chief Complaint:   Dyspnea    History of present illness/Interval history since last note:  The patient is a 65 y.o. female with PMH of DM & IPF s/p single left lung transplant 8/10/2020 admitted 11/4/2020 with shortness of breath. The patient was admitted due to concern for respiratory compromise from COVID 19.      Review of patient's allergies indicates:   Allergen Reactions    Boniva [ibandronate] Other (See Comments)     "chest cramps"       Past Medical History:   Diagnosis Date    A-fib around 1996    Chronic hypoxemic respiratory failure 4/11/2019    Common bile duct dilatation 1/15/2019    Controlled type 2 diabetes mellitus without complication, without long-term current use of insulin 1/17/2019    Essential hypertension 1/16/2019    GERD (gastroesophageal reflux disease)     Hepatitis B core antibody positive 1/15/2019    IPF (idiopathic pulmonary fibrosis)     Obesity (BMI 30-39.9) 1/16/2019    RLS (restless legs syndrome)      Past Surgical History:   Procedure Laterality Date    APPLICATION OF WOUND VACUUM-ASSISTED CLOSURE DEVICE Left 8/10/2020    Procedure: APPLICATION, WOUND VAC, 40 x 5cm;  Surgeon: Benedict Clemons MD;  Location: [facility] OR [address] [place] [address];  Service: Cardiovascular;  Laterality: Left;    BRONCHOSCOPY N/A 9/23/2020    Procedure: flexible bronchoscopy with tissue biopsy CPT 85805;  [facility];  Location: [facility] OR [address] [place] [address];  Service: Anesthesiology;  Laterality: N/A;    CATHETERIZATION OF BOTH LEFT AND RIGHT HEART N/A 1/17/2019    Procedure: CATHETERIZATION, HEART, BOTH LEFT AND RIGHT;  Surgeon: Trey Evans MD;  Location: [facility] CATH LAB;  Service: Cardiology;  Laterality: N/A;    CHOLECYSTECTOMY      COLONOSCOPY      ESOPHAGOGASTRODUODENOSCOPY      HERNIA REPAIR      LEFT HEART CATHETERIZATION Left 2/18/2020    Procedure: Left heart cath;  Surgeon: Trey Evans MD;  Location: [facility] CATH LAB;  " Service: Cardiology;  Laterality: Left;    LUNG TRANSPLANT Left 8/10/2020    Procedure: TRANSPLANT, LUNG - 4:30AM start;  Surgeon: Benedict Clemons MD;  Location: [facility] OR [address] [place] [address];  Service: Cardiovascular;  Laterality: Left;    SMALL INTESTINE SURGERY      mass removed (benign)     Family History   Problem Relation Age of Onset    Cirrhosis Neg Hx      Social History     Socioeconomic History    Marital status: Single     Spouse name: Not on file    Number of children: Not on file    Years of education: Not on file    Highest education level: Not on file   Occupational History    Not on file   Social Needs    Financial resource strain: Not on file    Food insecurity     Worry: Not on file     Inability: Not on file    Transportation needs     Medical: Not on file     Non-medical: Not on file   Tobacco Use    Smoking status: Former Smoker     Types: Cigarettes     Quit date: 12/13/2016     Years since quitting: 3.9    Smokeless tobacco: Never Used   Substance and Sexual Activity    Alcohol use: No     Frequency: Never    Drug use: No    Sexual activity: Not on file   Lifestyle    Physical activity     Days per week: Not on file     Minutes per session: Not on file    Stress: Not on file   Relationships    Social connections     Talks on phone: Not on file     Gets together: Not on file     Attends Pentecostalism service: Not on file     Active member of club or organization: Not on file     Attends meetings of clubs or organizations: Not on file     Relationship status: Not on file   Other Topics Concern    Not on file   Social History Narrative    Not on file       Review of Systems:  Unable to obtain due to patient's status- respiratory compromise.    OBJECTIVE:     Vital Signs  Vitals:    12/04/20 0600 12/04/20 0800 12/04/20 0815 12/04/20 1000   BP: (!) 87/53      BP Location:       Patient Position:       Pulse: 63      Resp: (!) 25      Temp: 97.5 °F (36.4 °C) 98.4 °F (36.9 °C) 98.6 °F (37 °C) 98.4  °F (36.9 °C)   TempSrc: Core Esophageal   (P) Core Esophageal   SpO2: (!) 92%      Weight:       Height:         Body mass index is 26.47 kg/m².    Physical Exam:  General: supine, in no acute distress  Eyes:  conjunctivae/corneas clear  Nose: no discharge  Neck: trachea midline with no masses appreciated  Lungs:  equal breath sounds bilaterally, no wheezes appreciated  Heart: regular rate and rhythm  Abdomen: non-distended, soft  Extremities: no cyanosis, +dependent edema has improved some  Skin: No rashes. Good skin turgor  Neurologic: sedated & paralyzed     Laboratory:  Lab Results   Component Value Date    WBC 4.83 12/04/2020    HGB 8.2 (L) 12/04/2020    HCT 27.6 (L) 12/04/2020     (H) 12/04/2020     12/04/2020       Chest Imaging, My Impression:   Most recent CXR: bilateral infiltrates    Diagnostic Results:  Electrocardiogram reviewed    ASSESSMENT:     1) Acute hypoxemic respiratory failure due to ARDS. Intubated 11/12. Pplat is at goal this morning. P:F 160 this morning in the supine position. Does not require prone positioning today; this is a modest improvement. Will discontinue nimbex today. Continue lasix as tolerated to maintain an even to slightly-negative fluid balance. Okay to proceed with tracheostomy & peg from my perspective when surgery feels that it can be safely. Lovenox can be stopped for the procedure (will need to coordinate timing with surgery).  2) Pneumonia suspect due to COVID 19 virus. Comleted remdesivir & decadron.  3) S/p lung txp. Immune suppression per lung txp team (currently prograf & prednisone 20 daily).  4) Hypercoagulable state. Administering therapeutic lovenox.  5) IDDM. Glucose controlled per endocrine team.    PLAN:     Sedation & paralytics: fent, propofol, nimbex  Nutrition: Tolerating tube feeds at 20/hr. Advance to 30/hr.  Lines:  RIJ CVC, R rad a line, pelletier, og, flexiseal, ETT  DVT prophylaxis: therapeutic lovenox  GI prophylaxis: protonix 40  daily  Antibiotics: bactrim, posaconazole, lamivudine, & ganciclovir  Vasopressor support: none today    Critical Care Time: 30 minutes  Critical care was time spent personally by me on the following activities: evaluating this patient's organ dysfunction, development of treatment plan, discussing treatment plan with patient or surrogate and bedside caregivers, discussions with consultants, evaluation of patient's response to treatment, examination of patient, ordering and performing treatments and interventions, ordering and review of laboratory studies, ordering and review of radiographic studies, re-evaluation of patient's condition. This critical care time did not overlap with that of any other provider or involve time for any procedures.

## 2020-12-05 NOTE — PROGRESS NOTES
Pt with a deterioration in her respiratory status this afternoon off nimbex. Will likely need to start proning her again. Would hold off on trach & peg until she demonstrates a sustained improvement as tracheostomy tubes make prone positioning more difficult. Notified general surgery resident on call of my request to take her off the tracheostomy list until further notice.    Cj Rogel MD  [facility]

## 2020-12-05 NOTE — PLAN OF CARE
Patients labs and vitals reviewed.  Remains intubated and sedated.  Paralytics off.  Diuresing well.  Increased supplemental oxygen requirements today per flowsheet.  COVID care per MICU.  Continue with tac and prednisone.  MMF on hold due to ongoing infection concerns.  Goal tac level 6-8; no changes today.  Continue with OIP with ganciclovir, posaconazole, bactrim, and lamivudine.  Will continue to monitor and adjust IS and OIP.       Please call with any questions.

## 2020-12-05 NOTE — CARE UPDATE
BG goal 140 - 180   Diet NPO     BG remains stable and within goal on current SQ insulin regimen. Endo will continue to follow.     - Continue Novolog 3 units q 4 hours while on enteral nutrition.   - Low Dose SQ Insulin Correction Scale.  - BG monitoring every 4 hours.     ** Please call Endocrine for any BG related issues **  Discharge Planning:   TBD. Please notify endocrinology prior to discharge.     Discharge Planning:   TBD. Please notify endocrinology prior to discharge.     Lab Results   Component Value Date    HGBA1C 8.0 (H) 11/05/2020

## 2020-12-05 NOTE — PROGRESS NOTES
"Progress Note  [facility]        SUBJECTIVE:     Chief Complaint:   Dyspnea    History of present illness/Interval history since last note:  The patient is a 65 y.o. female with PMH of DM & IPF s/p single left lung transplant 8/10/2020 admitted 11/4/2020 with shortness of breath. The patient was admitted due to concern for respiratory compromise from COVID 19.      Review of patient's allergies indicates:   Allergen Reactions    Boniva [ibandronate] Other (See Comments)     "chest cramps"       Past Medical History:   Diagnosis Date    A-fib around 1996    Chronic hypoxemic respiratory failure 4/11/2019    Common bile duct dilatation 1/15/2019    Controlled type 2 diabetes mellitus without complication, without long-term current use of insulin 1/17/2019    Essential hypertension 1/16/2019    GERD (gastroesophageal reflux disease)     Hepatitis B core antibody positive 1/15/2019    IPF (idiopathic pulmonary fibrosis)     Obesity (BMI 30-39.9) 1/16/2019    RLS (restless legs syndrome)      Past Surgical History:   Procedure Laterality Date    APPLICATION OF WOUND VACUUM-ASSISTED CLOSURE DEVICE Left 8/10/2020    Procedure: APPLICATION, WOUND VAC, 40 x 5cm;  Surgeon: Benedict Clemons MD;  Location: [facility] OR [address] [place] [address];  Service: Cardiovascular;  Laterality: Left;    BRONCHOSCOPY N/A 9/23/2020    Procedure: flexible bronchoscopy with tissue biopsy CPT 17526;  Surgeon: [employer];  Location: [facility] OR [address] [place] [address];  Service: Anesthesiology;  Laterality: N/A;    CATHETERIZATION OF BOTH LEFT AND RIGHT HEART N/A 1/17/2019    Procedure: CATHETERIZATION, HEART, BOTH LEFT AND RIGHT;  Surgeon: Trey Evans MD;  Location: [facility] CATH LAB;  Service: Cardiology;  Laterality: N/A;    CHOLECYSTECTOMY      COLONOSCOPY      ESOPHAGOGASTRODUODENOSCOPY      HERNIA REPAIR      LEFT HEART CATHETERIZATION Left 2/18/2020    Procedure: Left heart cath;  Surgeon: Trey Evans MD;  Location: [facility] CATH LAB;  " Service: Cardiology;  Laterality: Left;    LUNG TRANSPLANT Left 8/10/2020    Procedure: TRANSPLANT, LUNG - 4:30AM start;  Surgeon: Benedict Clemons MD;  Location: [facility] OR [address] [place] [address];  Service: Cardiovascular;  Laterality: Left;    SMALL INTESTINE SURGERY      mass removed (benign)     Family History   Problem Relation Age of Onset    Cirrhosis Neg Hx      Social History     Socioeconomic History    Marital status: Single     Spouse name: Not on file    Number of children: Not on file    Years of education: Not on file    Highest education level: Not on file   Occupational History    Not on file   Social Needs    Financial resource strain: Not on file    Food insecurity     Worry: Not on file     Inability: Not on file    Transportation needs     Medical: Not on file     Non-medical: Not on file   Tobacco Use    Smoking status: Former Smoker     Types: Cigarettes     Quit date: 12/13/2016     Years since quitting: 3.9    Smokeless tobacco: Never Used   Substance and Sexual Activity    Alcohol use: No     Frequency: Never    Drug use: No    Sexual activity: Not on file   Lifestyle    Physical activity     Days per week: Not on file     Minutes per session: Not on file    Stress: Not on file   Relationships    Social connections     Talks on phone: Not on file     Gets together: Not on file     Attends Pentecostalism service: Not on file     Active member of club or organization: Not on file     Attends meetings of clubs or organizations: Not on file     Relationship status: Not on file   Other Topics Concern    Not on file   Social History Narrative    Not on file       Review of Systems:  Unable to obtain due to patient's status- respiratory compromise.    OBJECTIVE:     Vital Signs  Vitals:    12/05/20 0600 12/05/20 0734 12/05/20 0800 12/05/20 1059   BP: (!) 92/52   111/68   BP Location: Left arm      Patient Position: Lying      Pulse: 80 87  107   Resp: (!) 25 (!) 26  (!) 33   Temp:   99.4 °F (37.4 °C)     TempSrc:   Oral    SpO2: (!) 90% (!) 94%  (!) 90%   Weight:       Height:         Body mass index is 26.47 kg/m².    Physical Exam:  General: supine, in no acute distress  Eyes:  conjunctivae/corneas clear  Nose: no discharge  Neck: trachea midline with no masses appreciated  Lungs:  equal breath sounds bilaterally, no wheezes appreciated  Heart: regular rhythm, tachy  Abdomen: non-distended, soft  Extremities: no cyanosis, +dependent edema has improved some  Skin: No rashes. Good skin turgor  Neurologic: sedated & paralyzed     Laboratory:  Lab Results   Component Value Date    WBC 4.55 12/05/2020    HGB 8.7 (L) 12/05/2020    HCT 30.1 (L) 12/05/2020     (H) 12/05/2020     12/05/2020       Chest Imaging, My Impression:   Most recent CXR: bilateral infiltrates    Diagnostic Results:  Electrocardiogram reviewed    ASSESSMENT:     1) Acute hypoxemic respiratory failure due to ARDS. Intubated 11/12. P:F 160 this morning in the supine position. Does not require prone positioning today; this is a modest improvement. Tolerating being off nimbex. Will decrease lasix; pt has diuresed net negative 4.5L over the past five days. Okay to proceed with tracheostomy & peg from my perspective when surgery feels that it can be done safely. Lovenox can be stopped for the procedure (will need to coordinate timing with surgery).  2) Pneumonia due to COVID 19 virus. Comleted remdesivir & decadron.  3) S/p lung txp. Immune suppression per lung txp team (currently prograf & prednisone 20 daily).  4) Hypercoagulable state. Administering therapeutic lovenox.  5) IDDM. Glucose controlled per endocrine team.    PLAN:     Sedation & paralytics: fent, propofol  Nutrition: Tolerating tube feeds at 30/hr.   Lines:  RIJ CVC, R rad a line, pelletier, og, flexiseal, ETT   DVT prophylaxis: therapeutic lovenox  GI prophylaxis: protonix 40 daily  Antibiotics: bactrim, posaconazole, lamivudine, & ganciclovir  Vasopressor support: none  today    Critical Care Time: 30 minutes  Critical care was time spent personally by me on the following activities: evaluating this patient's organ dysfunction, development of treatment plan, discussing treatment plan with patient or surrogate and bedside caregivers, discussions with consultants, evaluation of patient's response to treatment, examination of patient, ordering and performing treatments and interventions, ordering and review of laboratory studies, ordering and review of radiographic studies, re-evaluation of patient's condition. This critical care time did not overlap with that of any other provider or involve time for any procedures.

## 2020-12-06 PROBLEM — R78.81 BACTEREMIA DUE TO PSEUDOMONAS: Status: RESOLVED | Noted: 2020-01-01 | Resolved: 2020-01-01

## 2020-12-06 PROBLEM — E87.0 HYPERNATREMIA: Status: RESOLVED | Noted: 2020-01-01 | Resolved: 2020-01-01

## 2020-12-06 PROBLEM — B96.5 BACTEREMIA DUE TO PSEUDOMONAS: Status: RESOLVED | Noted: 2020-01-01 | Resolved: 2020-01-01

## 2020-12-06 PROBLEM — J15.1 PNEUMONIA DUE TO PSEUDOMONAS AERUGINOSA: Status: RESOLVED | Noted: 2020-01-01 | Resolved: 2020-01-01

## 2020-12-06 NOTE — PROGRESS NOTES
"Progress Note  [facility]        SUBJECTIVE:     Chief Complaint:   Dyspnea    History of present illness/Interval history since last note:  The patient is a 65 y.o. female with PMH of DM & IPF s/p single left lung transplant 8/10/2020 admitted 11/4/2020 with shortness of breath. The patient was admitted due to concern for respiratory compromise from COVID 19.      Review of patient's allergies indicates:   Allergen Reactions    Boniva [ibandronate] Other (See Comments)     "chest cramps"       Past Medical History:   Diagnosis Date    A-fib around 1996    Chronic hypoxemic respiratory failure 4/11/2019    Common bile duct dilatation 1/15/2019    Controlled type 2 diabetes mellitus without complication, without long-term current use of insulin 1/17/2019    Essential hypertension 1/16/2019    GERD (gastroesophageal reflux disease)     Hepatitis B core antibody positive 1/15/2019    IPF (idiopathic pulmonary fibrosis)     Obesity (BMI 30-39.9) 1/16/2019    RLS (restless legs syndrome)      Past Surgical History:   Procedure Laterality Date    APPLICATION OF WOUND VACUUM-ASSISTED CLOSURE DEVICE Left 8/10/2020    Procedure: APPLICATION, WOUND VAC, 40 x 5cm;  Surgeon: Benedict Clemons MD;  Location: [facility] OR [address] [place] [address];  Service: Cardiovascular;  Laterality: Left;    BRONCHOSCOPY N/A 9/23/2020    Procedure: flexible bronchoscopy with tissue biopsy CPT 72436;  [facility];  Location: [facility] OR [address] [place] [address];  Service: Anesthesiology;  Laterality: N/A;    CATHETERIZATION OF BOTH LEFT AND RIGHT HEART N/A 1/17/2019    Procedure: CATHETERIZATION, HEART, BOTH LEFT AND RIGHT;  Surgeon: Trey Evans MD;  Location: [facility] CATH LAB;  Service: Cardiology;  Laterality: N/A;    CHOLECYSTECTOMY      COLONOSCOPY      ESOPHAGOGASTRODUODENOSCOPY      HERNIA REPAIR      LEFT HEART CATHETERIZATION Left 2/18/2020    Procedure: Left heart cath;  Surgeon: Trey Evans MD;  Location: [facility] CATH LAB;  " Service: Cardiology;  Laterality: Left;    LUNG TRANSPLANT Left 8/10/2020    Procedure: TRANSPLANT, LUNG - 4:30AM start;  Surgeon: Benedict Clemons MD;  Location: [facility] OR [address] [place] [address];  Service: Cardiovascular;  Laterality: Left;    SMALL INTESTINE SURGERY      mass removed (benign)     Family History   Problem Relation Age of Onset    Cirrhosis Neg Hx      Social History     Socioeconomic History    Marital status: Single     Spouse name: Not on file    Number of children: Not on file    Years of education: Not on file    Highest education level: Not on file   Occupational History    Not on file   Social Needs    Financial resource strain: Not on file    Food insecurity     Worry: Not on file     Inability: Not on file    Transportation needs     Medical: Not on file     Non-medical: Not on file   Tobacco Use    Smoking status: Former Smoker     Types: Cigarettes     Quit date: 12/13/2016     Years since quitting: 3.9    Smokeless tobacco: Never Used   Substance and Sexual Activity    Alcohol use: No     Frequency: Never    Drug use: No    Sexual activity: Not on file   Lifestyle    Physical activity     Days per week: Not on file     Minutes per session: Not on file    Stress: Not on file   Relationships    Social connections     Talks on phone: Not on file     Gets together: Not on file     Attends Pentecostal service: Not on file     Active member of club or organization: Not on file     Attends meetings of clubs or organizations: Not on file     Relationship status: Not on file   Other Topics Concern    Not on file   Social History Narrative    Not on file       Review of Systems:  Unable to obtain due to patient's status- respiratory compromise.    OBJECTIVE:     Vital Signs  Vitals:    12/06/20 0400 12/06/20 0424 12/06/20 0500 12/06/20 0600   BP: (!) 119/52  101/67 (!) 84/54   BP Location: Left arm  Left arm Left arm   Patient Position: Lying  Lying Lying   Pulse: (!) 118 (!) 128 (!) 130 (!) 123    Resp: (!) 26 (!) 26 (!) 26 (!) 26   Temp: 98.2 °F (36.8 °C) 98.1 °F (36.7 °C) 97.5 °F (36.4 °C) 97.2 °F (36.2 °C)   TempSrc:   Core Esophageal Core Esophageal   SpO2: (!) 73% (!) 71% (!) 70% (!) 73%   Weight:       Height:         Body mass index is 26.47 kg/m².    Physical Exam:  General: supine, in no acute distress  Eyes:  conjunctivae/corneas clear  Nose: no discharge  Neck: trachea midline with no masses appreciated  Lungs:  equal breath sounds bilaterally, no wheezes appreciated  Heart: regular rhythm, tachy  Abdomen: non-distended, soft  Extremities: +cyanosis, +edema  Skin: No rashes. Good skin turgor  Neurologic: sedated & paralyzed      Laboratory:  Lab Results   Component Value Date    WBC 4.29 12/06/2020    HGB 8.2 (L) 12/06/2020    HCT 28.3 (L) 12/06/2020     (H) 12/06/2020     12/06/2020       Chest Imaging, My Impression:   Most recent CXR: bilateral infiltrates    Diagnostic Results:  Electrocardiogram reviewed    ASSESSMENT:     1) Acute hypoxemic respiratory failure due to ARDS. Dramatic deteiroration in blood pressure & oxygenation over the past 24 hours. Possibly due to bacterial superinfection.  2) Pneumonia due to COVID 19 virus. Comleted remdesivir & decadron.  3) S/p lung txp.  4) Hypercoagulable state. Administering therapeutic lovenox.  5) IDDM. Glucose controlled per endocrine team.    PLAN:     Sedation & paralytics: fent, propofol  Lines:  RIJ CVC, R rad a line, pelletier, og, flexiseal, ETT   DVT prophylaxis: therapeutic lovenox  GI prophylaxis: protonix 40 daily  Antibiotics: vanc, antolin, bactrim, posaconazole, lamivudine, & ganciclovir  Vasopressor support: levophed at 1, vasopressin    Spoke with family early this morning & again just now. Unable to maintain oxygenation despite high airway pressures, Carlos Eduardo, 100% FiO2, & paralytics.  Pt is in extremis. Some family expressed a desire to come to the bedside though they are not in route yet. Offered compassionate withdrawal of  life support, but family is not ready for this. Family understands that CPR is not recommended with current circumstances; code status updated.    Critical Care Time: 100 minutes  Critical care was time spent personally by me on the following activities: evaluating this patient's organ dysfunction, development of treatment plan, discussing treatment plan with patient or surrogate and bedside caregivers, discussions with consultants, evaluation of patient's response to treatment, examination of patient, ordering and performing treatments and interventions, ordering and review of laboratory studies, ordering and review of radiographic studies, re-evaluation of patient's condition. This critical care time did not overlap with that of any other provider or involve time for any procedures.

## 2020-12-06 NOTE — NURSING
Dr Called to bedside due to status change in patient.    Pt O2 started to drop into the 70s' RT notified and then pt became Hypotensive with Tachycardia. RT started to bag patient and noticed it was a little difficult to bag. Xray done at bedside possible small pneumo. Pt tolerates laying flat on her back with sats in the upper 80s. Pt BP stable and titrating appropriately. Pt son to come to bedside in AM. Will continue to monitor.

## 2020-12-06 NOTE — PROGRESS NOTES
"Pharmacokinetic Initial Assessment: IV Vancomycin    Assessment/Plan:    Initiate intravenous vancomycin with loading dose of 1750 mg once followed by a maintenance dose of vancomycin 1000mg IV every 12 hours  Desired empiric serum trough concentration is 15 to 20 mcg/mL  Draw vancomycin trough level 60 min prior to fourth dose on 12/7 at approximately 0700  Pharmacy will continue to follow and monitor vancomycin.      Please contact pharmacy at extension [phone] with any questions regarding this assessment.     Thank you for the consult,   Kierra Costa       Patient brief summary:  Chely Becerra is a 65 y.o. female initiated on antimicrobial therapy with IV Vancomycin for treatment of suspected lower respiratory infection    Drug Allergies:   Review of patient's allergies indicates:   Allergen Reactions    Boniva [ibandronate] Other (See Comments)     "chest cramps"       Actual Body Weight:   76.7 kg    Renal Function:   Estimated Creatinine Clearance: 87.8 mL/min (based on SCr of 0.7 mg/dL).,     Dialysis Method (if applicable):  N/A    CBC (last 72 hours):  Recent Labs   Lab Result Units 12/03/20  0432 12/04/20  0429 12/05/20  0414 12/05/20  1608   WBC K/uL 5.10 4.83 4.55 5.12   Hemoglobin g/dL 8.0* 8.2* 8.7* 9.4*   Hematocrit % 27.2* 27.6* 30.1* 31.9*   Platelets K/uL 145* 163 152 200   Gran % % 68.0 62.0 74.3* 59.0   Lymph % % 20.0 26.0 18.9 26.0   Mono % % 5.0 2.7* 3.1* 4.0   Eosinophil % % 7.0 2.7 1.5 0.0   Basophil % % 0.0 0.0 0.7 0.0   Differential Method  Manual Manual Automated Manual       Metabolic Panel (last 72 hours):  Recent Labs   Lab Result Units 12/02/20  2334 12/03/20  0432 12/04/20  0429 12/04/20  1734 12/05/20  0414 12/05/20  1608   Sodium mmol/L 143 143 139  --  144 142   Potassium mmol/L 3.7 4.4 3.2* 3.8 4.1 3.5   Chloride mmol/L 100 101 98  --  100 99   CO2 mmol/L 34* 33* 34*  --  34* 32*   Glucose mg/dL 134* 85 123*  --  153* 186*   BUN mg/dL 24* 23 24*  --  23 23   Creatinine mg/dL " 0.7 0.7 0.6  --  0.6 0.7   Albumin g/dL  --  1.6* 1.6*  --  1.6*  --    Total Bilirubin mg/dL  --  0.5 0.4  --  0.5  --    Alkaline Phosphatase U/L  --  105 105  --  113  --    AST U/L  --  12 11  --  13  --    ALT U/L  --  14 13  --  13  --    Magnesium mg/dL 1.7 2.1 1.4* 2.1 1.6  --    Phosphorus mg/dL  --  3.6 3.6  --  3.7  --        Drug levels (last 3 results):  No results for input(s): VANCOMYCINRA, VANCOMYCINPE, VANCOMYCINTR in the last 72 hours.    Microbiologic Results:  Microbiology Results (last 7 days)     Procedure Component Value Units Date/Time    Culture, Respiratory with Gram Stain [[id number]]     Order Status: No result Specimen: Respiratory     Blood culture [[id number]]     Order Status: No result Specimen: Blood     Blood culture [[id number]]     Order Status: No result Specimen: Blood

## 2020-12-06 NOTE — PROGRESS NOTES
Around 1:30Am patient with labile HR and BPs Per nursing, had few 10-15 second episodes of brief tachyarrhthmias to 160s with drop in BP to th 40s, occurred about 3 times over less than 1 minute. Shortly after, patient had acute desaturation to the 70s and became hypotensive with MAPS in 30-40s. RT started Bag mask ventilation and levophed requirements rapidly  increased to maximum up to 3. ABG showed worsening hypoxemia paO2 49, spo2 77 on 100% FiO2 and 10 PEEP. CXR largely unchanged. After about 20 minutes of bag mask venitlation, patients SpO2 improved to the high 80s.     Around 3am, same event occurred. Required bag mask ventilation for 20 minutes and still unable to achieve goal SpO2. Decision made to reparalyze patient. Despite this, unable to achieve SpO2 > 80%. Remains HD labile with rapid swings in pressor requirements. She is too unstable for rescue proning at this time. Attempted to uptitrate PEEP for recruitment however she did not tolerate and hypoxemia worsened; exquisitely sensitive to PEEP. Remains now on 10PEEP/100% FiO2. ABG with worsening hypoxemia with SpO2 now 74%. After discussion with primary attending, will plan now for Carlos Eduardo. Called family on phone discuss recent clinical deterioration. Called daughter (edinson) who conferenced in remainder of her siblings (juan, alvaro, and Sushma) to hear this update. Explained at length her recent deterioration, including increase in vasopressor requirements and that she nearly had cardiac arrest earlier this morning. Explained that we suspect this recent worsening to possibly due to a new PNA for which she is receiving abx. Additionally explained that despite all aggressive measures, we are unable to obtain SpO2 levels > 80% and that she is likely nearing the end of her life. Explained that at this point, last option would be Carlos Eduardo with hopes for improvement, however I do not expect it to improve her condition. Will also initiate stress dose steroids.  Explained that given the severity of her current illness, I am highly concerned she will go into cardiac arrest. Explained that at this time, if heart were to stop, it would mean that she has failed all of our therapies and that CPR would not likely result in meaningful survival. Family was adamant that at this current time, they wish to continue to pursue all measures including CPR at this time. Explained that we will likely know over the next few hours whether or not this last potential therapy would be of benefit and could revisit the discussion later in the morning. Discussed with family that I request that they discuss resuscitative measures as a family and that can revisit this discussion at a later time. Answered all questions that arose.     Claudia Sarabia MD  Pulmonary / Critical Care Fellow  12/06/2020  4:22 AM

## 2020-12-06 NOTE — CARE UPDATE
BG goal 140-180    Remains in RICU, intubated and sedated. Acute decompensation overnight. Now DNR. Solumedrol 80 x1 this AM.     stop BG checks and insulin if decision to withdraw care.   Otherwise Plan:   BG monitoring every 4 hours and change to moderate dose correction scale given pulse steroids.   novolog 3 units every 4 hours if remains on TF; hold if Tf held.       Endocrine to continue to follow    ** Please call Endocrine for any BG related issues **

## 2020-12-06 NOTE — TREATMENT PLAN
General Surgery Brief Note    Discussed with Dr. Rogel. In light of worsening clinical status will plan to hold off on trach & peg for now. General Surgery will sign off.     Porfirio Kemp MD   [facility]

## 2020-12-06 NOTE — PLAN OF CARE
Called overnight and continued to monitor patients acute decompensation in vital signs.  Called Ivy to offer emotional support.  All questions were answered.  Agree with DNR status and current treatment plan.  Please call with any questions.

## 2020-12-06 NOTE — SIGNIFICANT EVENT
Ms. Becerra has steadily decompensated throughout the night. She is now on 100% FiO2 and 18 of peep which is maintaining O2 sats in the 60s with frequent drops to the 40s. Dr. Sarabia, Juan F Barnes, Dr. Dominguez, and myself have spoken with the family throughout the night and morning. She has one daughter here in town that is attempting to make it here but after extensive discussions Ivy (power of ) voiced understanding that despite all we are doing her mom is not going to survive this and if her heart stops it is unlikely that we will be able to resuscitate her given the inability to fix her underlying issues. We will continue all that we are currently doing but when her heart stops we will not perform chest compressions, shocks, or give additional medications.     Julia Smiht M.D.  [facility] Pulmonary/Critical Care Fellow

## 2020-12-06 NOTE — PROGRESS NOTES
Discussed case at length with Dr Sarabia, on-call fellow. Mrs Becerra has suffered a continued deterioration in both her oxygenation & blood pressure since yesterday afternoon. CXR showed worsening infiltrates. Procalcitonin was elevated. Empiric abx were started. LV systolic function is intact on CC ultrasound. The pt has received 1.5L of crystalloid & vasopressors. BP is at goal presently, but the pt is requiring very high doses to maintain pressure which is concerning. Stress dose steroids are being administered. The pt is too tenuous to prone at this time. We are administering inhaled NO in an effort to improve oxygenation.    I have spoken to Ivy twice previously during the course of the week, so I called her this morning & personally updated her of these turn of events.    Cj Rogel MD  [facility]

## 2020-12-07 LAB — BACTERIA UR CULT: NORMAL

## 2020-12-07 NOTE — PLAN OF CARE
Patient  - TOD:  on 2020.    CM to follow for discharge planning needs.  ALEKSANDRA Reyes RN  Case Management  [id number]        20 [id number]   Final Note   Assessment Type Final Discharge Note   Anticipated Discharge Disposition   (Patient  - TOD:  on 2020)   Post-Acute Status   Post-Acute Authorization Other  (Patient  - TOD:  on 2020)   Other Status No Post-Acute Service Needs  (Patient  - TOD:  on 2020)

## 2020-12-07 NOTE — PLAN OF CARE
Patient passed away at 2155 on 12/6/2020. Pronounced by Dr. Mosher. COD     I personally called patient's NOK daughter Ivy to notify her of death. Offered my deepest condolences. Staff notified.     Claudia Sarabia MD  Pulmonary / Critical Care Fellow  12/06/2020  10:01 PM

## 2020-12-07 NOTE — NURSING
Transport called for an updated eta on pt being transported to [facility]. escalated at this time.

## 2020-12-07 NOTE — SIGNIFICANT EVENT
Critical Care Medicine                                                                                       Death Note      Called to bedside by patient's nurse. Nursing supervisor notified. Family at bedside.  has been called and is also at bedside.    Patient is not responding to verbal or tactile stimuli. Patient does not have a papillary or corneal reflex. Her pupils are fixed and dilated. No heart or breath sounds on auscultation. No respirations. No palpable pulses.     Time of death: 2155     Cause of Death: Hypoxic Respiratory Failure with ARDS secondary to COVID19    MD [email or web address]  [phone]

## 2020-12-07 NOTE — DISCHARGE SUMMARY
[facility] - ICU 16   Critical Care Medicine  Discharge Summary      Patient Name: Chely Becerra  MRN: [id number]  Admission Date: 11/4/2020  Hospital Length of Stay: 33 days  Discharge Date and Time: 12/7/2020  4:41 AM  Attending Physician: No att. providers found   Discharging Provider: Eze Gonzalez MD  Primary Care Provider: ZAHIDA Stack MD  Reason for Admission: Acute respiratory distress syndrome (ARDS) due to COVID-19    HPI:   Ms. Chely Becerra is a 65 y.o. year old female with a PMHx of diabetes and idiopathic pulmonary fibrosis s/p unilateral (left) lung transplant in August 2020 who presents to the ED complaining of a shortness of breath that started last Friday and progressively got worse. She had a fever of 101 on Sunday, started coughing for the last 3 days nonproductive, denies any chest pain. She was seen in the [facility] Transplant [facility] for routine testing earlier this morning was found to be hypoxic and she was referred to the emergency department. On arrival her COVID-19 test is positive.     Patient lives with her 2 daughters, 1 of the daughters has been having some respiratory symptoms recently however she thought was secondary to asthma. he is on prednisone taper currently 20 mg daily, she has been compliant with her antirejection medication Prograf and CellCept. She was started on BiPAP in the ED with improvement in her oxygen saturations to the mid 90's.     * No surgery found *    Indwelling Lines/Drains at Time of Discharge:   Lines/Drains/Airways     Airway                 Airway - Non-Surgical 11/12/20 0928 Endotracheal Tube 24 days              Hospital Course:   Ms. Chely Becerra was admitted to [facility] on 11/04/2020 for management of new acute hypoxemic respiratory failure secondary to COVID-19. Her respiratory status was stabilized on admission alternating between BiPAP and comfort flow. She was initiated on Azithromycin and Ceftriaxone for CAP  coverage, in addition to home Voriconazole prophylaxis. She completed a 10-day course of Dexamethasone from 11/04 to 11/13, in addition to a 5 day course of Remdesivir, and one time dose of convalescent plasma. However, by 11/12, her hypoxia progressed, associated with worsening leukocytosis, new DWAYNE, and persistent fevers, requiring intubation at that time. Repeat infectious work-up was significant for blood and respiratory cultures from 11/12 positive for Pseudmonas. ID was consulted and she was initiatedon Meropenem on 11/12 with subsequent blood cultures remaining NGTD. DWAYNE resolved by 11/14 without need for dialysis. Intubation and subsequent sedation were also associated with onset of shock requiring vasopressor support until 11/20. She was initiated on intermittent proning with improvements in oxygenation, however this was limited by intermittent episodes of bradycardia. Endocrinology was consulted on 11/17 for hyperglycemia management. Following weaning sedation by 11/23, she developed worsening hypertension requiring a Cardene drip by 11/24. By 11/25, she was intolerable of weaning ventilator support and sedation and neuromuscular blockade was re-initiated; proning resumed on 11/26 for attempted improvement in oxygenation. On 11/26, Hgb progressively downtrended since admission and PRBC given with adequate response. She completed course of Meropenem on 11/27. Lung transplant team followed closely throughout admission for assistance with home immunosuppressive medications. By 12/03, patient showed only slight improvement in oxygenation with intermittent proning. However, on 12/05, she developed a deterioration of her respiratory status after attempting to wean paralytic associated with new vasopressor requirements; broad spectrum antibiotics were re-initiated and septic work-up was repeated. On early morning of 12/06, she developed an acute decline in respiratory status with saturations unable to be  maintaining >80% despite bagging and use of inhaled NO; patient was hemodynamically too unstable to attempt rescue proning. Goals of care discussions held with family and multiple different providers and decision made to make patient DNR. On late evening of , respiratory status continued to decline and patient  with preliminary cause of death: ARDS from COVID-19.     Consults (From admission, onward)        Status Ordering Provider     Inpatient consult to Critical Care Medicine  Once     Provider:  (Not yet assigned)    Completed RUPERT CORREA     Inpatient consult to Endocrinology  Once     Provider:  (Not yet assigned)    Completed PAMELA BROWN     Inpatient consult to General Surgery  Once     Provider:  (Not yet assigned)    Completed PATRICIO LONGORIA     Inpatient consult to Infectious Diseases  Once     Provider:  (Not yet assigned)    Completed CHELSEY WRIGHT     Inpatient consult to Infectious Diseases  Once     Provider:  (Not yet assigned)    Completed DARNELL NELSON     Inpatient consult to Midline team  Once     Provider:  (Not yet assigned)    Completed CHELSEY WRIGHT     Inpatient consult to Palliative Care  Once     Provider:  (Not yet assigned)    Completed CRISTIAN BRIGHT        Significant Labs:  All pertinent labs within the past 24 hours have been reviewed.    Significant Imaging:  I have reviewed all pertinent imaging results/findings within the past 24 hours.    Pending Diagnostic Studies:     Procedure Component Value Units Date/Time    Tacrolimus level [[id number]] Collected: 20    Order Status: Sent Lab Status: In process Updated: 20    Specimen: Blood     Urinalysis, Reflex to Urine Culture Urine, Clean Catch [[id number]] Collected: 20    Order Status: Sent Lab Status: In process Updated: 20    Specimen: Urine         Final Active Diagnoses:    Diagnosis Date Noted POA    PRINCIPAL PROBLEM:  Acute respiratory  distress syndrome (ARDS) due to COVID-19 virus [U07.1, J80] 2020 Yes    Pneumonia due to COVID-19 virus [U07.1, J12.89]  Yes    Acute hypoxemic respiratory failure due to COVID-19 [U07.1, J96.01]  Yes    History of lung transplant [Z94.2]  Not Applicable    Macrocytic anemia [D53.9] 2020 Yes    Long-term use of immunosuppressant medication [Z79.899] 2020 Not Applicable    Pressure injury of right buttock, unstageable [L89.310] 2020 No    Pressure ulcer of head, stage 2 [L89.812] 2020 No    Sepsis, unspecified organism [A41.9] 2020 Yes    Shock, unspecified [R57.9]  No    Elevated alkaline phosphatase level [R74.8] 11/10/2020 Yes    Palliative care encounter [Z51.5] 2020 Not Applicable    Status post lung transplantation [Z94.2] 08/10/2020 Not Applicable    Type 2 diabetes mellitus, with long-term current use of insulin [E11.9, Z79.4] 2019 Not Applicable    Essential hypertension [I10] 2019 Yes      Problems Resolved During this Admission:    Diagnosis Date Noted Date Resolved POA    Hypernatremia [E87.0] 2020 No    Pneumonia due to Pseudomonas aeruginosa [J15.1] 2020 No    Bacteremia due to Pseudomonas [R78.81, B96.5] 2020 Yes    COVID-19 virus infection [U07.1]  2020 Yes     No new Assessment & Plan notes have been filed under this hospital service since the last note was generated.  Service: Critical Care Medicine    Discharged Condition:     Disposition:     Patient Instructions:   No discharge procedures on file.     Medications:  None (patient  at medical facility)     Eze Gonzalez MD  Critical Care Medicine  [facility] - ICU [facility]

## 2020-12-08 ENCOUNTER — POST MORTEM DOCUMENTATION (OUTPATIENT)
Dept: TRANSPLANT | Facility: CLINIC | Age: 65
End: 2020-12-08

## 2020-12-09 LAB
BACTERIA BLD CULT: ABNORMAL

## 2020-12-11 LAB — BACTERIA BLD CULT: NORMAL

## 2021-02-28 NOTE — PHYSICIAN QUERY
PT Name: Chely Becerra  MR #: [id number]  Hematology Clarification      CDS/: Anastasia Walker               Contact information:[email or web address]    This form is a permanent document in the medical record.      Query Date: November 19, 2020    By submitting this query, we are merely seeking further clarification of documentation. Please utilize your independent clinical judgment when addressing the question(s) below.    The Medical Record contains the following:   Indicators  Supporting Clinical Findings Location in Medical Record    PT        INR        PTT     x Platelets   Platelets 247 230 208 219 253 252 309 296 247       Platelets 231 191 151 157 108 (L) 87 (L) 94 (L) 122 (L) 154 166      Labs 11/4 - 11/11      Labs 11/12 - 11/19        Coagulopathy or Coagulation Defect documented     x Acute/Chronic Illness PMHx of diabetes and idiopathic pulmonary fibrosis s/p unilateral (left) lung transplant in August 2020    Acute hypoxic respiratory failure due to Covid 19     Septic shock - Pseudomonas bacteremia and in sputum   H&P 11/4          CCM PN 11/18    Treatment     x Other hypercoagulable on full dose lovenox    H&P 11/4 - CCM PN 11/18     Provider, please clarify the diagnosis associated with above clinical findings.    [ x  ] Hypercoagulable 2/2 COVID   [   ] Hypercoagulable state unspecified (Other Primary Thrombophilia)   [   ] Other hematological diagnosis (please specify):_______   [  ] Clinically Undetermined     Please document in your progress notes daily for the duration of treatment until resolved, and include in your discharge summary.   Initial (On Arrival)

## 2021-07-20 NOTE — ASSESSMENT & PLAN NOTE
POD #4 s/p uncomplicated LSLT for IPF. PGD 2 for 24 hours. CT output stable. Underwent bronchoscopy and successfully extubated to HFNC on POD#1. Continue immunosuppression and prophylaxis. Continue IS/PT/OT/CPT.   independent

## 2021-09-29 NOTE — ASSESSMENT & PLAN NOTE
-Developed worsening hypoxia by 11/12 requiring intubation, associated with worsening leukocytosis, persistent fevers, and transient DWAYNE with repeat infectious work-up at that time significant for blood and respiratory cultures positive for Pseudomonas.  -Status post ID consultation and initiation on Meropenem on 11/12 with subsequent cultures remaining NGTD.   -Intermittent vasopressor support continues that may be related to sedation side effect.     Plan:   -Continue vasopressor support as needed to maintain MAP's >65; wean as tolerated.   -Transitioned stress dose steroid regimen to home Prednisone 20 mg on 11/27.   -S/p Meropenem 11/12-11/27.   -Continue immunosuppressant antimicrobial prophylaxis (OIP with ganciclovir, lamivudine, bactrim, and posaconazole. Azithro MW for CLAD/DRAKE prophylaxis once stable.).   no

## 2022-02-01 NOTE — TELEPHONE ENCOUNTER
Spoke with pt about rescheduled appointments.   Sski Pregnancy And Lactation Text: This medication is Pregnancy Category D and isn't considered safe during pregnancy. It is excreted in breast milk.

## 2022-04-12 NOTE — PROGRESS NOTES
Pt comes for COVID 19 swab testing. Pt denies any symptoms     Show Applicator Variable?: Yes Render Note In Bullet Format When Appropriate: No Number Of Freeze-Thaw Cycles: 1 freeze-thaw cycle Duration Of Freeze Thaw-Cycle (Seconds): 10 Post-Care Instructions: I reviewed with the patient in detail post-care instructions. Patient is to wear sunprotection, and avoid picking at any of the treated lesions. Pt may apply Vaseline to crusted or scabbing areas. Consent: The patient's verbal consent was obtained including but not limited to risks of crusting, scabbing, blistering, scarring, darker or lighter pigmentary change, recurrence, incomplete removal and infection. Detail Level: Detailed

## 2022-06-01 NOTE — PLAN OF CARE
Patients labs and vitals reviewed.  Remains intubated and sedated.  Paralytics weaned off.  Diuresing.  COVID care per MICU.  Continue with tac and prednisone.  MMF on hold due to ongoing infection concerns.  Goal tac level 6-8; no changes today.  Continue with OIP with ganciclovir, posaconazole, bactrim, and lamivudine.  Will continue to monitor and adjust IS and OIP.      Please call with any questions.   Cephalexin Pregnancy And Lactation Text: This medication is Pregnancy Category B and considered safe during pregnancy.  It is also excreted in breast milk but can be used safely for shorter doses.

## 2022-06-03 NOTE — DISCHARGE SUMMARY
[facility]  Lung Transplant  Discharge Summary      Patient Name: Chely Becerra  MRN: [id number]  Admission Date: 9/23/2020  Hospital Length of Stay: 0 days  Discharge Date and Time: 09/23/2020 8:50 AM  Attending Physician: Niraj Garza MD   Discharging Provider: Niraj Garza MD  Primary Care Provider: ZAHIDA Stack MD     HPI: Ms. Becerra was admitted for surveillance bronchoscopy.    Procedure(s) (LRB):  flexible bronchoscopy with tissue biopsy CPT 05139 (N/A)     Hospital Course: Bronchoscopy was performed without complications.         Significant Diagnostic Studies: Bronchoscopy: see separate report    Pending Diagnostic Studies:     Procedure Component Value Units Date/Time    X-Ray Chest AP Single View [[id number]]     Order Status: Sent Lab Status: No result         Final Active Diagnoses:    Diagnosis Date Noted POA    PRINCIPAL PROBLEM:  H/O lung transplant [Z94.2] 09/23/2020 Not Applicable      Problems Resolved During this Admission:      Discharged Condition: good    Disposition: Home or Self Care    Medications:  Reconciled Home Medications:      Medication List      CHANGE how you take these medications    insulin aspart U-100 100 unit/mL (3 mL) Inpn pen  Commonly known as: NovoLOG Flexpen U-100 Insulin  Inject 4 Units into the skin 3 (three) times daily with meals. Plus correction scale; max TDD 27 units  What changed: how much to take        CONTINUE taking these medications    amoxicillin-clavulanate 875-125mg 875-125 mg per tablet  Commonly known as: AUGMENTIN  Take 1 tablet by mouth every 12 (twelve) hours.     aspirin 81 MG EC tablet  Commonly known as: ECOTRIN  Take 1 tablet (81 mg total) by mouth once daily.     atorvastatin 20 MG tablet  Commonly known as: LIPITOR  Take 1 tablet (20 mg total) by mouth once daily.     azithromycin 250 MG tablet  Commonly known as: Z-JAYSHREE  Take 1 tablet (250 mg total) by mouth every Mon, Wed, Fri.     blood sugar diagnostic  [facility] together with [facility] home [facility] care has been arranged for your recovery. This will include Physical and Occupational therapy. They will call you and schedule your first visit. You may contact them with any questions or concerns at [phone]._________________________________________________________________________________________      You have identified that you have a Power of  for Healthcare Document at home.  Please provide a copy of your Power of  for Healthcare Document to your physician at your scheduled follow up visit so a copy can be placed on file.     A copy of your Power of  Document can also be mailed to the following address to be placed on file:  [facility]  Attn: Medical Records  [address] [place] [address]___________________________________________________________________________________      Posterior Total Hip Replacement        Activity Status:.  • Weight bearing status:  [x] Weight bear as tolerated (WBAT) with crutches/walker; then progress to full weight bearing as your symptoms/comfort & confidence allow.      Restrictions: No driving until seen by physician and advised to drive.  Continue with posterior hip precautions as follows:  1.  Do not bend your hip past 90 degree angle  2.  Do not cross your legs.  3.  Do not twist your hip inwards - keep knees and toes pointed upwards.     Wound care:   Keep your incision clean and dry.  Do not remove your bandage until your follow up appointment.  No baths, hot tubs or swimming pools until after your follow up appointment.  Able to begin showering once Mepilex dressings in place and drainage has subsided.      Special instructions:  Use ice packs or cold therapy until during awake hours 30 min on / 30 min off.  Then use as needed to prevent pain and swelling.  Do NOT apply ice directly to the skin.  Wear compression stockings until you have returned to a normal level  "Strp  Use as directed to test blood glucose 3-4 times daily.     blood-glucose meter kit  Use as instructed     cyclobenzaprine 10 MG tablet  Commonly known as: FLEXERIL  Take 1 tablet (10 mg total) by mouth 3 (three) times daily as needed for Muscle spasms.     gabapentin 100 MG capsule  Commonly known as: NEURONTIN  Take 1 capsule (100 mg total) by mouth 3 (three) times daily.     lamiVUDine 150 MG Tab  Commonly known as: EPIVIR  Take 1 tablet (150 mg total) by mouth once daily.     lancets Misc  Use as directed to test blood glucose 3-4 times daily.     lansoprazole 15 MG capsule  Commonly known as: PREVACID  Take 1 capsule (15 mg total) by mouth once daily.     lidocaine 5 %  Commonly known as: LIDODERM  Place 1 patch onto the skin once daily. Remove & Discard patch within 12 hours or as directed by MD     magnesium oxide 400 mg (241.3 mg magnesium) tablet  Commonly known as: MAG-OX  Take 1 tablet (400 mg total) by mouth 3 (three) times daily.     metoprolol tartrate 25 MG tablet  Commonly known as: LOPRESSOR  Take 1 tablet (25 mg total) by mouth 2 (two) times daily.     mycophenolate 250 mg Cap  Commonly known as: CELLCEPT  Take 4 capsules (1,000 mg total) by mouth 2 (two) times daily.     NIFEdipine 30 MG (OSM) 24 hr tablet  Commonly known as: PROCARDIA-XL  Take 1 tablet (30 mg total) by mouth once daily.     ondansetron 8 MG Tbdl  Commonly known as: ZOFRAN-ODT  DISSOLVE 1 tablet (8 mg total) by mouth every 8 (eight) hours as needed.     opw transplant care kit 1 Kit  Welcome to [facility].  This is your transplant care kit.     oxyCODONE 5 MG immediate release tablet  Commonly known as: ROXICODONE  Take 1 tablet (5 mg total) by mouth every 6 (six) hours as needed.     pen needle, diabetic 31 gauge x 5/16" Ndle  Use as directed with insulin pen 3-5 times daily.     polyethylene glycol 17 gram/dose powder  Commonly known as: GLYCOLAX  Take 17 g by mouth 2 (two) times daily as needed.   " of activity - approximately 4-6 weeks after surgery.    Call your doctor if you have severe pain not controlled by pain medications, you notice increased bleeding, heat, drainage, swelling or redness from your incisions, you have a fever higher than 101°F.      DISCHARGE INSTRUCTIONS:   Hip Incision Care upon discharge from the hospital:  · You have a specialized silver dressing that is waterproof. You CAN shower with the dressing in place.  If you would like extra protection you can use plastic cling wrap or a bag.   · If this dressing falls off or if you notice water got inside, call our office  · If you notice any blood or drainage soak-through on the dressing outside a dime size, contact our office immediately  · The dressing does not get changed. This stays in place until your first post-operative visit in 2 weeks.      Activity/Physical Therapy:  ·  Full weight bearing to the operative side. Be cautious to not bend past 90 degrees at the waist, do not turn the up inwards, do not cross your legs. While sleeping it is important to keep a pillow in between your legs and to not sleep on the operative side.   · Physical therapy (home-care or outpatient) will start within a few days of discharge from the hospital, this is crucial to having a successful recovery.      Compression Stockings:  · The thigh high (white) compression stockings are to stay on 24 hours per day for the first two weeks.   · You can take short breaks from them for washing the stockings.  · If they are cutting into the thigh or you experience numbness or tingling of the lower extremities remove the stockings and call our office     Anticoagulation “Blood thinners”:  · Most patients will be on low-dose Aspirin (81mg) after your total joint replacement. This is to be taken with food and taken twice per day (breakfast & dinner)  ? If you are on aspirin, it is okay to (and recommended) to resume your anti-inflammatory (NSAID) of choice (i.e.    predniSONE 5 MG tablet  Commonly known as: DELTASONE  From 8/20-9/9 Take 25mg by mouth daily; From 9/10-11/8 take 20mg daily; From 11/9-12/8 take 15mg daily; 12/9-2/7/21 take 10mg daily then 5mg daily thereafter     SENNA 8.6 mg tablet  Generic drug: senna  Take 2 tablets by mouth once daily.     STOOL SOFTENER 100 MG capsule  Generic drug: docusate sodium  Take 1 capsule (100 mg total) by mouth 2 (two) times daily as needed for Constipation.     sulfamethoxazole-trimethoprim 800-160mg 800-160 mg Tab  Commonly known as: BACTRIM DS  Take 1 tablet by mouth every Mon, Wed, Fri.     tacrolimus 0.5 MG Cap  Commonly known as: PROGRAF  Take 1 capsule (0.5 mg total) by mouth once daily.     valGANciclovir 450 mg Tab  Commonly known as: VALCYTE  Take 1 tablet (450 mg total) by mouth 2 (two) times daily.     voriconazole 200 MG Tab  Commonly known as: VFEND  Take by mouth 300 mg (1 and 1/2 tablets) by mouth twice daily          Patient Instructions:      Diet general     Call MD for:  temperature >101     Call MD for:  coughing up blood greater than 3 tablespoons in volume     Call MD for:  chest pain     Call MD for:  difficulty breathing or shortness of breath     Call MD for:  development of yellow/green sputum     Activity as tolerated     Follow Up:      Niraj Garza MD  Lung Transplant  [facility]   ibuprofen, Naproxen/Aleve, Meloxicam, Diclofenac, or Celebrex) when you are home. This will help reduce pain, swelling, and inflammation. You can take this in addition to your prescribed narcotic pain prescriptions.   § Make sure you are NOT taking your Aspirin and anti-inflammatories together. These must be spaced out by at least 2 hours.   · If you are on any other blood thinner such as coumadin (Warfarin), Lovenox (enoxaparin), Xarelto (Rivaroxaban), or Eliquis (Apixaban) you CANNOT take any additional anti-inflammatories due to risk of bleeding.   ? Tylenol (Acetaminophen) is okay to take with these medications.   § Must stay under 4000mg of Tylenol per 24 hour period      Pain medication:  · Upon discharge from the hospital following your total joint replacement you may have had narcotic pain medications prescribed to you.  ? FIRST AND BEST MODE OF PAIN CONTROL IS ICE!      ? Ensure you are elevating the surgical leg to reduce swelling  ? Extra strength Tylenol (Acetaminophen)   § Make sure your TOTAL acetaminophen intake is less than 4000mg in a 24 hour period  § Be cautious as SOME narcotics have Acetaminophen in them such as   § Hydrocodone/acetaminophen   § Oxycodone/acetaminophen   § Each of these have 325mg of Tylenol (acetaminophen) per tablet.  ? NSAIDS- over the counter ibuprofen, Aleve, Meloxicam, Diclofenac, Celebrex, etc.   § These are recommended (see above under 'blood thinners section')  § This will help swelling and pain.   ? Tramadol (MODERATE PAIN)  § 50mg tablets, you can take either 1 or 2 every 6 hours for pain.  ? Oxycodone (SEVERE PAIN)  § 5mg tablets  § Start with 1 tablet every 4-6 hours as needed for severe (breakthrough pain)  § Can take a MAXIMUM of 2 tablets every 4 hours for pain  § Use these as a last resort      Call with any issues that cannot be answered with this sheet. If any prescription refills are needed, call our office, please allow 24-48 hours for refills. Follow up is  set to be approximately 2 weeks from your surgical date.        Discharge Instructions for Total Hip Replacement    The hip is one of the body's largest joints. It is a ball-and-socket joint. The socket is formed by the acetabulum, which is part of the large pelvis bone. The ball is the femoral head, which is the upper end of the femur (thighbone).  The bone surfaces of the ball and socket are covered with articular cartilage, a smooth tissue that cushions the ends of the bones and enables them to move easily.  A thin tissue called synovial membrane surrounds the hip joint. In a healthy hip, this membrane makes a small amount of fluid that lubricates the cartilage and eliminates almost all friction during hip movement.  Bands of tissue called ligaments (the hip capsule) connect the ball to the socket and provide stability to the joint.      You have undergone Hip replacement surgery. The hip joint forms where the thighbone, and pelvis meet. The hip joint is supported by muscles and ligaments, and is lined with a cushioning called cartilage. Over time, cartilage wears away. This can make the hip feel stiff and painful. Your doctor replaced your painful joint with a hip prosthesis (artificial joint) to relieve pain and restore movement.     Description:  In a total hip replacement (also called total hip arthroplasty), the damaged bone and cartilage is removed and replaced with prosthetic components.  • The damaged femoral head is removed and replaced with a metal stem that is placed into the hollow center of the femur. The femoral stem may be either cemented or \"press fit\" into the bone.   • A metal or ceramic ball is placed on the upper part of the stem. This ball replaces the damaged femoral head that was removed.   • The damaged cartilage surface of the socket (acetabulum) is removed and replaced with a metal socket. Screws or cement are sometimes used to hold the socket in place.   • A plastic spacer is inserted  between the new ball and the socket to allow for a smooth gliding surface.      HERE ARE SOME INSTRUCTIONS TO FOLLOW WHEN AT HOME:.      Narcotic medications:  · Take only as prescribed. Store in a place inaccessible to children/pets.  · Do not share medicine. Do not crush, snort, dissolve, or inject the medicine. If you cannot swallow medicine, please call.   · Do NOT drink alcohol while on this medicine.  · Leftover narcotic medicine may be taken to police departments/ office    C.  Other Medications  *  Multi-purpose vitamin is okay to resume and can be helpful in aiding in your recovery    *  Stool softener daily as prescribed until off all pain medication and normal stool.  *  Iron pill daily x 2 weeks.  *  Continue with current home medication regime.  Any concerns about them contact our office       ** If you are diabetic, it is critical that you maintain well controlled blood sugar levels because elevated blood sugar levels will significantly increase the risk of infection and affect the healing process.  If issues with controlling please contact your primary care physician.    Home Care:  *  Take your pain medications as directed.  *  Do not drive until your physician says it's fine.  Never drive while taking narcotic pain medications.  *  Wear the support stockings you were given in the hospital.  Wear them 24 hours a day for the first 2 weeks then just daily.  *  To relieve discomfort at night get up and move around.  *  Tell all your healthcare providers, including your dentist, about your artificial joint before any procedure.  You will likely need to take an antibiotics before dental work and other medical procedures to reduce the risk of infection.          Moving Safely  · The key to successful recovery is movement is walking and exercising your hip as directed by your doctor.  · Walk up and down stairs with support. Try one step at a time--good hip up, bad hip down. Use the railing if  possible.  · Don’t drive until your doctor says it’s okay. Most people can start driving about 3-6 weeks after surgery. Don’t drive while you are taking narcotic pain medication.                                                                                                                                    Other Precautions  · Avoid soaking your hip in water (no hot tubs, bathtubs, swimming pools until your doctor says it’s okay.  · Arrange your household to keep the items you need handy. Keep everything else out of the way. Remove items that may cause you to fall, such as throw rugs and electrical cords.  · Use nonslip bath mats, grab bars, an elevated toilet seat, and a shower chair in your bathroom.  · Until your balance, flexibility, and strength improve, use a cane, crutches, a walker, handrails, or someone to help you.  · Keep your hands free by using a backpack, ashish pack, apron, or pockets to carry things.  · Prevent infection. Ask your doctor for instructions if you haven’t already received them. Any infection will need to be treated immediately with antibiotics. Call your doctor right away if you think you might have an infection.  · Tell your dentist that you have an artificial joint and take antibiotics as prescribed before any dental work.  · Tell all your healthcare providers about your artificial joint before any medical procedure.  · Maintain a healthy weight. Get help to lose any extra pounds. Added body weight puts stress on the hip.  · Take any medication you may have been given after surgery. This may include blood-thinning medications to prevent blood clots or antibiotics to prevent infection.    What is Constipation?  Constipation is defined as the inability to pass feces or having difficulty passing feces because it is dry or hardened. Some sources define constipation as having three or fewer bowel movements per week, but that is a generalization. If you normally have 2 or 3 bowel movements  per day, 3 per week would indicate constipation. If three movements per week is normal for you, though, then there is no reason for concern.    Constipation becomes more severe the longer it lasts. As the length of time between bowel movements increases, more water is absorbed back into the bloodstream, causing the stool to harden in the colon (large intestine). The discomfort increases, along with the damage that can be done by the strain there is to have a bowel movement.    Surgery patients are far more likely to have bouts of constipation than the average person. In fact, after surgery, you may be given a stool softener to prevent constipation before it happens.  If you are not, stool softeners are available at your local drug store.      Why Does Constipation Strike After Surgery?    What Causes Constipation After Surgery?  Surgery patients are prone to constipation for multiple reasons:    · Pain Medication: The primary reason for constipation after surgery is that the prescription drugs given for pain relief can cause constipation. If you must take large or multiple doses of pain medication or you have taken the pain medication for an extended period of time, you will be at risk for constipation.    · Food and Drink After Surgery: As part of your preparation for surgery, you may have been instructed not to eat or drink. After surgery, you may have been told to drink minimally and perhaps not eat at all for a day or two. The combination of too little fluid and no food intake can work against your body’s normal routine of elimination.    • Too little fluid in the body means less fluid in feces, resulting in hard, dry stools. Food works to stimulate the digestive system and keep things moving along. With no food being eaten, the “food in, food out” mechanism doesn’t work.    · Your dietary choices, along with your intake level, also may have changed after surgery. Even the food provided in the hospital may be a  major change from your normal diet and can cause constipation.    · Inactivity: Getting up and walking or being active is one of the triggers for a bowel movement. Suddenly spending most of your time in bed resting can help to trigger a bout of constipation.    · Anesthesia: Most patients think of anesthesia as something that puts us to sleep. Anesthesia, though, also paralyzes the muscles: your intestine is paralyzed during surgery along with your arms and legs. This stops the muscle contractions to push food along the intestinal tract. Until your intestines \"wake up\" there is no movement of feces.    Preventing and Treating Constipation    Surgery patients are far more likely to develop constipation than the average person.  A stool softener may be prescribed. Taking the stool softener will help prevent further problems. Prevention is key after surgery because constipation can be very painful.    Medications: If your surgeon has prescribed a medication for constipation or recommended an over-the-counter treatment, such as a stool softener or an enema, it should work to relieve your symptoms. You may want to take a mild stool softener prescribed by your surgeon as a preventative measure, rather than waiting until symptoms develop. Do not use over-the-counter treatments without first discussing it with your doctor. Your surgeon needs to be aware of your symptoms and all medications you are taking.    Drink More Fluids: Increasing your intake of fluids, avoiding caffeinated beverages and focusing on beverages (water and juice) can help keep you well-hydrated and decrease the risk of constipation. Fluids will also help your body to recover after you develop constipation.    Eat More Fiber: Focusing your food intake on healthy, whole foods, such as fresh fruit and vegetables, will help decrease the risk of constipation. It will also help improve symptoms if constipation does develop. When possible, try to eat  high-fiber foods. Lean protein is important when recovering from surgery, but it should not be your only source of nutrition. A very low carb diet can also cause constipation.    Home Instruction Sheet  ANESTHESIA for ADULT     What are the side effects?  Side effects depend on the medication used, and may not even be present.    Most Common Sometimes   Irritability  Poor Balance  Sleeping for Several Hours  Drowsiness  Fatigue  Difficulty Concentrating Change in Behavior  Hyperactivity  Nausea (upset stomach)  Gas (flatulence)  Dizziness  Hiccups  Constipation  Blurred Vision     1. The effects of sedation medicine can last up to 24 hours.  You may be drowsy or irritable for 2 to 8 hours after receiving medicine.  2. You may need to sleep after leaving the testing area.  Sleeping after sedation will help reduce irritability.  3. Diet:  - Do not give anything to eat or drink until you are fully awake.  Eat a light meal and advance to a normal diet unless instructed differently:   - Do not drink alcohol beverages for the next 24 hours.  - Avoid greasy or spicy foods today.  4. Activity:  - You may be dizzy and/or unsteady.  Ask for help walking, using the bathroom, or stairs to protect yourself from injury.  - You should not drive a vehicle, operate machinery or power tools for 24 hours because your reflexes may be slow and your vision may be blurry.  - Do not sign any legally binding documents or make important decisions for 24 hours after receiving sedation.  5. Medications:  - Unless told otherwise, do not take any non-prescription medications (cold medicine, etc.) for 24 hours, as these medications in combination with sedation medication, can cause increased drowsiness.  If you feel that you need over the counter medication, discuss with your physician.    When to Seek Medical Attention  Call 911 right away if you have:  · Chest pain.  · Shortness of breath.  · Any pain or tenderness in your calf.  Otherwise,  call your doctor immediately if you have:  · Fever of 101°F higher, or shaking chills.  · Stiffness, or inability to move the hip.  · Increased swelling in your leg.  · Increased redness, tenderness, or swelling in or around the hip incision.  · Drainage from the hip incision.  · Increased hip pain.  _____________________________________________________________________    Questions please contact office    Physician:                   Bernardo Vega MD              Physician Assistant:  Be Venegas PA-C                     Office Address:  [facility]                                            [address] [place] [address]       Office Phone: [phone]         We would like to take this opportunity to thank you for choosing [facility]. Because your confidence in your caregivers is very important to us, it is our commitment to always treat you and your family with courtesy and respect. Our goal is to always answer any questions or worries or concerns you may have about the care you received If you have any suggestions for improvement or worries or concerns please do not hesitate to let us know.

## 2022-08-23 NOTE — ASSESSMENT & PLAN NOTE
COVID + 11/4. CXR with bilateral patchy opacities. Symptoms started 10/30 and steadily progressed. Daughter found to be COVID +. Overnight on 11/12, patient developed septic shock 2/ 2 GNR LLL PNA c/b GNR bacteremia.     - LPV  - Wean respiratory rate today  - Hold on proning this am, but will re-evaluate this in the afternoon  - Remdesivir X 5 days; completed 11/09/20  - Convalescent Plasma; Dose #1 11/5  - Dexamethasone X 10 days (end 11/14)  - Wean FiO2 for SpO2 >88%  - Duonebs  - hypercoagulable on full dose lovenox   - TTE with EF 70%; left ventricular concentric remodeling     Patient requests all Lab, Cardiology, and Radiology Results on their Discharge Instructions

## 2023-05-07 NOTE — ASSESSMENT & PLAN NOTE
BG goal 140-180.     Change to transition insulin infusion at 2 u/hr.  BG monitoring every 4 hours and moderate dose correction scale.      You can access the FollowMyHealth Patient Portal offered by [facility] by registering at the following website: [email or web address]. By joining [employer]’s FollowMyHealth portal, you will also be able to view your health information using other applications (apps) compatible with our system.

## 2023-06-22 NOTE — PROGRESS NOTES
LUNG TRANSPLANT RECIPIENT FOLLOW-UP    Reason for Visit: Follow-up after lung transplantation.                                                                                                         Date of Transplant: 8/10/20                                                                               Reason for Transplant: IPF                                                                               Type of Transplant: single left lung                                                                               CMV Status: D- / R+     Hepatitis C Status:  Donor Ab:(--)  Donor JENNY:(--)  Recipient serostatus:(--)  Treatment status: NA                                                                              Major Complications: None                                                                               History of Present Illness: Chely Becerra is a 65 y.o. year old female with the above listed transplant history who presents for routine post op follow-up.  She is on 0L of oxygen.  She is on no assisted ventilation.  Her New [place] Heart Association Class is 100% - Normal, No Complaints, no evidence of disease.  She is diabetic insulin dependent    Since her last visit, she has been doing well from a respiratory standpoint. Denies any dyspnea, cough, fever, or sick contacts.  Strength and insomnia are improving. Takes all medications as directed.  Overall, doing very well.      Review of Systems   Constitutional: Negative for chills, diaphoresis, fever, malaise/fatigue and weight loss.   HENT: Negative for congestion, ear discharge, ear pain, hearing loss, nosebleeds, sinus pain, sore throat and tinnitus.    Eyes: Negative for blurred vision, double vision, photophobia, pain, discharge and redness.   Respiratory: Negative for cough ( ), hemoptysis, sputum production, shortness of breath, wheezing and stridor.    Cardiovascular: Negative for chest pain, palpitations, orthopnea, claudication, leg  "swelling and PND.   Gastrointestinal: Negative for abdominal pain, blood in stool, constipation, diarrhea, heartburn, melena, nausea and vomiting.   Genitourinary: Negative for dysuria, flank pain, frequency, hematuria and urgency.   Musculoskeletal: Negative for back pain, falls, joint pain, myalgias and neck pain.   Skin: Negative for itching and rash.   Neurological: Negative for dizziness, tingling, tremors, sensory change, speech change, focal weakness, seizures, loss of consciousness, weakness and headaches.   Endo/Heme/Allergies: Negative for environmental allergies and polydipsia. Does not bruise/bleed easily.   Psychiatric/Behavioral: Negative for depression, hallucinations, memory loss, substance abuse and suicidal ideas. The patient is not nervous/anxious and does not have insomnia.      /80   Pulse 89   Temp 96.8 °F (36 °C) (Oral)   Resp 20   Ht 5' 7" (1.702 m)   Wt 76.7 kg (169 lb)   LMP  (LMP Unknown)   SpO2 96%   BMI 26.47 kg/m²     Physical Exam  Vitals signs and nursing note reviewed.   Constitutional:       General: She is not in acute distress.     Appearance: She is well-developed. She is not diaphoretic.   HENT:      Head: Normocephalic and atraumatic.      Nose: Nose normal.      Mouth/Throat:      Pharynx: No oropharyngeal exudate.   Eyes:      General: No scleral icterus.     Conjunctiva/sclera: Conjunctivae normal.      Pupils: Pupils are equal, round, and reactive to light.   Neck:      Musculoskeletal: Normal range of motion and neck supple.      Thyroid: No thyromegaly.      Vascular: No JVD.      Trachea: No tracheal deviation.   Cardiovascular:      Rate and Rhythm: Normal rate and regular rhythm.      Heart sounds: Normal heart sounds. No murmur. No friction rub. No gallop.    Pulmonary:      Effort: Pulmonary effort is normal. No respiratory distress.      Breath sounds: No wheezing or rales.   Abdominal:      General: Bowel sounds are normal. There is no distension.     "  Palpations: Abdomen is soft.      Tenderness: There is no abdominal tenderness.   Musculoskeletal: Normal range of motion.         General: No deformity.   Skin:     General: Skin is warm and dry.      Capillary Refill: Capillary refill takes less than 2 seconds.      Coloration: Skin is not pale.      Findings: No erythema or rash.      Comments: Sternal incision healing well.  Chest tube sites x3 without suggestion of infection, oozing, drainage, or erythema.  Visible SQ tissue     Neurological:      Mental Status: She is alert and oriented to person, place, and time.      Cranial Nerves: No cranial nerve deficit.   Psychiatric:         Judgment: Judgment normal.       Labs:  cbc, cmp, tacrolimus Latest Ref Rng & Units 9/8/2020 9/11/2020 9/14/2020   TACROLIMUS LVL 5.0 - 15.0 ng/mL 15.7(H) 11.9 10.9   WHITE BLOOD CELL COUNT 3.90 - 12.70 K/uL 10.03 - 10.86   RBC 4.00 - 5.40 M/uL 3.91(L) - 3.98(L)   HEMOGLOBIN 12.0 - 16.0 g/dL 12.0 - 12.1   HEMATOCRIT 37.0 - 48.5 % 37.4 - 38.6   MCV 82 - 98 fL 96 - 97   MCH 27.0 - 31.0 pg 30.7 - 30.4   MCHC 32.0 - 36.0 g/dL 32.1 - 31.3(L)   RDW 11.5 - 14.5 % 14.3 - 13.9   PLATELETS 150 - 350 K/uL 406(H) - 385(H)   MPV 9.2 - 12.9 fL 9.5 - 10.0   GRAN # 1.8 - 7.7 K/uL 4.0 - 8.3(H)   LYMPH # 1.0 - 4.8 K/uL 5.2(H) - 1.9   MONO # 0.3 - 1.0 K/uL 0.5 - 0.6   EOSINOPHIL% 0.0 - 8.0 % 2.2 - 0.0   BASOPHIL% 0.0 - 1.9 % 0.5 - 0.1   DIFFERENTIAL METHOD - Automated - Automated   SODIUM 136 - 145 mmol/L 136 136 137   POTASSIUM 3.5 - 5.1 mmol/L 3.9 4.6 4.4   CHLORIDE 95 - 110 mmol/L 101 102 101   CO2 23 - 29 mmol/L 25 22(L) 22(L)   GLUCOSE 70 - 110 mg/dL 183(H) 197(H) 222(H)   BUN BLD 8 - 23 mg/dL 23 20 26(H)   CREATININE 0.5 - 1.4 mg/dL 1.3 1.0 1.1   CALCIUM 8.7 - 10.5 mg/dL 9.4 9.6 9.8   PROTEIN TOTAL 6.0 - 8.4 g/dL 6.9 - 7.3   ALBUMIN 3.5 - 5.2 g/dL 3.6 - 3.7   BILIRUBIN TOTAL 0.1 - 1.0 mg/dL 0.4 - 0.4   ALK PHOS 55 - 135 U/L 206(H) - 252(H)   AST 10 - 40 U/L 29 - 37   ALT 10 - 44 U/L 81(H) -  81(H)   ANION GAP 8 - 16 mmol/L 10 12 14   EGFR IF AFRICAN AMERICAN >60 mL/min/1.73 m:2 50.5(A) >60.0 >60.0   EGFR IF NON-AFRICAN AMERICAN >60 mL/min/1.73 m:2 43.8(A) >60.0 53.6(A)     Pulmonary Function Tests 9/28/2020 9/14/2020 9/8/2020 8/31/2020 8/24/2020 6/15/2020 2/17/2020   FVC 1.7 1.72 1.77 1.52 1.5 1.32 1.31   FEV1 1.48 1.49 1.5 1.35 1.33 1.16 1.17   TLC (liters) - - - - - 2.1 -   DLCO (ml/mmHg sec) - - - - - 5.31 -   FVC% 58.4 59.1 61 52.1 51.3 45 45   FEV1% 64.6 65.3 66 59 57.9 50 51   FEF 25-75 1.97 - - - - - -   FEF 25-75% 97.9 - - - - - -   TLC% - - - - - 43 -   RV - - - - - - -   RV% - - - - - - -   DLCO% - - - - - 25 -       Imaging:  Results for orders placed during the hospital encounter of 08/31/20   X-Ray Chest PA And Lateral    Narrative EXAMINATION:  XR CHEST PA AND LATERAL    CLINICAL HISTORY:  left lung transplant 8/10/2020; Lung transplant status    TECHNIQUE:  PA and lateral views of the chest were performed.    COMPARISON:  08/24/2020    FINDINGS:  Postoperative changes are again identified in the thorax, compatible with history of left lung transplant.  Cardiomediastinal silhouette is unchanged and pulmonary vascular pattern is stable.  Continued elevation of the right hemidiaphragm with chronic interstitial changes.  Left lung is clear.  No pleural fluid and no definite pneumothorax is seen.  Skeletal structures appear intact.  Accentuation of the thoracic kyphosis with some hypertrophic degenerative changes.  Surgical clips at the upper abdomen.      Impression No significant interval change      Electronically signed by: Gus Johnson MD  Date:    08/31/2020  Time:    08:30       Assessment:  1. Encounter for aftercare following lung transplant    2. Lung replaced by transplant    3. Immunosuppression    4. Prophylactic antibiotic    5. Elevated alkaline phosphatase level    6. Non-healing surgical wound, subsequent encounter    7. Adrenal cortical steroids causing adverse effect in  therapeutic use    8. Muscle cramping      Plan:   1. FEV1 stable from previous.  CXR without acute changes.  Symptomatically doing well. No concerns for graft dysfunction; 6 week surveillance bronchoscopy without rejection or infection.  Continue to monitor spirometry and chest imaging with routine visits.      2. Continue with tac, MMF, and prednisone.  Follow-up tac level and adjust to a goal of 10-14 if she can tolerate.    3. Continue with bactrim DS, valgan, vori, lamivudine, and azithro.      4. Follow-up CMP today and GGT level for work-up of elevated alk phos.    5. Refer back to surgery for evaluation of her non-healing chest tube incision sites.  Right sided incision site has not approximated and healed appropriately with steristrips.      6. Follow with endocrine for DM.  Continue with current diabetic regimen.    7. Will change statin if electrolytes are stable given her reported muscle cramping with atorvastatin.      8. Follow-up in 2 week.      Patricia Dominguez D.O.  Pulmonary/Critical Care and Lung Transplantation  [facility]                                                                                                                                                                   Her/She

## 2023-07-26 NOTE — CONSULTS
Inpatient consult to Critical Care Medicine  Consult performed by: Cori Sutton MD  Consult ordered by: Sydney Ponce MD        Patient seen, accepted to MICU service under Dr. Montoya. Full note to follow.     Subjective:   Cori Sutton MD  Critical Care Medicine  [facility]   n/a

## 2024-07-06 NOTE — PROGRESS NOTES
LUNG TRANSPLANT INITIAL EVALUATION                                                                                                                                             Reason for Visit:  Evaluation for lung transplant    Referring Physician: Nitin Ariza MD    History of Present Illness: Chely Becerra is a 63 y.o. female who is on 0L of oxygen at rest but 2L on exertion.  She is on no assisted ventilation.  Her New [place] Heart Association Class is III and a Karnofsky score of 60% - Requires occasional assistance but is able to care for needs. She is not diabetic. She presents today for her initial evaluation for lung transplant. She carries a diagnosis of IPF.    Ms. Becerra says hat she was diagnosed with her condition in 2012. She used to work as a  at [employer] in [place] and noticed that she was developing shortness of breath. She was smoking at the time and has a smoking history of 18 pack years. She quit smoking in 2016. She also had an associated cough and one night she coughed up blood which prompted her to go to the Emergency Department. She was told that she had a pneumonia and was prescribed antibiotics and advised to follow up with hr PCP.    When she saw her PCP she was still symptomatic so she was referred to Cardiology (Dr. Marshall) who performed a LHC on her. Her LHC was unremarkable for coronary disease but he did notice fibrotic changes on the lungs with the fluoroscope and referred her to pulmonary to see Dr. Ariza.     Dr. Ariza performed several radiographic and serum investigations and confirmed the diagnosis of IPF. He did not perform a biopsy. At the moment she was started on a LABA/ICS and KOJO as well as prednisone and benzonatate. She says that the KOJO and the benzonatate helped with her cough but that felt no benefit from the other medications. In 2014 she was started on nintedanib which she has tolerated well. She initially had some  diarrhea but says that is is controlled with loperamide and diet.     She continues to have shortness of breath on exertion and a dry cough. She was started on oxygen this year which she uses for exertion and sleeping. Denies chest pain or orthopnea. Denies leg swelling. Denies fevers, night sweats, or weight loss. Does have heartburn which is controlled with lansoprazole.     Has never been hospitalized or visited the ED since she was diagnosed with her condition. She did receive a medrol dose pack once before she was started on nintedanib.     Past Medical History:   Diagnosis Date    Hypertension     IPF (idiopathic pulmonary fibrosis)      Colonoscopy Results: No procedure found.    Past Surgical History:   Procedure Laterality Date    CHOLECYSTECTOMY      COLONOSCOPY      ESOPHAGOGASTRODUODENOSCOPY      HERNIA REPAIR      SMALL INTESTINE SURGERY      mass removed (benign)       Allergies: Patient has no known allergies.    Current Outpatient Medications   Medication Sig    ADVAIR DISKUS 250-50 mcg/dose diskus inhaler Inhale 1 puff into the lungs 2 (two) times daily.     calcium carbonate (TUMS) 200 mg calcium (500 mg) chewable tablet Take 1 tablet by mouth once daily.    diltiaZEM HCl (TIAZAC) 120 mg 24 hr capsule Take 120 mg by mouth once daily.     gabapentin (NEURONTIN) 100 MG capsule Take 100 mg by mouth 3 (three) times daily.     lansoprazole (PREVACID) 15 MG capsule Take 15 mg by mouth once daily.    loperamide (IMODIUM A-D) 2 mg Tab Take 2 mg by mouth 4 (four) times daily as needed.    losartan-hydrochlorothiazide 100-25 mg (HYZAAR) 100-25 mg per tablet Take 1 tablet by mouth once daily.     OFEV 150 mg Cap TAKE 1 CAPSULE BY MOUTH TWICE A DAY  EVERY 12 HOURS  AS DIRECTED WITH FOOD    VENTOLIN HFA 90 mcg/actuation inhaler Inhale 1 puff into the lungs every 6 (six) hours as needed.      No current facility-administered medications for this visit.          There is no immunization history on  file for this patient.  Family History:  History reviewed. No pertinent family history.  Social History     Substance and Sexual Activity   Alcohol Use No    Frequency: Never      Social History     Substance and Sexual Activity   Drug Use No      Social History     Socioeconomic History    Marital status: Single     Spouse name: Not on file    Number of children: Not on file    Years of education: Not on file    Highest education level: Not on file   Social Needs    Financial resource strain: Not on file    Food insecurity - worry: Not on file    Food insecurity - inability: Not on file    Transportation needs - medical: Not on file    Transportation needs - non-medical: Not on file   Occupational History    Not on file   Tobacco Use    Smoking status: Former Smoker     Types: Cigarettes     Last attempt to quit: 2016     Years since quittin.0    Smokeless tobacco: Never Used   Substance and Sexual Activity    Alcohol use: No     Frequency: Never    Drug use: No    Sexual activity: Not on file   Other Topics Concern    Not on file   Social History Narrative    Not on file     Review of Systems   Constitutional: Negative for chills, diaphoresis, fever, malaise/fatigue and weight loss.   HENT: Negative for congestion, ear discharge, ear pain, hearing loss, nosebleeds and sore throat.    Eyes: Negative for blurred vision, double vision and photophobia.   Respiratory: Positive for cough and shortness of breath. Negative for hemoptysis, sputum production and wheezing.    Cardiovascular: Negative for chest pain, palpitations, orthopnea, claudication, leg swelling and PND.   Gastrointestinal: Positive for diarrhea and heartburn. Negative for abdominal pain, blood in stool, constipation, melena, nausea and vomiting.   Genitourinary: Negative for dysuria, flank pain, frequency, hematuria and urgency.   Musculoskeletal: Negative for back pain, falls, joint pain, myalgias and neck pain.   Skin:  "Negative for itching and rash.   Neurological: Negative for dizziness, tremors, sensory change, loss of consciousness, weakness and headaches.   Endo/Heme/Allergies: Does not bruise/bleed easily.   Psychiatric/Behavioral: Negative for depression, hallucinations and memory loss. The patient is not nervous/anxious and does not have insomnia.      Vitals  /62 (BP Location: Left arm, Patient Position: Sitting, BP Method: Medium (Automatic))   Pulse 75   Temp 98.4 °F (36.9 °C) (Oral)   Resp 20   Ht 5' 6" (1.676 m)   Wt 91.2 kg (201 lb 1 oz)   SpO2 96% Comment: RA  BMI 32.45 kg/m²      Physical Exam   Constitutional: She is oriented to person, place, and time and well-developed, well-nourished, and in no distress. No distress.   HENT:   Head: Normocephalic and atraumatic.   Nose: Nose normal.   Mouth/Throat: Oropharynx is clear and moist. No oropharyngeal exudate.   Eyes: Conjunctivae and EOM are normal. Pupils are equal, round, and reactive to light. No scleral icterus.   Neck: Normal range of motion. Neck supple. No JVD present. No tracheal deviation present. No thyromegaly present.   Cardiovascular: Normal rate, regular rhythm and intact distal pulses. Exam reveals no gallop and no friction rub.   No murmur heard.  Pulmonary/Chest: Effort normal. No stridor. No respiratory distress. She has no wheezes. She has rales in the right middle field, the right lower field, the left middle field and the left lower field. She exhibits no tenderness.   Abdominal: Soft. Bowel sounds are normal. She exhibits no distension and no mass. There is no tenderness. There is no rebound and no guarding.   Musculoskeletal: Normal range of motion. She exhibits no edema.   Lymphadenopathy:     She has no cervical adenopathy.   Neurological: She is alert and oriented to person, place, and time. No cranial nerve deficit. Gait normal. Coordination normal.   Skin: Skin is warm and dry. No rash noted. She is not diaphoretic. No " erythema. No pallor.   Psychiatric: Mood and affect normal.       Labs:  Hospital Outpatient Visit on 12/13/2018   Component Date Value    Ascending aorta 12/13/2018 3.26     STJ 12/13/2018 2.87     AV mean gradient 12/13/2018 3.88     Ao peak ganesh 12/13/2018 1.40     Ao VTI 12/13/2018 26.56     IVRT 12/13/2018 0.13     IVS 12/13/2018 0.93     LA size 12/13/2018 3.97     Left Atrium Major Axis 12/13/2018 5.32     Left Atrium Minor Axis 12/13/2018 5.29     LVIDD 12/13/2018 4.68     LVIDS 12/13/2018 3.11     LVOT diameter 12/13/2018 2.12     LVOT peak VTI 12/13/2018 22.31     PW 12/13/2018 0.57*    MV Peak A Ganesh 12/13/2018 0.65     E wave decelartion time 12/13/2018 189.84     MV Peak E Ganesh 12/13/2018 0.77     PV Peak D Ganesh 12/13/2018 0.44     PV Peak S Ganesh 12/13/2018 0.53     RA Major Axis 12/13/2018 5.37     RA Width 12/13/2018 3.81     RVDD 12/13/2018 3.00     Sinus 12/13/2018 3.29     TAPSE 12/13/2018 1.01     TR Max Ganesh 12/13/2018 2.74     TDI LATERAL 12/13/2018 0.13     TDI SEPTAL 12/13/2018 0.06     LA WIDTH 12/13/2018 4.08     PV PEAK VELOCITY 12/13/2018 0.80     LV Diastolic Volume 12/13/2018 101.52     LV Systolic Volume 12/13/2018 38.07     RV S' 12/13/2018 13.34     LV LATERAL E/E' RATIO 12/13/2018 5.92     LV SEPTAL E/E' RATIO 12/13/2018 12.83     FS 12/13/2018 34     LA volume 12/13/2018 73.04     LV mass 12/13/2018 111.69     Left Ventricle Relative * 12/13/2018 0.24     AV valve area 12/13/2018 2.96     AV index (prosthetic) 12/13/2018 0.84     E/A ratio 12/13/2018 1.18     Mean e' 12/13/2018 0.10     Pulm vein S/D ratio 12/13/2018 1.20     LVOT area 12/13/2018 3.53     LVOT stroke volume 12/13/2018 78.71     AV peak gradient 12/13/2018 7.84     E/E' ratio 12/13/2018 8.11     Triscuspid Valve Regurgi* 12/13/2018 30.03     BSA 12/13/2018 2.07     LV Systolic Volume Index 12/13/2018 18.8     LV Diastolic Volume Index 12/13/2018 50.21     LA Volume Index  12/13/2018 36.1     LV Mass Index 12/13/2018 55.2    Lab Visit on 12/13/2018   Component Date Value    Alcohol, Urine 12/13/2018 <10     Benzodiazepines 12/13/2018 Negative     Methadone metabolites 12/13/2018 Negative     Cocaine (Metab.) 12/13/2018 Negative     Opiate Scrn, Ur 12/13/2018 Negative     Barbiturate Screen, Ur 12/13/2018 Negative     Amphetamine Screen, Ur 12/13/2018 Negative     THC 12/13/2018 Negative     Phencyclidine 12/13/2018 Negative     Creatinine, Random Ur 12/13/2018 229.0     Toxicology Information 12/13/2018 SEE COMMENT        Pulmonary Function Tests 11/20/2018 2/26/2018   FVC 1.36 1.31   FEV1 1.2 0.98   TLC (liters) 1.89 2.55   DLCO (ml/mmHg sec) 7.3 -   FVC% 41 44   FEV1% 46 41   FEF 25-75 2.33 0.76   FEF 25-75% 90 33   TLC% 35 52   RV 0.58 1.36   RV% 28 1.16   DLCO% 37 -     6MW 12/13/2018   6MWT Status completed without stopping   Patient Reported Dyspnea   Was O2 used? Yes   Delivery Method Cannula;Pull Tank;Continuous Flow   6MW Distance walked (feet) 1200   Distance walked (meters) 365.76   Did patient stop? No   Oxygen Saturation 99   Supplemental Oxygen 2 L/M   Heart Rate 81   Blood Pressure 114/65   Jaclyn Dyspnea Rating  nothing at all   Oxygen Saturation 88   Supplemental Oxygen 2 L/M   Heart Rate 101   Blood Pressure 133/69   Jaclyn Dyspnea Rating  very light   Recovery Time (seconds) 87   Oxygen Saturation 96   Supplemental Oxygen 2 L/M   Heart Rate 93       Imaging:  Results for orders placed during the hospital encounter of 12/13/18   CT CHEST WITHOUT CONTRAST    Narrative EXAMINATION:  CT CHEST WITHOUT CONTRAST    CLINICAL HISTORY:  lung transplant consult; Interstitial pulmonary disease, unspecified    TECHNIQUE:  Low dose axial images, sagittal and coronal reformations were obtained from the thoracic inlet to the lung bases. Contrast was not administered.    COMPARISON:  Chest radiograph 11/20/2018.    FINDINGS:  Examination of the soft tissue and vascular  structures at the base of the neck is normal.    There is a left-sided aortic arch with 3 branch vessels.  The thoracic aorta maintains normal caliber, contour, and course without significant atherosclerotic calcification.  There is no evidence of aneurysmal dilation or dissection.    The pulmonary arteries distribute normally.  There are four pulmonary veins.  Systemic and pulmonary venoatrial connections are concordant.    The heart is not enlarged and there is no evidence of pericardial effusion.  There is mild coronary artery atherosclerosis.    There is no axillary, mediastinal, or hilar lymph node enlargement.  There are multiple calcified mediastinal lymph nodes.    The esophagus maintains a normal course and caliber.    Limited images of the upper abdomen obtained during the course of this dedicated thoracic CT demonstrates multiple calcified granulomas in the spleen, fluid attenuating left renal lesions suggestive of renal cyst, and cholecystectomy.  There is a small hiatal hernia.    The osseous structures demonstrate thoracic kyphosis and degenerative changes of the thoracic spine.    The trachea and proximal airways are patent.    The lungs are under expanded.  There is widespread reticulation, ground-glass attenuation, architectural distortion, tubular bronchiectasis, and honeycombing which is most compatible with interstitial lung.  No pleural effusion, pneumothorax, or large focal consolidation.      Impression Findings suggestive of an underlying interstitial lung disease such as UIP or NSIP.  Other considerations include chronic hypersensitivity pneumonitis and sarcoidosis which are felt less likely.    Small hiatal hernia.    Mild coronary artery atherosclerosis.    Calcified mediastinal lymph nodes and multiple calcified splenic granulomas suggestive of old granulomatous disease.    Fluid attenuating left renal lesions suggestive of renal cyst.  This could further be evaluated with ultrasound as  warranted.    Additional findings as detailed above.    Electronically signed by resident: Alyce Diaz  Date:    12/13/2018  Time:    09:50    Electronically signed by: Aby Acevedo MD  Date:    12/13/2018  Time:    10:18       Cardiodiagnostics:  TTE 12/13/2018  · Normal left ventricular systolic function. The estimated ejection fraction is 65%  · Indeterminate left ventricular diastolic function.  · Mild-moderate left atrial enlargement.  · Mild tricuspid regurgitation.  · Mild atrial enlargement.  · Mildly reduced right ventricular systolic function.    Assessment:  1. IPF (idiopathic pulmonary fibrosis)    2. Chronic respiratory failure with hypoxia    3. Obesity (BMI 30.0-34.9)    4. Lung transplant candidate      Plan:   1. She will continue current therapy with nintedanib for her IPF. Her FVC has remained stable but there has been a significant decrease in her total lung capacity and her diffusion is severely decreased.     2. I advised her to use her oxygen on any exertion since there is already evidence of reduced RV function.     3. I also advised her to lose at least 10 lbs. Sleep study to evaluate for KELLEY is indicated if not already done.    4. Regarding transplant evaluation, she does meet criteria for transplant for IPF since there is evidence of desaturation to 88% on exertion and also evidence of severe decrease in her diffusion capacity. Review of her CT shows that there is increase volume loss in the left lung so a right single lung transplant may be the option if we decide to list her. For that reason I advised her to lose weight to reach a BMI of 30 or less. Lung transplant survival statistics were discussed with her and her daughters. We will begin transplant workup.     5. RTC during workup    Thank you for allowing me to participate in the care of your patient. Please, do not hesitate to contact me if you have any questions.      Niraj Garza MD  Medical Director of Lung  Transplantation  [facility]     no

## 2025-06-17 NOTE — PROGRESS NOTES
Attempted to contact Ms. Becerra to see if she had the pap smear and the mammogram as scheduled since the results have not been received.  No answer.  Left a message requesting a return call.    Received a return call from Ms. Becerra.  She stated that she had the pap smear, but she had to cancel and reschedule the mammogram.  The mammogram was rescheduled to 3/4/19.  She also has an appointment with the dentist on 3/4/19.   Linear/Impaired reasoning

## (undated) DEVICE — SUT PROLENE 4-0 RB-1 BL MO

## (undated) DEVICE — BLADE 4 INCH EDGE UN-INS

## (undated) DEVICE — KIT CO-PILOT

## (undated) DEVICE — TAPE UMBILICAL RADIOPAQUE

## (undated) DEVICE — SEE MEDLINE ITEM 156894

## (undated) DEVICE — POSITIONER HEART URCHIN

## (undated) DEVICE — SUT SILK 2-0 STRANDS 30IN

## (undated) DEVICE — SUT VICRYL BR 1 GEN 27 CT-1

## (undated) DEVICE — KIT CUSTOM MANIFOLD

## (undated) DEVICE — STOCKINETTE 2INX36

## (undated) DEVICE — SET DECANTER MEDICHOICE

## (undated) DEVICE — STAPLER GIA HANDLE STD

## (undated) DEVICE — DRESSING ADH ISLAND 3.6 X 14

## (undated) DEVICE — SUT PROLENE 4-0 SH BLU 36IN

## (undated) DEVICE — SUT SILK BLK BR. 2 2-60

## (undated) DEVICE — Device

## (undated) DEVICE — SPONGE LAP 18X18 PREWASHED

## (undated) DEVICE — CONTAINER SPECIMEN STRL 4OZ

## (undated) DEVICE — SUT ETHIBOND XTRA 1 CTX

## (undated) DEVICE — ELECTRODE REM PLYHSV RETURN 9

## (undated) DEVICE — TIPS FLEXIBLE 45CM

## (undated) DEVICE — DRAPE SLUSH WARMER WITH DISC

## (undated) DEVICE — KIT URINARY CATH URINE METER

## (undated) DEVICE — CATH THORACIC 32FR ST

## (undated) DEVICE — SPIKE CONTRAST CONTROLLER

## (undated) DEVICE — KIT PREVENA INCISION MGMT 13CM

## (undated) DEVICE — GUIDE LAUNCHER 6FR EBU 3.5

## (undated) DEVICE — GUIDEWIRE AMPLATZ .035X260

## (undated) DEVICE — HEMOSTAT VASC BAND REG 24CM

## (undated) DEVICE — STAPLE TRI-STAPLE 2.0 CRV TIP

## (undated) DEVICE — WIPE ESENTA BARR STNG FREE 3ML

## (undated) DEVICE — HEMOSTAT SURGICEL NU-KNIT 6X9

## (undated) DEVICE — SEE MEDLINE ITEM 157187

## (undated) DEVICE — ENDO GIA UNIVERSAL 12MM

## (undated) DEVICE — WIRE BENTSON 035/180

## (undated) DEVICE — CATH JACKY RADIAL 5FR 100CM

## (undated) DEVICE — SUT 6 18IN STEEL MONO CCS

## (undated) DEVICE — CATH THOR STND RGHT ANG 32FR

## (undated) DEVICE — CATH THORACIC ANGLE 32F

## (undated) DEVICE — PLEDGET SUT SOFT 3/8X3/16X1/16

## (undated) DEVICE — ELECTRODE EXTENDED BLADE

## (undated) DEVICE — KIT GLIDESHEATH SLEND 6FR 10CM

## (undated) DEVICE — DRESSING AQUACEL SACRAL 9 X 9

## (undated) DEVICE — SUT 2/0 30IN ETHIBOND

## (undated) DEVICE — DRAIN CHEST DRY SUCTION

## (undated) DEVICE — SOL NS 1000CC

## (undated) DEVICE — GUIDEWIRE VERRATA + JTIP 185CM

## (undated) DEVICE — DRAPE SPLIT STERILE

## (undated) DEVICE — OMNIPAQUE 350 200ML

## (undated) DEVICE — SOL 9P NACL IRR PIC IL

## (undated) DEVICE — GLOVE BIOGEL PI MICRO SZ 7.5

## (undated) DEVICE — SUT PROLENE 5-0 36IN C-1

## (undated) DEVICE — SUT SILK 2-0 SH 18IN BLACK

## (undated) DEVICE — LOOP VESSEL BLUE MAXI

## (undated) DEVICE — SUT D SPECIAL 4-0 PDS SH

## (undated) DEVICE — RETRACTOR OCTOBASE INSERT HOLD

## (undated) DEVICE — TRAY HEART

## (undated) DEVICE — SUT 2/0 30IN SILK BLK BRAI

## (undated) DEVICE — CATH THORACIC ST AXIOM 32F

## (undated) DEVICE — COVER SET UP STRL 54X54 20/BX